# Patient Record
Sex: FEMALE | Race: OTHER | HISPANIC OR LATINO
[De-identification: names, ages, dates, MRNs, and addresses within clinical notes are randomized per-mention and may not be internally consistent; named-entity substitution may affect disease eponyms.]

---

## 2017-09-29 ENCOUNTER — APPOINTMENT (OUTPATIENT)
Dept: NEUROLOGY | Facility: CLINIC | Age: 47
End: 2017-09-29

## 2022-03-27 ENCOUNTER — INPATIENT (INPATIENT)
Facility: HOSPITAL | Age: 52
LOS: 22 days | Discharge: NURSING, REHAB OR LONG-TERM CARE | End: 2022-04-19
Attending: HOSPITALIST | Admitting: HOSPITALIST
Payer: MEDICARE

## 2022-03-27 VITALS
RESPIRATION RATE: 30 BRPM | SYSTOLIC BLOOD PRESSURE: 93 MMHG | TEMPERATURE: 98 F | HEART RATE: 96 BPM | DIASTOLIC BLOOD PRESSURE: 60 MMHG

## 2022-03-27 LAB
ALBUMIN SERPL ELPH-MCNC: 2.5 G/DL — LOW (ref 3.3–5)
ALP SERPL-CCNC: 162 U/L — HIGH (ref 40–120)
ALT FLD-CCNC: 12 U/L — SIGNIFICANT CHANGE UP (ref 4–33)
ANION GAP SERPL CALC-SCNC: 18 MMOL/L — HIGH (ref 7–14)
ANISOCYTOSIS BLD QL: SLIGHT — SIGNIFICANT CHANGE UP
AST SERPL-CCNC: 23 U/L — SIGNIFICANT CHANGE UP (ref 4–32)
B-OH-BUTYR SERPL-SCNC: 0.3 MMOL/L — SIGNIFICANT CHANGE UP (ref 0–0.4)
BASE EXCESS BLDV CALC-SCNC: -9.2 MMOL/L — LOW (ref -2–3)
BASOPHILS # BLD AUTO: 0.58 K/UL — HIGH (ref 0–0.2)
BASOPHILS NFR BLD AUTO: 2.6 % — HIGH (ref 0–2)
BILIRUB SERPL-MCNC: 0.4 MG/DL — SIGNIFICANT CHANGE UP (ref 0.2–1.2)
BLOOD GAS VENOUS COMPREHENSIVE RESULT: SIGNIFICANT CHANGE UP
BUN SERPL-MCNC: 59 MG/DL — HIGH (ref 7–23)
CALCIUM SERPL-MCNC: 9.1 MG/DL — SIGNIFICANT CHANGE UP (ref 8.4–10.5)
CHLORIDE BLDV-SCNC: 100 MMOL/L — SIGNIFICANT CHANGE UP (ref 96–108)
CHLORIDE SERPL-SCNC: 96 MMOL/L — LOW (ref 98–107)
CO2 BLDV-SCNC: 15.2 MMOL/L — LOW (ref 22–26)
CO2 SERPL-SCNC: 13 MMOL/L — LOW (ref 22–31)
CREAT SERPL-MCNC: 2.01 MG/DL — HIGH (ref 0.5–1.3)
EGFR: 30 ML/MIN/1.73M2 — LOW
EOSINOPHIL # BLD AUTO: 0.2 K/UL — SIGNIFICANT CHANGE UP (ref 0–0.5)
EOSINOPHIL NFR BLD AUTO: 0.9 % — SIGNIFICANT CHANGE UP (ref 0–6)
GAS PNL BLDV: 127 MMOL/L — LOW (ref 136–145)
GIANT PLATELETS BLD QL SMEAR: PRESENT — SIGNIFICANT CHANGE UP
GLUCOSE BLDV-MCNC: 551 MG/DL — CRITICAL HIGH (ref 70–99)
GLUCOSE SERPL-MCNC: 531 MG/DL — CRITICAL HIGH (ref 70–99)
HCO3 BLDV-SCNC: 14 MMOL/L — LOW (ref 22–29)
HCT VFR BLD CALC: 27.6 % — LOW (ref 34.5–45)
HCT VFR BLDA CALC: 26 % — LOW (ref 34.5–46.5)
HGB BLD CALC-MCNC: 8.8 G/DL — LOW (ref 11.5–15.5)
HGB BLD-MCNC: 8.4 G/DL — LOW (ref 11.5–15.5)
IANC: 18.3 K/UL — HIGH (ref 1.8–7.4)
LACTATE BLDV-MCNC: 3.2 MMOL/L — HIGH (ref 0.5–2)
LYMPHOCYTES # BLD AUTO: 0.79 K/UL — LOW (ref 1–3.3)
LYMPHOCYTES # BLD AUTO: 3.5 % — LOW (ref 13–44)
MAGNESIUM SERPL-MCNC: 1.5 MG/DL — LOW (ref 1.6–2.6)
MANUAL SMEAR VERIFICATION: SIGNIFICANT CHANGE UP
MCHC RBC-ENTMCNC: 25.3 PG — LOW (ref 27–34)
MCHC RBC-ENTMCNC: 30.4 GM/DL — LOW (ref 32–36)
MCV RBC AUTO: 83.1 FL — SIGNIFICANT CHANGE UP (ref 80–100)
METAMYELOCYTES # FLD: 0.9 % — SIGNIFICANT CHANGE UP (ref 0–1)
MICROCYTES BLD QL: SIGNIFICANT CHANGE UP
MONOCYTES # BLD AUTO: 2.56 K/UL — HIGH (ref 0–0.9)
MONOCYTES NFR BLD AUTO: 11.4 % — SIGNIFICANT CHANGE UP (ref 2–14)
MYELOCYTES NFR BLD: 0.9 % — HIGH (ref 0–0)
NEUTROPHILS # BLD AUTO: 17.52 K/UL — HIGH (ref 1.8–7.4)
NEUTROPHILS NFR BLD AUTO: 76.3 % — SIGNIFICANT CHANGE UP (ref 43–77)
NEUTS BAND # BLD: 1.7 % — SIGNIFICANT CHANGE UP (ref 0–6)
PCO2 BLDV: 24 MMHG — LOW (ref 39–42)
PH BLDV: 7.39 — SIGNIFICANT CHANGE UP (ref 7.32–7.43)
PHOSPHATE SERPL-MCNC: 5.3 MG/DL — HIGH (ref 2.5–4.5)
PLAT MORPH BLD: NORMAL — SIGNIFICANT CHANGE UP
PLATELET # BLD AUTO: 505 K/UL — HIGH (ref 150–400)
PLATELET COUNT - ESTIMATE: ABNORMAL
PO2 BLDV: 107 MMHG — SIGNIFICANT CHANGE UP
POIKILOCYTOSIS BLD QL AUTO: SLIGHT — SIGNIFICANT CHANGE UP
POLYCHROMASIA BLD QL SMEAR: SLIGHT — SIGNIFICANT CHANGE UP
POTASSIUM BLDV-SCNC: 6.8 MMOL/L — CRITICAL HIGH (ref 3.5–5.1)
POTASSIUM SERPL-MCNC: 6.6 MMOL/L — CRITICAL HIGH (ref 3.5–5.3)
POTASSIUM SERPL-SCNC: 6.6 MMOL/L — CRITICAL HIGH (ref 3.5–5.3)
PROT SERPL-MCNC: 8.2 G/DL — SIGNIFICANT CHANGE UP (ref 6–8.3)
RBC # BLD: 3.32 M/UL — LOW (ref 3.8–5.2)
RBC # FLD: 14.7 % — HIGH (ref 10.3–14.5)
RBC BLD AUTO: NORMAL — SIGNIFICANT CHANGE UP
SAO2 % BLDV: 99.2 % — SIGNIFICANT CHANGE UP
SODIUM SERPL-SCNC: 127 MMOL/L — LOW (ref 135–145)
SPHEROCYTES BLD QL SMEAR: SLIGHT — SIGNIFICANT CHANGE UP
VARIANT LYMPHS # BLD: 1.8 % — SIGNIFICANT CHANGE UP (ref 0–6)
WBC # BLD: 22.46 K/UL — HIGH (ref 3.8–10.5)
WBC # FLD AUTO: 22.46 K/UL — HIGH (ref 3.8–10.5)

## 2022-03-27 PROCEDURE — 99291 CRITICAL CARE FIRST HOUR: CPT | Mod: 25

## 2022-03-27 PROCEDURE — 36556 INSERT NON-TUNNEL CV CATH: CPT

## 2022-03-27 RX ORDER — SODIUM CHLORIDE 9 MG/ML
1000 INJECTION, SOLUTION INTRAVENOUS ONCE
Refills: 0 | Status: COMPLETED | OUTPATIENT
Start: 2022-03-27 | End: 2022-03-27

## 2022-03-27 RX ORDER — ALBUTEROL 90 UG/1
2.5 AEROSOL, METERED ORAL ONCE
Refills: 0 | Status: COMPLETED | OUTPATIENT
Start: 2022-03-27 | End: 2022-03-27

## 2022-03-27 RX ORDER — ALBUTEROL 90 UG/1
1 AEROSOL, METERED ORAL ONCE
Refills: 0 | Status: COMPLETED | OUTPATIENT
Start: 2022-03-27 | End: 2022-03-27

## 2022-03-27 RX ORDER — CALCIUM GLUCONATE 100 MG/ML
1 VIAL (ML) INTRAVENOUS ONCE
Refills: 0 | Status: COMPLETED | OUTPATIENT
Start: 2022-03-27 | End: 2022-03-27

## 2022-03-27 RX ORDER — MAGNESIUM SULFATE 500 MG/ML
2 VIAL (ML) INJECTION ONCE
Refills: 0 | Status: COMPLETED | OUTPATIENT
Start: 2022-03-27 | End: 2022-03-27

## 2022-03-27 RX ORDER — INSULIN LISPRO 100/ML
7 VIAL (ML) SUBCUTANEOUS ONCE
Refills: 0 | Status: DISCONTINUED | OUTPATIENT
Start: 2022-03-27 | End: 2022-03-28

## 2022-03-27 RX ORDER — SODIUM BICARBONATE 1 MEQ/ML
50 SYRINGE (ML) INTRAVENOUS ONCE
Refills: 0 | Status: COMPLETED | OUTPATIENT
Start: 2022-03-27 | End: 2022-03-27

## 2022-03-27 RX ORDER — INSULIN HUMAN 100 [IU]/ML
10 INJECTION, SOLUTION SUBCUTANEOUS ONCE
Refills: 0 | Status: COMPLETED | OUTPATIENT
Start: 2022-03-27 | End: 2022-03-27

## 2022-03-27 RX ADMIN — INSULIN HUMAN 10 UNIT(S): 100 INJECTION, SOLUTION SUBCUTANEOUS at 21:33

## 2022-03-27 RX ADMIN — SODIUM CHLORIDE 1000 MILLILITER(S): 9 INJECTION, SOLUTION INTRAVENOUS at 23:30

## 2022-03-27 RX ADMIN — ALBUTEROL 1 PUFF(S): 90 AEROSOL, METERED ORAL at 18:53

## 2022-03-27 RX ADMIN — Medication 25 GRAM(S): at 22:47

## 2022-03-27 RX ADMIN — SODIUM CHLORIDE 1000 MILLILITER(S): 9 INJECTION, SOLUTION INTRAVENOUS at 21:33

## 2022-03-27 RX ADMIN — ALBUTEROL 2.5 MILLIGRAM(S): 90 AEROSOL, METERED ORAL at 21:34

## 2022-03-27 RX ADMIN — Medication 50 MILLIEQUIVALENT(S): at 21:33

## 2022-03-27 RX ADMIN — Medication 100 GRAM(S): at 22:47

## 2022-03-27 NOTE — ED PROVIDER NOTE - CLINICAL SUMMARY MEDICAL DECISION MAKING FREE TEXT BOX
tootie pgy1: 52 F with HTN DM presents for eval of skin wounds. Thigh and suprapubic chronic appearing non-infected but concern for R labial infection. Check labs, CT scan, and assess for appropriateness of drainage. TLC placed by day team. tootie pgy1: 52 F with HTN DM presents for eval of skin wounds. Thigh and suprapubic chronic appearing non-infected but concern for R labial infection. Check labs, CT scan, and assess for appropriateness of drainage. TLC placed by day team.      Authored by Dr Sanford: 51 yo F, hx of T2DM, asthma, obesity, chronic immobility and several chronic wounds, presenting for vulvar lesion noted by her home health nurse today. Tachycardic and borderline temp of 100.3 rectally. exam as noted. Paralabial abscess w/ likely communication to the vaginal cannal. Consider deep space extension. Non-toxic and not septic currently. CT A/P iv contrast and cmp, cbc, vbg lactate. ivf.

## 2022-03-27 NOTE — ED PROVIDER NOTE - ATTENDING CONTRIBUTION TO CARE
51 yo F, hx of T2DM, asthma, obesity, chronic immobility and several chronic wounds, presenting for vulvar lesion noted by her home health nurse today. Unclear the duration of the lesion, as patient had not noticed this herself. The area was draining and appeared infection. Pt brother notified by home RN and ambulance called. Pt denies any ongoing symptoms. She lives on her own. She takes an unspecified oral hyperglycemic, is not on insulin. She has a rescue albuterol inhaler. Denies known allergies to medications.

## 2022-03-27 NOTE — ED PROVIDER NOTE - PHYSICAL EXAMINATION
Authored by Dr Sanford:  PE:  In no acute distress   NCAT   No respiratory distress, mild inspiratory wheeze   Normal rate and rhythm. No abnormal heart sounds.   Abdomen is large and distended w/ subcu fat. Non-tender   : Chaperoned by Phani Espinoza, Attending EP. There is a large paralabial fluid collection. This is draining purulent material. There is purulent material exiting the vagina. There is a  1 cm non-stagable ulcer over the medial groin on the R   Rectal: Large Stage 4 Ulcer over the R hip w/out cardinal signs of infection. There are several low stage ulcers over the rectum and buttock area that do not appear infected. The anus is unremarkable.   Neuro: Alert and grossly oriented. Answers questions approrpiately. Do gross motor deficits  MSK: No lennie deformities.
202

## 2022-03-27 NOTE — ED ADULT TRIAGE NOTE - CHIEF COMPLAINT QUOTE
arrives ems who called to house- bedsores r leg,r rib area,sacral. pt denies c/o pain at present.  pt awke,appears weak,orinted x3.  incontinent feces present. fs 466. pmh- dm,ms

## 2022-03-27 NOTE — ED ADULT NURSE NOTE - OBJECTIVE STATEMENT
A&Ox3. non ambulatory. c/o bed sores. pt states she lives alone and takes care of herself. NAD. pt denies chest pain, dizziness, n/v/d, fevers, chills, SOB. respirations are even and un labored. abd is soft and non tender. positive un stageable pressure wound to right lateral hip, 6" by 6". positive tunneling wound to lower right lateral leg, 4" by 4". IV placed by MD via MD. comfort care provided, wound covers placed to left lateral hip and left lateral thigh. white odorous substance draining from the vagina. various stages of wounds observed to left lateral breast. right heel has un-stageable pressure wound. left heel elevated to relieve pressure. pt position changed every 2 hours. safety precautions maintained.

## 2022-03-27 NOTE — ED PROVIDER NOTE - PROGRESS NOTE DETAILS
tootie pgy1: sign out from day team - 52F with HTN DM2 morbid obesity presents from home after aid described some suprapubic ulcer/R labial and R thigh wound. Per prior resident pt has R labial swelling with some discharge as well as a R thigh/R suprapubic (chronic) wound that does not appear infected. Pt extremely difficult to get vascular access resulting in TLC. Labs, CT scans pending. Will plan to admit. tootie pgy1: labs not improving despite aggressive fluids and meds. Consulted MICU and Nephro. To place toussaint and check urine. Nephro does not think needs emergent HD but will come to see pt. Likely MICU Dustin pgy1: spoke to Nephro fellow again. Does not feel that lab abnormalities are due to renal failure, feels she is still grossly volume down and in DKA vs HHS. Feels K will improve with insulin drip and aggressive fluid resuscitation. No plan for HD yet, MICU aware. Insulin drip ordered. tootie pgy1: pt was pending repeat labs after third IVF L and insulin drip then pt's BP dropped first to 80/40 then 60/40. Pt somewhat more tired and less responsive but still talking. Further fluids given and levo started. MICU re-consulted. tootie pgy1: pt was pending repeat labs after third IVF L and insulin drip then pt's BP dropped first to 80/40 then 60/40. Pt somewhat more tired and less responsive but still talking. Further fluids given and levo started. MICU re-consulted.    Camacho Ocasio DO: agree with above patient received from Dr Espinoza. Upon my evaluation, this patient had a high probability of imminent or life-threatening deterioration due to hyperkalemia, acidemia and then subsequent hypotension requiring pressors, which required my direct attention, intervention, and personal management. s/p 4L ivf lungs cta, patient aaox3 speaking full sentences comfortably. turbid, thick purulent urine draining from newly placed toussaint. The patient has a  medical condition that impairs one or more vital organ systems.  Frequent personal assessment and adjustment of medical interventions was performed.      I have personally provided 35 minutes of critical care time exclusive of time spent on separately billable procedures. Time includes review of laboratory data, radiology results, discussion with consultants, patient and family; monitoring for potential decompensation, as well as time spent retrieving data and reviewing the chart and documenting the visit. Interventions were performed as documented above.

## 2022-03-27 NOTE — ED PROVIDER NOTE - OBJECTIVE STATEMENT
Authored by Dr Sanford: 53 yo F, hx of T2DM, asthma, obesity, chronic immobility and several chronic wounds, presenting for vulvar lesion noted by her home health nurse today. Unclear the duration of the lesion, as patient had not noticed this herself. The area was draining and appeared infection. Pt brother notified by home RN and ambulance called. Pt denies any ongoing symptoms. She lives on her own. She takes an unspecified oral hyperglycemic, is not on insulin. She has a rescue albuterol inhaler. Denies known allergies to medications.

## 2022-03-28 DIAGNOSIS — E87.2 ACIDOSIS: ICD-10-CM

## 2022-03-28 DIAGNOSIS — T14.8XXA OTHER INJURY OF UNSPECIFIED BODY REGION, INITIAL ENCOUNTER: ICD-10-CM

## 2022-03-28 DIAGNOSIS — E87.5 HYPERKALEMIA: ICD-10-CM

## 2022-03-28 DIAGNOSIS — N17.9 ACUTE KIDNEY FAILURE, UNSPECIFIED: ICD-10-CM

## 2022-03-28 LAB
A1C WITH ESTIMATED AVERAGE GLUCOSE RESULT: 14.2 % — HIGH (ref 4–5.6)
ALBUMIN SERPL ELPH-MCNC: 1.7 G/DL — LOW (ref 3.3–5)
ALBUMIN SERPL ELPH-MCNC: 2.2 G/DL — LOW (ref 3.3–5)
ALP SERPL-CCNC: 155 U/L — HIGH (ref 40–120)
ALP SERPL-CCNC: 161 U/L — HIGH (ref 40–120)
ALT FLD-CCNC: 16 U/L — SIGNIFICANT CHANGE UP (ref 4–33)
ALT FLD-CCNC: 18 U/L — SIGNIFICANT CHANGE UP (ref 4–33)
ANION GAP SERPL CALC-SCNC: 16 MMOL/L — HIGH (ref 7–14)
ANION GAP SERPL CALC-SCNC: 16 MMOL/L — HIGH (ref 7–14)
ANION GAP SERPL CALC-SCNC: 18 MMOL/L — HIGH (ref 7–14)
ANION GAP SERPL CALC-SCNC: 18 MMOL/L — HIGH (ref 7–14)
APPEARANCE UR: ABNORMAL
AST SERPL-CCNC: 50 U/L — HIGH (ref 4–32)
AST SERPL-CCNC: 51 U/L — HIGH (ref 4–32)
BACTERIA # UR AUTO: ABNORMAL
BILIRUB SERPL-MCNC: 0.3 MG/DL — SIGNIFICANT CHANGE UP (ref 0.2–1.2)
BILIRUB SERPL-MCNC: 0.4 MG/DL — SIGNIFICANT CHANGE UP (ref 0.2–1.2)
BILIRUB UR-MCNC: NEGATIVE — SIGNIFICANT CHANGE UP
BLOOD GAS VENOUS COMPREHENSIVE RESULT: SIGNIFICANT CHANGE UP
BLOOD GAS VENOUS COMPREHENSIVE RESULT: SIGNIFICANT CHANGE UP
BUN SERPL-MCNC: 54 MG/DL — HIGH (ref 7–23)
BUN SERPL-MCNC: 56 MG/DL — HIGH (ref 7–23)
BUN SERPL-MCNC: 57 MG/DL — HIGH (ref 7–23)
BUN SERPL-MCNC: 59 MG/DL — HIGH (ref 7–23)
CALCIUM SERPL-MCNC: 8.5 MG/DL — SIGNIFICANT CHANGE UP (ref 8.4–10.5)
CALCIUM SERPL-MCNC: 8.8 MG/DL — SIGNIFICANT CHANGE UP (ref 8.4–10.5)
CALCIUM SERPL-MCNC: 9 MG/DL — SIGNIFICANT CHANGE UP (ref 8.4–10.5)
CALCIUM SERPL-MCNC: 9.2 MG/DL — SIGNIFICANT CHANGE UP (ref 8.4–10.5)
CHLORIDE SERPL-SCNC: 91 MMOL/L — LOW (ref 98–107)
CHLORIDE SERPL-SCNC: 95 MMOL/L — LOW (ref 98–107)
CHLORIDE UR-SCNC: 41 MMOL/L — SIGNIFICANT CHANGE UP
CO2 SERPL-SCNC: 12 MMOL/L — LOW (ref 22–31)
CO2 SERPL-SCNC: 15 MMOL/L — LOW (ref 22–31)
CO2 SERPL-SCNC: 17 MMOL/L — LOW (ref 22–31)
CO2 SERPL-SCNC: 18 MMOL/L — LOW (ref 22–31)
COLOR SPEC: ABNORMAL
CREAT ?TM UR-MCNC: 51 MG/DL — SIGNIFICANT CHANGE UP
CREAT SERPL-MCNC: 2.17 MG/DL — HIGH (ref 0.5–1.3)
CREAT SERPL-MCNC: 2.29 MG/DL — HIGH (ref 0.5–1.3)
CREAT SERPL-MCNC: 2.33 MG/DL — HIGH (ref 0.5–1.3)
CREAT SERPL-MCNC: 2.4 MG/DL — HIGH (ref 0.5–1.3)
DIFF PNL FLD: ABNORMAL
EGFR: 24 ML/MIN/1.73M2 — LOW
EGFR: 25 ML/MIN/1.73M2 — LOW
EGFR: 25 ML/MIN/1.73M2 — LOW
EGFR: 27 ML/MIN/1.73M2 — LOW
EPI CELLS # UR: SIGNIFICANT CHANGE UP
ESTIMATED AVERAGE GLUCOSE: 361 — SIGNIFICANT CHANGE UP
FERRITIN SERPL-MCNC: 956 NG/ML — HIGH (ref 15–150)
FLUAV + FLUBV RNA SPEC NAA+PROBE: SIGNIFICANT CHANGE UP
FLUAV AG NPH QL: SIGNIFICANT CHANGE UP
FLUBV AG NPH QL: SIGNIFICANT CHANGE UP
GLUCOSE BLDC GLUCOMTR-MCNC: 231 MG/DL — HIGH (ref 70–99)
GLUCOSE BLDC GLUCOMTR-MCNC: 236 MG/DL — HIGH (ref 70–99)
GLUCOSE BLDC GLUCOMTR-MCNC: 241 MG/DL — HIGH (ref 70–99)
GLUCOSE BLDC GLUCOMTR-MCNC: 301 MG/DL — HIGH (ref 70–99)
GLUCOSE BLDC GLUCOMTR-MCNC: 318 MG/DL — HIGH (ref 70–99)
GLUCOSE BLDC GLUCOMTR-MCNC: 365 MG/DL — HIGH (ref 70–99)
GLUCOSE SERPL-MCNC: 301 MG/DL — HIGH (ref 70–99)
GLUCOSE SERPL-MCNC: 312 MG/DL — HIGH (ref 70–99)
GLUCOSE SERPL-MCNC: 351 MG/DL — HIGH (ref 70–99)
GLUCOSE SERPL-MCNC: 400 MG/DL — HIGH (ref 70–99)
GLUCOSE UR QL: NEGATIVE — SIGNIFICANT CHANGE UP
HAPTOGLOB SERPL-MCNC: 526 MG/DL — HIGH (ref 34–200)
IRON SATN MFR SERPL: 15 % — SIGNIFICANT CHANGE UP (ref 14–50)
IRON SATN MFR SERPL: 24 UG/DL — LOW (ref 30–160)
KETONES UR-MCNC: NEGATIVE — SIGNIFICANT CHANGE UP
LDH SERPL L TO P-CCNC: 387 U/L — HIGH (ref 135–225)
LEUKOCYTE ESTERASE UR-ACNC: ABNORMAL
MAGNESIUM SERPL-MCNC: 1.9 MG/DL — SIGNIFICANT CHANGE UP (ref 1.6–2.6)
NITRITE UR-MCNC: NEGATIVE — SIGNIFICANT CHANGE UP
PH UR: 5.5 — SIGNIFICANT CHANGE UP (ref 5–8)
POTASSIUM SERPL-MCNC: 5.2 MMOL/L — SIGNIFICANT CHANGE UP (ref 3.5–5.3)
POTASSIUM SERPL-MCNC: 5.4 MMOL/L — HIGH (ref 3.5–5.3)
POTASSIUM SERPL-MCNC: 6.1 MMOL/L — HIGH (ref 3.5–5.3)
POTASSIUM SERPL-MCNC: 6.6 MMOL/L — CRITICAL HIGH (ref 3.5–5.3)
POTASSIUM SERPL-SCNC: 5.2 MMOL/L — SIGNIFICANT CHANGE UP (ref 3.5–5.3)
POTASSIUM SERPL-SCNC: 5.4 MMOL/L — HIGH (ref 3.5–5.3)
POTASSIUM SERPL-SCNC: 6.1 MMOL/L — HIGH (ref 3.5–5.3)
POTASSIUM SERPL-SCNC: 6.6 MMOL/L — CRITICAL HIGH (ref 3.5–5.3)
POTASSIUM UR-SCNC: 34.9 MMOL/L — SIGNIFICANT CHANGE UP
PROT SERPL-MCNC: 7.2 G/DL — SIGNIFICANT CHANGE UP (ref 6–8.3)
PROT SERPL-MCNC: 7.7 G/DL — SIGNIFICANT CHANGE UP (ref 6–8.3)
PROT UR-MCNC: ABNORMAL
RBC CASTS # UR COMP ASSIST: 8 /HPF — HIGH (ref 0–4)
RSV RNA NPH QL NAA+NON-PROBE: SIGNIFICANT CHANGE UP
SARS-COV-2 RNA SPEC QL NAA+PROBE: DETECTED
SODIUM SERPL-SCNC: 124 MMOL/L — LOW (ref 135–145)
SODIUM SERPL-SCNC: 125 MMOL/L — LOW (ref 135–145)
SODIUM SERPL-SCNC: 128 MMOL/L — LOW (ref 135–145)
SODIUM SERPL-SCNC: 129 MMOL/L — LOW (ref 135–145)
SODIUM UR-SCNC: 44 MMOL/L — SIGNIFICANT CHANGE UP
SP GR SPEC: 1.03 — SIGNIFICANT CHANGE UP (ref 1–1.05)
TIBC SERPL-MCNC: 164 UG/DL — LOW (ref 220–430)
TSH SERPL-MCNC: 3.86 UIU/ML — SIGNIFICANT CHANGE UP (ref 0.27–4.2)
UIBC SERPL-MCNC: 140 UG/DL — SIGNIFICANT CHANGE UP (ref 110–370)
UROBILINOGEN FLD QL: SIGNIFICANT CHANGE UP
WBC UR QL: >200 /HPF — HIGH (ref 0–5)

## 2022-03-28 PROCEDURE — 76604 US EXAM CHEST: CPT | Mod: 26,GC

## 2022-03-28 PROCEDURE — 99222 1ST HOSP IP/OBS MODERATE 55: CPT

## 2022-03-28 PROCEDURE — 93308 TTE F-UP OR LMTD: CPT | Mod: 26,GC

## 2022-03-28 PROCEDURE — 99291 CRITICAL CARE FIRST HOUR: CPT

## 2022-03-28 PROCEDURE — 74176 CT ABD & PELVIS W/O CONTRAST: CPT | Mod: 26,MA

## 2022-03-28 PROCEDURE — 99292 CRITICAL CARE ADDL 30 MIN: CPT

## 2022-03-28 RX ORDER — SODIUM CHLORIDE 9 MG/ML
1000 INJECTION, SOLUTION INTRAVENOUS ONCE
Refills: 0 | Status: DISCONTINUED | OUTPATIENT
Start: 2022-03-28 | End: 2022-03-28

## 2022-03-28 RX ORDER — INSULIN GLARGINE 100 [IU]/ML
7 INJECTION, SOLUTION SUBCUTANEOUS ONCE
Refills: 0 | Status: DISCONTINUED | OUTPATIENT
Start: 2022-03-28 | End: 2022-03-28

## 2022-03-28 RX ORDER — SODIUM CHLORIDE 9 MG/ML
1000 INJECTION, SOLUTION INTRAVENOUS
Refills: 0 | Status: DISCONTINUED | OUTPATIENT
Start: 2022-03-28 | End: 2022-03-28

## 2022-03-28 RX ORDER — ACETAMINOPHEN 500 MG
1000 TABLET ORAL ONCE
Refills: 0 | Status: COMPLETED | OUTPATIENT
Start: 2022-03-28 | End: 2022-03-28

## 2022-03-28 RX ORDER — SODIUM ZIRCONIUM CYCLOSILICATE 10 G/10G
5 POWDER, FOR SUSPENSION ORAL ONCE
Refills: 0 | Status: COMPLETED | OUTPATIENT
Start: 2022-03-28 | End: 2022-03-28

## 2022-03-28 RX ORDER — SODIUM BICARBONATE 1 MEQ/ML
0.14 SYRINGE (ML) INTRAVENOUS
Qty: 150 | Refills: 0 | Status: DISCONTINUED | OUTPATIENT
Start: 2022-03-28 | End: 2022-03-29

## 2022-03-28 RX ORDER — PIPERACILLIN AND TAZOBACTAM 4; .5 G/20ML; G/20ML
3.38 INJECTION, POWDER, LYOPHILIZED, FOR SOLUTION INTRAVENOUS ONCE
Refills: 0 | Status: COMPLETED | OUTPATIENT
Start: 2022-03-28 | End: 2022-03-28

## 2022-03-28 RX ORDER — VANCOMYCIN HCL 1 G
750 VIAL (EA) INTRAVENOUS ONCE
Refills: 0 | Status: COMPLETED | OUTPATIENT
Start: 2022-03-28 | End: 2022-03-28

## 2022-03-28 RX ORDER — SODIUM ZIRCONIUM CYCLOSILICATE 10 G/10G
10 POWDER, FOR SUSPENSION ORAL ONCE
Refills: 0 | Status: DISCONTINUED | OUTPATIENT
Start: 2022-03-28 | End: 2022-03-28

## 2022-03-28 RX ORDER — MIDODRINE HYDROCHLORIDE 2.5 MG/1
10 TABLET ORAL EVERY 8 HOURS
Refills: 0 | Status: DISCONTINUED | OUTPATIENT
Start: 2022-03-28 | End: 2022-03-29

## 2022-03-28 RX ORDER — VANCOMYCIN HCL 1 G
1000 VIAL (EA) INTRAVENOUS ONCE
Refills: 0 | Status: COMPLETED | OUTPATIENT
Start: 2022-03-28 | End: 2022-03-28

## 2022-03-28 RX ORDER — ALBUTEROL 90 UG/1
2 AEROSOL, METERED ORAL EVERY 6 HOURS
Refills: 0 | Status: DISCONTINUED | OUTPATIENT
Start: 2022-03-28 | End: 2022-04-15

## 2022-03-28 RX ORDER — SODIUM CHLORIDE 9 MG/ML
1000 INJECTION INTRAMUSCULAR; INTRAVENOUS; SUBCUTANEOUS
Refills: 0 | Status: DISCONTINUED | OUTPATIENT
Start: 2022-03-28 | End: 2022-03-28

## 2022-03-28 RX ORDER — DEXTROSE 50 % IN WATER 50 %
25 SYRINGE (ML) INTRAVENOUS ONCE
Refills: 0 | Status: DISCONTINUED | OUTPATIENT
Start: 2022-03-28 | End: 2022-04-19

## 2022-03-28 RX ORDER — INSULIN LISPRO 100/ML
5 VIAL (ML) SUBCUTANEOUS
Refills: 0 | Status: DISCONTINUED | OUTPATIENT
Start: 2022-03-28 | End: 2022-03-29

## 2022-03-28 RX ORDER — SODIUM CHLORIDE 9 MG/ML
1000 INJECTION, SOLUTION INTRAVENOUS ONCE
Refills: 0 | Status: COMPLETED | OUTPATIENT
Start: 2022-03-28 | End: 2022-03-28

## 2022-03-28 RX ORDER — PIPERACILLIN AND TAZOBACTAM 4; .5 G/20ML; G/20ML
3.38 INJECTION, POWDER, LYOPHILIZED, FOR SOLUTION INTRAVENOUS EVERY 8 HOURS
Refills: 0 | Status: DISCONTINUED | OUTPATIENT
Start: 2022-03-28 | End: 2022-03-31

## 2022-03-28 RX ORDER — INSULIN HUMAN 100 [IU]/ML
8 INJECTION, SOLUTION SUBCUTANEOUS
Qty: 100 | Refills: 0 | Status: DISCONTINUED | OUTPATIENT
Start: 2022-03-28 | End: 2022-03-28

## 2022-03-28 RX ORDER — HYDROMORPHONE HYDROCHLORIDE 2 MG/ML
0.25 INJECTION INTRAMUSCULAR; INTRAVENOUS; SUBCUTANEOUS ONCE
Refills: 0 | Status: DISCONTINUED | OUTPATIENT
Start: 2022-03-28 | End: 2022-03-28

## 2022-03-28 RX ORDER — SODIUM CHLORIDE 9 MG/ML
1000 INJECTION, SOLUTION INTRAVENOUS
Refills: 0 | Status: DISCONTINUED | OUTPATIENT
Start: 2022-03-28 | End: 2022-04-19

## 2022-03-28 RX ORDER — HUMAN INSULIN 100 [IU]/ML
7 INJECTION, SUSPENSION SUBCUTANEOUS AT BEDTIME
Refills: 0 | Status: DISCONTINUED | OUTPATIENT
Start: 2022-03-28 | End: 2022-03-29

## 2022-03-28 RX ORDER — SODIUM CHLORIDE 9 MG/ML
1000 INJECTION INTRAMUSCULAR; INTRAVENOUS; SUBCUTANEOUS ONCE
Refills: 0 | Status: COMPLETED | OUTPATIENT
Start: 2022-03-28 | End: 2022-03-28

## 2022-03-28 RX ORDER — SODIUM BICARBONATE 1 MEQ/ML
0.14 SYRINGE (ML) INTRAVENOUS
Qty: 150 | Refills: 0 | Status: DISCONTINUED | OUTPATIENT
Start: 2022-03-28 | End: 2022-03-28

## 2022-03-28 RX ORDER — LEVOTHYROXINE SODIUM 125 MCG
75 TABLET ORAL DAILY
Refills: 0 | Status: DISCONTINUED | OUTPATIENT
Start: 2022-03-28 | End: 2022-04-19

## 2022-03-28 RX ORDER — IPRATROPIUM/ALBUTEROL SULFATE 18-103MCG
3 AEROSOL WITH ADAPTER (GRAM) INHALATION EVERY 6 HOURS
Refills: 0 | Status: DISCONTINUED | OUTPATIENT
Start: 2022-03-28 | End: 2022-04-15

## 2022-03-28 RX ORDER — INSULIN LISPRO 100/ML
VIAL (ML) SUBCUTANEOUS
Refills: 0 | Status: DISCONTINUED | OUTPATIENT
Start: 2022-03-28 | End: 2022-03-29

## 2022-03-28 RX ORDER — INSULIN LISPRO 100/ML
VIAL (ML) SUBCUTANEOUS AT BEDTIME
Refills: 0 | Status: DISCONTINUED | OUTPATIENT
Start: 2022-03-28 | End: 2022-03-29

## 2022-03-28 RX ORDER — ENOXAPARIN SODIUM 100 MG/ML
40 INJECTION SUBCUTANEOUS EVERY 12 HOURS
Refills: 0 | Status: DISCONTINUED | OUTPATIENT
Start: 2022-03-28 | End: 2022-04-07

## 2022-03-28 RX ORDER — DEXTROSE 50 % IN WATER 50 %
15 SYRINGE (ML) INTRAVENOUS ONCE
Refills: 0 | Status: DISCONTINUED | OUTPATIENT
Start: 2022-03-28 | End: 2022-04-19

## 2022-03-28 RX ORDER — GLUCAGON INJECTION, SOLUTION 0.5 MG/.1ML
1 INJECTION, SOLUTION SUBCUTANEOUS ONCE
Refills: 0 | Status: DISCONTINUED | OUTPATIENT
Start: 2022-03-28 | End: 2022-04-19

## 2022-03-28 RX ORDER — NOREPINEPHRINE BITARTRATE/D5W 8 MG/250ML
0.05 PLASTIC BAG, INJECTION (ML) INTRAVENOUS
Qty: 16 | Refills: 0 | Status: DISCONTINUED | OUTPATIENT
Start: 2022-03-28 | End: 2022-03-29

## 2022-03-28 RX ORDER — HUMAN INSULIN 100 [IU]/ML
7 INJECTION, SUSPENSION SUBCUTANEOUS
Refills: 0 | Status: DISCONTINUED | OUTPATIENT
Start: 2022-03-29 | End: 2022-03-29

## 2022-03-28 RX ORDER — CHLORHEXIDINE GLUCONATE 213 G/1000ML
1 SOLUTION TOPICAL
Refills: 0 | Status: DISCONTINUED | OUTPATIENT
Start: 2022-03-28 | End: 2022-03-29

## 2022-03-28 RX ORDER — INSULIN HUMAN 100 [IU]/ML
12 INJECTION, SOLUTION SUBCUTANEOUS
Qty: 100 | Refills: 0 | Status: DISCONTINUED | OUTPATIENT
Start: 2022-03-28 | End: 2022-03-28

## 2022-03-28 RX ORDER — SODIUM ZIRCONIUM CYCLOSILICATE 10 G/10G
10 POWDER, FOR SUSPENSION ORAL ONCE
Refills: 0 | Status: COMPLETED | OUTPATIENT
Start: 2022-03-28 | End: 2022-03-28

## 2022-03-28 RX ORDER — ACETAMINOPHEN 500 MG
975 TABLET ORAL ONCE
Refills: 0 | Status: DISCONTINUED | OUTPATIENT
Start: 2022-03-28 | End: 2022-03-28

## 2022-03-28 RX ORDER — DEXTROSE 50 % IN WATER 50 %
12.5 SYRINGE (ML) INTRAVENOUS ONCE
Refills: 0 | Status: DISCONTINUED | OUTPATIENT
Start: 2022-03-28 | End: 2022-04-19

## 2022-03-28 RX ORDER — HUMAN INSULIN 100 [IU]/ML
7 INJECTION, SUSPENSION SUBCUTANEOUS ONCE
Refills: 0 | Status: COMPLETED | OUTPATIENT
Start: 2022-03-28 | End: 2022-03-28

## 2022-03-28 RX ORDER — NOREPINEPHRINE BITARTRATE/D5W 8 MG/250ML
0.05 PLASTIC BAG, INJECTION (ML) INTRAVENOUS
Qty: 8 | Refills: 0 | Status: DISCONTINUED | OUTPATIENT
Start: 2022-03-28 | End: 2022-03-28

## 2022-03-28 RX ADMIN — Medication 5 UNIT(S): at 17:49

## 2022-03-28 RX ADMIN — Medication 250 MILLIGRAM(S): at 04:37

## 2022-03-28 RX ADMIN — PIPERACILLIN AND TAZOBACTAM 200 GRAM(S): 4; .5 INJECTION, POWDER, LYOPHILIZED, FOR SOLUTION INTRAVENOUS at 02:10

## 2022-03-28 RX ADMIN — Medication 1000 MILLIGRAM(S): at 07:00

## 2022-03-28 RX ADMIN — PIPERACILLIN AND TAZOBACTAM 25 GRAM(S): 4; .5 INJECTION, POWDER, LYOPHILIZED, FOR SOLUTION INTRAVENOUS at 22:48

## 2022-03-28 RX ADMIN — INSULIN HUMAN 8 UNIT(S)/HR: 100 INJECTION, SOLUTION SUBCUTANEOUS at 07:07

## 2022-03-28 RX ADMIN — SODIUM ZIRCONIUM CYCLOSILICATE 5 GRAM(S): 10 POWDER, FOR SUSPENSION ORAL at 14:14

## 2022-03-28 RX ADMIN — Medication 2: at 22:56

## 2022-03-28 RX ADMIN — INSULIN HUMAN 12 UNIT(S)/HR: 100 INJECTION, SOLUTION SUBCUTANEOUS at 04:37

## 2022-03-28 RX ADMIN — Medication 250 MILLIGRAM(S): at 09:13

## 2022-03-28 RX ADMIN — SODIUM ZIRCONIUM CYCLOSILICATE 10 GRAM(S): 10 POWDER, FOR SUSPENSION ORAL at 02:09

## 2022-03-28 RX ADMIN — HUMAN INSULIN 7 UNIT(S): 100 INJECTION, SUSPENSION SUBCUTANEOUS at 22:56

## 2022-03-28 RX ADMIN — SODIUM CHLORIDE 1000 MILLILITER(S): 9 INJECTION, SOLUTION INTRAVENOUS at 04:36

## 2022-03-28 RX ADMIN — Medication 5.16 MICROGRAM(S)/KG/MIN: at 14:06

## 2022-03-28 RX ADMIN — SODIUM CHLORIDE 1000 MILLILITER(S): 9 INJECTION INTRAMUSCULAR; INTRAVENOUS; SUBCUTANEOUS at 06:38

## 2022-03-28 RX ADMIN — MIDODRINE HYDROCHLORIDE 10 MILLIGRAM(S): 2.5 TABLET ORAL at 22:48

## 2022-03-28 RX ADMIN — Medication 100 MEQ/KG/HR: at 15:41

## 2022-03-28 RX ADMIN — Medication 4: at 12:29

## 2022-03-28 RX ADMIN — ENOXAPARIN SODIUM 40 MILLIGRAM(S): 100 INJECTION SUBCUTANEOUS at 17:58

## 2022-03-28 RX ADMIN — Medication 5: at 15:41

## 2022-03-28 RX ADMIN — PIPERACILLIN AND TAZOBACTAM 25 GRAM(S): 4; .5 INJECTION, POWDER, LYOPHILIZED, FOR SOLUTION INTRAVENOUS at 13:43

## 2022-03-28 RX ADMIN — SODIUM CHLORIDE 125 MILLILITER(S): 9 INJECTION, SOLUTION INTRAVENOUS at 07:07

## 2022-03-28 RX ADMIN — MIDODRINE HYDROCHLORIDE 10 MILLIGRAM(S): 2.5 TABLET ORAL at 13:43

## 2022-03-28 RX ADMIN — Medication 400 MILLIGRAM(S): at 06:34

## 2022-03-28 RX ADMIN — HUMAN INSULIN 7 UNIT(S): 100 INJECTION, SUSPENSION SUBCUTANEOUS at 18:47

## 2022-03-28 RX ADMIN — SODIUM CHLORIDE 333.33 MILLILITER(S): 9 INJECTION, SOLUTION INTRAVENOUS at 09:12

## 2022-03-28 RX ADMIN — Medication 14.9 MICROGRAM(S)/KG/MIN: at 06:22

## 2022-03-28 RX ADMIN — CHLORHEXIDINE GLUCONATE 1 APPLICATION(S): 213 SOLUTION TOPICAL at 17:50

## 2022-03-28 NOTE — CONSULT NOTE ADULT - ASSESSMENT
53 yo F, hx of T2DM, asthma, obesity, chronic immobility 2/2 MS and several chronic wounds, presenting for vulvar/R thigh found to have RAFAELA and hyperglycemia c/f DKA and ARF    Plan  Patient on metformin/januvia for DM2 likely not sufficient enough for glycemic control exacerbated in the setting of new wound further c/b RAFAELA a/w metabolic acidosis. Nephro consulted w/ declination of HD for now and patient placed on bicarb gtt. Patient w/ elevated glucose and bicarb of 13, however pH normal and BHB negative making clinical picture less likely due to DKA. Metabolic acidosis likely multifactorial 2/2 distributive shock 2/2 acute decompensation of R vulvar/thigh wound leading to RAFAELA and worsening renal failure.  -Aggressive resuscitation w/ IV fluids  -Basal/bolus insulin regimen   -f/u nephro recs  -repeat labs after adequate resus and subq insulin    Patient is not a MICU candidate. Please reconsult as necessary.

## 2022-03-28 NOTE — CONSULT NOTE ADULT - SUBJECTIVE AND OBJECTIVE BOX
CHIEF COMPLAINT:    HPI:   51 yo F, hx of T2DM, asthma, obesity, chronic immobility and several chronic wounds, presenting for vulvar/R thigh noted by her home health nurse today. Patient states wound began x1 month ago, started as a skin tear and progressed over the course of a month. Patient states that this Friday RN inspected wound and stated it may need evaluation but wanted to observe, would rechecked today and patient was brought into ed.     In the ED, was initially hypotensive to 90s/50s which improved w/ fluids. Labs significant for wc 22, k 6.6, cr 2.2 unknown bl, glc 533, bicarb 13. bhb negative, ph 7.38. MICU consulted for DKA vs ARF requiring dialysis. Nephro consulted for HD, patient declined.        PAST MEDICAL & SURGICAL HISTORY:      FAMILY HISTORY:      SOCIAL HISTORY:  Smoking: __ packs x ___ years  EtOH Use:  Marital Status:  Occupation:  Recent Travel:  Country of Birth:  Advance Directives:    Allergies    No Known Allergies    Intolerances        HOME MEDICATIONS:    HOSPITAL MEDICATIONS:  MEDICATIONS  (STANDING):  insulin lispro Injectable (ADMELOG). 7 Unit(s) SubCutaneous once  insulin regular Infusion 12 Unit(s)/Hr (12 mL/Hr) IV Continuous <Continuous>  sodium chloride 0.9%. 1000 milliLiter(s) (100 mL/Hr) IV Continuous <Continuous>    MEDICATIONS  (PRN):      REVIEW OF SYSTEMS:  General: no fever, chills  HENT: no nasal congestion, no sore throat  Eyes: no visual changes, no blurred vision  Neck: no neck pain  CV: denies chest pain, no palpitations  Resp: no difficulty breathing, no cough  Abdominal: no nausea, no vomiting, no diarrhea, no abdominal pain  MSK: no muscle aches  Neuro: no headaches  Skin: no rashes      OBJECTIVE:  ICU Vital Signs Last 24 Hrs  T(C): 36.6 (28 Mar 2022 01:12), Max: 36.7 (27 Mar 2022 17:05)  T(F): 97.9 (28 Mar 2022 01:12), Max: 98 (27 Mar 2022 17:05)  HR: 94 (28 Mar 2022 01:12) (94 - 96)  BP: 100/62 (28 Mar 2022 01:12) (93/60 - 100/62)  BP(mean): --  ABP: --  ABP(mean): --  RR: 28 (28 Mar 2022 01:12) (28 - 30)  SpO2: 100% (28 Mar 2022 01:12) (100% - 100%)        CAPILLARY BLOOD GLUCOSE      POCT Blood Glucose.: 368 mg/dL (28 Mar 2022 04:28)      PHYSICAL EXAM:  General: obese   HEENT: neck supple, anicteric sclera  Cardiovascular: Normal s1, s2, RRR  Respiratory: CTA b/l   Abdominal: Soft, ntnd  Extremities: No swelling in LEs  Neurologic: Non focal  Psych: Awake, alert answering questions appropriately    LABS:  CBC Full  -  (  @ 19:13 )                        8.4 g/dL  27.6 % )-----------( 30.4 gm/dL              25.3 pg  Mean Cell Volume : 83.1 fL  Auto Neutrophil # : 14.7 %  Auto Lymphocyte # : 22.46 K/uL  Auto Monocyte # : x  Auto Eosinophil # : x  Auto Basophil # : x        128<L>  |  95<L>  |  57<H>  ----------------------------<  400<H>  6.1<H>   |  15<L>  |  2.17<H>    Ca    9.2      28 Mar 2022 00:47  Phos  5.3       Mg     1.50         TPro  8.2  /  Alb  2.5<L>  /  TBili  0.4  /  DBili  x   /  AST  23  /  ALT  12  /  AlkPhos  162<H>          VB.29/34/38/16  Lactate = 5.4  7.39/24/107/14  Lactate = 3.2    ABG:       MICROBIOLOGY:   Blood Cx -    Urine Culture -   Sputum Culture -   RVP -    RADIOLOGY:  Xray -   CT -   MRI -   Ultrasound -   [ ] Reviewed and interpreted by me    EKG:

## 2022-03-28 NOTE — CONSULT NOTE ADULT - SUBJECTIVE AND OBJECTIVE BOX
[facility] DIVISION OF KIDNEY DISEASES AND HYPERTENSION -- [phone]  -- INITIAL CONSULT NOTE  --------------------------------------------------------------------------------  HPI: Patient is a 52 year old female with PMH of MS (diagnosed in 2001), long standing DM (>10 years), hypothyroidism, multiple chronic wounds and morbid obesity who came to the [facility] ER on 3/27 with c/o lethargy x3-4 days. Initial labs in ER showed severe hyperkalemia of 6.6 (non hemolyzed), elevated Blood sugars of 531 and elevated Scr of 2.01. Pt. received 2 L IVF bolus, medical management for hyperkalemia- insulin and calcium gluconate 1gm Iv, MgSo4 IV and sodium bicarb 50 mQ once in ER. Nephrology consutled for RAFAELA, and hyperkalemia.   Scr on admission is elevated at 2.01 with SNa of 135 (corrected), serum potassium elevated to 6.6 and serum CO2 of 15 with Bld glucose of 531. Received medical management for hyperkalemia. Repeat Scr is at 2.17 with serum potassium at 6.1(non hemolyzed). Pt. reported that she is not compliant with her meds. Denies recent use of NSAIDs/ motrin or OTC meds use recently. Reported grandfather had kidney disease and was on HD. Denies SOB, CP, HA, N/V, abdominal pain, diarrhea or constipation or dizziness.     PAST HISTORY  --------------------------------------------------------------------------------  PAST MEDICAL & SURGICAL HISTORY:    FAMILY HISTORY:    PAST SOCIAL HISTORY:    ALLERGIES & MEDICATIONS  --------------------------------------------------------------------------------  Allergies    No Known Allergies    Intolerances    Standing Inpatient Medications  insulin lispro Injectable (ADMELOG). 7 Unit(s) SubCutaneous once  sodium chloride 0.9%. 1000 milliLiter(s) IV Continuous <Continuous>  vancomycin  IVPB. 1000 milliGRAM(s) IV Intermittent once    PRN Inpatient Medications    REVIEW OF SYSTEMS  --------------------------------------------------------------------------------  Gen: No fevers/chills, lethargy+  Skin: No rashes  Head/Eyes/Ears: Normal hearing,   Respiratory: No dyspnea, cough  CV: No chest pain  GI: No abdominal pain, diarrhea  : No dysuria, hematuria, increased frequency +  MSK: No  edema  Heme: No easy bruising or bleeding  Psych: No significant depression  All other systems were reviewed and are negative, except as noted.    VITALS/PHYSICAL EXAM  --------------------------------------------------------------------------------  T(C): 36.6 (03-28-22 @ 01:12), Max: 36.7 (03-27-22 @ 17:05)  HR: 94 (03-28-22 @ 01:12) (94 - 96)  BP: 100/62 (03-28-22 @ 01:12) (93/60 - 100/62)  RR: 28 (03-28-22 @ 01:12) (28 - 30)  SpO2: 100% (03-28-22 @ 01:12) (100% - 100%)  Wt(kg): --    Physical Exam:  	Gen: NAD, appears calm  	HEENT: no JVD  	Pulm: CTA B/L  	CV: S1S2  	Abd: Soft, +BS   	Ext: B/L LE edema -non pitting  	Neuro: Awake, AAOx3  	Skin: Warm and dry, multiple pressure wounds on b/l ankle evident  	Vascular access: Right IJ TLC+     LABS/STUDIES  --------------------------------------------------------------------------------              8.4    22.46 >-----------<  505      [03-27-22 @ 19:13]              27.6     128  |  95  |  57  ----------------------------<  400      [03-28-22 @ 00:47]  6.1   |  15  |  2.17        Ca     9.2     [03-28-22 @ 00:47]      Mg     1.50     [03-27-22 @ 19:13]      Phos  5.3     [03-27-22 @ 19:13]    TPro  8.2  /  Alb  2.5  /  TBili  0.4  /  DBili  x   /  AST  23  /  ALT  12  /  AlkPhos  162  [03-27-22 @ 19:13]    Creatinine Trend:  SCr 2.17 [03-28 @ 00:47]  SCr 2.01 [03-27 @ 19:13] [facility] DIVISION OF KIDNEY DISEASES AND HYPERTENSION -- [phone]  -- INITIAL CONSULT NOTE  --------------------------------------------------------------------------------  HPI: Patient is a 52 year old female with PMH of MS (diagnosed in 2001), long standing DM (>10 years), hypothyroidism, multiple chronic wounds and morbid obesity who came to the [facility] ER on 3/27 with c/o lethargy x3-4 days. Initial labs in ER showed severe hyperkalemia of 6.6 (non hemolyzed), elevated Blood sugars of 531 and elevated Scr of 2.01. Pt. received 2 L IVF bolus, medical management for hyperkalemia- insulin and calcium gluconate 1gm Iv, MgSo4 IV and sodium bicarb 50 mQ once in ER. Nephrology consutled for RAFAELA, and hyperkalemia.   Scr on admission is elevated at 2.01 with SNa of 135 (corrected), serum potassium elevated to 6.6 and serum CO2 of 15 with Bld glucose of 531. Received medical management for hyperkalemia. Repeat Scr is at 2.17 with serum potassium at 6.1(non hemolyzed). Pt. reported that she is not compliant with her meds. Denies recent use of NSAIDs/ motrin or OTC meds use recently. Reported grandfather had kidney disease and was on HD. Denies SOB, CP, HA, N/V, abdominal pain, diarrhea or constipation or dizziness.     PAST HISTORY  --------------------------------------------------------------------------------  PAST MEDICAL & SURGICAL HISTORY:   Multiple sclerosis  IDDM  Hypothyroidism  Morbid Obesity    FAMILY HISTORY: Reviewed and non contributory    PAST SOCIAL HISTORY: No smoking, drugs or alcohol use    ALLERGIES & MEDICATIONS  --------------------------------------------------------------------------------  Allergies    No Known Allergies    Intolerances    Standing Inpatient Medications  insulin lispro Injectable (ADMELOG). 7 Unit(s) SubCutaneous once  sodium chloride 0.9%. 1000 milliLiter(s) IV Continuous <Continuous>  vancomycin  IVPB. 1000 milliGRAM(s) IV Intermittent once    PRN Inpatient Medications    REVIEW OF SYSTEMS  --------------------------------------------------------------------------------  Gen: No fevers/chills, lethargy+  Skin: No rashes  Head/Eyes/Ears: Normal hearing,   Respiratory: No dyspnea, cough  CV: No chest pain  GI: No abdominal pain, diarrhea  : No dysuria, hematuria, increased frequency +  MSK: No  edema  Heme: No easy bruising or bleeding  Psych: No significant depression  All other systems were reviewed and are negative, except as noted.    VITALS/PHYSICAL EXAM  --------------------------------------------------------------------------------  T(C): 36.6 (03-28-22 @ 01:12), Max: 36.7 (03-27-22 @ 17:05)  HR: 94 (03-28-22 @ 01:12) (94 - 96)  BP: 100/62 (03-28-22 @ 01:12) (93/60 - 100/62)  RR: 28 (03-28-22 @ 01:12) (28 - 30)  SpO2: 100% (03-28-22 @ 01:12) (100% - 100%)  Wt(kg): --    Physical Exam:  	Gen: NAD, appears calm  	HEENT: no JVD  	Pulm: CTA B/L  	CV: S1S2  	Abd: Soft, +BS   	Ext: B/L LE edema -non pitting  	Neuro: Awake, AAOx3  	Skin: Warm and dry, multiple pressure wounds on b/l ankle evident  	Vascular access: Right IJ TLC+     LABS/STUDIES  --------------------------------------------------------------------------------              8.4    22.46 >-----------<  505      [03-27-22 @ 19:13]              27.6     128  |  95  |  57  ----------------------------<  400      [03-28-22 @ 00:47]  6.1   |  15  |  2.17        Ca     9.2     [03-28-22 @ 00:47]      Mg     1.50     [03-27-22 @ 19:13]      Phos  5.3     [03-27-22 @ 19:13]    TPro  8.2  /  Alb  2.5  /  TBili  0.4  /  DBili  x   /  AST  23  /  ALT  12  /  AlkPhos  162  [03-27-22 @ 19:13]    Creatinine Trend:  SCr 2.17 [03-28 @ 00:47]  SCr 2.01 [03-27 @ 19:13]

## 2022-03-28 NOTE — PROGRESS NOTE ADULT - SUBJECTIVE AND OBJECTIVE BOX
Follow Up:      Interval History/ROS:    Allergies  No Known Allergies        ANTIMICROBIALS:  piperacillin/tazobactam IVPB.. 3.375 every 8 hours      OTHER MEDS:  MEDICATIONS  (STANDING):  ALBUTerol    90 MICROgram(s) HFA Inhaler 2 every 6 hours PRN  albuterol/ipratropium for Nebulization 3 every 6 hours PRN  dextrose 50% Injectable 25 once  dextrose 50% Injectable 12.5 once  dextrose 50% Injectable 25 once  dextrose Oral Gel 15 once PRN  glucagon  Injectable 1 once  insulin lispro (ADMELOG) corrective regimen sliding scale  three times a day before meals  insulin lispro (ADMELOG) corrective regimen sliding scale  at bedtime  levothyroxine 75 daily  midodrine 10 every 8 hours  norepinephrine Infusion 0.05 <Continuous>      Vital Signs Last 24 Hrs  T(C): 37.3 (28 Mar 2022 08:00), Max: 37.3 (28 Mar 2022 08:00)  T(F): 99.1 (28 Mar 2022 08:00), Max: 99.1 (28 Mar 2022 08:00)  HR: 65 (28 Mar 2022 10:00) (65 - 96)  BP: 140/43 (28 Mar 2022 10:00) (65/40 - 140/43)  BP(mean): 68 (28 Mar 2022 10:00) (49 - 75)  RR: 30 (28 Mar 2022 10:00) (17 - 31)  SpO2: 100% (28 Mar 2022 10:00) (100% - 100%)    PHYSICAL EXAM:  General: no fever, chills  HENT: no nasal congestion, no sore throat  Eyes: no visual changes, no blurred vision,  Neck: no neck pain  CV: denies chest pain, no palpitations  Resp: no difficulty breathing, no cough  Abdominal: no nausea, no vomiting, no diarrhea, no abdominal pain  MSK: no muscle aches  Neuro: no headaches  Skin: R vulvar/thigh wound    22 @ 07:  -  22 @ 11:51  --------------------------------------------------------  IN:    Insulin: 8 mL    Lactated Ringers Bolus: 999.9 mL    Norepinephrine: 92.7 mL  Total IN: 1100.6 mL    OUT:    Indwelling Catheter - Urethral (mL): 145 mL  Total OUT: 145 mL    Total NET: 955.6 mL                                  8.4    22.46 )-----------( 505      ( 27 Mar 2022 19:13 )             27.6           124<L>  |  91<L>  |  59<H>  ----------------------------<  301<H>  5.4<H>   |  17<L>  |  2.40<H>    Ca    9.0      28 Mar 2022 06:18  Phos  5.3       Mg     1.90         TPro  8.2  /  Alb  2.5<L>  /  TBili  0.4  /  DBili  x   /  AST  23  /  ALT  12  /  AlkPhos  162<H>        Urinalysis Basic - ( 28 Mar 2022 06:18 )    Color: Dark Brown / Appearance: Turbid / S.029 / pH: x  Gluc: x / Ketone: Negative  / Bili: Negative / Urobili: <2 mg/dL   Blood: x / Protein: 30 mg/dL / Nitrite: Negative   Leuk Esterase: Large / RBC: 8 /HPF / WBC >200 /HPF   Sq Epi: x / Non Sq Epi: Occasional / Bacteria: Many        MICROBIOLOGY:  v                  RADIOLOGY:   Follow Up:      Interval History/ROS: Patient stable this morning, denying pain.     Allergies  No Known Allergies        ANTIMICROBIALS:  piperacillin/tazobactam IVPB.. 3.375 every 8 hours      OTHER MEDS:  MEDICATIONS  (STANDING):  ALBUTerol    90 MICROgram(s) HFA Inhaler 2 every 6 hours PRN  albuterol/ipratropium for Nebulization 3 every 6 hours PRN  dextrose 50% Injectable 25 once  dextrose 50% Injectable 12.5 once  dextrose 50% Injectable 25 once  dextrose Oral Gel 15 once PRN  glucagon  Injectable 1 once  insulin lispro (ADMELOG) corrective regimen sliding scale  three times a day before meals  insulin lispro (ADMELOG) corrective regimen sliding scale  at bedtime  levothyroxine 75 daily  midodrine 10 every 8 hours  norepinephrine Infusion 0.05 <Continuous>      Vital Signs Last 24 Hrs  T(C): 37.3 (28 Mar 2022 08:00), Max: 37.3 (28 Mar 2022 08:00)  T(F): 99.1 (28 Mar 2022 08:00), Max: 99.1 (28 Mar 2022 08:00)  HR: 65 (28 Mar 2022 10:00) (65 - 96)  BP: 140/43 (28 Mar 2022 10:00) (65/40 - 140/43)  BP(mean): 68 (28 Mar 2022 10:00) (49 - 75)  RR: 30 (28 Mar 2022 10:00) (17 - 31)  SpO2: 100% (28 Mar 2022 10:00) (100% - 100%)    PHYSICAL EXAM:  General: no fever, chills  HENT: no nasal congestion, no sore throat  Eyes: no visual changes, no blurred vision,  Neck: no neck pain  CV: denies chest pain, no palpitations  Resp: no difficulty breathing, no cough  Abdominal: no nausea, no vomiting, no diarrhea, no abdominal pain  MSK: no muscle aches  Neuro: no headaches  Skin: R vulvar/thigh wound    22 @ 07:01  -  22 @ 11:51  --------------------------------------------------------  IN:    Insulin: 8 mL    Lactated Ringers Bolus: 999.9 mL    Norepinephrine: 92.7 mL  Total IN: 1100.6 mL    OUT:    Indwelling Catheter - Urethral (mL): 145 mL  Total OUT: 145 mL    Total NET: 955.6 mL                                  8.4    22.46 )-----------( 505      ( 27 Mar 2022 19:13 )             27.6           124<L>  |  91<L>  |  59<H>  ----------------------------<  301<H>  5.4<H>   |  17<L>  |  2.40<H>    Ca    9.0      28 Mar 2022 06:18  Phos  5.3       Mg     1.90         TPro  8.2  /  Alb  2.5<L>  /  TBili  0.4  /  DBili  x   /  AST  23  /  ALT  12  /  AlkPhos  162<H>        Urinalysis Basic - ( 28 Mar 2022 06:18 )    Color: Dark Brown / Appearance: Turbid / S.029 / pH: x  Gluc: x / Ketone: Negative  / Bili: Negative / Urobili: <2 mg/dL   Blood: x / Protein: 30 mg/dL / Nitrite: Negative   Leuk Esterase: Large / RBC: 8 /HPF / WBC >200 /HPF   Sq Epi: x / Non Sq Epi: Occasional / Bacteria: Many        MICROBIOLOGY:  v                  RADIOLOGY:   Follow Up:      Interval History/ROS: Patient stable this morning, denying pain.     Allergies  No Known Allergies        ANTIMICROBIALS:  piperacillin/tazobactam IVPB.. 3.375 every 8 hours      OTHER MEDS:  MEDICATIONS  (STANDING):  ALBUTerol    90 MICROgram(s) HFA Inhaler 2 every 6 hours PRN  albuterol/ipratropium for Nebulization 3 every 6 hours PRN  dextrose 50% Injectable 25 once  dextrose 50% Injectable 12.5 once  dextrose 50% Injectable 25 once  dextrose Oral Gel 15 once PRN  glucagon  Injectable 1 once  insulin lispro (ADMELOG) corrective regimen sliding scale  three times a day before meals  insulin lispro (ADMELOG) corrective regimen sliding scale  at bedtime  levothyroxine 75 daily  midodrine 10 every 8 hours  norepinephrine Infusion 0.05 <Continuous>      Vital Signs Last 24 Hrs  T(C): 37.3 (28 Mar 2022 08:00), Max: 37.3 (28 Mar 2022 08:00)  T(F): 99.1 (28 Mar 2022 08:00), Max: 99.1 (28 Mar 2022 08:00)  HR: 65 (28 Mar 2022 10:00) (65 - 96)  BP: 140/43 (28 Mar 2022 10:00) (65/40 - 140/43)  BP(mean): 68 (28 Mar 2022 10:00) (49 - 75)  RR: 30 (28 Mar 2022 10:00) (17 - 31)  SpO2: 100% (28 Mar 2022 10:00) (100% - 100%)    PHYSICAL EXAM:  General: no fever, chills  HENT: no nasal congestion, no sore throat  Eyes: no visual changes, no blurred vision,  Neck: no neck pain  CV: denies chest pain, no palpitations  Resp: no difficulty breathing, no cough  Abdominal: no nausea, no vomiting, no diarrhea, no abdominal pain  MSK: no muscle aches  Neuro: no headaches  Skin: R vulvar/thigh wound    22 @ 07:01  -  22 @ 11:51  --------------------------------------------------------  IN:    Insulin: 8 mL    Lactated Ringers Bolus: 999.9 mL    Norepinephrine: 92.7 mL  Total IN: 1100.6 mL    OUT:    Indwelling Catheter - Urethral (mL): 145 mL  Total OUT: 145 mL    Total NET: 955.6 mL               8.4    22.46 )-----------( 505      ( 27 Mar 2022 19:13 )             27.6           124<L>  |  91<L>  |  59<H>  ----------------------------<  301<H>  5.4<H>   |  17<L>  |  2.40<H>    Ca    9.0      28 Mar 2022 06:18  Phos  5.3       Mg     1.90         TPro  8.2  /  Alb  2.5<L>  /  TBili  0.4  /  DBili  x   /  AST  23  /  ALT  12  /  AlkPhos  162<H>        Urinalysis Basic - ( 28 Mar 2022 06:18 )    Color: Dark Brown / Appearance: Turbid / S.029 / pH: x  Gluc: x / Ketone: Negative  / Bili: Negative / Urobili: <2 mg/dL   Blood: x / Protein: 30 mg/dL / Nitrite: Negative   Leuk Esterase: Large / RBC: 8 /HPF / WBC >200 /HPF   Sq Epi: x / Non Sq Epi: Occasional / Bacteria: Many        MICROBIOLOGY:  v                  RADIOLOGY:   Follow Up:      Interval History/ROS: Patient stable this morning, denying pain.     Allergies  No Known Allergies        ANTIMICROBIALS:  piperacillin/tazobactam IVPB.. 3.375 every 8 hours      OTHER MEDS:  MEDICATIONS  (STANDING):  ALBUTerol    90 MICROgram(s) HFA Inhaler 2 every 6 hours PRN  albuterol/ipratropium for Nebulization 3 every 6 hours PRN  dextrose 50% Injectable 25 once  dextrose 50% Injectable 12.5 once  dextrose 50% Injectable 25 once  dextrose Oral Gel 15 once PRN  glucagon  Injectable 1 once  insulin lispro (ADMELOG) corrective regimen sliding scale  three times a day before meals  insulin lispro (ADMELOG) corrective regimen sliding scale  at bedtime  levothyroxine 75 daily  midodrine 10 every 8 hours  norepinephrine Infusion 0.05 <Continuous>      Vital Signs Last 24 Hrs  T(C): 37.3 (28 Mar 2022 08:00), Max: 37.3 (28 Mar 2022 08:00)  T(F): 99.1 (28 Mar 2022 08:00), Max: 99.1 (28 Mar 2022 08:00)  HR: 65 (28 Mar 2022 10:00) (65 - 96)  BP: 140/43 (28 Mar 2022 10:00) (65/40 - 140/43)  BP(mean): 68 (28 Mar 2022 10:00) (49 - 75)  RR: 30 (28 Mar 2022 10:00) (17 - 31)  SpO2: 100% (28 Mar 2022 10:00) (100% - 100%)    PHYSICAL EXAM:  General: no fever, chills  HENT: no nasal congestion, no sore throat  Eyes: no visual changes, no blurred vision,  Neck: no neck pain  CV: denies chest pain, no palpitations  Resp: no difficulty breathing, no cough  Abdominal: no nausea, no vomiting, no diarrhea, no abdominal pain  MSK: no muscle aches  Neuro: no headaches  Skin: R vulvar/thigh wound    22 @ 07:01  -  22 @ 11:51  --------------------------------------------------------  IN:    Insulin: 8 mL    Lactated Ringers Bolus: 999.9 mL    Norepinephrine: 92.7 mL  Total IN: 1100.6 mL    OUT:    Indwelling Catheter - Urethral (mL): 145 mL  Total OUT: 145 mL    Total NET: 955.6 mL               8.4    22.46 )-----------( 505      ( 27 Mar 2022 19:13 )             27.6           124<L>  |  91<L>  |  59<H>  ----------------------------<  301<H>  5.4<H>   |  17<L>  |  2.40<H>    Ca    9.0      28 Mar 2022 06:18  Phos  5.3       Mg     1.90         TPro  8.2  /  Alb  2.5<L>  /  TBili  0.4  /  DBili  x   /  AST  23  /  ALT  12  /  AlkPhos  162<H>        Urinalysis Basic - ( 28 Mar 2022 06:18 )    Color: Dark Brown / Appearance: Turbid / S.029 / pH: x  Gluc: x / Ketone: Negative  / Bili: Negative / Urobili: <2 mg/dL   Blood: x / Protein: 30 mg/dL / Nitrite: Negative   Leuk Esterase: Large / RBC: 8 /HPF / WBC >200 /HPF   Sq Epi: x / Non Sq Epi: Occasional / Bacteria: Many      MICROBIOLOGY:  Blood/wound/urine cultures pending      RADIOLOGY:     Follow Up:      Interval History/ROS: Patient stable this morning, denying pain.     Allergies  No Known Allergies        ANTIMICROBIALS:  piperacillin/tazobactam IVPB.. 3.375 every 8 hours      OTHER MEDS:  MEDICATIONS  (STANDING):  ALBUTerol    90 MICROgram(s) HFA Inhaler 2 every 6 hours PRN  albuterol/ipratropium for Nebulization 3 every 6 hours PRN  dextrose 50% Injectable 25 once  dextrose 50% Injectable 12.5 once  dextrose 50% Injectable 25 once  dextrose Oral Gel 15 once PRN  glucagon  Injectable 1 once  insulin lispro (ADMELOG) corrective regimen sliding scale  three times a day before meals  insulin lispro (ADMELOG) corrective regimen sliding scale  at bedtime  levothyroxine 75 daily  midodrine 10 every 8 hours  norepinephrine Infusion 0.05 <Continuous>      Vital Signs Last 24 Hrs  T(C): 37.3 (28 Mar 2022 08:00), Max: 37.3 (28 Mar 2022 08:00)  T(F): 99.1 (28 Mar 2022 08:00), Max: 99.1 (28 Mar 2022 08:00)  HR: 65 (28 Mar 2022 10:00) (65 - 96)  BP: 140/43 (28 Mar 2022 10:00) (65/40 - 140/43)  BP(mean): 68 (28 Mar 2022 10:00) (49 - 75)  RR: 30 (28 Mar 2022 10:00) (17 - 31)  SpO2: 100% (28 Mar 2022 10:00) (100% - 100%)    PHYSICAL EXAM:  General: no fever, chills  HENT: no nasal congestion, no sore throat  Eyes: no visual changes, no blurred vision,  Neck: no neck pain  CV: denies chest pain, no palpitations  Resp: no difficulty breathing, no cough  Abdominal: no nausea, no vomiting, no diarrhea, no abdominal pain  MSK: no muscle aches  Neuro: no headaches  Skin: R vulvar/thigh wound    22 @ 07:01  -  22 @ 11:51  --------------------------------------------------------  IN:    Insulin: 8 mL    Lactated Ringers Bolus: 999.9 mL    Norepinephrine: 92.7 mL  Total IN: 1100.6 mL    OUT:    Indwelling Catheter - Urethral (mL): 145 mL  Total OUT: 145 mL    Total NET: 955.6 mL               8.4    22.46 )-----------( 505      ( 27 Mar 2022 19:13 )             27.6           124<L>  |  91<L>  |  59<H>  ----------------------------<  301<H>  5.4<H>   |  17<L>  |  2.40<H>    Ca    9.0      28 Mar 2022 06:18  Phos  5.3       Mg     1.90         TPro  8.2  /  Alb  2.5<L>  /  TBili  0.4  /  DBili  x   /  AST  23  /  ALT  12  /  AlkPhos  162<H>        Urinalysis Basic - ( 28 Mar 2022 06:18 )    Color: Dark Brown / Appearance: Turbid / S.029 / pH: x  Gluc: x / Ketone: Negative  / Bili: Negative / Urobili: <2 mg/dL   Blood: x / Protein: 30 mg/dL / Nitrite: Negative   Leuk Esterase: Large / RBC: 8 /HPF / WBC >200 /HPF   Sq Epi: x / Non Sq Epi: Occasional / Bacteria: Many      MICROBIOLOGY:  Blood/wound/urine cultures pending      RADIOLOGY:  < from: CT Abdomen and Pelvis No Cont (22 @ 00:50) >  ACC: [id number] EXAM:  CT ABDOMEN AND PELVIS                        PROCEDURE DATE:  2022    INTERPRETATION:  CLINICAL INFORMATION: Right labial swelling and   discharge, evaluate for labial/perineal abscess.    COMPARISON: Pelvic MRI dated 2010.    CONTRAST/COMPLICATIONS:  IV Contrast: None.  Oral Contrast: None.  Complications: None reported.    PROCEDURE:  CT of the Abdomen and Pelvis was performed.  Sagittal and coronal reformats were performed.    FINDINGS:  LOWER CHEST: Partially imaged catheter tip within the superior vena cava.   Bilateral lower lobe linear atelectasis. A 3 mm nonspecific right lower   lobe pulmonary nodule is seen.    LIVER: Diffuse steatosis. Punctate parenchymal calcification, likely a   granuloma.  BILE DUCTS: Normal caliber.  GALLBLADDER: Cholelithiasis.  SPLEEN: Within normal limits.  PANCREAS: Within normal limits.  ADRENALS: Within normal limits.  KIDNEYS/URETERS: Mild left greater than right hydronephrosis and trace   perinephric and left periureteral fat stranding. Bilateral renal cortical   scarring.    BLADDER: Circumferential bladder wall thickening and adjacent fat   stranding.  REPRODUCTIVE ORGANS: Myomatous uterus.    BOWEL: No bowel obstruction. Appendix is normal.  PERITONEUM: No ascites.  VESSELS: Atherosclerotic changes.  RETROPERITONEUM/LYMPH NODES: No lymphadenopathy.  ABDOMINAL WALL: No discrete collection or abscess is seen involving the   labia or perineum. Detailed evaluation of the soft tissues is limited due   to the absence of intravenous contrast. Scattered foci of air in this   region are favored to be within the patient's skin folds, external to the   patient. A large soft tissue defect is present along the lateral aspect   of the right thigh with underlying inflammatory changes of the   subcutaneous tissues.  BONES: Degenerative changes. Old right rib fractures.    IMPRESSION:  No evidence of perineal or labial fluid collection to suggest abscess,   though full evaluation is limited on noncontrastCT.    Large soft tissue defect/ulceration in the lateral right thigh with   associated subcutaneous inflammatory changes.    Circumferential bladder wall thickening with adjacent inflammation most   concerning for cystitis. In addition, there is mild greater than right   hydronephrosis and subtle perinephric and trace prevertebral fat   stranding, cannot exclude ascending infection and recommend correlation   with clinical examination laboratory findings to exclude pyelonephritis.  --- End of Report ---  < end of copied text >

## 2022-03-28 NOTE — H&P ADULT - NSHPPHYSICALEXAM_GEN_ALL_CORE
General: obese   HEENT: neck supple, anicteric sclera  Cardiovascular: Normal s1, s2, RRR  Respiratory: CTA b/l   Abdominal: Soft, ntnd  Extremities: No swelling in LEs  Neurologic: Non focal  Psych: Awake, alert answering questions appropriately

## 2022-03-28 NOTE — ED ADULT NURSE REASSESSMENT NOTE - NS ED NURSE REASSESS COMMENT FT1
PT is resting in stretcher, easily arousable to verbal stimuli. no apparent distress noted. pt @ [id number]  transported to MICU  from ED w/ RN and ED Tech. pt denies complaints.  will continue to monitor.

## 2022-03-28 NOTE — H&P ADULT - NSHPLABSRESULTS_GEN_ALL_CORE
The Labs were reviewed by me   The Radiology was reviewed by me    EKG tracing reviewed by me        124<L>  |  91<L>  |  59<H>  ----------------------------<  301<H>  5.4<H>   |  17<L>  |  2.40<H>      128<L>  |  95<L>  |  57<H>  ----------------------------<  400<H>  6.1<H>   |  15<L>  |  2.17<H>      127<L>  |  96<L>  |  59<H>  ----------------------------<  531<HH>  6.6<HH>   |  13<L>  |  2.01<H>    Ca    9.0      28 Mar 2022 06:18  Ca    9.2      28 Mar 2022 00:47  Ca    9.1      27 Mar 2022 19:13  Phos  5.3       Mg     1.50         TPro  8.2  /  Alb  2.5<L>  /  TBili  0.4  /  DBili  x   /  AST  23  /  ALT  12  /  AlkPhos  162<H>      Magnesium, Serum: 1.50 mg/dL (22 @ 19:13)    Phosphorus Level, Serum: 5.3 mg/dL (22 @ 19:13)                    Urinalysis Basic - ( 28 Mar 2022 06:18 )    Color: Dark Brown / Appearance: Turbid / S.029 / pH: x  Gluc: x / Ketone: Negative  / Bili: Negative / Urobili: <2 mg/dL   Blood: x / Protein: 30 mg/dL / Nitrite: Negative   Leuk Esterase: Large / RBC: 8 /HPF / WBC >200 /HPF   Sq Epi: x / Non Sq Epi: Occasional / Bacteria: Many                              8.4    22.46 )-----------( 505      ( 27 Mar 2022 19:13 )             27.6     CAPILLARY BLOOD GLUCOSE      POCT Blood Glucose.: 251 mg/dL (28 Mar 2022 06:41)  POCT Blood Glucose.: 347 mg/dL (28 Mar 2022 05:42)  POCT Blood Glucose.: 368 mg/dL (28 Mar 2022 04:28)  POCT Blood Glucose.: 377 mg/dL (28 Mar 2022 03:07)  POCT Blood Glucose.: 555 mg/dL (27 Mar 2022 21:31)  POCT Blood Glucose.: 466 mg/dL (27 Mar 2022 17:04)    Blood Gas Source Venous: Venous (22 @ 06:18)  Blood Gas Source Venous: Venous (22 @ 00:47)

## 2022-03-28 NOTE — PROGRESS NOTE ADULT - ASSESSMENT
53 yo F hx of T2DM, asthma, obesity, chronic immobility 2/2 MS and several chronic wounds presenting for vulvar/R thigh wound found to be hyperglycemic + metabolic acidosis w/ purulent urine c/f urosepsis + ARF.     Neuro  AAOx3 at baseline     #MS  History of MS and follows w/ new neurologist receiving unknown once a week injection   -Patient unsure of primary neurologist; obtain collateral or consult house neuro     Resp  Satting well on RA  -HAKEEM    CV  #Hypotension  Likely septic shock 2/2 UTI  -c/w levo; wean as tolerated  -treat UTI as below     GI  diet     ID  #Complicated UTI  Patient w/ purulent drainage from [facility] w/ no hx of sx prior. However, R medial thigh/vulvar wound could potentially be source (? abscess) in the setting of DM  -Empirically treat w/ vanc/zosyn  -f/u blood/urine cx  -f/u official CT read    #Renal    #ARF   Cr 2.4 w/ unknown baseline a/w metabolic acidosis, bicarb of 13, AG of 18 likely pre renal in the setting of septic shock 2/2 UTI vs RAFAELA on CKD due to uncontrolled DM. Nephro c/s and offered HD if necessary but patient declined and recommend bicarb gtt.  -IV fluids as needed  -Renal US  -f/u Ucx    Heme  #Anemia  Possible anemia of chronic disease d/t CKD  -Send iron studies  -transfuse PRN  -SCDs for now     Endo  Metabolic acidosis 2/2 ? DKA vs ARF 2/2 sepsis vs RAFAELA on CKD. Patient on metformin and janivia w/ likely inadequate control of BG. Urosepsis vs R medial thigh/vulvar wound may represent precipitant for DKA, however BHB negative.   -c/w insulin gtt for now until gap closes   -correct concurrent causes for acidosis including pre renal RAFAELA on CKD + urosepsis  -Endo c/s    MSK  #Multiple chronic + acute wounds  Hx of chronic sacral wound d/t immobility further complicated by acute progression of right medial thigh/vulvar wound precipitated by skin tear  -Wound care   -f/u official CT read    #GOC/Ethics  -GOC pending  53 yo F hx of T2DM, asthma, obesity, chronic immobility 2/2 MS and several chronic wounds presenting for vulvar/R thigh wound found to be hyperglycemic + metabolic acidosis w/ purulent urine c/f urosepsis + ARF.     Neuro  AAOx3 at baseline     #MS  History of MS and follows w/ new neurologist receiving unknown once a week injection   -Patient unsure of primary neurologist; obtain collateral or consult house neuro     Resp  Satting well on RA  -HAKEEM    CV  #Hypotension  Likely septic shock 2/2 UTI  -c/w levo; wean as tolerated  -treat UTI as below     GI  diet     ID  #Complicated UTI  Patient w/ purulent drainage from [facility] w/ no hx of sx prior. However, R medial thigh/vulvar wound could potentially be source (? abscess) in the setting of DM  -Empirically treat w/ vanc/zosyn  -f/u blood/urine cx  -f/u official CT read    #Renal    #ARF   Cr 2.4 w/ unknown baseline a/w metabolic acidosis, bicarb of 13, AG of 18 likely pre renal in the setting of septic shock 2/2 UTI vs RAFAELA on CKD due to uncontrolled DM. Nephro c/s and offered HD if necessary but patient declined and recommend bicarb gtt.  -IV fluids as needed  -Renal US  -f/u Ucx    Heme  #Anemia  Possible anemia of chronic disease d/t CKD  -Send iron studies  -transfuse PRN  -SCDs for now     Endo  Metabolic acidosis 2/2 ? DKA vs ARF 2/2 sepsis vs RAFAELA on CKD. Patient on metformin and janivia w/ likely inadequate control of BG. Urosepsis vs R medial thigh/vulvar wound may represent precipitant for DKA, however BHB negative.   -HbA1c pending   -c/w insulin gtt for now until gap closes   -correct concurrent causes for acidosis including pre renal RAFAELA on CKD + urosepsis  -Endo c/s    MSK  #Multiple chronic + acute wounds  Hx of chronic sacral wound d/t immobility further complicated by acute progression of right medial thigh/vulvar wound precipitated by skin tear  -Wound care   -f/u official CT read    #GOC/Ethics  -GOC pending  51 yo F hx of T2DM, asthma, obesity, chronic immobility 2/2 MS and several chronic wounds presenting for vulvar/R thigh wound found to be hyperglycemic + metabolic acidosis w/ purulent urine c/f urosepsis + ARF.     Neuro  AAOx3 at baseline     #MS  History of MS and follows w/ new neurologist receiving unknown once a week injection   -Patient unsure of primary neurologist; obtain collateral or consult house neuro     Resp  Satting well on RA  -HAKEEM    CV  #Hypotension  Likely septic shock 2/2 UTI  -c/w levo; wean as tolerated  -treat UTI as below     GI  diet     ID  #Complicated UTI  Patient w/ purulent drainage from [facility] w/ no hx of sx prior. However, R medial thigh/vulvar wound could potentially be source (? abscess) in the setting of DM  -Empirically treat w/ vanc/zosyn  -f/u blood/urine cx  -f/u official CT read    #Renal    #ARF   Cr 2.4 w/ unknown baseline a/w metabolic acidosis, bicarb of 13, AG of 18 likely pre renal in the setting of septic shock 2/2 UTI vs RAFAELA on CKD due to uncontrolled DM. Nephro c/s and offered HD if necessary but patient declined and recommend bicarb gtt.  -IV fluids as needed  -Renal US  -f/u Ucx    Heme  #Anemia  Possible anemia of chronic disease d/t CKD  -Send iron studies  -transfuse PRN  -SCDs for now     Endo  Metabolic acidosis 2/2 ? DKA vs ARF 2/2 sepsis vs RAFAELA on CKD. Patient on metformin and janivia w/ likely inadequate control of BG. Urosepsis vs R medial thigh/vulvar wound may represent precipitant for DKA, however BHB negative.   -HbA1c pending   -insulin sliding scale   -correct concurrent causes for acidosis including pre renal RAFAELA on CKD + urosepsis  -Endo c/s    MSK  #Multiple chronic + acute wounds  Hx of chronic sacral wound d/t immobility further complicated by acute progression of right medial thigh/vulvar wound precipitated by skin tear  -Wound care   -f/u official CT read    #GOC/Ethics  -GOC pending  51 yo F hx of T2DM, asthma, obesity, chronic immobility 2/2 MS and several chronic wounds presenting for vulvar/R thigh wound found to be hyperglycemic + metabolic acidosis w/ purulent urine c/f urosepsis + ARF.     Neuro  AAOx3 at baseline     #MS  History of MS and follows w/ new neurologist receiving unknown once a week injection   -Patient unsure of primary neurologist; obtain collateral or consult house neuro     Resp  Satting well on RA  -HAKEEM    CV  #Hypotension  Likely septic shock 2/2 UTI  -c/w levo; wean as tolerated  -midodrine 10q8h  -treat UTI as below     GI  diet     ID  #Complicated UTI  Patient w/ purulent drainage from [facility] w/ no hx of sx prior. However, R medial thigh/vulvar wound could potentially be source (? abscess) in the setting of DM  -Empirically treat w/ vanc/zosyn  -f/u blood/urine cx  -f/u official CT read    #Renal    #ARF   Cr 2.4 w/ unknown baseline a/w metabolic acidosis, bicarb of 13, AG of 18 likely pre renal in the setting of septic shock 2/2 UTI vs RAFAELA on CKD due to uncontrolled DM. Nephro c/s and offered HD if necessary but patient declined and recommend bicarb gtt.  -IV fluids as needed  -Renal US  -f/u Ucx    Heme  #Anemia  Possible anemia of chronic disease d/t CKD  -Send iron studies  -transfuse PRN  -SCDs for now     Endo  Metabolic acidosis 2/2 ? DKA vs ARF 2/2 sepsis vs RAFAELA on CKD. Patient on metformin and janivia w/ likely inadequate control of BG. Urosepsis vs R medial thigh/vulvar wound may represent precipitant for DKA, however BHB negative.   -HbA1c pending   -insulin sliding scale   -correct concurrent causes for acidosis including pre renal RAFAELA on CKD + urosepsis  -Endo c/s    MSK  #Multiple chronic + acute wounds  Hx of chronic sacral wound d/t immobility further complicated by acute progression of right medial thigh/vulvar wound precipitated by skin tear  -Wound care   -f/u official CT read    #GOC/Ethics  -GOC pending  51 yo F hx of T2DM, asthma, obesity, chronic immobility 2/2 MS and several chronic wounds presenting for vulvar/R thigh wound found to be hyperglycemic + metabolic acidosis w/ purulent urine c/f urosepsis + ARF.     Neuro  AAOx3 at baseline     #MS  History of MS and follows w/ new neurologist receiving unknown once a week injection   -Patient unsure of primary neurologist; obtain collateral or consult house neuro     Resp  Satting well on RA  -HAKEEM    CV  #Hypotension  Likely septic shock 2/2 UTI  -c/w levo; wean as tolerated  -midodrine 10q8h  -treat UTI as below   - Hold home losartan 50mg qD    GI  diet     ID  #Complicated UTI  Patient w/ purulent drainage from [facility] w/ no hx of sx prior. However, R medial thigh/vulvar wound could potentially be source (? abscess) in the setting of DM  -Empirically treat w/ vanc/zosyn  -f/u blood/urine cx  -f/u official CT read    #Renal    #ARF   Cr 2.4 w/ unknown baseline a/w metabolic acidosis, bicarb of 13, AG of 18 likely pre renal in the setting of septic shock 2/2 UTI vs RAFAELA on CKD due to uncontrolled DM. Nephro c/s and offered HD if necessary but patient declined and recommend bicarb gtt.  -IV fluids as needed  -Renal US  -f/u Ucx    Heme  #Anemia  Possible anemia of chronic disease d/t CKD  -Send iron studies  -transfuse PRN  -SCDs for now     Endo  Metabolic acidosis 2/2 ? DKA vs ARF 2/2 sepsis vs RAFAELA on CKD. Patient on metformin and janivia w/ likely inadequate control of BG. Urosepsis vs R medial thigh/vulvar wound may represent precipitant for DKA, however BHB negative.   -HbA1c pending   -insulin sliding scale   -correct concurrent causes for acidosis including pre renal RAFAELA on CKD + urosepsis  -Endo c/s  - holding home meds: glimerperide 50mg BID, januvia 100mg qD, basaglar 15 qHS, pioglitazone 30mg qD, metformin 1000 BID  - Start home synthroid 75mcg qD  - Order TSH    MSK  #Multiple chronic + acute wounds  Hx of chronic sacral wound d/t immobility further complicated by acute progression of right medial thigh/vulvar wound precipitated by skin tear  -Wound care   -f/u official CT read    #GOC/Ethics  -GOC pending  53 yo F hx of T2DM, asthma, obesity, chronic immobility 2/2 MS and several chronic wounds presenting for vulvar/R thigh wound found to be hyperglycemic + metabolic acidosis w/ purulent urine c/f urosepsis + ARF.     Neuro  AAOx3 at baseline     #MS  History of MS and follows w/ new neurologist receiving unknown once a week injection   -Patient unsure of primary neurologist; obtain collateral or consult house neuro     Resp  Satting well on RA  - COVID test positive   - Hold remdesivir due to RAFAELA    CV  #Hypotension  Likely septic shock 2/2 UTI  -c/w levo; wean as tolerated  -midodrine 10q8h  -treat UTI as below   - Hold home losartan 50mg qD    GI  -Start DASH diet    ID  #Complicated UTI  Patient w/ purulent drainage from [facility] w/ no hx of sx prior.  -Empirically treat w/ vanc/zosyn, vanc by level  -f/u blood/urine cx  -f/u official CT read    #Renal    #ARF   Cr 2.4 w/ unknown baseline a/w metabolic acidosis, bicarb of 13, AG of 18 likely pre renal in the setting of septic shock 2/2 UTI vs RAFAELA on CKD due to uncontrolled DM. Nephro c/s and offered HD if necessary but patient declined.  -Nephro recs appreciated.  -IV fluids as needed  -f/u Ucx, urine lytes  - BMP+Mg q6h    Heme  #Anemia  Possible anemia of chronic disease d/t CKD  -Send iron studies  -transfuse PRN    #DVT ppx  -Unable to use SCDs due to leg wounds  -Start lovenox 40 BID given BMI 40+    Endo  Metabolic acidosis 2/2 ? DKA vs ARF 2/2 sepsis vs RAFAELA on CKD. Patient on metformin and janivia w/ likely inadequate control of BG. Urosepsis vs R medial thigh/vulvar wound may represent precipitant for DKA, however BHB negative.   -HbA1c pending   -insulin sliding scale   -correct concurrent causes for acidosis including pre renal RAFAELA on CKD + urosepsis  -Endo c/s  - holding home meds: glimerperide 50mg BID, januvia 100mg qD, basaglar 15 qHS, pioglitazone 30mg qD, metformin 1000 BID  - Start home synthroid 75mcg qD  - TSH ordered    MSK  #Multiple chronic + acute wounds  Hx of chronic sacral wound d/t immobility further complicated by acute progression of right medial thigh/vulvar wound precipitated by skin tear  -Wound care consulted  - CT read appreciated: No evidence of perineal or labial fluid collection to suggest abscess, though full evaluation is limited on noncontrast CT. Large soft tissue defect/ulceration in the lateral right thigh with associated subcutaneous inflammatory changes. Mild greater than L>R hydronephrosis and subtle perinephric and trace prevertebral fat stranding, cannot exclude ascending infection    #GOC/Ethics  -GOC pending  53 yo F hx of T2DM, asthma, obesity, chronic immobility 2/2 MS and several chronic wounds presenting for vulvar/R thigh wound found to be hyperglycemic + metabolic acidosis w/ purulent urine c/f urosepsis + ARF.      Neuro  AAOx3 at baseline     #MS  History of MS and follows w/ new neurologist receiving unknown once a week injection   -Patient unsure of primary neurologist; obtain collateral or consult house neuro     Resp  Satting well on RA  - COVID test positive   - Hold remdesivir due to RAFAELA    CV  #Hypotension  Likely septic shock 2/2 UTI  -c/w levo; wean as tolerated  -midodrine 10q8h  -treat UTI as below   - Hold home losartan 50mg qD    GI  -Start DASH diet    ID  #Complicated UTI.   Patient w/ purulent drainage from [facility] w/ no hx of sx prior. However, R medial thigh/vulvar wound could potentially be source in the setting of DM.  -Empirically treat w/ vanc/zosyn  -Vanc trough in the AM, holding vanc until results as per pharmacy  -f/u blood/urine cx  -f/u official CT read    #Renal    #ARF   Cr 2.4 w/ unknown baseline a/w metabolic acidosis, bicarb of 13, AG of 18 likely pre renal in the setting of septic shock 2/2 UTI/skin wounds vs RAFAELA on CKD due to uncontrolled DM. Nephro c/s and offered HD if necessary but patient declined.  -Nephro recs appreciated.  -IV fluids as needed  -f/u Ucx, urine lytes  - BMP+Mg q6h    Heme  #Anemia  Possible anemia of chronic disease d/t CKD  -Send iron studies  -transfuse PRN    #DVT ppx  -Unable to use SCDs due to leg wounds  -Start lovenox 40 BID given BMI 40+    Endo  Metabolic acidosis 2/2 ? DKA vs ARF 2/2 sepsis vs RAFAELA on CKD. Patient on metformin and janivia w/ likely inadequate control of BG. Urosepsis vs R medial thigh/vulvar wound may represent precipitant for DKA, however BHB negative.   -HbA1c pending   -insulin sliding scale   -correct concurrent causes for acidosis including pre renal RAFAELA on CKD + urosepsis  -Endo c/s  - holding home meds: glimerperide 50mg BID, januvia 100mg qD, basaglar 15 qHS, pioglitazone 30mg qD, metformin 1000 BID  - Start home synthroid 75mcg qD  - TSH ordered    MSK  #Multiple chronic + acute wounds  Hx of chronic sacral wound d/t immobility further complicated by acute progression of right medial thigh/vulvar wound precipitated by skin tear  -Wound care consulted  - CT read appreciated: No evidence of perineal or labial fluid collection to suggest abscess, though full evaluation is limited on noncontrast CT. Large soft tissue defect/ulceration in the lateral right thigh with associated subcutaneous inflammatory changes. Mild greater than L>R hydronephrosis and subtle perinephric and trace prevertebral fat stranding, cannot exclude ascending infection    #GOC/Ethics  -GOC pending  53 yo F hx of T2DM, asthma, obesity, chronic immobility 2/2 MS and several chronic wounds presenting for vulvar/R thigh wound found to be hyperglycemic + metabolic acidosis w/ purulent urine c/f urosepsis + ARF.      Neuro  AAOx3 at baseline     #MS  History of MS and follows w/ new neurologist receiving unknown once a week injection   -Hold home cosentyx (20mg q4w). Last dose 3/26 as per brother.    Resp  Satting well on RA  - COVID test positive   - Hold remdesivir due to RAFAELA    CV  #Hypotension  Likely septic shock 2/2 UTI  -c/w levo; wean as tolerated  -midodrine 10q8h  -treat UTI as below   - Hold home losartan 50mg qD    GI  -Start DASH diet    ID  #Complicated UTI.   Patient w/ purulent drainage from [facility] w/ no hx of sx prior. However, R medial thigh/vulvar wound could potentially be source in the setting of DM.  -Empirically treat w/ vanc/zosyn  -Vanc trough in the AM, hold vanc until results as per pharmacy  -f/u blood/urine cx  -f/u official CT read    #Renal    #ARF   Cr 2.4 w/ unknown baseline a/w metabolic acidosis, bicarb of 13, AG of 18 likely pre renal in the setting of septic shock 2/2 UTI/skin wounds vs RAFAELA on CKD due to uncontrolled DM. Nephro c/s and offered HD if necessary but patient declined.  -Nephro recs appreciated.  -IV fluids as needed  -f/u Ucx, urine lytes  - BMP+Mg q6h    Heme  #Anemia  Possible anemia of chronic disease d/t CKD  -Send iron studies  -transfuse PRN    #DVT ppx  -Unable to use SCDs due to leg wounds  -Start lovenox 40 BID given BMI 40+    Endo  Metabolic acidosis 2/2 ? DKA vs ARF 2/2 sepsis vs RAFAELA on CKD. Patient on metformin and janivia w/ likely inadequate control of BG. Urosepsis vs R medial thigh/vulvar wound may represent precipitant for DKA, however BHB negative.   -HbA1c pending   -insulin sliding scale   -correct concurrent causes for acidosis including pre renal RAFAELA on CKD + urosepsis  -Endo c/s  - holding home meds: glimerperide 50mg BID, januvia 100mg qD, basaglar 15 qHS, pioglitazone 30mg qD, metformin 1000 BID  - Start home synthroid 75mcg qD  - TSH ordered    MSK  #Multiple chronic + acute wounds  Hx of chronic sacral wound d/t immobility further complicated by acute progression of right medial thigh/vulvar wound precipitated by skin tear  -Wound care consulted  - CT read appreciated: No evidence of perineal or labial fluid collection to suggest abscess, though full evaluation is limited on noncontrast CT. Large soft tissue defect/ulceration in the lateral right thigh with associated subcutaneous inflammatory changes. Mild greater than L>R hydronephrosis and subtle perinephric and trace prevertebral fat stranding, cannot exclude ascending infection    #GOC/Ethics  -GOC pending  51 yo F hx of T2DM, asthma, obesity, chronic immobility 2/2 MS and several chronic wounds presenting for vulvar/R thigh wound found to be hyperglycemic + metabolic acidosis w/ purulent urine c/f urosepsis + ARF.      Neuro  AAOx3 at baseline     #MS  History of MS and follows w/ new neurologist receiving unknown once a week injection   -Hold home cosentyx (20mg q4w). Last dose 3/26 as per brother.    Resp  Satting well on RA  - COVID test positive   - Hold remdesivir due to RAFAELA    CV  #Hypotension  Likely septic shock 2/2 UTI  -c/w levo; wean as tolerated  -midodrine 10q8h  -treat UTI as below   - Hold home losartan 50mg qD    GI  -Start DASH diet    ID  #Complicated UTI.   Patient w/ purulent drainage from [facility] w/ no hx of sx prior. However, R medial thigh/vulvar wound could potentially be source in the setting of DM.  -Empirically treat w/ vanc/zosyn  -Vanc trough in the AM, hold vanc until results as per pharmacy  -f/u blood/urine cx  -f/u official CT read    #Renal    #ARF   Cr 2.4 w/ unknown baseline a/w metabolic acidosis, bicarb of 13, AG of 18 likely pre renal in the setting of septic shock 2/2 UTI/skin wounds vs RAFAELA on CKD due to uncontrolled DM. Nephro c/s and offered HD if necessary but patient declined.  -Nephro recs appreciated.  -IV fluids as needed  -f/u Ucx, urine lytes  - BMP+Mg q6h    Heme  #Anemia  Possible anemia of chronic disease d/t CKD  -Send iron studies  -transfuse PRN    #DVT ppx  -Unable to use SCDs due to leg wounds  -Start lovenox 40mg BID given BMI 40+    Endo  Metabolic acidosis 2/2 ? DKA vs ARF 2/2 sepsis vs RAFAELA on CKD. Patient on metformin and janivia w/ likely inadequate control of BG. Urosepsis vs R medial thigh/vulvar wound may represent precipitant for DKA, however BHB negative.   -HbA1c pending   -insulin sliding scale   -correct concurrent causes for acidosis including pre renal RAFAELA on CKD + urosepsis  -Endo c/s  - holding home meds: glimerperide 50mg BID, januvia 100mg qD, basaglar 15 qHS, pioglitazone 30mg qD, metformin 1000 BID  - Start home synthroid 75mcg qD  - TSH ordered    MSK  #Multiple chronic + acute wounds  Hx of chronic sacral wound d/t immobility further complicated by acute progression of right medial thigh/vulvar wound precipitated by skin tear  - Wound care consulted  - CT read appreciated: No evidence of perineal or labial fluid collection to suggest abscess, though full evaluation is limited on noncontrast CT. Large soft tissue defect/ulceration in the lateral right thigh with associated subcutaneous inflammatory changes. Mild greater than L>R hydronephrosis and subtle perinephric and trace prevertebral fat stranding, cannot exclude ascending infection    #GOC/Ethics  -GOC pending  51 yo F hx of T2DM, asthma, obesity, chronic immobility 2/2 MS and several chronic wounds presenting for vulvar/R thigh wound found to be hyperglycemic + metabolic acidosis w/ purulent urine c/f urosepsis + ARF.      Neuro  AAOx3 at baseline     #MS  History of MS and follows w/ new neurologist receiving unknown once a week injection   -Hold home cosentyx (20mg q4w). Last dose 3/26 as per brother.    Resp  Satting well on RA  - COVID test positive   - Hold remdesivir due to RAFAELA    CV  #Hypotension  Likely septic shock 2/2 UTI  -c/w levo; wean as tolerated  -midodrine 10q8h  -treat UTI as below   - Hold home losartan 50mg qD    GI  -Start DASH diet    ID  #Complicated UTI.   Patient w/ purulent drainage from [facility] w/ no hx of sx prior. However, R medial thigh/vulvar wound could potentially be source in the setting of DM.  -Empirically treat w/ vanc/zosyn  -Vanc trough in the AM, hold vanc until results as per pharmacy  -f/u blood/urine cx  -f/u official CT read    #Renal    #ARF   Cr 2.4 w/ unknown baseline a/w metabolic acidosis, bicarb of 13, AG of 18 likely pre renal in the setting of septic shock 2/2 UTI/skin wounds vs RAFAELA on CKD due to uncontrolled DM. Nephro c/s and offered HD if necessary but patient declined.  -Nephro recs appreciated.  -IV fluids as needed  -f/u Ucx, urine lytes  - BMP+Mg q6h  - Lokelma 10g PO for hyperkalemia    Heme  #Anemia  Possible anemia of chronic disease d/t CKD  -Send iron studies  -transfuse PRN    #DVT ppx  -Unable to use SCDs due to leg wounds  -Start lovenox 40mg BID given BMI 40+    Endo  Metabolic acidosis 2/2 ? DKA vs ARF 2/2 sepsis vs RAFAELA on CKD. Patient on metformin and janivia w/ likely inadequate control of BG. Urosepsis vs R medial thigh/vulvar wound may represent precipitant for DKA, however BHB negative.   -HbA1c pending   -insulin sliding scale   -correct concurrent causes for acidosis including pre renal RAFAELA on CKD + urosepsis  -Endo c/s  - holding home meds: glimerperide 50mg BID, januvia 100mg qD, basaglar 15 qHS, pioglitazone 30mg qD, metformin 1000 BID  - Start home synthroid 75mcg qD  - TSH ordered    MSK  #Multiple chronic + acute wounds  Hx of chronic sacral wound d/t immobility further complicated by acute progression of right medial thigh/vulvar wound precipitated by skin tear  - Wound care consulted  - CT read appreciated: No evidence of perineal or labial fluid collection to suggest abscess, though full evaluation is limited on noncontrast CT. Large soft tissue defect/ulceration in the lateral right thigh with associated subcutaneous inflammatory changes. Mild greater than L>R hydronephrosis and subtle perinephric and trace prevertebral fat stranding, cannot exclude ascending infection    #GOC/Ethics  -GOC pending  51 yo F hx of T2DM, asthma, obesity, chronic immobility 2/2 MS and several chronic wounds presenting for vulvar/R thigh wound found to be hyperglycemic + metabolic acidosis w/ purulent urine c/f urosepsis + ARF.      Neuro  AAOx3 at baseline     #MS  History of MS   -Hold home cosentyx (20mg q4w). Last dose 3/26 as per brother.    Resp  Sating well on RA  - COVID test positive 3/28  - Hold remdesivir due to RAFAELA    CV  #Hypotension  Likely septic shock 2/2 UTI  -c/w levo; wean as tolerated  -midodrine 10q8h  -treat UTI as below   -Hold home losartan 50mg qD    GI  -Start DASH diet    ID  #Complicated UTI.   Patient w/ purulent drainage from [facility] w/ no hx of sx prior. However, R medial thigh/vulvar wound could potentially be source in the setting of DM.  -Empirically treat w/ vanc by level/zosyn  -Vanc trough in the AM, hold vanc until results as per pharmacy  -f/u blood/urine cx    #Renal    #ARF   Cr 2.4 w/ unknown baseline a/w metabolic acidosis, bicarb of 13, AG of 18 likely pre renal in the setting of septic shock 2/2 UTI/skin wounds vs RAFAELA on CKD due to uncontrolled DM. Nephro c/s and offered HD if necessary but patient declined.  -Nephro recs appreciated.  -IV fluids as needed  -f/u Ucx, urine lytes  -sodium bicarb drip   - BMP+Mg q6h  - Lokelma 5g PO for hyperkalemia    Heme  #Anemia  Hb 8.4, possible anemia of chronic disease d/t CKD  -Send iron studies  -transfuse PRN    #DVT ppx  -Unable to use SCDs due to leg wounds  -Start lovenox 40mg BID given BMI 40+    Endo  Metabolic acidosis 2/2 ? DKA vs ARF 2/2 sepsis vs RAFAELA on CKD. Patient on metformin and janivia w/ likely inadequate control of BG. Urosepsis vs R medial thigh/vulvar wound may represent precipitant for DKA, however BHB negative.   -HbA1c pending   -insulin sliding scale   -correct concurrent causes for acidosis including pre renal RAFAELA on CKD + urosepsis  -Endo c/s  - holding home meds: glimerperide 50mg BID, januvia 100mg qD, basaglar 15 qHS, pioglitazone 30mg qD, metformin 1000 BID  - Start home synthroid 75mcg qD  - TSH ordered    MSK  #Multiple chronic + acute wounds  Hx of chronic sacral wound d/t immobility further complicated by acute progression of right medial thigh/vulvar wound precipitated by skin tear  - Wound care consulted  - CT read appreciated: No evidence of perineal or labial fluid collection to suggest abscess, though full evaluation is limited on noncontrast CT. Large soft tissue defect/ulceration in the lateral right thigh with associated subcutaneous inflammatory changes.     #GOC/Ethics  -GOC pending    53 yo F hx of T2DM, asthma, obesity, chronic immobility 2/2 MS and several chronic wounds presenting for vulvar/R thigh wound found to be hyperglycemic + metabolic acidosis w/ purulent urine c/f urosepsis + ARF.      Neuro  AAOx3 at baseline     #MS  History of MS   -Hold home cosentyx (20mg q4w). Last dose 3/26 as per brother.    Resp  Sating well on RA  - COVID test positive 3/28  - Hold remdesivir due to RAFAELA    CV  #Hypotension  Likely septic shock 2/2 UTI  -c/w levo; wean as tolerated  -midodrine 10q8h  -treat UTI as below   -Hold home losartan 50mg qD    GI  -Start DASH diet    ID  #Complicated UTI.   Patient w/ purulent drainage from [facility]saint w/ no hx of sx prior. However, R medial thigh/vulvar wound could potentially be source in the setting of DM.  -Empirically treat w/ vanc by level/zosyn  -Vanc trough in the AM, hold vanc until results as per pharmacy  -f/u blood/urine cx    #Renal    #ARF   Cr 2.4 w/ unknown baseline a/w metabolic acidosis, bicarb of 13, AG of 18 likely pre renal in the setting of septic shock 2/2 UTI/skin wounds vs RAFAELA on CKD due to uncontrolled DM. Nephro c/s and offered HD if necessary but patient declined.  -Nephro recs appreciated.  -IV fluids as needed  -f/u Ucx, urine lytes  -sodium bicarb drip   - BMP+Mg q6h  - Lokelma 5g PO for hyperkalemia    Heme  #Anemia  Hb 8.4, possible anemia of chronic disease d/t CKD  -Send iron studies  -transfuse PRN    #DVT ppx  -Unable to use SCDs due to leg wounds  -Start lovenox 40mg BID given BMI 40+    Endo  Metabolic acidosis 2/2 ? DKA vs ARF 2/2 sepsis vs RAFAELA on CKD. Patient on metformin and janivia w/ likely inadequate control of BG. Urosepsis vs R medial thigh/vulvar wound may represent precipitant for DKA, however BHB negative.   -HbA1c pending   -insulin sliding scale   -correct concurrent causes for acidosis including pre renal RAFAELA on CKD + urosepsis  -Endo c/s  - holding home meds: glimerperide 50mg BID, januvia 100mg qD, basaglar 15 qHS, pioglitazone 30mg qD, metformin 1000 BID  - Start home synthroid 75mcg qD  - TSH 3.86    MSK  #Multiple chronic + acute wounds  Hx of chronic sacral wound d/t immobility further complicated by acute progression of right medial thigh/vulvar wound precipitated by skin tear  - Wound care consulted  - CT read appreciated: No evidence of perineal or labial fluid collection to suggest abscess, though full evaluation is limited on noncontrast CT. Large soft tissue defect/ulceration in the lateral right thigh with associated subcutaneous inflammatory changes.     #GOC/Ethics  -GOC pending    51 yo F hx of T2DM, asthma, obesity, chronic immobility 2/2 MS and several chronic wounds presenting for vulvar/R thigh wound found to be hyperglycemic + metabolic acidosis w/ purulent urine c/f urosepsis + ARF.      Neuro  AAOx3 at baseline     #MS  History of MS   -Hold home cosentyx (20mg q4w). Last dose 3/26 as per brother.    Resp  Sating well on RA  - COVID test positive 3/28  - Hold remdesivir due to RAFAELA    CV  #Hypotension  Likely septic shock 2/2 UTI  -c/w levo; wean as tolerated  -midodrine 10q8h  -treat UTI as below   -Hold home losartan 50mg qD    GI  -Start DASH diet    ID  #Complicated UTI.   Patient w/ purulent drainage from [facility]saint w/ no hx of sx prior. However, R medial thigh/vulvar wound could potentially be source in the setting of DM.  -Empirically treat w/ vanc by level/zosyn  -Vanc trough in the AM, hold vanc until results as per pharmacy  -f/u blood/urine cx    #Renal    #ARF   Cr 2.4 w/ unknown baseline a/w metabolic acidosis, bicarb of 13, AG of 18 likely pre renal in the setting of septic shock 2/2 UTI/skin wounds vs RAFAELA on CKD due to uncontrolled DM. Nephro c/s and offered HD if necessary but patient declined.  -Nephro recs appreciated.  -IV fluids as needed  -f/u Ucx, urine lytes  -sodium bicarb drip   - BMP+Mg q6h  - Lokelma 5g PO for hyperkalemia    Heme  #Anemia  Hb 8.4, possible anemia of chronic disease d/t CKD  -Send iron studies  -transfuse PRN    #DVT ppx  -Unable to use SCDs due to leg wounds  -Start lovenox 40mg BID given BMI 40+    Endo  Metabolic acidosis 2/2 ? DKA vs ARF 2/2 sepsis vs RAFAELA on CKD. Patient on metformin and janivia w/ likely inadequate control of BG. Urosepsis vs R medial thigh/vulvar wound may represent precipitant for DKA, however BHB negative.   -HbA1c pending   -insulin sliding scale   -correct concurrent causes for acidosis including pre renal RAFAELA on CKD + urosepsis  -Endo c/s  - holding home meds  - NPH 7 units BID Admelog 5 TID  - Start home synthroid 75mcg qD  - TSH 3.86    MSK  #Multiple chronic + acute wounds  Hx of chronic sacral wound d/t immobility further complicated by acute progression of right medial thigh/vulvar wound precipitated by skin tear  - Wound care consulted  - CT read appreciated: No evidence of perineal or labial fluid collection to suggest abscess, though full evaluation is limited on noncontrast CT. Large soft tissue defect/ulceration in the lateral right thigh with associated subcutaneous inflammatory changes.     #GOC/Ethics  -GOC pending

## 2022-03-28 NOTE — CONSULT NOTE ADULT - PROBLEM SELECTOR RECOMMENDATION 2
In the setting of RAFAELA. Received medical management. Agree with insulin infusion for elevated Blood glucose. This will help in transcellular shift of potassium. Recommend continuing medical management. Monitor serum potassium q4.

## 2022-03-28 NOTE — CONSULT NOTE ADULT - PROBLEM SELECTOR RECOMMENDATION 3
In the setting of RAFAELA and ? sepsis. Serum lac is elevated at 5.4. Recommend NS @ 100cc/hr for now. Monitor serum CO2 q12 for now.    If you have any questions, please feel free to contact me  Stephane Albarran  Nephrology Fellow  [phone]  (After 5pm or on weekends please page the on-call fellow).

## 2022-03-28 NOTE — PATIENT PROFILE ADULT - FALL HARM RISK - HARM RISK INTERVENTIONS

## 2022-03-28 NOTE — ED ADULT NURSE REASSESSMENT NOTE - NS ED NURSE REASSESS COMMENT FT1
PT is resting in stretcher, easily arousable to verbal stimuli. no apparent distress noted. Medication being administered per MD orders.  will continue to monitor.

## 2022-03-28 NOTE — H&P ADULT - NSHPREVIEWOFSYSTEMS_GEN_ALL_CORE
General: no fever, chills  HENT: no nasal congestion, no sore throat  Eyes: no visual changes, no blurred vision,  Neck: no neck pain  CV: denies chest pain, no palpitations  Resp: no difficulty breathing, no cough  Abdominal: no nausea, no vomiting, no diarrhea, no abdominal pain  MSK: no muscle aches  Neuro: no headaches  Skin: R vulvar/thigh wound

## 2022-03-28 NOTE — H&P ADULT - ASSESSMENT
52F DM2 likely not sufficient enough for glycemic control exacerbated in the setting of new wound further c/b RAFAELA a/w metabolic acidosis    Neuro  Mentating well   No active pain     Cardio   Became hypotensive after straight cath  Started of midodrine and levo     Respiratory   No acute issues     GI:  53 yo F hx of T2DM, asthma, obesity, chronic immobility 2/2 MS and several chronic wounds presenting for vulvar/R thigh wound found to be hyperglycemic + metabolic acidosis w/ purulent urine c/f urosepsis precipitating DKA + ARF.     Neuro  AAOx3 at baseline     #MS  History of MS and follows w/ new neurologist receiving unknown once a week injection   -Patient unsure of primary neurologist; obtain collateral or consult house neuro     Resp  Satting well on RA  -HAKEEM    CV  #Hypotension  Likely septic shock 2/2 UTI  -c/w levo; wean as tolerated  -treat UTI as below     GI  NPO for now given c/f DKA    ID  #Complicated UTI  Patient w/ purulent drainage from [facility]saint w/ no hx of sx prior. However, R medial thigh/vulvar wound could potentially be source (? abscess) in the setting of DM  -Empirically treat w/ vanc/zosyn  -f/u blood/urine cx  -f/u official CT read    #Renal    #ARF   Cr 2.4 w/ unknown baseline a/w metabolic acidosis, bicarb of 13, AG of 18 likely pre renal in the setting of septic shock 2/2 UTI vs RAFAELA on CKD due to uncontrolled DM. Nephro c/s and offered HD if necessary but patient declined and recommend bicarb gtt.  -IV fluids as needed  -Treat ?DKA as below   -Renal US  -f/u Ucx    Heme  #Anemia  Poss 2/2 anemia of chronic disease d/t CKD  -Send iron studies  -transfuse PRN  -SCDs for now     Endo  Metabolic acidosis 2/2 ? DKA vs ARF 2/2 sepsis vs RAFAELA on CKD. Patient on metformin and janivia w/ likely inadequate control of BG. Urosepsis vs R medial thigh/vulvar wound may represent precipitant for DKA, however BHB negative.   -c/w insulin gtt for now until gap closes   -correct concurrent causes for acidosis including pre renal RAFAELA on CKD + urosepsis  -Endo c/s    MSK  #Multiple chronic + acute wounds  Hx of chronic sacral wound d/t immobility further complicated by acute progression of right medial thigh/vulvar wound precipitated by skin tear  -Wound care   -f/u official CT read    #GOC/Ethics  -GOC pending

## 2022-03-28 NOTE — PROGRESS NOTE ADULT - ATTENDING COMMENTS
52 F with obesity, DM here with multiple purulent skin wounds, found to have septic shock due to UTI, RAFAELA, hyperkalemia, hyperglycemia without ketosis.    Continue with vasopressors for septic shock  continue with broad abx, f/u cultures  given metabolic acidosis and RAFAELA, hyperkalemia, will start bicarb gtt and monitor uop (patient does not want HD at this time), f/u EKG  will try sliding scale insulin, but if still hyperglycemic, may need insulin gtt again  wound care consult for wounds (no evidence of abscess on CT)    Critically ill patient requiring frequent bedside visits with therapy changes. 52 F with obesity, DM here with multiple purulent skin wounds, found to have septic shock due to UTI, RAFAELA, hyperkalemia, hyperglycemia without ketosis.    Continue with vasopressors for septic shock  continue with broad abx, f/u cultures  given metabolic acidosis and RAFAELA, hyperkalemia, will start bicarb gtt and monitor uop (patient does not want HD at this time), f/u EKG  will try sliding scale insulin, but if still hyperglycemic, may need insulin gtt again  wound care consult for wounds (no evidence of abscess on CT)  dvt ppx lovenox  full code as per patient    Critically ill patient requiring frequent bedside visits with therapy changes.

## 2022-03-28 NOTE — H&P ADULT - ATTENDING COMMENTS
51 yo obese and bedbound woman with h/o DM on oral hypoglycemic agent, presenting for eval of multiple purulent skin wounds around perineum and found on bloodwork to have RAFAELA and hyperkalemia, also hyperglycemia without ketosis.  She received 2L crystalloid and total of 17U sq reg insulin and glucose remained elevated.  She was then placed on insulin gtt with additional ivf in hopes of decreasing both glucose and K.  Pt seen by nephrology and is refusing HD at present. CT abd shows thickened bladder with bilat hydro c/w cystitis and poss pyelo.  She received vanco and zosyn MICU called for evaluation of hyperglycemia. (please see consult note earlier today).    MICU reconsulted because she had sudden decompensation with AMS and hypotension necessitating vasopressor.  Urine was noteable for being pus - sent for UA and culture.  FS now 250 and repeat K down to 5.4,     She is awake and alert/oriented lying flat on stretcher in NARD, without complaints.   100% on RA   Hr 80's MAP 65 on 0.1 mcg/k/min norepi.  Lungs clear,  Multiple wounds as described.    RAFAELA and hyperkalemia improving  Uncontrolled DM2 with hyperglycemia without ketosis, improving on insulin gtt.  Will continue for now as pt septic, may add dextrose to ivf  UTI and poss pyelonephritis suggested on CT.  Continue vanco and zosyn and f/u culture  Skin infections: some appear infected; covered with vanco  DVT ppx sq heparin  GOC: pt is full code.  She expressed o/n that she did not want HD, but if it becomes necessary it should be discussed again.  guarded 51 yo obese and bedbound woman with h/o DM on oral hypoglycemic agent, presenting for eval of multiple purulent skin wounds around perineum and found on bloodwork to have RAFAELA and hyperkalemia, also hyperglycemia without ketosis.  She received 2L crystalloid and total of 17U sq reg insulin and glucose remained elevated.  She was then placed on insulin gtt with additional ivf in hopes of decreasing both glucose and K.  Pt seen by nephrology and is refusing HD at present. CT abd shows thickened bladder with bilat hydro c/w cystitis and poss pyelo.  She received vanco and zosyn MICU called for evaluation of hyperglycemia. (please see consult note earlier today).    MICU reconsulted because she had sudden decompensation with AMS and hypotension necessitating vasopressor.  Urine was noteable for being pus - sent for UA and culture.  FS now 250 and repeat K down to 5.4,     She is awake and alert/oriented lying flat on stretcher in NARD, without complaints.   100% on RA   Hr 80's MAP 65 on 0.1 mcg/k/min norepi.  Lungs clear,  Multiple wounds as described.    RAFAELA and hyperkalemia improving  Uncontrolled DM2 with hyperglycemia without ketosis, improving on insulin gtt.  Will continue for now as pt septic, may add dextrose to ivf  UTI and poss pyelonephritis suggested on CT.  Continue vanco and zosyn and f/u culture  Skin infections: some appear infected; covered with vanco  DVT ppx sq heparin  GOC: pt is full code.  She expressed o/n that she did not want HD, but if it becomes necessary it should be discussed again  guarded

## 2022-03-28 NOTE — ED ADULT NURSE REASSESSMENT NOTE - NS ED NURSE REASSESS COMMENT FT1
Break RN note- patient resting quietly in bed, breathing even and nonlabored, no acute distress. Cardiac monitor in place- sinus rhythm. Patient medicated as ordered. Blood cultures sent. Safety maintained. Patient stable upon exiting the room.

## 2022-03-28 NOTE — CHART NOTE - NSCHARTNOTEFT_GEN_A_CORE
: Hyeokchan Kwon      INDICATION: Shock state      PROCEDURE:     [x] LIMITED ECHO    [x] LIMITED ABDOMINAL      FINDINGS: Occasional b-lines, A-line predominant, LV function grossly wnl      INTERPRETATION:  Suggestive of volume resuscitated state    Images stored on [employer] : Dr. Lance Espinoza      INDICATION: Shock state      PROCEDURE:     [x] LIMITED ECHO    [x] LIMITED ABDOMINAL      FINDINGS: Occasional b-lines, A-line predominant, LV function grossly wnl      INTERPRETATION:  Suggestive of volume resuscitated state    Images stored on [employer] : Dr. Lance Espinoza      INDICATION: Shock state      PROCEDURE:     [x] LIMITED ECHO    [x] limited chest      FINDINGS: Occasional b-lines, A-line predominant, no pleural effusiosn   LV function grossly wnl  IVC indeterminate  INTERPRETATION:  shock likely vasoplegic/septic    Images stored on [employer]    Attending Attestation:  I was present during the key portions of the procedure and immediately available during the entire procedure.  Lance Espinoza MD  Attending  Pulmonary & Critical Care Medicine

## 2022-03-28 NOTE — H&P ADULT - HISTORY OF PRESENT ILLNESS
51 yo F, hx of T2DM, asthma, obesity, chronic immobility and several chronic wounds, presenting for vulvar lesion noted by her home health nurse today. Unclear the duration of the lesion, as patient had not noticed this herself. The area was draining and appeared infection. Pt brother notified by home RN and ambulance called. Pt denies any ongoing symptoms. She lives on her own. She takes an unspecified oral hyperglycemic, is not on insulin. She has a rescue albuterol inhaler. Denies known allergies to medications 53 yo F, hx of T2DM, asthma, obesity, chronic immobility and several chronic wounds, presenting for vulvar/R thigh noted by her home health nurse today. Patient states wound began x1 month ago, started as a skin tear and progressed over the course of a month. Patient states that this Friday RN inspected wound and stated it may need evaluation but wanted to observe, would rechecked today and patient was brought into ed.     In the ED, was initially hypotensive to 90s/50s which improved w/ fluids. Labs significant for wc 22, k 6.6, cr 2.2 unknown bl, glc 533, bicarb 13. bhb negative, ph 7.38. Nephro consulted for HD, patient declined. Patient treated for hyper K w/ mild improvement, placed on insulin gtt w/ improvement of bicarb from 13 to 18, pH 7.3, lactate 5.6 to 4.6. However, upon placement of toussaint patient had purulent drainage w/ a temporary change in mental status. Patient admitted to MICU for further management of urosepsis, DKA and metabolic acidosis due to ARF.

## 2022-03-28 NOTE — CONSULT NOTE ADULT - PROBLEM SELECTOR RECOMMENDATION 9
Pt. with RAFAELA of unclear duration. Likely In the setting of vol depletion in view of uncontrolled DM/DKA and or ? sepsis from abdominal wounds. No prior labs available. Scr on admission elevated to 2.1. Pt. has hx of long standing uncontrolled DM (>10 years). Check  UA, urine spot TP/CR and kidney sonogram. Agree with IVF resuscitation for DKA. recommend better BG control. Discussed with patient regarding RRT if Scr worsened or persistent severe hyperkalemia, pt. refused any RRT(hemodialysis) for now. Monitor labs and urine output. Avoid any potential nephrotoxins. Dose medications as per eGFR.

## 2022-03-29 DIAGNOSIS — E11.65 TYPE 2 DIABETES MELLITUS WITH HYPERGLYCEMIA: ICD-10-CM

## 2022-03-29 DIAGNOSIS — E78.5 HYPERLIPIDEMIA, UNSPECIFIED: ICD-10-CM

## 2022-03-29 DIAGNOSIS — E03.9 HYPOTHYROIDISM, UNSPECIFIED: ICD-10-CM

## 2022-03-29 DIAGNOSIS — I10 ESSENTIAL (PRIMARY) HYPERTENSION: ICD-10-CM

## 2022-03-29 LAB
ALBUMIN SERPL ELPH-MCNC: 1.9 G/DL — LOW (ref 3.3–5)
ALBUMIN SERPL ELPH-MCNC: 2 G/DL — LOW (ref 3.3–5)
ALBUMIN SERPL ELPH-MCNC: 2.1 G/DL — LOW (ref 3.3–5)
ALP SERPL-CCNC: 168 U/L — HIGH (ref 40–120)
ALP SERPL-CCNC: 173 U/L — HIGH (ref 40–120)
ALP SERPL-CCNC: 190 U/L — HIGH (ref 40–120)
ALT FLD-CCNC: 17 U/L — SIGNIFICANT CHANGE UP (ref 4–33)
ALT FLD-CCNC: 19 U/L — SIGNIFICANT CHANGE UP (ref 4–33)
ALT FLD-CCNC: 21 U/L — SIGNIFICANT CHANGE UP (ref 4–33)
ANION GAP SERPL CALC-SCNC: 14 MMOL/L — SIGNIFICANT CHANGE UP (ref 7–14)
ANION GAP SERPL CALC-SCNC: 14 MMOL/L — SIGNIFICANT CHANGE UP (ref 7–14)
ANION GAP SERPL CALC-SCNC: 15 MMOL/L — HIGH (ref 7–14)
AST SERPL-CCNC: 43 U/L — HIGH (ref 4–32)
AST SERPL-CCNC: 49 U/L — HIGH (ref 4–32)
AST SERPL-CCNC: 58 U/L — HIGH (ref 4–32)
BASE EXCESS BLDV CALC-SCNC: -2.3 MMOL/L — LOW (ref -2–3)
BASOPHILS # BLD AUTO: 0.09 K/UL — SIGNIFICANT CHANGE UP (ref 0–0.2)
BASOPHILS NFR BLD AUTO: 0.4 % — SIGNIFICANT CHANGE UP (ref 0–2)
BILIRUB SERPL-MCNC: 0.4 MG/DL — SIGNIFICANT CHANGE UP (ref 0.2–1.2)
BLOOD GAS VENOUS COMPREHENSIVE RESULT: SIGNIFICANT CHANGE UP
BUN SERPL-MCNC: 41 MG/DL — HIGH (ref 7–23)
BUN SERPL-MCNC: 45 MG/DL — HIGH (ref 7–23)
BUN SERPL-MCNC: 49 MG/DL — HIGH (ref 7–23)
CALCIUM SERPL-MCNC: 8 MG/DL — LOW (ref 8.4–10.5)
CALCIUM SERPL-MCNC: 8.4 MG/DL — SIGNIFICANT CHANGE UP (ref 8.4–10.5)
CALCIUM SERPL-MCNC: 8.5 MG/DL — SIGNIFICANT CHANGE UP (ref 8.4–10.5)
CHLORIDE BLDV-SCNC: 98 MMOL/L — SIGNIFICANT CHANGE UP (ref 96–108)
CHLORIDE SERPL-SCNC: 94 MMOL/L — LOW (ref 98–107)
CHLORIDE SERPL-SCNC: 95 MMOL/L — LOW (ref 98–107)
CHLORIDE SERPL-SCNC: 96 MMOL/L — LOW (ref 98–107)
CO2 BLDV-SCNC: 23.5 MMOL/L — SIGNIFICANT CHANGE UP (ref 22–26)
CO2 SERPL-SCNC: 21 MMOL/L — LOW (ref 22–31)
CO2 SERPL-SCNC: 22 MMOL/L — SIGNIFICANT CHANGE UP (ref 22–31)
CO2 SERPL-SCNC: 22 MMOL/L — SIGNIFICANT CHANGE UP (ref 22–31)
CREAT ?TM UR-MCNC: 34 MG/DL — SIGNIFICANT CHANGE UP
CREAT SERPL-MCNC: 1.66 MG/DL — HIGH (ref 0.5–1.3)
CREAT SERPL-MCNC: 1.88 MG/DL — HIGH (ref 0.5–1.3)
CREAT SERPL-MCNC: 2.01 MG/DL — HIGH (ref 0.5–1.3)
CULTURE RESULTS: SIGNIFICANT CHANGE UP
EGFR: 30 ML/MIN/1.73M2 — LOW
EGFR: 32 ML/MIN/1.73M2 — LOW
EGFR: 37 ML/MIN/1.73M2 — LOW
EOSINOPHIL # BLD AUTO: 0.17 K/UL — SIGNIFICANT CHANGE UP (ref 0–0.5)
EOSINOPHIL NFR BLD AUTO: 0.8 % — SIGNIFICANT CHANGE UP (ref 0–6)
GAS PNL BLDV: 130 MMOL/L — LOW (ref 136–145)
GLUCOSE BLDC GLUCOMTR-MCNC: 199 MG/DL — HIGH (ref 70–99)
GLUCOSE BLDC GLUCOMTR-MCNC: 218 MG/DL — HIGH (ref 70–99)
GLUCOSE BLDC GLUCOMTR-MCNC: 340 MG/DL — HIGH (ref 70–99)
GLUCOSE BLDC GLUCOMTR-MCNC: 353 MG/DL — HIGH (ref 70–99)
GLUCOSE BLDV-MCNC: 302 MG/DL — HIGH (ref 70–99)
GLUCOSE SERPL-MCNC: 196 MG/DL — HIGH (ref 70–99)
GLUCOSE SERPL-MCNC: 302 MG/DL — HIGH (ref 70–99)
GLUCOSE SERPL-MCNC: 339 MG/DL — HIGH (ref 70–99)
HCO3 BLDV-SCNC: 22 MMOL/L — SIGNIFICANT CHANGE UP (ref 22–29)
HCT VFR BLD CALC: 25.1 % — LOW (ref 34.5–45)
HCT VFR BLDA CALC: 25 % — LOW (ref 34.5–46.5)
HGB BLD CALC-MCNC: 8.2 G/DL — LOW (ref 11.5–15.5)
HGB BLD-MCNC: 8 G/DL — LOW (ref 11.5–15.5)
IANC: 18.86 K/UL — HIGH (ref 1.8–7.4)
IMM GRANULOCYTES NFR BLD AUTO: 1.7 % — HIGH (ref 0–1.5)
LACTATE BLDV-MCNC: 1.7 MMOL/L — SIGNIFICANT CHANGE UP (ref 0.5–2)
LYMPHOCYTES # BLD AUTO: 1.62 K/UL — SIGNIFICANT CHANGE UP (ref 1–3.3)
LYMPHOCYTES # BLD AUTO: 7.2 % — LOW (ref 13–44)
MAGNESIUM SERPL-MCNC: 1.5 MG/DL — LOW (ref 1.6–2.6)
MAGNESIUM SERPL-MCNC: 1.6 MG/DL — SIGNIFICANT CHANGE UP (ref 1.6–2.6)
MAGNESIUM SERPL-MCNC: 1.9 MG/DL — SIGNIFICANT CHANGE UP (ref 1.6–2.6)
MCHC RBC-ENTMCNC: 25.6 PG — LOW (ref 27–34)
MCHC RBC-ENTMCNC: 31.9 GM/DL — LOW (ref 32–36)
MCV RBC AUTO: 80.2 FL — SIGNIFICANT CHANGE UP (ref 80–100)
MONOCYTES # BLD AUTO: 1.46 K/UL — HIGH (ref 0–0.9)
MONOCYTES NFR BLD AUTO: 6.5 % — SIGNIFICANT CHANGE UP (ref 2–14)
NEUTROPHILS # BLD AUTO: 18.86 K/UL — HIGH (ref 1.8–7.4)
NEUTROPHILS NFR BLD AUTO: 83.4 % — HIGH (ref 43–77)
NRBC # BLD: 0 /100 WBCS — SIGNIFICANT CHANGE UP
NRBC # FLD: 0 K/UL — SIGNIFICANT CHANGE UP
PCO2 BLDV: 37 MMHG — LOW (ref 39–42)
PH BLDV: 7.39 — SIGNIFICANT CHANGE UP (ref 7.32–7.43)
PHOSPHATE SERPL-MCNC: 3 MG/DL — SIGNIFICANT CHANGE UP (ref 2.5–4.5)
PHOSPHATE SERPL-MCNC: 4 MG/DL — SIGNIFICANT CHANGE UP (ref 2.5–4.5)
PLATELET # BLD AUTO: 530 K/UL — HIGH (ref 150–400)
PO2 BLDV: 40 MMHG — SIGNIFICANT CHANGE UP
POTASSIUM BLDV-SCNC: 5.2 MMOL/L — HIGH (ref 3.5–5.1)
POTASSIUM SERPL-MCNC: 3.9 MMOL/L — SIGNIFICANT CHANGE UP (ref 3.5–5.3)
POTASSIUM SERPL-MCNC: 4.6 MMOL/L — SIGNIFICANT CHANGE UP (ref 3.5–5.3)
POTASSIUM SERPL-MCNC: 5.2 MMOL/L — SIGNIFICANT CHANGE UP (ref 3.5–5.3)
POTASSIUM SERPL-SCNC: 3.9 MMOL/L — SIGNIFICANT CHANGE UP (ref 3.5–5.3)
POTASSIUM SERPL-SCNC: 4.6 MMOL/L — SIGNIFICANT CHANGE UP (ref 3.5–5.3)
POTASSIUM SERPL-SCNC: 5.2 MMOL/L — SIGNIFICANT CHANGE UP (ref 3.5–5.3)
PROT ?TM UR-MCNC: 90 MG/DL — SIGNIFICANT CHANGE UP
PROT SERPL-MCNC: 6.8 G/DL — SIGNIFICANT CHANGE UP (ref 6–8.3)
PROT SERPL-MCNC: 6.8 G/DL — SIGNIFICANT CHANGE UP (ref 6–8.3)
PROT SERPL-MCNC: 7.1 G/DL — SIGNIFICANT CHANGE UP (ref 6–8.3)
PROT/CREAT UR-RTO: 2.6 RATIO — HIGH (ref 0–0.2)
RBC # BLD: 3.13 M/UL — LOW (ref 3.8–5.2)
RBC # FLD: 14.8 % — HIGH (ref 10.3–14.5)
SAO2 % BLDV: 66.3 % — SIGNIFICANT CHANGE UP
SODIUM SERPL-SCNC: 131 MMOL/L — LOW (ref 135–145)
SPECIMEN SOURCE: SIGNIFICANT CHANGE UP
VANCOMYCIN FLD-MCNC: 16.5 UG/ML — SIGNIFICANT CHANGE UP
WBC # BLD: 22.58 K/UL — HIGH (ref 3.8–10.5)
WBC # FLD AUTO: 22.58 K/UL — HIGH (ref 3.8–10.5)

## 2022-03-29 PROCEDURE — 93308 TTE F-UP OR LMTD: CPT | Mod: 26,GC

## 2022-03-29 PROCEDURE — 99223 1ST HOSP IP/OBS HIGH 75: CPT

## 2022-03-29 PROCEDURE — 99221 1ST HOSP IP/OBS SF/LOW 40: CPT

## 2022-03-29 PROCEDURE — 76604 US EXAM CHEST: CPT | Mod: 26,GC

## 2022-03-29 PROCEDURE — 99233 SBSQ HOSP IP/OBS HIGH 50: CPT | Mod: GC

## 2022-03-29 PROCEDURE — 99291 CRITICAL CARE FIRST HOUR: CPT

## 2022-03-29 PROCEDURE — 76770 US EXAM ABDO BACK WALL COMP: CPT | Mod: 26

## 2022-03-29 RX ORDER — INSULIN LISPRO 100/ML
7 VIAL (ML) SUBCUTANEOUS
Refills: 0 | Status: DISCONTINUED | OUTPATIENT
Start: 2022-03-29 | End: 2022-03-29

## 2022-03-29 RX ORDER — SODIUM CHLORIDE 9 MG/ML
1000 INJECTION INTRAMUSCULAR; INTRAVENOUS; SUBCUTANEOUS
Refills: 0 | Status: DISCONTINUED | OUTPATIENT
Start: 2022-03-29 | End: 2022-03-29

## 2022-03-29 RX ORDER — SODIUM CHLORIDE 9 MG/ML
1000 INJECTION, SOLUTION INTRAVENOUS ONCE
Refills: 0 | Status: COMPLETED | OUTPATIENT
Start: 2022-03-29 | End: 2022-03-29

## 2022-03-29 RX ORDER — MIDODRINE HYDROCHLORIDE 2.5 MG/1
10 TABLET ORAL ONCE
Refills: 0 | Status: COMPLETED | OUTPATIENT
Start: 2022-03-29 | End: 2022-03-29

## 2022-03-29 RX ORDER — MAGNESIUM SULFATE 500 MG/ML
2 VIAL (ML) INJECTION ONCE
Refills: 0 | Status: COMPLETED | OUTPATIENT
Start: 2022-03-29 | End: 2022-03-29

## 2022-03-29 RX ORDER — VANCOMYCIN HCL 1 G
1500 VIAL (EA) INTRAVENOUS EVERY 24 HOURS
Refills: 0 | Status: DISCONTINUED | OUTPATIENT
Start: 2022-03-30 | End: 2022-03-30

## 2022-03-29 RX ORDER — VANCOMYCIN HCL 1 G
VIAL (EA) INTRAVENOUS
Refills: 0 | Status: DISCONTINUED | OUTPATIENT
Start: 2022-03-30 | End: 2022-03-30

## 2022-03-29 RX ORDER — MIDODRINE HYDROCHLORIDE 2.5 MG/1
20 TABLET ORAL EVERY 8 HOURS
Refills: 0 | Status: DISCONTINUED | OUTPATIENT
Start: 2022-03-29 | End: 2022-03-29

## 2022-03-29 RX ORDER — INSULIN LISPRO 100/ML
VIAL (ML) SUBCUTANEOUS
Refills: 0 | Status: DISCONTINUED | OUTPATIENT
Start: 2022-03-29 | End: 2022-04-19

## 2022-03-29 RX ORDER — INSULIN GLARGINE 100 [IU]/ML
22 INJECTION, SOLUTION SUBCUTANEOUS AT BEDTIME
Refills: 0 | Status: DISCONTINUED | OUTPATIENT
Start: 2022-03-29 | End: 2022-03-29

## 2022-03-29 RX ORDER — INSULIN LISPRO 100/ML
11 VIAL (ML) SUBCUTANEOUS
Refills: 0 | Status: DISCONTINUED | OUTPATIENT
Start: 2022-03-29 | End: 2022-03-30

## 2022-03-29 RX ORDER — INSULIN GLARGINE 100 [IU]/ML
33 INJECTION, SOLUTION SUBCUTANEOUS AT BEDTIME
Refills: 0 | Status: DISCONTINUED | OUTPATIENT
Start: 2022-03-29 | End: 2022-03-30

## 2022-03-29 RX ORDER — ACETAMINOPHEN 500 MG
1000 TABLET ORAL ONCE
Refills: 0 | Status: COMPLETED | OUTPATIENT
Start: 2022-03-29 | End: 2022-03-29

## 2022-03-29 RX ORDER — INSULIN LISPRO 100/ML
VIAL (ML) SUBCUTANEOUS AT BEDTIME
Refills: 0 | Status: DISCONTINUED | OUTPATIENT
Start: 2022-03-29 | End: 2022-04-19

## 2022-03-29 RX ORDER — SODIUM HYPOCHLORITE 0.125 %
1 SOLUTION, NON-ORAL MISCELLANEOUS
Refills: 0 | Status: DISCONTINUED | OUTPATIENT
Start: 2022-03-29 | End: 2022-04-19

## 2022-03-29 RX ORDER — VANCOMYCIN HCL 1 G
1500 VIAL (EA) INTRAVENOUS ONCE
Refills: 0 | Status: COMPLETED | OUTPATIENT
Start: 2022-03-29 | End: 2022-03-29

## 2022-03-29 RX ORDER — MIDODRINE HYDROCHLORIDE 2.5 MG/1
20 TABLET ORAL EVERY 8 HOURS
Refills: 0 | Status: DISCONTINUED | OUTPATIENT
Start: 2022-03-29 | End: 2022-04-01

## 2022-03-29 RX ORDER — MAGNESIUM SULFATE 500 MG/ML
2 VIAL (ML) INJECTION ONCE
Refills: 0 | Status: DISCONTINUED | OUTPATIENT
Start: 2022-03-29 | End: 2022-03-29

## 2022-03-29 RX ORDER — ASCORBIC ACID 60 MG
500 TABLET,CHEWABLE ORAL DAILY
Refills: 0 | Status: DISCONTINUED | OUTPATIENT
Start: 2022-03-29 | End: 2022-04-19

## 2022-03-29 RX ADMIN — Medication 5.16 MICROGRAM(S)/KG/MIN: at 08:22

## 2022-03-29 RX ADMIN — Medication 300 MILLIGRAM(S): at 05:00

## 2022-03-29 RX ADMIN — Medication 25 GRAM(S): at 08:35

## 2022-03-29 RX ADMIN — MIDODRINE HYDROCHLORIDE 20 MILLIGRAM(S): 2.5 TABLET ORAL at 13:36

## 2022-03-29 RX ADMIN — MIDODRINE HYDROCHLORIDE 10 MILLIGRAM(S): 2.5 TABLET ORAL at 05:47

## 2022-03-29 RX ADMIN — Medication 7 UNIT(S): at 17:22

## 2022-03-29 RX ADMIN — Medication 7 UNIT(S): at 12:21

## 2022-03-29 RX ADMIN — MIDODRINE HYDROCHLORIDE 10 MILLIGRAM(S): 2.5 TABLET ORAL at 10:05

## 2022-03-29 RX ADMIN — Medication 5: at 08:25

## 2022-03-29 RX ADMIN — SODIUM CHLORIDE 1000 MILLILITER(S): 9 INJECTION, SOLUTION INTRAVENOUS at 10:06

## 2022-03-29 RX ADMIN — PIPERACILLIN AND TAZOBACTAM 25 GRAM(S): 4; .5 INJECTION, POWDER, LYOPHILIZED, FOR SOLUTION INTRAVENOUS at 13:35

## 2022-03-29 RX ADMIN — ENOXAPARIN SODIUM 40 MILLIGRAM(S): 100 INJECTION SUBCUTANEOUS at 05:48

## 2022-03-29 RX ADMIN — Medication 1000 MILLIGRAM(S): at 08:35

## 2022-03-29 RX ADMIN — Medication 75 MICROGRAM(S): at 06:38

## 2022-03-29 RX ADMIN — Medication 4: at 17:23

## 2022-03-29 RX ADMIN — PIPERACILLIN AND TAZOBACTAM 25 GRAM(S): 4; .5 INJECTION, POWDER, LYOPHILIZED, FOR SOLUTION INTRAVENOUS at 06:48

## 2022-03-29 RX ADMIN — HUMAN INSULIN 7 UNIT(S): 100 INJECTION, SUSPENSION SUBCUTANEOUS at 08:24

## 2022-03-29 RX ADMIN — Medication 1 APPLICATION(S): at 19:28

## 2022-03-29 RX ADMIN — INSULIN GLARGINE 33 UNIT(S): 100 INJECTION, SOLUTION SUBCUTANEOUS at 22:06

## 2022-03-29 RX ADMIN — PIPERACILLIN AND TAZOBACTAM 25 GRAM(S): 4; .5 INJECTION, POWDER, LYOPHILIZED, FOR SOLUTION INTRAVENOUS at 21:54

## 2022-03-29 RX ADMIN — Medication 1000 MILLIGRAM(S): at 09:22

## 2022-03-29 RX ADMIN — MIDODRINE HYDROCHLORIDE 20 MILLIGRAM(S): 2.5 TABLET ORAL at 21:54

## 2022-03-29 RX ADMIN — Medication 8: at 12:22

## 2022-03-29 RX ADMIN — ENOXAPARIN SODIUM 40 MILLIGRAM(S): 100 INJECTION SUBCUTANEOUS at 17:29

## 2022-03-29 RX ADMIN — Medication 5 UNIT(S): at 08:26

## 2022-03-29 RX ADMIN — CHLORHEXIDINE GLUCONATE 1 APPLICATION(S): 213 SOLUTION TOPICAL at 05:36

## 2022-03-29 NOTE — DIETITIAN INITIAL EVALUATION ADULT. - WEIGHT (LBS)
Central venous access care/Medication teaching and assessment/Observation and assessment/Rehabilitation services/Teaching and training/Wound care and assessment 125

## 2022-03-29 NOTE — PROGRESS NOTE ADULT - SUBJECTIVE AND OBJECTIVE BOX
INTERVAL HPI/OVERNIGHT EVENTS: No acute overnight events.    SUBJECTIVE: Patient seen and examined at bedside. Complains of R. neck pain due to IJ line. Pain well controlled otherwise. No other complaints.    VITAL SIGNS:  ICU Vital Signs Last 24 Hrs  T(C): 37.2 (29 Mar 2022 04:00), Max: 37.3 (28 Mar 2022 08:00)  T(F): 99 (29 Mar 2022 04:00), Max: 99.1 (28 Mar 2022 08:00)  HR: 92 (29 Mar 2022 06:00) (65 - 100)  BP: 132/56 (29 Mar 2022 06:00) (96/46 - 152/64)  BP(mean): 76 (29 Mar 2022 06:00) (56 - 101)  ABP: --  ABP(mean): --  RR: 23 (29 Mar 2022 06:00) (20 - 34)  SpO2: 98% (29 Mar 2022 06:00) (96% - 100%)      Plateau pressure:   P/F ratio:      @ 07:01  -   @ 07:00  --------------------------------------------------------  IN: 3044.2 mL / OUT: 1850 mL / NET: 1194.2 mL      CAPILLARY BLOOD GLUCOSE  POCT Blood Glucose.: 301 mg/dL (28 Mar 2022 22:52)    PHYSICAL EXAM:    T(C): 37.2 (22 @ 04:00), Max: 37.2 (22 @ 20:00)  HR: 92 (22 @ 06:00) (65 - 100)  BP: 132/56 (22 @ 06:00) (96/46 - 152/64)  RR: 23 (22 @ 06:00) (20 - 34)  SpO2: 98% (22 @ 06:00) (96% - 100%)    CONSTITUTIONAL: Well groomed, no apparent distress  HEENT: No conjunctival or scleral injection, non-icteric  NECK: Supple, symmetric and without tracheal deviation; thyroid gland not enlarged and without palpable masses  RESPIRATORY: No respiratory distress, no use of accessory muscles; CTA b/l, no wheezes, rales or rhonchi.  CARDIOVASCULAR: RRRR, +S1S2, no murmurs, no rubs, no gallops; no JVD; no peripheral edema  GASTROINTESTINAL: Soft, non tender, non distended, no rebound, no guarding  GENITOURINARY: Rose cath patent  SKIN: No obvious ozzing or bleeding from dressings.    MEDICATIONS:  MEDICATIONS  (STANDING):  chlorhexidine 4% Liquid 1 Application(s) Topical <User Schedule>  dextrose 5%. 1000 milliLiter(s) (50 mL/Hr) IV Continuous <Continuous>  dextrose 5%. 1000 milliLiter(s) (100 mL/Hr) IV Continuous <Continuous>  dextrose 50% Injectable 25 Gram(s) IV Push once  dextrose 50% Injectable 12.5 Gram(s) IV Push once  dextrose 50% Injectable 25 Gram(s) IV Push once  enoxaparin Injectable 40 milliGRAM(s) SubCutaneous every 12 hours  glucagon  Injectable 1 milliGRAM(s) IntraMuscular once  insulin lispro (ADMELOG) corrective regimen sliding scale   SubCutaneous three times a day before meals  insulin lispro (ADMELOG) corrective regimen sliding scale   SubCutaneous at bedtime  insulin lispro Injectable (ADMELOG) 5 Unit(s) SubCutaneous three times a day before meals  insulin NPH human recombinant 7 Unit(s) SubCutaneous before breakfast  insulin NPH human recombinant 7 Unit(s) SubCutaneous at bedtime  levothyroxine 75 MICROGram(s) Oral daily  magnesium sulfate  IVPB 2 Gram(s) IV Intermittent once  midodrine 10 milliGRAM(s) Oral every 8 hours  norepinephrine Infusion 0.05 MICROgram(s)/kG/Min (5.16 mL/Hr) IV Continuous <Continuous>  piperacillin/tazobactam IVPB.. 3.375 Gram(s) IV Intermittent every 8 hours  sodium bicarbonate  Infusion 0.136 mEq/kG/Hr (100 mL/Hr) IV Continuous <Continuous>  vancomycin  IVPB        MEDICATIONS  (PRN):  ALBUTerol    90 MICROgram(s) HFA Inhaler 2 Puff(s) Inhalation every 6 hours PRN Shortness of Breath  albuterol/ipratropium for Nebulization 3 milliLiter(s) Nebulizer every 6 hours PRN Shortness of Breath  dextrose Oral Gel 15 Gram(s) Oral once PRN Blood Glucose LESS THAN 70 milliGRAM(s)/deciliter  HYDROmorphone  Injectable 0.25 milliGRAM(s) IV Push once PRN Severe Pain (7 - 10)      ALLERGIES:  Allergies    No Known Allergies  Intolerances        LABS:                        8.0    22.58 )-----------( 530      ( 29 Mar 2022 01:00 )             25.1         131<L>  |  95<L>  |  45<H>  ----------------------------<  339<H>  4.6   |  22  |  1.88<H>    Ca    8.0<L>      29 Mar 2022 06:53  Phos  4.0       Mg     1.50         TPro  6.8  /  Alb  1.9<L>  /  TBili  0.4  /  DBili  x   /  AST  43<H>  /  ALT  17  /  AlkPhos  168<H>      Urinalysis Basic - ( 28 Mar 2022 06:18 )    Color: Dark Brown / Appearance: Turbid / S.029 / pH: x  Gluc: x / Ketone: Negative  / Bili: Negative / Urobili: <2 mg/dL   Blood: x / Protein: 30 mg/dL / Nitrite: Negative   Leuk Esterase: Large / RBC: 8 /HPF / WBC >200 /HPF   Sq Epi: x / Non Sq Epi: Occasional / Bacteria: Many    Sodium, Random Urine: 44 mmol/L (22 @ 12:11)  Creatinine, Random Urine: 51 mg/dL (22 @ 12:11)  Potassium, Random Urine: 34.9 mmol/L (22 @ 12:11)    Vancomycin Level, Random: 16.5 ( @ 01:00)    Iron Total, Serum: 24 ug/dL ( @ 06:28)  Iron - Total Binding Capacity.: 164 ug/dL ( @ 06:28)  Ferritin, Serum: 956 ng/mL ( @ 06:28)       INTERVAL HPI/OVERNIGHT EVENTS: No acute overnight events.    SUBJECTIVE: Patient seen and examined at bedside. Complains of R. neck pain due to IJ line which resolved with dressing change. Pain well controlled otherwise. No other complaints.    VITAL SIGNS:  ICU Vital Signs Last 24 Hrs  T(C): 37.2 (29 Mar 2022 04:00), Max: 37.3 (28 Mar 2022 08:00)  T(F): 99 (29 Mar 2022 04:00), Max: 99.1 (28 Mar 2022 08:00)  HR: 92 (29 Mar 2022 06:00) (65 - 100)  BP: 132/56 (29 Mar 2022 06:00) (96/46 - 152/64)  BP(mean): 76 (29 Mar 2022 06:00) (56 - 101)  ABP: --  ABP(mean): --  RR: 23 (29 Mar 2022 06:00) (20 - 34)  SpO2: 98% (29 Mar 2022 06:00) (96% - 100%)      Plateau pressure:   P/F ratio:      @ 07:01  -   @ 07:00  --------------------------------------------------------  IN: 3044.2 mL / OUT: 1850 mL / NET: 1194.2 mL      CAPILLARY BLOOD GLUCOSE  POCT Blood Glucose.: 301 mg/dL (28 Mar 2022 22:52)    PHYSICAL EXAM:    T(C): 37.2 (22 @ 04:00), Max: 37.2 (22 @ 20:00)  HR: 92 (22 @ 06:00) (65 - 100)  BP: 132/56 (22 @ 06:00) (96/46 - 152/64)  RR: 23 (22 @ 06:00) (20 - 34)  SpO2: 98% (22 @ 06:00) (96% - 100%)    CONSTITUTIONAL: Well groomed, no apparent distress  HEENT: No conjunctival or scleral injection, non-icteric  NECK: Right IJ line without erythema, ecchymosis or bleeding.  RESPIRATORY: No respiratory distress, no use of accessory muscles; CTA b/l, no wheezes, rales or rhonchi.  CARDIOVASCULAR: RRRR, +S1S2, no murmurs, no rubs, no gallops; no JVD; no peripheral edema  GASTROINTESTINAL: Soft, non tender, non distended, no rebound, no guarding  GENITOURINARY: Rose cath patent, urine not cloudy.  SKIN: No obvious ozzing or bleeding from dressings.    MEDICATIONS:  MEDICATIONS  (STANDING):  chlorhexidine 4% Liquid 1 Application(s) Topical <User Schedule>  dextrose 5%. 1000 milliLiter(s) (50 mL/Hr) IV Continuous <Continuous>  dextrose 5%. 1000 milliLiter(s) (100 mL/Hr) IV Continuous <Continuous>  dextrose 50% Injectable 25 Gram(s) IV Push once  dextrose 50% Injectable 12.5 Gram(s) IV Push once  dextrose 50% Injectable 25 Gram(s) IV Push once  enoxaparin Injectable 40 milliGRAM(s) SubCutaneous every 12 hours  glucagon  Injectable 1 milliGRAM(s) IntraMuscular once  insulin lispro (ADMELOG) corrective regimen sliding scale   SubCutaneous three times a day before meals  insulin lispro (ADMELOG) corrective regimen sliding scale   SubCutaneous at bedtime  insulin lispro Injectable (ADMELOG) 5 Unit(s) SubCutaneous three times a day before meals  insulin NPH human recombinant 7 Unit(s) SubCutaneous before breakfast  insulin NPH human recombinant 7 Unit(s) SubCutaneous at bedtime  levothyroxine 75 MICROGram(s) Oral daily  magnesium sulfate  IVPB 2 Gram(s) IV Intermittent once  midodrine 10 milliGRAM(s) Oral every 8 hours  norepinephrine Infusion 0.05 MICROgram(s)/kG/Min (5.16 mL/Hr) IV Continuous <Continuous>  piperacillin/tazobactam IVPB.. 3.375 Gram(s) IV Intermittent every 8 hours  sodium bicarbonate  Infusion 0.136 mEq/kG/Hr (100 mL/Hr) IV Continuous <Continuous>  vancomycin  IVPB        MEDICATIONS  (PRN):  ALBUTerol    90 MICROgram(s) HFA Inhaler 2 Puff(s) Inhalation every 6 hours PRN Shortness of Breath  albuterol/ipratropium for Nebulization 3 milliLiter(s) Nebulizer every 6 hours PRN Shortness of Breath  dextrose Oral Gel 15 Gram(s) Oral once PRN Blood Glucose LESS THAN 70 milliGRAM(s)/deciliter  HYDROmorphone  Injectable 0.25 milliGRAM(s) IV Push once PRN Severe Pain (7 - 10)      ALLERGIES:  Allergies    No Known Allergies  Intolerances        LABS:                        8.0    22.58 )-----------( 530      ( 29 Mar 2022 01:00 )             25.1         131<L>  |  95<L>  |  45<H>  ----------------------------<  339<H>  4.6   |  22  |  1.88<H>    Ca    8.0<L>      29 Mar 2022 06:53  Phos  4.0       Mg     1.50         TPro  6.8  /  Alb  1.9<L>  /  TBili  0.4  /  DBili  x   /  AST  43<H>  /  ALT  17  /  AlkPhos  168<H>      Urinalysis Basic - ( 28 Mar 2022 06:18 )    Color: Dark Brown / Appearance: Turbid / S.029 / pH: x  Gluc: x / Ketone: Negative  / Bili: Negative / Urobili: <2 mg/dL   Blood: x / Protein: 30 mg/dL / Nitrite: Negative   Culture - Blood (22 @ 08:26)    Specimen Source: .Blood Blood    Culture Results:   No growth to date.    Leuk Esterase: Large / RBC: 8 /HPF / WBC >200 /HPF   Sq Epi: x / Non Sq Epi: Occasional / Bacteria: Many    Sodium, Random Urine: 44 mmol/L (22 @ 12:11)  Creatinine, Random Urine: 51 mg/dL (22 @ 12:11)  Potassium, Random Urine: 34.9 mmol/L (22 @ 12:11)    Vancomycin Level, Random: 16.5 ( @ 01:00)    Iron Total, Serum: 24 ug/dL ( @ 06:28)  Iron - Total Binding Capacity.: 164 ug/dL ( @ 06:28)  Ferritin, Serum: 956 ng/mL ( @ 06:28)    Culture - Blood (22 @ 08:26)    Specimen Source: .Blood    Culture Results:   No growth to date.    Culture - Urine (22 @ 07:27)    Specimen Source: Catheterized Catheterized    Culture Results:   >=3 organisms. Probable collection contamination.       INTERVAL HPI/OVERNIGHT EVENTS: No acute overnight events.    SUBJECTIVE: Patient seen and examined at bedside. Complains of R. neck pain due to IJ line which resolved with dressing change. Pain well controlled otherwise. No other complaints.    Plateau pressure:   P/F ratio:      @ 07:01  -   @ 07:00  --------------------------------------------------------  IN: 3044.2 mL / OUT: 1850 mL / NET: 1194.2 mL      CAPILLARY BLOOD GLUCOSE  POCT Blood Glucose.: 301 mg/dL (28 Mar 2022 22:52)    PHYSICAL EXAM:    T(C): 37.2 (22 @ 04:00), Max: 37.2 (22 @ 20:00)  HR: 92 (22 @ 06:00) (65 - 100)  BP: 132/56 (22 @ 06:00) (96/46 - 152/64)  RR: 23 (22 @ 06:00) (20 - 34)  SpO2: 98% (22 @ 06:00) (96% - 100%)    CONSTITUTIONAL: Well groomed, no apparent distress  HEENT: No conjunctival or scleral injection, non-icteric  NECK: Right IJ line without erythema, ecchymosis or bleeding.  RESPIRATORY: No respiratory distress, no use of accessory muscles; CTA b/l, no wheezes, rales or rhonchi.  CARDIOVASCULAR: RRRR, +S1S2, no murmurs, no rubs, no gallops; no JVD; no peripheral edema  GASTROINTESTINAL: Soft, non tender, non distended, no rebound, no guarding  GENITOURINARY: Rose cath patent, urine not cloudy.  SKIN: No obvious ozzing or bleeding from dressings.    MEDICATIONS:  MEDICATIONS  (STANDING):  chlorhexidine 4% Liquid 1 Application(s) Topical <User Schedule>  dextrose 5%. 1000 milliLiter(s) (50 mL/Hr) IV Continuous <Continuous>  dextrose 5%. 1000 milliLiter(s) (100 mL/Hr) IV Continuous <Continuous>  dextrose 50% Injectable 25 Gram(s) IV Push once  dextrose 50% Injectable 12.5 Gram(s) IV Push once  dextrose 50% Injectable 25 Gram(s) IV Push once  enoxaparin Injectable 40 milliGRAM(s) SubCutaneous every 12 hours  glucagon  Injectable 1 milliGRAM(s) IntraMuscular once  insulin lispro (ADMELOG) corrective regimen sliding scale   SubCutaneous three times a day before meals  insulin lispro (ADMELOG) corrective regimen sliding scale   SubCutaneous at bedtime  insulin lispro Injectable (ADMELOG) 5 Unit(s) SubCutaneous three times a day before meals  insulin NPH human recombinant 7 Unit(s) SubCutaneous before breakfast  insulin NPH human recombinant 7 Unit(s) SubCutaneous at bedtime  levothyroxine 75 MICROGram(s) Oral daily  magnesium sulfate  IVPB 2 Gram(s) IV Intermittent once  midodrine 10 milliGRAM(s) Oral every 8 hours  norepinephrine Infusion 0.05 MICROgram(s)/kG/Min (5.16 mL/Hr) IV Continuous <Continuous>  piperacillin/tazobactam IVPB.. 3.375 Gram(s) IV Intermittent every 8 hours  sodium bicarbonate  Infusion 0.136 mEq/kG/Hr (100 mL/Hr) IV Continuous <Continuous>  vancomycin  IVPB        MEDICATIONS  (PRN):  ALBUTerol    90 MICROgram(s) HFA Inhaler 2 Puff(s) Inhalation every 6 hours PRN Shortness of Breath  albuterol/ipratropium for Nebulization 3 milliLiter(s) Nebulizer every 6 hours PRN Shortness of Breath  dextrose Oral Gel 15 Gram(s) Oral once PRN Blood Glucose LESS THAN 70 milliGRAM(s)/deciliter  HYDROmorphone  Injectable 0.25 milliGRAM(s) IV Push once PRN Severe Pain (7 - 10)      ALLERGIES:  Allergies    No Known Allergies  Intolerances        LABS:                        8.0    22.58 )-----------( 530      ( 29 Mar 2022 01:00 )             25.1     03-29    131<L>  |  95<L>  |  45<H>  ----------------------------<  339<H>  4.6   |  22  |  1.88<H>    Ca    8.0<L>      29 Mar 2022 06:53  Phos  4.0     -  Mg     1.50         TPro  6.8  /  Alb  1.9<L>  /  TBili  0.4  /  DBili  x   /  AST  43<H>  /  ALT  17  /  AlkPhos  168<H>      Urinalysis Basic - ( 28 Mar 2022 06:18 )    Color: Dark Brown / Appearance: Turbid / S.029 / pH: x  Gluc: x / Ketone: Negative  / Bili: Negative / Urobili: <2 mg/dL   Blood: x / Protein: 30 mg/dL / Nitrite: Negative   Culture - Blood (22 @ 08:26)    Specimen Source: .Blood Blood    Culture Results:   No growth to date.    Leuk Esterase: Large / RBC: 8 /HPF / WBC >200 /HPF   Sq Epi: x / Non Sq Epi: Occasional / Bacteria: Many    Sodium, Random Urine: 44 mmol/L (22 @ 12:11)  Creatinine, Random Urine: 51 mg/dL (22 @ 12:11)  Potassium, Random Urine: 34.9 mmol/L (22 @ 12:11)    Vancomycin Level, Random: 16.5 ( @ 01:00)    Iron Total, Serum: 24 ug/dL ( @ 06:28)  Iron - Total Binding Capacity.: 164 ug/dL ( @ 06:28)  Ferritin, Serum: 956 ng/mL ( @ 06:28)    Culture - Blood (22 @ 08:26)    Specimen Source: .Blood    Culture Results:   No growth to date.    Culture - Urine (22 @ 07:27)    Specimen Source: Catheterized Catheterized    Culture Results:   >=3 organisms. Probable collection contamination.

## 2022-03-29 NOTE — DIETITIAN INITIAL EVALUATION ADULT. - ORAL INTAKE PTA/DIET HISTORY
Per Pt., generally eats 1-2 meals/day that brother provides.  Drinks water throughout the day.  Does not add salt to food.  Is unable to cook for herself.  Also reports sometimes feeding her dog, "a pug", the food her brother brings to her.

## 2022-03-29 NOTE — CHART NOTE - NSCHARTNOTEFT_GEN_A_CORE
: Lance Espinoza      INDICATION: Shock state      PROCEDURE:    [x] LIMITED ECHO    [x] LIMITED CHEST    [ ] LIMITED RETROPERITONEAL    [ ] LIMITED ABDOMINAL    [ ] LIMITED DVT    [ ] NEEDLE GUIDANCE VASCULAR    [ ] NEEDLE GUIDANCE THORACENTESIS    [ ] NEEDLE GUIDANCE PARACENTESIS    [ ] NEEDLE GUIDANCE PERICARDIOCENTESIS    [ ] OTHER      FINDINGS:  Predominant A-lines, trace B-lines on R side  LV systolic function grossly wnl  Indeterminate IVC    INTERPRETATION:   Indeterminate IVC  Shock noncardiogenic     Images stored on [facility] : Lance Espinoza      INDICATION: Shock state      PROCEDURE:    [x] LIMITED ECHO    [x] LIMITED CHEST    [ ] LIMITED RETROPERITONEAL    [ ] LIMITED ABDOMINAL    [ ] LIMITED DVT    [ ] NEEDLE GUIDANCE VASCULAR    [ ] NEEDLE GUIDANCE THORACENTESIS    [ ] NEEDLE GUIDANCE PARACENTESIS    [ ] NEEDLE GUIDANCE PERICARDIOCENTESIS    [ ] OTHER      FINDINGS:  Predominant A-lines, trace B-lines on R side  LV systolic function grossly wnl  Indeterminate IVC    INTERPRETATION:   Indeterminate IVC  Shock noncardiogenic     Images stored on [facility]    Attending Attestation:  I was present during the key portions of the procedure and immediately available during the entire procedure.  Lance Espinoza MD  Attending  Pulmonary & Critical Care Medicine

## 2022-03-29 NOTE — PROGRESS NOTE ADULT - SUBJECTIVE AND OBJECTIVE BOX
[facility] DIVISION OF KIDNEY DISEASES AND HYPERTENSION -- FOLLOW UP NOTE  --------------------------------------------------------------------------------  HPI: Patient is a 52 year old female with PMH of MS (diagnosed in 2001), long standing DM (>10 years), hypothyroidism, multiple chronic wounds and morbid obesity who came to the [facility] ER on 3/27 with c/o lethargy x3-4 days. Initial labs in ER showed severe hyperkalemia of 6.6 (non hemolyzed), elevated Blood sugars of 531 and elevated Scr of 2.01. Pt. received 2 L IVF bolus, medical management for hyperkalemia- insulin and calcium gluconate 1gm Iv, MgSo4 IV and sodium bicarb 50 mQ once in ER. Nephrology consutled for RAFAELA, and hyperkalemia. Scr on admission is elevated at 2.01 with SNa of 135 (corrected), serum potassium elevated to 6.6 and serum CO2 of 15 with Bld glucose of 531. Received medical management for hyperkalemia. Repeat Scr is at 2.17 with serum potassium at 6.1(non hemolyzed). Pt. reported that she is not compliant with her meds. Denies recent use of NSAIDs/ motrin or OTC meds use recently. Reported grandfather had kidney disease and was on HD. Denies SOB, CP, HA, N/V, abdominal pain, diarrhea or constipation or dizziness. Pt. seen this AM in MICU, denies any acute complaints. Scr. improving with hydration. Off Insulin gtt. Good UOP. Remains on Pressors.       PAST HISTORY  --------------------------------------------------------------------------------  No significant changes to PMH, PSH, FHx, SHx, unless otherwise noted    ALLERGIES & MEDICATIONS  --------------------------------------------------------------------------------  Allergies    No Known Allergies    Intolerances      Standing Inpatient Medications  chlorhexidine 4% Liquid 1 Application(s) Topical <User Schedule>  dextrose 5%. 1000 milliLiter(s) IV Continuous <Continuous>  dextrose 5%. 1000 milliLiter(s) IV Continuous <Continuous>  dextrose 50% Injectable 25 Gram(s) IV Push once  dextrose 50% Injectable 12.5 Gram(s) IV Push once  dextrose 50% Injectable 25 Gram(s) IV Push once  enoxaparin Injectable 40 milliGRAM(s) SubCutaneous every 12 hours  glucagon  Injectable 1 milliGRAM(s) IntraMuscular once  insulin lispro (ADMELOG) corrective regimen sliding scale   SubCutaneous three times a day before meals  insulin lispro (ADMELOG) corrective regimen sliding scale   SubCutaneous at bedtime  insulin lispro Injectable (ADMELOG) 5 Unit(s) SubCutaneous three times a day before meals  insulin NPH human recombinant 7 Unit(s) SubCutaneous before breakfast  insulin NPH human recombinant 7 Unit(s) SubCutaneous at bedtime  levothyroxine 75 MICROGram(s) Oral daily  magnesium sulfate  IVPB 2 Gram(s) IV Intermittent once  midodrine 10 milliGRAM(s) Oral every 8 hours  norepinephrine Infusion 0.05 MICROgram(s)/kG/Min IV Continuous <Continuous>  piperacillin/tazobactam IVPB.. 3.375 Gram(s) IV Intermittent every 8 hours  sodium bicarbonate  Infusion 0.136 mEq/kG/Hr IV Continuous <Continuous>  vancomycin  IVPB        PRN Inpatient Medications  ALBUTerol    90 MICROgram(s) HFA Inhaler 2 Puff(s) Inhalation every 6 hours PRN  albuterol/ipratropium for Nebulization 3 milliLiter(s) Nebulizer every 6 hours PRN  dextrose Oral Gel 15 Gram(s) Oral once PRN  HYDROmorphone  Injectable 0.25 milliGRAM(s) IV Push once PRN      REVIEW OF SYSTEMS  --------------------------------------------------------------------------------    Respiratory: No dyspnea, cough  CV: No chest pain  GI: No abdominal pain, diarrhea  : No urinary complaints  MSK: No edema    All other systems were reviewed and are negative, except as noted.    VITALS/PHYSICAL EXAM  --------------------------------------------------------------------------------  T(C): 37.2 (03-29-22 @ 04:00), Max: 37.3 (03-28-22 @ 08:00)  HR: 92 (03-29-22 @ 06:00) (65 - 100)  BP: 132/56 (03-29-22 @ 06:00) (96/46 - 152/64)  RR: 23 (03-29-22 @ 06:00) (17 - 34)  SpO2: 98% (03-29-22 @ 06:00) (96% - 100%)  Wt(kg): --  Height (cm): 165.1 (03-28-22 @ 08:00)  Weight (kg): 110 (03-28-22 @ 08:00)  BMI (kg/m2): 40.4 (03-28-22 @ 08:00)  BSA (m2): 2.15 (03-28-22 @ 08:00)      03-28-22 @ 07:01  -  03-29-22 @ 07:00  --------------------------------------------------------  IN: 3044.2 mL / OUT: 1850 mL / NET: 1194.2 mL        Physical Exam:  	Gen: NAD  	HEENT: MMM  	Pulm: CTA B/L-anteriorly  	CV: S1S2  	Abd: Obese, Soft, +BS   	Ext: Trace LE edema B/L  	Neuro: Awake  	Skin: Warm and dry  	Vascular access: [facility]      LABS/STUDIES  --------------------------------------------------------------------------------              8.0    22.58 >-----------<  530      [03-29-22 @ 01:00]              25.1     131  |  96  |  49  ----------------------------<  302      [03-29-22 @ 01:00]  5.2   |  21  |  2.01        Ca     8.5     [03-29-22 @ 01:00]      Mg     1.60     [03-29-22 @ 01:00]      Phos  4.0     [03-29-22 @ 01:00]    TPro  7.1  /  Alb  2.1  /  TBili  0.4  /  DBili  x   /  AST  49  /  ALT  19  /  AlkPhos  173  [03-29-22 @ 01:00]              [03-28-22 @ 06:28]    Creatinine Trend:  SCr 2.01 [03-29 @ 01:00]  SCr 2.29 [03-28 @ 18:31]  SCr 2.33 [03-28 @ 12:43]  SCr 2.40 [03-28 @ 06:18]  SCr 2.17 [03-28 @ 00:47]    Urinalysis - [03-28-22 @ 06:18]      Color Dark Brown / Appearance Turbid / SG 1.029 / pH 5.5      Gluc Negative / Ketone Negative  / Bili Negative / Urobili <2 mg/dL       Blood Small / Protein 30 mg/dL / Leuk Est Large / Nitrite Negative      RBC 8 / WBC >200 / Hyaline  / Gran  / Sq Epi  / Non Sq Epi Occasional / Bacteria Many    Urine Creatinine 51      [03-28-22 @ 12:11]  Urine Sodium 44      [03-28-22 @ 12:11]  Urine Potassium 34.9      [03-28-22 @ 12:11]  Urine Chloride 41      [03-28-22 @ 12:11]    Iron 24, TIBC 164, %sat 15      [03-28-22 @ 06:28]  Ferritin 956      [03-28-22 @ 06:28]  TSH 3.86      [03-28-22 @ 12:53]

## 2022-03-29 NOTE — DIETITIAN INITIAL EVALUATION ADULT. - PERTINENT MEDS FT
MEDICATIONS  (STANDING):  chlorhexidine 4% Liquid 1 Application(s) Topical <User Schedule>  dextrose 5%. 1000 milliLiter(s) (50 mL/Hr) IV Continuous <Continuous>  dextrose 5%. 1000 milliLiter(s) (100 mL/Hr) IV Continuous <Continuous>  dextrose 50% Injectable 25 Gram(s) IV Push once  dextrose 50% Injectable 12.5 Gram(s) IV Push once  dextrose 50% Injectable 25 Gram(s) IV Push once  enoxaparin Injectable 40 milliGRAM(s) SubCutaneous every 12 hours  glucagon  Injectable 1 milliGRAM(s) IntraMuscular once  insulin glargine Injectable (LANTUS) 22 Unit(s) SubCutaneous at bedtime  insulin lispro (ADMELOG) corrective regimen sliding scale   SubCutaneous three times a day before meals  insulin lispro (ADMELOG) corrective regimen sliding scale   SubCutaneous at bedtime  insulin lispro Injectable (ADMELOG) 7 Unit(s) SubCutaneous three times a day before meals  levothyroxine 75 MICROGram(s) Oral daily  midodrine 20 milliGRAM(s) Oral every 8 hours  norepinephrine Infusion 0.05 MICROgram(s)/kG/Min (5.16 mL/Hr) IV Continuous <Continuous>  piperacillin/tazobactam IVPB.. 3.375 Gram(s) IV Intermittent every 8 hours  vancomycin  IVPB

## 2022-03-29 NOTE — PROGRESS NOTE ADULT - ATTENDING COMMENTS
RAFAELA  ATN  Metabolic Acidosis  Hyperkalemia  COVID 19 infection    Would continue to treat underlying DKA and infections. Now on pressor support, please maintain a MAP > 60.   Monitor Is/Os and daily weights. Will assess daily for RRT needs.

## 2022-03-29 NOTE — CONSULT NOTE ADULT - ASSESSMENT
Assessment: 51 yo F hx of T2DM, asthma, obesity, chronic immobility 2/2 MS and several chronic wounds presenting for vulvar/R thigh wound found to be hyperglycemic + metabolic acidosis w/ purulent urine c/f urosepsis + ARF.  Wound surgery consult requested to assist with management of multiple pressure injuries POA, no drainable collections, no sharp debridement indicated at this time.    Right trochanter stage 4 pressure injury  - Bone in close proximity but not visible.   - Wound base with 10% firmly attached slough  - Mild odor  - Periwound skin without edema, erythema, no increased warmth, no fluctuance, no crepitus noted.  - CT abd/pelvis: "Large soft tissue defect/ulceration in the lateral right thigh with   associated subcutaneous inflammatory changes."  - No sharp debridement indicated at this time.  - Topical recommendations- pack with Dakins 1/4 strength moistened kerlix, cover with 4x4 gauze, abdominal pad, change twice a day.  - Offload pressure.    Right lateral thigh stage 4 pressure injury  -wound base clean, granula  - Periwound skin without edema, erythema, no increased warmth, no fluctuance, no crepitus noted.  - CT abd/pelvis: "Large soft tissue defect/ulceration in the lateral right thigh with   associated subcutaneous inflammatory changes."  - No sharp debridement indicated at this time.  - No drainable collection  - Topical recommendations- pack with Dakins 1/4 strength moistened kerlix, cover with 4x4 gauze, abdominal pad, change twice a day.  - Offload pressure.    Right buttock unstagable pressure injury  - Firmly attached eschar, no crepitus, no fluctuance noted.  - No drainage, no odor.  - (+) fecal incontinence, pasty stool  - Avoid occlusive/semiocclusive dressing in setting of loose/pasty fecal incontinence  - Autolytic debridement with TRIAD moisture barrier paste every shift and prn with episodes of incontinence  - Offload pressure.    Vulvar abscess?  - Limited exam due to body habitus, no abscess or open ulceration visualized.  - CT abd/pelvis "No discrete collection or abscess is seen involving the   labia or perineum. Detailed evaluation of the soft tissues is limited due   to the absence of intravenous contrast."   - Consider Gyn Consult     Bilateral lower extremities multiple deep tissue pressure injuries  - no ischemic changes noted.  - consider obtaining MARK/PVR  - consider Podiatry consult for left heel Deep tissue pressure injury  - Paint with betadine daily.  - offload pressure, complete cair boots.    Additional recommendations:  -Nutrition consult for optimization as tolerated in patient with multiple pressure injuries, stage 4, unstagable, deep tissue pressure injuries.        consider Vit C to promote wound healing        encourage high quality protein when appropriate  -DM management per Endocrinology/primary team  -Abx per primary team      Remainder of care per primary team.    Upon discharge patient would benefit from VNS or inpatient facility to assist with wound management.   Upon discharge f/u as outpatient at [facility] [address] [place], [place] [phone]  Seen w/ Dr. Cooper, findings and plan discussed with patient, RN, and primary team.  Will f/u periodically throughout hospitalization, please reconsult earlier if needed.    Thank you for this consult  DEMAR Skinner, [email or web address] (pager #[phone])    If after 4PM or before 7:30AM on Mon-Friday or weekend/holiday please contact general surgery for urgent matters.   Team A- [id number]   Team B- [id number]  For non-urgent matters e-mail [email or web address]    We spent 70 minutes face to face with this patient of which more than 50% of the time was spent counseling & coordinating care of this pt

## 2022-03-29 NOTE — PROGRESS NOTE ADULT - PROBLEM SELECTOR PLAN 1
Pt. with RAFAELA of unclear duration. Likely In the setting of vol depletion in view of uncontrolled DM/DKA and or ? sepsis from abdominal wounds vs. UTI. No prior labs available. Scr on admission elevated to 2.1, now trending to 2.0. Pt. has hx of long standing uncontrolled DM (>10 years). Obtain kidney sonogram. Check PCr. ratio. Urine studies suggestive of infection and intrinsic renal disease. Would repeat prior to discharge. Agree with IVF for DKA and septic shock. BG control. Discussion with patient regarding RRT if Scr worsened or persistent severe hyperkalemia, pt. refused any RRT(hemodialysis) for now. Monitor labs and urine output. Avoid any potential nephrotoxins. Dose medications as per eGFR.

## 2022-03-29 NOTE — CONSULT NOTE ADULT - ASSESSMENT
51 yo F hx of uncontrolled T2DM, asthma, obesity, chronic immobility 2/2 MS and several chronic wounds presenting for vulvar/R thigh wound found to be hyperglycemic + metabolic acidosis w/ purulent urine c/f urosepsis + ARF.    Consulted for: Uncontrolled T2DM    #Uncontrolled T2DM  A1c 12.2%. Goal < 7%  Inpatient FS goal 140-180  Home regimen: Takes Metformin 1g BID, Amaryl before meals (doesn't know dose),and Januvia 100 mg daily  Transitioned off insulin gtt to NPH.   Recs:   -Lantus 33 units qHS  -Admelog 11 units qAC. HOLD if NPO  -moderate dose correctional scale qAC and qHS  -consistent carb diet  -FS qAC and qHS  -RD consult and insulin pen teaching (ordered)   For d/c:   -basal/bolus + GLP -1 agonist  -For coverage purpose, please send Lantus/Basaglar/Tresiba/Semglee/Toujeo 10 units at bedtime and also Admelog/Humalog/Novolog 3 units before meals to [facility]. Can start by send one of each and discuss with pharmacy which is covered.   -Please send Trulicity 0.75 mg subq/weekly or Ozempic 0.25 mg subq/weekly to pharmacy to see if covered. If covered, can send either to pharmacy on d/c. If sending Trulicity, please write script for 0.75 mg subq/weekly x 4 weeks. If sending Ozempic, please send 0.25 mg subq/weekly x 4 weeks  -Can fu with Endocrine Practice at [address] [place] [address], [address], [place] [address]; Ph # [phone]    #Hypothyroidism  TSH 3.82  -Check FT4  -Continue LT4 75 mcg daily    #HTN  BP Goal < 130/80  Continue management per primary team    #HLD  LDL goal < 70  Statin if no contraindications    Sandy Hernandez MD  Endocrine Fellow  Can be reached via teams. For follow up questions, discharge recommendations, or new consults, please call answering service at [phone] (weekdays); [phone] (nights/weekends)   51 yo F hx of uncontrolled T2DM, asthma, obesity, chronic immobility 2/2 MS and several chronic wounds presenting for vulvar/R thigh wound found to be hyperglycemic + metabolic acidosis w/ purulent urine c/f urosepsis + ARF.    Consulted for: Uncontrolled T2DM    #Uncontrolled T2DM  A1c 14.2%. Goal < 7%  Inpatient FS goal 140-180  Home regimen: Takes Metformin 1g BID, Amaryl before meals (doesn't know dose),and Januvia 100 mg daily  Transitioned off insulin gtt to NPH.   Recs:   -Lantus 33 units qHS  -Admelog 11 units qAC. HOLD if NPO  -moderate dose correctional scale qAC and qHS  -consistent carb diet  -FS qAC and qHS  -RD consult and insulin pen teaching (ordered)   For d/c:   -basal/bolus + GLP -1 agonist  -For coverage purpose, please send Lantus/Basaglar/Tresiba/Semglee/Toujeo 10 units at bedtime and also Admelog/Humalog/Novolog 3 units before meals to [facility]. Can start by send one of each and discuss with pharmacy which is covered.   -Please send Trulicity 0.75 mg subq/weekly or Ozempic 0.25 mg subq/weekly to pharmacy to see if covered. If covered, can send either to pharmacy on d/c. If sending Trulicity, please write script for 0.75 mg subq/weekly x 4 weeks. If sending Ozempic, please send 0.25 mg subq/weekly x 4 weeks  -Can fu with Endocrine Practice at [address] [place] [address], [address], [place] [address]; Ph # [phone]    #Hypothyroidism  TSH 3.82  -Check FT4  -Continue LT4 75 mcg daily    #HTN  BP Goal < 130/80  Continue management per primary team    #HLD  LDL goal < 70  Statin if no contraindications    Sandy Hernandez MD  Endocrine Fellow  Can be reached via teams. For follow up questions, discharge recommendations, or new consults, please call answering service at [phone] (weekdays); [phone] (nights/weekends)

## 2022-03-29 NOTE — DIETITIAN INITIAL EVALUATION ADULT. - SIGNS/SYMPTOMS
HbA1c 14.2%, elevated gluc w dx DKA.  Pt. unable to teach back prior knowledge of therapeutic diet. Multiple pressure injuries.

## 2022-03-29 NOTE — DIETITIAN INITIAL EVALUATION ADULT. - ADD RECOMMEND
-- Consider modification in insulin regimen.                                                                               -- Add multivitamin for micronutrient coverage.

## 2022-03-29 NOTE — DIETITIAN INITIAL EVALUATION ADULT. - ETIOLOGY
Lack of previous diet/nutrition related education, particularly as it relates to Hx of DM. Physiological demands to help promote improvement in significantly impaired skin integrity.

## 2022-03-29 NOTE — CHART NOTE - NSCHARTNOTEFT_GEN_A_CORE
MICU Transfer Note    Transfer from: MICU    Transfer to: ( x ) Medicine    (  ) Telemetry     (   ) RCU        (    ) Palliative         (   ) Stroke Unit          (   ) __________________    Accepting Physician:  Signout given to:   [id number]  MICU COURSE:  53 yo F hx of T2DM, asthma, obesity, chronic immobility 2/2 MS and several chronic wounds presenting for vulvar/R thigh wound found to be hyperglycemic + metabolic acidosis w/ purulent urine c/f urosepsis + ARF. During the course of her stay, pt was on levo and titrated off and has been hemodynamically stable since 3/29. Presumably the hypotension was from urosepsis.   Vanc and zoysn was given for empiric treatment. Blood cultures have had no growth to date, and possible contamination of her catheterized sample.   Wound care assessed pt, assessed large ulceration in lateral right thigh, rec include offloading pressure, pack w/ dakins,         ASSESSMENT & PLAN:     53 yo F hx of T2DM, asthma, obesity, chronic immobility 2/2 MS and several chronic wounds presenting for vulvar/R thigh wound found to be hyperglycemic + metabolic acidosis w/ purulent urine c/f urosepsis + ARF.    Neuro  AAOx3 at baseline     #MS  History of MS   -Hold home cosentyx (20mg q4w). Last dose 3/26 as per brother.    Resp  Sating well on RA  - COVID test positive 3/28  - Hold remdesivir due to RAFAELA    CV  #Hypotension  Likely septic shock 2/2 UTI  -c/w levo; wean as tolerated  -midodrine 20q8h  -treat UTI as below   -Hold home losartan 50mg qD    GI  -Continue DASH diet    ID  #Complicated UTI.   Patient w/ purulent drainage from [facility]saint w/ no hx of sx prior. However, R medial thigh/vulvar wound could potentially be source in the setting of DM.  -Empirically treat w/ vanc by level/zosyn  -1500 vanc in AM. Vanc trough tomorrow morning, redose accordingly  -blood culture NGTD, urine culture 3 organisms, speciation pending  -MRSA PCR ordered    #Renal    #ARF   Cr 2.01 w/ unknown baseline a/w metabolic acidosis, bicarb of 13, AG of 18 likely pre renal in the setting of septic shock 2/2 UTI/skin wounds vs RAFAELA on CKD due to uncontrolled DM. Nephro c/s and offered HD if necessary but patient declined.  -Nephro recs appreciated  -renal sonogram: mild to moderate bilateral hydronephrosis  -Urine lytes 3/28 Fe 1.4%  -BMP+mg/phos q6h  -Urine culture speciation pending    Heme  #Anemia  Hb 8.0, possible anemia of chronic disease d/t CKD  -Iron studies consistent w/ ACD  -transfuse PRN    #DVT ppx  -Unable to use SCDs due to leg wounds  -Continue lovenox 40mg BID given BMI 40+    Endo  Metabolic acidosis 2/2 ? DKA vs ARF 2/2 sepsis vs RAFAELA on CKD. Patient on metformin and janivia w/ likely inadequate control of BG. Urosepsis vs R medial thigh/vulvar wound may represent precipitant for DKA, however BHB negative.   -HbA1c 14.2  -moderate insulin sliding scale   -Basal bolus insulin 22 units glargine qHS + 7 U admelog TID  -holding home diabetes meds  -Continue home synthroid 75mcg qD, 3/28 TSH 3.86 wnl  -Endo consulted    MSK  #Multiple chronic + acute wounds  Hx of chronic sacral wound d/t immobility further complicated by acute progression of right medial thigh/vulvar wound precipitated by skin tear  -Awaiting wound care consult  -Hydromorphone IV 0.25mg PRN and tylenol PRN for pain.    #GOC/Ethics  -GOC pending  -PT consulted  -Social work consulted        FOR FOLLOW UP:  Wound follow up for seemingly chronic wound  Possible in pt assessment for wound management or wound healing center MICU Transfer Note    Transfer from: MICU    Transfer to: ( x ) Medicine    (  ) Telemetry     (   ) RCU        (    ) Palliative         (   ) Stroke Unit          (   ) __________________    Accepting Physician:  Signout given to: Clair Ku   MICU COURSE:  53 yo F hx of T2DM, asthma, obesity, chronic immobility 2/2 MS and several chronic wounds presenting for vulvar/R thigh wound found to be hyperglycemic + metabolic acidosis w/ purulent urine c/f urosepsis + ARF. During the course of her stay, pt was on levo and titrated off and has been hemodynamically stable since 3/29. Presumably the hypotension was from urosepsis.   Vanc and zoysn was given for empiric treatment. Blood cultures have had no growth to date, and possible contamination of her catheterized sample.   Wound care assessed pt, assessed large ulceration in lateral right thigh, rec include offloading pressure, pack w/ dakins,         ASSESSMENT & PLAN:     53 yo F hx of T2DM, asthma, obesity, chronic immobility 2/2 MS and several chronic wounds presenting for vulvar/R thigh wound found to be hyperglycemic + metabolic acidosis w/ purulent urine c/f urosepsis + ARF.    Neuro  AAOx3 at baseline     #MS  History of MS   -Hold home cosentyx (20mg q4w). Last dose 3/26 as per brother.    Resp  Sating well on RA  - COVID test positive 3/28  - Hold remdesivir due to RAFAELA    CV  #Hypotension  Likely septic shock 2/2 UTI  -c/w levo; wean as tolerated  -midodrine 20q8h  -treat UTI as below   -Hold home losartan 50mg qD    GI  -Continue DASH diet    ID  #Complicated UTI.   Patient w/ purulent drainage from [facility] w/ no hx of sx prior. However, R medial thigh/vulvar wound could potentially be source in the setting of DM.  -Empirically treat w/ vanc by level/zosyn  -1500 vanc in AM. Vanc trough tomorrow morning, redose accordingly  -blood culture NGTD, urine culture 3 organisms, speciation pending  -MRSA PCR ordered    #Renal    #ARF   Cr 2.01 w/ unknown baseline a/w metabolic acidosis, bicarb of 13, AG of 18 likely pre renal in the setting of septic shock 2/2 UTI/skin wounds vs RAFAELA on CKD due to uncontrolled DM. Nephro c/s and offered HD if necessary but patient declined.  -Nephro recs appreciated  -renal sonogram: mild to moderate bilateral hydronephrosis  -Urine lytes 3/28 Fe 1.4%  -BMP+mg/phos q6h  -Urine culture speciation pending    Heme  #Anemia  Hb 8.0, possible anemia of chronic disease d/t CKD  -Iron studies consistent w/ ACD  -transfuse PRN    #DVT ppx  -Unable to use SCDs due to leg wounds  -Continue lovenox 40mg BID given BMI 40+    Endo  Metabolic acidosis 2/2 ? DKA vs ARF 2/2 sepsis vs RAFAELA on CKD. Patient on metformin and janivia w/ likely inadequate control of BG. Urosepsis vs R medial thigh/vulvar wound may represent precipitant for DKA, however BHB negative.   -HbA1c 14.2  -moderate insulin sliding scale   -Basal bolus insulin 22 units glargine qHS + 7 U admelog TID  -holding home diabetes meds  -Continue home synthroid 75mcg qD, 3/28 TSH 3.86 wnl  -Endo consulted    MSK  #Multiple chronic + acute wounds  Hx of chronic sacral wound d/t immobility further complicated by acute progression of right medial thigh/vulvar wound precipitated by skin tear  -Awaiting wound care consult  -Hydromorphone IV 0.25mg PRN and tylenol PRN for pain.    #GOC/Ethics  -GOC pending  -PT consulted  -Social work consulted        FOR FOLLOW UP:  Wound follow up for seemingly chronic wound  Possible in pt assessment for wound management or wound healing center

## 2022-03-29 NOTE — DIETITIAN INITIAL EVALUATION ADULT. - PERTINENT LABORATORY DATA
03-29    131<L>  |  95<L>  |  45<H>  ----------------------------<  339<H>  4.6   |  22  |  1.88<H>    Ca    8.0<L>      29 Mar 2022 06:53  Phos  4.0     03-29  Mg     1.50     03-29    TPro  6.8  /  Alb  1.9<L>  /  TBili  0.4  /  DBili  x   /  AST  43<H>  /  ALT  17  /  AlkPhos  168<H>  03-29    HbA1c 14.2%    CAPILLARY BLOOD GLUCOSE      POCT Blood Glucose.: 353 mg/dL (29 Mar 2022 08:07)  POCT Blood Glucose.: 301 mg/dL (28 Mar 2022 22:52)  POCT Blood Glucose.: 365 mg/dL (28 Mar 2022 15:39)  POCT Blood Glucose.: 318 mg/dL (28 Mar 2022 11:50)

## 2022-03-29 NOTE — PROGRESS NOTE ADULT - ASSESSMENT
53 yo F hx of T2DM, asthma, obesity, chronic immobility 2/2 MS and several chronic wounds presenting for vulvar/R thigh wound found to be hyperglycemic + metabolic acidosis w/ purulent urine c/f urosepsis + ARF.      Neuro  AAOx3 at baseline     #MS  History of MS   -Hold home cosentyx (20mg q4w). Last dose 3/26 as per brother.    Resp  Sating well on RA  - COVID test positive 3/28  - Hold remdesivir due to RAFAELA    CV  #Hypotension  Likely septic shock 2/2 UTI  -c/w levo; wean as tolerated  -midodrine 10q8h  -treat UTI as below   -Hold home losartan 50mg qD    GI  -Continue DASH diet    ID  #Complicated UTI.   Patient w/ purulent drainage from toussaint w/ no hx of sx prior. However, R medial thigh/vulvar wound could potentially be source in the setting of DM.  -Empirically treat w/ vanc by level/zosyn  -1500 vanc in AM, vanc trough tomorrow morning, redose accordingly  -f/u blood/urine cx    #Renal    #ARF   Cr 2.4 w/ unknown baseline a/w metabolic acidosis, bicarb of 13, AG of 18 likely pre renal in the setting of septic shock 2/2 UTI/skin wounds vs RAFAELA on CKD due to uncontrolled DM. Nephro c/s and offered HD if necessary but patient declined.  -Nephro recs appreciated.  -IV fluids as needed  -f/u Ucx, urine lytes  -sodium bicarb drip   - BMP+Mg q6h  - Lokelma 5g PO for hyperkalemia    Heme  #Anemia  Hb 8.0, possible anemia of chronic disease d/t CKD  -Iron studies consistent w/ ACD  -transfuse PRN    #DVT ppx  -Unable to use SCDs due to leg wounds  -Continue lovenox 40mg BID given BMI 40+    Endo  Metabolic acidosis 2/2 ? DKA vs ARF 2/2 sepsis vs RAFAELA on CKD. Patient on metformin and janivia w/ likely inadequate control of BG. Urosepsis vs R medial thigh/vulvar wound may represent precipitant for DKA, however BHB negative.   -HbA1c 14.2  -insulin sliding scale   - NPH 7 units BID Admelog 5 TID  -correct concurrent causes for acidosis including pre renal RAFAELA on CKD + urosepsis  -Endo c/s  - holding home meds  - Continue home synthroid 75mcg qD  - TSH 3.86 wnl    MSK  #Multiple chronic + acute wounds  Hx of chronic sacral wound d/t immobility further complicated by acute progression of right medial thigh/vulvar wound precipitated by skin tear  - Awaiting wound care consult  - Hydromorphone IV 0.25mg PRN and tylenol PRN for pain    #GOC/Ethics  -GOC pending  51 yo F hx of T2DM, asthma, obesity, chronic immobility 2/2 MS and several chronic wounds presenting for vulvar/R thigh wound found to be hyperglycemic + metabolic acidosis w/ purulent urine c/f urosepsis + ARF.      Neuro  AAOx3 at baseline     #MS  History of MS   -Hold home cosentyx (20mg q4w). Last dose 3/26 as per brother.    Resp  Sating well on RA  - COVID test positive 3/28  - Hold remdesivir due to RAFAELA    CV  #Hypotension  Likely septic shock 2/2 UTI  -c/w levo; wean as tolerated  -midodrine 10q8h  -treat UTI as below   -Hold home losartan 50mg qD    GI  -Continue DASH diet    ID  #Complicated UTI.   Patient w/ purulent drainage from toussaint w/ no hx of sx prior. However, R medial thigh/vulvar wound could potentially be source in the setting of DM.  -Empirically treat w/ vanc by level/zosyn  -1500 vanc in AM. Vanc trough tomorrow morning, redose accordingly  -f/u blood/urine cx  -MRSA PCR ordered    #Renal    #ARF   Cr 2.4 w/ unknown baseline a/w metabolic acidosis, bicarb of 13, AG of 18 likely pre renal in the setting of septic shock 2/2 UTI/skin wounds vs RAFAELA on CKD due to uncontrolled DM. Nephro c/s and offered HD if necessary but patient declined.  -Nephro recs appreciated: renal sonogram, urine protein:cr, 1L bolus LR ordered  -f/u Ucx  - Urine lytes 3/28 Fe 1.4%  - BMP q6h    Heme  #Anemia  Hb 8.0, possible anemia of chronic disease d/t CKD  -Iron studies consistent w/ ACD  -transfuse PRN    #DVT ppx  -Unable to use SCDs due to leg wounds  -Continue lovenox 40mg BID given BMI 40+    Endo  Metabolic acidosis 2/2 ? DKA vs ARF 2/2 sepsis vs RAFAELA on CKD. Patient on metformin and janivia w/ likely inadequate control of BG. Urosepsis vs R medial thigh/vulvar wound may represent precipitant for DKA, however BHB negative.   -HbA1c 14.2  - moderate insulin sliding scale   - Basal bolus insulin 22 units glargine qHS + 7 U admelog TID  -Endo c/s  - holding home meds  - Continue home synthroid 75mcg qD  - TSH 3.86 wnl  - Endo consult    MSK  #Multiple chronic + acute wounds  Hx of chronic sacral wound d/t immobility further complicated by acute progression of right medial thigh/vulvar wound precipitated by skin tear  - Awaiting wound care consult  - Hydromorphone IV 0.25mg PRN and tylenol PRN for pain    #GOC/Ethics  -GOC pending  -PT consult  -Social work consult 51 yo F hx of T2DM, asthma, obesity, chronic immobility 2/2 MS and several chronic wounds presenting for vulvar/R thigh wound found to be hyperglycemic + metabolic acidosis w/ purulent urine c/f urosepsis + ARF.      Neuro  AAOx3 at baseline     #MS  History of MS   -Hold home cosentyx (20mg q4w). Last dose 3/26 as per brother.    Resp  Sating well on RA  - COVID test positive 3/28  - Hold remdesivir due to RAFAELA    CV  #Hypotension  Likely septic shock 2/2 UTI  -c/w levo; wean as tolerated  -midodrine 10q8h  -treat UTI as below   -Hold home losartan 50mg qD    GI  -Continue DASH diet    ID  #Complicated UTI.   Patient w/ purulent drainage from toussaint w/ no hx of sx prior. However, R medial thigh/vulvar wound could potentially be source in the setting of DM.  -Empirically treat w/ vanc by level/zosyn  -1500 vanc in AM. Vanc trough tomorrow morning, redose accordingly  -blood culture NGTD, urine culture 3 organisms, will ask lab to speciate  -wound MRSA PCR ordered    #Renal    #ARF   Cr 2.4 w/ unknown baseline a/w metabolic acidosis, bicarb of 13, AG of 18 likely pre renal in the setting of septic shock 2/2 UTI/skin wounds vs RAFAELA on CKD due to uncontrolled DM. Nephro c/s and offered HD if necessary but patient declined.  -Nephro recs appreciated: renal sonogram, urine protein:cr, 1L bolus LR ordered  -f/u Ucx  - Urine lytes 3/28 Fe 1.4%  - BMP+mg/phos q6h    Heme  #Anemia  Hb 8.0, possible anemia of chronic disease d/t CKD  -Iron studies consistent w/ ACD  -transfuse PRN    #DVT ppx  -Unable to use SCDs due to leg wounds  -Continue lovenox 40mg BID given BMI 40+    Endo  Metabolic acidosis 2/2 ? DKA vs ARF 2/2 sepsis vs RAFAELA on CKD. Patient on metformin and janivia w/ likely inadequate control of BG. Urosepsis vs R medial thigh/vulvar wound may represent precipitant for DKA, however BHB negative.   -HbA1c 14.2  - moderate insulin sliding scale   - Basal bolus insulin 22 units glargine qHS + 7 U admelog TID  -holding home meds  -Continue home synthroid 75mcg qD  -TSH 3.86 wnl  -Endo consulted    MSK  #Multiple chronic + acute wounds  Hx of chronic sacral wound d/t immobility further complicated by acute progression of right medial thigh/vulvar wound precipitated by skin tear  - Awaiting wound care consult  - Hydromorphone IV 0.25mg PRN and tylenol PRN for pain.    #GOC/Ethics  -GOC pending  -PT consulted  -Social work consulted 51 yo F hx of T2DM, asthma, obesity, chronic immobility 2/2 MS and several chronic wounds presenting for vulvar/R thigh wound found to be hyperglycemic + metabolic acidosis w/ purulent urine c/f urosepsis + ARF.    Neuro  AAOx3 at baseline     #MS  History of MS   -Hold home cosentyx (20mg q4w). Last dose 3/26 as per brother.    Resp  Sating well on RA  - COVID test positive 3/28  - Hold remdesivir due to RAFAELA    CV  #Hypotension  Likely septic shock 2/2 UTI  -c/w levo; wean as tolerated  -midodrine 10q8h  -treat UTI as below   -Hold home losartan 50mg qD    GI  -Continue DASH diet    ID  #Complicated UTI.   Patient w/ purulent drainage from toussaint w/ no hx of sx prior. However, R medial thigh/vulvar wound could potentially be source in the setting of DM.  -Empirically treat w/ vanc by level/zosyn  -1500 vanc in AM. Vanc trough tomorrow morning, redose accordingly  -blood culture NGTD, urine culture 3 organisms, speciation pending  -wound MRSA PCR ordered    #Renal    #ARF   Cr 2.4 w/ unknown baseline a/w metabolic acidosis, bicarb of 13, AG of 18 likely pre renal in the setting of septic shock 2/2 UTI/skin wounds vs RAFAELA on CKD due to uncontrolled DM. Nephro c/s and offered HD if necessary but patient declined.  -Nephro recs appreciated: renal sonogram, urine protein:cr, 1L bolus LR ordered  -Urine lytes 3/28 Fe 1.4%  -BMP+mg/phos q6h  -Urine culture speciation pending    Heme  #Anemia  Hb 8.0, possible anemia of chronic disease d/t CKD  -Iron studies consistent w/ ACD  -transfuse PRN    #DVT ppx  -Unable to use SCDs due to leg wounds  -Continue lovenox 40mg BID given BMI 40+    Endo  Metabolic acidosis 2/2 ? DKA vs ARF 2/2 sepsis vs RAFAELA on CKD. Patient on metformin and janivia w/ likely inadequate control of BG. Urosepsis vs R medial thigh/vulvar wound may represent precipitant for DKA, however BHB negative.   -HbA1c 14.2  -moderate insulin sliding scale   -Basal bolus insulin 22 units glargine qHS + 7 U admelog TID  -holding home diabetes meds  -Continue home synthroid 75mcg qD, 3/28 TSH 3.86 wnl  -Endo consulted    MSK  #Multiple chronic + acute wounds  Hx of chronic sacral wound d/t immobility further complicated by acute progression of right medial thigh/vulvar wound precipitated by skin tear  -Awaiting wound care consult  -Hydromorphone IV 0.25mg PRN and tylenol PRN for pain.    #GOC/Ethics  -GOC pending  -PT consulted  -Social work consulted 53 yo F hx of T2DM, asthma, obesity, chronic immobility 2/2 MS and several chronic wounds presenting for vulvar/R thigh wound found to be hyperglycemic + metabolic acidosis w/ purulent urine c/f urosepsis + ARF.    Neuro  AAOx3 at baseline     #MS  History of MS   -Hold home cosentyx (20mg q4w). Last dose 3/26 as per brother.    Resp  Sating well on RA  - COVID test positive 3/28  - Hold remdesivir due to RAFAELA    CV  #Hypotension  Likely septic shock 2/2 UTI  -c/w levo; wean as tolerated  -midodrine 20q8h  -treat UTI as below   -Hold home losartan 50mg qD    GI  -Continue DASH diet    ID  #Complicated UTI.   Patient w/ purulent drainage from toussaint w/ no hx of sx prior. However, R medial thigh/vulvar wound could potentially be source in the setting of DM.  -Empirically treat w/ vanc by level/zosyn  -1500 vanc in AM. Vanc trough tomorrow morning, redose accordingly  -blood culture NGTD, urine culture 3 organisms, speciation pending  -MRSA PCR ordered    #Renal    #ARF   Cr 2.01 w/ unknown baseline a/w metabolic acidosis, bicarb of 13, AG of 18 likely pre renal in the setting of septic shock 2/2 UTI/skin wounds vs RAFAEAL on CKD due to uncontrolled DM. Nephro c/s and offered HD if necessary but patient declined.  -Nephro recs appreciated: renal sonogram, urine protein:cr, 1L bolus LR ordered  -Urine lytes 3/28 Fe 1.4%  -BMP+mg/phos q6h  -Urine culture speciation pending    Heme  #Anemia  Hb 8.0, possible anemia of chronic disease d/t CKD  -Iron studies consistent w/ ACD  -transfuse PRN    #DVT ppx  -Unable to use SCDs due to leg wounds  -Continue lovenox 40mg BID given BMI 40+    Endo  Metabolic acidosis 2/2 ? DKA vs ARF 2/2 sepsis vs RAFAELA on CKD. Patient on metformin and janivia w/ likely inadequate control of BG. Urosepsis vs R medial thigh/vulvar wound may represent precipitant for DKA, however BHB negative.   -HbA1c 14.2  -moderate insulin sliding scale   -Basal bolus insulin 22 units glargine qHS + 7 U admelog TID  -holding home diabetes meds  -Continue home synthroid 75mcg qD, 3/28 TSH 3.86 wnl  -Endo consulted    MSK  #Multiple chronic + acute wounds  Hx of chronic sacral wound d/t immobility further complicated by acute progression of right medial thigh/vulvar wound precipitated by skin tear  -Awaiting wound care consult  -Hydromorphone IV 0.25mg PRN and tylenol PRN for pain.    #GOC/Ethics  -GOC pending  -PT consulted  -Social work consulted 53 yo F hx of T2DM, asthma, obesity, chronic immobility 2/2 MS and several chronic wounds presenting for vulvar/R thigh wound found to be hyperglycemic + metabolic acidosis w/ purulent urine c/f urosepsis + ARF.    Neuro  AAOx3 at baseline     #MS  History of MS   -Hold home cosentyx (20mg q4w). Last dose 3/26 as per brother.    Resp  Sating well on RA  - COVID test positive 3/28  - Hold remdesivir due to RAFAELA    CV  #Hypotension  Likely septic shock 2/2 UTI  -c/w levo; wean as tolerated  -midodrine 20q8h  -treat UTI as below   -Hold home losartan 50mg qD    GI  -Continue DASH diet    ID  #Complicated UTI.   Patient w/ purulent drainage from toussaint w/ no hx of sx prior. However, R medial thigh/vulvar wound could potentially be source in the setting of DM.  -Empirically treat w/ vanc by level/zosyn  -1500 vanc in AM. Vanc trough tomorrow morning, redose accordingly  -blood culture NGTD, urine culture 3 organisms, speciation pending  -MRSA PCR ordered    #Renal    #ARF   Cr 2.01 w/ unknown baseline a/w metabolic acidosis, bicarb of 13, AG of 18 likely pre renal in the setting of septic shock 2/2 UTI/skin wounds vs RAFAELA on CKD due to uncontrolled DM. Nephro c/s and offered HD if necessary but patient declined.  -Nephro recs appreciated  -renal sonogram: mild to moderate bilateral hydronephrosis  -Urine lytes 3/28 Fe 1.4%  -BMP+mg/phos q6h  -Urine culture speciation pending    Heme  #Anemia  Hb 8.0, possible anemia of chronic disease d/t CKD  -Iron studies consistent w/ ACD  -transfuse PRN    #DVT ppx  -Unable to use SCDs due to leg wounds  -Continue lovenox 40mg BID given BMI 40+    Endo  Metabolic acidosis 2/2 ? DKA vs ARF 2/2 sepsis vs RAFAELA on CKD. Patient on metformin and janivia w/ likely inadequate control of BG. Urosepsis vs R medial thigh/vulvar wound may represent precipitant for DKA, however BHB negative.   -HbA1c 14.2  -moderate insulin sliding scale   -Basal bolus insulin 22 units glargine qHS + 7 U admelog TID  -holding home diabetes meds  -Continue home synthroid 75mcg qD, 3/28 TSH 3.86 wnl  -Endo consulted    MSK  #Multiple chronic + acute wounds  Hx of chronic sacral wound d/t immobility further complicated by acute progression of right medial thigh/vulvar wound precipitated by skin tear  -Awaiting wound care consult  -Hydromorphone IV 0.25mg PRN and tylenol PRN for pain.    #GOC/Ethics  -GOC pending  -PT consulted  -Social work consulted

## 2022-03-29 NOTE — CONSULT NOTE ADULT - SUBJECTIVE AND OBJECTIVE BOX
Wound SURGERY CONSULT NOTE    HPI:  53 yo F, hx of T2DM, asthma, obesity, chronic immobility and several chronic wounds, presenting for vulvar/R thigh noted by her home health nurse today. Patient states wound began x1 month ago, started as a skin tear and progressed over the course of a month. Patient states that this Friday RN inspected wound and stated it may need evaluation but wanted to observe, would rechecked today and patient was brought into ed.     In the ED, was initially hypotensive to 90s/50s which improved w/ fluids. Labs significant for wc 22, k 6.6, cr 2.2 unknown bl, glc 533, bicarb 13. bhb negative, ph 7.38. Nephro consulted for HD, patient declined. Patient treated for hyper K w/ mild improvement, placed on insulin gtt w/ improvement of bicarb from 13 to 18, pH 7.3, lactate 5.6 to 4.6. However, upon placement of toussaint patient had purulent drainage w/ a temporary change in mental status. Patient admitted to MICU for further management of urosepsis, DKA and metabolic acidosis due to ARF. (28 Mar 2022 06:53)    Wound consult requested to assist w/ management of  multiple pressure injuries.  Per patient she has MS with limited mobility. Reports that she has a hospital bed at home, but feels it is too small for her, is cared for by her brother.    Current Diet: Diet, Consistent Carbohydrate Renal w/Evening Snack:   Supplement Feeding Modality:  Oral  Nepro Cans or Servings Per Day:  1       Frequency:  Two Times a day (03-29-22 @ 14:46)      PAST MEDICAL & SURGICAL HISTORY:      REVIEW OF SYSTEMS: General, skin, msk see HPI  All other systems negative.    MEDICATIONS  (STANDING):  chlorhexidine 4% Liquid 1 Application(s) Topical <User Schedule>  dextrose 5%. 1000 milliLiter(s) (50 mL/Hr) IV Continuous <Continuous>  dextrose 5%. 1000 milliLiter(s) (100 mL/Hr) IV Continuous <Continuous>  dextrose 50% Injectable 25 Gram(s) IV Push once  dextrose 50% Injectable 12.5 Gram(s) IV Push once  dextrose 50% Injectable 25 Gram(s) IV Push once  enoxaparin Injectable 40 milliGRAM(s) SubCutaneous every 12 hours  glucagon  Injectable 1 milliGRAM(s) IntraMuscular once  insulin glargine Injectable (LANTUS) 22 Unit(s) SubCutaneous at bedtime  insulin lispro (ADMELOG) corrective regimen sliding scale   SubCutaneous three times a day before meals  insulin lispro (ADMELOG) corrective regimen sliding scale   SubCutaneous at bedtime  insulin lispro Injectable (ADMELOG) 7 Unit(s) SubCutaneous three times a day before meals  levothyroxine 75 MICROGram(s) Oral daily  midodrine 20 milliGRAM(s) Oral every 8 hours  multivitamin 1 Tablet(s) Oral daily  norepinephrine Infusion 0.05 MICROgram(s)/kG/Min (5.16 mL/Hr) IV Continuous <Continuous>  piperacillin/tazobactam IVPB.. 3.375 Gram(s) IV Intermittent every 8 hours  vancomycin  IVPB        MEDICATIONS  (PRN):  ALBUTerol    90 MICROgram(s) HFA Inhaler 2 Puff(s) Inhalation every 6 hours PRN Shortness of Breath  albuterol/ipratropium for Nebulization 3 milliLiter(s) Nebulizer every 6 hours PRN Shortness of Breath  dextrose Oral Gel 15 Gram(s) Oral once PRN Blood Glucose LESS THAN 70 milliGRAM(s)/deciliter  HYDROmorphone  Injectable 0.25 milliGRAM(s) IV Push once PRN Severe Pain (7 - 10)      Allergies    No Known Drug Allergies  Pineapple (Anaphylaxis)    Intolerances        SOCIAL HISTORY:  Predominantly bedbound. Lives with brother.  Denies smoking, ETOH, illicit drug use.  On airborne/contact precautions COVID +    FAMILY HISTORY:      PHYSICAL EXAM:  Vital Signs Last 24 Hrs  T(C): 36.4 (29 Mar 2022 12:00), Max: 37.2 (28 Mar 2022 20:00)  T(F): 97.5 (29 Mar 2022 12:00), Max: 99 (28 Mar 2022 20:00)  HR: 80 (29 Mar 2022 15:00) (78 - 106)  BP: 103/50 (29 Mar 2022 15:00) (74/54 - 152/64)  BP(mean): 61 (29 Mar 2022 15:00) (59 - 101)  RR: 25 (29 Mar 2022 15:00) (16 - 36)  SpO2: 99% (29 Mar 2022 15:00) (96% - 100%)  Wt: 110 kg (28 Mar 2022)  BMI: 40.4 kg/m2 (28 Mar 2022)      Constitutional: NAD, A&Ox3, obese  (+) low airloss support surface, (+) fluidized positioning devices, (+) complete cair boots  HEENT:  NC/AT, PERRL, EOMI, mucosa moist  Cardiovascular: rate regular, vasopressor support  Respiratory: nonlabored, room air  Gastrointestinal: soft NT/ND, incontinent pasty stool.   : indwelling toussaint catheter, cloudy urine. Limited exam of vulva due to body habitus, no abscess or wound   visualized.  Neurology:  weakened strength & sensation  Musculoskeletal:  limited, requires to person assist for t&P, legs in extension, bilateral feet, mildly inverted.  Vascular: BLE equally warm, no edema noted, capilllary refill < 3 seconds, +2 dp/pt pulses.   Multiple deep tissue pressure injuries b/l le:  Right knee- 2.5cmx1.5cmx0  Right lateral lower leg- [id number]  Right posterior lower leg- 5cmx2.5cmx0  Left heel- [id number]  Non-fluctuant, no crepitus, no drainable collection. 100% purple-maroon discoloration.  Skin:  moist w/ good turgor  Moisture associated dermatitis with linear fissures to abdominal pannus  Right lateral thigh- stage 4 pressure injury- [id number], 100% granular base, no purulent drainage, no odor. Periwound skin intact, no edema, no erythema, no increased warmth, no fluctuance noted.  Right trochanter- stage 4 pressure injury- [id number]- 90% granular base, 10% firmly attached slough, bone in close proximity. Small-moderate seropurulent drainage, mild odor. Periwound skin without edema, erythema, no increased warmth, no fluctuance, no drainable collection. No sharp debridement indicated at this time.  Right buttock- unstagable pressure injury- [id number]- 100% firmly attached eschar, no fluctuance, no crepitus. Periwound skin without edema, erythema, no increased warmth, no fluctuance, no drainable collection. No sharp debridement indicated at this time..        LABS/ CULTURES/ RADIOLOGY:                        8.0    22.58 )-----------( 530      ( 29 Mar 2022 01:00 )             25.1       131  |  95  |  45  ----------------------------<  339      [03-29-22 @ 06:53]  4.6   |  22  |  1.88        Ca     8.0     [03-29-22 @ 06:53]      Mg     1.50     [03-29-22 @ 06:53]      Phos  4.0     [03-29-22 @ 01:00]    TPro  6.8  /  Alb  1.9  /  TBili  0.4  /  DBili  x   /  AST  43  /  ALT  17  /  AlkPhos  168  [03-29-22 @ 06:53]              [03-28-22 @ 06:28]      A1C with Estimated Average Glucose (03.27.22 @ 19:13)   A1C with Estimated Average Glucose Result: 14.2      Culture - Blood (collected 03-28-22 @ 08:26)  Source: .Blood Blood  Preliminary Report (03-29-22 @ 09:01):    No growth to date.      Culture - Urine (03.28.22 @ 07:27)   Specimen Source: Catheterized Catheterized   Culture Results:   >=3 organisms. Probable collection contamination.       < from: CT Abdomen and Pelvis No Cont (03.28.22 @ 00:50) >  ACC: [id number] EXAM:  CT ABDOMEN AND PELVIS                          PROCEDURE DATE:  03/28/2022          INTERPRETATION:  CLINICAL INFORMATION: Right labial swelling and   discharge, evaluate for labial/perineal abscess.    COMPARISON: Pelvic MRI dated 9/30/2010.    CONTRAST/COMPLICATIONS:  IV Contrast: None.  Oral Contrast: None.  Complications: None reported.    PROCEDURE:  CT of the Abdomen and Pelvis was performed.  Sagittal and coronal reformats were performed.    FINDINGS:  LOWER CHEST: Partially imaged catheter tip within the superior vena cava.   Bilateral lower lobe linear atelectasis. A 3 mm nonspecific right lower   lobe pulmonary nodule is seen.    LIVER: Diffuse steatosis. Punctate parenchymal calcification, likely a   granuloma.  BILE DUCTS: Normal caliber.  GALLBLADDER: Cholelithiasis.  SPLEEN: Within normal limits.  PANCREAS: Within normal limits.  ADRENALS: Within normal limits.  KIDNEYS/URETERS: Mild left greater than right hydronephrosis and trace   perinephric and left periureteral fat stranding. Bilateral renal cortical   scarring.    BLADDER: Circumferential bladder wall thickening and adjacent fat   stranding.  REPRODUCTIVE ORGANS: Myomatous uterus.    BOWEL: No bowel obstruction. Appendix is normal.  PERITONEUM: No ascites.  VESSELS: Atherosclerotic changes.  RETROPERITONEUM/LYMPH NODES: No lymphadenopathy.  ABDOMINAL WALL: No discrete collection or abscess is seen involving the   labia or perineum. Detailed evaluation of the soft tissues is limited due   to the absence of intravenous contrast. Scattered foci of air in this   region are favored to be within the patient's skin folds, external to the   patient. A large soft tissue defect is present along the lateral aspect   of the right thigh with underlying inflammatory changes of the   subcutaneous tissues.  BONES: Degenerative changes. Old right rib fractures.    IMPRESSION:  No evidence of perineal or labial fluid collection to suggest abscess,   though full evaluation is limited on noncontrastCT.    Large soft tissue defect/ulceration in the lateral right thigh with   associated subcutaneous inflammatory changes.    Circumferential bladder wall thickening with adjacent inflammation most   concerning for cystitis. In addition, there is mild greater than right   hydronephrosis and subtle perinephric and trace prevertebral fat   stranding, cannot exclude ascending infection and recommend correlation   with clinical examination laboratory findings to exclude pyelonephritis.    --- End of Report ---          MALICK LANG MD; Resident Radiologist  This document has been electronically signed.  YING HAMILTON MD; Attending Radiologist  This document has been electronically signed. Mar 28 2022  1:44AM    < end of copied text >

## 2022-03-29 NOTE — DIETITIAN INITIAL EVALUATION ADULT. - CHIEF COMPLAINT
The patient is a 52y Female w PMH DM2, hypothyroidism, chronic immobility and chronic wounds.  Admitted with septic shock likely 2/2 urosepsis c/b hyperglycemia, COVID+, RAFAELA, DKA.

## 2022-03-29 NOTE — DIETITIAN INITIAL EVALUATION ADULT. - OTHER INFO
Pt. with 10 pressure injuries on admission.  Fingersticks persistently elevated... consider insulin regimen modification.      Pt. 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  Denies nausea/vomiting/diarrhea/constipation, or issues with chewing/swallowing.  Observed as mostly edentulous.   Has not checked blood glucose as she "got tired of it" since "2012" when her mother passed away.  Does not take insulin PTA.  Also states she used to weight "426lbs" in "2012" & would like to lose more weight to "190lbs".    Poor PO intake @ breakfast (only ate gelatin).  Is amenable to Nepro which RDN advised to consume between meals.     RDN emphasized importance of protein intake to help promote improvement in skin integrity.    Pt. with poor health literacy... is unable to teach back [prior] knowledge of therapeutic diet modifications as it relates to Hx of DM (i.e. sources of carbohydrates).  RDN attempted to provide in depth nutrition education and informed of prescribed diet, however Pt. does not teach back appropriate understanding.  Explained importance of proper nutrition as it relates to overall medical outcomes.

## 2022-03-29 NOTE — PROGRESS NOTE ADULT - ATTENDING COMMENTS
52 F with obesity, DM here with multiple purulent skin wounds, found to have septic shock due to UTI, RAFAELA, hyperkalemia, hyperglycemia without ketosis.    Continue with vasopressors for septic shock, increase midodrine  continue with broad abx, f/u cultures, suspect septic shock due to UTI    RAFAELA - improving, likely shock related, ATN, will monitor off bicarb gtt    DM2 start basal/bolus regimen    wound care consult for wounds (no evidence of abscess on CT)    covid positive - on room air, will d/w ID re: role for monoclonal ab given she is likely at high risk (MS, dm), but was vaccinated with J&J in October, per patient    Critically ill patient requiring frequent bedside visits with therapy changes.

## 2022-03-29 NOTE — CONSULT NOTE ADULT - SUBJECTIVE AND OBJECTIVE BOX
HPI:  53 yo F hx of uncontrolled T2DM, asthma, obesity, chronic immobility 2/2 MS and several chronic wounds presenting for vulvar/R thigh wound found to be hyperglycemic + metabolic acidosis w/ purulent urine c/f urosepsis + ARF.    Consulted for: Uncontrolled T2DM    Patient has had T2DM for many years. Most recent A1c from this admission is 14.2%. DM previously managed by Dr. Szymanski, but she needs a new Endocrinologist. Takes Metformin 1g BID, Amaryl before meals (doesn't know dose),and Januvia 100 mg daily. FS elevated at home. Diet is average. Ophtho f/u up to date, no retinopathy. No neuropathy. Has nephropathy here with ARF. No MI or CVA. morbidly obese    Also has a long hx of hypothyroidism, on LT4 75 mcg daily. TSH was 3.82 here. Takes it as prescribed, on an empty stomach.     PAST MEDICAL & SURGICAL HISTORY:  Morbid obesity  DM    FAMILY HISTORY: long hx of DM in parents      Social History: No tobacco use    Home Medications:  Cosentyx: 20 milligram(s) subcutaneous every 4 weeks (28 Mar 2022 13:25)  Januvia 100 mg oral tablet: 1 tab(s) orally once a day (28 Mar 2022 12:42)  losartan 50 mg oral tablet: 1 tab(s) orally once a day (28 Mar 2022 12:42)  metFORMIN 1000 mg oral tablet: 1 tab(s) orally 2 times a day (28 Mar 2022 12:42)  pioglitazone 30 mg oral tablet: 1 tab(s) orally once a day (28 Mar 2022 12:42)  Synthroid 75 mcg (0.075 mg) oral tablet: 1 tab(s) orally once a day (28 Mar 2022 12:42)      MEDICATIONS  (STANDING):  chlorhexidine 4% Liquid 1 Application(s) Topical <User Schedule>  Dakins Solution - 1/4 Strength 1 Application(s) Topical two times a day  dextrose 5%. 1000 milliLiter(s) (50 mL/Hr) IV Continuous <Continuous>  dextrose 5%. 1000 milliLiter(s) (100 mL/Hr) IV Continuous <Continuous>  dextrose 50% Injectable 25 Gram(s) IV Push once  dextrose 50% Injectable 12.5 Gram(s) IV Push once  dextrose 50% Injectable 25 Gram(s) IV Push once  enoxaparin Injectable 40 milliGRAM(s) SubCutaneous every 12 hours  glucagon  Injectable 1 milliGRAM(s) IntraMuscular once  insulin glargine Injectable (LANTUS) 22 Unit(s) SubCutaneous at bedtime  insulin lispro (ADMELOG) corrective regimen sliding scale   SubCutaneous three times a day before meals  insulin lispro (ADMELOG) corrective regimen sliding scale   SubCutaneous at bedtime  insulin lispro Injectable (ADMELOG) 7 Unit(s) SubCutaneous three times a day before meals  levothyroxine 75 MICROGram(s) Oral daily  midodrine 20 milliGRAM(s) Oral every 8 hours  multivitamin 1 Tablet(s) Oral daily  norepinephrine Infusion 0.05 MICROgram(s)/kG/Min (5.16 mL/Hr) IV Continuous <Continuous>  piperacillin/tazobactam IVPB.. 3.375 Gram(s) IV Intermittent every 8 hours  vancomycin  IVPB        MEDICATIONS  (PRN):  ALBUTerol    90 MICROgram(s) HFA Inhaler 2 Puff(s) Inhalation every 6 hours PRN Shortness of Breath  albuterol/ipratropium for Nebulization 3 milliLiter(s) Nebulizer every 6 hours PRN Shortness of Breath  dextrose Oral Gel 15 Gram(s) Oral once PRN Blood Glucose LESS THAN 70 milliGRAM(s)/deciliter  HYDROmorphone  Injectable 0.25 milliGRAM(s) IV Push once PRN Severe Pain (7 - 10)      Allergies    No Known Drug Allergies  Pineapple (Anaphylaxis)    Intolerances      Review of Systems:  Constitutional: No fever  Eyes: No blurry vision  Neuro: No tremors  HEENT: No pain  Cardiovascular: No chest pain, palpitations  Respiratory: No SOB, no cough  GI: No nausea, vomiting, abdominal pain  : No dysuria  Skin: no rash  Psych: no depression  Endocrine: no polyuria, polydipsia  Hem/lymph: no swelling  Osteoporosis: no fractures    PHYSICAL EXAM:  VITALS: T(C): 36.4 (03-29-22 @ 12:00)  T(F): 97.5 (03-29-22 @ 12:00), Max: 99 (03-28-22 @ 20:00)  HR: 93 (03-29-22 @ 17:00) (73 - 106)  BP: 117/67 (03-29-22 @ 17:00) (74/54 - 144/66)  RR:  (16 - 36)  SpO2:  (96% - 100%)  Wt(kg): --  GENERAL: NAD, morbidly obese  EYES: No proptosis, no lid lag, anicteric  THYROID: Normal size, no palpable nodules  RESPIRATORY: Clear to auscultation bilaterally  CARDIOVASCULAR: Regular rate and rhythm  GI: Soft, nontender, non distended  EXT: b/l feet without wounds; 2+ pulses  PSYCH: Alert and oriented x 3, reactive mood    POCT Blood Glucose.: 218 mg/dL (03-29-22 @ 17:20)  POCT Blood Glucose.: 340 mg/dL (03-29-22 @ 12:20)  POCT Blood Glucose.: 353 mg/dL (03-29-22 @ 08:07)  POCT Blood Glucose.: 301 mg/dL (03-28-22 @ 22:52)  POCT Blood Glucose.: 365 mg/dL (03-28-22 @ 15:39)  POCT Blood Glucose.: 318 mg/dL (03-28-22 @ 11:50)  POCT Blood Glucose.: 236 mg/dL (03-28-22 @ 09:05)  POCT Blood Glucose.: 241 mg/dL (03-28-22 @ 08:33)  POCT Blood Glucose.: 231 mg/dL (03-28-22 @ 07:28)  POCT Blood Glucose.: 251 mg/dL (03-28-22 @ 06:41)  POCT Blood Glucose.: 347 mg/dL (03-28-22 @ 05:42)  POCT Blood Glucose.: 368 mg/dL (03-28-22 @ 04:28)  POCT Blood Glucose.: 377 mg/dL (03-28-22 @ 03:07)  POCT Blood Glucose.: 555 mg/dL (03-27-22 @ 21:31)  POCT Blood Glucose.: 466 mg/dL (03-27-22 @ 17:04)                            8.0    22.58 )-----------( 530      ( 29 Mar 2022 01:00 )             25.1       03-29    131<L>  |  95<L>  |  45<H>  ----------------------------<  339<H>  4.6   |  22  |  1.88<H>    EGFR if : x   EGFR if non : x     Ca    8.0<L>      03-29  Mg     1.50     03-29  Phos  4.0     03-29    TPro  6.8  /  Alb  1.9<L>  /  TBili  0.4  /  DBili  x   /  AST  43<H>  /  ALT  17  /  AlkPhos  168<H>  03-29      Thyroid Function Tests:  03-28 @ 12:53 TSH 3.86 FreeT4 -- T3 -- Anti TPO -- Anti Thyroglobulin Ab -- TSI --      A1C with Estimated Average Glucose Result: 14.2 % (03-27-22 @ 19:13)          Radiology:

## 2022-03-30 DIAGNOSIS — Z29.9 ENCOUNTER FOR PROPHYLACTIC MEASURES, UNSPECIFIED: ICD-10-CM

## 2022-03-30 DIAGNOSIS — U07.1 COVID-19: ICD-10-CM

## 2022-03-30 DIAGNOSIS — E03.9 HYPOTHYROIDISM, UNSPECIFIED: ICD-10-CM

## 2022-03-30 DIAGNOSIS — G92.8 OTHER TOXIC ENCEPHALOPATHY: ICD-10-CM

## 2022-03-30 DIAGNOSIS — G35 MULTIPLE SCLEROSIS: ICD-10-CM

## 2022-03-30 DIAGNOSIS — L98.499 NON-PRESSURE CHRONIC ULCER OF SKIN OF OTHER SITES WITH UNSPECIFIED SEVERITY: ICD-10-CM

## 2022-03-30 DIAGNOSIS — A41.9 SEPSIS, UNSPECIFIED ORGANISM: ICD-10-CM

## 2022-03-30 LAB
ANION GAP SERPL CALC-SCNC: 14 MMOL/L — SIGNIFICANT CHANGE UP (ref 7–14)
BUN SERPL-MCNC: 38 MG/DL — HIGH (ref 7–23)
CALCIUM SERPL-MCNC: 8.4 MG/DL — SIGNIFICANT CHANGE UP (ref 8.4–10.5)
CHLORIDE SERPL-SCNC: 97 MMOL/L — LOW (ref 98–107)
CO2 SERPL-SCNC: 23 MMOL/L — SIGNIFICANT CHANGE UP (ref 22–31)
CREAT SERPL-MCNC: 1.68 MG/DL — HIGH (ref 0.5–1.3)
CULTURE RESULTS: SIGNIFICANT CHANGE UP
EGFR: 37 ML/MIN/1.73M2 — LOW
GLUCOSE BLDC GLUCOMTR-MCNC: 110 MG/DL — HIGH (ref 70–99)
GLUCOSE BLDC GLUCOMTR-MCNC: 170 MG/DL — HIGH (ref 70–99)
GLUCOSE BLDC GLUCOMTR-MCNC: 187 MG/DL — HIGH (ref 70–99)
GLUCOSE BLDC GLUCOMTR-MCNC: 195 MG/DL — HIGH (ref 70–99)
GLUCOSE BLDC GLUCOMTR-MCNC: 72 MG/DL — SIGNIFICANT CHANGE UP (ref 70–99)
GLUCOSE SERPL-MCNC: 95 MG/DL — SIGNIFICANT CHANGE UP (ref 70–99)
HCT VFR BLD CALC: 25.2 % — LOW (ref 34.5–45)
HGB BLD-MCNC: 7.5 G/DL — LOW (ref 11.5–15.5)
MAGNESIUM SERPL-MCNC: 1.9 MG/DL — SIGNIFICANT CHANGE UP (ref 1.6–2.6)
MCHC RBC-ENTMCNC: 24.8 PG — LOW (ref 27–34)
MCHC RBC-ENTMCNC: 29.8 GM/DL — LOW (ref 32–36)
MCV RBC AUTO: 83.2 FL — SIGNIFICANT CHANGE UP (ref 80–100)
MRSA PCR RESULT.: SIGNIFICANT CHANGE UP
MRSA PCR RESULT.: SIGNIFICANT CHANGE UP
NRBC # BLD: 0 /100 WBCS — SIGNIFICANT CHANGE UP
NRBC # FLD: 0 K/UL — SIGNIFICANT CHANGE UP
PHOSPHATE SERPL-MCNC: 3.6 MG/DL — SIGNIFICANT CHANGE UP (ref 2.5–4.5)
PLATELET # BLD AUTO: 499 K/UL — HIGH (ref 150–400)
POTASSIUM SERPL-MCNC: 3.8 MMOL/L — SIGNIFICANT CHANGE UP (ref 3.5–5.3)
POTASSIUM SERPL-SCNC: 3.8 MMOL/L — SIGNIFICANT CHANGE UP (ref 3.5–5.3)
RBC # BLD: 3.03 M/UL — LOW (ref 3.8–5.2)
RBC # FLD: 15.1 % — HIGH (ref 10.3–14.5)
S AUREUS DNA NOSE QL NAA+PROBE: DETECTED
S AUREUS DNA NOSE QL NAA+PROBE: DETECTED
SODIUM SERPL-SCNC: 134 MMOL/L — LOW (ref 135–145)
T4 FREE SERPL-MCNC: 1 NG/DL — SIGNIFICANT CHANGE UP (ref 0.9–1.8)
VANCOMYCIN FLD-MCNC: 21.7 UG/ML — SIGNIFICANT CHANGE UP
VANCOMYCIN TROUGH SERPL-MCNC: 23.6 UG/ML — HIGH (ref 10–20)
WBC # BLD: 19.63 K/UL — HIGH (ref 3.8–10.5)
WBC # FLD AUTO: 19.63 K/UL — HIGH (ref 3.8–10.5)

## 2022-03-30 PROCEDURE — 99233 SBSQ HOSP IP/OBS HIGH 50: CPT

## 2022-03-30 PROCEDURE — 71045 X-RAY EXAM CHEST 1 VIEW: CPT | Mod: 26

## 2022-03-30 RX ORDER — INSULIN LISPRO 100/ML
8 VIAL (ML) SUBCUTANEOUS
Refills: 0 | Status: DISCONTINUED | OUTPATIENT
Start: 2022-03-30 | End: 2022-04-05

## 2022-03-30 RX ORDER — SODIUM CHLORIDE 9 MG/ML
1000 INJECTION INTRAMUSCULAR; INTRAVENOUS; SUBCUTANEOUS
Refills: 0 | Status: DISCONTINUED | OUTPATIENT
Start: 2022-03-30 | End: 2022-04-03

## 2022-03-30 RX ORDER — CHLORHEXIDINE GLUCONATE 213 G/1000ML
1 SOLUTION TOPICAL
Refills: 0 | Status: DISCONTINUED | OUTPATIENT
Start: 2022-03-30 | End: 2022-04-19

## 2022-03-30 RX ORDER — MUPIROCIN 20 MG/G
1 OINTMENT TOPICAL
Refills: 0 | Status: COMPLETED | OUTPATIENT
Start: 2022-03-30 | End: 2022-04-03

## 2022-03-30 RX ORDER — ACETAMINOPHEN 500 MG
325 TABLET ORAL EVERY 4 HOURS
Refills: 0 | Status: DISCONTINUED | OUTPATIENT
Start: 2022-03-30 | End: 2022-04-01

## 2022-03-30 RX ORDER — MUPIROCIN 20 MG/G
1 OINTMENT TOPICAL
Refills: 0 | Status: DISCONTINUED | OUTPATIENT
Start: 2022-03-30 | End: 2022-03-30

## 2022-03-30 RX ORDER — INSULIN GLARGINE 100 [IU]/ML
30 INJECTION, SOLUTION SUBCUTANEOUS AT BEDTIME
Refills: 0 | Status: DISCONTINUED | OUTPATIENT
Start: 2022-03-30 | End: 2022-04-19

## 2022-03-30 RX ADMIN — Medication 75 MICROGRAM(S): at 05:30

## 2022-03-30 RX ADMIN — ENOXAPARIN SODIUM 40 MILLIGRAM(S): 100 INJECTION SUBCUTANEOUS at 05:30

## 2022-03-30 RX ADMIN — PIPERACILLIN AND TAZOBACTAM 25 GRAM(S): 4; .5 INJECTION, POWDER, LYOPHILIZED, FOR SOLUTION INTRAVENOUS at 13:14

## 2022-03-30 RX ADMIN — SODIUM CHLORIDE 100 MILLILITER(S): 9 INJECTION INTRAMUSCULAR; INTRAVENOUS; SUBCUTANEOUS at 18:09

## 2022-03-30 RX ADMIN — Medication 2: at 18:44

## 2022-03-30 RX ADMIN — Medication 1 TABLET(S): at 12:55

## 2022-03-30 RX ADMIN — Medication 8 UNIT(S): at 18:44

## 2022-03-30 RX ADMIN — MUPIROCIN 1 APPLICATION(S): 20 OINTMENT TOPICAL at 06:32

## 2022-03-30 RX ADMIN — Medication 325 MILLIGRAM(S): at 19:01

## 2022-03-30 RX ADMIN — CHLORHEXIDINE GLUCONATE 1 APPLICATION(S): 213 SOLUTION TOPICAL at 10:22

## 2022-03-30 RX ADMIN — Medication 500 MILLIGRAM(S): at 12:55

## 2022-03-30 RX ADMIN — Medication 1 APPLICATION(S): at 08:27

## 2022-03-30 RX ADMIN — PIPERACILLIN AND TAZOBACTAM 25 GRAM(S): 4; .5 INJECTION, POWDER, LYOPHILIZED, FOR SOLUTION INTRAVENOUS at 05:30

## 2022-03-30 RX ADMIN — PIPERACILLIN AND TAZOBACTAM 25 GRAM(S): 4; .5 INJECTION, POWDER, LYOPHILIZED, FOR SOLUTION INTRAVENOUS at 21:29

## 2022-03-30 RX ADMIN — MUPIROCIN 1 APPLICATION(S): 20 OINTMENT TOPICAL at 18:12

## 2022-03-30 RX ADMIN — INSULIN GLARGINE 30 UNIT(S): 100 INJECTION, SOLUTION SUBCUTANEOUS at 22:13

## 2022-03-30 RX ADMIN — Medication 1 APPLICATION(S): at 18:58

## 2022-03-30 RX ADMIN — ENOXAPARIN SODIUM 40 MILLIGRAM(S): 100 INJECTION SUBCUTANEOUS at 18:10

## 2022-03-30 NOTE — PROGRESS NOTE ADULT - PROBLEM SELECTOR PLAN 1
On admission , pt with septic shock requiring Levophed on admission   Sepsis suspect secondary to UTI(Patient w/ purulent drainage from toussaint) Other possibility is secondary to multiple skin ulcers  Hx of chronic sacral wound d/t immobility further complicated by acute progression of right medial thigh/vulvar wound precipitated by skin tear  3/28- BC x 1- NGTD. UC- Ecoli . UA-  large LE. COVID positive    CT A/P: Large soft tissue defect/ulceration in the lateral right thigh with associated subcutaneous inflammatory changes. Circumferential bladder wall thickening with adjacent inflammation most concerning for cystitis. In addition, there is mild greater than right hydronephrosis and subtle perinephric and trace prevertebral fat stranding, cannot exclude ascending infection and recommend correlation with clinical examination laboratory findings to exclude pyelonephritis.  on Vanc/Zosyn- Vanc burt 23.6 today. Repeat level in AM   on Midodrine 20mg PO Q 8hrs,wean as tolerated   Wound care on board-  Topical recommendations- pack with Dakins 1/4 strength moistened kerlix, cover with 4x4 gauze, abdominal pad, change twice a day.- Offload pressure.

## 2022-03-30 NOTE — CHART NOTE - NSCHARTNOTEFT_GEN_A_CORE
MAR Accept Note  Transfer to:    Accepting Attending Physician:  Clair Ku   Assigned Room:      Patient seen and examined.   Labs and data reviewed.   No findings precluding transfer of service.       HPI/MICU COURSE:   Please refer to MICU transfer note for full details. Briefly, this is a 51 yo F hx of T2DM, asthma, obesity, chronic immobility 2/2 MS and several chronic wounds presenting for vulvar/R thigh wound found to be hyperglycemic + metabolic acidosis w/ purulent urine c/f urosepsis + ARF. During the course of her stay, pt was on levo and titrated off and has been hemodynamically stable since 3/29. Presumably the hypotension was from urosepsis.   Vanc and zoysn was given for empiric treatment. Blood cultures have had no growth to date, and possible contamination of her catheterized sample.   Wound care assessed pt, assessed large ulceration in lateral right thigh, rec include offloading pressure, pack w/ dakins,       FOR FOLLOW-UP:  Wound follow up for seemingly chronic wound  Possible in pt assessment for wound management or wound healing center.    Rich Burton  IM PGY-3  MAR

## 2022-03-30 NOTE — PHYSICAL THERAPY INITIAL EVALUATION ADULT - PERTINENT HX OF CURRENT PROBLEM, REHAB EVAL
Pt is a 52 year old female presenting to [facility] with vulvar/Right thigh noted by her home health nurse today. Patient states wound began x1 month ago, started as a skin tear and progressed over the course of a month. Patient states that this Friday RN inspected wound and stated it may need evaluation but wanted to observe, would rechecked today and patient was brought into ed. PMHx of T2DM, asthma, obesity, chronic immobility and several chronic wounds. Pt is a 52 year old female presenting to [facility] with wounds to vulvar/Right thigh noted by her home health nurse. Patient states wound began x1 month ago, started as a skin tear and progressed over the course of a month. PMHx of T2DM, asthma, obesity, chronic immobility and several chronic wounds.

## 2022-03-30 NOTE — PHYSICAL THERAPY INITIAL EVALUATION ADULT - ADDITIONAL COMMENTS
Pt lives alone in an apartment with no steps to enter and elevator access. Pt is primarily wheelchair bound >20 years. Pt has a brother and boyfriend who come once or twice a week to assist when needed. Pt states needs assistance with ADLs at times. Pt states she had a home health aide a few weeks prior to admission, but not anymore.     Pt left semi supine in bed, in NAD, lines/tubes intact, call bell within reach, RN aware.

## 2022-03-30 NOTE — PROGRESS NOTE ADULT - PROBLEM SELECTOR PLAN 5
On admission, Cr 2.01 w/ unknown baseline. Also with metabolic acidosis, bicarb of 13, AG of 18   Suspect  pre renal in the setting of septic shock 2/2 UTI/skin wounds vs RAFAELA on CKD due to uncontrolled DM.   Renal sonogram: mild to moderate bilateral hydronephrosis  Urine lytes 3/28 Fe 1.4%  Serum lactate 5.4--> 1.7   Urine culture speciation pending  Nephro recs appreciated- Recommend switching to NS @ 100cc/hr for now.          #Hypothyroidism  TSH 3.82,  FT4-1.0  Continue Synthroid 75 mcg daily

## 2022-03-30 NOTE — PROGRESS NOTE ADULT - SUBJECTIVE AND OBJECTIVE BOX
Melanie Aguilarira  Hospitalist  Pager- [phone]     PROGRESS NOTE:     Patient is a 52y old  Female who presents with a chief complaint of     SUBJECTIVE / OVERNIGHT EVENTS: pt seen and examined by me   No new complaints  Reports pain in back from lying on bed   Denies fever, chills or SOB, Denies cough     ADDITIONAL REVIEW OF SYSTEMS:    MEDICATIONS  (STANDING):  ascorbic acid 500 milliGRAM(s) Oral daily  chlorhexidine 2% Cloths 1 Application(s) Topical <User Schedule>  Dakins Solution - 1/4 Strength 1 Application(s) Topical two times a day  dextrose 5%. 1000 milliLiter(s) (50 mL/Hr) IV Continuous <Continuous>  dextrose 5%. 1000 milliLiter(s) (100 mL/Hr) IV Continuous <Continuous>  dextrose 50% Injectable 25 Gram(s) IV Push once  dextrose 50% Injectable 12.5 Gram(s) IV Push once  dextrose 50% Injectable 25 Gram(s) IV Push once  enoxaparin Injectable 40 milliGRAM(s) SubCutaneous every 12 hours  glucagon  Injectable 1 milliGRAM(s) IntraMuscular once  insulin glargine Injectable (LANTUS) 30 Unit(s) SubCutaneous at bedtime  insulin lispro (ADMELOG) corrective regimen sliding scale   SubCutaneous three times a day before meals  insulin lispro (ADMELOG) corrective regimen sliding scale   SubCutaneous at bedtime  insulin lispro Injectable (ADMELOG) 8 Unit(s) SubCutaneous three times a day before meals  levothyroxine 75 MICROGram(s) Oral daily  midodrine 20 milliGRAM(s) Oral every 8 hours  multivitamin 1 Tablet(s) Oral daily  mupirocin 2% Ointment 1 Application(s) Topical two times a day  piperacillin/tazobactam IVPB.. 3.375 Gram(s) IV Intermittent every 8 hours    MEDICATIONS  (PRN):  ALBUTerol    90 MICROgram(s) HFA Inhaler 2 Puff(s) Inhalation every 6 hours PRN Shortness of Breath  albuterol/ipratropium for Nebulization 3 milliLiter(s) Nebulizer every 6 hours PRN Shortness of Breath  dextrose Oral Gel 15 Gram(s) Oral once PRN Blood Glucose LESS THAN 70 milliGRAM(s)/deciliter  HYDROmorphone  Injectable 0.25 milliGRAM(s) IV Push once PRN Severe Pain (7 - 10)      CAPILLARY BLOOD GLUCOSE      POCT Blood Glucose.: 110 mg/dL (30 Mar 2022 12:42)  POCT Blood Glucose.: 72 mg/dL (30 Mar 2022 08:49)  POCT Blood Glucose.: 187 mg/dL (30 Mar 2022 00:30)  POCT Blood Glucose.: 199 mg/dL (29 Mar 2022 21:59)  POCT Blood Glucose.: 218 mg/dL (29 Mar 2022 17:20)    I&O's Summary    29 Mar 2022 07:01  -  30 Mar 2022 07:00  --------------------------------------------------------  IN: 1371 mL / OUT: 1000 mL / NET: 371 mL        PHYSICAL EXAM:  Vital Signs Last 24 Hrs  T(C): 37.1 (30 Mar 2022 12:20), Max: 37.1 (30 Mar 2022 12:20)  T(F): 98.8 (30 Mar 2022 12:20), Max: 98.8 (30 Mar 2022 12:20)  HR: 90 (30 Mar 2022 12:20) (73 - 109)  BP: 128/60 (30 Mar 2022 12:20) (96/55 - 142/98)  BP(mean): 77 (29 Mar 2022 22:00) (61 - 107)  RR: 20 (30 Mar 2022 12:20) (18 - 30)  SpO2: 99% (30 Mar 2022 12:20) (99% - 100%)      Cardiovascular: rate regular, vasopressor support  Respiratory: nonlabored, room air  Gastrointestinal: soft NT/ND, incontinent pasty stool.   : indwelling toussaint catheter, cloudy urine. Limited exam of vulva due to body habitus, no abscess or wound   visualized.  Neurology:  weakened strength & sensation  Musculoskeletal:  limited, requires to person assist for t&P, legs in extension, bilateral feet, mildly inverted.  Vascular: BLE equally warm, no edema noted, capilllary refill < 3 seconds, +2 dp/pt pulses.         CONSTITUTIONAL: NAD, obese   RESPIRATORY: Normal respiratory effort; decreased BS at base  CARDIOVASCULAR: Regular rate and rhythm, normal S1 and S2, no murmur/rub/gallop; No lower extremity edema; Peripheral pulses are 2+ bilaterally  ABDOMEN: Nontender to palpation, normoactive bowel sounds, no rebound/guarding; No hepatosplenomegaly  MUSCLOSKELETAL: no clubbing or cyanosis of digits; no joint swelling or tenderness to palpation  PSYCH: A+O to person, place, and time; affect appropriate  NEURO: Decreased strength more on right side compared to left( as per pt chronic)  - Toussaint catheter present    SKIN:  Multiple wounds with dressing  present -Right knee, RLE  right posterior lower leg- 5cmx2.5cmx0  Beneath breast/abdominal folds - Dermatitis with  fissures  present  Dressing present  Right lateral thigh-. As per wound care eval , presence of stage 4 pressure injury- 2paw5enm0.5cm, 100% granular base, no purulent drainage, no odor. Periwound skin intact, no edema, no erythema, no increased warmth, no fluctuance noted. Right trochanter- stage 4 pressure injury- [id number]- 90% granular base, 10% firmly attached slough, bone in close proximity. Small-moderate seropurulent drainage, mild odor.       LABS:                        7.5    19.63 )-----------( 499      ( 30 Mar 2022 06:40 )             25.2     03-30    134<L>  |  97<L>  |  38<H>  ----------------------------<  95  3.8   |  23  |  1.68<H>    Ca    8.4      30 Mar 2022 06:40  Phos  3.6     03-30  Mg     1.90     03-30    TPro  6.8  /  Alb  2.0<L>  /  TBili  0.4  /  DBili  x   /  AST  58<H>  /  ALT  21  /  AlkPhos  190<H>  03-29              Culture - Blood (collected 28 Mar 2022 08:26)  Source: .Blood Blood  Preliminary Report (29 Mar 2022 09:01):    No growth to date.    Culture - Urine (collected 28 Mar 2022 08:19)  Source: Catheterized Catheterized  Final Report (30 Mar 2022 08:53):    >100,000 CFU/ml Escherichia coli    >100,000 CFU/ml Escherichia coli #2    >=3 organisms. Probable collection contamination.    Normal Urogenital brayan present        RADIOLOGY & ADDITIONAL TESTS:  Results Reviewed:   Imaging Personally Reviewed:  Electrocardiogram Personally Reviewed:    COORDINATION OF CARE:  Care Discussed with Consultants/Other Providers [Y/N]:  Prior or Outpatient Records Reviewed [Y/N]:

## 2022-03-30 NOTE — CHART NOTE - NSCHARTNOTEFT_GEN_A_CORE
FS reviewed.   Would decrease Lantus to 30 units at bedtime.   Decrease Admelog to 8 units qAC. Patient is not eating much now  Continue moderate dose correctional scale qAC and qHS  TFTs - TSH 3.82, FT4 1.0. Continue home dose LT4 75 mcg PO daily    Sandy Hernandez MD  Endocrine Fellow  Can be reached via teams. For follow up questions, discharge recommendations, or new consults, please call answering service at [phone] (weekdays); [phone] (nights/weekends)

## 2022-03-30 NOTE — PROGRESS NOTE ADULT - ASSESSMENT
51 yo F hx of T2DM, asthma, obesity, chronic immobility 2/2 MS and several chronic wounds presenting for vulvar/R thigh wound found to be hyperglycemic + metabolic acidosis w/ purulent urine c/f urosepsis + ARF.    Neuro  AAOx3 at baseline     #MS  History of MS   -Hold home cosentyx (20mg q4w). Last dose 3/26 as per brother.    Resp  Sating well on RA  - COVID test positive 3/28  - Hold remdesivir due to RAFAELA    CV  #Hypotension  Likely septic shock 2/2 UTI  -c/w levo; wean as tolerated  -midodrine 20q8h  -treat UTI as below   -Hold home losartan 50mg qD    GI  -Continue DASH diet    ID  #Complicated UTI.   Patient w/ purulent drainage from toussaint w/ no hx of sx prior. However, R medial thigh/vulvar wound could potentially be source in the setting of DM.  -Empirically treat w/ vanc by level/zosyn  -1500 vanc in AM. Vanc trough tomorrow morning, redose accordingly  -blood culture NGTD, urine culture 3 organisms, speciation pending  -MRSA PCR ordered    #Renal    #ARF   Cr 2.01 w/ unknown baseline a/w metabolic acidosis, bicarb of 13, AG of 18 likely pre renal in the setting of septic shock 2/2 UTI/skin wounds vs RAFAELA on CKD due to uncontrolled DM. Nephro c/s and offered HD if necessary but patient declined.  -Nephro recs appreciated  -renal sonogram: mild to moderate bilateral hydronephrosis  -Urine lytes 3/28 Fe 1.4%  -BMP+mg/phos q6h  -Urine culture speciation pending    Heme  #Anemia  Hb 8.0, possible anemia of chronic disease d/t CKD  -Iron studies consistent w/ ACD  -transfuse PRN    #DVT ppx  -Unable to use SCDs due to leg wounds  -Continue lovenox 40mg BID given BMI 40+    Endo  Metabolic acidosis 2/2 ? DKA vs ARF 2/2 sepsis vs RAFAELA on CKD. Patient on metformin and janivia w/ likely inadequate control of BG. Urosepsis vs R medial thigh/vulvar wound may represent precipitant for DKA, however BHB negative.   -HbA1c 14.2  -moderate insulin sliding scale   -Basal bolus insulin 22 units glargine qHS + 7 U admelog TID  -holding home diabetes meds  -Continue home synthroid 75mcg qD, 3/28 TSH 3.86 wnl  -Endo consulted    MSK  #Multiple chronic + acute wounds  Hx of chronic sacral wound d/t immobility further complicated by acute progression of right medial thigh/vulvar wound precipitated by skin tear  -Awaiting wound care consult  -Hydromorphone IV 0.25mg PRN and tylenol PRN for pain.    #GOC/Ethics  -GOC pending  -PT consulted  -Social work consulted 51 yo F hx of T2DM, asthma, obesity, chronic immobility 2/2 MS and several chronic wounds presenting for vulvar/R thigh wound found to be hyperglycemic + metabolic acidosis w/ purulent urine c/f urosepsis + ARF.

## 2022-03-31 LAB
-  AMIKACIN: SIGNIFICANT CHANGE UP
-  AMIKACIN: SIGNIFICANT CHANGE UP
-  AMOXICILLIN/CLAVULANIC ACID: SIGNIFICANT CHANGE UP
-  AMOXICILLIN/CLAVULANIC ACID: SIGNIFICANT CHANGE UP
-  AMPICILLIN/SULBACTAM: SIGNIFICANT CHANGE UP
-  AMPICILLIN/SULBACTAM: SIGNIFICANT CHANGE UP
-  AMPICILLIN: SIGNIFICANT CHANGE UP
-  AMPICILLIN: SIGNIFICANT CHANGE UP
-  AZTREONAM: SIGNIFICANT CHANGE UP
-  AZTREONAM: SIGNIFICANT CHANGE UP
-  CEFAZOLIN: SIGNIFICANT CHANGE UP
-  CEFAZOLIN: SIGNIFICANT CHANGE UP
-  CEFEPIME: SIGNIFICANT CHANGE UP
-  CEFEPIME: SIGNIFICANT CHANGE UP
-  CEFOXITIN: SIGNIFICANT CHANGE UP
-  CEFTRIAXONE: SIGNIFICANT CHANGE UP
-  CEFTRIAXONE: SIGNIFICANT CHANGE UP
-  CIPROFLOXACIN: SIGNIFICANT CHANGE UP
-  CIPROFLOXACIN: SIGNIFICANT CHANGE UP
-  ERTAPENEM: SIGNIFICANT CHANGE UP
-  ERTAPENEM: SIGNIFICANT CHANGE UP
-  GENTAMICIN: SIGNIFICANT CHANGE UP
-  GENTAMICIN: SIGNIFICANT CHANGE UP
-  IMIPENEM: SIGNIFICANT CHANGE UP
-  IMIPENEM: SIGNIFICANT CHANGE UP
-  LEVOFLOXACIN: SIGNIFICANT CHANGE UP
-  LEVOFLOXACIN: SIGNIFICANT CHANGE UP
-  MEROPENEM: SIGNIFICANT CHANGE UP
-  MEROPENEM: SIGNIFICANT CHANGE UP
-  NITROFURANTOIN: SIGNIFICANT CHANGE UP
-  NITROFURANTOIN: SIGNIFICANT CHANGE UP
-  PIPERACILLIN/TAZOBACTAM: SIGNIFICANT CHANGE UP
-  PIPERACILLIN/TAZOBACTAM: SIGNIFICANT CHANGE UP
-  STAPHYLOCOCCUS EPIDERMIDIS: SIGNIFICANT CHANGE UP
-  TIGECYCLINE: SIGNIFICANT CHANGE UP
-  TIGECYCLINE: SIGNIFICANT CHANGE UP
-  TOBRAMYCIN: SIGNIFICANT CHANGE UP
-  TOBRAMYCIN: SIGNIFICANT CHANGE UP
-  TRIMETHOPRIM/SULFAMETHOXAZOLE: SIGNIFICANT CHANGE UP
-  TRIMETHOPRIM/SULFAMETHOXAZOLE: SIGNIFICANT CHANGE UP
ALBUMIN SERPL ELPH-MCNC: 1.8 G/DL — LOW (ref 3.3–5)
ALP SERPL-CCNC: 264 U/L — HIGH (ref 40–120)
ALT FLD-CCNC: 19 U/L — SIGNIFICANT CHANGE UP (ref 4–33)
ANION GAP SERPL CALC-SCNC: 15 MMOL/L — HIGH (ref 7–14)
AST SERPL-CCNC: 52 U/L — HIGH (ref 4–32)
BASE EXCESS BLDV CALC-SCNC: -0.2 MMOL/L — SIGNIFICANT CHANGE UP (ref -2–3)
BASE EXCESS BLDV CALC-SCNC: 1.5 MMOL/L — SIGNIFICANT CHANGE UP (ref -2–3)
BASOPHILS # BLD AUTO: 0.06 K/UL — SIGNIFICANT CHANGE UP (ref 0–0.2)
BASOPHILS NFR BLD AUTO: 0.4 % — SIGNIFICANT CHANGE UP (ref 0–2)
BILIRUB SERPL-MCNC: 0.3 MG/DL — SIGNIFICANT CHANGE UP (ref 0.2–1.2)
BLD GP AB SCN SERPL QL: NEGATIVE — SIGNIFICANT CHANGE UP
BLOOD GAS VENOUS COMPREHENSIVE RESULT: SIGNIFICANT CHANGE UP
BLOOD GAS VENOUS COMPREHENSIVE RESULT: SIGNIFICANT CHANGE UP
BUN SERPL-MCNC: 33 MG/DL — HIGH (ref 7–23)
CALCIUM SERPL-MCNC: 7.9 MG/DL — LOW (ref 8.4–10.5)
CHLORIDE BLDV-SCNC: 102 MMOL/L — SIGNIFICANT CHANGE UP (ref 96–108)
CHLORIDE BLDV-SCNC: 104 MMOL/L — SIGNIFICANT CHANGE UP (ref 96–108)
CHLORIDE SERPL-SCNC: 99 MMOL/L — SIGNIFICANT CHANGE UP (ref 98–107)
CO2 BLDV-SCNC: 26.1 MMOL/L — HIGH (ref 22–26)
CO2 BLDV-SCNC: 27.1 MMOL/L — HIGH (ref 22–26)
CO2 SERPL-SCNC: 21 MMOL/L — LOW (ref 22–31)
CREAT SERPL-MCNC: 1.39 MG/DL — HIGH (ref 0.5–1.3)
CULTURE RESULTS: SIGNIFICANT CHANGE UP
EGFR: 46 ML/MIN/1.73M2 — LOW
EOSINOPHIL # BLD AUTO: 0.35 K/UL — SIGNIFICANT CHANGE UP (ref 0–0.5)
EOSINOPHIL NFR BLD AUTO: 2.5 % — SIGNIFICANT CHANGE UP (ref 0–6)
GAS PNL BLDV: 135 MMOL/L — LOW (ref 136–145)
GAS PNL BLDV: 136 MMOL/L — SIGNIFICANT CHANGE UP (ref 136–145)
GLUCOSE BLDC GLUCOMTR-MCNC: 103 MG/DL — HIGH (ref 70–99)
GLUCOSE BLDC GLUCOMTR-MCNC: 119 MG/DL — HIGH (ref 70–99)
GLUCOSE BLDC GLUCOMTR-MCNC: 152 MG/DL — HIGH (ref 70–99)
GLUCOSE BLDC GLUCOMTR-MCNC: 160 MG/DL — HIGH (ref 70–99)
GLUCOSE BLDV-MCNC: 119 MG/DL — HIGH (ref 70–99)
GLUCOSE BLDV-MCNC: 183 MG/DL — HIGH (ref 70–99)
GLUCOSE SERPL-MCNC: 183 MG/DL — HIGH (ref 70–99)
GRAM STN FLD: SIGNIFICANT CHANGE UP
HCO3 BLDV-SCNC: 25 MMOL/L — SIGNIFICANT CHANGE UP (ref 22–29)
HCO3 BLDV-SCNC: 26 MMOL/L — SIGNIFICANT CHANGE UP (ref 22–29)
HCT VFR BLD CALC: 24.9 % — LOW (ref 34.5–45)
HCT VFR BLD CALC: 26.6 % — LOW (ref 34.5–45)
HCT VFR BLDA CALC: 21 % — CRITICAL LOW (ref 34.5–46.5)
HCT VFR BLDA CALC: 24 % — LOW (ref 34.5–46.5)
HGB BLD CALC-MCNC: 7 G/DL — CRITICAL LOW (ref 11.5–15.5)
HGB BLD CALC-MCNC: 8 G/DL — LOW (ref 11.5–15.5)
HGB BLD-MCNC: 7.1 G/DL — LOW (ref 11.5–15.5)
HGB BLD-MCNC: 7.9 G/DL — LOW (ref 11.5–15.5)
IANC: 9.93 K/UL — HIGH (ref 1.8–7.4)
IMM GRANULOCYTES NFR BLD AUTO: 2.5 % — HIGH (ref 0–1.5)
LACTATE BLDV-MCNC: 1.8 MMOL/L — SIGNIFICANT CHANGE UP (ref 0.5–2)
LACTATE BLDV-MCNC: 4.1 MMOL/L — CRITICAL HIGH (ref 0.5–2)
LYMPHOCYTES # BLD AUTO: 16.9 % — SIGNIFICANT CHANGE UP (ref 13–44)
LYMPHOCYTES # BLD AUTO: 2.4 K/UL — SIGNIFICANT CHANGE UP (ref 1–3.3)
MAGNESIUM SERPL-MCNC: 1.6 MG/DL — SIGNIFICANT CHANGE UP (ref 1.6–2.6)
MCHC RBC-ENTMCNC: 24.6 PG — LOW (ref 27–34)
MCHC RBC-ENTMCNC: 25.2 PG — LOW (ref 27–34)
MCHC RBC-ENTMCNC: 28.5 GM/DL — LOW (ref 32–36)
MCHC RBC-ENTMCNC: 29.7 GM/DL — LOW (ref 32–36)
MCV RBC AUTO: 84.7 FL — SIGNIFICANT CHANGE UP (ref 80–100)
MCV RBC AUTO: 86.2 FL — SIGNIFICANT CHANGE UP (ref 80–100)
METHOD TYPE: SIGNIFICANT CHANGE UP
MONOCYTES # BLD AUTO: 1.1 K/UL — HIGH (ref 0–0.9)
MONOCYTES NFR BLD AUTO: 7.8 % — SIGNIFICANT CHANGE UP (ref 2–14)
NEUTROPHILS # BLD AUTO: 9.93 K/UL — HIGH (ref 1.8–7.4)
NEUTROPHILS NFR BLD AUTO: 69.9 % — SIGNIFICANT CHANGE UP (ref 43–77)
NRBC # BLD: 0 /100 WBCS — SIGNIFICANT CHANGE UP
NRBC # BLD: 0 /100 WBCS — SIGNIFICANT CHANGE UP
NRBC # FLD: 0 K/UL — SIGNIFICANT CHANGE UP
NRBC # FLD: 0 K/UL — SIGNIFICANT CHANGE UP
ORGANISM # SPEC MICROSCOPIC CNT: SIGNIFICANT CHANGE UP
PCO2 BLDV: 39 MMHG — SIGNIFICANT CHANGE UP (ref 39–42)
PCO2 BLDV: 41 MMHG — SIGNIFICANT CHANGE UP (ref 39–42)
PH BLDV: 7.39 — SIGNIFICANT CHANGE UP (ref 7.32–7.43)
PH BLDV: 7.43 — SIGNIFICANT CHANGE UP (ref 7.32–7.43)
PHOSPHATE SERPL-MCNC: 2.9 MG/DL — SIGNIFICANT CHANGE UP (ref 2.5–4.5)
PLATELET # BLD AUTO: 427 K/UL — HIGH (ref 150–400)
PLATELET # BLD AUTO: 444 K/UL — HIGH (ref 150–400)
PO2 BLDV: 48 MMHG — SIGNIFICANT CHANGE UP
PO2 BLDV: 51 MMHG — SIGNIFICANT CHANGE UP
POTASSIUM BLDV-SCNC: 3.1 MMOL/L — LOW (ref 3.5–5.1)
POTASSIUM BLDV-SCNC: 3.6 MMOL/L — SIGNIFICANT CHANGE UP (ref 3.5–5.1)
POTASSIUM SERPL-MCNC: 3.4 MMOL/L — LOW (ref 3.5–5.3)
POTASSIUM SERPL-SCNC: 3.4 MMOL/L — LOW (ref 3.5–5.3)
PROT SERPL-MCNC: 6.2 G/DL — SIGNIFICANT CHANGE UP (ref 6–8.3)
RBC # BLD: 2.89 M/UL — LOW (ref 3.8–5.2)
RBC # BLD: 3.14 M/UL — LOW (ref 3.8–5.2)
RBC # FLD: 15.3 % — HIGH (ref 10.3–14.5)
RBC # FLD: 15.4 % — HIGH (ref 10.3–14.5)
RH IG SCN BLD-IMP: POSITIVE — SIGNIFICANT CHANGE UP
SAO2 % BLDV: 77.1 % — SIGNIFICANT CHANGE UP
SAO2 % BLDV: 83.2 % — SIGNIFICANT CHANGE UP
SODIUM SERPL-SCNC: 135 MMOL/L — SIGNIFICANT CHANGE UP (ref 135–145)
VANCOMYCIN FLD-MCNC: 13.6 UG/ML — SIGNIFICANT CHANGE UP
WBC # BLD: 14.19 K/UL — HIGH (ref 3.8–10.5)
WBC # BLD: 16.91 K/UL — HIGH (ref 3.8–10.5)
WBC # FLD AUTO: 14.19 K/UL — HIGH (ref 3.8–10.5)
WBC # FLD AUTO: 16.91 K/UL — HIGH (ref 3.8–10.5)

## 2022-03-31 PROCEDURE — 99222 1ST HOSP IP/OBS MODERATE 55: CPT

## 2022-03-31 PROCEDURE — 99233 SBSQ HOSP IP/OBS HIGH 50: CPT

## 2022-03-31 RX ORDER — VANCOMYCIN HCL 1 G
1500 VIAL (EA) INTRAVENOUS ONCE
Refills: 0 | Status: COMPLETED | OUTPATIENT
Start: 2022-03-31 | End: 2022-03-31

## 2022-03-31 RX ORDER — ERTAPENEM SODIUM 1 G/1
1000 INJECTION, POWDER, LYOPHILIZED, FOR SOLUTION INTRAMUSCULAR; INTRAVENOUS EVERY 24 HOURS
Refills: 0 | Status: COMPLETED | OUTPATIENT
Start: 2022-03-31 | End: 2022-04-06

## 2022-03-31 RX ORDER — POTASSIUM CHLORIDE 20 MEQ
40 PACKET (EA) ORAL ONCE
Refills: 0 | Status: COMPLETED | OUTPATIENT
Start: 2022-03-31 | End: 2022-03-31

## 2022-03-31 RX ORDER — ACETAMINOPHEN 500 MG
1000 TABLET ORAL ONCE
Refills: 0 | Status: COMPLETED | OUTPATIENT
Start: 2022-03-31 | End: 2022-03-31

## 2022-03-31 RX ORDER — MAGNESIUM SULFATE 500 MG/ML
1 VIAL (ML) INJECTION ONCE
Refills: 0 | Status: COMPLETED | OUTPATIENT
Start: 2022-03-31 | End: 2022-03-31

## 2022-03-31 RX ORDER — SODIUM CHLORIDE 9 MG/ML
1000 INJECTION, SOLUTION INTRAVENOUS ONCE
Refills: 0 | Status: COMPLETED | OUTPATIENT
Start: 2022-03-31 | End: 2022-03-31

## 2022-03-31 RX ADMIN — Medication 8 UNIT(S): at 18:14

## 2022-03-31 RX ADMIN — Medication 1 TABLET(S): at 13:05

## 2022-03-31 RX ADMIN — Medication 40 MILLIEQUIVALENT(S): at 05:48

## 2022-03-31 RX ADMIN — MIDODRINE HYDROCHLORIDE 20 MILLIGRAM(S): 2.5 TABLET ORAL at 05:25

## 2022-03-31 RX ADMIN — MUPIROCIN 1 APPLICATION(S): 20 OINTMENT TOPICAL at 18:13

## 2022-03-31 RX ADMIN — Medication 1 APPLICATION(S): at 18:26

## 2022-03-31 RX ADMIN — ENOXAPARIN SODIUM 40 MILLIGRAM(S): 100 INJECTION SUBCUTANEOUS at 05:26

## 2022-03-31 RX ADMIN — Medication 100 GRAM(S): at 13:04

## 2022-03-31 RX ADMIN — Medication 500 MILLIGRAM(S): at 13:05

## 2022-03-31 RX ADMIN — Medication 1 APPLICATION(S): at 05:26

## 2022-03-31 RX ADMIN — ERTAPENEM SODIUM 120 MILLIGRAM(S): 1 INJECTION, POWDER, LYOPHILIZED, FOR SOLUTION INTRAMUSCULAR; INTRAVENOUS at 18:12

## 2022-03-31 RX ADMIN — Medication 8 UNIT(S): at 13:07

## 2022-03-31 RX ADMIN — Medication 325 MILLIGRAM(S): at 01:37

## 2022-03-31 RX ADMIN — ENOXAPARIN SODIUM 40 MILLIGRAM(S): 100 INJECTION SUBCUTANEOUS at 18:13

## 2022-03-31 RX ADMIN — INSULIN GLARGINE 30 UNIT(S): 100 INJECTION, SOLUTION SUBCUTANEOUS at 21:29

## 2022-03-31 RX ADMIN — Medication 400 MILLIGRAM(S): at 21:30

## 2022-03-31 RX ADMIN — Medication 300 MILLIGRAM(S): at 13:06

## 2022-03-31 RX ADMIN — PIPERACILLIN AND TAZOBACTAM 25 GRAM(S): 4; .5 INJECTION, POWDER, LYOPHILIZED, FOR SOLUTION INTRAVENOUS at 05:25

## 2022-03-31 RX ADMIN — SODIUM CHLORIDE 1000 MILLILITER(S): 9 INJECTION, SOLUTION INTRAVENOUS at 05:24

## 2022-03-31 RX ADMIN — MUPIROCIN 1 APPLICATION(S): 20 OINTMENT TOPICAL at 05:25

## 2022-03-31 RX ADMIN — CHLORHEXIDINE GLUCONATE 1 APPLICATION(S): 213 SOLUTION TOPICAL at 13:25

## 2022-03-31 RX ADMIN — Medication 2: at 13:07

## 2022-03-31 RX ADMIN — PIPERACILLIN AND TAZOBACTAM 25 GRAM(S): 4; .5 INJECTION, POWDER, LYOPHILIZED, FOR SOLUTION INTRAVENOUS at 16:02

## 2022-03-31 RX ADMIN — Medication 1000 MILLIGRAM(S): at 22:51

## 2022-03-31 RX ADMIN — SODIUM CHLORIDE 100 MILLILITER(S): 9 INJECTION INTRAMUSCULAR; INTRAVENOUS; SUBCUTANEOUS at 21:34

## 2022-03-31 RX ADMIN — Medication 75 MICROGRAM(S): at 05:26

## 2022-03-31 RX ADMIN — Medication 2: at 18:14

## 2022-03-31 NOTE — PROGRESS NOTE ADULT - SUBJECTIVE AND OBJECTIVE BOX
Melanie Andujar  Hospitalist  Pager- [phone]     PROGRESS NOTE:     Patient is a 52y old  Female who presents with a chief complaint of     SUBJECTIVE / OVERNIGHT EVENTS: pt seen and examined by me   Fever of 100.6 overnight     ADDITIONAL REVIEW OF SYSTEMS:      MEDICATIONS  (STANDING):  ascorbic acid 500 milliGRAM(s) Oral daily  chlorhexidine 2% Cloths 1 Application(s) Topical <User Schedule>  Dakins Solution - 1/4 Strength 1 Application(s) Topical two times a day  dextrose 5%. 1000 milliLiter(s) (50 mL/Hr) IV Continuous <Continuous>  dextrose 5%. 1000 milliLiter(s) (100 mL/Hr) IV Continuous <Continuous>  dextrose 50% Injectable 25 Gram(s) IV Push once  dextrose 50% Injectable 12.5 Gram(s) IV Push once  dextrose 50% Injectable 25 Gram(s) IV Push once  enoxaparin Injectable 40 milliGRAM(s) SubCutaneous every 12 hours  glucagon  Injectable 1 milliGRAM(s) IntraMuscular once  insulin glargine Injectable (LANTUS) 30 Unit(s) SubCutaneous at bedtime  insulin lispro (ADMELOG) corrective regimen sliding scale   SubCutaneous three times a day before meals  insulin lispro (ADMELOG) corrective regimen sliding scale   SubCutaneous at bedtime  insulin lispro Injectable (ADMELOG) 8 Unit(s) SubCutaneous three times a day before meals  levothyroxine 75 MICROGram(s) Oral daily  magnesium sulfate  IVPB 1 Gram(s) IV Intermittent once  midodrine 20 milliGRAM(s) Oral every 8 hours  multivitamin 1 Tablet(s) Oral daily  mupirocin 2% Ointment 1 Application(s) Topical two times a day  piperacillin/tazobactam IVPB.. 3.375 Gram(s) IV Intermittent every 8 hours  sodium chloride 0.9%. 1000 milliLiter(s) (100 mL/Hr) IV Continuous <Continuous>  vancomycin  IVPB 1500 milliGRAM(s) IV Intermittent once    MEDICATIONS  (PRN):  acetaminophen     Tablet .. 325 milliGRAM(s) Oral every 4 hours PRN Temp greater or equal to 38C (100.4F)  ALBUTerol    90 MICROgram(s) HFA Inhaler 2 Puff(s) Inhalation every 6 hours PRN Shortness of Breath  albuterol/ipratropium for Nebulization 3 milliLiter(s) Nebulizer every 6 hours PRN Shortness of Breath  dextrose Oral Gel 15 Gram(s) Oral once PRN Blood Glucose LESS THAN 70 milliGRAM(s)/deciliter  HYDROmorphone  Injectable 0.25 milliGRAM(s) IV Push once PRN Severe Pain (7 - 10)      CAPILLARY BLOOD GLUCOSE  POCT Blood Glucose.: 195 mg/dL (30 Mar 2022 22:12)  POCT Blood Glucose.: 170 mg/dL (30 Mar 2022 18:30)  POCT Blood Glucose.: 110 mg/dL (30 Mar 2022 12:42)  POCT Blood Glucose.: 72 mg/dL (30 Mar 2022 08:49)    I&O's Summary    29 Mar 2022 07:01  -  30 Mar 2022 07:00  --------------------------------------------------------  IN: 1371 mL / OUT: 1000 mL / NET: 371 mL        PHYSICAL EXAM:    Vital Signs Last 24 Hrs  T(C): 37.1 (31 Mar 2022 05:25), Max: 38.1 (30 Mar 2022 18:00)  T(F): 98.7 (31 Mar 2022 05:25), Max: 100.6 (30 Mar 2022 18:00)  HR: 88 (31 Mar 2022 05:25) (88 - 109)  BP: 100/50 (31 Mar 2022 05:25) (100/50 - 131/89)  BP(mean): --  RR: 18 (31 Mar 2022 05:25) (18 - 20)  SpO2: 100% (31 Mar 2022 05:25) (99% - 100%)        CONSTITUTIONAL: NAD, obese   RESPIRATORY: Normal respiratory effort; decreased BS at base  CARDIOVASCULAR: Regular rate and rhythm, normal S1 and S2, no murmur/rub/gallop; No lower extremity edema; Peripheral pulses are 2+ bilaterally  ABDOMEN: Nontender to palpation, normoactive bowel sounds, no rebound/guarding; No hepatosplenomegaly  MUSCLOSKELETAL: no clubbing or cyanosis of digits; no joint swelling or tenderness to palpation  PSYCH: A+O to person, place, and time; affect appropriate  NEURO: Decreased strength more on right side compared to left( as per pt chronic)  - Rose catheter present    SKIN:  Multiple wounds with dressing  present -Right knee, RLE  right posterior lower leg- 5cmx2.5cmx0  Beneath breast/abdominal folds - Dermatitis with  fissures  present  Dressing present  Right lateral thigh-. As per wound care eval , presence of stage 4 pressure injury- [id number], 100% granular base, no purulent drainage, no odor. Periwound skin intact, no edema, no erythema, no increased warmth, no fluctuance noted. Right trochanter- stage 4 pressure injury- [id number]- 90% granular base, 10% firmly attached slough, bone in close proximity. Small-moderate seropurulent drainage, mild odor.       LABS:                                             7.1    14.19 )-----------( 444      ( 31 Mar 2022 04:38 )             24.9     03-31    135  |  99  |  33<H>  ----------------------------<  183<H>  3.4<L>   |  21<L>  |  1.39<H>    Ca    7.9<L>      31 Mar 2022 04:38  Phos  2.9     03-31  Mg     1.60     03-31    TPro  6.2  /  Alb  1.8<L>  /  TBili  0.3  /  DBili  x   /  AST  52<H>  /  ALT  19  /  AlkPhos  264<H>  03-31            Culture - Blood (collected 28 Mar 2022 08:26)  Source: .Blood Blood  Preliminary Report (29 Mar 2022 09:01):    No growth to date.    Culture - Urine (collected 28 Mar 2022 08:19)  Source: Catheterized Catheterized  Final Report (30 Mar 2022 08:53):    >100,000 CFU/ml Escherichia coli    >100,000 CFU/ml Escherichia coli #2    >=3 organisms. Probable collection contamination.    Normal Urogenital brayan present        RADIOLOGY & ADDITIONAL TESTS:  Results Reviewed:   Imaging Personally Reviewed:  Electrocardiogram Personally Reviewed:    COORDINATION OF CARE:  Care Discussed with Consultants/Other Providers [Y/N]:  Prior or Outpatient Records Reviewed [Y/N]:   Melanie Aguilarira  Hospitalist  Pager- [phone]     PROGRESS NOTE:     Patient is a 52y old  Female who presents with a chief complaint of     SUBJECTIVE / OVERNIGHT EVENTS: pt seen and examined by me   Fever of 100.6 overnight   Received 1 L bolus overnight     ADDITIONAL REVIEW OF SYSTEMS:      MEDICATIONS  (STANDING):  ascorbic acid 500 milliGRAM(s) Oral daily  chlorhexidine 2% Cloths 1 Application(s) Topical <User Schedule>  Dakins Solution - 1/4 Strength 1 Application(s) Topical two times a day  dextrose 5%. 1000 milliLiter(s) (50 mL/Hr) IV Continuous <Continuous>  dextrose 5%. 1000 milliLiter(s) (100 mL/Hr) IV Continuous <Continuous>  dextrose 50% Injectable 25 Gram(s) IV Push once  dextrose 50% Injectable 12.5 Gram(s) IV Push once  dextrose 50% Injectable 25 Gram(s) IV Push once  enoxaparin Injectable 40 milliGRAM(s) SubCutaneous every 12 hours  glucagon  Injectable 1 milliGRAM(s) IntraMuscular once  insulin glargine Injectable (LANTUS) 30 Unit(s) SubCutaneous at bedtime  insulin lispro (ADMELOG) corrective regimen sliding scale   SubCutaneous three times a day before meals  insulin lispro (ADMELOG) corrective regimen sliding scale   SubCutaneous at bedtime  insulin lispro Injectable (ADMELOG) 8 Unit(s) SubCutaneous three times a day before meals  levothyroxine 75 MICROGram(s) Oral daily  magnesium sulfate  IVPB 1 Gram(s) IV Intermittent once  midodrine 20 milliGRAM(s) Oral every 8 hours  multivitamin 1 Tablet(s) Oral daily  mupirocin 2% Ointment 1 Application(s) Topical two times a day  piperacillin/tazobactam IVPB.. 3.375 Gram(s) IV Intermittent every 8 hours  sodium chloride 0.9%. 1000 milliLiter(s) (100 mL/Hr) IV Continuous <Continuous>  vancomycin  IVPB 1500 milliGRAM(s) IV Intermittent once    MEDICATIONS  (PRN):  acetaminophen     Tablet .. 325 milliGRAM(s) Oral every 4 hours PRN Temp greater or equal to 38C (100.4F)  ALBUTerol    90 MICROgram(s) HFA Inhaler 2 Puff(s) Inhalation every 6 hours PRN Shortness of Breath  albuterol/ipratropium for Nebulization 3 milliLiter(s) Nebulizer every 6 hours PRN Shortness of Breath  dextrose Oral Gel 15 Gram(s) Oral once PRN Blood Glucose LESS THAN 70 milliGRAM(s)/deciliter  HYDROmorphone  Injectable 0.25 milliGRAM(s) IV Push once PRN Severe Pain (7 - 10)      CAPILLARY BLOOD GLUCOSE  POCT Blood Glucose.: 195 mg/dL (30 Mar 2022 22:12)  POCT Blood Glucose.: 170 mg/dL (30 Mar 2022 18:30)  POCT Blood Glucose.: 110 mg/dL (30 Mar 2022 12:42)  POCT Blood Glucose.: 72 mg/dL (30 Mar 2022 08:49)    I&O's Summary    29 Mar 2022 07:01  -  30 Mar 2022 07:00  --------------------------------------------------------  IN: 1371 mL / OUT: 1000 mL / NET: 371 mL        PHYSICAL EXAM:    Vital Signs Last 24 Hrs  T(C): 37.1 (31 Mar 2022 05:25), Max: 38.1 (30 Mar 2022 18:00)  T(F): 98.7 (31 Mar 2022 05:25), Max: 100.6 (30 Mar 2022 18:00)  HR: 88 (31 Mar 2022 05:25) (88 - 109)  BP: 100/50 (31 Mar 2022 05:25) (100/50 - 131/89)  BP(mean): --  RR: 18 (31 Mar 2022 05:25) (18 - 20)  SpO2: 100% (31 Mar 2022 05:25) (99% - 100%)        CONSTITUTIONAL: NAD, obese   RESPIRATORY: Normal respiratory effort; decreased BS at base  CARDIOVASCULAR: Regular rate and rhythm, normal S1 and S2, no murmur/rub/gallop; No lower extremity edema; Peripheral pulses are 2+ bilaterally  ABDOMEN: Nontender to palpation, normoactive bowel sounds, no rebound/guarding; No hepatosplenomegaly  MUSCLOSKELETAL: no clubbing or cyanosis of digits; no joint swelling or tenderness to palpation  PSYCH: A+O to person, place, and time; affect appropriate  NEURO: Decreased strength more on right side compared to left( as per pt chronic)  - Rose catheter present    SKIN:  Multiple wounds with dressing  present -Right knee, RLE  right posterior lower leg- 5cmx2.5cmx0  Beneath breast/abdominal folds - Dermatitis with  fissures  present  Dressing present  Right lateral thigh-. As per wound care eval , presence of stage 4 pressure injury- [id number], 100% granular base, no purulent drainage, no odor. Periwound skin intact, no edema, no erythema, no increased warmth, no fluctuance noted. Right trochanter- stage 4 pressure injury- [id number]- 90% granular base, 10% firmly attached slough, bone in close proximity. Small-moderate seropurulent drainage, mild odor.       LABS:                                             7.1    14.19 )-----------( 444      ( 31 Mar 2022 04:38 )             24.9     03-31    135  |  99  |  33<H>  ----------------------------<  183<H>  3.4<L>   |  21<L>  |  1.39<H>    Ca    7.9<L>      31 Mar 2022 04:38  Phos  2.9     03-31  Mg     1.60     03-31    TPro  6.2  /  Alb  1.8<L>  /  TBili  0.3  /  DBili  x   /  AST  52<H>  /  ALT  19  /  AlkPhos  264<H>  03-31            Culture - Blood (collected 28 Mar 2022 08:26)  Source: .Blood Blood  Preliminary Report (29 Mar 2022 09:01):    No growth to date.    Culture - Urine (collected 28 Mar 2022 08:19)  Source: Catheterized Catheterized  Final Report (30 Mar 2022 08:53):    >100,000 CFU/ml Escherichia coli    >100,000 CFU/ml Escherichia coli #2    >=3 organisms. Probable collection contamination.    Normal Urogenital brayan present        RADIOLOGY & ADDITIONAL TESTS:  Results Reviewed:   Imaging Personally Reviewed:  Electrocardiogram Personally Reviewed:    COORDINATION OF CARE:  Care Discussed with Consultants/Other Providers [Y/N]:  Prior or Outpatient Records Reviewed [Y/N]:

## 2022-03-31 NOTE — CONSULT NOTE ADULT - SUBJECTIVE AND OBJECTIVE BOX
Patient is a 52y old  Female who presents with a chief complaint of R thigh wound, urosepsis (31 Mar 2022 11:28)    HPI:  53 yo F, hx of T2DM, asthma, obesity, chronic immobility and several chronic wounds, presenting for vulvar/R thigh noted by her home health nurse today. Patient states wound began x1 month ago, started as a skin tear and progressed over the course of a month. Patient states that this Friday RN inspected wound and stated it may need evaluation but wanted to observe, would rechecked today and patient was brought into ed.     In the ED, was initially hypotensive to 90s/50s which improved w/ fluids. Labs significant for wc 22, k 6.6, cr 2.2 unknown bl, glc 533, bicarb 13. bhb negative, ph 7.38. Nephro consulted for HD, patient declined. Patient treated for hyper K w/ mild improvement, placed on insulin gtt w/ improvement of bicarb from 13 to 18, pH 7.3, lactate 5.6 to 4.6. However, upon placement of toussaint patient had purulent drainage w/ a temporary change in mental status. Patient admitted to MICU for further management of urosepsis, DKA and metabolic acidosis due to ARF. (28 Mar 2022 06:53)       REVIEW OF SYSTEMS  [  ] ROS unobtainable because:    [  ] All other systems negative except as noted below    Constitutional:  [ ] fever [ ] chills  [ ] weight loss  [ ]night sweat  [ ]poor appetite/PO intake [ ]fatigue   Skin:  [ ] rash [ ] phlebitis	  Eyes: [ ] icterus [ ] pain  [ ] discharge	  ENMT: [ ] sore throat  [ ] thrush [ ] ulcers [ ] exudates [ ]anosmia  Respiratory: [ ] dyspnea [ ] hemoptysis [ ] cough [ ] sputum	  Cardiovascular:  [ ] chest pain [ ] palpitations [ ] edema	  Gastrointestinal:  [ ] nausea [ ] vomiting [ ] diarrhea [ ] constipation [ ] pain	  Genitourinary:  [ ] dysuria [ ] frequency [ ] hematuria [ ] discharge [ ] flank pain  [ ] incontinence  Musculoskeletal:  [ ] myalgias [ ] arthralgias [ ] arthritis  [ ] back pain  Neurological:  [ ] headache [ ] weakness [ ] seizures  [ ] confusion/altered mental status    prior hospital charts reviewed [V]  primary team notes reviewed [V]  other consultant notes reviewed [V]    PAST MEDICAL & SURGICAL HISTORY:      FAMILY HISTORY:      SOCIAL HISTORY:  - Denied smoking/vaping/alcohol/recreational drug use    Allergies  No Known Drug Allergies  Pineapple (Anaphylaxis)        ANTIMICROBIALS:  piperacillin/tazobactam IVPB.. 3.375 every 8 hours  vancomycin  IVPB 1500 once      ANTIMICROBIALS (past 90 days):   MEDICATIONS  (STANDING):  piperacillin/tazobactam IVPB..   25 mL/Hr IV Intermittent (03-31-22 @ 05:25)   25 mL/Hr IV Intermittent (03-30-22 @ 21:29)   25 mL/Hr IV Intermittent (03-30-22 @ 13:14)   25 mL/Hr IV Intermittent (03-30-22 @ 05:30)   25 mL/Hr IV Intermittent (03-29-22 @ 21:54)   25 mL/Hr IV Intermittent (03-29-22 @ 13:35)   25 mL/Hr IV Intermittent (03-29-22 @ 06:48)   25 mL/Hr IV Intermittent (03-28-22 @ 22:48)   25 mL/Hr IV Intermittent (03-28-22 @ 13:43)    piperacillin/tazobactam IVPB...   200 mL/Hr IV Intermittent (03-28-22 @ 02:10)    vancomycin  IVPB   300 mL/Hr IV Intermittent (03-29-22 @ 05:00)    vancomycin  IVPB   250 mL/Hr IV Intermittent (03-28-22 @ 09:13)    vancomycin  IVPB.   250 mL/Hr IV Intermittent (03-28-22 @ 04:37)        MEDICATIONS  (STANDING):  acetaminophen     Tablet .. 325 every 4 hours PRN  ALBUTerol    90 MICROgram(s) HFA Inhaler 2 every 6 hours PRN  albuterol/ipratropium for Nebulization 3 every 6 hours PRN  dextrose 50% Injectable 25 once  dextrose 50% Injectable 12.5 once  dextrose 50% Injectable 25 once  dextrose Oral Gel 15 once PRN  enoxaparin Injectable 40 every 12 hours  glucagon  Injectable 1 once  HYDROmorphone  Injectable 0.25 once PRN  insulin glargine Injectable (LANTUS) 30 at bedtime  insulin lispro (ADMELOG) corrective regimen sliding scale  three times a day before meals  insulin lispro (ADMELOG) corrective regimen sliding scale  at bedtime  insulin lispro Injectable (ADMELOG) 8 three times a day before meals  levothyroxine 75 daily  midodrine 20 every 8 hours      VITALS:  Vital Signs Last 24 Hrs  T(F): 98.7 (03-31-22 @ 05:25), Max: 100.6 (03-30-22 @ 18:00)  Vital Signs Last 24 Hrs  HR: 88 (03-31-22 @ 05:25) (88 - 109)  BP: 100/50 (03-31-22 @ 05:25) (100/50 - 131/89)  RR: 18 (03-31-22 @ 05:25)  SpO2: 100% (03-31-22 @ 05:25) (99% - 100%)  Wt(kg): --    PHYSICAL EXAM:  Constitutional: non-toxic, no distress  HEAD/EYES: anicteric, no conjunctival injection  ENT:  supple, no thrush  Cardiovascular:   normal S1/S2, no murmur, no edema  Respiratory:  clear BS bilaterally, no wheezes, no rales  GI:  soft, non-tender, normal bowel sounds  :  no toussaint, no CVA tenderness  Musculoskeletal:  no synovitis, normal ROM  Neurologic: awake and alert,  no focal findings  Skin:  no rash, no erythema, no phlebitis  Heme/Onc: no lymphadenopathy   Psychiatric:  awake, alert, appropriate mood      Labs:                        7.9    16.91 )-----------( 427      ( 31 Mar 2022 09:27 )             26.6     03-31    135  |  99  |  33<H>  ----------------------------<  183<H>  3.4<L>   |  21<L>  |  1.39<H>    Ca    7.9<L>      31 Mar 2022 04:38  Phos  2.9     03-31  Mg     1.60     03-31    TPro  6.2  /  Alb  1.8<L>  /  TBili  0.3  /  DBili  x   /  AST  52<H>  /  ALT  19  /  AlkPhos  264<H>  03-31      WBC Trend:  WBC Count: 16.91 (03-31-22 @ 09:27)  WBC Count: 14.19 (03-31-22 @ 04:38)  WBC Count: 19.63 (03-30-22 @ 06:40)  WBC Count: 22.58 (03-29-22 @ 01:00)    Auto Neutrophil #: 9.93 K/uL (03-31-22 @ 04:38)  Auto Neutrophil #: 18.86 K/uL (03-29-22 @ 01:00)  Auto Neutrophil #: 17.52 K/uL (03-27-22 @ 19:13)  Band Neutrophils %: 1.7 % (03-27-22 @ 19:13)    Auto Eosinophil %: 2.5 % (03-31-22 @ 04:38)  Auto Eosinophil %: 0.8 % (03-29-22 @ 01:00)        MICROBIOLOGY:  Vancomycin Level, Random: 13.6 (03-31 @ 04:38)  Vancomycin Level, Random: 21.7 (03-30 @ 06:40)  Vancomycin Level, Trough: 23.6 (03-30 @ 02:39)  Vancomycin Level, Random: 16.5 (03-29 @ 01:00)    MRSA PCR Result.: NotDetec (03-30-22 @ 15:46)  MRSA PCR Result.: NotDetec (03-29-22 @ 20:01)      Culture - Blood (collected 28 Mar 2022 08:26)  Source: .Blood Blood  Preliminary Report:    Growth in anaerobic bottle: Gram Positive Cocci in Clusters    ***Blood Panel PCR results on this specimen are available    approximately 3 hours after the Gram stain result.***    Gram stain, PCR, and/or culture results may not always    correspond due todifference in methodologies.    ************************************************************    This PCR assay was performed by multiplex PCR. This    Assay tests for 66 bacterial and resistance gene targets.    Please refer to the [facility] testdirectory    at [email or web address] for details.  Organism: Blood Culture PCR  Organism: Blood Culture PCR    Sensitivities:      -  Staphylococcus epidermidis: Detec      Method Type: PCR    Culture - Urine (collected 28 Mar 2022 08:19)  Source: Catheterized Catheterized  Final Report:    >=3 organisms. Probable collection contamination. including    >100,000 CFU/ml Escherichia coli ESBL    >100,000 CFU/ml Escherichia coli #2    Normal Urogenital brayan present  Organism: Escherichia coli  Escherichia coli ESBL  Organism: Escherichia coli ESBL    Sensitivities:      -  Amikacin: S <=16      -  Amoxicillin/Clavulanic Acid: S <=8/4 Consider reserving for cystitis when ampicillin/sulbactam is resistant      -  Ampicillin: R >16 These ampicillin results predict results for amoxicillin      -  Ampicillin/Sulbactam: R >16/8 Enterobacter, Klebsiella aerogenes, Citrobacter, and Serratia may develop resistance during prolonged therapy (3-4 days)      -  Aztreonam: R 16      -  Cefazolin: R >16 (MIC_[email or web address]_[email or web address]) For uncomplicated UTI with K. pneumoniae, E. coli, or P. mirablis: DHAVAL <=16 is sensitive and DHAVAL >=32 is resistant. This also predicts results for oral agents cefaclor, cefdinir, cefpodoxime, cefprozil, cefuroxime axetil, cephalexin and locarbef for uncomplicated UTI. Note that some isolates may be susceptible to these agents while testing resistant to cefazolin.      -  Cefepime: R >16      -  Ceftriaxone: R >32 Enterobacter, Klebsiella aerogenes, Citrobacter, and Serratia may develop resistance during prolonged therapy      -  Ciprofloxacin: R >2      -  Ertapenem: S <=0.5      -  Gentamicin: S <=2      -  Imipenem: S <=1      -  Levofloxacin: R >4      -  Meropenem: S <=1      -  Nitrofurantoin: S <=32 Should not be used to treat pyelonephritis      -  Piperacillin/Tazobactam: S <=8      -  Tigecycline: S <=2      -  Tobramycin: S <=2      -  Trimethoprim/Sulfamethoxazole: S <=0.5/9.5      Method Type: DHAVAL  Organism: Escherichia coli    Sensitivities:      -  Amikacin: S <=16      -  Amoxicillin/Clavulanic Acid: S <=8/4 Consider reserving for cystitis when ampicillin/sulbactam is resistant      -  Ampicillin: R >16 These ampicillin results predict results for amoxicillin      -  Ampicillin/Sulbactam: R >16/8 Enterobacter, Klebsiella aerogenes, Citrobacter, and Serratia may develop resistance during prolonged therapy (3-4 days)      -  Aztreonam: S <=4      -  Cefazolin: S 8 (MIC_CL_COM_[email or web address]) For uncomplicated UTI with K. pneumoniae, E. coli, or P. mirablis: DHAVAL <=16 is sensitive and DHAVAL >=32 is resistant. This also predicts results for oral agents cefaclor, cefdinir, cefpodoxime, cefprozil, cefuroxime axetil, cephalexin and locarbef for uncomplicated UTI. Note that some isolates may be susceptible to these agents while testing resistant to cefazolin.      -  Cefepime: S <=2      -  Cefoxitin: S <=8      -  Ceftriaxone: S <=1 Enterobacter, Klebsiella aerogenes, Citrobacter, and Serratia may develop resistance during prolonged therapy      -  Ciprofloxacin: S <=0.25      -  Ertapenem: S <=0.5      -  Gentamicin: S <=2      -  Imipenem: S <=1      -  Levofloxacin: S <=0.5      -  Meropenem: S <=1      -  Nitrofurantoin: S <=32 Should not be used to treat pyelonephritis      -  Piperacillin/Tazobactam: S <=8      -  Tigecycline: S <=2      -  Tobramycin: S <=2      -  Trimethoprim/Sulfamethoxazole: R >2/38      Method Type: DHAVAL      RADIOLOGY:  imaging below personally reviewed     Patient is a 52y old  Female who presents with a chief complaint of R thigh wound, urosepsis (31 Mar 2022 11:28)    HPI:  51 yo F, hx of T2DM, asthma, obesity, chronic immobility and several chronic wounds, presenting for vulvar/R thigh noted by her home health nurse today. Patient states wound began x1 month ago, started as a skin tear and progressed over the course of a month. Patient states that this Friday RN inspected wound and stated it may need evaluation but wanted to observe, would rechecked today and patient was brought into ed.     In the ED, was initially hypotensive to 90s/50s which improved w/ fluids. Labs significant for wc 22, k 6.6, cr 2.2 unknown bl, glc 533, bicarb 13. bhb negative, ph 7.38. Nephro consulted for HD, patient declined. Patient treated for hyper K w/ mild improvement, placed on insulin gtt w/ improvement of bicarb from 13 to 18, pH 7.3, lactate 5.6 to 4.6. However, upon placement of toussaint patient had purulent drainage w/ a temporary change in mental status. Patient admitted to MICU for further management of urosepsis, DKA and metabolic acidosis due to ARF. (28 Mar 2022 06:53)    Pt required MICU admission for pressor support briefly, now downgraded to floors.   ID consulted for fever despite antibiotics. Patient with no urinary symptoms, states she was surprised to find out she had a UTI       REVIEW OF SYSTEMS  [  ] ROS unobtainable because:    [ x ] All other systems negative except as noted below    Constitutional:  [ ] fever [ ] chills  [ ] weight loss  [ ]night sweat  [ ]poor appetite/PO intake [ ]fatigue   Skin:  [ ] rash [ ] phlebitis	  Eyes: [ ] icterus [ ] pain  [ ] discharge	  ENMT: [ ] sore throat  [ ] thrush [ ] ulcers [ ] exudates [ ]anosmia  Respiratory: [ ] dyspnea [ ] hemoptysis [ ] cough [ ] sputum	  Cardiovascular:  [ ] chest pain [ ] palpitations [ ] edema	  Gastrointestinal:  [ ] nausea [ ] vomiting [ ] diarrhea [ ] constipation [ ] pain	  Genitourinary:  [ ] dysuria [ ] frequency [ ] hematuria [ ] discharge [ ] flank pain  [ ] incontinence  Musculoskeletal:  [ ] myalgias [ ] arthralgias [ ] arthritis  [ ] back pain  Neurological:  [ ] headache [ ] weakness [ ] seizures  [ ] confusion/altered mental status    prior hospital charts reviewed [V]  primary team notes reviewed [V]  other consultant notes reviewed [V]    PAST MEDICAL & SURGICAL HISTORY:      FAMILY HISTORY:  No pertinent family history in first degree relatives    SOCIAL HISTORY:  Denied smoking  Lives alone    Allergies  No Known Drug Allergies  Pineapple (Anaphylaxis)        ANTIMICROBIALS:  piperacillin/tazobactam IVPB.. 3.375 every 8 hours  vancomycin  IVPB 1500 once      ANTIMICROBIALS (past 90 days):   MEDICATIONS  (STANDING):  piperacillin/tazobactam IVPB..   25 mL/Hr IV Intermittent (03-31-22 @ 05:25)   25 mL/Hr IV Intermittent (03-30-22 @ 21:29)   25 mL/Hr IV Intermittent (03-30-22 @ 13:14)   25 mL/Hr IV Intermittent (03-30-22 @ 05:30)   25 mL/Hr IV Intermittent (03-29-22 @ 21:54)   25 mL/Hr IV Intermittent (03-29-22 @ 13:35)   25 mL/Hr IV Intermittent (03-29-22 @ 06:48)   25 mL/Hr IV Intermittent (03-28-22 @ 22:48)   25 mL/Hr IV Intermittent (03-28-22 @ 13:43)    piperacillin/tazobactam IVPB...   200 mL/Hr IV Intermittent (03-28-22 @ 02:10)    vancomycin  IVPB   300 mL/Hr IV Intermittent (03-29-22 @ 05:00)    vancomycin  IVPB   250 mL/Hr IV Intermittent (03-28-22 @ 09:13)    vancomycin  IVPB.   250 mL/Hr IV Intermittent (03-28-22 @ 04:37)        MEDICATIONS  (STANDING):  acetaminophen     Tablet .. 325 every 4 hours PRN  ALBUTerol    90 MICROgram(s) HFA Inhaler 2 every 6 hours PRN  albuterol/ipratropium for Nebulization 3 every 6 hours PRN  dextrose 50% Injectable 25 once  dextrose 50% Injectable 12.5 once  dextrose 50% Injectable 25 once  dextrose Oral Gel 15 once PRN  enoxaparin Injectable 40 every 12 hours  glucagon  Injectable 1 once  HYDROmorphone  Injectable 0.25 once PRN  insulin glargine Injectable (LANTUS) 30 at bedtime  insulin lispro (ADMELOG) corrective regimen sliding scale  three times a day before meals  insulin lispro (ADMELOG) corrective regimen sliding scale  at bedtime  insulin lispro Injectable (ADMELOG) 8 three times a day before meals  levothyroxine 75 daily  midodrine 20 every 8 hours      VITALS:  Vital Signs Last 24 Hrs  T(F): 98.7 (03-31-22 @ 05:25), Max: 100.6 (03-30-22 @ 18:00)  Vital Signs Last 24 Hrs  HR: 88 (03-31-22 @ 05:25) (88 - 109)  BP: 100/50 (03-31-22 @ 05:25) (100/50 - 131/89)  RR: 18 (03-31-22 @ 05:25)  SpO2: 100% (03-31-22 @ 05:25) (99% - 100%)  Wt(kg): --    PHYSICAL EXAM:  Constitutional: morbidly obese, non-toxic, no distress  HEAD/EYES: anicteric, no conjunctival injection  ENT:  supple, no thrush  Cardiovascular:   +S1/S2  Respiratory:  +BS bilaterally  GI:  soft, non-tender, +bowel sounds  :  +toussaint, no CVA tenderness  Musculoskeletal:  +LE edema  Neurologic: awake and alert,  no focal findings  Skin:  +deep thigh wounds, no surrounding erythema, minimal drainage  Heme/Onc: no cervical lymphadenopathy   Psychiatric:  awake, alert, appropriate mood      Labs:                        7.9    16.91 )-----------( 427      ( 31 Mar 2022 09:27 )             26.6     03-31    135  |  99  |  33<H>  ----------------------------<  183<H>  3.4<L>   |  21<L>  |  1.39<H>    Ca    7.9<L>      31 Mar 2022 04:38  Phos  2.9     03-31  Mg     1.60     03-31    TPro  6.2  /  Alb  1.8<L>  /  TBili  0.3  /  DBili  x   /  AST  52<H>  /  ALT  19  /  AlkPhos  264<H>  03-31      WBC Trend:  WBC Count: 16.91 (03-31-22 @ 09:27)  WBC Count: 14.19 (03-31-22 @ 04:38)  WBC Count: 19.63 (03-30-22 @ 06:40)  WBC Count: 22.58 (03-29-22 @ 01:00)    Auto Neutrophil #: 9.93 K/uL (03-31-22 @ 04:38)  Auto Neutrophil #: 18.86 K/uL (03-29-22 @ 01:00)  Auto Neutrophil #: 17.52 K/uL (03-27-22 @ 19:13)  Band Neutrophils %: 1.7 % (03-27-22 @ 19:13)    Auto Eosinophil %: 2.5 % (03-31-22 @ 04:38)  Auto Eosinophil %: 0.8 % (03-29-22 @ 01:00)        MICROBIOLOGY:  Vancomycin Level, Random: 13.6 (03-31 @ 04:38)  Vancomycin Level, Random: 21.7 (03-30 @ 06:40)  Vancomycin Level, Trough: 23.6 (03-30 @ 02:39)  Vancomycin Level, Random: 16.5 (03-29 @ 01:00)    MRSA PCR Result.: NotDetec (03-30-22 @ 15:46)  MRSA PCR Result.: NotDetec (03-29-22 @ 20:01)      Culture - Blood (collected 28 Mar 2022 08:26)  Source: .Blood Blood  Preliminary Report:    Growth in anaerobic bottle: Gram Positive Cocci in Clusters    ***Blood Panel PCR results on this specimen are available    approximately 3 hours after the Gram stain result.***    Gram stain, PCR, and/or culture results may not always    correspond due todifference in methodologies.    ************************************************************    This PCR assay was performed by multiplex PCR. This    Assay tests for 66 bacterial and resistance gene targets.    Please refer to the [facility] testdirectory    at [email or web address] for details.  Organism: Blood Culture PCR  Organism: Blood Culture PCR    Sensitivities:      -  Staphylococcus epidermidis: Detec      Method Type: PCR    Culture - Urine (collected 28 Mar 2022 08:19)  Source: Catheterized Catheterized  Final Report:    >=3 organisms. Probable collection contamination. including    >100,000 CFU/ml Escherichia coli ESBL    >100,000 CFU/ml Escherichia coli #2    Normal Urogenital brayan present  Organism: Escherichia coli  Escherichia coli ESBL  Organism: Escherichia coli ESBL    Sensitivities:      -  Amikacin: S <=16      -  Amoxicillin/Clavulanic Acid: S <=8/4 Consider reserving for cystitis when ampicillin/sulbactam is resistant      -  Ampicillin: R >16 These ampicillin results predict results for amoxicillin      -  Ampicillin/Sulbactam: R >16/8 Enterobacter, Klebsiella aerogenes, Citrobacter, and Serratia may develop resistance during prolonged therapy (3-4 days)      -  Aztreonam: R 16      -  Cefazolin: R >16 (MIC_CL_COM_ENTERIC_CEFAZU) For uncomplicated UTI with K. pneumoniae, E. coli, or P. mirablis: DHAVAL <=16 is sensitive and DHAVAL >=32 is resistant. This also predicts results for oral agents cefaclor, cefdinir, cefpodoxime, cefprozil, cefuroxime axetil, cephalexin and locarbef for uncomplicated UTI. Note that some isolates may be susceptible to these agents while testing resistant to cefazolin.      -  Cefepime: R >16      -  Ceftriaxone: R >32 Enterobacter, Klebsiella aerogenes, Citrobacter, and Serratia may develop resistance during prolonged therapy      -  Ciprofloxacin: R >2      -  Ertapenem: S <=0.5      -  Gentamicin: S <=2      -  Imipenem: S <=1      -  Levofloxacin: R >4      -  Meropenem: S <=1      -  Nitrofurantoin: S <=32 Should not be used to treat pyelonephritis      -  Piperacillin/Tazobactam: S <=8      -  Tigecycline: S <=2      -  Tobramycin: S <=2      -  Trimethoprim/Sulfamethoxazole: S <=0.5/9.5      Method Type: DHAVAL  Organism: Escherichia coli    Sensitivities:      -  Amikacin: S <=16      -  Amoxicillin/Clavulanic Acid: S <=8/4 Consider reserving for cystitis when ampicillin/sulbactam is resistant      -  Ampicillin: R >16 These ampicillin results predict results for amoxicillin      -  Ampicillin/Sulbactam: R >16/8 Enterobacter, Klebsiella aerogenes, Citrobacter, and Serratia may develop resistance during prolonged therapy (3-4 days)      -  Aztreonam: S <=4      -  Cefazolin: S 8 (MIC_CL_COM_ENTERIC_CEFAZU) For uncomplicated UTI with K. pneumoniae, E. coli, or P. mirablis: DHAVAL <=16 is sensitive and DHAVAL >=32 is resistant. This also predicts results for oral agents cefaclor, cefdinir, cefpodoxime, cefprozil, cefuroxime axetil, cephalexin and locarbef for uncomplicated UTI. Note that some isolates may be susceptible to these agents while testing resistant to cefazolin.      -  Cefepime: S <=2      -  Cefoxitin: S <=8      -  Ceftriaxone: S <=1 Enterobacter, Klebsiella aerogenes, Citrobacter, and Serratia may develop resistance during prolonged therapy      -  Ciprofloxacin: S <=0.25      -  Ertapenem: S <=0.5      -  Gentamicin: S <=2      -  Imipenem: S <=1      -  Levofloxacin: S <=0.5      -  Meropenem: S <=1      -  Nitrofurantoin: S <=32 Should not be used to treat pyelonephritis      -  Piperacillin/Tazobactam: S <=8      -  Tigecycline: S <=2      -  Tobramycin: S <=2      -  Trimethoprim/Sulfamethoxazole: R >2/38      Method Type: DHAVAL      RADIOLOGY:  imaging below personally reviewed    < from: Xray Chest 1 View- PORTABLE-Urgent (Xray Chest 1 View- PORTABLE-Urgent .) (03.30.22 @ 19:47) >  IMPRESSION:  No focal consolidation  < end of copied text >    < from: US Kidney and Bladder (03.29.22 @ 14:39) >  IMPRESSION:  Mild to moderate bilateral hydronephrosis  < end of copied text >    < from: CT Abdomen and Pelvis No Cont (03.28.22 @ 00:50) >  IMPRESSION:  No evidence of perineal or labial fluid collection to suggest abscess,   though full evaluation is limited on noncontrastCT.  Large soft tissue defect/ulceration in the lateral right thigh with   associated subcutaneous inflammatory changes.  Circumferential bladder wall thickening with adjacent inflammation most   concerning for cystitis. In addition, there is mild greater than right   hydronephrosis and subtle perinephric and trace prevertebral fat   stranding, cannot exclude ascending infection and recommend correlation   with clinical examination laboratory findings to exclude pyelonephritis.  < end of copied text >

## 2022-03-31 NOTE — PROVIDER CONTACT NOTE (CRITICAL VALUE NOTIFICATION) - BACKGROUND
patient admitted for septic shock, covid, RAFAELA, and uncontrolled diabetes.
Patient admitted for urosepsis

## 2022-03-31 NOTE — PROGRESS NOTE ADULT - PROBLEM SELECTOR PLAN 5
On admission, Cr 2.01 w/ unknown baseline. Also with metabolic acidosis, bicarb of 13, AG of 18   Suspect  pre renal in the setting of septic shock 2/2 UTI/skin wounds vs RAFAELA on CKD due to uncontrolled DM.   Renal sonogram: mild to moderate bilateral hydronephrosis  Creatinine improving  Urine lytes 3/28 Fe 1.4%  Serum lactate 5.4--> 1.7   Urine culture speciation pending  Nephro recs appreciated- Recommend switching to NS @ 100cc/hr for now.    # Hypokalemia-replete On admission, Cr 2.01 w/ unknown baseline. Also with metabolic acidosis, bicarb of 13, AG of 18   Suspect  pre renal in the setting of septic shock 2/2 UTI/skin wounds vs RAFAELA on CKD due to uncontrolled DM.   Renal sonogram: mild to moderate bilateral hydronephrosis  Creatinine improving to 1.39  Urine lytes 3/28 Fe 1.4%  Serum lactate 5.4--> 1.7   Nephro recs appreciated    # Hypokalemia-replete

## 2022-03-31 NOTE — CONSULT NOTE ADULT - ATTENDING COMMENTS
53 yo obese and bedbound woman with h/o DM on oral hypoglycemic agent, presenting for eval of multiple purulent skin wounds around perineum and found on bloodwork to have RAFAELA and hyperkalemia, also hyperglycemia wihtout ketosis.  She received 2L crystalloid and total of 17U sq reg insulin and glucose remained elevated.  She was then placed on insulin gtt with additional ivf in hopes of decreasing both glucose and K.  Pt seen by nephrology and is refusing HD at present. CT abd shows thickened bladder with bilat hydro c/w cystitis and poss pyelo.  She received vanco and zosyn MICU called for evaluation of hyperglycemia.  She is awake and alert/oriented lying flat on stretcher in NARD, without complaints.  100/60, HR 94, afeb, RR 26 100% on RA  Multiple wounds as described.    RAFAELA and hyperkalemia  Uncontrolled DM2 with hyperglycemia without ketosis  Poss UTI, Skin wound infection  - agree that pt needs aggressive ivf hydration and insulin.  FS now in 300's.  I believe she could be treated with  sq insulin but now that she is already on gtt, would check BMP after 1-2 hours on gtt and consider transitioning to sq insulin if K and glucose improved.  If this is not possible, please reconsult MICU.  Check UA and send Cx. Agree with empiric abx to cover skin and urine  - At this time she is not a MICU candidate.  Please reconsult as necessary  D/W ED team
53 yo woman with uncontrolled diabetes, obesity and chronic wounds right thigh x 2  admitted to MICU with hypotension due to suspected UTI  ESBL E coli one species growing from urine.  CT suggestive pyelo.    Also found covid+  Leucocytosis 22K now downtrending 13K  Wounds on thigh appear chronic.  Blood culture staph epi may represent contaminant.   Would broaden zosyn to ertapenem.
RAFAELA  ATN  Metabolic Acidosis  Hyperkalemia  COVID 19 infection    Would continue to treat underlying DKA and infections. Now on pressor support, please maintain a MAP > 60.   Monitor Is/Os and daily weights. Will assess daily for RRT needs.
Uncontrolled DM2 HBA1c 14.2%  in MICU with septic shock on pressors.  Will require basal bolus insulin pens for dc plus will add GLP1RA for weight management.  DM education. Will follow.  Patient previously seeing Dr. Vyas, looking to find new endocrinologist, can offer [facility] endocrine.  Endocrine team consulted for uncontrolled diabetes. Patient is high risk with high level decision making due to uncontrolled diabetes which places patient at high risk for cardiovascular and cerebrovascular events. Patient with lability of glucose requiring close monitoring and insulin adjustments.    Ananth Carrero MD  Division of Endocrinology  Pager: [phone]    If after 6PM or before 9AM, or on weekends/holidays, please call endocrine answering service for assistance ([phone]).  For nonurgent matters email [email or web address] for assistance.

## 2022-03-31 NOTE — PROVIDER CONTACT NOTE (CRITICAL VALUE NOTIFICATION) - TEST AND RESULT REPORTED:
urine culture, greater than or equal to 3 organisms, probable collection contamination including greater than 100,000 cfu/ml E. coli and ESBL. greater than 100,000 cfu/ml E. coli #2. normal Urogenital Mikaela present
Lactate 4.1, hemoglobin 7.0, and hematocrit 21.0

## 2022-03-31 NOTE — CONSULT NOTE ADULT - CONSULT REASON
Multiple pressure injuries present on arrival.
fever
RAFAELA
Uncontrolled T2DM
RAFAELA , hyperkalemia

## 2022-03-31 NOTE — PROVIDER CONTACT NOTE (OTHER) - BACKGROUND
PAtient with COVID, and PMHX of DM2, asthma, hypothyodisim, obseity, chronic immobility, MS, several chronic wounds Patient with COVID, and PMHX of DM2, asthma, hypothyroidism, obesity, chronic immobility, MS, several chronic wounds

## 2022-03-31 NOTE — PROGRESS NOTE ADULT - PROBLEM SELECTOR PLAN 1
On admission , pt with septic shock requiring Levophed on admission   Sepsis suspect secondary to UTI(Patient w/ purulent drainage from [facility]) Other possibility is secondary to multiple skin ulcers  Hx of chronic sacral wound d/t immobility further complicated by acute progression of right medial thigh/vulvar wound precipitated by skin tear  Fever of 100.6 overnight.   3/28- BC x 1- NGTD. UC- Ecoli . UA-  large LE. COVID positive    CT A/P: Large soft tissue defect/ulceration in the lateral right thigh with associated subcutaneous inflammatory changes. Circumferential bladder wall thickening with adjacent inflammation most concerning for cystitis. In addition, there is mild greater than right hydronephrosis and subtle perinephric and trace prevertebral fat stranding, cannot exclude ascending infection and recommend correlation with clinical examination laboratory findings to exclude pyelonephritis.  Lactate of 4.1 this AM, Received 1 LBolus, On NS at 100/hr  on Vanc/Zosyn- Vanc burt 23.6 today. Level- 13.6- Dose Vanc   on Midodrine 20mg PO Q 8hrs,wean as tolerated   Wound care on board-  Topical recommendations- pack with Dakins 1/4 strength moistened kerlix, cover with 4x4 gauze, abdominal pad, change twice a day.- Offload pressure.  Will obtain ID consult On admission , pt with septic shock requiring Levophed on admission   Sepsis suspect secondary to UTI(Patient w/ purulent drainage from [facility]) Other possibility is secondary to multiple skin ulcers  Hx of chronic sacral wound d/t immobility further complicated by acute progression of right medial thigh/vulvar wound precipitated by skin tear  Fever of 100.6 overnight.   3/28- BC x 1- NGTD. UC- Ecoli . UA-  large LE. COVID positive    CT A/P: Large soft tissue defect/ulceration in the lateral right thigh with associated subcutaneous inflammatory changes. Circumferential bladder wall thickening with adjacent inflammation most concerning for cystitis. In addition, there is mild greater than right hydronephrosis and subtle perinephric and trace prevertebral fat stranding, cannot exclude ascending infection and recommend correlation with clinical examination laboratory findings to exclude pyelonephritis.  Lactate of 4.1 this AM, Received 1 L Bolus, On NS at 100/hr  on Vanc/Zosyn- Vanc burt 23.6 today. Level- 13.6- Dose Vanc   on Midodrine 20mg PO Q 8hrs,wean as tolerated   Wound care on board-  Topical recommendations- pack with Dakins 1/4 strength moistened kerlix, cover with 4x4 gauze, abdominal pad, change twice a day.- Offload pressure.  Will obtain ID consult

## 2022-03-31 NOTE — CONSULT NOTE ADULT - ASSESSMENT
51 y/o F PMHx T2DM, asthma, obesity, chronic immobility, several chronic wounds, presenting for R thigh wound, worsening over last several months. 53 y/o F PMHx T2DM, asthma, obesity, chronic immobility, and chronic wounds, presenting for R thigh wound, worsening over last several months. Admitted to MICU for suspected urosepsis requiring pressor support. UA with pyuria and urine culture growing multiple strains of e.coli, including ESBL. Found to be incidentally COVID positive. Now downgraded to medicine floor, ID consulted for fever despite antibiotics.    Impression:  #Suspected UTI w/ ESBL - denied urinary symptoms, but was hypotensive requiring pressor support on admission with pyuria, ESBL e.coli on urine culture, and radiographic findings supportive of ?pyelo. Will treat for ESBL pyelo  #Fever, Leukocytosis - supports the diagnosis of pyelo, as fevers can persist for 5 days after initiation of abx  #R Thigh Wound - deep wounds, no surrounding cellulitis. No collections noted on CT  #Blood Culture growing CoNS - thus far in 1 bottle, >48 hours incubation time. Likely represents contaminant  #COVID - asymptomatic    Recs:  - d/c Zosyn   - start ertapenem 1g IV q24H to cover ESBL e.coli   - f/u blood culture  - trend leukocytosis  - wound care R thigh  - isolation for COVID     Plan discussed with ACP    Lyle Prescott MD  Infectious Disease Fellow  Available on [employer] Teams  Before 9AM or after 5PM: [phone]

## 2022-03-31 NOTE — CHART NOTE - NSCHARTNOTEFT_GEN_A_CORE
Pt. with significant improvement of renal function since admission. Acidosis improved. Nephrology signing off at this time. Please call for any questions.   Upon discharge, for appointment scheduling please email Nephrology at [email or web address]

## 2022-03-31 NOTE — CHART NOTE - NSCHARTNOTEFT_GEN_A_CORE
Morning labs reviewed. Hgb trending down Morning labs reviewed. Hgb trending down. Repeat cbc and T&S obtained. Consent obtained and placed in chart.  Lactate uptrending from 1.6 to 4.1. 1 L LR ordered STAT. Patient was febrile yesterday evening blood cultures were obtained and chest xray performed.   Will f/u labs and consider transfusion and maintenance fluid based on results.     DAPHNIE Nguyen  Department of Medicine   In House # [id number]

## 2022-03-31 NOTE — PROGRESS NOTE ADULT - ASSESSMENT
51 yo F hx of T2DM, asthma, obesity, chronic immobility 2/2 MS and several chronic wounds presenting for vulvar/R thigh wound found to be hyperglycemic + metabolic acidosis w/ purulent urine c/f urosepsis + ARF.

## 2022-04-01 LAB
ALBUMIN SERPL ELPH-MCNC: 2 G/DL — LOW (ref 3.3–5)
ALP SERPL-CCNC: 301 U/L — HIGH (ref 40–120)
ALT FLD-CCNC: 18 U/L — SIGNIFICANT CHANGE UP (ref 4–33)
ANION GAP SERPL CALC-SCNC: 13 MMOL/L — SIGNIFICANT CHANGE UP (ref 7–14)
AST SERPL-CCNC: 47 U/L — HIGH (ref 4–32)
BILIRUB SERPL-MCNC: 0.2 MG/DL — SIGNIFICANT CHANGE UP (ref 0.2–1.2)
BUN SERPL-MCNC: 21 MG/DL — SIGNIFICANT CHANGE UP (ref 7–23)
CALCIUM SERPL-MCNC: 7.9 MG/DL — LOW (ref 8.4–10.5)
CHLORIDE SERPL-SCNC: 104 MMOL/L — SIGNIFICANT CHANGE UP (ref 98–107)
CO2 SERPL-SCNC: 22 MMOL/L — SIGNIFICANT CHANGE UP (ref 22–31)
CREAT SERPL-MCNC: 0.98 MG/DL — SIGNIFICANT CHANGE UP (ref 0.5–1.3)
CULTURE RESULTS: SIGNIFICANT CHANGE UP
EGFR: 70 ML/MIN/1.73M2 — SIGNIFICANT CHANGE UP
GLUCOSE BLDC GLUCOMTR-MCNC: 113 MG/DL — HIGH (ref 70–99)
GLUCOSE BLDC GLUCOMTR-MCNC: 113 MG/DL — HIGH (ref 70–99)
GLUCOSE BLDC GLUCOMTR-MCNC: 120 MG/DL — HIGH (ref 70–99)
GLUCOSE BLDC GLUCOMTR-MCNC: 126 MG/DL — HIGH (ref 70–99)
GLUCOSE SERPL-MCNC: 86 MG/DL — SIGNIFICANT CHANGE UP (ref 70–99)
HCT VFR BLD CALC: 25.8 % — LOW (ref 34.5–45)
HGB BLD-MCNC: 7.5 G/DL — LOW (ref 11.5–15.5)
MAGNESIUM SERPL-MCNC: 1.6 MG/DL — SIGNIFICANT CHANGE UP (ref 1.6–2.6)
MCHC RBC-ENTMCNC: 25.3 PG — LOW (ref 27–34)
MCHC RBC-ENTMCNC: 29.1 GM/DL — LOW (ref 32–36)
MCV RBC AUTO: 86.9 FL — SIGNIFICANT CHANGE UP (ref 80–100)
NRBC # BLD: 0 /100 WBCS — SIGNIFICANT CHANGE UP
NRBC # FLD: 0 K/UL — SIGNIFICANT CHANGE UP
ORGANISM # SPEC MICROSCOPIC CNT: SIGNIFICANT CHANGE UP
ORGANISM # SPEC MICROSCOPIC CNT: SIGNIFICANT CHANGE UP
PLATELET # BLD AUTO: 485 K/UL — HIGH (ref 150–400)
POTASSIUM SERPL-MCNC: 3.8 MMOL/L — SIGNIFICANT CHANGE UP (ref 3.5–5.3)
POTASSIUM SERPL-SCNC: 3.8 MMOL/L — SIGNIFICANT CHANGE UP (ref 3.5–5.3)
PROT SERPL-MCNC: 6.5 G/DL — SIGNIFICANT CHANGE UP (ref 6–8.3)
RBC # BLD: 2.97 M/UL — LOW (ref 3.8–5.2)
RBC # FLD: 15.8 % — HIGH (ref 10.3–14.5)
SODIUM SERPL-SCNC: 139 MMOL/L — SIGNIFICANT CHANGE UP (ref 135–145)
SPECIMEN SOURCE: SIGNIFICANT CHANGE UP
WBC # BLD: 15.63 K/UL — HIGH (ref 3.8–10.5)
WBC # FLD AUTO: 15.63 K/UL — HIGH (ref 3.8–10.5)

## 2022-04-01 PROCEDURE — 99232 SBSQ HOSP IP/OBS MODERATE 35: CPT | Mod: GC

## 2022-04-01 PROCEDURE — 99232 SBSQ HOSP IP/OBS MODERATE 35: CPT

## 2022-04-01 RX ORDER — ACETAMINOPHEN 500 MG
650 TABLET ORAL EVERY 6 HOURS
Refills: 0 | Status: DISCONTINUED | OUTPATIENT
Start: 2022-04-01 | End: 2022-04-19

## 2022-04-01 RX ORDER — NYSTATIN CREAM 100000 [USP'U]/G
1 CREAM TOPICAL THREE TIMES A DAY
Refills: 0 | Status: DISCONTINUED | OUTPATIENT
Start: 2022-04-01 | End: 2022-04-19

## 2022-04-01 RX ORDER — MIDODRINE HYDROCHLORIDE 2.5 MG/1
15 TABLET ORAL EVERY 8 HOURS
Refills: 0 | Status: DISCONTINUED | OUTPATIENT
Start: 2022-04-01 | End: 2022-04-02

## 2022-04-01 RX ORDER — ACETAMINOPHEN 500 MG
650 TABLET ORAL ONCE
Refills: 0 | Status: COMPLETED | OUTPATIENT
Start: 2022-04-01 | End: 2022-04-01

## 2022-04-01 RX ORDER — ACETAMINOPHEN 500 MG
975 TABLET ORAL ONCE
Refills: 0 | Status: COMPLETED | OUTPATIENT
Start: 2022-04-01 | End: 2022-04-01

## 2022-04-01 RX ADMIN — Medication 1 APPLICATION(S): at 17:48

## 2022-04-01 RX ADMIN — Medication 975 MILLIGRAM(S): at 23:07

## 2022-04-01 RX ADMIN — Medication 8 UNIT(S): at 08:44

## 2022-04-01 RX ADMIN — SODIUM CHLORIDE 100 MILLILITER(S): 9 INJECTION INTRAMUSCULAR; INTRAVENOUS; SUBCUTANEOUS at 15:41

## 2022-04-01 RX ADMIN — MIDODRINE HYDROCHLORIDE 15 MILLIGRAM(S): 2.5 TABLET ORAL at 21:18

## 2022-04-01 RX ADMIN — Medication 650 MILLIGRAM(S): at 05:50

## 2022-04-01 RX ADMIN — NYSTATIN CREAM 1 APPLICATION(S): 100000 CREAM TOPICAL at 13:16

## 2022-04-01 RX ADMIN — Medication 1 APPLICATION(S): at 05:07

## 2022-04-01 RX ADMIN — Medication 650 MILLIGRAM(S): at 06:54

## 2022-04-01 RX ADMIN — ENOXAPARIN SODIUM 40 MILLIGRAM(S): 100 INJECTION SUBCUTANEOUS at 05:07

## 2022-04-01 RX ADMIN — ENOXAPARIN SODIUM 40 MILLIGRAM(S): 100 INJECTION SUBCUTANEOUS at 17:43

## 2022-04-01 RX ADMIN — ERTAPENEM SODIUM 120 MILLIGRAM(S): 1 INJECTION, POWDER, LYOPHILIZED, FOR SOLUTION INTRAMUSCULAR; INTRAVENOUS at 15:51

## 2022-04-01 RX ADMIN — Medication 75 MICROGRAM(S): at 05:07

## 2022-04-01 RX ADMIN — Medication 8 UNIT(S): at 18:21

## 2022-04-01 RX ADMIN — NYSTATIN CREAM 1 APPLICATION(S): 100000 CREAM TOPICAL at 21:18

## 2022-04-01 RX ADMIN — MUPIROCIN 1 APPLICATION(S): 20 OINTMENT TOPICAL at 17:42

## 2022-04-01 RX ADMIN — Medication 1 TABLET(S): at 11:00

## 2022-04-01 RX ADMIN — Medication 500 MILLIGRAM(S): at 11:00

## 2022-04-01 RX ADMIN — Medication 650 MILLIGRAM(S): at 17:43

## 2022-04-01 RX ADMIN — MUPIROCIN 1 APPLICATION(S): 20 OINTMENT TOPICAL at 05:07

## 2022-04-01 RX ADMIN — INSULIN GLARGINE 30 UNIT(S): 100 INJECTION, SOLUTION SUBCUTANEOUS at 21:17

## 2022-04-01 RX ADMIN — Medication 8 UNIT(S): at 12:39

## 2022-04-01 RX ADMIN — SODIUM CHLORIDE 100 MILLILITER(S): 9 INJECTION INTRAMUSCULAR; INTRAVENOUS; SUBCUTANEOUS at 21:20

## 2022-04-01 RX ADMIN — CHLORHEXIDINE GLUCONATE 1 APPLICATION(S): 213 SOLUTION TOPICAL at 11:00

## 2022-04-01 NOTE — PROVIDER CONTACT NOTE (OTHER) - BACKGROUND
Patient with COVID, and PMHX of DM2, asthma, hypothyroidism, obesity, chronic immobility, MS, several chronic wounds

## 2022-04-01 NOTE — PROGRESS NOTE ADULT - PROBLEM SELECTOR PLAN 5
On admission, Cr 2.01 w/ unknown baseline. Also with metabolic acidosis, bicarb of 13, AG of 18   Suspect  pre renal in the setting of septic shock 2/2 UTI/skin wounds vs RAFAELA on CKD due to uncontrolled DM.   Renal sonogram: mild to moderate bilateral hydronephrosis  Creatinine improving to 0.98 ( Admission Creatinine 2.33)   Urine lytes 3/28 Fe 1.4%  Nephro recs appreciated    # Hypokalemia-replete

## 2022-04-01 NOTE — PROGRESS NOTE ADULT - ASSESSMENT
53 y/o F PMHx T2DM, asthma, obesity, chronic immobility, and chronic wounds, presenting for R thigh wound, worsening over last several months. Admitted to MICU for suspected urosepsis requiring pressor support. UA with pyuria and urine culture growing multiple strains of e.coli, including ESBL. Found to be incidentally COVID positive. Now downgraded to medicine floor, ID consulted for fever despite antibiotics.    Impression:  #Suspected UTI w/ ESBL - denied urinary symptoms, but was hypotensive requiring pressor support on admission with pyuria, ESBL e.coli on urine culture, and radiographic findings supportive of ?pyelo. Will treat for ESBL pyelo  #Fever, Leukocytosis - 2/2 UTI/pyelo  #R Thigh Wound - chronic wounds, no surrounding cellulitis. No collections noted on CT  #Blood Culture growing CoNS - thus far in 1 bottle, >48 hours incubation time. Likely represents contaminant  #COVID - asymptomatic    Recs:  - c/w ertapenem 1g IV q24H to cover ESBL e.coli   - trend leukocytosis  - wound care R thigh    Lyle Prescott MD  Infectious Disease Fellow  Available on [employer] Teams  Before 9AM or after 5PM: [phone]

## 2022-04-01 NOTE — PROGRESS NOTE ADULT - PROBLEM SELECTOR PLAN 1
On admission , pt with septic shock requiring Levophed on admission   Sepsis suspect secondary to UT/Pyelo I(Patient w/ purulent drainage from [facility]) Other possibility is secondary to multiple skin ulcers  Hx of chronic sacral wound d/t immobility further complicated by acute progression of right medial thigh/vulvar wound precipitated by skin tear  Still with fevers, leukocytosis, Fever of 102.3 overnight.   3/28- BC x 1-Staph epi. Repeat BC 3/20- NGTD  UC- ESBL Ecoli . UA-  large LE. COVID positive    Lactate- 4.7--> 1.8  CT A/P: Large soft tissue defect/ulceration in the lateral right thigh with associated subcutaneous inflammatory changes. Circumferential bladder wall thickening with adjacent inflammation most concerning for cystitis. In addition, there is mild greater than right hydronephrosis and subtle perinephric and trace prevertebral fat stranding, cannot exclude ascending infection and recommend correlation with clinical examination laboratory findings to exclude pyelonephritis.    Was on  Vanc/Zosyn, transitioned to Ertapenem given ESBL ( 3/31-)   on Midodrine 20mg PO Q 8hrs,wean as tolerated   Wound care on board-  Topical recommendations- pack with Dakins 1/4 strength moistened kerlix, cover with 4x4 gauze, abdominal pad, change twice a day.- Offload pressure.  Appreciate ID recs On admission , pt with septic shock requiring Levophed on admission   Sepsis suspect secondary to UT/Pyelo I(Patient w/ purulent drainage from [facility]) Other possibility is secondary to multiple skin ulcers  Hx of chronic sacral wound d/t immobility further complicated by acute progression of right medial thigh/vulvar wound precipitated by skin tear  Still with fevers, leukocytosis, Fever of 102.3 overnight.   3/28- BC x 1-Staph epi. Repeat BC 3/20- NGTD  UC- ESBL Ecoli . UA-  large LE. COVID positive    Lactate- 4.7--> 1.8  CT A/P: Large soft tissue defect/ulceration in the lateral right thigh with associated subcutaneous inflammatory changes. Circumferential bladder wall thickening with adjacent inflammation most concerning for cystitis. In addition, there is mild greater than right hydronephrosis and subtle perinephric and trace prevertebral fat stranding, cannot exclude ascending infection and recommend correlation with clinical examination laboratory findings to exclude pyelonephritis.    Was on  Vanc/Zosyn, transitioned to Ertapenem given ESBL ( 3/31-)   On Midodrine 20mg PO Q 8hrs( was on pressors in MICU),  taper  to 15mg PO Q 8 from 4/1.Wean  as tolerated   Appreciate ID / Wound care recs

## 2022-04-01 NOTE — PROGRESS NOTE ADULT - ATTENDING COMMENTS
51 yo woman with uncontrolled diabetes, obesity and chronic wounds right thigh x 2  admitted to MICU with hypotension due to suspected UTI  Two species of E coli growing in urine one is ESBL +   CT suggestive pyelo.    covid+  Leucocytosis 22K now downtrending 15K  Wounds on thigh appear chronic.  Blood culture staph epi may represent contaminant.   Would c/w ertapenem.

## 2022-04-01 NOTE — CHART NOTE - NSCHARTNOTEFT_GEN_A_CORE
FS reviewed. Cont lantus 30 units HS and Admelog 8 units TIDAC along with moderate correctional scale. Endocrine team will follow.    Janae Quach DO

## 2022-04-01 NOTE — PROGRESS NOTE ADULT - ASSESSMENT
53 yo F hx of T2DM, asthma, obesity, chronic immobility 2/2 MS and several chronic wounds presenting for vulvar/R thigh wound found to be hyperglycemic + metabolic acidosis w/ purulent urine c/f urosepsis + ARF.

## 2022-04-01 NOTE — PROGRESS NOTE ADULT - SUBJECTIVE AND OBJECTIVE BOX
Melanie Andujar  Hospitalist  Pager- [phone]     PROGRESS NOTE:     Patient is a 52y old  Female who presents with a chief complaint of     SUBJECTIVE / OVERNIGHT EVENTS: pt seen and examined by me   Fever of 102.3 overnight       ADDITIONAL REVIEW OF SYSTEMS:      MEDICATIONS  (STANDING):  ascorbic acid 500 milliGRAM(s) Oral daily  chlorhexidine 2% Cloths 1 Application(s) Topical <User Schedule>  Dakins Solution - 1/4 Strength 1 Application(s) Topical two times a day  dextrose 5%. 1000 milliLiter(s) (50 mL/Hr) IV Continuous <Continuous>  dextrose 5%. 1000 milliLiter(s) (100 mL/Hr) IV Continuous <Continuous>  dextrose 50% Injectable 25 Gram(s) IV Push once  dextrose 50% Injectable 12.5 Gram(s) IV Push once  dextrose 50% Injectable 25 Gram(s) IV Push once  enoxaparin Injectable 40 milliGRAM(s) SubCutaneous every 12 hours  ertapenem  IVPB 1000 milliGRAM(s) IV Intermittent every 24 hours  glucagon  Injectable 1 milliGRAM(s) IntraMuscular once  insulin glargine Injectable (LANTUS) 30 Unit(s) SubCutaneous at bedtime  insulin lispro (ADMELOG) corrective regimen sliding scale   SubCutaneous three times a day before meals  insulin lispro (ADMELOG) corrective regimen sliding scale   SubCutaneous at bedtime  insulin lispro Injectable (ADMELOG) 8 Unit(s) SubCutaneous three times a day before meals  levothyroxine 75 MICROGram(s) Oral daily  midodrine 20 milliGRAM(s) Oral every 8 hours  multivitamin 1 Tablet(s) Oral daily  mupirocin 2% Ointment 1 Application(s) Topical two times a day  sodium chloride 0.9%. 1000 milliLiter(s) (100 mL/Hr) IV Continuous <Continuous>    MEDICATIONS  (PRN):  acetaminophen     Tablet .. 325 milliGRAM(s) Oral every 4 hours PRN Temp greater or equal to 38C (100.4F)  ALBUTerol    90 MICROgram(s) HFA Inhaler 2 Puff(s) Inhalation every 6 hours PRN Shortness of Breath  albuterol/ipratropium for Nebulization 3 milliLiter(s) Nebulizer every 6 hours PRN Shortness of Breath  dextrose Oral Gel 15 Gram(s) Oral once PRN Blood Glucose LESS THAN 70 milliGRAM(s)/deciliter  HYDROmorphone  Injectable 0.25 milliGRAM(s) IV Push once PRN Severe Pain (7 - 10)        CAPILLARY BLOOD GLUCOSE      POCT Blood Glucose.: 113 mg/dL (01 Apr 2022 08:41)  POCT Blood Glucose.: 119 mg/dL (31 Mar 2022 21:05)  POCT Blood Glucose.: 152 mg/dL (31 Mar 2022 17:59)  POCT Blood Glucose.: 160 mg/dL (31 Mar 2022 12:29)    I&O's Summary    29 Mar 2022 07:01  -  30 Mar 2022 07:00  --------------------------------------------------------  IN: 1371 mL / OUT: 1000 mL / NET: 371 mL        PHYSICAL EXAM:    Vital Signs Last 24 Hrs  T(C): 36.8 (01 Apr 2022 05:00), Max: 39.1 (31 Mar 2022 21:06)  T(F): 98.3 (01 Apr 2022 05:00), Max: 102.3 (31 Mar 2022 21:06)  HR: 84 (01 Apr 2022 05:00) (84 - 114)  BP: 134/84 (01 Apr 2022 05:00) (115/57 - 134/84)  BP(mean): --  RR: 18 (01 Apr 2022 05:00) (16 - 18)  SpO2: 98% (01 Apr 2022 05:00) (98% - 100%)      CONSTITUTIONAL: NAD, obese   RESPIRATORY: Normal respiratory effort; decreased BS at base  CARDIOVASCULAR: Regular rate and rhythm, normal S1 and S2, no murmur/rub/gallop; No lower extremity edema; Peripheral pulses are 2+ bilaterally  ABDOMEN: Nontender to palpation, normoactive bowel sounds, no rebound/guarding; No hepatosplenomegaly  MUSCLOSKELETAL: no clubbing or cyanosis of digits; no joint swelling or tenderness to palpation  PSYCH: A+O to person, place, and time; affect appropriate  NEURO: Decreased strength more on right side compared to left( as per pt chronic)  - Rose catheter present    SKIN:  Multiple wounds with dressing  present -Right knee, RLE  right posterior lower leg- 5cmx2.5cmx0  Beneath breast/abdominal folds - Dermatitis with  fissures  present  Dressing present  Right lateral thigh-.Stage 4 pressure ulcer - [id number],  granular base, no purulent drainage, no odor. Periwound skin intact, no edema, no erythema, no increased warmth, no fluctuance noted. Right trochanter- stage 4 pressure injury- [id number], granular base, bone in close proximity.     LABS:                                                                    7.5    15.63 )-----------( 485      ( 01 Apr 2022 07:10 )             25.8     04-01    139  |  104  |  21  ----------------------------<  86  3.8   |  22  |  0.98    Ca    7.9<L>      01 Apr 2022 07:10  Phos  2.9     03-31  Mg     1.60     04-01    TPro  6.5  /  Alb  2.0<L>  /  TBili  0.2  /  DBili  x   /  AST  47<H>  /  ALT  18  /  AlkPhos  301<H>  04-01      Culture - Blood (collected 28 Mar 2022 08:26)  Source: .Blood Blood  Preliminary Report (29 Mar 2022 09:01):    No growth to date.    Culture - Urine (collected 28 Mar 2022 08:19)  Source: Catheterized Catheterized  Final Report (30 Mar 2022 08:53):    >100,000 CFU/ml Escherichia coli    >100,000 CFU/ml Escherichia coli #2    >=3 organisms. Probable collection contamination.    Normal Urogenital brayan present        RADIOLOGY & ADDITIONAL TESTS:  Results Reviewed:   Imaging Personally Reviewed:  Electrocardiogram Personally Reviewed:    COORDINATION OF CARE:  Care Discussed with Consultants/Other Providers [Y/N]:  Prior or Outpatient Records Reviewed [Y/N]:   Melanie Andujar  Hospitalist  Pager- [phone]     PROGRESS NOTE:     Patient is a 52y old  Female who presents with a chief complaint of     SUBJECTIVE / OVERNIGHT EVENTS: pt seen and examined by me   Fever of 102.3 overnight   C/o Chronic low back pain from lying in bed in one position. States pain is 8/10 which improves with Tylenol   Not eating much- reports she wants to lose weight     ADDITIONAL REVIEW OF SYSTEMS:      MEDICATIONS  (STANDING):  ascorbic acid 500 milliGRAM(s) Oral daily  chlorhexidine 2% Cloths 1 Application(s) Topical <User Schedule>  Dakins Solution - 1/4 Strength 1 Application(s) Topical two times a day  dextrose 5%. 1000 milliLiter(s) (50 mL/Hr) IV Continuous <Continuous>  dextrose 5%. 1000 milliLiter(s) (100 mL/Hr) IV Continuous <Continuous>  dextrose 50% Injectable 25 Gram(s) IV Push once  dextrose 50% Injectable 12.5 Gram(s) IV Push once  dextrose 50% Injectable 25 Gram(s) IV Push once  enoxaparin Injectable 40 milliGRAM(s) SubCutaneous every 12 hours  ertapenem  IVPB 1000 milliGRAM(s) IV Intermittent every 24 hours  glucagon  Injectable 1 milliGRAM(s) IntraMuscular once  insulin glargine Injectable (LANTUS) 30 Unit(s) SubCutaneous at bedtime  insulin lispro (ADMELOG) corrective regimen sliding scale   SubCutaneous three times a day before meals  insulin lispro (ADMELOG) corrective regimen sliding scale   SubCutaneous at bedtime  insulin lispro Injectable (ADMELOG) 8 Unit(s) SubCutaneous three times a day before meals  levothyroxine 75 MICROGram(s) Oral daily  midodrine 20 milliGRAM(s) Oral every 8 hours  multivitamin 1 Tablet(s) Oral daily  mupirocin 2% Ointment 1 Application(s) Topical two times a day  sodium chloride 0.9%. 1000 milliLiter(s) (100 mL/Hr) IV Continuous <Continuous>    MEDICATIONS  (PRN):  acetaminophen     Tablet .. 325 milliGRAM(s) Oral every 4 hours PRN Temp greater or equal to 38C (100.4F)  ALBUTerol    90 MICROgram(s) HFA Inhaler 2 Puff(s) Inhalation every 6 hours PRN Shortness of Breath  albuterol/ipratropium for Nebulization 3 milliLiter(s) Nebulizer every 6 hours PRN Shortness of Breath  dextrose Oral Gel 15 Gram(s) Oral once PRN Blood Glucose LESS THAN 70 milliGRAM(s)/deciliter  HYDROmorphone  Injectable 0.25 milliGRAM(s) IV Push once PRN Severe Pain (7 - 10)        CAPILLARY BLOOD GLUCOSE      POCT Blood Glucose.: 113 mg/dL (01 Apr 2022 08:41)  POCT Blood Glucose.: 119 mg/dL (31 Mar 2022 21:05)  POCT Blood Glucose.: 152 mg/dL (31 Mar 2022 17:59)  POCT Blood Glucose.: 160 mg/dL (31 Mar 2022 12:29)    I&O's Summary    29 Mar 2022 07:01  -  30 Mar 2022 07:00  --------------------------------------------------------  IN: 1371 mL / OUT: 1000 mL / NET: 371 mL        PHYSICAL EXAM:    Vital Signs Last 24 Hrs  T(C): 36.8 (01 Apr 2022 05:00), Max: 39.1 (31 Mar 2022 21:06)  T(F): 98.3 (01 Apr 2022 05:00), Max: 102.3 (31 Mar 2022 21:06)  HR: 84 (01 Apr 2022 05:00) (84 - 114)  BP: 134/84 (01 Apr 2022 05:00) (115/57 - 134/84)  BP(mean): --  RR: 18 (01 Apr 2022 05:00) (16 - 18)  SpO2: 98% (01 Apr 2022 05:00) (98% - 100%)      CONSTITUTIONAL: NAD, obese   RESPIRATORY: Normal respiratory effort; decreased BS at base  CARDIOVASCULAR: Regular rate and rhythm, normal S1 and S2, no murmur/rub/gallop; No lower extremity edema; Peripheral pulses are 2+ bilaterally  ABDOMEN: Nontender to palpation, normoactive bowel sounds, no rebound/guarding; No hepatosplenomegaly  MUSCLOSKELETAL: no clubbing or cyanosis of digits; no joint swelling or tenderness to palpation  PSYCH: A+O to person, place, and time; affect appropriate  NEURO: Decreased strength more on right side compared to left( as per pt chronic)  - Rose catheter present    SKIN:  Multiple wounds with dressing  present -Right knee, RLE  right posterior lower leg- 5cmx2.5cmx0  Beneath breast/abdominal folds - Dermatitis with  fissures  present  Dressing present  Right lateral thigh-.Stage 4 pressure ulcer - 9una0ooa7.5cm,  granular base, no purulent drainage, no odor. Periwound skin intact, no edema, no erythema, no increased warmth, no fluctuance noted. Right trochanter- stage 4 pressure injury- [id number], granular base, bone in close proximity.     LABS:                                                                    7.5    15.63 )-----------( 485      ( 01 Apr 2022 07:10 )             25.8     04-01    139  |  104  |  21  ----------------------------<  86  3.8   |  22  |  0.98    Ca    7.9<L>      01 Apr 2022 07:10  Phos  2.9     03-31  Mg     1.60     04-01    TPro  6.5  /  Alb  2.0<L>  /  TBili  0.2  /  DBili  x   /  AST  47<H>  /  ALT  18  /  AlkPhos  301<H>  04-01      Culture - Blood (collected 28 Mar 2022 08:26)  Source: .Blood Blood  Preliminary Report (29 Mar 2022 09:01):    No growth to date.    Culture - Urine (collected 28 Mar 2022 08:19)  Source: Catheterized Catheterized  Final Report (30 Mar 2022 08:53):    >100,000 CFU/ml Escherichia coli    >100,000 CFU/ml Escherichia coli #2    >=3 organisms. Probable collection contamination.    Normal Urogenital brayan present        RADIOLOGY & ADDITIONAL TESTS:  Results Reviewed:   Imaging Personally Reviewed:  Electrocardiogram Personally Reviewed:    COORDINATION OF CARE:  Care Discussed with Consultants/Other Providers [Y/N]:  Prior or Outpatient Records Reviewed [Y/N]:

## 2022-04-01 NOTE — PROGRESS NOTE ADULT - PROBLEM SELECTOR PLAN 7
Hold home cosentyx (20mg q4w). Last dose 3/26 as per brother.  [facility] as outpt Hold home cosentyx (20mg q4w). Last dose 3/26 as per brother.  [facility] as outpt    # Multiple decub ulcers-  Topical recommendations- pack with Dakins 1/4 strength moistened kerlix, cover with 4x4 gauze, abdominal pad, change twice a day.- Offload pressure.  [facility] Wound care attending- ok to aguilar indwelling Rose, Ok for Primafit

## 2022-04-01 NOTE — PROVIDER CONTACT NOTE (OTHER) - BACKGROUND
Patient admitted with COVID.  PMHx of DM2, asthma, hypothyroidism, obesity, chronic immobility, MS, several chronic wounds

## 2022-04-01 NOTE — PROGRESS NOTE ADULT - SUBJECTIVE AND OBJECTIVE BOX
Follow Up:  urosepsis    Interval History/ROS:  No fevers overnight    Allergies  No Known Drug Allergies  Pineapple (Anaphylaxis)        ANTIMICROBIALS:  ertapenem  IVPB 1000 every 24 hours      OTHER MEDS:  MEDICATIONS  (STANDING):  acetaminophen     Tablet .. 650 every 6 hours PRN  ALBUTerol    90 MICROgram(s) HFA Inhaler 2 every 6 hours PRN  albuterol/ipratropium for Nebulization 3 every 6 hours PRN  dextrose 50% Injectable 25 once  dextrose 50% Injectable 12.5 once  dextrose 50% Injectable 25 once  dextrose Oral Gel 15 once PRN  enoxaparin Injectable 40 every 12 hours  glucagon  Injectable 1 once  HYDROmorphone  Injectable 0.25 once PRN  insulin glargine Injectable (LANTUS) 30 at bedtime  insulin lispro (ADMELOG) corrective regimen sliding scale  three times a day before meals  insulin lispro (ADMELOG) corrective regimen sliding scale  at bedtime  insulin lispro Injectable (ADMELOG) 8 three times a day before meals  levothyroxine 75 daily  midodrine 15 every 8 hours      Vital Signs Last 24 Hrs  T(C): 37.1 (01 Apr 2022 09:00), Max: 39.1 (31 Mar 2022 21:06)  T(F): 98.8 (01 Apr 2022 09:00), Max: 102.3 (31 Mar 2022 21:06)  HR: 90 (01 Apr 2022 09:00) (84 - 114)  BP: 114/58 (01 Apr 2022 09:00) (114/58 - 134/84)  BP(mean): --  RR: 17 (01 Apr 2022 09:00) (16 - 18)  SpO2: 100% (01 Apr 2022 09:00) (98% - 100%)    PHYSICAL EXAM:  Constitutional: morbidly obese, non-toxic, no distress  HEAD/EYES: anicteric, no conjunctival injection  ENT:  supple, no thrush  Cardiovascular:   +S1/S2  Respiratory:  +BS bilaterally  GI:  soft, non-tender, +bowel sounds  :  +toussaint, no CVA tenderness  Musculoskeletal:  +LE edema  Neurologic: awake and alert,  no focal findings  Skin:  +deep thigh wounds, no surrounding erythema, minimal drainage                                7.5    15.63 )-----------( 485      ( 01 Apr 2022 07:10 )             25.8       04-01    139  |  104  |  21  ----------------------------<  86  3.8   |  22  |  0.98    Ca    7.9<L>      01 Apr 2022 07:10  Phos  2.9     03-31  Mg     1.60     04-01    TPro  6.5  /  Alb  2.0<L>  /  TBili  0.2  /  DBili  x   /  AST  47<H>  /  ALT  18  /  AlkPhos  301<H>  04-01          MICROBIOLOGY:  v    Culture - Blood (collected 30 Mar 2022 22:41)  Source: .Blood Blood  Preliminary Report (31 Mar 2022 23:01):    No growth to date.    Culture - Blood (collected 28 Mar 2022 08:26)  Source: .Blood Blood  Gram Stain (31 Mar 2022 10:27):    Growth in anaerobic bottle: Gram Positive Cocci in Clusters  Preliminary Report (31 Mar 2022 10:28):    Growth in anaerobic bottle: Gram Positive Cocci in Clusters    ***Blood Panel PCR results on this specimen are available    approximately 3 hours after the Gram stain result.***    Gram stain, PCR, and/or culture results may not always    correspond due todifference in methodologies.    ************************************************************    This PCR assay was performed by multiplex PCR. This    Assay tests for 66 bacterial and resistance gene targets.    Please refer to the [facility] testdirectory    at [email or web address] for details.  Organism: Blood Culture PCR (31 Mar 2022 12:15)  Organism: Blood Culture PCR (31 Mar 2022 12:15)      -  Staphylococcus epidermidis: Detec      Method Type: PCR    Culture - Urine (collected 28 Mar 2022 08:19)  Source: Catheterized Catheterized  Final Report (31 Mar 2022 11:32):    >=3 organisms. Probable collection contamination. including    >100,000 CFU/ml Escherichia coli ESBL    >100,000 CFU/ml Escherichia coli #2    Normal Urogenital brayan present  Organism: Escherichia coli  Escherichia coli ESBL (31 Mar 2022 11:31)  Organism: Escherichia coli ESBL (31 Mar 2022 11:31)      -  Amikacin: S <=16      -  Amoxicillin/Clavulanic Acid: S <=8/4 Consider reserving for cystitis when ampicillin/sulbactam is resistant      -  Ampicillin: R >16 These ampicillin results predict results for amoxicillin      -  Ampicillin/Sulbactam: R >16/8 Enterobacter, Klebsiella aerogenes, Citrobacter, and Serratia may develop resistance during prolonged therapy (3-4 days)      -  Aztreonam: R 16      -  Cefazolin: R >16 ([email or web address]) For uncomplicated UTI with K. pneumoniae, E. coli, or P. mirablis: DHAVAL <=16 is sensitive and DHAVAL >=32 is resistant. This also predicts results for oral agents cefaclor, cefdinir, cefpodoxime, cefprozil, cefuroxime axetil, cephalexin and locarbef for uncomplicated UTI. Note that some isolates may be susceptible to these agents while testing resistant to cefazolin.      -  Cefepime: R >16      -  Ceftriaxone: R >32 Enterobacter, Klebsiella aerogenes, Citrobacter, and Serratia may develop resistance during prolonged therapy      -  Ciprofloxacin: R >2      -  Ertapenem: S <=0.5      -  Gentamicin: S <=2      -  Imipenem: S <=1      -  Levofloxacin: R >4      -  Meropenem: S <=1      -  Nitrofurantoin: S <=32 Should not be used to treat pyelonephritis      -  Piperacillin/Tazobactam: S <=8      -  Tigecycline: S <=2      -  Tobramycin: S <=2      -  Trimethoprim/Sulfamethoxazole: S <=0.5/9.5      Method Type: DHAVAL  Organism: Escherichia coli (31 Mar 2022 11:31)      -  Amikacin: S <=16      -  Amoxicillin/Clavulanic Acid: S <=8/4 Consider reserving for cystitis when ampicillin/sulbactam is resistant      -  Ampicillin: R >16 These ampicillin results predict results for amoxicillin      -  Ampicillin/Sulbactam: R >16/8 Enterobacter, Klebsiella aerogenes, Citrobacter, and Serratia may develop resistance during prolonged therapy (3-4 days)      -  Aztreonam: S <=4      -  Cefazolin: S 8 ([email or web address]_[email or web address]) For uncomplicated UTI with K. pneumoniae, E. coli, or P. mirablis: DHAVAL <=16 is sensitive and DHAVAL >=32 is resistant. This also predicts results for oral agents cefaclor, cefdinir, cefpodoxime, cefprozil, cefuroxime axetil, cephalexin and locarbef for uncomplicated UTI. Note that some isolates may be susceptible to these agents while testing resistant to cefazolin.      -  Cefepime: S <=2      -  Cefoxitin: S <=8      -  Ceftriaxone: S <=1 Enterobacter, Klebsiella aerogenes, Citrobacter, and Serratia may develop resistance during prolonged therapy      -  Ciprofloxacin: S <=0.25      -  Ertapenem: S <=0.5      -  Gentamicin: S <=2      -  Imipenem: S <=1      -  Levofloxacin: S <=0.5      -  Meropenem: S <=1      -  Nitrofurantoin: S <=32 Should not be used to treat pyelonephritis      -  Piperacillin/Tazobactam: S <=8      -  Tigecycline: S <=2      -  Tobramycin: S <=2      -  Trimethoprim/Sulfamethoxazole: R >2/38      Method Type: DHAVAL        RADIOLOGY:    < from: Xray Chest 1 View- PORTABLE-Urgent (Xray Chest 1 View- PORTABLE-Urgent .) (03.30.22 @ 19:47) >  IMPRESSION:    No focal consolidation    < end of copied text >

## 2022-04-02 DIAGNOSIS — E66.01 MORBID (SEVERE) OBESITY DUE TO EXCESS CALORIES: ICD-10-CM

## 2022-04-02 LAB
ALBUMIN SERPL ELPH-MCNC: 1.4 G/DL — LOW (ref 3.3–5)
ALP SERPL-CCNC: 288 U/L — HIGH (ref 40–120)
ALT FLD-CCNC: 17 U/L — SIGNIFICANT CHANGE UP (ref 4–33)
ANION GAP SERPL CALC-SCNC: 15 MMOL/L — HIGH (ref 7–14)
AST SERPL-CCNC: 51 U/L — HIGH (ref 4–32)
BILIRUB SERPL-MCNC: 0.2 MG/DL — SIGNIFICANT CHANGE UP (ref 0.2–1.2)
BUN SERPL-MCNC: 18 MG/DL — SIGNIFICANT CHANGE UP (ref 7–23)
CALCIUM SERPL-MCNC: 7.7 MG/DL — LOW (ref 8.4–10.5)
CHLORIDE SERPL-SCNC: 104 MMOL/L — SIGNIFICANT CHANGE UP (ref 98–107)
CO2 SERPL-SCNC: 17 MMOL/L — LOW (ref 22–31)
CREAT SERPL-MCNC: 0.77 MG/DL — SIGNIFICANT CHANGE UP (ref 0.5–1.3)
EGFR: 94 ML/MIN/1.73M2 — SIGNIFICANT CHANGE UP
GGT SERPL-CCNC: 128 U/L — HIGH (ref 5–36)
GLUCOSE BLDC GLUCOMTR-MCNC: 107 MG/DL — HIGH (ref 70–99)
GLUCOSE BLDC GLUCOMTR-MCNC: 133 MG/DL — HIGH (ref 70–99)
GLUCOSE BLDC GLUCOMTR-MCNC: 155 MG/DL — HIGH (ref 70–99)
GLUCOSE BLDC GLUCOMTR-MCNC: 94 MG/DL — SIGNIFICANT CHANGE UP (ref 70–99)
GLUCOSE SERPL-MCNC: 80 MG/DL — SIGNIFICANT CHANGE UP (ref 70–99)
HCT VFR BLD CALC: 28.7 % — LOW (ref 34.5–45)
HGB BLD-MCNC: 8.2 G/DL — LOW (ref 11.5–15.5)
MAGNESIUM SERPL-MCNC: 1.4 MG/DL — LOW (ref 1.6–2.6)
MCHC RBC-ENTMCNC: 25.3 PG — LOW (ref 27–34)
MCHC RBC-ENTMCNC: 28.6 GM/DL — LOW (ref 32–36)
MCV RBC AUTO: 88.6 FL — SIGNIFICANT CHANGE UP (ref 80–100)
NRBC # BLD: 0 /100 WBCS — SIGNIFICANT CHANGE UP
NRBC # FLD: 0 K/UL — SIGNIFICANT CHANGE UP
PHOSPHATE SERPL-MCNC: 3.2 MG/DL — SIGNIFICANT CHANGE UP (ref 2.5–4.5)
PLATELET # BLD AUTO: 394 K/UL — SIGNIFICANT CHANGE UP (ref 150–400)
POTASSIUM SERPL-MCNC: 4.8 MMOL/L — SIGNIFICANT CHANGE UP (ref 3.5–5.3)
POTASSIUM SERPL-SCNC: 4.8 MMOL/L — SIGNIFICANT CHANGE UP (ref 3.5–5.3)
PROT SERPL-MCNC: 6.5 G/DL — SIGNIFICANT CHANGE UP (ref 6–8.3)
RBC # BLD: 3.24 M/UL — LOW (ref 3.8–5.2)
RBC # FLD: 16.1 % — HIGH (ref 10.3–14.5)
SODIUM SERPL-SCNC: 136 MMOL/L — SIGNIFICANT CHANGE UP (ref 135–145)
WBC # BLD: 12.63 K/UL — HIGH (ref 3.8–10.5)
WBC # FLD AUTO: 12.63 K/UL — HIGH (ref 3.8–10.5)

## 2022-04-02 PROCEDURE — 99233 SBSQ HOSP IP/OBS HIGH 50: CPT

## 2022-04-02 RX ORDER — MAGNESIUM SULFATE 500 MG/ML
2 VIAL (ML) INJECTION ONCE
Refills: 0 | Status: COMPLETED | OUTPATIENT
Start: 2022-04-02 | End: 2022-04-02

## 2022-04-02 RX ORDER — MIDODRINE HYDROCHLORIDE 2.5 MG/1
10 TABLET ORAL EVERY 8 HOURS
Refills: 0 | Status: DISCONTINUED | OUTPATIENT
Start: 2022-04-02 | End: 2022-04-03

## 2022-04-02 RX ORDER — MAGNESIUM SULFATE 500 MG/ML
2 VIAL (ML) INJECTION ONCE
Refills: 0 | Status: DISCONTINUED | OUTPATIENT
Start: 2022-04-02 | End: 2022-04-02

## 2022-04-02 RX ADMIN — ENOXAPARIN SODIUM 40 MILLIGRAM(S): 100 INJECTION SUBCUTANEOUS at 18:15

## 2022-04-02 RX ADMIN — Medication 650 MILLIGRAM(S): at 13:04

## 2022-04-02 RX ADMIN — Medication 8 UNIT(S): at 18:15

## 2022-04-02 RX ADMIN — Medication 500 MILLIGRAM(S): at 11:32

## 2022-04-02 RX ADMIN — Medication 8 UNIT(S): at 08:58

## 2022-04-02 RX ADMIN — INSULIN GLARGINE 30 UNIT(S): 100 INJECTION, SOLUTION SUBCUTANEOUS at 21:46

## 2022-04-02 RX ADMIN — MUPIROCIN 1 APPLICATION(S): 20 OINTMENT TOPICAL at 18:14

## 2022-04-02 RX ADMIN — MIDODRINE HYDROCHLORIDE 10 MILLIGRAM(S): 2.5 TABLET ORAL at 13:04

## 2022-04-02 RX ADMIN — CHLORHEXIDINE GLUCONATE 1 APPLICATION(S): 213 SOLUTION TOPICAL at 11:32

## 2022-04-02 RX ADMIN — MUPIROCIN 1 APPLICATION(S): 20 OINTMENT TOPICAL at 05:05

## 2022-04-02 RX ADMIN — Medication 1 APPLICATION(S): at 05:04

## 2022-04-02 RX ADMIN — ERTAPENEM SODIUM 120 MILLIGRAM(S): 1 INJECTION, POWDER, LYOPHILIZED, FOR SOLUTION INTRAMUSCULAR; INTRAVENOUS at 16:07

## 2022-04-02 RX ADMIN — Medication 1 APPLICATION(S): at 17:31

## 2022-04-02 RX ADMIN — Medication 650 MILLIGRAM(S): at 14:04

## 2022-04-02 RX ADMIN — Medication 2: at 12:58

## 2022-04-02 RX ADMIN — SODIUM CHLORIDE 100 MILLILITER(S): 9 INJECTION INTRAMUSCULAR; INTRAVENOUS; SUBCUTANEOUS at 12:35

## 2022-04-02 RX ADMIN — Medication 8 UNIT(S): at 12:59

## 2022-04-02 RX ADMIN — SODIUM CHLORIDE 100 MILLILITER(S): 9 INJECTION INTRAMUSCULAR; INTRAVENOUS; SUBCUTANEOUS at 22:12

## 2022-04-02 RX ADMIN — ENOXAPARIN SODIUM 40 MILLIGRAM(S): 100 INJECTION SUBCUTANEOUS at 05:06

## 2022-04-02 RX ADMIN — NYSTATIN CREAM 1 APPLICATION(S): 100000 CREAM TOPICAL at 21:47

## 2022-04-02 RX ADMIN — NYSTATIN CREAM 1 APPLICATION(S): 100000 CREAM TOPICAL at 13:03

## 2022-04-02 RX ADMIN — NYSTATIN CREAM 1 APPLICATION(S): 100000 CREAM TOPICAL at 05:05

## 2022-04-02 RX ADMIN — Medication 1 TABLET(S): at 11:32

## 2022-04-02 RX ADMIN — Medication 25 GRAM(S): at 09:24

## 2022-04-02 RX ADMIN — Medication 975 MILLIGRAM(S): at 00:51

## 2022-04-02 RX ADMIN — Medication 75 MICROGRAM(S): at 05:05

## 2022-04-02 NOTE — PROGRESS NOTE ADULT - PROBLEM SELECTOR PLAN 1
On admission , pt with septic shock requiring Levophed on admission   Sepsis suspect secondary to UT/Pyelo I(Patient w/ purulent drainage from toussaint) Other possibility is secondary to multiple skin ulcers  Hx of chronic sacral wound d/t immobility further complicated by acute progression of right medial thigh/vulvar wound precipitated by skin tear  Still with fevers, leukocytosis, Fever of 102.3 overnight.   3/28- BC x 1-Staph epi. Repeat BC 3/20- NGTD  UC- ESBL Ecoli . UA-  large LE. COVID positive    Lactate- 4.7--> 1.8  CT A/P: Large soft tissue defect/ulceration in the lateral right thigh with associated subcutaneous inflammatory changes. Circumferential bladder wall thickening with adjacent inflammation most concerning for cystitis. In addition, there is mild greater than right hydronephrosis and subtle perinephric and trace prevertebral fat stranding, cannot exclude ascending infection and recommend correlation with clinical examination laboratory findings to exclude pyelonephritis.    Was on  Vanc/Zosyn, transitioned to Ertapenem given ESBL ( 3/31-)   On Midodrine 20mg PO Q 8hrs( was on pressors in MICU),  taper  to 15mg PO Q 8 from 4/1, further taper to 10mg Q 8 on 4/2. Wean  as tolerated   Appreciate ID / Wound care recs

## 2022-04-02 NOTE — PROGRESS NOTE ADULT - SUBJECTIVE AND OBJECTIVE BOX
Melanie Andujar  Hospitalist  Pager- [phone]     PROGRESS NOTE:     Patient is a 52y old  Female who presents with a chief complaint of     SUBJECTIVE / OVERNIGHT EVENTS: pt seen and examined by me   Fever of 100.4-100.6  overnight   C/o Chronic low back pain from lying in bed in one position. States pain is 8/10 which improves with Tylenol   Not eating much- reports she wants to lose weight     ADDITIONAL REVIEW OF SYSTEMS:        MEDICATIONS  (STANDING):  ascorbic acid 500 milliGRAM(s) Oral daily  chlorhexidine 2% Cloths 1 Application(s) Topical <User Schedule>  Dakins Solution - 1/4 Strength 1 Application(s) Topical two times a day  dextrose 5%. 1000 milliLiter(s) (50 mL/Hr) IV Continuous <Continuous>  dextrose 5%. 1000 milliLiter(s) (100 mL/Hr) IV Continuous <Continuous>  dextrose 50% Injectable 25 Gram(s) IV Push once  dextrose 50% Injectable 12.5 Gram(s) IV Push once  dextrose 50% Injectable 25 Gram(s) IV Push once  enoxaparin Injectable 40 milliGRAM(s) SubCutaneous every 12 hours  ertapenem  IVPB 1000 milliGRAM(s) IV Intermittent every 24 hours  glucagon  Injectable 1 milliGRAM(s) IntraMuscular once  insulin glargine Injectable (LANTUS) 30 Unit(s) SubCutaneous at bedtime  insulin lispro (ADMELOG) corrective regimen sliding scale   SubCutaneous three times a day before meals  insulin lispro (ADMELOG) corrective regimen sliding scale   SubCutaneous at bedtime  insulin lispro Injectable (ADMELOG) 8 Unit(s) SubCutaneous three times a day before meals  levothyroxine 75 MICROGram(s) Oral daily  magnesium sulfate  IVPB 2 Gram(s) IV Intermittent once  midodrine 15 milliGRAM(s) Oral every 8 hours  multivitamin 1 Tablet(s) Oral daily  mupirocin 2% Ointment 1 Application(s) Topical two times a day  nystatin Powder 1 Application(s) Topical three times a day  sodium chloride 0.9%. 1000 milliLiter(s) (100 mL/Hr) IV Continuous <Continuous>    MEDICATIONS  (PRN):  acetaminophen     Tablet .. 650 milliGRAM(s) Oral every 6 hours PRN Temp greater or equal to 38C (100.4F), Mild Pain (1 - 3), Moderate Pain (4 - 6)  ALBUTerol    90 MICROgram(s) HFA Inhaler 2 Puff(s) Inhalation every 6 hours PRN Shortness of Breath  albuterol/ipratropium for Nebulization 3 milliLiter(s) Nebulizer every 6 hours PRN Shortness of Breath  dextrose Oral Gel 15 Gram(s) Oral once PRN Blood Glucose LESS THAN 70 milliGRAM(s)/deciliter  HYDROmorphone  Injectable 0.25 milliGRAM(s) IV Push once PRN Severe Pain (7 - 10)      CAPILLARY BLOOD GLUCOSE      POCT Blood Glucose.: 107 mg/dL (02 Apr 2022 08:54)  POCT Blood Glucose.: 120 mg/dL (01 Apr 2022 20:54)  POCT Blood Glucose.: 126 mg/dL (01 Apr 2022 17:23)  POCT Blood Glucose.: 113 mg/dL (01 Apr 2022 12:33)    I&O's Summary    29 Mar 2022 07:01  -  30 Mar 2022 07:00  --------------------------------------------------------  IN: 1371 mL / OUT: 1000 mL / NET: 371 mL        PHYSICAL EXAM:    Vital Signs Last 24 Hrs  T(C): 37 (02 Apr 2022 08:47), Max: 38.1 (01 Apr 2022 23:00)  T(F): 98.6 (02 Apr 2022 08:47), Max: 100.6 (01 Apr 2022 23:00)  HR: 93 (02 Apr 2022 08:47) (77 - 106)  BP: 144/64 (02 Apr 2022 08:47) (118/56 - 145/60)  BP(mean): --  RR: 17 (02 Apr 2022 08:47) (17 - 18)  SpO2: 100% (02 Apr 2022 08:47) (96% - 100%)    CONSTITUTIONAL: NAD, obese   RESPIRATORY: Normal respiratory effort; decreased BS at base  CARDIOVASCULAR: Regular rate and rhythm, normal S1 and S2, no murmur/rub/gallop; No lower extremity edema; Peripheral pulses are 2+ bilaterally  ABDOMEN: Nontender to palpation, normoactive bowel sounds, no rebound/guarding; No hepatosplenomegaly  MUSCLOSKELETAL: no clubbing or cyanosis of digits; no joint swelling or tenderness to palpation  PSYCH: A+O to person, place, and time; affect appropriate  NEURO: Decreased strength more on right side compared to left( as per pt chronic)  - Rose catheter present    SKIN:  Multiple wounds with dressing  present -Right knee, RLE  right posterior lower leg- 5cmx2.5cmx0  Beneath breast/abdominal folds - Dermatitis with  fissures  present  Dressing present  Right lateral thigh-.Stage 4 pressure ulcer - 9mne2cgi8.5cm,  granular base, no purulent drainage, no odor. Periwound skin intact, no edema, no erythema, no increased warmth, no fluctuance noted. Right trochanter- stage 4 pressure injury- [id number], granular base, bone in close proximity.     LABS:                                                                    8.2    12.63 )-----------( 394      ( 02 Apr 2022 06:40 )             28.7         04-02    136  |  104  |  18  ----------------------------<  80  4.8   |  17<L>  |  0.77    Ca    7.7<L>      02 Apr 2022 06:40  Phos  3.2     04-02  Mg     1.40     04-02    TPro  6.5  /  Alb  1.4<L>  /  TBili  0.2  /  DBili  x   /  AST  51<H>  /  ALT  17  /  AlkPhos  288<H>  04-02        Culture - Blood (collected 28 Mar 2022 08:26)  Source: .Blood Blood  Preliminary Report (29 Mar 2022 09:01):    No growth to date.    Culture - Urine (collected 28 Mar 2022 08:19)  Source: Catheterized Catheterized  Final Report (30 Mar 2022 08:53):    >100,000 CFU/ml Escherichia coli    >100,000 CFU/ml Escherichia coli #2    >=3 organisms. Probable collection contamination.    Normal Urogenital brayan present        RADIOLOGY & ADDITIONAL TESTS:  Results Reviewed:   Imaging Personally Reviewed:  Electrocardiogram Personally Reviewed:    COORDINATION OF CARE:  Care Discussed with Consultants/Other Providers [Y/N]:  Prior or Outpatient Records Reviewed [Y/N]:

## 2022-04-02 NOTE — PROGRESS NOTE ADULT - PROBLEM SELECTOR PLAN 5
BMI 40.4  Also with low albumin  Pt reports she doesn't eat much as she is trying to lose weight   Will obtain Nutrition consult

## 2022-04-03 LAB
ALBUMIN SERPL ELPH-MCNC: 2 G/DL — LOW (ref 3.3–5)
ALP SERPL-CCNC: 272 U/L — HIGH (ref 40–120)
ALT FLD-CCNC: 10 U/L — SIGNIFICANT CHANGE UP (ref 4–33)
ANION GAP SERPL CALC-SCNC: 13 MMOL/L — SIGNIFICANT CHANGE UP (ref 7–14)
AST SERPL-CCNC: 25 U/L — SIGNIFICANT CHANGE UP (ref 4–32)
BILIRUB SERPL-MCNC: <0.2 MG/DL — SIGNIFICANT CHANGE UP (ref 0.2–1.2)
BUN SERPL-MCNC: 13 MG/DL — SIGNIFICANT CHANGE UP (ref 7–23)
CALCIUM SERPL-MCNC: 7.8 MG/DL — LOW (ref 8.4–10.5)
CHLORIDE SERPL-SCNC: 108 MMOL/L — HIGH (ref 98–107)
CO2 SERPL-SCNC: 20 MMOL/L — LOW (ref 22–31)
CREAT SERPL-MCNC: 0.7 MG/DL — SIGNIFICANT CHANGE UP (ref 0.5–1.3)
EGFR: 105 ML/MIN/1.73M2 — SIGNIFICANT CHANGE UP
GLUCOSE BLDC GLUCOMTR-MCNC: 130 MG/DL — HIGH (ref 70–99)
GLUCOSE BLDC GLUCOMTR-MCNC: 142 MG/DL — HIGH (ref 70–99)
GLUCOSE BLDC GLUCOMTR-MCNC: 172 MG/DL — HIGH (ref 70–99)
GLUCOSE BLDC GLUCOMTR-MCNC: 217 MG/DL — HIGH (ref 70–99)
GLUCOSE SERPL-MCNC: 114 MG/DL — HIGH (ref 70–99)
HCT VFR BLD CALC: 24.7 % — LOW (ref 34.5–45)
HGB BLD-MCNC: 7.4 G/DL — LOW (ref 11.5–15.5)
MAGNESIUM SERPL-MCNC: 1.4 MG/DL — LOW (ref 1.6–2.6)
MCHC RBC-ENTMCNC: 25.4 PG — LOW (ref 27–34)
MCHC RBC-ENTMCNC: 30 GM/DL — LOW (ref 32–36)
MCV RBC AUTO: 84.9 FL — SIGNIFICANT CHANGE UP (ref 80–100)
NRBC # BLD: 0 /100 WBCS — SIGNIFICANT CHANGE UP
NRBC # FLD: 0 K/UL — SIGNIFICANT CHANGE UP
PHOSPHATE SERPL-MCNC: 3 MG/DL — SIGNIFICANT CHANGE UP (ref 2.5–4.5)
PLATELET # BLD AUTO: 546 K/UL — HIGH (ref 150–400)
POTASSIUM SERPL-MCNC: 3.5 MMOL/L — SIGNIFICANT CHANGE UP (ref 3.5–5.3)
POTASSIUM SERPL-SCNC: 3.5 MMOL/L — SIGNIFICANT CHANGE UP (ref 3.5–5.3)
PROT SERPL-MCNC: 6.2 G/DL — SIGNIFICANT CHANGE UP (ref 6–8.3)
RBC # BLD: 2.91 M/UL — LOW (ref 3.8–5.2)
RBC # FLD: 16.2 % — HIGH (ref 10.3–14.5)
SODIUM SERPL-SCNC: 141 MMOL/L — SIGNIFICANT CHANGE UP (ref 135–145)
WBC # BLD: 10.88 K/UL — HIGH (ref 3.8–10.5)
WBC # FLD AUTO: 10.88 K/UL — HIGH (ref 3.8–10.5)

## 2022-04-03 PROCEDURE — 99233 SBSQ HOSP IP/OBS HIGH 50: CPT

## 2022-04-03 RX ORDER — MIDODRINE HYDROCHLORIDE 2.5 MG/1
5 TABLET ORAL EVERY 8 HOURS
Refills: 0 | Status: DISCONTINUED | OUTPATIENT
Start: 2022-04-03 | End: 2022-04-04

## 2022-04-03 RX ORDER — SODIUM CHLORIDE 9 MG/ML
1000 INJECTION INTRAMUSCULAR; INTRAVENOUS; SUBCUTANEOUS
Refills: 0 | Status: DISCONTINUED | OUTPATIENT
Start: 2022-04-03 | End: 2022-04-16

## 2022-04-03 RX ORDER — POTASSIUM CHLORIDE 20 MEQ
40 PACKET (EA) ORAL ONCE
Refills: 0 | Status: COMPLETED | OUTPATIENT
Start: 2022-04-03 | End: 2022-04-03

## 2022-04-03 RX ORDER — MAGNESIUM SULFATE 500 MG/ML
4 VIAL (ML) INJECTION ONCE
Refills: 0 | Status: COMPLETED | OUTPATIENT
Start: 2022-04-03 | End: 2022-04-03

## 2022-04-03 RX ADMIN — INSULIN GLARGINE 30 UNIT(S): 100 INJECTION, SOLUTION SUBCUTANEOUS at 21:19

## 2022-04-03 RX ADMIN — Medication 500 MILLIGRAM(S): at 11:51

## 2022-04-03 RX ADMIN — Medication 75 MICROGRAM(S): at 06:19

## 2022-04-03 RX ADMIN — NYSTATIN CREAM 1 APPLICATION(S): 100000 CREAM TOPICAL at 21:19

## 2022-04-03 RX ADMIN — SODIUM CHLORIDE 100 MILLILITER(S): 9 INJECTION INTRAMUSCULAR; INTRAVENOUS; SUBCUTANEOUS at 08:39

## 2022-04-03 RX ADMIN — Medication 25 GRAM(S): at 09:08

## 2022-04-03 RX ADMIN — NYSTATIN CREAM 1 APPLICATION(S): 100000 CREAM TOPICAL at 06:19

## 2022-04-03 RX ADMIN — Medication 1 APPLICATION(S): at 17:10

## 2022-04-03 RX ADMIN — ENOXAPARIN SODIUM 40 MILLIGRAM(S): 100 INJECTION SUBCUTANEOUS at 17:10

## 2022-04-03 RX ADMIN — Medication 1 APPLICATION(S): at 06:56

## 2022-04-03 RX ADMIN — ENOXAPARIN SODIUM 40 MILLIGRAM(S): 100 INJECTION SUBCUTANEOUS at 06:19

## 2022-04-03 RX ADMIN — NYSTATIN CREAM 1 APPLICATION(S): 100000 CREAM TOPICAL at 14:17

## 2022-04-03 RX ADMIN — SODIUM CHLORIDE 50 MILLILITER(S): 9 INJECTION INTRAMUSCULAR; INTRAVENOUS; SUBCUTANEOUS at 21:18

## 2022-04-03 RX ADMIN — Medication 8 UNIT(S): at 08:45

## 2022-04-03 RX ADMIN — SODIUM CHLORIDE 50 MILLILITER(S): 9 INJECTION INTRAMUSCULAR; INTRAVENOUS; SUBCUTANEOUS at 11:04

## 2022-04-03 RX ADMIN — ERTAPENEM SODIUM 120 MILLIGRAM(S): 1 INJECTION, POWDER, LYOPHILIZED, FOR SOLUTION INTRAMUSCULAR; INTRAVENOUS at 17:09

## 2022-04-03 RX ADMIN — MUPIROCIN 1 APPLICATION(S): 20 OINTMENT TOPICAL at 17:11

## 2022-04-03 RX ADMIN — Medication 1 TABLET(S): at 11:51

## 2022-04-03 RX ADMIN — Medication 40 MILLIEQUIVALENT(S): at 08:38

## 2022-04-03 RX ADMIN — Medication 4: at 18:15

## 2022-04-03 RX ADMIN — Medication 8 UNIT(S): at 12:48

## 2022-04-03 RX ADMIN — MUPIROCIN 1 APPLICATION(S): 20 OINTMENT TOPICAL at 06:20

## 2022-04-03 RX ADMIN — Medication 8 UNIT(S): at 18:15

## 2022-04-03 RX ADMIN — CHLORHEXIDINE GLUCONATE 1 APPLICATION(S): 213 SOLUTION TOPICAL at 09:08

## 2022-04-03 NOTE — PROGRESS NOTE ADULT - SUBJECTIVE AND OBJECTIVE BOX
Melanie Andujar  Hospitalist  Pager- [phone]     PROGRESS NOTE:     Patient is a 52y old  Female who presents with a chief complaint of     SUBJECTIVE / OVERNIGHT EVENTS: pt seen and examined by me   No fever overnight       ADDITIONAL REVIEW OF SYSTEMS:          MEDICATIONS  (STANDING):  ascorbic acid 500 milliGRAM(s) Oral daily  chlorhexidine 2% Cloths 1 Application(s) Topical <User Schedule>  Dakins Solution - 1/4 Strength 1 Application(s) Topical two times a day  dextrose 5%. 1000 milliLiter(s) (50 mL/Hr) IV Continuous <Continuous>  dextrose 5%. 1000 milliLiter(s) (100 mL/Hr) IV Continuous <Continuous>  dextrose 50% Injectable 25 Gram(s) IV Push once  dextrose 50% Injectable 12.5 Gram(s) IV Push once  dextrose 50% Injectable 25 Gram(s) IV Push once  enoxaparin Injectable 40 milliGRAM(s) SubCutaneous every 12 hours  ertapenem  IVPB 1000 milliGRAM(s) IV Intermittent every 24 hours  glucagon  Injectable 1 milliGRAM(s) IntraMuscular once  insulin glargine Injectable (LANTUS) 30 Unit(s) SubCutaneous at bedtime  insulin lispro (ADMELOG) corrective regimen sliding scale   SubCutaneous three times a day before meals  insulin lispro (ADMELOG) corrective regimen sliding scale   SubCutaneous at bedtime  insulin lispro Injectable (ADMELOG) 8 Unit(s) SubCutaneous three times a day before meals  levothyroxine 75 MICROGram(s) Oral daily  magnesium sulfate  IVPB 4 Gram(s) IV Intermittent once  midodrine 10 milliGRAM(s) Oral every 8 hours  multivitamin 1 Tablet(s) Oral daily  mupirocin 2% Ointment 1 Application(s) Topical two times a day  nystatin Powder 1 Application(s) Topical three times a day  sodium chloride 0.9%. 1000 milliLiter(s) (100 mL/Hr) IV Continuous <Continuous>    MEDICATIONS  (PRN):  acetaminophen     Tablet .. 650 milliGRAM(s) Oral every 6 hours PRN Temp greater or equal to 38C (100.4F), Mild Pain (1 - 3), Moderate Pain (4 - 6)  ALBUTerol    90 MICROgram(s) HFA Inhaler 2 Puff(s) Inhalation every 6 hours PRN Shortness of Breath  albuterol/ipratropium for Nebulization 3 milliLiter(s) Nebulizer every 6 hours PRN Shortness of Breath  dextrose Oral Gel 15 Gram(s) Oral once PRN Blood Glucose LESS THAN 70 milliGRAM(s)/deciliter  HYDROmorphone  Injectable 0.25 milliGRAM(s) IV Push once PRN Severe Pain (7 - 10)      CAPILLARY BLOOD GLUCOSE    POCT Blood Glucose.: 142 mg/dL (03 Apr 2022 08:43)  POCT Blood Glucose.: 94 mg/dL (02 Apr 2022 21:30)  POCT Blood Glucose.: 133 mg/dL (02 Apr 2022 18:06)  POCT Blood Glucose.: 155 mg/dL (02 Apr 2022 12:50)  I&O's Summary    29 Mar 2022 07:01  -  30 Mar 2022 07:00  --------------------------------------------------------  IN: 1371 mL / OUT: 1000 mL / NET: 371 mL        PHYSICAL EXAM:      Vital Signs Last 24 Hrs  T(C): 36.8 (03 Apr 2022 06:32), Max: 37.3 (02 Apr 2022 22:41)  T(F): 98.3 (03 Apr 2022 06:32), Max: 99.2 (02 Apr 2022 22:41)  HR: 86 (03 Apr 2022 06:32) (69 - 100)  BP: 148/70 (03 Apr 2022 06:32) (101/75 - 148/70)  BP(mean): --  RR: 17 (03 Apr 2022 06:32) (15 - 18)  SpO2: 100% (03 Apr 2022 06:32) (100% - 100%)    CONSTITUTIONAL: NAD, obese   RESPIRATORY: Normal respiratory effort; decreased BS at base  CARDIOVASCULAR: Regular rate and rhythm, normal S1 and S2, no murmur/rub/gallop; No lower extremity edema; Peripheral pulses are 2+ bilaterally  ABDOMEN: Nontender to palpation, normoactive bowel sounds, no rebound/guarding; No hepatosplenomegaly  MUSCLOSKELETAL: no clubbing or cyanosis of digits; no joint swelling or tenderness to palpation  PSYCH: A+O to person, place, and time; affect appropriate  NEURO: Decreased strength more on right side compared to left( as per pt chronic)  - Primafit   SKIN:  Multiple wounds with dressing  present -Right knee, RLE  right posterior lower leg- 5cmx2.5cmx0  Beneath breast/abdominal folds - Dermatitis with  fissures  present  Dressing present  Right lateral thigh-.Stage 4 pressure ulcer - 5wpn9msz8.5cm,  granular base, no purulent drainage, no odor. Periwound skin intact, no edema, no erythema, no increased warmth, no fluctuance noted. Right trochanter- stage 4 pressure injury- [id number], granular base, bone in close proximity.     LABS:                                                     7.4    10.88 )-----------( 546      ( 03 Apr 2022 07:22 )             24.7     04-03    141  |  108<H>  |  13  ----------------------------<  114<H>  3.5   |  20<L>  |  0.70    Ca    7.8<L>      03 Apr 2022 07:22  Phos  3.0     04-03  Mg     1.40     04-03    TPro  6.2  /  Alb  2.0<L>  /  TBili  <0.2  /  DBili  x   /  AST  25  /  ALT  10  /  AlkPhos  272<H>  04-03      Culture - Blood (collected 28 Mar 2022 08:26)  Source: .Blood Blood  Preliminary Report (29 Mar 2022 09:01):    No growth to date.    Culture - Urine (collected 28 Mar 2022 08:19)  Source: Catheterized Catheterized  Final Report (30 Mar 2022 08:53):    >100,000 CFU/ml Escherichia coli    >100,000 CFU/ml Escherichia coli #2    >=3 organisms. Probable collection contamination.    Normal Urogenital brayan present        RADIOLOGY & ADDITIONAL TESTS:  Results Reviewed:   Imaging Personally Reviewed:  Electrocardiogram Personally Reviewed:    COORDINATION OF CARE:  Care Discussed with Consultants/Other Providers [Y/N]:  Prior or Outpatient Records Reviewed [Y/N]:   Melanie Andujar  Hospitalist  Pager- [phone]     PROGRESS NOTE:     Patient is a 52y old  Female who presents with a chief complaint of     SUBJECTIVE / OVERNIGHT EVENTS: pt seen and examined by me   No fever overnight   Had  an episode of diarrhea after drinking glucerna  No other symptoms    ADDITIONAL REVIEW OF SYSTEMS:          MEDICATIONS  (STANDING):  ascorbic acid 500 milliGRAM(s) Oral daily  chlorhexidine 2% Cloths 1 Application(s) Topical <User Schedule>  Dakins Solution - 1/4 Strength 1 Application(s) Topical two times a day  dextrose 5%. 1000 milliLiter(s) (50 mL/Hr) IV Continuous <Continuous>  dextrose 5%. 1000 milliLiter(s) (100 mL/Hr) IV Continuous <Continuous>  dextrose 50% Injectable 25 Gram(s) IV Push once  dextrose 50% Injectable 12.5 Gram(s) IV Push once  dextrose 50% Injectable 25 Gram(s) IV Push once  enoxaparin Injectable 40 milliGRAM(s) SubCutaneous every 12 hours  ertapenem  IVPB 1000 milliGRAM(s) IV Intermittent every 24 hours  glucagon  Injectable 1 milliGRAM(s) IntraMuscular once  insulin glargine Injectable (LANTUS) 30 Unit(s) SubCutaneous at bedtime  insulin lispro (ADMELOG) corrective regimen sliding scale   SubCutaneous three times a day before meals  insulin lispro (ADMELOG) corrective regimen sliding scale   SubCutaneous at bedtime  insulin lispro Injectable (ADMELOG) 8 Unit(s) SubCutaneous three times a day before meals  levothyroxine 75 MICROGram(s) Oral daily  magnesium sulfate  IVPB 4 Gram(s) IV Intermittent once  midodrine 10 milliGRAM(s) Oral every 8 hours  multivitamin 1 Tablet(s) Oral daily  mupirocin 2% Ointment 1 Application(s) Topical two times a day  nystatin Powder 1 Application(s) Topical three times a day  sodium chloride 0.9%. 1000 milliLiter(s) (100 mL/Hr) IV Continuous <Continuous>    MEDICATIONS  (PRN):  acetaminophen     Tablet .. 650 milliGRAM(s) Oral every 6 hours PRN Temp greater or equal to 38C (100.4F), Mild Pain (1 - 3), Moderate Pain (4 - 6)  ALBUTerol    90 MICROgram(s) HFA Inhaler 2 Puff(s) Inhalation every 6 hours PRN Shortness of Breath  albuterol/ipratropium for Nebulization 3 milliLiter(s) Nebulizer every 6 hours PRN Shortness of Breath  dextrose Oral Gel 15 Gram(s) Oral once PRN Blood Glucose LESS THAN 70 milliGRAM(s)/deciliter  HYDROmorphone  Injectable 0.25 milliGRAM(s) IV Push once PRN Severe Pain (7 - 10)      CAPILLARY BLOOD GLUCOSE    POCT Blood Glucose.: 142 mg/dL (03 Apr 2022 08:43)  POCT Blood Glucose.: 94 mg/dL (02 Apr 2022 21:30)  POCT Blood Glucose.: 133 mg/dL (02 Apr 2022 18:06)  POCT Blood Glucose.: 155 mg/dL (02 Apr 2022 12:50)  I&O's Summary    29 Mar 2022 07:01  -  30 Mar 2022 07:00  --------------------------------------------------------  IN: 1371 mL / OUT: 1000 mL / NET: 371 mL        PHYSICAL EXAM:      Vital Signs Last 24 Hrs  T(C): 36.8 (03 Apr 2022 06:32), Max: 37.3 (02 Apr 2022 22:41)  T(F): 98.3 (03 Apr 2022 06:32), Max: 99.2 (02 Apr 2022 22:41)  HR: 86 (03 Apr 2022 06:32) (69 - 100)  BP: 148/70 (03 Apr 2022 06:32) (101/75 - 148/70)  BP(mean): --  RR: 17 (03 Apr 2022 06:32) (15 - 18)  SpO2: 100% (03 Apr 2022 06:32) (100% - 100%)    CONSTITUTIONAL: NAD, obese   RESPIRATORY: Normal respiratory effort; decreased BS at base  CARDIOVASCULAR: Regular rate and rhythm, normal S1 and S2, no murmur/rub/gallop; No lower extremity edema; Peripheral pulses are 2+ bilaterally  ABDOMEN: Nontender to palpation, normoactive bowel sounds, no rebound/guarding; No hepatosplenomegaly  MUSCLOSKELETAL: no clubbing or cyanosis of digits; no joint swelling or tenderness to palpation  PSYCH: A+O to person, place, and time; affect appropriate  NEURO: Decreased strength more on right side compared to left( as per pt chronic)  - Primafit   SKIN:  Multiple wounds with dressing  present -Right knee, RLE  right posterior lower leg- 5cmx2.5cmx0  Beneath breast/abdominal folds - Dermatitis with  fissures  present  Dressing present  Right lateral thigh-.Stage 4 pressure ulcer - 0vxd5zgc4.5cm,  granular base, no purulent drainage, no odor. Periwound skin intact, no edema, no erythema, no increased warmth, no fluctuance noted. Right trochanter- stage 4 pressure injury- [id number], granular base, bone in close proximity.     LABS:                                                     7.4    10.88 )-----------( 546      ( 03 Apr 2022 07:22 )             24.7     04-03    141  |  108<H>  |  13  ----------------------------<  114<H>  3.5   |  20<L>  |  0.70    Ca    7.8<L>      03 Apr 2022 07:22  Phos  3.0     04-03  Mg     1.40     04-03    TPro  6.2  /  Alb  2.0<L>  /  TBili  <0.2  /  DBili  x   /  AST  25  /  ALT  10  /  AlkPhos  272<H>  04-03      Culture - Blood (collected 28 Mar 2022 08:26)  Source: .Blood Blood  Preliminary Report (29 Mar 2022 09:01):    No growth to date.    Culture - Urine (collected 28 Mar 2022 08:19)  Source: Catheterized Catheterized  Final Report (30 Mar 2022 08:53):    >100,000 CFU/ml Escherichia coli    >100,000 CFU/ml Escherichia coli #2    >=3 organisms. Probable collection contamination.    Normal Urogenital brayan present        RADIOLOGY & ADDITIONAL TESTS:  Results Reviewed:   Imaging Personally Reviewed:  Electrocardiogram Personally Reviewed:    COORDINATION OF CARE:  Care Discussed with Consultants/Other Providers [Y/N]:  Prior or Outpatient Records Reviewed [Y/N]:

## 2022-04-03 NOTE — PROGRESS NOTE ADULT - PROBLEM SELECTOR PLAN 7
TSH 3.86 wnl  Continue home synthroid 75mcg qD,    # Elevated Alk Phosphatase-GGT elevated  FU Abdominal ultrasound

## 2022-04-03 NOTE — PROGRESS NOTE ADULT - PROBLEM SELECTOR PLAN 1
On admission , pt with septic shock requiring Levophed on admission   Sepsis suspect secondary to UT/Pyelo I(Patient w/ purulent drainage from [facility]) Other possibility is secondary to multiple skin ulcers  Fever of 102.3 on 4/1 overnight, No fever now   3/28- BC x 1-Staph epi. Repeat BC 3/20- NGTD  UC- ESBL Ecoli . UA-  large LE. COVID positive    Lactate- 4.7--> 1.8  CT A/P: Large soft tissue defect/ulceration in the lateral right thigh with associated subcutaneous inflammatory changes. Circumferential bladder wall thickening with adjacent inflammation most concerning for cystitis. In addition, there is mild greater than right hydronephrosis and subtle perinephric and trace prevertebral fat stranding, cannot exclude ascending infection and recommend correlation with clinical examination laboratory findings to exclude pyelonephritis.    Was on  Vanc/Zosyn, transitioned to Ertapenem given ESBL ( 3/31-)   On Midodrine 20mg PO Q 8hrs( was on pressors in MICU),  taper  to 15mg PO Q 8 from 4/1, further taper to 10mg Q 8 on 4/2 and 5mg Q 8 on 4/3. Taper to off  as tolerated   Appreciate ID / Wound care recs

## 2022-04-04 LAB
24R-OH-CALCIDIOL SERPL-MCNC: 12.5 NG/ML — LOW (ref 30–80)
ALBUMIN SERPL ELPH-MCNC: 2.1 G/DL — LOW (ref 3.3–5)
ALP SERPL-CCNC: 297 U/L — HIGH (ref 40–120)
ALT FLD-CCNC: 9 U/L — SIGNIFICANT CHANGE UP (ref 4–33)
ANION GAP SERPL CALC-SCNC: 12 MMOL/L — SIGNIFICANT CHANGE UP (ref 7–14)
AST SERPL-CCNC: 24 U/L — SIGNIFICANT CHANGE UP (ref 4–32)
BILIRUB SERPL-MCNC: <0.2 MG/DL — SIGNIFICANT CHANGE UP (ref 0.2–1.2)
BLD GP AB SCN SERPL QL: NEGATIVE — SIGNIFICANT CHANGE UP
BUN SERPL-MCNC: 9 MG/DL — SIGNIFICANT CHANGE UP (ref 7–23)
CALCIUM SERPL-MCNC: 7.9 MG/DL — LOW (ref 8.4–10.5)
CHLORIDE SERPL-SCNC: 109 MMOL/L — HIGH (ref 98–107)
CO2 SERPL-SCNC: 20 MMOL/L — LOW (ref 22–31)
CREAT SERPL-MCNC: 0.6 MG/DL — SIGNIFICANT CHANGE UP (ref 0.5–1.3)
CRP SERPL-MCNC: 124.7 MG/L — HIGH
CULTURE RESULTS: SIGNIFICANT CHANGE UP
D DIMER BLD IA.RAPID-MCNC: 869 NG/ML DDU — HIGH
EGFR: 109 ML/MIN/1.73M2 — SIGNIFICANT CHANGE UP
FERRITIN SERPL-MCNC: 247 NG/ML — HIGH (ref 15–150)
GLUCOSE BLDC GLUCOMTR-MCNC: 132 MG/DL — HIGH (ref 70–99)
GLUCOSE BLDC GLUCOMTR-MCNC: 134 MG/DL — HIGH (ref 70–99)
GLUCOSE BLDC GLUCOMTR-MCNC: 224 MG/DL — HIGH (ref 70–99)
GLUCOSE BLDC GLUCOMTR-MCNC: 231 MG/DL — HIGH (ref 70–99)
GLUCOSE SERPL-MCNC: 122 MG/DL — HIGH (ref 70–99)
HCT VFR BLD CALC: 23.5 % — LOW (ref 34.5–45)
HCT VFR BLD CALC: 26.1 % — LOW (ref 34.5–45)
HGB BLD-MCNC: 6.8 G/DL — CRITICAL LOW (ref 11.5–15.5)
HGB BLD-MCNC: 7.5 G/DL — LOW (ref 11.5–15.5)
LDH SERPL L TO P-CCNC: 326 U/L — HIGH (ref 135–225)
MAGNESIUM SERPL-MCNC: 1.5 MG/DL — LOW (ref 1.6–2.6)
MCHC RBC-ENTMCNC: 24.9 PG — LOW (ref 27–34)
MCHC RBC-ENTMCNC: 25.1 PG — LOW (ref 27–34)
MCHC RBC-ENTMCNC: 28.7 GM/DL — LOW (ref 32–36)
MCHC RBC-ENTMCNC: 28.9 GM/DL — LOW (ref 32–36)
MCV RBC AUTO: 86.7 FL — SIGNIFICANT CHANGE UP (ref 80–100)
MCV RBC AUTO: 86.7 FL — SIGNIFICANT CHANGE UP (ref 80–100)
NRBC # BLD: 0 /100 WBCS — SIGNIFICANT CHANGE UP
NRBC # BLD: 0 /100 WBCS — SIGNIFICANT CHANGE UP
NRBC # FLD: 0 K/UL — SIGNIFICANT CHANGE UP
NRBC # FLD: 0.03 K/UL — HIGH
PHOSPHATE SERPL-MCNC: 2.9 MG/DL — SIGNIFICANT CHANGE UP (ref 2.5–4.5)
PLATELET # BLD AUTO: 507 K/UL — HIGH (ref 150–400)
PLATELET # BLD AUTO: 573 K/UL — HIGH (ref 150–400)
POTASSIUM SERPL-MCNC: 3.6 MMOL/L — SIGNIFICANT CHANGE UP (ref 3.5–5.3)
POTASSIUM SERPL-SCNC: 3.6 MMOL/L — SIGNIFICANT CHANGE UP (ref 3.5–5.3)
PROCALCITONIN SERPL-MCNC: 0.3 NG/ML — HIGH (ref 0.02–0.1)
PROT SERPL-MCNC: 6.2 G/DL — SIGNIFICANT CHANGE UP (ref 6–8.3)
RBC # BLD: 2.71 M/UL — LOW (ref 3.8–5.2)
RBC # BLD: 3.01 M/UL — LOW (ref 3.8–5.2)
RBC # FLD: 16.2 % — HIGH (ref 10.3–14.5)
RBC # FLD: 16.2 % — HIGH (ref 10.3–14.5)
RH IG SCN BLD-IMP: POSITIVE — SIGNIFICANT CHANGE UP
SODIUM SERPL-SCNC: 141 MMOL/L — SIGNIFICANT CHANGE UP (ref 135–145)
SPECIMEN SOURCE: SIGNIFICANT CHANGE UP
WBC # BLD: 10.08 K/UL — SIGNIFICANT CHANGE UP (ref 3.8–10.5)
WBC # BLD: 9.79 K/UL — SIGNIFICANT CHANGE UP (ref 3.8–10.5)
WBC # FLD AUTO: 10.08 K/UL — SIGNIFICANT CHANGE UP (ref 3.8–10.5)
WBC # FLD AUTO: 9.79 K/UL — SIGNIFICANT CHANGE UP (ref 3.8–10.5)

## 2022-04-04 PROCEDURE — 99233 SBSQ HOSP IP/OBS HIGH 50: CPT

## 2022-04-04 RX ORDER — ERGOCALCIFEROL 1.25 MG/1
50000 CAPSULE ORAL
Refills: 0 | Status: DISCONTINUED | OUTPATIENT
Start: 2022-04-04 | End: 2022-04-19

## 2022-04-04 RX ORDER — MAGNESIUM SULFATE 500 MG/ML
2 VIAL (ML) INJECTION DAILY
Refills: 0 | Status: DISCONTINUED | OUTPATIENT
Start: 2022-04-04 | End: 2022-04-05

## 2022-04-04 RX ORDER — MIDODRINE HYDROCHLORIDE 2.5 MG/1
2.5 TABLET ORAL EVERY 8 HOURS
Refills: 0 | Status: DISCONTINUED | OUTPATIENT
Start: 2022-04-04 | End: 2022-04-06

## 2022-04-04 RX ADMIN — CHLORHEXIDINE GLUCONATE 1 APPLICATION(S): 213 SOLUTION TOPICAL at 09:56

## 2022-04-04 RX ADMIN — Medication 1 APPLICATION(S): at 05:14

## 2022-04-04 RX ADMIN — ENOXAPARIN SODIUM 40 MILLIGRAM(S): 100 INJECTION SUBCUTANEOUS at 17:13

## 2022-04-04 RX ADMIN — ERTAPENEM SODIUM 120 MILLIGRAM(S): 1 INJECTION, POWDER, LYOPHILIZED, FOR SOLUTION INTRAMUSCULAR; INTRAVENOUS at 16:41

## 2022-04-04 RX ADMIN — Medication 75 MICROGRAM(S): at 05:11

## 2022-04-04 RX ADMIN — MIDODRINE HYDROCHLORIDE 2.5 MILLIGRAM(S): 2.5 TABLET ORAL at 22:03

## 2022-04-04 RX ADMIN — INSULIN GLARGINE 30 UNIT(S): 100 INJECTION, SOLUTION SUBCUTANEOUS at 22:03

## 2022-04-04 RX ADMIN — NYSTATIN CREAM 1 APPLICATION(S): 100000 CREAM TOPICAL at 13:44

## 2022-04-04 RX ADMIN — Medication 8 UNIT(S): at 12:31

## 2022-04-04 RX ADMIN — Medication 4: at 18:17

## 2022-04-04 RX ADMIN — ERGOCALCIFEROL 50000 UNIT(S): 1.25 CAPSULE ORAL at 16:40

## 2022-04-04 RX ADMIN — ENOXAPARIN SODIUM 40 MILLIGRAM(S): 100 INJECTION SUBCUTANEOUS at 05:11

## 2022-04-04 RX ADMIN — Medication 25 GRAM(S): at 11:08

## 2022-04-04 RX ADMIN — Medication 8 UNIT(S): at 18:18

## 2022-04-04 RX ADMIN — Medication 1 APPLICATION(S): at 17:13

## 2022-04-04 RX ADMIN — Medication 650 MILLIGRAM(S): at 17:12

## 2022-04-04 RX ADMIN — NYSTATIN CREAM 1 APPLICATION(S): 100000 CREAM TOPICAL at 05:11

## 2022-04-04 RX ADMIN — NYSTATIN CREAM 1 APPLICATION(S): 100000 CREAM TOPICAL at 21:06

## 2022-04-04 RX ADMIN — Medication 8 UNIT(S): at 08:47

## 2022-04-04 NOTE — CHART NOTE - NSCHARTNOTEFT_GEN_A_CORE
Patient's AM Hgb revealed to be 6.8. Patient seen and examined at bedside. Patient was asymptomatic with no evidence of bleeding. Repeat stat CBC revealed Hgb to be 7.5. No transfusion needed at this time. Last type and screen 4/4. Will continue to monitor. Discussed with Dr. Rolon.

## 2022-04-04 NOTE — PROGRESS NOTE ADULT - SUBJECTIVE AND OBJECTIVE BOX
Layla Rolon MD   Hospitalist  Pager- [phone]    SUBJECTIVE / OVERNIGHT EVENTS: pt seen and examined by me   No fever overnight   No other symptoms    ADDITIONAL REVIEW OF SYSTEMS:    MEDICATIONS  (STANDING):  ascorbic acid 500 milliGRAM(s) Oral daily  chlorhexidine 2% Cloths 1 Application(s) Topical <User Schedule>  Dakins Solution - 1/4 Strength 1 Application(s) Topical two times a day  dextrose 5%. 1000 milliLiter(s) (50 mL/Hr) IV Continuous <Continuous>  dextrose 5%. 1000 milliLiter(s) (100 mL/Hr) IV Continuous <Continuous>  dextrose 50% Injectable 25 Gram(s) IV Push once  dextrose 50% Injectable 12.5 Gram(s) IV Push once  dextrose 50% Injectable 25 Gram(s) IV Push once  enoxaparin Injectable 40 milliGRAM(s) SubCutaneous every 12 hours  ergocalciferol 79648 Unit(s) Oral <User Schedule>  ertapenem  IVPB 1000 milliGRAM(s) IV Intermittent every 24 hours  glucagon  Injectable 1 milliGRAM(s) IntraMuscular once  insulin glargine Injectable (LANTUS) 30 Unit(s) SubCutaneous at bedtime  insulin lispro (ADMELOG) corrective regimen sliding scale   SubCutaneous three times a day before meals  insulin lispro (ADMELOG) corrective regimen sliding scale   SubCutaneous at bedtime  insulin lispro Injectable (ADMELOG) 8 Unit(s) SubCutaneous three times a day before meals  levothyroxine 75 MICROGram(s) Oral daily  magnesium sulfate  IVPB 2 Gram(s) IV Intermittent daily  midodrine 5 milliGRAM(s) Oral every 8 hours  multivitamin 1 Tablet(s) Oral daily  nystatin Powder 1 Application(s) Topical three times a day  sodium chloride 0.9%. 1000 milliLiter(s) (50 mL/Hr) IV Continuous <Continuous>    MEDICATIONS  (PRN):  acetaminophen     Tablet .. 650 milliGRAM(s) Oral every 6 hours PRN Temp greater or equal to 38C (100.4F), Mild Pain (1 - 3), Moderate Pain (4 - 6)  ALBUTerol    90 MICROgram(s) HFA Inhaler 2 Puff(s) Inhalation every 6 hours PRN Shortness of Breath  albuterol/ipratropium for Nebulization 3 milliLiter(s) Nebulizer every 6 hours PRN Shortness of Breath  dextrose Oral Gel 15 Gram(s) Oral once PRN Blood Glucose LESS THAN 70 milliGRAM(s)/deciliter  HYDROmorphone  Injectable 0.25 milliGRAM(s) IV Push once PRN Severe Pain (7 - 10)    CAPILLARY BLOOD GLUCOSE  POCT Blood Glucose.: 134 mg/dL (04 Apr 2022 11:55)  POCT Blood Glucose.: 132 mg/dL (04 Apr 2022 08:21)  POCT Blood Glucose.: 172 mg/dL (03 Apr 2022 21:07)  POCT Blood Glucose.: 217 mg/dL (03 Apr 2022 17:57)    PHYSICAL EXAM:  Vital Signs Last 24 Hrs  T(C): 37.3 (04 Apr 2022 14:00), Max: 37.3 (04 Apr 2022 14:00)  T(F): 99.2 (04 Apr 2022 14:00), Max: 99.2 (04 Apr 2022 14:00)  HR: 98 (04 Apr 2022 14:00) (93 - 110)  BP: 126/60 (04 Apr 2022 14:00) (111/70 - 165/82)  BP(mean): --  RR: 18 (04 Apr 2022 14:00) (17 - 18)  SpO2: 97% (04 Apr 2022 14:00) (96% - 100%)    CONSTITUTIONAL: NAD, obese   RESPIRATORY: Normal respiratory effort; decreased BS at base  CARDIOVASCULAR: Regular rate and rhythm, normal S1 and S2, no murmur/rub/gallop; No lower extremity edema; Peripheral pulses are 2+ bilaterally  ABDOMEN: Nontender to palpation, normoactive bowel sounds, no rebound/guarding; No hepatosplenomegaly  MUSCLOSKELETAL: no clubbing or cyanosis of digits; no joint swelling or tenderness to palpation  PSYCH: A+O to person, place, and time; affect appropriate  NEURO: Decreased strength more on right side compared to left( as per pt chronic)  - Primafit   SKIN:  Multiple wounds with dressing  present -Right knee, RLE  right posterior lower leg- 5cmx2.5cmx0  Beneath breast/abdominal folds - Dermatitis with  fissures  present  Dressing present  Right lateral thigh-.Stage 4 pressure ulcer - 8rkw5vbu8.5cm,  granular base, no purulent drainage, no odor. Periwound skin intact, no edema, no erythema, no increased warmth, no fluctuance noted. Right trochanter- stage 4 pressure injury- [id number], granular base, bone in close proximity.     LABS:                         7.5    10.08 )-----------( 507      ( 04 Apr 2022 10:02 )             26.1     04-04    141  |  109<H>  |  9   ----------------------------<  122<H>  3.6   |  20<L>  |  0.60    Ca    7.9<L>      04 Apr 2022 06:40  Phos  2.9     04-04  Mg     1.50     04-04    TPro  6.2  /  Alb  2.1<L>  /  TBili  <0.2  /  DBili  x   /  AST  24  /  ALT  9   /  AlkPhos  297<H>  04-04                  RADIOLOGY, EKG & ADDITIONAL TESTS: Reviewed.     Culture - Blood (collected 28 Mar 2022 08:26)  Source: .Blood Blood  Preliminary Report (29 Mar 2022 09:01):    No growth to date.    Culture - Urine (collected 28 Mar 2022 08:19)  Source: Catheterized Catheterized  Final Report (30 Mar 2022 08:53):    >100,000 CFU/ml Escherichia coli    >100,000 CFU/ml Escherichia coli #2    >=3 organisms. Probable collection contamination.    Normal Urogenital brayan present        RADIOLOGY & ADDITIONAL TESTS:  Results Reviewed:   Imaging Personally Reviewed:  Electrocardiogram Personally Reviewed:    COORDINATION OF CARE:  Care Discussed with Consultants/Other Providers [Y/N]:  Prior or Outpatient Records Reviewed [Y/N]:

## 2022-04-04 NOTE — PROGRESS NOTE ADULT - PROBLEM SELECTOR PLAN 7
TSH 3.86 wnl  Continue home synthroid 75mcg qD,    # Elevated Alk Phosphatase-GGT elevated  CT scan w/ gallstones, and hepatic steatosis  Abdominal US discontinued given hepatic and gallbladder imaging already performed this admission w/ benign abdominal exam

## 2022-04-04 NOTE — CHART NOTE - NSCHARTNOTEFT_GEN_A_CORE
Pt seen for nutrition consult- Nutrition Support Team.    Medical Course:  - Per chart, pt is 52 year old female PMHx type 2 DM, asthma, obesity, chronic immobility secondary to MS and several chronic wounds presenting for vulvar/R thigh wound. Pt admitted to MICU for suspected urosepsis requiring pressor support. Found to be incidentally COVID positive. Pt now downgraded to medicine floor for continued management.     Nutrition Course:  - Pt on RA at this time. Pt with improving PO intake since admission. Per chart, pt reported monitoring PO intake as wants to lose weight.   - History of type 2 DM. HbA1c (3/27/22) 14.2% indicating poor control. Medications PTA per chart inclusive of Metformin 1000 mg BID, Amaryl premeal (unknown dose) and Januvia 100 mg qD. Pt unable to administer insulin independently, possible plan for discharge to rehab. Prior to admit, pt was living with brother at home (who assisted with meal preparation). Pt was provided with nutrition education on diabetic diet during initial RDN assessment (3/29), however pt was unable to teach back material secondary to poor health literacy. Nutrition education deferred at this time given current clinical condition.  - Per chart, pt with episode of diarrhea following consumption of nutrition supplement. Last BM today per flowsheets, fecal incontinence noted.  - Labs notable for low magnesium, repleted. Additionally with low vitamin D, being supplemented.     Diet Prescription:   - Consistent Carbohydrate Renal w/Evening Snack  - Nepro 1 PO 2x daily    Food Allergies:  - Pineapple    Pertinent Medications:  - ascorbic acid, ergocalciferol, LANTUS 30U qHS, ADMELOG corrective regimen sliding scale, ADMELOG 8U TID, levothyroxine, magnesium sulfate IVPB, midodrine, multivitamin, sodium chloride 0.9% IV Continuous    Pertinent Labs:   - (4/4) Na 141 mmol/L Glu 122 mg/dL<H> K+ 3.6 mmol/L Cr 0.60 mg/dL BUN 9 mg/dL Phos 2.9 mg/dL Alb 2.1 g/dL<L>  - (3/27) HbA1c 14.2%<H>  - POCT: (4/4) 132-134, (4/3) 130-217    Weight: (3/28 dosing) 242.5 lbs / 110.0 kg  Height: 65 in / 165.1 cm  IBW: 125 lbs / 56.6 kg +/-10%  BMI: 40.3 kg/m^2 (at dosing weight)    Physical Assessment:  Edema, per flowsheets: 1+ generalized  Pressure Injury, per Wound Care NP note (3/29/22): R knee deep tissue pressure injury, R lateral lower leg deep tissue pressure injury, R posterior lower leg deep tissue pressure injury, L heel deep tissue pressure injury, R lateral thigh stage 4 pressure injury, R trochanter stage 4 pressure injury, R buttock unstageable pressure injury    Estimated Needs:   [X] No change since previous assessment, based on ideal body weight 125 lbs / 56.6 kg  1480-6794 kcal daily @30-35 kcal/kg, 90..2 gm protein daily @1.6-2.0 gm/kg     Previous Nutrition Diagnosis: [X] Increased nutrient needs, ongoing [X] Altered nutrition related lab values, ongoing [X] Food and nutrition related knowledge deficit, ongoing  New Nutrition Diagnosis: [X] Overweight/Obesity related to excessive caloric intake, limited physical expenditure as evidenced by BMI 40.3 kg/m^2.     Interventions:   1) Recommend change diet to consistent carbohydrate with evening snack; may d/c Renal restriction.   2) Recommend Glucerna 1 PO 2x daily (provides 220 kcal, 10 gm protein per 8oz serving); may d/c Nepro.  3) Encourage PO intake as tolerated, with prioritization of protein dense tray items.  4) May continue micronutrient supplementation.  5) Obtain weekly weights.  6) RDN remains available to provide nutrition education as appropriate.    Monitor & Evaluate:  PO intake, tolerance to diet/supplement, nutrition related lab values, weight trends, BMs/GI distress, hydration status, skin integrity.  Roma Sotelo RDN, CDN #[id number]

## 2022-04-04 NOTE — CHART NOTE - NSCHARTNOTEFT_GEN_A_CORE
FS reviewed. Continue Lantus 30 Units HS and Admelog 8 Units TIDAC plus moderate scale. Would also start ergocalciferol [id number] weekly X 8 weeks for Vitamin D deficiency.  Endocrine team will follow.    Janae Quach DO

## 2022-04-04 NOTE — PROGRESS NOTE ADULT - PROBLEM SELECTOR PLAN 1
On admission , pt with septic shock requiring Levophed on admission   Sepsis suspect secondary to UT/Pyelo I(Patient w/ purulent drainage from [facility]) Other possibility is secondary to multiple skin ulcers  Fever of 102.3 on 4/1 overnight, No fever now   3/28- BC x 1-Staph epi. Repeat BC 3/20- NGTD  UC- ESBL Ecoli . UA-  large LE. COVID positive    Lactate- 4.7--> 1.8  CT A/P: Large soft tissue defect/ulceration in the lateral right thigh with associated subcutaneous inflammatory changes. Circumferential bladder wall thickening with adjacent inflammation most concerning for cystitis. In addition, there is mild greater than right hydronephrosis and subtle perinephric and trace prevertebral fat stranding, cannot exclude ascending infection and recommend correlation with clinical examination laboratory findings to exclude pyelonephritis.    Was on  Vanc/Zosyn, transitioned to Ertapenem given ESBL ( 3/31-)   On Midodrine 20mg PO Q 8hrs( was on pressors in MICU),  taper  to 15mg PO Q 8 from 4/1, further taper to 10mg Q 8 on 4/2 and 5mg Q 8 on 4/3. Taper to 2.5mg Q8 4/4.   Appreciate ID / Wound care recs

## 2022-04-05 LAB
ALBUMIN SERPL ELPH-MCNC: 2 G/DL — LOW (ref 3.3–5)
ALP SERPL-CCNC: 255 U/L — HIGH (ref 40–120)
ALT FLD-CCNC: 9 U/L — SIGNIFICANT CHANGE UP (ref 4–33)
ANION GAP SERPL CALC-SCNC: 14 MMOL/L — SIGNIFICANT CHANGE UP (ref 7–14)
AST SERPL-CCNC: 19 U/L — SIGNIFICANT CHANGE UP (ref 4–32)
BILIRUB SERPL-MCNC: <0.2 MG/DL — SIGNIFICANT CHANGE UP (ref 0.2–1.2)
BUN SERPL-MCNC: 11 MG/DL — SIGNIFICANT CHANGE UP (ref 7–23)
CALCIUM SERPL-MCNC: 7.9 MG/DL — LOW (ref 8.4–10.5)
CHLORIDE SERPL-SCNC: 105 MMOL/L — SIGNIFICANT CHANGE UP (ref 98–107)
CO2 SERPL-SCNC: 20 MMOL/L — LOW (ref 22–31)
CREAT SERPL-MCNC: 0.64 MG/DL — SIGNIFICANT CHANGE UP (ref 0.5–1.3)
EGFR: 108 ML/MIN/1.73M2 — SIGNIFICANT CHANGE UP
GLUCOSE BLDC GLUCOMTR-MCNC: 229 MG/DL — HIGH (ref 70–99)
GLUCOSE SERPL-MCNC: 269 MG/DL — HIGH (ref 70–99)
HCT VFR BLD CALC: 22.8 % — LOW (ref 34.5–45)
HCT VFR BLD CALC: 24 % — LOW (ref 34.5–45)
HCT VFR BLD CALC: 29.1 % — LOW (ref 34.5–45)
HGB BLD-MCNC: 6.6 G/DL — CRITICAL LOW (ref 11.5–15.5)
HGB BLD-MCNC: 6.9 G/DL — CRITICAL LOW (ref 11.5–15.5)
HGB BLD-MCNC: 8.7 G/DL — LOW (ref 11.5–15.5)
MAGNESIUM SERPL-MCNC: 1.5 MG/DL — LOW (ref 1.6–2.6)
MCHC RBC-ENTMCNC: 25 PG — LOW (ref 27–34)
MCHC RBC-ENTMCNC: 25.4 PG — LOW (ref 27–34)
MCHC RBC-ENTMCNC: 25.4 PG — LOW (ref 27–34)
MCHC RBC-ENTMCNC: 28.8 GM/DL — LOW (ref 32–36)
MCHC RBC-ENTMCNC: 28.9 GM/DL — LOW (ref 32–36)
MCHC RBC-ENTMCNC: 29.9 GM/DL — LOW (ref 32–36)
MCV RBC AUTO: 85.1 FL — SIGNIFICANT CHANGE UP (ref 80–100)
MCV RBC AUTO: 86.4 FL — SIGNIFICANT CHANGE UP (ref 80–100)
MCV RBC AUTO: 88.2 FL — SIGNIFICANT CHANGE UP (ref 80–100)
NRBC # BLD: 0 /100 WBCS — SIGNIFICANT CHANGE UP
NRBC # FLD: 0 K/UL — SIGNIFICANT CHANGE UP
PHOSPHATE SERPL-MCNC: 3.8 MG/DL — SIGNIFICANT CHANGE UP (ref 2.5–4.5)
PLATELET # BLD AUTO: 558 K/UL — HIGH (ref 150–400)
PLATELET # BLD AUTO: 573 K/UL — HIGH (ref 150–400)
PLATELET # BLD AUTO: 628 K/UL — HIGH (ref 150–400)
POTASSIUM SERPL-MCNC: 3.3 MMOL/L — LOW (ref 3.5–5.3)
POTASSIUM SERPL-SCNC: 3.3 MMOL/L — LOW (ref 3.5–5.3)
PROT SERPL-MCNC: 6 G/DL — SIGNIFICANT CHANGE UP (ref 6–8.3)
RBC # BLD: 2.64 M/UL — LOW (ref 3.8–5.2)
RBC # BLD: 2.72 M/UL — LOW (ref 3.8–5.2)
RBC # BLD: 3.42 M/UL — LOW (ref 3.8–5.2)
RBC # FLD: 16.1 % — HIGH (ref 10.3–14.5)
RBC # FLD: 16.3 % — HIGH (ref 10.3–14.5)
RBC # FLD: 16.4 % — HIGH (ref 10.3–14.5)
SODIUM SERPL-SCNC: 139 MMOL/L — SIGNIFICANT CHANGE UP (ref 135–145)
WBC # BLD: 7.75 K/UL — SIGNIFICANT CHANGE UP (ref 3.8–10.5)
WBC # BLD: 8.57 K/UL — SIGNIFICANT CHANGE UP (ref 3.8–10.5)
WBC # BLD: 9.52 K/UL — SIGNIFICANT CHANGE UP (ref 3.8–10.5)
WBC # FLD AUTO: 7.75 K/UL — SIGNIFICANT CHANGE UP (ref 3.8–10.5)
WBC # FLD AUTO: 8.57 K/UL — SIGNIFICANT CHANGE UP (ref 3.8–10.5)
WBC # FLD AUTO: 9.52 K/UL — SIGNIFICANT CHANGE UP (ref 3.8–10.5)

## 2022-04-05 PROCEDURE — 99233 SBSQ HOSP IP/OBS HIGH 50: CPT

## 2022-04-05 PROCEDURE — 99232 SBSQ HOSP IP/OBS MODERATE 35: CPT

## 2022-04-05 RX ORDER — MAGNESIUM SULFATE 500 MG/ML
2 VIAL (ML) INJECTION ONCE
Refills: 0 | Status: DISCONTINUED | OUTPATIENT
Start: 2022-04-05 | End: 2022-04-05

## 2022-04-05 RX ORDER — POTASSIUM CHLORIDE 20 MEQ
40 PACKET (EA) ORAL ONCE
Refills: 0 | Status: COMPLETED | OUTPATIENT
Start: 2022-04-05 | End: 2022-04-05

## 2022-04-05 RX ORDER — INSULIN LISPRO 100/ML
9 VIAL (ML) SUBCUTANEOUS
Refills: 0 | Status: DISCONTINUED | OUTPATIENT
Start: 2022-04-05 | End: 2022-04-12

## 2022-04-05 RX ADMIN — Medication 1 APPLICATION(S): at 19:26

## 2022-04-05 RX ADMIN — INSULIN GLARGINE 30 UNIT(S): 100 INJECTION, SOLUTION SUBCUTANEOUS at 21:42

## 2022-04-05 RX ADMIN — ENOXAPARIN SODIUM 40 MILLIGRAM(S): 100 INJECTION SUBCUTANEOUS at 18:04

## 2022-04-05 RX ADMIN — CHLORHEXIDINE GLUCONATE 1 APPLICATION(S): 213 SOLUTION TOPICAL at 11:38

## 2022-04-05 RX ADMIN — Medication 25 GRAM(S): at 12:21

## 2022-04-05 RX ADMIN — NYSTATIN CREAM 1 APPLICATION(S): 100000 CREAM TOPICAL at 21:42

## 2022-04-05 RX ADMIN — Medication 8 UNIT(S): at 09:30

## 2022-04-05 RX ADMIN — ENOXAPARIN SODIUM 40 MILLIGRAM(S): 100 INJECTION SUBCUTANEOUS at 05:33

## 2022-04-05 RX ADMIN — Medication 2: at 13:54

## 2022-04-05 RX ADMIN — MIDODRINE HYDROCHLORIDE 2.5 MILLIGRAM(S): 2.5 TABLET ORAL at 05:34

## 2022-04-05 RX ADMIN — Medication 9 UNIT(S): at 18:16

## 2022-04-05 RX ADMIN — Medication 4: at 09:30

## 2022-04-05 RX ADMIN — ERTAPENEM SODIUM 120 MILLIGRAM(S): 1 INJECTION, POWDER, LYOPHILIZED, FOR SOLUTION INTRAMUSCULAR; INTRAVENOUS at 19:25

## 2022-04-05 RX ADMIN — Medication 75 MICROGRAM(S): at 05:33

## 2022-04-05 RX ADMIN — NYSTATIN CREAM 1 APPLICATION(S): 100000 CREAM TOPICAL at 13:53

## 2022-04-05 RX ADMIN — Medication 40 MILLIEQUIVALENT(S): at 16:08

## 2022-04-05 RX ADMIN — Medication 650 MILLIGRAM(S): at 12:05

## 2022-04-05 RX ADMIN — Medication 500 MILLIGRAM(S): at 11:40

## 2022-04-05 RX ADMIN — Medication 8 UNIT(S): at 13:52

## 2022-04-05 RX ADMIN — MIDODRINE HYDROCHLORIDE 2.5 MILLIGRAM(S): 2.5 TABLET ORAL at 13:58

## 2022-04-05 RX ADMIN — Medication 2: at 18:15

## 2022-04-05 RX ADMIN — Medication 650 MILLIGRAM(S): at 11:38

## 2022-04-05 RX ADMIN — Medication 1 TABLET(S): at 11:39

## 2022-04-05 NOTE — CHART NOTE - NSCHARTNOTEFT_GEN_A_CORE
Patients AM Hgb 4/5 found to be 6.9. Repeat stat CBC revealed Hgb 6.6 Patient seen and observed at bedside, is asymptomatic. Denies any lightheadedness or dizziness. Will transfuse at this time to maintain Hgb >7. Patient consent in chart. Risks and benefits discussed with patient. Type and screen active.     Pt has no phx of ESRD, malignancy or thyroid disease. Heme onc consulted for consistent anemia.  As per resident, pt's COVID status, pyelonephritis and chronic conditions can be sources of anemia. Recommends transfusion and supportive care.

## 2022-04-05 NOTE — PROGRESS NOTE ADULT - PROBLEM SELECTOR PLAN 5
BMI 40.4  Also with low albumin  Pt reports she doesn't eat much as she is trying to lose weight   Nutrition consult

## 2022-04-05 NOTE — PROGRESS NOTE ADULT - ASSESSMENT
51 yo F hx of uncontrolled T2DM, asthma, obesity, chronic immobility 2/2 MS and several chronic wounds presenting for vulvar/R thigh wound found to be hyperglycemic + metabolic acidosis w/ purulent urine c/f urosepsis + ARF.    Consulted for: Uncontrolled T2DM    #Uncontrolled T2DM  A1c 14.2%. Goal < 7%  Inpatient FS goal 140-180  Home regimen: Takes Metformin 1g BID, Amaryl before meals (doesn't know dose),and Januvia 100 mg daily    Recs:   -Lantus 30 units qHS  -Admelog 9 Units TIDAC  -moderate dose correctional scale qAC and qHS  -consistent carb diet  -FS qAC and qHS  -RD consult and insulin pen teaching (ordered)   For d/c:   -basal/bolus + GLP -1 agonist  -For coverage purpose, please send Lantus/Basaglar/Tresiba/Semglee/Toujeo 10 units at bedtime and also Admelog/Humalog/Novolog 3 units before meals to [facility]. Can start by send one of each and discuss with pharmacy which is covered.   -Please send Trulicity 0.75 mg subq/weekly or Ozempic 0.25 mg subq/weekly to pharmacy to see if covered. If covered, can send either to pharmacy on d/c. If sending Trulicity, please write script for 0.75 mg subq/weekly x 4 weeks. If sending Ozempic, please send 0.25 mg subq/weekly x 4 weeks  -Can fu with Endocrine Practice at [address] [place] [address], [address], [place] [address]; Ph # [phone]    #Hypothyroidism  TSH 3.82  -Continue LT4 75 mcg daily    #HTN  BP Goal < 130/80  Continue management per primary team    #HLD  LDL goal < 70  Statin if no contraindications      Janae Quach DO

## 2022-04-05 NOTE — PROGRESS NOTE ADULT - PROBLEM SELECTOR PROBLEM 1
Continue cephalexin. I have added bactrim to treatment. If no improvement, follow up.    Diabetes type 2, uncontrolled

## 2022-04-05 NOTE — PROGRESS NOTE ADULT - ASSESSMENT
51 y/o F PMHx MS, T2DM, asthma, obesity, chronic immobility, and chronic wounds, presented 3/28 for R thigh wound, worsening over last several months. Admitted to MICU for suspected urosepsis requiring pressor support. UA with pyuria and urine culture growing multiple strains of e.coli, including ESBL. Found to be incidentally COVID positive. Downgraded to medicine floor.    Impression:  #Suspected UTI w/ ESBL - denied urinary symptoms, but was hypotensive requiring pressor support on admission with pyuria, ESBL e.coli on urine culture, and radiographic findings supportive of ?pyelo. Treating for for ESBL pyelo 3/31-    Leukocytosis - 2/2 UTI/pyelo.  WBC normal now.  #R Thigh Wound - chronic wounds, no surrounding cellulitis. No collections noted on CT  #Blood Culture growing CoNS - thus far in 1 bottle, >48 hours incubation time. Likely represents contaminant  #COVID - asymptomatic  # Fever - intermittent low grade.  blood culture 3/30 negative   clinically stable    Recs:  - c/w ertapenem 1g IV q24H --> 4/8  - wound care R thigh  - monitor temp

## 2022-04-05 NOTE — PROGRESS NOTE ADULT - PROBLEM SELECTOR PLAN 1
On admission , pt with septic shock requiring Levophed on admission   Sepsis suspect secondary to UT/Pyelo I(Patient w/ purulent drainage from [facility]) Other possibility is secondary to multiple skin ulcers  Fever of 102.3 on 4/1 overnight, Febrile overnight 4/4  3/28- BC x 1-Staph epi. Repeat BC 3/20- NGTD  UC- ESBL Ecoli . UA-  large LE. COVID positive    Lactate- 4.7--> 1.8  CT A/P: Large soft tissue defect/ulceration in the lateral right thigh with associated subcutaneous inflammatory changes. Circumferential bladder wall thickening with adjacent inflammation most concerning for cystitis. In addition, there is mild greater than right hydronephrosis and subtle perinephric and trace prevertebral fat stranding, cannot exclude ascending infection and recommend correlation with clinical examination laboratory findings to exclude pyelonephritis.    Was on  Vanc/Zosyn, transitioned to Ertapenem given ESBL ( 3/31-) planned course through 4/8 per ID  On Midodrine 20mg PO Q 8hrs( was on pressors in MICU),  taper  to 15mg PO Q 8 from 4/1, further taper to 10mg Q 8 on 4/2 and 5mg Q 8 on 4/3. Taper to 2.5mg Q8 4/4.   Appreciate ID / Wound care recs

## 2022-04-05 NOTE — PROGRESS NOTE ADULT - SUBJECTIVE AND OBJECTIVE BOX
Chief Complaint: T2DM    History: Patient reports that her FS have been running higher since Glucerna was added to her regimen.     MEDICATIONS  (STANDING):  ascorbic acid 500 milliGRAM(s) Oral daily  chlorhexidine 2% Cloths 1 Application(s) Topical <User Schedule>  Dakins Solution - 1/4 Strength 1 Application(s) Topical two times a day  dextrose 5%. 1000 milliLiter(s) (50 mL/Hr) IV Continuous <Continuous>  dextrose 5%. 1000 milliLiter(s) (100 mL/Hr) IV Continuous <Continuous>  dextrose 50% Injectable 25 Gram(s) IV Push once  dextrose 50% Injectable 12.5 Gram(s) IV Push once  dextrose 50% Injectable 25 Gram(s) IV Push once  enoxaparin Injectable 40 milliGRAM(s) SubCutaneous every 12 hours  ergocalciferol 74193 Unit(s) Oral <User Schedule>  ertapenem  IVPB 1000 milliGRAM(s) IV Intermittent every 24 hours  glucagon  Injectable 1 milliGRAM(s) IntraMuscular once  insulin glargine Injectable (LANTUS) 30 Unit(s) SubCutaneous at bedtime  insulin lispro (ADMELOG) corrective regimen sliding scale   SubCutaneous three times a day before meals  insulin lispro (ADMELOG) corrective regimen sliding scale   SubCutaneous at bedtime  insulin lispro Injectable (ADMELOG) 8 Unit(s) SubCutaneous three times a day before meals  levothyroxine 75 MICROGram(s) Oral daily  midodrine 2.5 milliGRAM(s) Oral every 8 hours  multivitamin 1 Tablet(s) Oral daily  nystatin Powder 1 Application(s) Topical three times a day  sodium chloride 0.9%. 1000 milliLiter(s) (50 mL/Hr) IV Continuous <Continuous>    MEDICATIONS  (PRN):  acetaminophen     Tablet .. 650 milliGRAM(s) Oral every 6 hours PRN Temp greater or equal to 38C (100.4F), Mild Pain (1 - 3), Moderate Pain (4 - 6)  ALBUTerol    90 MICROgram(s) HFA Inhaler 2 Puff(s) Inhalation every 6 hours PRN Shortness of Breath  albuterol/ipratropium for Nebulization 3 milliLiter(s) Nebulizer every 6 hours PRN Shortness of Breath  dextrose Oral Gel 15 Gram(s) Oral once PRN Blood Glucose LESS THAN 70 milliGRAM(s)/deciliter      Allergies    No Known Drug Allergies  Pineapple (Anaphylaxis)    Intolerances      Review of Systems:  Constitutional: No fever  Eyes: No blurry vision  Neuro: No tremors  HEENT: No pain  Cardiovascular: No chest pain, palpitations  Respiratory: No SOB, no cough  GI: No nausea, vomiting, abdominal pain  : No dysuria  Skin: no rash  Psych: no depression  Endocrine: no polyuria, polydipsia  Hem/lymph: no swelling  Osteoporosis: no fractures    ALL OTHER SYSTEMS REVIEWED AND NEGATIVE        PHYSICAL EXAM:  VITALS: T(C): 37.1 (04-05-22 @ 15:45)  T(F): 98.8 (04-05-22 @ 15:45), Max: 100.4 (04-04-22 @ 17:00)  HR: 89 (04-05-22 @ 15:45) (89 - 105)  BP: 124/52 (04-05-22 @ 15:45) (115/70 - 152/72)  RR:  (16 - 19)  SpO2:  (97% - 100%)  Wt(kg): --  GENERAL: NAD, well-groomed, well-developed  EYES: No proptosis, no lid lag, anicteric  HEENT:  Atraumatic, Normocephalic, moist mucous membranes  RESPIRATORY: Clear to auscultation bilaterally  CARDIOVASCULAR: Regular rate and rhythm  GI: Soft, nontender, non distended, normal bowel sounds  PSYCH: Alert and oriented x 3, normal affect, normal mood      POCT Blood Glucose.: 229 mg/dL (04-05-22 @ 08:44)  POCT Blood Glucose.: 224 mg/dL (04-04-22 @ 21:39)  POCT Blood Glucose.: 231 mg/dL (04-04-22 @ 17:55)  POCT Blood Glucose.: 134 mg/dL (04-04-22 @ 11:55)  POCT Blood Glucose.: 132 mg/dL (04-04-22 @ 08:21)  POCT Blood Glucose.: 172 mg/dL (04-03-22 @ 21:07)  POCT Blood Glucose.: 217 mg/dL (04-03-22 @ 17:57)  POCT Blood Glucose.: 130 mg/dL (04-03-22 @ 12:42)  POCT Blood Glucose.: 142 mg/dL (04-03-22 @ 08:43)  POCT Blood Glucose.: 94 mg/dL (04-02-22 @ 21:30)  POCT Blood Glucose.: 133 mg/dL (04-02-22 @ 18:06)      04-05    139  |  105  |  11  ----------------------------<  269<H>  3.3<L>   |  20<L>  |  0.64    EGFR if : x   EGFR if non : x     Ca    7.9<L>      04-05  Mg     1.50     04-05  Phos  3.8     04-05    TPro  6.0  /  Alb  2.0<L>  /  TBili  <0.2  /  DBili  x   /  AST  19  /  ALT  9   /  AlkPhos  255<H>  04-05          Thyroid Function Tests:  03-30 @ 02:39 TSH -- FreeT4 1.0 T3 -- Anti TPO -- Anti Thyroglobulin Ab -- TSI --  03-28 @ 12:53 TSH 3.86 FreeT4 -- T3 -- Anti TPO -- Anti Thyroglobulin Ab -- TSI --

## 2022-04-05 NOTE — PROGRESS NOTE ADULT - SUBJECTIVE AND OBJECTIVE BOX
Layla Rolon MD   Hospitalist  Pager- [phone]    SUBJECTIVE / OVERNIGHT EVENTS: patient seen and examined by me. Patient w/ fever overnight. ID notified. Continue w/ ertapenem 4/8 likely fever 2/2 to COVID. Patient also now w/ worsening anemia w/o blood loss requiring blood transfusion Anemia studies consistent w/ AOCD. Patient otherwise w/o complaints. ROS otherwise negative.     MEDICATIONS  (STANDING):  ascorbic acid 500 milliGRAM(s) Oral daily  chlorhexidine 2% Cloths 1 Application(s) Topical <User Schedule>  Dakins Solution - 1/4 Strength 1 Application(s) Topical two times a day  dextrose 5%. 1000 milliLiter(s) (50 mL/Hr) IV Continuous <Continuous>  dextrose 5%. 1000 milliLiter(s) (100 mL/Hr) IV Continuous <Continuous>  dextrose 50% Injectable 25 Gram(s) IV Push once  dextrose 50% Injectable 12.5 Gram(s) IV Push once  dextrose 50% Injectable 25 Gram(s) IV Push once  enoxaparin Injectable 40 milliGRAM(s) SubCutaneous every 12 hours  ergocalciferol 04691 Unit(s) Oral <User Schedule>  ertapenem  IVPB 1000 milliGRAM(s) IV Intermittent every 24 hours  glucagon  Injectable 1 milliGRAM(s) IntraMuscular once  insulin glargine Injectable (LANTUS) 30 Unit(s) SubCutaneous at bedtime  insulin lispro (ADMELOG) corrective regimen sliding scale   SubCutaneous three times a day before meals  insulin lispro (ADMELOG) corrective regimen sliding scale   SubCutaneous at bedtime  insulin lispro Injectable (ADMELOG) 8 Unit(s) SubCutaneous three times a day before meals  levothyroxine 75 MICROGram(s) Oral daily  midodrine 2.5 milliGRAM(s) Oral every 8 hours  multivitamin 1 Tablet(s) Oral daily  nystatin Powder 1 Application(s) Topical three times a day  sodium chloride 0.9%. 1000 milliLiter(s) (50 mL/Hr) IV Continuous <Continuous>    MEDICATIONS  (PRN):  acetaminophen     Tablet .. 650 milliGRAM(s) Oral every 6 hours PRN Temp greater or equal to 38C (100.4F), Mild Pain (1 - 3), Moderate Pain (4 - 6)  ALBUTerol    90 MICROgram(s) HFA Inhaler 2 Puff(s) Inhalation every 6 hours PRN Shortness of Breath  albuterol/ipratropium for Nebulization 3 milliLiter(s) Nebulizer every 6 hours PRN Shortness of Breath  dextrose Oral Gel 15 Gram(s) Oral once PRN Blood Glucose LESS THAN 70 milliGRAM(s)/deciliter    CAPILLARY BLOOD GLUCOSE  POCT Blood Glucose.: 229 mg/dL (05 Apr 2022 08:44)  POCT Blood Glucose.: 224 mg/dL (04 Apr 2022 21:39)    PHYSICAL EXAM:  Vital Signs Last 24 Hrs  T(C): 37.1 (05 Apr 2022 15:45), Max: 37.9 (05 Apr 2022 05:30)  T(F): 98.8 (05 Apr 2022 15:45), Max: 100.2 (05 Apr 2022 05:30)  HR: 89 (05 Apr 2022 15:45) (89 - 105)  BP: 124/52 (05 Apr 2022 15:45) (115/70 - 152/72)  BP(mean): --  RR: 17 (05 Apr 2022 15:45) (16 - 19)  SpO2: 100% (05 Apr 2022 15:45) (97% - 100%)    CONSTITUTIONAL: NAD, obese   RESPIRATORY: Normal respiratory effort; decreased BS at base  CARDIOVASCULAR: Regular rate and rhythm, normal S1 and S2, no murmur/rub/gallop; No lower extremity edema; Peripheral pulses are 2+ bilaterally  ABDOMEN: Nontender to palpation, normoactive bowel sounds, no rebound/guarding; No hepatosplenomegaly  MUSCLOSKELETAL: no clubbing or cyanosis of digits; no joint swelling or tenderness to palpation  PSYCH: A+O to person, place, and time; affect appropriate  NEURO: Decreased strength more on right side compared to left( as per pt chronic)  - Primafit   SKIN:  Multiple wounds with dressing  present -Right knee, RLE  right posterior lower leg- 5cmx2.5cmx0  Beneath breast/abdominal folds - Dermatitis with  fissures  present  Dressing present  Right lateral thigh-.Stage 4 pressure ulcer - [id number],  granular base, no purulent drainage, no odor. Periwound skin intact, no edema, no erythema, no increased warmth, no fluctuance noted. Right trochanter- stage 4 pressure injury- [id number], granular base, bone in close proximity.     LABS:                         6.6    8.57  )-----------( 558      ( 05 Apr 2022 10:52 )             22.8     04-05    139  |  105  |  11  ----------------------------<  269<H>  3.3<L>   |  20<L>  |  0.64    Ca    7.9<L>      05 Apr 2022 07:40  Phos  3.8     04-05  Mg     1.50     04-05    TPro  6.0  /  Alb  2.0<L>  /  TBili  <0.2  /  DBili  x   /  AST  19  /  ALT  9   /  AlkPhos  255<H>  04-05                  RADIOLOGY, EKG & ADDITIONAL TESTS: Reviewed.     Culture - Blood (collected 28 Mar 2022 08:26)  Source: .Blood Blood  Preliminary Report (29 Mar 2022 09:01):    No growth to date.    Culture - Urine (collected 28 Mar 2022 08:19)  Source: Catheterized Catheterized  Final Report (30 Mar 2022 08:53):    >100,000 CFU/ml Escherichia coli    >100,000 CFU/ml Escherichia coli #2    >=3 organisms. Probable collection contamination.    Normal Urogenital brayan present        RADIOLOGY & ADDITIONAL TESTS:  Results Reviewed:   Imaging Personally Reviewed:  Electrocardiogram Personally Reviewed:    COORDINATION OF CARE:  Care Discussed with Consultants/Other Providers [Y/N]:  Prior or Outpatient Records Reviewed [Y/N]:

## 2022-04-06 LAB
ALBUMIN SERPL ELPH-MCNC: 2.1 G/DL — LOW (ref 3.3–5)
ALP SERPL-CCNC: 215 U/L — HIGH (ref 40–120)
ALT FLD-CCNC: 9 U/L — SIGNIFICANT CHANGE UP (ref 4–33)
ANION GAP SERPL CALC-SCNC: 14 MMOL/L — SIGNIFICANT CHANGE UP (ref 7–14)
AST SERPL-CCNC: 19 U/L — SIGNIFICANT CHANGE UP (ref 4–32)
BASOPHILS # BLD AUTO: 0.01 K/UL — SIGNIFICANT CHANGE UP (ref 0–0.2)
BASOPHILS NFR BLD AUTO: 0.1 % — SIGNIFICANT CHANGE UP (ref 0–2)
BILIRUB SERPL-MCNC: <0.2 MG/DL — SIGNIFICANT CHANGE UP (ref 0.2–1.2)
BUN SERPL-MCNC: 10 MG/DL — SIGNIFICANT CHANGE UP (ref 7–23)
CALCIUM SERPL-MCNC: 8.2 MG/DL — LOW (ref 8.4–10.5)
CHLORIDE SERPL-SCNC: 109 MMOL/L — HIGH (ref 98–107)
CO2 SERPL-SCNC: 19 MMOL/L — LOW (ref 22–31)
CREAT SERPL-MCNC: 0.6 MG/DL — SIGNIFICANT CHANGE UP (ref 0.5–1.3)
EGFR: 109 ML/MIN/1.73M2 — SIGNIFICANT CHANGE UP
EOSINOPHIL # BLD AUTO: 0.11 K/UL — SIGNIFICANT CHANGE UP (ref 0–0.5)
EOSINOPHIL NFR BLD AUTO: 1.5 % — SIGNIFICANT CHANGE UP (ref 0–6)
GLUCOSE SERPL-MCNC: 162 MG/DL — HIGH (ref 70–99)
HCT VFR BLD CALC: 26.3 % — LOW (ref 34.5–45)
HGB BLD-MCNC: 7.9 G/DL — LOW (ref 11.5–15.5)
IANC: 4.09 K/UL — SIGNIFICANT CHANGE UP (ref 1.8–7.4)
IMM GRANULOCYTES NFR BLD AUTO: 2.2 % — HIGH (ref 0–1.5)
LYMPHOCYTES # BLD AUTO: 2.21 K/UL — SIGNIFICANT CHANGE UP (ref 1–3.3)
LYMPHOCYTES # BLD AUTO: 30.7 % — SIGNIFICANT CHANGE UP (ref 13–44)
MAGNESIUM SERPL-MCNC: 1.3 MG/DL — LOW (ref 1.6–2.6)
MCHC RBC-ENTMCNC: 25.6 PG — LOW (ref 27–34)
MCHC RBC-ENTMCNC: 30 GM/DL — LOW (ref 32–36)
MCV RBC AUTO: 85.4 FL — SIGNIFICANT CHANGE UP (ref 80–100)
MONOCYTES # BLD AUTO: 0.63 K/UL — SIGNIFICANT CHANGE UP (ref 0–0.9)
MONOCYTES NFR BLD AUTO: 8.7 % — SIGNIFICANT CHANGE UP (ref 2–14)
NEUTROPHILS # BLD AUTO: 4.09 K/UL — SIGNIFICANT CHANGE UP (ref 1.8–7.4)
NEUTROPHILS NFR BLD AUTO: 56.8 % — SIGNIFICANT CHANGE UP (ref 43–77)
NRBC # BLD: 0 /100 WBCS — SIGNIFICANT CHANGE UP
NRBC # FLD: 0 K/UL — SIGNIFICANT CHANGE UP
PHOSPHATE SERPL-MCNC: 3.5 MG/DL — SIGNIFICANT CHANGE UP (ref 2.5–4.5)
PLATELET # BLD AUTO: 600 K/UL — HIGH (ref 150–400)
POTASSIUM SERPL-MCNC: 3.6 MMOL/L — SIGNIFICANT CHANGE UP (ref 3.5–5.3)
POTASSIUM SERPL-SCNC: 3.6 MMOL/L — SIGNIFICANT CHANGE UP (ref 3.5–5.3)
PROT SERPL-MCNC: 5.8 G/DL — LOW (ref 6–8.3)
RBC # BLD: 3.08 M/UL — LOW (ref 3.8–5.2)
RBC # FLD: 16.5 % — HIGH (ref 10.3–14.5)
SODIUM SERPL-SCNC: 142 MMOL/L — SIGNIFICANT CHANGE UP (ref 135–145)
WBC # BLD: 7.21 K/UL — SIGNIFICANT CHANGE UP (ref 3.8–10.5)
WBC # FLD AUTO: 7.21 K/UL — SIGNIFICANT CHANGE UP (ref 3.8–10.5)

## 2022-04-06 PROCEDURE — 99232 SBSQ HOSP IP/OBS MODERATE 35: CPT

## 2022-04-06 RX ORDER — INSULIN GLARGINE 100 [IU]/ML
24 INJECTION, SOLUTION SUBCUTANEOUS ONCE
Refills: 0 | Status: COMPLETED | OUTPATIENT
Start: 2022-04-06 | End: 2022-04-06

## 2022-04-06 RX ORDER — MAGNESIUM SULFATE 500 MG/ML
4 VIAL (ML) INJECTION ONCE
Refills: 0 | Status: COMPLETED | OUTPATIENT
Start: 2022-04-06 | End: 2022-04-06

## 2022-04-06 RX ADMIN — Medication 650 MILLIGRAM(S): at 05:59

## 2022-04-06 RX ADMIN — Medication 25 GRAM(S): at 11:59

## 2022-04-06 RX ADMIN — ENOXAPARIN SODIUM 40 MILLIGRAM(S): 100 INJECTION SUBCUTANEOUS at 05:06

## 2022-04-06 RX ADMIN — NYSTATIN CREAM 1 APPLICATION(S): 100000 CREAM TOPICAL at 05:06

## 2022-04-06 RX ADMIN — ERTAPENEM SODIUM 120 MILLIGRAM(S): 1 INJECTION, POWDER, LYOPHILIZED, FOR SOLUTION INTRAMUSCULAR; INTRAVENOUS at 17:35

## 2022-04-06 RX ADMIN — Medication 1 APPLICATION(S): at 05:07

## 2022-04-06 RX ADMIN — Medication 9 UNIT(S): at 12:34

## 2022-04-06 RX ADMIN — Medication 9 UNIT(S): at 08:40

## 2022-04-06 RX ADMIN — MIDODRINE HYDROCHLORIDE 2.5 MILLIGRAM(S): 2.5 TABLET ORAL at 05:07

## 2022-04-06 RX ADMIN — INSULIN GLARGINE 24 UNIT(S): 100 INJECTION, SOLUTION SUBCUTANEOUS at 23:15

## 2022-04-06 RX ADMIN — Medication 9 UNIT(S): at 17:53

## 2022-04-06 RX ADMIN — Medication 500 MILLIGRAM(S): at 12:33

## 2022-04-06 RX ADMIN — ENOXAPARIN SODIUM 40 MILLIGRAM(S): 100 INJECTION SUBCUTANEOUS at 17:36

## 2022-04-06 RX ADMIN — CHLORHEXIDINE GLUCONATE 1 APPLICATION(S): 213 SOLUTION TOPICAL at 12:02

## 2022-04-06 RX ADMIN — Medication 1 TABLET(S): at 12:33

## 2022-04-06 RX ADMIN — Medication 1 APPLICATION(S): at 17:36

## 2022-04-06 RX ADMIN — NYSTATIN CREAM 1 APPLICATION(S): 100000 CREAM TOPICAL at 12:03

## 2022-04-06 RX ADMIN — Medication 75 MICROGRAM(S): at 05:06

## 2022-04-06 NOTE — PROGRESS NOTE ADULT - SUBJECTIVE AND OBJECTIVE BOX
Chief Complaint: T2DM    History: pt seen at bedside.  Did not want to eat lunch today.  Does not remember if she was given insulin.  Denies n/v, denies, s/s of hypoglycemia    MEDICATIONS  (STANDING):  ascorbic acid 500 milliGRAM(s) Oral daily  chlorhexidine 2% Cloths 1 Application(s) Topical <User Schedule>  Dakins Solution - 1/4 Strength 1 Application(s) Topical two times a day  dextrose 5%. 1000 milliLiter(s) (50 mL/Hr) IV Continuous <Continuous>  dextrose 5%. 1000 milliLiter(s) (100 mL/Hr) IV Continuous <Continuous>  dextrose 50% Injectable 25 Gram(s) IV Push once  dextrose 50% Injectable 12.5 Gram(s) IV Push once  dextrose 50% Injectable 25 Gram(s) IV Push once  enoxaparin Injectable 40 milliGRAM(s) SubCutaneous every 12 hours  ergocalciferol 84687 Unit(s) Oral <User Schedule>  ertapenem  IVPB 1000 milliGRAM(s) IV Intermittent every 24 hours  glucagon  Injectable 1 milliGRAM(s) IntraMuscular once  insulin glargine Injectable (LANTUS) 30 Unit(s) SubCutaneous at bedtime  insulin lispro (ADMELOG) corrective regimen sliding scale   SubCutaneous three times a day before meals  insulin lispro (ADMELOG) corrective regimen sliding scale   SubCutaneous at bedtime  insulin lispro Injectable (ADMELOG) 8 Unit(s) SubCutaneous three times a day before meals  levothyroxine 75 MICROGram(s) Oral daily  midodrine 2.5 milliGRAM(s) Oral every 8 hours  multivitamin 1 Tablet(s) Oral daily  nystatin Powder 1 Application(s) Topical three times a day  sodium chloride 0.9%. 1000 milliLiter(s) (50 mL/Hr) IV Continuous <Continuous>    MEDICATIONS  (PRN):  acetaminophen     Tablet .. 650 milliGRAM(s) Oral every 6 hours PRN Temp greater or equal to 38C (100.4F), Mild Pain (1 - 3), Moderate Pain (4 - 6)  ALBUTerol    90 MICROgram(s) HFA Inhaler 2 Puff(s) Inhalation every 6 hours PRN Shortness of Breath  albuterol/ipratropium for Nebulization 3 milliLiter(s) Nebulizer every 6 hours PRN Shortness of Breath  dextrose Oral Gel 15 Gram(s) Oral once PRN Blood Glucose LESS THAN 70 milliGRAM(s)/deciliter      Allergies    No Known Drug Allergies  Pineapple (Anaphylaxis)    Intolerances      Review of Systems:  Constitutional: No fever  ALL OTHER SYSTEMS REVIEWED AND NEGATIVE        PHYSICAL EXAM:  Vital Signs Last 24 Hrs  T(C): 36.8 (06 Apr 2022 13:00), Max: 37 (06 Apr 2022 05:00)  T(F): 98.3 (06 Apr 2022 13:00), Max: 98.6 (06 Apr 2022 05:00)  HR: 94 (06 Apr 2022 13:00) (9 - 98)  BP: 128/64 (06 Apr 2022 13:00) (115/56 - 149/73)  BP(mean): --  RR: 17 (06 Apr 2022 13:00) (16 - 18)  SpO2: 99% (06 Apr 2022 13:00) (98% - 100%)  GENERAL: NAD, well-developed  EYES: No proptosis, no lid lag, anicteric  HEENT:  Atraumatic, Normocephalic, moist mucous membranes  RESPIRATORY: Clear to auscultation bilaterally  CARDIOVASCULAR: Regular rate and rhythm  GI: Soft, nontender, non distended, normal bowel sounds  PSYCH: Alert and oriented x 3, normal affect, normal mood    CAPILLARY BLOOD GLUCOSE  120 (06 Apr 2022 12:00)  169 (05 Apr 2022 21:15)  POCT Blood Glucose.: 229 mg/dL (04-05-22 @ 08:44)  POCT Blood Glucose.: 224 mg/dL (04-04-22 @ 21:39)  POCT Blood Glucose.: 231 mg/dL (04-04-22 @ 17:55)  POCT Blood Glucose.: 134 mg/dL (04-04-22 @ 11:55)  POCT Blood Glucose.: 132 mg/dL (04-04-22 @ 08:21)  POCT Blood Glucose.: 172 mg/dL (04-03-22 @ 21:07)  POCT Blood Glucose.: 217 mg/dL (04-03-22 @ 17:57)  POCT Blood Glucose.: 130 mg/dL (04-03-22 @ 12:42)  POCT Blood Glucose.: 142 mg/dL (04-03-22 @ 08:43)  POCT Blood Glucose.: 94 mg/dL (04-02-22 @ 21:30)  POCT Blood Glucose.: 133 mg/dL (04-02-22 @ 18:06)      04-06    142  |  109<H>  |  10  ----------------------------<  162<H>  3.6   |  19<L>  |  0.60    Ca    8.2<L>      06 Apr 2022 07:25  Phos  3.5     04-06  Mg     1.30     04-06    TPro  5.8<L>  /  Alb  2.1<L>  /  TBili  <0.2  /  DBili  x   /  AST  19  /  ALT  9   /  AlkPhos  215<H>  04-06        Thyroid Function Tests:  03-30 @ 02:39 TSH -- FreeT4 1.0 T3 -- Anti TPO -- Anti Thyroglobulin Ab -- TSI --  03-28 @ 12:53 TSH 3.86 FreeT4 -- T3 -- Anti TPO -- Anti Thyroglobulin Ab -- TSI --

## 2022-04-06 NOTE — PROGRESS NOTE ADULT - ASSESSMENT
53 yo F hx of uncontrolled T2DM, asthma, obesity, chronic immobility 2/2 MS and several chronic wounds presenting for vulvar/R thigh wound found to be hyperglycemic + metabolic acidosis w/ purulent urine c/f urosepsis + ARF.    Consulted for: Uncontrolled T2DM    #Uncontrolled T2DM  A1c 14.2%. Goal < 7%  Inpatient FS goal 140-180  Home regimen: Takes Metformin 1g BID, Amaryl before meals (doesn't know dose),and Januvia 100 mg daily    Recs:   -Continue Lantus 30 units qHS  -Continue Admelog 9 Units TIDAC  -moderate dose correctional scale qAC and qHS  -consistent carb diet  -FS qAC and qHS  -RD consult and insulin pen teaching (ordered)   -BEDSIDE RN - should provide insulin PEN and hypoglycemia teaching to patient prior to d/c.      For d/c:   -basal/bolus + GLP -1 agonist  -For coverage purpose, please send Lantus/Basaglar/Tresiba/Semglee/Toujeo 10 units at bedtime and also Admelog/Humalog/Novolog 3 units before meals to [facility]. Can start by send one of each and discuss with pharmacy which is covered.   -Please send Trulicity 0.75 mg subq/weekly or Ozempic 0.25 mg subq/weekly to pharmacy to see if covered. If covered, can send either to pharmacy on d/c. If sending Trulicity, please write script for 0.75 mg subq/weekly x 4 weeks. If sending Ozempic, please send 0.25 mg subq/weekly x 4 weeks  -Can fu with Endocrine Practice at [address] [place] [address], [address], [place] [address]; Ph # [phone]    #Hypothyroidism  TSH 3.82  -Continue LT4 75 mcg daily    #HTN  BP Goal < 130/80  Continue management per primary team    #HLD  LDL goal < 70  Statin if no contraindications      Michelle Clark  Nurse Practitioner  Division of Endocrinology & Diabetes  Pager # [phone]      If after 6PM or before 9AM, or on weekends/holidays, please call endocrine answering service for assistance ([phone]).  For nonurgent matters email [email or web address] for assistance.

## 2022-04-06 NOTE — PROGRESS NOTE ADULT - PROBLEM SELECTOR PLAN 1
On admission , pt with septic shock requiring Levophed on admission   Sepsis suspect secondary to UT/Pyelo I(Patient w/ purulent drainage from toussaint) Other possibility is secondary to multiple skin ulcers  Fever of 102.3 on 4/1 overnight, Febrile overnight 4/4  3/28- BC x 1-Staph epi. Repeat BC 3/20- NGTD  UC- ESBL Ecoli . UA-  large LE. COVID positive    Lactate- 4.7--> 1.8  CT A/P: Large soft tissue defect/ulceration in the lateral right thigh with associated subcutaneous inflammatory changes. Circumferential bladder wall thickening with adjacent inflammation most concerning for cystitis. In addition, there is mild greater than right hydronephrosis and subtle perinephric and trace prevertebral fat stranding, cannot exclude ascending infection and recommend correlation with clinical examination laboratory findings to exclude pyelonephritis.    Was on  Vanc/Zosyn, transitioned to Ertapenem given ESBL ( 3/31-) planned course through today per ID  On Midodrine 20mg PO Q 8hrs( was on pressors in MICU),  taper  to 15mg PO Q 8 from 4/1, further taper to 10mg Q 8 on 4/2 and 5mg Q 8 on 4/3. Taper to 2.5mg Q8 4/4. Tapered off today 4/6  Appreciate ID / Wound care recs

## 2022-04-06 NOTE — PROGRESS NOTE ADULT - SUBJECTIVE AND OBJECTIVE BOX
Layla Rolon MD   Hospitalist  Pager- [phone]    SUBJECTIVE / OVERNIGHT EVENTS: patient seen and examined by me afebrile. Per ID last day of ertapenem today. Patient last day for isolation is today. Patient hemoglobin responded appropriately to blood transfusion. Patient otherwise w/o complaints. ROS otherwise negative.   Complaining of chronic back pain, giving Tylenol.     MEDICATIONS  (STANDING):  ascorbic acid 500 milliGRAM(s) Oral daily  chlorhexidine 2% Cloths 1 Application(s) Topical <User Schedule>  Dakins Solution - 1/4 Strength 1 Application(s) Topical two times a day  dextrose 5%. 1000 milliLiter(s) (50 mL/Hr) IV Continuous <Continuous>  dextrose 5%. 1000 milliLiter(s) (100 mL/Hr) IV Continuous <Continuous>  dextrose 50% Injectable 25 Gram(s) IV Push once  dextrose 50% Injectable 12.5 Gram(s) IV Push once  dextrose 50% Injectable 25 Gram(s) IV Push once  enoxaparin Injectable 40 milliGRAM(s) SubCutaneous every 12 hours  ergocalciferol 60831 Unit(s) Oral <User Schedule>  ertapenem  IVPB 1000 milliGRAM(s) IV Intermittent every 24 hours  glucagon  Injectable 1 milliGRAM(s) IntraMuscular once  insulin glargine Injectable (LANTUS) 30 Unit(s) SubCutaneous at bedtime  insulin lispro (ADMELOG) corrective regimen sliding scale   SubCutaneous three times a day before meals  insulin lispro (ADMELOG) corrective regimen sliding scale   SubCutaneous at bedtime  insulin lispro Injectable (ADMELOG) 9 Unit(s) SubCutaneous three times a day before meals  levothyroxine 75 MICROGram(s) Oral daily  multivitamin 1 Tablet(s) Oral daily  nystatin Powder 1 Application(s) Topical three times a day  sodium chloride 0.9%. 1000 milliLiter(s) (50 mL/Hr) IV Continuous <Continuous>    MEDICATIONS  (PRN):  acetaminophen     Tablet .. 650 milliGRAM(s) Oral every 6 hours PRN Temp greater or equal to 38C (100.4F), Mild Pain (1 - 3), Moderate Pain (4 - 6)  ALBUTerol    90 MICROgram(s) HFA Inhaler 2 Puff(s) Inhalation every 6 hours PRN Shortness of Breath  albuterol/ipratropium for Nebulization 3 milliLiter(s) Nebulizer every 6 hours PRN Shortness of Breath  dextrose Oral Gel 15 Gram(s) Oral once PRN Blood Glucose LESS THAN 70 milliGRAM(s)/deciliter    CAPILLARY BLOOD GLUCOSE  120 (06 Apr 2022 12:00)  169 (05 Apr 2022 21:15)    Vital Signs Last 24 Hrs  T(C): 36.9 (06 Apr 2022 17:00), Max: 37 (06 Apr 2022 05:00)  T(F): 98.4 (06 Apr 2022 17:00), Max: 98.6 (06 Apr 2022 05:00)  HR: 86 (06 Apr 2022 17:00) (9 - 98)  BP: 137/66 (06 Apr 2022 17:00) (115/56 - 149/73)  BP(mean): --  RR: 19 (06 Apr 2022 17:00) (16 - 19)  SpO2: 98% (06 Apr 2022 17:00) (98% - 100%)    CONSTITUTIONAL: NAD, obese   RESPIRATORY: Normal respiratory effort; decreased BS at base  CARDIOVASCULAR: Regular rate and rhythm, normal S1 and S2, no murmur/rub/gallop; No lower extremity edema; Peripheral pulses are 2+ bilaterally  ABDOMEN: Nontender to palpation, normoactive bowel sounds, no rebound/guarding; No hepatosplenomegaly  MUSCLOSKELETAL: no clubbing or cyanosis of digits; no joint swelling or tenderness to palpation  PSYCH: A+O to person, place, and time; affect appropriate  NEURO: Decreased strength more on right side compared to left( as per pt chronic)  - Primafit   SKIN:  Multiple wounds with dressing  present -Right knee, RLE  right posterior lower leg- 5cmx2.5cmx0  Beneath breast/abdominal folds - Dermatitis with  fissures  present  Dressing present  Right lateral thigh-.Stage 4 pressure ulcer - 7apo7dyr2.5cm,  granular base, no purulent drainage, no odor. Periwound skin intact, no edema, no erythema, no increased warmth, no fluctuance noted. Right trochanter- stage 4 pressure injury- [id number], granular base, bone in close proximity.     LABS:                         7.9    7.21  )-----------( 600      ( 06 Apr 2022 07:25 )             26.3     04-06    142  |  109<H>  |  10  ----------------------------<  162<H>  3.6   |  19<L>  |  0.60    Ca    8.2<L>      06 Apr 2022 07:25  Phos  3.5     04-06  Mg     1.30     04-06    TPro  5.8<L>  /  Alb  2.1<L>  /  TBili  <0.2  /  DBili  x   /  AST  19  /  ALT  9   /  AlkPhos  215<H>  04-06                  RADIOLOGY, EKG & ADDITIONAL TESTS: Reviewed.     Culture - Blood (collected 28 Mar 2022 08:26)  Source: .Blood Blood  Preliminary Report (29 Mar 2022 09:01):    No growth to date.    Culture - Urine (collected 28 Mar 2022 08:19)  Source: Catheterized Catheterized  Final Report (30 Mar 2022 08:53):    >100,000 CFU/ml Escherichia coli    >100,000 CFU/ml Escherichia coli #2    >=3 organisms. Probable collection contamination.    Normal Urogenital brayan present        RADIOLOGY & ADDITIONAL TESTS:  Results Reviewed:   Imaging Personally Reviewed:  Electrocardiogram Personally Reviewed:    COORDINATION OF CARE:  Care Discussed with Consultants/Other Providers [Y/N]:  Prior or Outpatient Records Reviewed [Y/N]:

## 2022-04-07 ENCOUNTER — TRANSCRIPTION ENCOUNTER (OUTPATIENT)
Age: 52
End: 2022-04-07

## 2022-04-07 DIAGNOSIS — E11.9 TYPE 2 DIABETES MELLITUS WITHOUT COMPLICATIONS: ICD-10-CM

## 2022-04-07 DIAGNOSIS — E78.5 HYPERLIPIDEMIA, UNSPECIFIED: ICD-10-CM

## 2022-04-07 LAB
ALBUMIN SERPL ELPH-MCNC: 2.3 G/DL — LOW (ref 3.3–5)
ALP SERPL-CCNC: 219 U/L — HIGH (ref 40–120)
ALT FLD-CCNC: 9 U/L — SIGNIFICANT CHANGE UP (ref 4–33)
ANION GAP SERPL CALC-SCNC: 12 MMOL/L — SIGNIFICANT CHANGE UP (ref 7–14)
AST SERPL-CCNC: 25 U/L — SIGNIFICANT CHANGE UP (ref 4–32)
BILIRUB SERPL-MCNC: <0.2 MG/DL — SIGNIFICANT CHANGE UP (ref 0.2–1.2)
BLD GP AB SCN SERPL QL: NEGATIVE — SIGNIFICANT CHANGE UP
BUN SERPL-MCNC: 8 MG/DL — SIGNIFICANT CHANGE UP (ref 7–23)
CALCIUM SERPL-MCNC: 8.6 MG/DL — SIGNIFICANT CHANGE UP (ref 8.4–10.5)
CHLORIDE SERPL-SCNC: 109 MMOL/L — HIGH (ref 98–107)
CO2 SERPL-SCNC: 22 MMOL/L — SIGNIFICANT CHANGE UP (ref 22–31)
CREAT SERPL-MCNC: 0.54 MG/DL — SIGNIFICANT CHANGE UP (ref 0.5–1.3)
CRP SERPL-MCNC: 63.6 MG/L — HIGH
D DIMER BLD IA.RAPID-MCNC: 987 NG/ML DDU — HIGH
EGFR: 112 ML/MIN/1.73M2 — SIGNIFICANT CHANGE UP
FERRITIN SERPL-MCNC: 251 NG/ML — HIGH (ref 15–150)
GLUCOSE BLDC GLUCOMTR-MCNC: 112 MG/DL — HIGH (ref 70–99)
GLUCOSE BLDC GLUCOMTR-MCNC: 128 MG/DL — HIGH (ref 70–99)
GLUCOSE BLDC GLUCOMTR-MCNC: 133 MG/DL — HIGH (ref 70–99)
GLUCOSE BLDC GLUCOMTR-MCNC: 160 MG/DL — HIGH (ref 70–99)
GLUCOSE BLDC GLUCOMTR-MCNC: 204 MG/DL — HIGH (ref 70–99)
GLUCOSE SERPL-MCNC: 97 MG/DL — SIGNIFICANT CHANGE UP (ref 70–99)
HCT VFR BLD CALC: 29.9 % — LOW (ref 34.5–45)
HGB BLD-MCNC: 8.8 G/DL — LOW (ref 11.5–15.5)
LDH SERPL L TO P-CCNC: 321 U/L — HIGH (ref 135–225)
MAGNESIUM SERPL-MCNC: 1.6 MG/DL — SIGNIFICANT CHANGE UP (ref 1.6–2.6)
MCHC RBC-ENTMCNC: 25.4 PG — LOW (ref 27–34)
MCHC RBC-ENTMCNC: 29.4 GM/DL — LOW (ref 32–36)
MCV RBC AUTO: 86.4 FL — SIGNIFICANT CHANGE UP (ref 80–100)
NRBC # BLD: 0 /100 WBCS — SIGNIFICANT CHANGE UP
NRBC # FLD: 0 K/UL — SIGNIFICANT CHANGE UP
PHOSPHATE SERPL-MCNC: 3.8 MG/DL — SIGNIFICANT CHANGE UP (ref 2.5–4.5)
PLATELET # BLD AUTO: 663 K/UL — HIGH (ref 150–400)
POTASSIUM SERPL-MCNC: 3.9 MMOL/L — SIGNIFICANT CHANGE UP (ref 3.5–5.3)
POTASSIUM SERPL-SCNC: 3.9 MMOL/L — SIGNIFICANT CHANGE UP (ref 3.5–5.3)
PROCALCITONIN SERPL-MCNC: 0.16 NG/ML — HIGH (ref 0.02–0.1)
PROT SERPL-MCNC: 6 G/DL — SIGNIFICANT CHANGE UP (ref 6–8.3)
RBC # BLD: 3.46 M/UL — LOW (ref 3.8–5.2)
RBC # FLD: 16.8 % — HIGH (ref 10.3–14.5)
RH IG SCN BLD-IMP: POSITIVE — SIGNIFICANT CHANGE UP
SARS-COV-2 RNA SPEC QL NAA+PROBE: SIGNIFICANT CHANGE UP
SODIUM SERPL-SCNC: 143 MMOL/L — SIGNIFICANT CHANGE UP (ref 135–145)
WBC # BLD: 7.13 K/UL — SIGNIFICANT CHANGE UP (ref 3.8–10.5)
WBC # FLD AUTO: 7.13 K/UL — SIGNIFICANT CHANGE UP (ref 3.8–10.5)

## 2022-04-07 PROCEDURE — 99232 SBSQ HOSP IP/OBS MODERATE 35: CPT

## 2022-04-07 RX ORDER — INSULIN LISPRO 100/ML
3 VIAL (ML) SUBCUTANEOUS
Qty: 0 | Refills: 0 | DISCHARGE

## 2022-04-07 RX ORDER — ALBUTEROL 90 UG/1
2 AEROSOL, METERED ORAL
Qty: 0 | Refills: 0 | DISCHARGE
Start: 2022-04-07

## 2022-04-07 RX ORDER — NYSTATIN CREAM 100000 [USP'U]/G
1 CREAM TOPICAL
Qty: 0 | Refills: 0 | DISCHARGE
Start: 2022-04-07

## 2022-04-07 RX ORDER — INSULIN LISPRO 100/ML
3 VIAL (ML) SUBCUTANEOUS
Qty: 1 | Refills: 0
Start: 2022-04-07 | End: 2022-05-06

## 2022-04-07 RX ORDER — ASCORBIC ACID 60 MG
1 TABLET,CHEWABLE ORAL
Qty: 0 | Refills: 0 | DISCHARGE
Start: 2022-04-07

## 2022-04-07 RX ORDER — ENOXAPARIN SODIUM 100 MG/ML
10 INJECTION SUBCUTANEOUS
Qty: 0 | Refills: 0 | DISCHARGE

## 2022-04-07 RX ORDER — ACETAMINOPHEN 500 MG
2 TABLET ORAL
Qty: 0 | Refills: 0 | DISCHARGE
Start: 2022-04-07

## 2022-04-07 RX ORDER — LOSARTAN POTASSIUM 100 MG/1
1 TABLET, FILM COATED ORAL
Qty: 0 | Refills: 0 | DISCHARGE

## 2022-04-07 RX ORDER — DULAGLUTIDE 4.5 MG/.5ML
1 INJECTION, SOLUTION SUBCUTANEOUS
Qty: 4 | Refills: 0
Start: 2022-04-07 | End: 2022-04-10

## 2022-04-07 RX ORDER — ENOXAPARIN SODIUM 100 MG/ML
40 INJECTION SUBCUTANEOUS EVERY 12 HOURS
Refills: 0 | Status: DISCONTINUED | OUTPATIENT
Start: 2022-04-07 | End: 2022-04-19

## 2022-04-07 RX ORDER — SITAGLIPTIN 50 MG/1
1 TABLET, FILM COATED ORAL
Qty: 0 | Refills: 0 | DISCHARGE

## 2022-04-07 RX ORDER — SODIUM HYPOCHLORITE 0.125 %
1 SOLUTION, NON-ORAL MISCELLANEOUS
Qty: 0 | Refills: 0 | DISCHARGE
Start: 2022-04-07

## 2022-04-07 RX ORDER — DULAGLUTIDE 4.5 MG/.5ML
1 INJECTION, SOLUTION SUBCUTANEOUS
Qty: 0 | Refills: 0 | DISCHARGE

## 2022-04-07 RX ORDER — ERGOCALCIFEROL 1.25 MG/1
50000 CAPSULE ORAL
Qty: 0 | Refills: 0 | DISCHARGE
Start: 2022-04-07

## 2022-04-07 RX ORDER — METFORMIN HYDROCHLORIDE 850 MG/1
1 TABLET ORAL
Qty: 0 | Refills: 0 | DISCHARGE

## 2022-04-07 RX ORDER — PIOGLITAZONE HYDROCHLORIDE 15 MG/1
1 TABLET ORAL
Qty: 0 | Refills: 0 | DISCHARGE

## 2022-04-07 RX ORDER — ENOXAPARIN SODIUM 100 MG/ML
10 INJECTION SUBCUTANEOUS
Qty: 1 | Refills: 0
Start: 2022-04-07 | End: 2022-05-06

## 2022-04-07 RX ORDER — MAGNESIUM SULFATE 500 MG/ML
2 VIAL (ML) INJECTION ONCE
Refills: 0 | Status: COMPLETED | OUTPATIENT
Start: 2022-04-07 | End: 2022-04-07

## 2022-04-07 RX ADMIN — Medication 500 MILLIGRAM(S): at 12:45

## 2022-04-07 RX ADMIN — Medication 1 TABLET(S): at 12:45

## 2022-04-07 RX ADMIN — Medication 650 MILLIGRAM(S): at 09:52

## 2022-04-07 RX ADMIN — Medication 650 MILLIGRAM(S): at 11:00

## 2022-04-07 RX ADMIN — Medication 9 UNIT(S): at 12:46

## 2022-04-07 RX ADMIN — NYSTATIN CREAM 1 APPLICATION(S): 100000 CREAM TOPICAL at 17:57

## 2022-04-07 RX ADMIN — Medication 9 UNIT(S): at 17:57

## 2022-04-07 RX ADMIN — ENOXAPARIN SODIUM 40 MILLIGRAM(S): 100 INJECTION SUBCUTANEOUS at 05:04

## 2022-04-07 RX ADMIN — ENOXAPARIN SODIUM 40 MILLIGRAM(S): 100 INJECTION SUBCUTANEOUS at 17:58

## 2022-04-07 RX ADMIN — Medication 25 GRAM(S): at 10:33

## 2022-04-07 RX ADMIN — CHLORHEXIDINE GLUCONATE 1 APPLICATION(S): 213 SOLUTION TOPICAL at 09:31

## 2022-04-07 RX ADMIN — INSULIN GLARGINE 30 UNIT(S): 100 INJECTION, SOLUTION SUBCUTANEOUS at 21:45

## 2022-04-07 RX ADMIN — Medication 9 UNIT(S): at 09:30

## 2022-04-07 RX ADMIN — Medication 1 APPLICATION(S): at 17:57

## 2022-04-07 RX ADMIN — Medication 2: at 12:46

## 2022-04-07 RX ADMIN — Medication 1 APPLICATION(S): at 05:30

## 2022-04-07 RX ADMIN — Medication 75 MICROGRAM(S): at 05:05

## 2022-04-07 NOTE — DISCHARGE NOTE PROVIDER - HOSPITAL COURSE
51 y/o Female, with a PmHx of T2DM, Asthma, Obesity, Chronic immobility and several chronic wounds, presenting to the [facility] ED for vulvar/R thigh noted by her home health nurse today. Patient states wound began x 1 month ago, started as a skin tear and progressed over the course of a month. Patient states that this Friday RN inspected wound and stated it may need evaluation but wanted to observe, would rechecked today and patient was brought into ed. In the ED, was initially hypotensive to 90s/50s which improved w/ fluids. Labs significant for wc 22, k 6.6, cr 2.2 unknown bl, glc 533, bicarb 13. bhb negative, ph 7.38. Nephro consulted for HD, patient declined. Patient treated for hyper K w/ mild improvement, placed on insulin gtt w/ improvement of bicarb from 13 to 18, pH 7.3, lactate 5.6 to 4.6. However, upon placement of toussaint patient had purulent drainage w/ a temporary change in mental status. Patient admitted to MICU for further management of urosepsis, DKA and metabolic acidosis due to ARF.    On admission:    1. COVID  LDH: 317         Haptoglobin: 526          Ferritin: 956           Lactate: 5.4  - on RA, Hold remdesivir due to RAFAELA, no need for Dex    2. Septic shock   WBC: 22.46             Lactate: 5.4           UA - large leuks, small blood  Bcx 3/28 staph epi, repeat BCx 3/30 NGTD, likely contaminated  UCX - ESBL pyleo  - weaned off levo and Midodrine  - Hold home losartan 50mg qD  - MRSA PCR positive, Mupiricon x 5days ordered   - House ID c/s following - s/p ertapenem x 7 finished 4/6    3. RAFAELA  Bun/Cr: 59/2.40--> 8/0.54, now resolved  - likely due to uncontrolled DM.   3/29 Renal US - mild to moderate bilateral hydronephrosis  - Losartan on hold  - Cr improved outpatient followup with renal   - House Renal c/s following    4. Anemia  Hgb: 6.6/27.8-->8.8/29.9  - possible anemia of chronic disease d/t CKD  - Iron studies consistent w/ ACD  - S/p 1u PRBC 4/5    5. DM  HbA1c: 14.2           Glucose: 531  - House Endo c/s following  - Endo adjusting insulin, f/u recs upon discharge    6. Multiple chronic/Acute wounds  - Hx of chronic sacral wound d/t immobility further complicated by acute progression of right medial thigh/vulvar wound precipitated by skin tear  - Wound care recs in chart    7. Transaminitis  GGT: 128         Alk phos: 264         AST: 52  3/28 CT Abd/Pelvis - No evidence of perineal or labial fluid collection to suggest abscess, though full evaluation is limited on noncontrast CT. Large soft tissue defect/ulceration in the lateral right thigh with associated subcutaneous inflammatory changes. Circumferential bladder wall thickening with adjacent inflammation most concerning for cystitis. In addition, there is mild greater than right hydronephrosis and subtle perinephric and trace prevertebral fat stranding, cannot exclude ascending infection and recommend correlation with clinical examination laboratory findings to exclude pyelonephritis.    8. Vit D Deficiency  - Started on supplements weekly x 8 weeks     Pt comfortable at this time and is now medically cleared for discharge to [place] for Rehab. Outpatient medical follow up.    Reviewed discharge medications with patient; All new medications requiring new prescription sent to pharmacy of patients choice. Reviewed need for prescription from previous home medications and new prescriptions sent if requested. Patient in agreement and understands.   52 F PMHx DM, asthma, obesity, hypothyroidism, chronic immobility and several chronic wounds, presenting to the [facility] ED for vulvar/R thigh noted by her home health nurse today. Pt states wound began x 1 month ago, started as a skin tear and progressed over the course of a month. Patient states that this Friday RN inspected wound and stated it may need evaluation but wanted to observe, would rechecked today and patient was brought into ed. In the ED, was initially hypotensive to 90s/50s which improved w/ fluids. Labs significant for WBC 22, K 6.6, Cr 2.2, , bicarb 13. BHB negative, pH 7.38. Nephro consulted for HD, pt declined. Pt treated for hyper K w/ mild improvement, placed on insulin gtt w/ improvement of bicarb from 13 to 18, pH 7.3, lactate 5.6 to 4.6. However, upon placement of Rose patient had purulent drainage w/ a temporary change in mental status. Pt admitted to MICU for further management of urosepsis, DKA and metabolic acidosis due to ARF. Renal US with mild-moderate B/L hydronephrosis. Losartan held. Nephro consulted. ARF now resolved. Treated with Ertapenem (completed 4/6) for ESBL UTI.     Noted COVID positive. Stable on RA. Rem held 2/2 RAFAELA. No need for Dex. Completed contact isolation for COVID.     A1c 14.2. Endocrine consulted. S/P insulin gtt for elevated BG. BG now stable on Lantus/ Admelog, ISS.     Noted with anemia. Iron studies c/w ACD. S/P 1 U PRBC 4/5. Stable, no signs of acute bleed     Vit D levels low - supplement started x8 weeks    Labs noted with elevated ALP-GGT. CT with gallstones, hepatic steatosis. Stable, abdominal exam benign.     Hospital course c/b exposure to flu. Tamiflu started. Continue Albuterol PRN. Stable      Pt comfortable at this time and is now medically cleared for discharge to [place] for Rehab. Outpatient medical follow up.     52 F PMHx DM, asthma, obesity, hypothyroidism, chronic immobility and several chronic wounds, presenting to the [facility] ED for vulvar/R thigh noted by her home health nurse today. Pt states wound began x 1 month ago, started as a skin tear and progressed over the course of a month. Patient states that this Friday RN inspected wound and stated it may need evaluation but wanted to observe, would rechecked today and patient was brought into ed. In the ED, was initially hypotensive to 90s/50s which improved w/ fluids. Labs significant for WBC 22, K 6.6, Cr 2.2, , bicarb 13. BHB negative, pH 7.38. Nephro consulted for HD, pt declined. Pt treated for hyper K w/ mild improvement, placed on insulin gtt w/ improvement of bicarb from 13 to 18, pH 7.3, lactate 5.6 to 4.6. However, upon placement of Rose patient had purulent drainage w/ a temporary change in mental status. Pt admitted to MICU for further management of urosepsis, DKA and metabolic acidosis due to ARF. Renal US with mild-moderate B/L hydronephrosis. Losartan held. Nephro consulted. ARF now resolved. Treated with Ertapenem (completed 4/6) for ESBL UTI.     Noted COVID positive. Stable on RA. Rem held 2/2 RAFAELA. No need for Dex. Completed contact isolation for COVID.     A1c 14.2. Endocrine consulted. S/P insulin gtt for elevated BG. BG now stable on Lantus/ Admelog, ISS.     Noted with anemia. Iron studies c/w ACD. S/P 1 U PRBC 4/5. Stable, no signs of acute bleed     Vit D levels low - supplement started x8 weeks    Labs noted with elevated ALP-GGT. CT with gallstones, hepatic steatosis. Stable, abdominal exam benign.     Hospital course c/b exposure to flu. Tamiflu started. Continue Albuterol PRN. Stable    Pt comfortable at this time and is now medically cleared for discharge to [place] for rehab. Outpatient medical follow up.     52 F PMHx DM, asthma, obesity, hypothyroidism, chronic immobility and several chronic wounds, presenting to the [facility] ED for vulvar/R thigh noted by her home health nurse today. Pt states wound began x 1 month ago, started as a skin tear and progressed over the course of a month. Patient states that this Friday RN inspected wound and stated it may need evaluation but wanted to observe, would rechecked today and patient was brought into ed. In the ED, was initially hypotensive to 90s/50s which improved w/ fluids. Labs significant for WBC 22, K 6.6, Cr 2.2, , bicarb 13. BHB negative, pH 7.38. Nephro consulted for HD, pt declined. Pt treated for hyper K w/ mild improvement, placed on insulin gtt w/ improvement of bicarb from 13 to 18, pH 7.3, lactate 5.6 to 4.6. However, upon placement of Rose patient had purulent drainage w/ a temporary change in mental status. Pt admitted to MICU for further management of urosepsis, DKA and metabolic acidosis due to ARF. Renal US with mild-moderate B/L hydronephrosis. Losartan held. Nephro consulted. ARF now resolved. Treated with Ertapenem (completed 4/6) for ESBL UTI.     Noted COVID positive. Stable on RA. Rem held 2/2 RAFAELA. No need for Dex. Completed contact isolation for COVID.     A1c 14.2. Endocrine consulted. S/P insulin gtt for elevated BG. BG now stable on Lantus/ Admelog, ISS. Pt to be discharge with trulicity 0.75mg weekly, lantus 30U, admelog 8U premeal and metformin 1g BID    Noted with anemia. Iron studies c/w ACD. S/P 1 U PRBC 4/5. Stable, no signs of acute bleed     Vit D levels low - supplement started x8 weeks    Labs noted with elevated ALP-GGT. CT with gallstones, hepatic steatosis. Stable, abdominal exam benign.     Hospital course c/b exposure to flu. Tamiflu started. Continue Albuterol PRN. Stable    Pt comfortable at this time and is now medically cleared for discharge to [place] for rehab. Outpatient medical follow up.     52 F PMHx DM, asthma, obesity, hypothyroidism, chronic immobility and several chronic wounds, presenting to the [facility] ED for vulvar/R thigh noted by her home health nurse. Pt states wound began x 1 month ago, started as a skin tear and progressed over the course of a month. Patient states that this Friday RN inspected wound and stated it may need evaluation but wanted to observe. In the ED, was initially hypotensive to 90s/50s which improved w/ fluids. Labs significant for WBC 22, K 6.6, Cr 2.2, , bicarb 13. BHB negative, pH 7.38. Nephro consulted for HD, pt declined. Pt treated for hyper K w/ mild improvement, placed on insulin gtt w/ improvement of bicarb from 13 to 18, pH 7.3, lactate 5.6 to 4.6. However, upon placement of Rose patient had purulent drainage w/ a temporary change in mental status. Pt admitted to MICU for further management of urosepsis, hyperglycemia and metabolic acidosis due to ARF. Renal US with mild-moderate B/L hydronephrosis. Losartan held. Nephro consulted. ARF now resolved. Treated with Ertapenem (completed 4/6) for ESBL UTI.     Noted COVID positive. Stable on RA. Rem held 2/2 RAFAELA. No need for Dex. Completed contact isolation for COVID.     Uncontrolled DM2 w/ severe hyperglycemia. A1c 14.2. Endocrine consulted. S/P insulin gtt for elevated BG. BG now stable on Lantus/ Admelog, ISS. Pt to be discharge with trulicity 0.75mg weekly, lantus 30U, admelog 8U premeal and metformin 1g BID    Noted with anemia. Iron studies c/w ACD. S/P 1 U PRBC 4/5. Stable, no signs of acute bleed     Vit D levels low - supplement started x8 weeks    Labs noted with elevated ALP-GGT. CT with gallstones, hepatic steatosis. Stable, abdominal exam benign.     Hospital course c/b exposure to flu. Tamiflu started. Continue Albuterol PRN. Stable    Pt comfortable at this time and is now medically cleared for discharge to [place] for rehab. Outpatient medical follow up.

## 2022-04-07 NOTE — DISCHARGE NOTE PROVIDER - NSDCCPCAREPLAN_GEN_ALL_CORE_FT
PRINCIPAL DISCHARGE DIAGNOSIS  Diagnosis: Wound of skin  Assessment and Plan of Treatment: Continue Dakins Solution and Nystatin Powder as prescribed. Follow up with your primary care doctor.      SECONDARY DISCHARGE DIAGNOSES  Diagnosis: RAFAELA (acute kidney injury)  Assessment and Plan of Treatment: Found to be in Acute Kidney Injury. Now resolved. Avoid medications and things that may worsen renal fucntion. Follow up with your primary care doctor.    Diagnosis: 2019 novel coronavirus disease (COVID-19)  Assessment and Plan of Treatment: You have tested POSITIVE for the novel coronavirus (COVID-19). Upon discharge, you must self-quarantine for 14 days, or until the Department of Health contacts you. Please wear a face mask if you are around other individuals. Try to avoid contact with house members, family, and friends for the duration of this quarantine. Please follow up with your primary care physician within 2-3 weeks of your discharge from the hospital. Please take all medications as prescribed. If you experience any worsening or recurrence of your symptoms, particularly worsening or high fever, shortness of breathe, extreme fatigue, or bloody cough please call [phone] immediately or report to the nearest Emergency Department.    Diagnosis: Hyperlipidemia  Assessment and Plan of Treatment: To maintain normal cholesterol levels to prevent stroke, coronary artery disease, peripheral vascular disease and heart attacks.   Low fat diet, exercise daily and continue current medications. Follow up with primary care physician and cardiologist for management.    Diagnosis: Diabetes type 2, uncontrolled  Assessment and Plan of Treatment: To maintain a normal blood glucose level and HgA1C level < 5.7 and to prevent diabetic complications such as uncontrolled diabetes, diabetic coma, blindness, renal failure, and amputations.   Monitor finger sticks pre-meal and bedtime, low salt, fat and carbohydrate diet, minimize glucose intake.  Exercise daily for at least 30 minutes and weight loss.  Follow up with primary care physician and endocrinologist for routine Hemoglobin A1C checks and management.  Follow up with your ophthalmologist for routine yearly vision exams.   Follow up with Endorcinology.    Diagnosis: Hypothyroidism  Assessment and Plan of Treatment: Continue taking Levothyroxine as prescribed. Follow up with Endocrinology.    Diagnosis: Multiple sclerosis  Assessment and Plan of Treatment: Follow up with your primary care doctor.    Diagnosis: Asthma  Assessment and Plan of Treatment: To prevent long-term (chronic) symptoms that interfere with daily living, such as coughing, wheezing or shortness of breath during the night or after exercise.  To be able to participate in all activities of daily living, including work, school, and exercise.  To maintain near normal pulmonary function test and prevent asthma exacerbation.   Continue current medications as prescribed.  Avoid exposures to environmental allergens such as carpets, pets and both first-hand and second-hand smoking.  During seasonal allergy period, take a shower as soon as you get home and change your clothes immediately.  Follow up your routine medical appointments.     PRINCIPAL DISCHARGE DIAGNOSIS  Diagnosis: Wound of skin  Assessment and Plan of Treatment: Continue Dakins Solution and Nystatin Powder as prescribed. Follow up with your primary care doctor.      SECONDARY DISCHARGE DIAGNOSES  Diagnosis: RAFAELA (acute kidney injury)  Assessment and Plan of Treatment: Found to be in Acute Kidney Injury. Now resolved. Avoid medications and things that may worsen renal fucntion. Follow up with your primary care doctor.    Diagnosis: Diabetes type 2, uncontrolled  Assessment and Plan of Treatment: You were noted with uncontrolled diabetes, you were seen by Endocrine in-hospital and your insulin was adjusted with improvement in blood sugar levels. Continue with insulin regimen plus Metformin as recommended upon discharge to rehab. Continue consistent carbohydrate diet.  Monitor blood glucose levels throughout the day before meals and at bedtime.  Record blood sugars and bring to outpatient providers appointment in order to be reviewed by your doctor for management modifications.  Be aware of diabetes management symptoms including feeling cool and clammy may be related to low glucose levels.  Feeling hot and dry may indicate high glucose levels. If you feel these symptoms, check your blood sugar.  Make regular podiatry appointments in order to have feet checked for wounds and toe nails cut by a doctor to prevent infections, as well as, appointments with an ophthalmologist to monitor your vision. Follow up outpatient for consideration of adding weekly GLP-1 agonist (eg: Trulicity, Ozempic) for diabetes, cardiac and weight loss benefits. Follow up with [facility] at [address] [place] [address], [address], [place] [address];  # [phone].    Diagnosis: Multiple sclerosis  Assessment and Plan of Treatment: Follow up with your primary care doctor.    Diagnosis: Hypothyroidism  Assessment and Plan of Treatment: Continue your thyroid medications as recommended and follow-up with your outpatient provider for continual thyroid function testing and further medication management.      Diagnosis: 2019 novel coronavirus disease (COVID-19)  Assessment and Plan of Treatment: You have tested POSITIVE for the novel coronavirus (COVID-19)  during this admission and have completed quarantine. Please wear a face mask if you are around other individuals. Monitor for worsening fever, shortness of breathe, extreme fatigue, or bloody cough.    Diagnosis: Hyperlipidemia  Assessment and Plan of Treatment: To maintain normal cholesterol levels to prevent stroke, coronary artery disease, peripheral vascular disease and heart attacks.   Continue low fat diet, exercise daily and continue current medications. Follow up with primary care physician and cardiologist for management.    Diagnosis: Anemia of chronic disease  Assessment and Plan of Treatment: Follow-up with your outpatient provider if further treatment is warranted. Monitor for signs/symptoms indicating worsening of disease, such as, easy bleeding/bruising, pale skin, fatigue, dizziness, increased heart rate, or chest pain.      Diagnosis: Vitamin D deficiency  Assessment and Plan of Treatment: Continue with supplements as recommended. Follow up PCP for further management.    Diagnosis: Asthma  Assessment and Plan of Treatment: To prevent long-term (chronic) symptoms that interfere with daily living, such as coughing, wheezing or shortness of breath during the night or after exercise.  To be able to participate in all activities of daily living, including work, school, and exercise.  To maintain near normal pulmonary function test and prevent asthma exacerbation.   Continue current medications as prescribed.  Avoid exposures to environmental allergens such as carpets, pets and both first-hand and second-hand smoking.  During seasonal allergy period, take a shower as soon as you get home and change your clothes immediately.  Follow up your routine medical appointments.     PRINCIPAL DISCHARGE DIAGNOSIS  Diagnosis: Wound of skin  Assessment and Plan of Treatment: Continue Dakins Solution and Nystatin Powder as prescribed. Follow up with your primary care doctor.      SECONDARY DISCHARGE DIAGNOSES  Diagnosis: RAFAELA (acute kidney injury)  Assessment and Plan of Treatment: Now resolved. Avoid medications and things that may worsen renal function. Follow up with your primary care doctor for repeat labwork to monitor and further management.    Diagnosis: Diabetes type 2, uncontrolled  Assessment and Plan of Treatment: You were noted with uncontrolled diabetes, you were seen by Endocrine in-hospital and your insulin was adjusted with improvement in blood sugar levels. Continue with insulin regimen plus Metformin as recommended upon discharge to rehab. Continue consistent carbohydrate diet.  Monitor blood glucose levels throughout the day before meals and at bedtime.  Record blood sugars and bring to outpatient providers appointment in order to be reviewed by your doctor for management modifications.  Be aware of diabetes management symptoms including feeling cool and clammy may be related to low glucose levels.  Feeling hot and dry may indicate high glucose levels. If you feel these symptoms, check your blood sugar.  Make regular podiatry appointments in order to have feet checked for wounds and toe nails cut by a doctor to prevent infections, as well as, appointments with an ophthalmologist to monitor your vision. Upon discharge from rehab - make sure if needed to obtain prescription for glucometer, test strips, lancets, alcohol pads, and BD loren pen needles AND if needed glucose tabs, Baqsimi nasal spray or glucagon emergency kit for hypoglycemia risk Follow up outpatient for consideration to adding weekly GLP-1 Agonist (eg: Trulicity, Ozempic) for diabetes, cardiac and weight loss benefits. Followup with [facility] at [address] [place] [address], [address], [place] [address]; Ph # [phone]      Diagnosis: Multiple sclerosis  Assessment and Plan of Treatment: Your Cosentyx was held this admission. Follow up outpatient with neurologist after rehab for further management and resuming as warranted.    Diagnosis: Hypothyroidism  Assessment and Plan of Treatment: Continue your thyroid medications as recommended and follow-up with your outpatient provider for continual thyroid function testing and further medication management.      Diagnosis: 2019 novel coronavirus disease (COVID-19)  Assessment and Plan of Treatment: You have tested POSITIVE for the novel coronavirus (COVID-19)  during this admission and have completed quarantine. Please wear a face mask if you are around other individuals. Monitor for worsening fever, shortness of breathe, extreme fatigue, or bloody cough.    Diagnosis: Hyperlipidemia  Assessment and Plan of Treatment: To maintain normal cholesterol levels to prevent stroke, coronary artery disease, peripheral vascular disease and heart attacks.   Continue low fat diet, exercise daily and continue current medications. Follow up with primary care physician and cardiologist for management.    Diagnosis: Anemia of chronic disease  Assessment and Plan of Treatment: Follow-up with your outpatient provider if further treatment is warranted. Monitor for signs/symptoms indicating worsening of disease, such as, easy bleeding/bruising, pale skin, fatigue, dizziness, increased heart rate, or chest pain.      Diagnosis: Vitamin D deficiency  Assessment and Plan of Treatment: Continue with supplements as recommended. Follow up PCP for further management.    Diagnosis: Asthma  Assessment and Plan of Treatment: To prevent long-term (chronic) symptoms that interfere with daily living, such as coughing, wheezing or shortness of breath during the night or after exercise.  To be able to participate in all activities of daily living, including work, school, and exercise.  To maintain near normal pulmonary function test and prevent asthma exacerbation.   Continue current medications as prescribed.  Avoid exposures to environmental allergens such as carpets, pets and both first-hand and second-hand smoking.  During seasonal allergy period, take a shower as soon as you get home and change your clothes immediately.  Follow up your routine medical appointments.     PRINCIPAL DISCHARGE DIAGNOSIS  Diagnosis: Wound of skin  Assessment and Plan of Treatment: Continue Dakins Solution and Nystatin Powder as prescribed. Follow up with your primary care doctor.      SECONDARY DISCHARGE DIAGNOSES  Diagnosis: RAFAELA (acute kidney injury)  Assessment and Plan of Treatment: Now resolved. Avoid medications and things that may worsen renal function. Follow up with your primary care doctor for repeat labwork to monitor and further management.    Diagnosis: Diabetes type 2, uncontrolled  Assessment and Plan of Treatment: You were noted with uncontrolled diabetes, you were seen by Endocrine in-hospital and your insulin was adjusted with improvement in blood sugar levels. Continue with insulin regimen plus Metformin as recommended upon discharge to rehab. Continue consistent carbohydrate diet.  Monitor blood glucose levels throughout the day before meals and at bedtime.  Record blood sugars and bring to outpatient providers appointment in order to be reviewed by your doctor for management modifications.  Be aware of diabetes management symptoms including feeling cool and clammy may be related to low glucose levels.  Feeling hot and dry may indicate high glucose levels. If you feel these symptoms, check your blood sugar.  Make regular podiatry appointments in order to have feet checked for wounds and toe nails cut by a doctor to prevent infections, as well as, appointments with an ophthalmologist to monitor your vision. Upon discharge from rehab - make sure if needed to obtain prescription for glucometer, test strips, lancets, alcohol pads, and BD loren pen needles AND if needed glucose tabs, Baqsimi nasal spray or glucagon emergency kit for hypoglycemia risk Follow up outpatient for consideration to adding weekly GLP-1 Agonist (eg: Trulicity, Ozempic) for diabetes, cardiac and weight loss benefits. Followup with [facility] at [address] [place] [address], [address], [place] [address]; Ph # [phone]      Diagnosis: Multiple sclerosis  Assessment and Plan of Treatment: Your Cosentyx was held this admission. Follow up outpatient with neurologist after rehab for further management and resuming as warranted.    Diagnosis: Hypothyroidism  Assessment and Plan of Treatment: Continue your thyroid medications as recommended and follow-up with your outpatient provider for continual thyroid function testing and further medication management.      Diagnosis: 2019 novel coronavirus disease (COVID-19)  Assessment and Plan of Treatment: You have tested POSITIVE for the novel coronavirus (COVID-19)  during this admission and have completed quarantine. Please wear a face mask if you are around other individuals. Monitor for worsening fever, shortness of breathe, extreme fatigue, or bloody cough.    Diagnosis: Hyperlipidemia  Assessment and Plan of Treatment: To maintain normal cholesterol levels to prevent stroke, coronary artery disease, peripheral vascular disease and heart attacks.   Continue low fat diet, exercise daily and continue current medications. Follow up with primary care physician and cardiologist for management.    Diagnosis: Asthma  Assessment and Plan of Treatment: To prevent long-term (chronic) symptoms that interfere with daily living, such as coughing, wheezing or shortness of breath during the night or after exercise.  To be able to participate in all activities of daily living, including work, school, and exercise.  To maintain near normal pulmonary function test and prevent asthma exacerbation.   Continue current medications as prescribed.  Avoid exposures to environmental allergens such as carpets, pets and both first-hand and second-hand smoking.  During seasonal allergy period, take a shower as soon as you get home and change your clothes immediately.  Follow up your routine medical appointments.    Diagnosis: Anemia of chronic disease  Assessment and Plan of Treatment: Follow-up with your outpatient provider if further treatment is warranted. Monitor for signs/symptoms indicating worsening of disease, such as, easy bleeding/bruising, pale skin, fatigue, dizziness, increased heart rate, or chest pain.      Diagnosis: Vitamin D deficiency  Assessment and Plan of Treatment: Continue with supplements as recommended. Follow up PCP for further management.     PRINCIPAL DISCHARGE DIAGNOSIS  Diagnosis: Wound of skin  Assessment and Plan of Treatment: Continue Dakins Solution and Nystatin Powder as prescribed. Follow up with your primary care doctor.      SECONDARY DISCHARGE DIAGNOSES  Diagnosis: RAFAELA (acute kidney injury)  Assessment and Plan of Treatment: Now resolved. Avoid medications and things that may worsen renal function. Follow up with your primary care doctor for repeat labwork to monitor and further management.    Diagnosis: Diabetes type 2, uncontrolled  Assessment and Plan of Treatment: You were noted with uncontrolled diabetes, you were seen by Endocrine in-hospital and your insulin was adjusted with improvement in blood sugar levels. Continue with insulin regimen, trulicity plus Metformin as recommended upon discharge to rehab. Continue consistent carbohydrate diet.  Monitor blood glucose levels throughout the day before meals and at bedtime.  Record blood sugars and bring to outpatient providers appointment in order to be reviewed by your doctor for management modifications.  Be aware of diabetes management symptoms including feeling cool and clammy may be related to low glucose levels.  Feeling hot and dry may indicate high glucose levels. If you feel these symptoms, check your blood sugar.  Make regular podiatry appointments in order to have feet checked for wounds and toe nails cut by a doctor to prevent infections, as well as, appointments with an ophthalmologist to monitor your vision. Upon discharge from rehab - make sure if needed to obtain prescription for Basaglar, Novolog, trulicity, glucometer, test strips, lancets, alcohol pads, and BD loren pen needles AND if needed glucose tabs, Baqsimi nasal spray or glucagon emergency kit for hypoglycemia risk .  Followup with [facility] at [address] [place] [address], [address], [place] [address]; Ph # [phone]  Of note pt's insuarnce cover for Trulicity copay $0      Diagnosis: Multiple sclerosis  Assessment and Plan of Treatment: Your Cosentyx was held this admission. Follow up outpatient with neurologist after rehab for further management and resuming as warranted.    Diagnosis: Hypothyroidism  Assessment and Plan of Treatment: Continue your thyroid medications as recommended and follow-up with your outpatient provider for continual thyroid function testing and further medication management.      Diagnosis: 2019 novel coronavirus disease (COVID-19)  Assessment and Plan of Treatment: You have tested POSITIVE for the novel coronavirus (COVID-19)  during this admission and have completed quarantine. Please wear a face mask if you are around other individuals. Monitor for worsening fever, shortness of breathe, extreme fatigue, or bloody cough.    Diagnosis: Hyperlipidemia  Assessment and Plan of Treatment: To maintain normal cholesterol levels to prevent stroke, coronary artery disease, peripheral vascular disease and heart attacks.   Continue low fat diet, exercise daily and continue current medications. Follow up with primary care physician and cardiologist for management.    Diagnosis: Asthma  Assessment and Plan of Treatment: To prevent long-term (chronic) symptoms that interfere with daily living, such as coughing, wheezing or shortness of breath during the night or after exercise.  To be able to participate in all activities of daily living, including work, school, and exercise.  To maintain near normal pulmonary function test and prevent asthma exacerbation.   Continue current medications as prescribed.  Avoid exposures to environmental allergens such as carpets, pets and both first-hand and second-hand smoking.  During seasonal allergy period, take a shower as soon as you get home and change your clothes immediately.  Follow up your routine medical appointments.    Diagnosis: Anemia of chronic disease  Assessment and Plan of Treatment: Follow-up with your outpatient provider if further treatment is warranted. Monitor for signs/symptoms indicating worsening of disease, such as, easy bleeding/bruising, pale skin, fatigue, dizziness, increased heart rate, or chest pain.      Diagnosis: Vitamin D deficiency  Assessment and Plan of Treatment: Continue with supplements as recommended. Follow up PCP for further management.

## 2022-04-07 NOTE — PROGRESS NOTE ADULT - ASSESSMENT
52 year old woman with uncontrolled T2DM, asthma, obesity, chronic immobility 2/2 MS and several chronic wounds presenting for vulvar/R thigh wound found to be hyperglycemic + metabolic acidosis w/ purulent urine c/f urosepsis + ARF.         T2DM - now with reasonable glucose control  A1c 14.2%.   Home regimen: Takes Metformin 1g BID, Amaryl before meals (doesn't know dose),and Januvia 100 mg daily    Recommend   -Continue Lantus 30 units at bedtime  -Continue Admelog 9 Units before meals  -moderate dose correctional scale before meals and at bedtime  -consistent carb diet  -FS qAC and qHS  -BEDSIDE RN - should provide insulin PEN and hypoglycemia teaching to patient prior to d/c.      For d/c:   -basal/bolus + GLP -1 agonist  -For coverage purpose, please send Lantus/Basaglar/Tresiba/Semglee/Toujeo 10 units at bedtime and also Admelog/Humalog/Novolog 3 units before meals to [facility]. Can start by send one of each and discuss with pharmacy which is covered.   -Please send Trulicity 0.75 mg subq/weekly or Ozempic 0.25 mg subq/weekly to pharmacy to see if covered. If covered, can send either to pharmacy on d/c. If sending Trulicity, please write script for 0.75 mg subq/weekly x 4 weeks. If sending Ozempic, please send 0.25 mg subq/weekly x 4 weeks  -Can fu with [facility] at [address] [place] [address], [address], [place] [address]; Ph # [phone]    Hypothyroidism  TSH 3.82  -Continue LT4 75 mcg daily      HLD  LDL goal < 70  Recommend Statin if no contraindications      Naty Conner MD  pager  on 4/7/22  Other times:  Diabetes team: [phone] business hours  [phone] night/weekend  For nonurgent matters email [email or web address] for assistance.

## 2022-04-07 NOTE — PROGRESS NOTE ADULT - SUBJECTIVE AND OBJECTIVE BOX
Layla Rolon MD   Hospitalist  Pager- [phone]    SUBJECTIVE / OVERNIGHT EVENTS: patient seen and examined by me afebrile. Completed course of ertapenem 4/6. Patient last day of isolation today 4/7, can come off isolation 4/8. Patient hemoglobin remains stable since transfusion. Patient otherwise w/o complaints. Patient medically optimized for discharge awaiting rehab bed. ROS otherwise negative.     MEDICATIONS  (STANDING):  ascorbic acid 500 milliGRAM(s) Oral daily  chlorhexidine 2% Cloths 1 Application(s) Topical <User Schedule>  Dakins Solution - 1/4 Strength 1 Application(s) Topical two times a day  dextrose 5%. 1000 milliLiter(s) (50 mL/Hr) IV Continuous <Continuous>  dextrose 5%. 1000 milliLiter(s) (100 mL/Hr) IV Continuous <Continuous>  dextrose 50% Injectable 25 Gram(s) IV Push once  dextrose 50% Injectable 12.5 Gram(s) IV Push once  dextrose 50% Injectable 25 Gram(s) IV Push once  enoxaparin Injectable 40 milliGRAM(s) SubCutaneous every 12 hours  ergocalciferol 06524 Unit(s) Oral <User Schedule>  glucagon  Injectable 1 milliGRAM(s) IntraMuscular once  insulin glargine Injectable (LANTUS) 30 Unit(s) SubCutaneous at bedtime  insulin lispro (ADMELOG) corrective regimen sliding scale   SubCutaneous three times a day before meals  insulin lispro (ADMELOG) corrective regimen sliding scale   SubCutaneous at bedtime  insulin lispro Injectable (ADMELOG) 9 Unit(s) SubCutaneous three times a day before meals  levothyroxine 75 MICROGram(s) Oral daily  multivitamin 1 Tablet(s) Oral daily  nystatin Powder 1 Application(s) Topical three times a day  sodium chloride 0.9%. 1000 milliLiter(s) (50 mL/Hr) IV Continuous <Continuous>    MEDICATIONS  (PRN):  acetaminophen     Tablet .. 650 milliGRAM(s) Oral every 6 hours PRN Temp greater or equal to 38C (100.4F), Mild Pain (1 - 3), Moderate Pain (4 - 6)  ALBUTerol    90 MICROgram(s) HFA Inhaler 2 Puff(s) Inhalation every 6 hours PRN Shortness of Breath  albuterol/ipratropium for Nebulization 3 milliLiter(s) Nebulizer every 6 hours PRN Shortness of Breath  dextrose Oral Gel 15 Gram(s) Oral once PRN Blood Glucose LESS THAN 70 milliGRAM(s)/deciliter    CAPILLARY BLOOD GLUCOSE  128 (07 Apr 2022 02:16)  109 (06 Apr 2022 22:15)  94 (06 Apr 2022 21:15)  77 (06 Apr 2022 21:05)      POCT Blood Glucose.: 160 mg/dL (07 Apr 2022 12:35)  POCT Blood Glucose.: 112 mg/dL (07 Apr 2022 08:51)  POCT Blood Glucose.: 128 mg/dL (07 Apr 2022 02:14)    Vital Signs Last 24 Hrs  T(C): 36.6 (07 Apr 2022 13:00), Max: 36.9 (06 Apr 2022 17:00)  T(F): 97.9 (07 Apr 2022 13:00), Max: 98.4 (06 Apr 2022 17:00)  HR: 88 (07 Apr 2022 13:00) (85 - 92)  BP: 127/70 (07 Apr 2022 13:00) (127/70 - 156/74)  BP(mean): --  RR: 19 (07 Apr 2022 13:00) (17 - 19)  SpO2: 98% (07 Apr 2022 13:00) (98% - 100%)    CONSTITUTIONAL: NAD, obese   RESPIRATORY: Normal respiratory effort; decreased BS at base  CARDIOVASCULAR: Regular rate and rhythm, normal S1 and S2, no murmur/rub/gallop; No lower extremity edema; Peripheral pulses are 2+ bilaterally  ABDOMEN: Nontender to palpation, normoactive bowel sounds, no rebound/guarding; No hepatosplenomegaly  MUSCLOSKELETAL: no clubbing or cyanosis of digits; no joint swelling or tenderness to palpation  PSYCH: A+O to person, place, and time; affect appropriate  NEURO: Decreased strength more on right side compared to left( as per pt chronic)  - Primafit   SKIN:  Multiple wounds with dressing  present -Right knee, RLE  right posterior lower leg- 5cmx2.5cmx0  Beneath breast/abdominal folds - Dermatitis with  fissures  present  Dressing present  Right lateral thigh-.Stage 4 pressure ulcer - [id number],  granular base, no purulent drainage, no odor. Periwound skin intact, no edema, no erythema, no increased warmth, no fluctuance noted. Right trochanter- stage 4 pressure injury- [id number], granular base, bone in close proximity.     LABS:                         8.8    7.13  )-----------( 663      ( 07 Apr 2022 07:12 )             29.9     04-07    143  |  109<H>  |  8   ----------------------------<  97  3.9   |  22  |  0.54    Ca    8.6      07 Apr 2022 07:12  Phos  3.8     04-07  Mg     1.60     04-07    TPro  6.0  /  Alb  2.3<L>  /  TBili  <0.2  /  DBili  x   /  AST  25  /  ALT  9   /  AlkPhos  219<H>  04-07                  RADIOLOGY, EKG & ADDITIONAL TESTS: Reviewed.     Culture - Blood (collected 28 Mar 2022 08:26)  Source: .Blood Blood  Preliminary Report (29 Mar 2022 09:01):    No growth to date.    Culture - Urine (collected 28 Mar 2022 08:19)  Source: Catheterized Catheterized  Final Report (30 Mar 2022 08:53):    >100,000 CFU/ml Escherichia coli    >100,000 CFU/ml Escherichia coli #2    >=3 organisms. Probable collection contamination.    Normal Urogenital brayan present        RADIOLOGY & ADDITIONAL TESTS:  Results Reviewed:   Imaging Personally Reviewed:  Electrocardiogram Personally Reviewed:    COORDINATION OF CARE:  Care Discussed with Consultants/Other Providers [Y/N]:  Prior or Outpatient Records Reviewed [Y/N]:

## 2022-04-07 NOTE — CHART NOTE - NSCHARTNOTEFT_GEN_A_CORE
Medicine PA Note    Spoke with Infection Control. Pt cannot come off isolation until 4/8/22. Will continue to keep patient on isolation until tomorrow morning. Family wanted to visit so they were hoping she cam come off isolation in order to see her but as per infection control she needs one more day before she can be cleared. Pt is d/c planning when bed at Rehab comes available. Discussed with Dr. Rolon.     Buster Jewell PA-C   [id number]

## 2022-04-07 NOTE — DISCHARGE NOTE PROVIDER - PROVIDER TOKENS
PROVIDER:[TOKEN:[[id number]]],PROVIDER:[TOKEN:[[id number]]] PROVIDER:[TOKEN:[[id number]]],PROVIDER:[TOKEN:[[id number]]],FREE:[LAST:[Out patient neurologist:],PHONE:[phone]],FAX:[(   )    -],ADDRESS:[[address]]]

## 2022-04-07 NOTE — DISCHARGE NOTE PROVIDER - CARE PROVIDERS DIRECT ADDRESSES
,[email or web address],DirectAddress_Unknown ,[email or web address],DirectAddress_Unknown,DirectAddress_Unknown

## 2022-04-07 NOTE — PROGRESS NOTE ADULT - PROBLEM SELECTOR PLAN 5
BMI 40.4  Also with low albumin  Pt reports she doesn't eat much as she is trying to lose weight   Nutrition consult recs appreciated:  1) diet changed to consistent carbohydrate with evening snack; d/cd Renal restriction.   2) Glucerna 1 PO 2x daily (provides 220 kcal, 10 gm protein per 8oz serving); dcd Phylicia

## 2022-04-07 NOTE — DISCHARGE NOTE PROVIDER - CARE PROVIDER_API CALL
Naty Conner (MD)  EndocrinologyMetabDiabetes  [address] [facility], [address]  [place] [address]  Phone: [phone]  Fax: [phone]  Follow Up Time:     JONN OLEA  Internal Medicine  [address]  [place] [address]  Phone: [phone]  Fax: [phone]  Follow Up Time:    Naty Conner (MD)  EndocrinologyMetabDiabetes  [address] [facility] [address], [address]  [place] [address]  Phone: [phone]  Fax: [phone]  Follow Up Time:     JONN OLEA  Internal Medicine  [address]  [place] [address]  Phone: [phone]  Fax: [phone]  Follow Up Time:     Out patient neurologist:,   [facility] [address] [place] [address]  Phone: [phone]  Fax: (   )    -  Follow Up Time:

## 2022-04-07 NOTE — PROGRESS NOTE ADULT - PROBLEM SELECTOR PLAN 1
On admission , pt with septic shock requiring Levophed on admission   Sepsis suspect secondary to UT/Pyelo (Patient w/ purulent drainage from [facility]) Other possibility is secondary to multiple skin ulcers  Fever of 102.3 on 4/1 overnight, Febrile overnight 4/4, afebrile today  3/28- BC x 1-Staph epi. Repeat BC 3/20- NGTD  UC- ESBL Ecoli . UA-  large LE. COVID positive    Lactate- 4.7--> 1.8  CT A/P: Large soft tissue defect/ulceration in the lateral right thigh with associated subcutaneous inflammatory changes. Circumferential bladder wall thickening with adjacent inflammation most concerning for cystitis. In addition, there is mild greater than right hydronephrosis and subtle perinephric and trace prevertebral fat stranding, cannot exclude ascending infection and recommend correlation with clinical examination laboratory findings to exclude pyelonephritis.    Was on  Vanc/Zosyn, transitioned to Ertapenem given ESBL ( 3/31-4/6), completed  midodrine weaned off  Appreciate ID / Wound care recs

## 2022-04-07 NOTE — DISCHARGE NOTE PROVIDER - NSDCMRMEDTOKEN_GEN_ALL_CORE_FT
acetaminophen 325 mg oral tablet: 2 tab(s) orally every 6 hours, As needed, Temp greater or equal to 38C (100.4F), Mild Pain (1 - 3), Moderate Pain (4 - 6)  albuterol 90 mcg/inh inhalation aerosol: 2 puff(s) inhaled every 6 hours, As needed, Shortness of Breath  ascorbic acid 500 mg oral tablet: 1 tab(s) orally once a day  Cosentyx: 20 milligram(s) subcutaneous every 4 weeks  ergocalciferol: 22057 unit(s) orally once a week  on Mondays  HumaLOG KwikPen 100 units/mL injectable solution: 3 unit(s) injectable 3 times a day (before meals)  Lantus Solostar Pen 100 units/mL subcutaneous solution: 10 unit(s) subcutaneous once a day (at bedtime)  Multiple Vitamins oral tablet: 1 tab(s) orally once a day  nystatin 100,000 units/g topical powder: 1 application topically 3 times a day  sodium hypochlorite 0.125% topical solution: 1 application topically 2 times a day  Synthroid 75 mcg (0.075 mg) oral tablet: 1 tab(s) orally once a day  Trulicity Pen 0.75 mg/0.5 mL subcutaneous solution: 1 application subcutaneous once a week on Mondays x 4 weeks   acetaminophen 325 mg oral tablet: 2 tab(s) orally every 6 hours, As needed, Temp greater or equal to 38C (100.4F), Mild Pain (1 - 3), Moderate Pain (4 - 6)  albuterol 90 mcg/inh inhalation aerosol: 2 puff(s) inhaled every 6 hours, As needed, Shortness of Breath  ascorbic acid 500 mg oral tablet: 1 tab(s) orally once a day  Basaglar KwikPen 100 units/mL subcutaneous solution: 10 unit(s) subcutaneous once a day (at bedtime)  Cosentyx: 20 milligram(s) subcutaneous every 4 weeks  ergocalciferol: 76352 unit(s) orally once a week  on Mondays  Multiple Vitamins oral tablet: 1 tab(s) orally once a day  NovoLOG FlexPen 100 units/mL injectable solution: 3 unit(s) injectable 3 times a day (before meals)  nystatin 100,000 units/g topical powder: 1 application topically 3 times a day  sodium hypochlorite 0.125% topical solution: 1 application topically 2 times a day  Synthroid 75 mcg (0.075 mg) oral tablet: 1 tab(s) orally once a day  Trulicity Pen 0.75 mg/0.5 mL subcutaneous solution: 1 application subcutaneous once a week on Mondays x 4 weeks   acetaminophen 325 mg oral tablet: 2 tab(s) orally every 6 hours, As needed, Temp greater or equal to 38C (100.4F), Mild Pain (1 - 3), Moderate Pain (4 - 6)  albuterol 90 mcg/inh inhalation aerosol: 2 puff(s) inhaled every 6 hours, As needed, Shortness of Breath  ascorbic acid 500 mg oral tablet: 1 tab(s) orally once a day  Basaglar KwikPen 100 units/mL subcutaneous solution: 10 unit(s) subcutaneous once a day (at bedtime)  Cosentyx: 20 milligram(s) subcutaneous every 4 weeks  ergocalciferol: 52193 unit(s) orally once a week  on Mondays  Multiple Vitamins oral tablet: 1 tab(s) orally once a day  NovoLOG FlexPen 100 units/mL injectable solution: 3 unit(s) injectable 3 times a day (before meals)  nystatin 100,000 units/g topical powder: 1 application topically 3 times a day  oseltamivir 75 mg oral capsule: 1 cap(s) orally every 24 hours through 4/22   sodium hypochlorite 0.125% topical solution: 1 application topically 2 times a day  Synthroid 75 mcg (0.075 mg) oral tablet: 1 tab(s) orally once a day  Trulicity Pen 0.75 mg/0.5 mL subcutaneous solution: 1 application subcutaneous once a week on Mondays x 4 weeks   acetaminophen 325 mg oral tablet: 2 tab(s) orally every 6 hours, As needed, Temp greater or equal to 38C (100.4F), Mild Pain (1 - 3), Moderate Pain (4 - 6)  albuterol 90 mcg/inh inhalation aerosol: 2 puff(s) inhaled every 6 hours, As needed, Shortness of Breath  ascorbic acid 500 mg oral tablet: 1 tab(s) orally once a day  Basaglar KwikPen 100 units/mL subcutaneous solution: 30 unit(s) subcutaneous once a day (at bedtime)  Cosentyx: 20 milligram(s) subcutaneous every 4 weeks  ergocalciferol: 93553 unit(s) orally once a week  on Mondays  Multiple Vitamins oral tablet: 1 tab(s) orally once a day  NovoLOG FlexPen 100 units/mL injectable solution: 10 unit(s) injectable 3 times a day (before meals)  nystatin 100,000 units/g topical powder: 1 application topically 3 times a day  oseltamivir 75 mg oral capsule: 1 cap(s) orally every 24 hours through 4/22   sodium hypochlorite 0.125% topical solution: 1 application topically 2 times a day  Synthroid 75 mcg (0.075 mg) oral tablet: 1 tab(s) orally once a day   acetaminophen 325 mg oral tablet: 2 tab(s) orally every 6 hours, As needed, Temp greater or equal to 38C (100.4F), Mild Pain (1 - 3), Moderate Pain (4 - 6)  albuterol 90 mcg/inh inhalation aerosol: 2 puff(s) inhaled every 6 hours, As needed, Shortness of Breath  ascorbic acid 500 mg oral tablet: 1 tab(s) orally once a day  Basaglar KwikPen 100 units/mL subcutaneous solution: 30 unit(s) subcutaneous once a day (at bedtime)  ergocalciferol: 66145 unit(s) orally once a week  on Mondays  metFORMIN 1000 mg oral tablet: 1 tab(s) orally 2 times a day  Multiple Vitamins oral tablet: 1 tab(s) orally once a day  NovoLOG FlexPen 100 units/mL injectable solution: 10 unit(s) injectable 3 times a day (before meals)  nystatin 100,000 units/g topical powder: 1 application topically 3 times a day  oseltamivir 75 mg oral capsule: 1 cap(s) orally every 24 hours through 4/22   sodium hypochlorite 0.125% topical solution: 1 application topically 2 times a day  Synthroid 75 mcg (0.075 mg) oral tablet: 1 tab(s) orally once a day   acetaminophen 325 mg oral tablet: 2 tab(s) orally every 6 hours, As needed, Temp greater or equal to 38C (100.4F), Mild Pain (1 - 3), Moderate Pain (4 - 6)  albuterol 90 mcg/inh inhalation aerosol: 2 puff(s) inhaled every 6 hours, As needed, Shortness of Breath  ascorbic acid 500 mg oral tablet: 1 tab(s) orally once a day  Basaglar KwikPen 100 units/mL subcutaneous solution: 30 unit(s) subcutaneous once a day (at bedtime)  ergocalciferol: 66096 unit(s) orally once a week  on Mondays  metFORMIN 1000 mg oral tablet: 1 tab(s) orally 2 times a day  Multiple Vitamins oral tablet: 1 tab(s) orally once a day  NovoLOG FlexPen 100 units/mL injectable solution: 8 unit(s) injectable 3 times a day (before meals)  nystatin 100,000 units/g topical powder: 1 application topically 3 times a day  oseltamivir 75 mg oral capsule: 1 cap(s) orally every 24 hours through 4/22   sodium hypochlorite 0.125% topical solution: 1 application topically 2 times a day  Synthroid 75 mcg (0.075 mg) oral tablet: 1 tab(s) orally once a day  Trulicity Pen 0.75 mg/0.5 mL subcutaneous solution: 0.75 milligram(s) subcutaneous once a week Monday

## 2022-04-07 NOTE — DISCHARGE NOTE PROVIDER - NSDCFUADDAPPT_GEN_ALL_CORE_FT
[facility] [address] [phone]    Out patient neurologist:  [address] [phone] Right trochanter stage 4 pressure injury - pack with Dakins 1/4 strength moistened Kerlix, cover with 4x4 gauze, abdominal pad, Tegaderm, change twice a day. Offload pressure.    Right lateral thigh stage 4 pressure injury - pack with Aquacel hydrofiber, cover with silicone foam with border. Change daily. Offload pressure.    Right buttock unstagable pressure injury - pack with Dakins 1/4 strength moistened Kerlix, cover with 4x4 gauze, abdominal pad, Tegaderm change twice a day. Offload pressure    Sacral/gluteal cleft mixed moisture/unstageable pressure injury- apply Dakins 1/4 strength moistened gauze, cover with 4x4 gauze, abdominal pad, Tegaderm, change twice a day. Offload pressure     Bilateral lower extremities - to areas of purple-maroon discoloration, paint with betadine, cover with 4x4 gauze. To open ulcerations apply Medihoney gel, cover with 4x4 gauze, and abdominal pads. Secure with Kerlix wrap and paper tape. Change daily.    Follow up outpatient with [facility] [address] [phone]    Outpatient neurologist: [address] [phone]

## 2022-04-07 NOTE — PROGRESS NOTE ADULT - SUBJECTIVE AND OBJECTIVE BOX
Diabetes  Follow up note    Interval History/ROS: Pt reports no nausea, vomiting , abdominal pain.    MEDICATIONS  (STANDING):  ascorbic acid 500 milliGRAM(s) Oral daily  chlorhexidine 2% Cloths 1 Application(s) Topical <User Schedule>  Dakins Solution - 1/4 Strength 1 Application(s) Topical two times a day  dextrose 5%. 1000 milliLiter(s) (50 mL/Hr) IV Continuous <Continuous>  dextrose 5%. 1000 milliLiter(s) (100 mL/Hr) IV Continuous <Continuous>  dextrose 50% Injectable 25 Gram(s) IV Push once  dextrose 50% Injectable 12.5 Gram(s) IV Push once  dextrose 50% Injectable 25 Gram(s) IV Push once  enoxaparin Injectable 40 milliGRAM(s) SubCutaneous every 12 hours  ergocalciferol 53029 Unit(s) Oral <User Schedule>  glucagon  Injectable 1 milliGRAM(s) IntraMuscular once  insulin glargine Injectable (LANTUS) 30 Unit(s) SubCutaneous at bedtime  insulin lispro (ADMELOG) corrective regimen sliding scale   SubCutaneous three times a day before meals  insulin lispro (ADMELOG) corrective regimen sliding scale   SubCutaneous at bedtime  insulin lispro Injectable (ADMELOG) 9 Unit(s) SubCutaneous three times a day before meals  levothyroxine 75 MICROGram(s) Oral daily  multivitamin 1 Tablet(s) Oral daily  nystatin Powder 1 Application(s) Topical three times a day  sodium chloride 0.9%. 1000 milliLiter(s) (50 mL/Hr) IV Continuous <Continuous>    MEDICATIONS  (PRN):  acetaminophen     Tablet .. 650 milliGRAM(s) Oral every 6 hours PRN Temp greater or equal to 38C (100.4F), Mild Pain (1 - 3), Moderate Pain (4 - 6)  ALBUTerol    90 MICROgram(s) HFA Inhaler 2 Puff(s) Inhalation every 6 hours PRN Shortness of Breath  albuterol/ipratropium for Nebulization 3 milliLiter(s) Nebulizer every 6 hours PRN Shortness of Breath  dextrose Oral Gel 15 Gram(s) Oral once PRN Blood Glucose LESS THAN 70 milliGRAM(s)/deciliter      Allergies    No Known Drug Allergies  Pineapple (Anaphylaxis)    Intolerances          PHYSICAL EXAM:  VITALS: T(C): 36.7 (04-07-22 @ 09:00)  T(F): 98 (04-07-22 @ 09:00), Max: 98.4 (04-06-22 @ 17:00)  HR: 88 (04-07-22 @ 09:00) (85 - 92)  BP: 155/72 (04-07-22 @ 09:00) (137/66 - 156/74)  RR:  (17 - 19)  SpO2:  (98% - 100%)  GENERAL: Lying in bed in NAD,   EYES: No proptosis,  HEENT:  Atraumatic, Normocephalic,   Thyroid: normal size  RESPIRATORY: NO audible wheezing  GI: Soft, nontender, non distended, s      POCT Blood Glucose.: 160 mg/dL (04-07-22 @ 12:35)  POCT Blood Glucose.: 112 mg/dL (04-07-22 @ 08:51)  POCT Blood Glucose.: 128 mg/dL (04-07-22 @ 02:14)  POCT Blood Glucose.: 229 mg/dL (04-05-22 @ 08:44)  POCT Blood Glucose.: 224 mg/dL (04-04-22 @ 21:39)  POCT Blood Glucose.: 231 mg/dL (04-04-22 @ 17:55)        04-07    143  |  109<H>  |  8   ----------------------------<  97  3.9   |  22  |  0.54    EGFR if : x   EGFR if non : x     Ca    8.6      04-07  Mg     1.60     04-07  Phos  3.8     04-07    TPro  6.0  /  Alb  2.3<L>  /  TBili  <0.2  /  DBili  x   /  AST  25  /  ALT  9   /  AlkPhos  219<H>  04-07        A1C with Estimated Average Glucose Result: 14.2 % (03-27-22 @ 19:13)

## 2022-04-07 NOTE — CHART NOTE - NSCHARTNOTEFT_GEN_A_CORE
Medicine PA Note    Spoke with [facility]. Pt's insurance will Cover Trulicity, Basaglar and Novolog only.    Buster Jewell PA-C  x [phone]

## 2022-04-08 LAB
ANION GAP SERPL CALC-SCNC: 12 MMOL/L — SIGNIFICANT CHANGE UP (ref 7–14)
BASOPHILS # BLD AUTO: 0.03 K/UL — SIGNIFICANT CHANGE UP (ref 0–0.2)
BASOPHILS NFR BLD AUTO: 0.4 % — SIGNIFICANT CHANGE UP (ref 0–2)
BUN SERPL-MCNC: 10 MG/DL — SIGNIFICANT CHANGE UP (ref 7–23)
CALCIUM SERPL-MCNC: 8.3 MG/DL — LOW (ref 8.4–10.5)
CHLORIDE SERPL-SCNC: 105 MMOL/L — SIGNIFICANT CHANGE UP (ref 98–107)
CO2 SERPL-SCNC: 21 MMOL/L — LOW (ref 22–31)
CREAT SERPL-MCNC: 0.56 MG/DL — SIGNIFICANT CHANGE UP (ref 0.5–1.3)
EGFR: 111 ML/MIN/1.73M2 — SIGNIFICANT CHANGE UP
EOSINOPHIL # BLD AUTO: 0.18 K/UL — SIGNIFICANT CHANGE UP (ref 0–0.5)
EOSINOPHIL NFR BLD AUTO: 2.5 % — SIGNIFICANT CHANGE UP (ref 0–6)
GLUCOSE BLDC GLUCOMTR-MCNC: 120 MG/DL — HIGH (ref 70–99)
GLUCOSE BLDC GLUCOMTR-MCNC: 129 MG/DL — HIGH (ref 70–99)
GLUCOSE BLDC GLUCOMTR-MCNC: 163 MG/DL — HIGH (ref 70–99)
GLUCOSE BLDC GLUCOMTR-MCNC: 177 MG/DL — HIGH (ref 70–99)
GLUCOSE BLDC GLUCOMTR-MCNC: 183 MG/DL — HIGH (ref 70–99)
GLUCOSE SERPL-MCNC: 150 MG/DL — HIGH (ref 70–99)
HCT VFR BLD CALC: 26.8 % — LOW (ref 34.5–45)
HGB BLD-MCNC: 8.1 G/DL — LOW (ref 11.5–15.5)
IANC: 3.99 K/UL — SIGNIFICANT CHANGE UP (ref 1.8–7.4)
IMM GRANULOCYTES NFR BLD AUTO: 1.1 % — SIGNIFICANT CHANGE UP (ref 0–1.5)
LYMPHOCYTES # BLD AUTO: 2.27 K/UL — SIGNIFICANT CHANGE UP (ref 1–3.3)
LYMPHOCYTES # BLD AUTO: 31.4 % — SIGNIFICANT CHANGE UP (ref 13–44)
MAGNESIUM SERPL-MCNC: 1.5 MG/DL — LOW (ref 1.6–2.6)
MCHC RBC-ENTMCNC: 25.7 PG — LOW (ref 27–34)
MCHC RBC-ENTMCNC: 30.2 GM/DL — LOW (ref 32–36)
MCV RBC AUTO: 85.1 FL — SIGNIFICANT CHANGE UP (ref 80–100)
MONOCYTES # BLD AUTO: 0.68 K/UL — SIGNIFICANT CHANGE UP (ref 0–0.9)
MONOCYTES NFR BLD AUTO: 9.4 % — SIGNIFICANT CHANGE UP (ref 2–14)
NEUTROPHILS # BLD AUTO: 3.99 K/UL — SIGNIFICANT CHANGE UP (ref 1.8–7.4)
NEUTROPHILS NFR BLD AUTO: 55.2 % — SIGNIFICANT CHANGE UP (ref 43–77)
NRBC # BLD: 0 /100 WBCS — SIGNIFICANT CHANGE UP
NRBC # FLD: 0 K/UL — SIGNIFICANT CHANGE UP
PHOSPHATE SERPL-MCNC: 3.9 MG/DL — SIGNIFICANT CHANGE UP (ref 2.5–4.5)
PLATELET # BLD AUTO: 633 K/UL — HIGH (ref 150–400)
POTASSIUM SERPL-MCNC: 3.8 MMOL/L — SIGNIFICANT CHANGE UP (ref 3.5–5.3)
POTASSIUM SERPL-SCNC: 3.8 MMOL/L — SIGNIFICANT CHANGE UP (ref 3.5–5.3)
RBC # BLD: 3.15 M/UL — LOW (ref 3.8–5.2)
RBC # FLD: 16.7 % — HIGH (ref 10.3–14.5)
SODIUM SERPL-SCNC: 138 MMOL/L — SIGNIFICANT CHANGE UP (ref 135–145)
WBC # BLD: 7.23 K/UL — SIGNIFICANT CHANGE UP (ref 3.8–10.5)
WBC # FLD AUTO: 7.23 K/UL — SIGNIFICANT CHANGE UP (ref 3.8–10.5)

## 2022-04-08 PROCEDURE — 99232 SBSQ HOSP IP/OBS MODERATE 35: CPT

## 2022-04-08 RX ORDER — MAGNESIUM SULFATE 500 MG/ML
2 VIAL (ML) INJECTION ONCE
Refills: 0 | Status: COMPLETED | OUTPATIENT
Start: 2022-04-08 | End: 2022-04-08

## 2022-04-08 RX ADMIN — NYSTATIN CREAM 1 APPLICATION(S): 100000 CREAM TOPICAL at 21:58

## 2022-04-08 RX ADMIN — Medication 500 MILLIGRAM(S): at 12:20

## 2022-04-08 RX ADMIN — INSULIN GLARGINE 30 UNIT(S): 100 INJECTION, SOLUTION SUBCUTANEOUS at 21:54

## 2022-04-08 RX ADMIN — Medication 2: at 12:57

## 2022-04-08 RX ADMIN — Medication 9 UNIT(S): at 18:35

## 2022-04-08 RX ADMIN — Medication 650 MILLIGRAM(S): at 09:43

## 2022-04-08 RX ADMIN — Medication 2: at 18:34

## 2022-04-08 RX ADMIN — Medication 9 UNIT(S): at 12:57

## 2022-04-08 RX ADMIN — Medication 1 APPLICATION(S): at 17:55

## 2022-04-08 RX ADMIN — Medication 25 GRAM(S): at 10:01

## 2022-04-08 RX ADMIN — NYSTATIN CREAM 1 APPLICATION(S): 100000 CREAM TOPICAL at 12:20

## 2022-04-08 RX ADMIN — NYSTATIN CREAM 1 APPLICATION(S): 100000 CREAM TOPICAL at 05:41

## 2022-04-08 RX ADMIN — Medication 2: at 08:44

## 2022-04-08 RX ADMIN — ENOXAPARIN SODIUM 40 MILLIGRAM(S): 100 INJECTION SUBCUTANEOUS at 17:43

## 2022-04-08 RX ADMIN — Medication 650 MILLIGRAM(S): at 08:43

## 2022-04-08 RX ADMIN — Medication 1 APPLICATION(S): at 05:42

## 2022-04-08 RX ADMIN — Medication 75 MICROGRAM(S): at 05:41

## 2022-04-08 RX ADMIN — CHLORHEXIDINE GLUCONATE 1 APPLICATION(S): 213 SOLUTION TOPICAL at 10:14

## 2022-04-08 RX ADMIN — ENOXAPARIN SODIUM 40 MILLIGRAM(S): 100 INJECTION SUBCUTANEOUS at 05:42

## 2022-04-08 RX ADMIN — Medication 9 UNIT(S): at 08:47

## 2022-04-08 NOTE — PROGRESS NOTE ADULT - PROBLEM SELECTOR PLAN 1
RESOLVED  On admission , pt with septic shock requiring Levophed on admission   Sepsis suspect secondary to UT/Pyelo (Patient w/ purulent drainage from [facility]) Other possibility is secondary to multiple skin ulcers  Fever of 102.3 on 4/1 overnight, Febrile overnight 4/4, afebrile today  3/28- BC x 1-Staph epi. Repeat BC 3/20- NGTD  UC- ESBL Ecoli . UA-  large LE. COVID positive    Lactate- 4.7--> 1.8  CT A/P: Large soft tissue defect/ulceration in the lateral right thigh with associated subcutaneous inflammatory changes. Circumferential bladder wall thickening with adjacent inflammation most concerning for cystitis. In addition, there is mild greater than right hydronephrosis and subtle perinephric and trace prevertebral fat stranding, cannot exclude ascending infection and recommend correlation with clinical examination laboratory findings to exclude pyelonephritis.    Was on  Vanc/Zosyn, transitioned to Ertapenem given ESBL ( 3/31-4/6), completed  midodrine weaned off  Appreciate ID / Wound care recs

## 2022-04-08 NOTE — PROGRESS NOTE ADULT - PROBLEM SELECTOR PROBLEM 8
Multiple sclerosis
Need for prophylactic measure
Need for prophylactic measure
Multiple sclerosis

## 2022-04-08 NOTE — PROGRESS NOTE ADULT - ASSESSMENT
53 yo F hx of T2DM, asthma, obesity, chronic immobility 2/2 MS and several chronic wounds presenting for vulvar/R thigh wound found to be hyperglycemic + metabolic acidosis w/ purulent urine c/f urosepsis + ARF. Patient also COVID positive. Patient s/p course of ertapenem. Off isolation for COVID. Patient pending rehab placement.

## 2022-04-08 NOTE — PROGRESS NOTE ADULT - PROBLEM SELECTOR PLAN 8
Lovenox   PT eval- Rehab
Lovenox   FU PT eval  Fu ID eval
Hold home cosentyx (20mg q4w). Last dose 3/26 as per brother.  FU Neurology as outpt
Hold home cosentyx (20mg q4w). Last dose 3/26 as per brother.  FU Neurology as outpt    # Multiple decub ulcers-  Hx of chronic sacral wound d/t immobility further complicated by acute progression of right medial thigh/vulvar wound precipitated by skin tear  Appreciate wounds care recs- Topical recommendations- pack with Dakins 1/4 strength moistened kerlix, cover with 4x4 gauze, abdominal pad, change twice a day.- Offload pressure.  DW Wound care attending- ok to aguilar indwelling Rose, Ok for Primafit    #Anemia  - hemoglobin 7.9 today responded appropriately to transfusion   - no transfusion today, hgb goal >7
Hold home cosentyx (20mg q4w). Last dose 3/26 as per brother.  FU Neurology as outpt    # Multiple decub ulcers-  Hx of chronic sacral wound d/t immobility further complicated by acute progression of right medial thigh/vulvar wound precipitated by skin tear  Appreciate wounds care recs- Topical recommendations- pack with Dakins 1/4 strength moistened kerlix, cover with 4x4 gauze, abdominal pad, change twice a day.- Offload pressure.  DW Wound care attending- ok to [place] indwelling Rose, Ok for Primafit    #Anemia  - hemoglobin 6.8 repeat 6.6  - ferritin elevated likely AOCD  - transfusion today
Hold home cosentyx (20mg q4w). Last dose 3/26 as per brother.  FU Neurology as outpt    # Multiple decub ulcers-  Hx of chronic sacral wound d/t immobility further complicated by acute progression of right medial thigh/vulvar wound precipitated by skin tear  Appreciate wounds care recs- Topical recommendations- pack with Dakins 1/4 strength moistened kerlix, cover with 4x4 gauze, abdominal pad, change twice a day.- Offload pressure.  DW Wound care attending- ok to aguilar indwelling Rose, Ok for Primafit
Hold home cosentyx (20mg q4w). Last dose 3/26 as per brother.  FU Neurology as outpt    # Multiple decub ulcers-  Hx of chronic sacral wound d/t immobility further complicated by acute progression of right medial thigh/vulvar wound precipitated by skin tear  Appreciate wounds care recs- Topical recommendations- pack with Dakins 1/4 strength moistened kerlix, cover with 4x4 gauze, abdominal pad, change twice a day.- Offload pressure.  DW Wound care attending- ok to [place] indwelling Rose, Ok for Primafit    #Anemia  - hemoglobin 6.8 this AM--> repeat 7.5 stable from prior hemoglobins  - ferritin elevated likely AOCD  - CTM, transfuse if hemoglobin falls below 7
Hold home cosentyx (20mg q4w). Last dose 3/26 as per brother.  FU Neurology as outpt    # Multiple decub ulcers-  Hx of chronic sacral wound d/t immobility further complicated by acute progression of right medial thigh/vulvar wound precipitated by skin tear  Appreciate wounds care recs- Topical recommendations- pack with Dakins 1/4 strength moistened kerlix, cover with 4x4 gauze, abdominal pad, change twice a day.- Offload pressure.  DW Wound care attending- ok to aguilar indwelling Rose, Ok for Primafit    #Anemia  - hemoglobin 8.1 today responded appropriately to transfusion   - no transfusion today, hgb goal >7
Hold home cosentyx (20mg q4w). Last dose 3/26 as per brother.  FU Neurology as outpt    # Multiple decub ulcers-  Hx of chronic sacral wound d/t immobility further complicated by acute progression of right medial thigh/vulvar wound precipitated by skin tear  Appreciate wounds care recs- Topical recommendations- pack with Dakins 1/4 strength moistened kerlix, cover with 4x4 gauze, abdominal pad, change twice a day.- Offload pressure.  DW Wound care attending- ok to aguilar indwelling Rose, Ok for Primafit    #Anemia  - hemoglobin 8.8 today responded appropriately to transfusion   - no transfusion today, hgb goal >7
Hold home cosentyx (20mg q4w). Last dose 3/26 as per brother.  FU Neurology as outpt    # Multiple decub ulcers-  Topical recommendations- pack with Dakins 1/4 strength moistened kerlix, cover with 4x4 gauze, abdominal pad, change twice a day.- Offload pressure.  DW Wound care attending- ok to aguilar indwelling Rose, Ok for Primafit

## 2022-04-08 NOTE — PROGRESS NOTE ADULT - SUBJECTIVE AND OBJECTIVE BOX
Layla Rolon MD   Hospitalist  Pager- [phone]    SUBJECTIVE / OVERNIGHT EVENTS: Patient seen and examined afebrile. Patient coming off isolation today. Patient otherwise w/o complaints. Patient medically optimized for discharge pending rehab.     MEDICATIONS  (STANDING):  ascorbic acid 500 milliGRAM(s) Oral daily  chlorhexidine 2% Cloths 1 Application(s) Topical <User Schedule>  Dakins Solution - 1/4 Strength 1 Application(s) Topical two times a day  dextrose 5%. 1000 milliLiter(s) (50 mL/Hr) IV Continuous <Continuous>  dextrose 5%. 1000 milliLiter(s) (100 mL/Hr) IV Continuous <Continuous>  dextrose 50% Injectable 25 Gram(s) IV Push once  dextrose 50% Injectable 12.5 Gram(s) IV Push once  dextrose 50% Injectable 25 Gram(s) IV Push once  enoxaparin Injectable 40 milliGRAM(s) SubCutaneous every 12 hours  ergocalciferol 37636 Unit(s) Oral <User Schedule>  glucagon  Injectable 1 milliGRAM(s) IntraMuscular once  insulin glargine Injectable (LANTUS) 30 Unit(s) SubCutaneous at bedtime  insulin lispro (ADMELOG) corrective regimen sliding scale   SubCutaneous three times a day before meals  insulin lispro (ADMELOG) corrective regimen sliding scale   SubCutaneous at bedtime  insulin lispro Injectable (ADMELOG) 9 Unit(s) SubCutaneous three times a day before meals  levothyroxine 75 MICROGram(s) Oral daily  multivitamin 1 Tablet(s) Oral daily  nystatin Powder 1 Application(s) Topical three times a day  sodium chloride 0.9%. 1000 milliLiter(s) (50 mL/Hr) IV Continuous <Continuous>    MEDICATIONS  (PRN):  acetaminophen     Tablet .. 650 milliGRAM(s) Oral every 6 hours PRN Temp greater or equal to 38C (100.4F), Mild Pain (1 - 3), Moderate Pain (4 - 6)  ALBUTerol    90 MICROgram(s) HFA Inhaler 2 Puff(s) Inhalation every 6 hours PRN Shortness of Breath  albuterol/ipratropium for Nebulization 3 milliLiter(s) Nebulizer every 6 hours PRN Shortness of Breath  dextrose Oral Gel 15 Gram(s) Oral once PRN Blood Glucose LESS THAN 70 milliGRAM(s)/deciliter    CAPILLARY BLOOD GLUCOSE  POCT Blood Glucose.: 163 mg/dL (08 Apr 2022 17:57)  POCT Blood Glucose.: 177 mg/dL (08 Apr 2022 12:44)  POCT Blood Glucose.: 183 mg/dL (08 Apr 2022 08:42)  POCT Blood Glucose.: 204 mg/dL (07 Apr 2022 21:00)    Vital Signs Last 24 Hrs  T(C): 36.7 (08 Apr 2022 16:00), Max: 37.3 (08 Apr 2022 01:00)  T(F): 98.1 (08 Apr 2022 16:00), Max: 99.2 (08 Apr 2022 01:00)  HR: 95 (08 Apr 2022 16:00) (71 - 100)  BP: 126/69 (08 Apr 2022 16:00) (125/60 - 149/68)  BP(mean): --  RR: 19 (08 Apr 2022 16:00) (17 - 19)  SpO2: 100% (08 Apr 2022 16:00) (95% - 100%)    CONSTITUTIONAL: NAD, obese   RESPIRATORY: Normal respiratory effort; decreased BS at base  CARDIOVASCULAR: Regular rate and rhythm, normal S1 and S2, no murmur/rub/gallop; No lower extremity edema; Peripheral pulses are 2+ bilaterally  ABDOMEN: Nontender to palpation, normoactive bowel sounds, no rebound/guarding; No hepatosplenomegaly  MUSCLOSKELETAL: no clubbing or cyanosis of digits; no joint swelling or tenderness to palpation  PSYCH: A+O to person, place, and time; affect appropriate  NEURO: Decreased strength more on right side compared to left( as per pt chronic)  - Primafit   SKIN:  Multiple wounds with dressing  present -Right knee, RLE  right posterior lower leg- 5cmx2.5cmx0  Beneath breast/abdominal folds - Dermatitis with  fissures  present  Dressing present  Right lateral thigh-.Stage 4 pressure ulcer - 0qxz1rcl8.5cm,  granular base, no purulent drainage, no odor. Periwound skin intact, no edema, no erythema, no increased warmth, no fluctuance noted. Right trochanter- stage 4 pressure injury- [id number], granular base, bone in close proximity.     LABS:                         8.1    7.23  )-----------( 633      ( 08 Apr 2022 06:42 )             26.8     04-08    138  |  105  |  10  ----------------------------<  150<H>  3.8   |  21<L>  |  0.56    Ca    8.3<L>      08 Apr 2022 06:42  Phos  3.9     04-08  Mg     1.50     04-08    TPro  6.0  /  Alb  2.3<L>  /  TBili  <0.2  /  DBili  x   /  AST  25  /  ALT  9   /  AlkPhos  219<H>  04-07                  RADIOLOGY, EKG & ADDITIONAL TESTS: Reviewed.   Culture - Blood (collected 28 Mar 2022 08:26)  Source: .Blood Blood  Preliminary Report (29 Mar 2022 09:01):    No growth to date.    Culture - Urine (collected 28 Mar 2022 08:19)  Source: Catheterized Catheterized  Final Report (30 Mar 2022 08:53):    >100,000 CFU/ml Escherichia coli    >100,000 CFU/ml Escherichia coli #2    >=3 organisms. Probable collection contamination.    Normal Urogenital brayan present        RADIOLOGY & ADDITIONAL TESTS:  Results Reviewed:   Imaging Personally Reviewed:  Electrocardiogram Personally Reviewed:    COORDINATION OF CARE:  Care Discussed with Consultants/Other Providers [Y/N]:  Prior or Outpatient Records Reviewed [Y/N]:

## 2022-04-09 DIAGNOSIS — D63.8 ANEMIA IN OTHER CHRONIC DISEASES CLASSIFIED ELSEWHERE: ICD-10-CM

## 2022-04-09 LAB
ANION GAP SERPL CALC-SCNC: 12 MMOL/L — SIGNIFICANT CHANGE UP (ref 7–14)
BASOPHILS # BLD AUTO: 0.04 K/UL — SIGNIFICANT CHANGE UP (ref 0–0.2)
BASOPHILS NFR BLD AUTO: 0.5 % — SIGNIFICANT CHANGE UP (ref 0–2)
BUN SERPL-MCNC: 9 MG/DL — SIGNIFICANT CHANGE UP (ref 7–23)
CALCIUM SERPL-MCNC: 8.6 MG/DL — SIGNIFICANT CHANGE UP (ref 8.4–10.5)
CHLORIDE SERPL-SCNC: 109 MMOL/L — HIGH (ref 98–107)
CO2 SERPL-SCNC: 22 MMOL/L — SIGNIFICANT CHANGE UP (ref 22–31)
CREAT SERPL-MCNC: 0.62 MG/DL — SIGNIFICANT CHANGE UP (ref 0.5–1.3)
EGFR: 108 ML/MIN/1.73M2 — SIGNIFICANT CHANGE UP
EOSINOPHIL # BLD AUTO: 0.35 K/UL — SIGNIFICANT CHANGE UP (ref 0–0.5)
EOSINOPHIL NFR BLD AUTO: 4 % — SIGNIFICANT CHANGE UP (ref 0–6)
GLUCOSE BLDC GLUCOMTR-MCNC: 124 MG/DL — HIGH (ref 70–99)
GLUCOSE BLDC GLUCOMTR-MCNC: 125 MG/DL — HIGH (ref 70–99)
GLUCOSE BLDC GLUCOMTR-MCNC: 135 MG/DL — HIGH (ref 70–99)
GLUCOSE BLDC GLUCOMTR-MCNC: 141 MG/DL — HIGH (ref 70–99)
GLUCOSE BLDC GLUCOMTR-MCNC: 142 MG/DL — HIGH (ref 70–99)
GLUCOSE BLDC GLUCOMTR-MCNC: 146 MG/DL — HIGH (ref 70–99)
GLUCOSE SERPL-MCNC: 133 MG/DL — HIGH (ref 70–99)
HCT VFR BLD CALC: 26.6 % — LOW (ref 34.5–45)
HGB BLD-MCNC: 7.9 G/DL — LOW (ref 11.5–15.5)
IANC: 5.03 K/UL — SIGNIFICANT CHANGE UP (ref 1.8–7.4)
IMM GRANULOCYTES NFR BLD AUTO: 0.7 % — SIGNIFICANT CHANGE UP (ref 0–1.5)
LYMPHOCYTES # BLD AUTO: 2.54 K/UL — SIGNIFICANT CHANGE UP (ref 1–3.3)
LYMPHOCYTES # BLD AUTO: 29 % — SIGNIFICANT CHANGE UP (ref 13–44)
MAGNESIUM SERPL-MCNC: 1.5 MG/DL — LOW (ref 1.6–2.6)
MCHC RBC-ENTMCNC: 25.6 PG — LOW (ref 27–34)
MCHC RBC-ENTMCNC: 29.7 GM/DL — LOW (ref 32–36)
MCV RBC AUTO: 86.1 FL — SIGNIFICANT CHANGE UP (ref 80–100)
MONOCYTES # BLD AUTO: 0.73 K/UL — SIGNIFICANT CHANGE UP (ref 0–0.9)
MONOCYTES NFR BLD AUTO: 8.3 % — SIGNIFICANT CHANGE UP (ref 2–14)
NEUTROPHILS # BLD AUTO: 5.03 K/UL — SIGNIFICANT CHANGE UP (ref 1.8–7.4)
NEUTROPHILS NFR BLD AUTO: 57.5 % — SIGNIFICANT CHANGE UP (ref 43–77)
NRBC # BLD: 0 /100 WBCS — SIGNIFICANT CHANGE UP
NRBC # FLD: 0 K/UL — SIGNIFICANT CHANGE UP
PHOSPHATE SERPL-MCNC: 4.5 MG/DL — SIGNIFICANT CHANGE UP (ref 2.5–4.5)
PLATELET # BLD AUTO: 607 K/UL — HIGH (ref 150–400)
POTASSIUM SERPL-MCNC: 3.6 MMOL/L — SIGNIFICANT CHANGE UP (ref 3.5–5.3)
POTASSIUM SERPL-SCNC: 3.6 MMOL/L — SIGNIFICANT CHANGE UP (ref 3.5–5.3)
RBC # BLD: 3.09 M/UL — LOW (ref 3.8–5.2)
RBC # FLD: 17.1 % — HIGH (ref 10.3–14.5)
SODIUM SERPL-SCNC: 143 MMOL/L — SIGNIFICANT CHANGE UP (ref 135–145)
WBC # BLD: 8.75 K/UL — SIGNIFICANT CHANGE UP (ref 3.8–10.5)
WBC # FLD AUTO: 8.75 K/UL — SIGNIFICANT CHANGE UP (ref 3.8–10.5)

## 2022-04-09 PROCEDURE — 99233 SBSQ HOSP IP/OBS HIGH 50: CPT

## 2022-04-09 RX ORDER — MAGNESIUM SULFATE 500 MG/ML
2 VIAL (ML) INJECTION ONCE
Refills: 0 | Status: COMPLETED | OUTPATIENT
Start: 2022-04-09 | End: 2022-04-09

## 2022-04-09 RX ADMIN — NYSTATIN CREAM 1 APPLICATION(S): 100000 CREAM TOPICAL at 13:32

## 2022-04-09 RX ADMIN — Medication 25 GRAM(S): at 11:29

## 2022-04-09 RX ADMIN — Medication 1 APPLICATION(S): at 17:11

## 2022-04-09 RX ADMIN — Medication 9 UNIT(S): at 18:02

## 2022-04-09 RX ADMIN — Medication 1 APPLICATION(S): at 05:27

## 2022-04-09 RX ADMIN — ENOXAPARIN SODIUM 40 MILLIGRAM(S): 100 INJECTION SUBCUTANEOUS at 05:25

## 2022-04-09 RX ADMIN — ENOXAPARIN SODIUM 40 MILLIGRAM(S): 100 INJECTION SUBCUTANEOUS at 17:11

## 2022-04-09 RX ADMIN — Medication 1 TABLET(S): at 11:29

## 2022-04-09 RX ADMIN — NYSTATIN CREAM 1 APPLICATION(S): 100000 CREAM TOPICAL at 05:26

## 2022-04-09 RX ADMIN — Medication 650 MILLIGRAM(S): at 11:46

## 2022-04-09 RX ADMIN — CHLORHEXIDINE GLUCONATE 1 APPLICATION(S): 213 SOLUTION TOPICAL at 09:11

## 2022-04-09 RX ADMIN — Medication 9 UNIT(S): at 13:32

## 2022-04-09 RX ADMIN — Medication 500 MILLIGRAM(S): at 11:29

## 2022-04-09 RX ADMIN — Medication 650 MILLIGRAM(S): at 12:46

## 2022-04-09 RX ADMIN — Medication 75 MICROGRAM(S): at 05:26

## 2022-04-09 RX ADMIN — INSULIN GLARGINE 30 UNIT(S): 100 INJECTION, SOLUTION SUBCUTANEOUS at 22:41

## 2022-04-09 RX ADMIN — Medication 9 UNIT(S): at 09:08

## 2022-04-09 RX ADMIN — NYSTATIN CREAM 1 APPLICATION(S): 100000 CREAM TOPICAL at 22:35

## 2022-04-09 NOTE — PROGRESS NOTE ADULT - PROBLEM SELECTOR PLAN 1
Likely chronic in nature.  No sign of acute bleed.  Monitor H/H - will verify with SW with what Hgb level NABEEL require and consider transfusion to keep Hgb >8.

## 2022-04-09 NOTE — PROGRESS NOTE ADULT - PROBLEM SELECTOR PLAN 5
Hold home cosentyx (20mg q4w). Last dose 3/26 as per brother.  FU Neurology as outpt    # Multiple decub ulcers-  Hx of chronic sacral wound d/t immobility further complicated by acute progression of right medial thigh/vulvar wound precipitated by skin tear  Appreciate wounds care recs- Topical recommendations- pack with Dakins 1/4 strength moistened kerlix, cover with 4x4 gauze, abdominal pad, change twice a day.- Offload pressure.  DW Wound care attending- ok to aguilar indwelling Rose, Ok for Primafit    #Anemia  - hemoglobin 8.1 today responded appropriately to transfusion   - no transfusion today, hgb goal >7

## 2022-04-09 NOTE — PROGRESS NOTE ADULT - SUBJECTIVE AND OBJECTIVE BOX
[facility] Division of Hospital Medicine  Jose Ayala MD  Pager (JAMA-F, 0J-5P): [phone]  Other Times:  [phone]    Patient is a 52y old  Female who presents with a chief complaint of Septic Shock (07 Apr 2022 15:48)    SUBJECTIVE / OVERNIGHT EVENTS:  Patient offers no new complaints.  No F/C, N/V, CP, SOB, Cough, lightheadedness, dizziness, abdominal pain, diarrhea, dysuria.    MEDICATIONS  (STANDING):  ascorbic acid 500 milliGRAM(s) Oral daily  chlorhexidine 2% Cloths 1 Application(s) Topical <User Schedule>  Dakins Solution - 1/4 Strength 1 Application(s) Topical two times a day  dextrose 5%. 1000 milliLiter(s) (50 mL/Hr) IV Continuous <Continuous>  dextrose 5%. 1000 milliLiter(s) (100 mL/Hr) IV Continuous <Continuous>  dextrose 50% Injectable 25 Gram(s) IV Push once  dextrose 50% Injectable 12.5 Gram(s) IV Push once  dextrose 50% Injectable 25 Gram(s) IV Push once  enoxaparin Injectable 40 milliGRAM(s) SubCutaneous every 12 hours  ergocalciferol 14107 Unit(s) Oral <User Schedule>  glucagon  Injectable 1 milliGRAM(s) IntraMuscular once  insulin glargine Injectable (LANTUS) 30 Unit(s) SubCutaneous at bedtime  insulin lispro (ADMELOG) corrective regimen sliding scale   SubCutaneous three times a day before meals  insulin lispro (ADMELOG) corrective regimen sliding scale   SubCutaneous at bedtime  insulin lispro Injectable (ADMELOG) 9 Unit(s) SubCutaneous three times a day before meals  levothyroxine 75 MICROGram(s) Oral daily  multivitamin 1 Tablet(s) Oral daily  nystatin Powder 1 Application(s) Topical three times a day  sodium chloride 0.9%. 1000 milliLiter(s) (50 mL/Hr) IV Continuous <Continuous>    MEDICATIONS  (PRN):  acetaminophen     Tablet .. 650 milliGRAM(s) Oral every 6 hours PRN Temp greater or equal to 38C (100.4F), Mild Pain (1 - 3), Moderate Pain (4 - 6)  ALBUTerol    90 MICROgram(s) HFA Inhaler 2 Puff(s) Inhalation every 6 hours PRN Shortness of Breath  albuterol/ipratropium for Nebulization 3 milliLiter(s) Nebulizer every 6 hours PRN Shortness of Breath  dextrose Oral Gel 15 Gram(s) Oral once PRN Blood Glucose LESS THAN 70 milliGRAM(s)/deciliter      Vital Signs Last 24 Hrs  T(C): 36.4 (09 Apr 2022 12:00), Max: 37.5 (09 Apr 2022 00:00)  T(F): 97.5 (09 Apr 2022 12:00), Max: 99.5 (09 Apr 2022 00:00)  HR: 95 (09 Apr 2022 12:00) (78 - 99)  BP: 137/64 (09 Apr 2022 12:00) (113/62 - 146/71)  BP(mean): --  RR: 17 (09 Apr 2022 12:00) (17 - 19)  SpO2: 97% (09 Apr 2022 12:00) (96% - 100%)  CAPILLARY BLOOD GLUCOSE      POCT Blood Glucose.: 142 mg/dL (09 Apr 2022 08:59)  POCT Blood Glucose.: 129 mg/dL (08 Apr 2022 21:23)  POCT Blood Glucose.: 163 mg/dL (08 Apr 2022 17:57)    I&O's Summary    08 Apr 2022 07:01  -  09 Apr 2022 07:00  --------------------------------------------------------  IN: 700 mL / OUT: 1400 mL / NET: -700 mL        PHYSICAL EXAM:  CONSTITUTIONAL: NAD, obese   RESPIRATORY: Normal respiratory effort; decreased BS at base  CARDIOVASCULAR: Regular rate and rhythm, normal S1 and S2, no murmur/rub/gallop; No lower extremity edema; Peripheral pulses are 2+ bilaterally  ABDOMEN: Nontender to palpation, normoactive bowel sounds, no rebound/guarding; No hepatosplenomegaly  MUSCLOSKELETAL: no clubbing or cyanosis of digits; no joint swelling or tenderness to palpation  PSYCH: A+O to person, place, and time; affect appropriate  NEURO: Decreased strength more on right side compared to left( as per pt chronic)  - Primafit   SKIN:  Multiple wounds with dressing  present -Right knee, RLE  right posterior lower leg- 5cmx2.5cmx0  Beneath breast/abdominal folds - Dermatitis with  fissures  present  Dressing present  Right lateral thigh-.Stage 4 pressure ulcer - 8quv7woc8.5cm,  granular base, no purulent drainage, no odor. Periwound skin intact, no edema, no erythema, no increased warmth, no fluctuance noted. Right trochanter- stage 4 pressure injury- [id number], granular base, bone in close proximity.     LABS:                        7.9    8.75  )-----------( 607      ( 09 Apr 2022 07:37 )             26.6     04-09    143  |  109<H>  |  9   ----------------------------<  133<H>  3.6   |  22  |  0.62    Ca    8.6      09 Apr 2022 07:37  Phos  4.5     04-09  Mg     1.50     04-09                RADIOLOGY & ADDITIONAL TESTS:    Imaging Personally Reviewed:    Care Discussed with Consultants/Other Providers:

## 2022-04-09 NOTE — PROGRESS NOTE ADULT - ASSESSMENT
52F DM type 2 A1c 14.2, asthma, hypothyroidism, obesity, MS - limited mobility, chronic wounds to vulvar/Rt thigh p/w septic shock POA from UTI requiring MICU/pressors s/p Ertapenem IV tx, asymptomatic COVID, chronic anemia.

## 2022-04-10 LAB
ANION GAP SERPL CALC-SCNC: 14 MMOL/L — SIGNIFICANT CHANGE UP (ref 7–14)
ANISOCYTOSIS BLD QL: SLIGHT — SIGNIFICANT CHANGE UP
BASOPHILS # BLD AUTO: 0.06 K/UL — SIGNIFICANT CHANGE UP (ref 0–0.2)
BASOPHILS NFR BLD AUTO: 0.8 % — SIGNIFICANT CHANGE UP (ref 0–2)
BUN SERPL-MCNC: 8 MG/DL — SIGNIFICANT CHANGE UP (ref 7–23)
CALCIUM SERPL-MCNC: 8.9 MG/DL — SIGNIFICANT CHANGE UP (ref 8.4–10.5)
CHLORIDE SERPL-SCNC: 109 MMOL/L — HIGH (ref 98–107)
CO2 SERPL-SCNC: 22 MMOL/L — SIGNIFICANT CHANGE UP (ref 22–31)
CREAT SERPL-MCNC: 0.6 MG/DL — SIGNIFICANT CHANGE UP (ref 0.5–1.3)
EGFR: 109 ML/MIN/1.73M2 — SIGNIFICANT CHANGE UP
EOSINOPHIL # BLD AUTO: 0.35 K/UL — SIGNIFICANT CHANGE UP (ref 0–0.5)
EOSINOPHIL NFR BLD AUTO: 4.9 % — SIGNIFICANT CHANGE UP (ref 0–6)
GIANT PLATELETS BLD QL SMEAR: PRESENT — SIGNIFICANT CHANGE UP
GLUCOSE BLDC GLUCOMTR-MCNC: 104 MG/DL — HIGH (ref 70–99)
GLUCOSE BLDC GLUCOMTR-MCNC: 109 MG/DL — HIGH (ref 70–99)
GLUCOSE BLDC GLUCOMTR-MCNC: 162 MG/DL — HIGH (ref 70–99)
GLUCOSE BLDC GLUCOMTR-MCNC: 178 MG/DL — HIGH (ref 70–99)
GLUCOSE BLDC GLUCOMTR-MCNC: 213 MG/DL — HIGH (ref 70–99)
GLUCOSE SERPL-MCNC: 99 MG/DL — SIGNIFICANT CHANGE UP (ref 70–99)
HCT VFR BLD CALC: 29.5 % — LOW (ref 34.5–45)
HGB BLD-MCNC: 8.6 G/DL — LOW (ref 11.5–15.5)
IANC: 3.72 K/UL — SIGNIFICANT CHANGE UP (ref 1.8–7.4)
IMM GRANULOCYTES NFR BLD AUTO: 1 % — SIGNIFICANT CHANGE UP (ref 0–1.5)
LYMPHOCYTES # BLD AUTO: 2.36 K/UL — SIGNIFICANT CHANGE UP (ref 1–3.3)
LYMPHOCYTES # BLD AUTO: 32.8 % — SIGNIFICANT CHANGE UP (ref 13–44)
MAGNESIUM SERPL-MCNC: 1.5 MG/DL — LOW (ref 1.6–2.6)
MCHC RBC-ENTMCNC: 25.7 PG — LOW (ref 27–34)
MCHC RBC-ENTMCNC: 29.2 GM/DL — LOW (ref 32–36)
MCV RBC AUTO: 88.3 FL — SIGNIFICANT CHANGE UP (ref 80–100)
MONOCYTES # BLD AUTO: 0.63 K/UL — SIGNIFICANT CHANGE UP (ref 0–0.9)
MONOCYTES NFR BLD AUTO: 8.8 % — SIGNIFICANT CHANGE UP (ref 2–14)
NEUTROPHILS # BLD AUTO: 3.72 K/UL — SIGNIFICANT CHANGE UP (ref 1.8–7.4)
NEUTROPHILS NFR BLD AUTO: 51.7 % — SIGNIFICANT CHANGE UP (ref 43–77)
NRBC # BLD: 0 /100 WBCS — SIGNIFICANT CHANGE UP
NRBC # FLD: 0 K/UL — SIGNIFICANT CHANGE UP
PHOSPHATE SERPL-MCNC: 4.2 MG/DL — SIGNIFICANT CHANGE UP (ref 2.5–4.5)
PLAT MORPH BLD: NORMAL — SIGNIFICANT CHANGE UP
PLATELET # BLD AUTO: 373 K/UL — SIGNIFICANT CHANGE UP (ref 150–400)
PLATELET COUNT - ESTIMATE: ABNORMAL
POIKILOCYTOSIS BLD QL AUTO: SLIGHT — SIGNIFICANT CHANGE UP
POLYCHROMASIA BLD QL SMEAR: SLIGHT — SIGNIFICANT CHANGE UP
POTASSIUM SERPL-MCNC: 4.1 MMOL/L — SIGNIFICANT CHANGE UP (ref 3.5–5.3)
POTASSIUM SERPL-SCNC: 4.1 MMOL/L — SIGNIFICANT CHANGE UP (ref 3.5–5.3)
RBC # BLD: 3.34 M/UL — LOW (ref 3.8–5.2)
RBC # FLD: 17.1 % — HIGH (ref 10.3–14.5)
RBC BLD AUTO: NORMAL — SIGNIFICANT CHANGE UP
SARS-COV-2 RNA SPEC QL NAA+PROBE: SIGNIFICANT CHANGE UP
SMUDGE CELLS # BLD: PRESENT — SIGNIFICANT CHANGE UP
SODIUM SERPL-SCNC: 145 MMOL/L — SIGNIFICANT CHANGE UP (ref 135–145)
VARIANT LYMPHS # BLD: 3.8 % — SIGNIFICANT CHANGE UP (ref 0–6)
WBC # BLD: 7.19 K/UL — SIGNIFICANT CHANGE UP (ref 3.8–10.5)
WBC # FLD AUTO: 7.19 K/UL — SIGNIFICANT CHANGE UP (ref 3.8–10.5)

## 2022-04-10 PROCEDURE — 99232 SBSQ HOSP IP/OBS MODERATE 35: CPT

## 2022-04-10 RX ORDER — MAGNESIUM SULFATE 500 MG/ML
4 VIAL (ML) INJECTION ONCE
Refills: 0 | Status: COMPLETED | OUTPATIENT
Start: 2022-04-10 | End: 2022-04-10

## 2022-04-10 RX ADMIN — NYSTATIN CREAM 1 APPLICATION(S): 100000 CREAM TOPICAL at 13:21

## 2022-04-10 RX ADMIN — INSULIN GLARGINE 30 UNIT(S): 100 INJECTION, SOLUTION SUBCUTANEOUS at 22:22

## 2022-04-10 RX ADMIN — Medication 75 MICROGRAM(S): at 05:59

## 2022-04-10 RX ADMIN — Medication 9 UNIT(S): at 13:15

## 2022-04-10 RX ADMIN — Medication 1 TABLET(S): at 13:16

## 2022-04-10 RX ADMIN — Medication 25 GRAM(S): at 13:14

## 2022-04-10 RX ADMIN — Medication 9 UNIT(S): at 18:18

## 2022-04-10 RX ADMIN — Medication 500 MILLIGRAM(S): at 13:16

## 2022-04-10 RX ADMIN — Medication 9 UNIT(S): at 09:56

## 2022-04-10 RX ADMIN — ENOXAPARIN SODIUM 40 MILLIGRAM(S): 100 INJECTION SUBCUTANEOUS at 18:20

## 2022-04-10 RX ADMIN — CHLORHEXIDINE GLUCONATE 1 APPLICATION(S): 213 SOLUTION TOPICAL at 13:21

## 2022-04-10 RX ADMIN — ENOXAPARIN SODIUM 40 MILLIGRAM(S): 100 INJECTION SUBCUTANEOUS at 05:59

## 2022-04-10 RX ADMIN — Medication 1 APPLICATION(S): at 18:19

## 2022-04-10 RX ADMIN — NYSTATIN CREAM 1 APPLICATION(S): 100000 CREAM TOPICAL at 22:23

## 2022-04-10 RX ADMIN — Medication 1 APPLICATION(S): at 06:24

## 2022-04-10 RX ADMIN — Medication 2: at 18:16

## 2022-04-10 RX ADMIN — NYSTATIN CREAM 1 APPLICATION(S): 100000 CREAM TOPICAL at 05:59

## 2022-04-10 NOTE — PROGRESS NOTE ADULT - SUBJECTIVE AND OBJECTIVE BOX
[facility] Division of Hospital Medicine  Jose Ayala MD  Pager (JAMA-F, 1E-5P): [phone]  Other Times:  [phone]    Patient is a 52y old  Female who presents with a chief complaint of Septic Shock (07 Apr 2022 15:48)    SUBJECTIVE / OVERNIGHT EVENTS:  No new complaints.  No F/C, N/V, CP, SOB, Cough, lightheadedness, dizziness, abdominal pain, diarrhea, dysuria.    MEDICATIONS  (STANDING):  ascorbic acid 500 milliGRAM(s) Oral daily  chlorhexidine 2% Cloths 1 Application(s) Topical <User Schedule>  Dakins Solution - 1/4 Strength 1 Application(s) Topical two times a day  dextrose 5%. 1000 milliLiter(s) (100 mL/Hr) IV Continuous <Continuous>  dextrose 5%. 1000 milliLiter(s) (50 mL/Hr) IV Continuous <Continuous>  dextrose 50% Injectable 25 Gram(s) IV Push once  dextrose 50% Injectable 12.5 Gram(s) IV Push once  dextrose 50% Injectable 25 Gram(s) IV Push once  enoxaparin Injectable 40 milliGRAM(s) SubCutaneous every 12 hours  ergocalciferol 85092 Unit(s) Oral <User Schedule>  glucagon  Injectable 1 milliGRAM(s) IntraMuscular once  insulin glargine Injectable (LANTUS) 30 Unit(s) SubCutaneous at bedtime  insulin lispro (ADMELOG) corrective regimen sliding scale   SubCutaneous three times a day before meals  insulin lispro (ADMELOG) corrective regimen sliding scale   SubCutaneous at bedtime  insulin lispro Injectable (ADMELOG) 9 Unit(s) SubCutaneous three times a day before meals  levothyroxine 75 MICROGram(s) Oral daily  magnesium sulfate  IVPB 4 Gram(s) IV Intermittent once  multivitamin 1 Tablet(s) Oral daily  nystatin Powder 1 Application(s) Topical three times a day  sodium chloride 0.9%. 1000 milliLiter(s) (50 mL/Hr) IV Continuous <Continuous>    MEDICATIONS  (PRN):  acetaminophen     Tablet .. 650 milliGRAM(s) Oral every 6 hours PRN Temp greater or equal to 38C (100.4F), Mild Pain (1 - 3), Moderate Pain (4 - 6)  ALBUTerol    90 MICROgram(s) HFA Inhaler 2 Puff(s) Inhalation every 6 hours PRN Shortness of Breath  albuterol/ipratropium for Nebulization 3 milliLiter(s) Nebulizer every 6 hours PRN Shortness of Breath  dextrose Oral Gel 15 Gram(s) Oral once PRN Blood Glucose LESS THAN 70 milliGRAM(s)/deciliter      Vital Signs Last 24 Hrs  T(C): 36.8 (10 Apr 2022 10:00), Max: 37.7 (09 Apr 2022 22:30)  T(F): 98.2 (10 Apr 2022 10:00), Max: 99.9 (09 Apr 2022 22:30)  HR: 91 (10 Apr 2022 10:00) (86 - 98)  BP: 145/77 (10 Apr 2022 10:00) (118/68 - 152/76)  BP(mean): --  RR: 18 (10 Apr 2022 10:00) (17 - 20)  SpO2: 100% (10 Apr 2022 10:00) (96% - 100%)  CAPILLARY BLOOD GLUCOSE      POCT Blood Glucose.: 104 mg/dL (10 Apr 2022 08:58)  POCT Blood Glucose.: 124 mg/dL (09 Apr 2022 22:39)  POCT Blood Glucose.: 125 mg/dL (09 Apr 2022 21:00)  POCT Blood Glucose.: 141 mg/dL (09 Apr 2022 17:53)  POCT Blood Glucose.: 135 mg/dL (09 Apr 2022 13:10)    I&O's Summary    09 Apr 2022 07:01  -  10 Apr 2022 07:00  --------------------------------------------------------  IN: 0 mL / OUT: 1600 mL / NET: -1600 mL        PHYSICAL EXAM:  CONSTITUTIONAL: NAD, obese   RESPIRATORY: Normal respiratory effort; decreased BS at base  CARDIOVASCULAR: Regular rate and rhythm, normal S1 and S2, no murmur/rub/gallop; No lower extremity edema; Peripheral pulses are 2+ bilaterally  ABDOMEN: Nontender to palpation, normoactive bowel sounds, no rebound/guarding; No hepatosplenomegaly  MUSCLOSKELETAL: no clubbing or cyanosis of digits; no joint swelling or tenderness to palpation  PSYCH: A+O to person, place, and time; affect appropriate  NEURO: Decreased strength more on right side compared to left( as per pt chronic)  - Primafit   SKIN:  Multiple wounds with dressing  present -Right knee, RLE  right posterior lower leg- 5cmx2.5cmx0  Beneath breast/abdominal folds - Dermatitis with  fissures  present  Dressing present  Right lateral thigh-.Stage 4 pressure ulcer - 0jfu0gyl2.5cm,  granular base, no purulent drainage, no odor. Periwound skin intact, no edema, no erythema, no increased warmth, no fluctuance noted. Right trochanter- stage 4 pressure injury- [id number], granular base, bone in close proximity.     LABS:                        8.6    7.19  )-----------( 373      ( 10 Apr 2022 07:32 )             29.5     04-10    145  |  109<H>  |  8   ----------------------------<  99  4.1   |  22  |  0.60    Ca    8.9      10 Apr 2022 07:32  Phos  4.2     04-10  Mg     1.50     04-10                RADIOLOGY & ADDITIONAL TESTS:    Imaging Personally Reviewed:    Care Discussed with Consultants/Other Providers:

## 2022-04-11 ENCOUNTER — TRANSCRIPTION ENCOUNTER (OUTPATIENT)
Age: 52
End: 2022-04-11

## 2022-04-11 DIAGNOSIS — E55.9 VITAMIN D DEFICIENCY, UNSPECIFIED: ICD-10-CM

## 2022-04-11 LAB
ANION GAP SERPL CALC-SCNC: 12 MMOL/L — SIGNIFICANT CHANGE UP (ref 7–14)
BUN SERPL-MCNC: 9 MG/DL — SIGNIFICANT CHANGE UP (ref 7–23)
CALCIUM SERPL-MCNC: 8.7 MG/DL — SIGNIFICANT CHANGE UP (ref 8.4–10.5)
CHLORIDE SERPL-SCNC: 107 MMOL/L — SIGNIFICANT CHANGE UP (ref 98–107)
CO2 SERPL-SCNC: 25 MMOL/L — SIGNIFICANT CHANGE UP (ref 22–31)
CREAT SERPL-MCNC: 0.56 MG/DL — SIGNIFICANT CHANGE UP (ref 0.5–1.3)
EGFR: 111 ML/MIN/1.73M2 — SIGNIFICANT CHANGE UP
GLUCOSE BLDC GLUCOMTR-MCNC: 157 MG/DL — HIGH (ref 70–99)
GLUCOSE SERPL-MCNC: 148 MG/DL — HIGH (ref 70–99)
MAGNESIUM SERPL-MCNC: 1.6 MG/DL — SIGNIFICANT CHANGE UP (ref 1.6–2.6)
PHOSPHATE SERPL-MCNC: 4.4 MG/DL — SIGNIFICANT CHANGE UP (ref 2.5–4.5)
POTASSIUM SERPL-MCNC: 3.8 MMOL/L — SIGNIFICANT CHANGE UP (ref 3.5–5.3)
POTASSIUM SERPL-SCNC: 3.8 MMOL/L — SIGNIFICANT CHANGE UP (ref 3.5–5.3)
SODIUM SERPL-SCNC: 144 MMOL/L — SIGNIFICANT CHANGE UP (ref 135–145)

## 2022-04-11 PROCEDURE — 99232 SBSQ HOSP IP/OBS MODERATE 35: CPT

## 2022-04-11 PROCEDURE — 99233 SBSQ HOSP IP/OBS HIGH 50: CPT

## 2022-04-11 RX ADMIN — Medication 9 UNIT(S): at 09:07

## 2022-04-11 RX ADMIN — Medication 500 MILLIGRAM(S): at 12:37

## 2022-04-11 RX ADMIN — NYSTATIN CREAM 1 APPLICATION(S): 100000 CREAM TOPICAL at 05:00

## 2022-04-11 RX ADMIN — Medication 650 MILLIGRAM(S): at 10:35

## 2022-04-11 RX ADMIN — Medication 1 APPLICATION(S): at 05:00

## 2022-04-11 RX ADMIN — ENOXAPARIN SODIUM 40 MILLIGRAM(S): 100 INJECTION SUBCUTANEOUS at 05:00

## 2022-04-11 RX ADMIN — NYSTATIN CREAM 1 APPLICATION(S): 100000 CREAM TOPICAL at 23:52

## 2022-04-11 RX ADMIN — INSULIN GLARGINE 30 UNIT(S): 100 INJECTION, SOLUTION SUBCUTANEOUS at 23:51

## 2022-04-11 RX ADMIN — Medication 2: at 09:03

## 2022-04-11 RX ADMIN — Medication 9 UNIT(S): at 12:51

## 2022-04-11 RX ADMIN — Medication 75 MICROGRAM(S): at 08:15

## 2022-04-11 RX ADMIN — Medication 4: at 18:13

## 2022-04-11 RX ADMIN — ENOXAPARIN SODIUM 40 MILLIGRAM(S): 100 INJECTION SUBCUTANEOUS at 18:05

## 2022-04-11 RX ADMIN — Medication 9 UNIT(S): at 18:14

## 2022-04-11 RX ADMIN — CHLORHEXIDINE GLUCONATE 1 APPLICATION(S): 213 SOLUTION TOPICAL at 09:08

## 2022-04-11 RX ADMIN — Medication 650 MILLIGRAM(S): at 09:35

## 2022-04-11 RX ADMIN — NYSTATIN CREAM 1 APPLICATION(S): 100000 CREAM TOPICAL at 12:36

## 2022-04-11 RX ADMIN — Medication 650 MILLIGRAM(S): at 18:24

## 2022-04-11 RX ADMIN — ERGOCALCIFEROL 50000 UNIT(S): 1.25 CAPSULE ORAL at 12:37

## 2022-04-11 RX ADMIN — Medication 1 APPLICATION(S): at 18:32

## 2022-04-11 NOTE — PROGRESS NOTE ADULT - ASSESSMENT
A/P: 52F DM type 2 A1c 14.2, asthma, hypothyroidism, obesity, MS - limited mobility, chronic wounds to vulvar/Rt thigh p/w septic shock POA from UTI requiring MICU/pressors s/p Ertapenem IV tx, asymptomatic COVID, chronic anemia.  Wound surgery f/u for multiple pressure injuries POA, no drainable collections, no sharp debridement indicated at this time.    Right trochanter stage 4 pressure injury  - Bone in close proximity but not visible.   - Wound base with 5% firmly attached slough  - Odor resolved  - Periwound skin without edema, erythema, no increased warmth, no fluctuance, no crepitus noted.  - CT abd/pelvis: "Large soft tissue defect/ulceration in the lateral right thigh with   associated subcutaneous inflammatory changes."  - No sharp debridement indicated at this time.  - Topical recommendations- pack with Dakins 1/4 strength moistened kerlix, cover with 4x4 gauze, abdominal pad, tegaderm, change twice a day.  - Offload pressure.    Right lateral thigh stage 4 pressure injury  -wound base clean, granular  - Periwound skin without edema, erythema, no increased warmth, no fluctuance, no crepitus noted.  - CT abd/pelvis: "Large soft tissue defect/ulceration in the lateral right thigh with   associated subcutaneous inflammatory changes."  - No sharp debridement indicated at this time.  - No drainable collection  - Topical recommendations- pack with Aquacel hydrofiber, cover with silicone foam with border. Change daily.  - Offload pressure.    Right buttock unstagable pressure injury  - Softening eschar  from wound edges, exposing red-moist adipose tissue  - no crepitus, no fluctuance, no drainable collections.  - Small-moderate serosanguineous drainage, no odor  - no s/s of acute skin infection/cellulitis  - (+) fecal incontinence, pasty stool  - No sharp debridement indicated.  - Topical recommendations- pack with Dakins 1/4 strength moistened kerlix, cover with 4x4 gauze, abdominal pad, tegaderm change twice a day.  -offload pressure    Sacral/gluteal cleft mixed moisture/unstageable pressure injury  -100% tan-moist firmly attached slough  - no crepitus, no fluctuance, no drainable collections.  - No drainage  - no s/s of acute skin infection/cellulitis  - No sharp debridement indicated.  - Topical recommendations-Apply Dakins 1/4 strength moistened gauze, cover with 4x4 gauze, abdominal pad, tegaderm, change twice a day.  -offload pressure      Bilateral lower extremities   - no ischemic changes noted.  - consider obtaining MARK/PVR  - consider Podiatry consult for left heel monitor for changes in tissue type  -TOpical recommendations: to areas of purple-maroon discoloration, paint with betadine, cover with 4x4 gauze. To open ulcerations apply medihoney gel, cover with 4x4 gauze, and abdominal pads. Secure with kerlix wrap and paper tape. Change daily.  -continue to offload pressure; complete cair boots.    Additional recommendations  -Nutrition consult for optimization as tolerated in patient with multiple pressure injuries, stage 4, unstagable, deep tissue pressure injuries.        consider Vit C to promote wound healing        encourage high quality protein when appropriate  -DM management per Endocrinology/primary team  -Case management to assist with proper mattress upon discharge home and external urinary collection device with suction (per patient's request)      Remainder of care per primary team.    Upon discharge patient would benefit from VNS or inpatient facility to assist with wound management.   Upon discharge f/u as outpatient at [facility] [address] [place], [place] [phone]  Seen w/ Dr. Mccollum, findings and plan discussed with patient and primary team.  Will f/u periodically throughout hospitalization, please reconsult earlier if needed.    Thank you for this consult  DEMAR Skinner, DAVID (pager #[phone])    If after 4PM or before 7:30AM on Mon-Friday or weekend/holiday please contact general surgery for urgent matters.   Team A- [id number]   Team B- [id number]  For non-urgent matters e-mail [email or web address]    We spent 70 minutes face to face with this patient of which more than 50% of the time was spent counseling & coordinating care of this pt     A/P: 52F DM type 2 A1c 14.2, asthma, hypothyroidism, obesity, MS - limited mobility, chronic wounds to vulvar/Rt thigh p/w septic shock POA from UTI requiring MICU/pressors s/p Ertapenem IV tx, asymptomatic COVID, chronic anemia.  Wound surgery f/u for multiple pressure injuries POA, no drainable collections, no sharp debridement indicated at this time.    Right trochanter stage 4 pressure injury  - Bone in close proximity but not visible.   - Wound base with 5% firmly attached slough  - Odor resolved  - Periwound skin without edema, erythema, no increased warmth, no fluctuance, no crepitus noted.  - CT abd/pelvis: "Large soft tissue defect/ulceration in the lateral right thigh with   associated subcutaneous inflammatory changes."  - No sharp debridement indicated at this time.  - Topical recommendations- pack with Dakins 1/4 strength moistened kerlix, cover with 4x4 gauze, abdominal pad, tegaderm, change twice a day.  - Offload pressure.    Right lateral thigh stage 4 pressure injury  -wound base clean, granular  - Periwound skin without edema, erythema, no increased warmth, no fluctuance, no crepitus noted.  - CT abd/pelvis: "Large soft tissue defect/ulceration in the lateral right thigh with   associated subcutaneous inflammatory changes."  - No sharp debridement indicated at this time.  - No drainable collection  - Topical recommendations- pack with Aquacel hydrofiber, cover with silicone foam with border. Change daily.  - Offload pressure.    Right buttock unstagable pressure injury  - Softening eschar  from wound edges, exposing red-moist adipose tissue  - no crepitus, no fluctuance, no drainable collections.  - Small-moderate serosanguineous drainage, no odor  - no s/s of acute skin infection/cellulitis  - (+) fecal incontinence, pasty stool  - No sharp debridement indicated.  - Topical recommendations- pack with Dakins 1/4 strength moistened kerlix, cover with 4x4 gauze, abdominal pad, tegaderm change twice a day.  -offload pressure  may need debridement sharp if does not seprate    Sacral/gluteal cleft mixed moisture/unstageable pressure injury  -100% tan-moist firmly attached slough  - no crepitus, no fluctuance, no drainable collections.  - No drainage  - no s/s of acute skin infection/cellulitis  - No sharp debridement indicated.  - Topical recommendations-Apply Dakins 1/4 strength moistened gauze, cover with 4x4 gauze, abdominal pad, tegaderm, change twice a day.  -offload pressure      Bilateral lower extremities   - no ischemic changes noted.  - consider obtaining MARK/PVR  - consider Podiatry consult for left heel monitor for changes in tissue type  -TOpical recommendations: to areas of purple-maroon discoloration, paint with betadine, cover with 4x4 gauze. To open ulcerations apply medihoney gel, cover with 4x4 gauze, and abdominal pads. Secure with kerlix wrap and paper tape. Change daily.  -continue to offload pressure; complete cair boots.    Additional recommendations  -Nutrition consult for optimization as tolerated in patient with multiple pressure injuries, stage 4, unstagable, deep tissue pressure injuries.        consider Vit C to promote wound healing        encourage high quality protein when appropriate  -DM management per Endocrinology/primary team  -Case management to assist with proper mattress upon discharge home and external urinary collection device with suction (per patient's request)      Remainder of care per primary team.    Upon discharge patient would benefit from VNS or inpatient facility to assist with wound management.   Upon discharge f/u as outpatient at [facility] [address] [place], [place] [phone]  Seen w/ Dr. Mccollum, findings and plan discussed with patient and primary team.  Will f/u periodically throughout hospitalization, please reconsult earlier if needed.    Thank you for this consult  DEMAR Skinner, CWJAYDONN (pager #[phone])    If after 4PM or before 7:30AM on Mon-Friday or weekend/holiday please contact general surgery for urgent matters.   Team A- [id number]   Team B- [id number]  For non-urgent matters e-mail [email or web address]    We spent 70 minutes face to face with this patient of which more than 50% of the time was spent counseling & coordinating care of this pt

## 2022-04-11 NOTE — PROGRESS NOTE ADULT - SUBJECTIVE AND OBJECTIVE BOX
[facility] Division of Hospital Medicine  Jose Ayala MD  Pager (JAMA-F, 8O-5P): [phone]  Other Times:  [phone]    Patient is a 52y old  Female who presents with a chief complaint of Septic Shock (07 Apr 2022 15:48)    SUBJECTIVE / OVERNIGHT EVENTS:  Patient offers no new complaints.  No F/C, N/V, CP, SOB, Cough, lightheadedness, dizziness, abdominal pain, diarrhea, dysuria.    MEDICATIONS  (STANDING):  ascorbic acid 500 milliGRAM(s) Oral daily  chlorhexidine 2% Cloths 1 Application(s) Topical <User Schedule>  Dakins Solution - 1/4 Strength 1 Application(s) Topical two times a day  dextrose 5%. 1000 milliLiter(s) (50 mL/Hr) IV Continuous <Continuous>  dextrose 5%. 1000 milliLiter(s) (100 mL/Hr) IV Continuous <Continuous>  dextrose 50% Injectable 25 Gram(s) IV Push once  dextrose 50% Injectable 12.5 Gram(s) IV Push once  dextrose 50% Injectable 25 Gram(s) IV Push once  enoxaparin Injectable 40 milliGRAM(s) SubCutaneous every 12 hours  ergocalciferol 74435 Unit(s) Oral <User Schedule>  glucagon  Injectable 1 milliGRAM(s) IntraMuscular once  insulin glargine Injectable (LANTUS) 30 Unit(s) SubCutaneous at bedtime  insulin lispro (ADMELOG) corrective regimen sliding scale   SubCutaneous three times a day before meals  insulin lispro (ADMELOG) corrective regimen sliding scale   SubCutaneous at bedtime  insulin lispro Injectable (ADMELOG) 9 Unit(s) SubCutaneous three times a day before meals  levothyroxine 75 MICROGram(s) Oral daily  multivitamin 1 Tablet(s) Oral daily  nystatin Powder 1 Application(s) Topical three times a day  sodium chloride 0.9%. 1000 milliLiter(s) (50 mL/Hr) IV Continuous <Continuous>    MEDICATIONS  (PRN):  acetaminophen     Tablet .. 650 milliGRAM(s) Oral every 6 hours PRN Temp greater or equal to 38C (100.4F), Mild Pain (1 - 3), Moderate Pain (4 - 6)  ALBUTerol    90 MICROgram(s) HFA Inhaler 2 Puff(s) Inhalation every 6 hours PRN Shortness of Breath  albuterol/ipratropium for Nebulization 3 milliLiter(s) Nebulizer every 6 hours PRN Shortness of Breath  dextrose Oral Gel 15 Gram(s) Oral once PRN Blood Glucose LESS THAN 70 milliGRAM(s)/deciliter      Vital Signs Last 24 Hrs  T(C): 37.1 (11 Apr 2022 09:39), Max: 37.1 (11 Apr 2022 09:39)  T(F): 98.8 (11 Apr 2022 09:39), Max: 98.8 (11 Apr 2022 09:39)  HR: 93 (11 Apr 2022 09:39) (91 - 95)  BP: 136/60 (11 Apr 2022 09:39) (127/73 - 149/66)  BP(mean): --  RR: 17 (11 Apr 2022 09:39) (16 - 18)  SpO2: 97% (11 Apr 2022 09:39) (97% - 99%)  CAPILLARY BLOOD GLUCOSE      POCT Blood Glucose.: 157 mg/dL (11 Apr 2022 09:00)  POCT Blood Glucose.: 178 mg/dL (10 Apr 2022 22:20)  POCT Blood Glucose.: 213 mg/dL (10 Apr 2022 21:15)  POCT Blood Glucose.: 162 mg/dL (10 Apr 2022 17:30)  POCT Blood Glucose.: 109 mg/dL (10 Apr 2022 13:01)    I&O's Summary      PHYSICAL EXAM:  CONSTITUTIONAL: NAD, obese   RESPIRATORY: Normal respiratory effort; decreased BS at base  CARDIOVASCULAR: Regular rate and rhythm, normal S1 and S2, no murmur/rub/gallop; No lower extremity edema; Peripheral pulses are 2+ bilaterally  ABDOMEN: Nontender to palpation, normoactive bowel sounds, no rebound/guarding; No hepatosplenomegaly  MUSCLOSKELETAL: no clubbing or cyanosis of digits; no joint swelling or tenderness to palpation  PSYCH: A+O to person, place, and time; affect appropriate  NEURO: Decreased strength more on right side compared to left( as per pt chronic)  - Primafit   SKIN:  Multiple wounds with dressing  present -Right knee, RLE  right posterior lower leg- 5cmx2.5cmx0  Beneath breast/abdominal folds - Dermatitis with  fissures  present  Dressing present  Right lateral thigh-.Stage 4 pressure ulcer - 0flg5qne1.5cm,  granular base, no purulent drainage, no odor. Periwound skin intact, no edema, no erythema, no increased warmth, no fluctuance noted. Right trochanter- stage 4 pressure injury- 8mgh0pne5tm, granular base, bone in close proximity.     LABS:                        8.6    7.19  )-----------( 373      ( 10 Apr 2022 07:32 )             29.5     04-11    144  |  107  |  9   ----------------------------<  148<H>  3.8   |  25  |  0.56    Ca    8.7      11 Apr 2022 07:55  Phos  4.4     04-11  Mg     1.60     04-11                RADIOLOGY & ADDITIONAL TESTS:    Imaging Personally Reviewed:    Care Discussed with Consultants/Other Providers:

## 2022-04-11 NOTE — PROGRESS NOTE ADULT - ASSESSMENT
52 year old woman with uncontrolled T2DM, asthma, obesity, chronic immobility 2/2 MS and several chronic wounds presenting for vulvar/R thigh wound found to be hyperglycemic + metabolic acidosis w/ purulent urine c/f urosepsis + ARF.    1. Uncontrolled DM 2  A1c 14.2%.   Home regimen: Takes Metformin 1g BID, Glimepiride (doesn't know dose), Januvia 100 mg daily    While inpatient:  BG target 100-180 mg/dl  Within target, will continue current management  Continue Lantus 30 units SQ qHS  Continue Admelog 9 units SQ TID before meals (Hold if NPO/not eating meal)  Continue Admelog MODERATE dose correctional scale before meals, moderate dose at bedtime   Consistent carb diet  Check BG before meals and bedtime  Hypoglycemia protocol  DM Education: Please ensure insulin PEN education is completed, document return demonstration in [facility] flowsheets     Discharge Plan:  Recommend basal/bolus insulin to rehab, dose TBD (current dosing if FS remains in target range)  Can continue Metformin 1g PO BID  STOP Glimepiride, STOP Januvia   Outpatient, recommend consideration of adding weekly GLP-1 Agonist (eg: Trulicity, Ozempic) for diabetes, cardiac and weight loss benefits  [facility]up with [facility] at [address] [place] [address], [address], [place] [address]; Ph # [phone]    2. Hypothyroidism  TSH 3.82  Continue Levothyroxine 75 mcg PO daily    3. Vitamin D deficiency   Vitamin D 25 OH 12.6  Continue Ergocalciferol 50,000 units PO weekly (every Monday)    4. HLD  LDL goal < 70  Likely would benefit from statin if no contraindications     Keiko Hunter  Nurse Practitioner  Division of Endocrinology & Diabetes  In house pager #[phone]/long range pager #[phone]    If before 9AM or after 6PM, or on weekends/holidays, please call endocrine answering service for assistance ([phone]).  For nonurgent matters email [email or web address] for assistance.

## 2022-04-11 NOTE — DISCHARGE NOTE NURSING/CASE MANAGEMENT/SOCIAL WORK - PATIENT PORTAL LINK FT
You can access the FollowMyHealth Patient Portal offered by [facility] by registering at the following website: [email or web address]. By joining [employer]s FollowMyHealth portal, you will also be able to view your health information using other applications (apps) compatible with our system.

## 2022-04-11 NOTE — DISCHARGE NOTE NURSING/CASE MANAGEMENT/SOCIAL WORK - NSDCFUADDAPPT_GEN_ALL_CORE_FT
[facility] [address] [phone]    Out patient neurologist:  [address] [phone]

## 2022-04-11 NOTE — PROGRESS NOTE ADULT - SUBJECTIVE AND OBJECTIVE BOX
Chief Complaint: DM 2    History: Patient seen at bedside. Eating meals, no nausea/vomiting, no s/s of hypoglycemia  Discussed basal/bolus insulin plan for discharge - patient is agreeable. She will be NEW to insulin. Has practiced self-injection with syringes, but has not learned insulin PEN yet. Reports she knows how to use a glucometer  Plan for discharge to rehab initially after hospitalization    MEDICATIONS  (STANDING):  ascorbic acid 500 milliGRAM(s) Oral daily  chlorhexidine 2% Cloths 1 Application(s) Topical <User Schedule>  Dakins Solution - 1/4 Strength 1 Application(s) Topical two times a day  dextrose 5%. 1000 milliLiter(s) (50 mL/Hr) IV Continuous <Continuous>  dextrose 5%. 1000 milliLiter(s) (100 mL/Hr) IV Continuous <Continuous>  dextrose 50% Injectable 25 Gram(s) IV Push once  dextrose 50% Injectable 12.5 Gram(s) IV Push once  dextrose 50% Injectable 25 Gram(s) IV Push once  enoxaparin Injectable 40 milliGRAM(s) SubCutaneous every 12 hours  ergocalciferol 68795 Unit(s) Oral <User Schedule>  glucagon  Injectable 1 milliGRAM(s) IntraMuscular once  insulin glargine Injectable (LANTUS) 30 Unit(s) SubCutaneous at bedtime  insulin lispro (ADMELOG) corrective regimen sliding scale   SubCutaneous three times a day before meals  insulin lispro (ADMELOG) corrective regimen sliding scale   SubCutaneous at bedtime  insulin lispro Injectable (ADMELOG) 9 Unit(s) SubCutaneous three times a day before meals  levothyroxine 75 MICROGram(s) Oral daily  multivitamin 1 Tablet(s) Oral daily  nystatin Powder 1 Application(s) Topical three times a day  sodium chloride 0.9%. 1000 milliLiter(s) (50 mL/Hr) IV Continuous <Continuous>    MEDICATIONS  (PRN):  acetaminophen     Tablet .. 650 milliGRAM(s) Oral every 6 hours PRN Temp greater or equal to 38C (100.4F), Mild Pain (1 - 3), Moderate Pain (4 - 6)  ALBUTerol    90 MICROgram(s) HFA Inhaler 2 Puff(s) Inhalation every 6 hours PRN Shortness of Breath  albuterol/ipratropium for Nebulization 3 milliLiter(s) Nebulizer every 6 hours PRN Shortness of Breath  dextrose Oral Gel 15 Gram(s) Oral once PRN Blood Glucose LESS THAN 70 milliGRAM(s)/deciliter    Allergies  No Known Drug Allergies  Pineapple (Anaphylaxis)    Review of Systems:  Cardiovascular: No chest pain  Respiratory: No SOB  GI: No nausea, vomiting  Endocrine: no hypoglycemia     PHYSICAL EXAM:  VITALS: T(C): 37.4 (04-11-22 @ 13:35)  T(F): 99.3 (04-11-22 @ 13:35), Max: 99.3 (04-11-22 @ 13:35)  HR: 98 (04-11-22 @ 13:35) (91 - 98)  BP: 144/69 (04-11-22 @ 13:35) (127/73 - 144/69)  RR:  (17 - 19)  SpO2:  (97% - 98%)  Wt(kg): --  GENERAL: NAD  EYES: No proptosis, no lid lag, anicteric  HEENT:  Atraumatic, Normocephalic, moist mucous membranes  RESPIRATORY: unlabored respirations     CAPILLARY BLOOD GLUCOSE    POCT Blood Glucose.: 148 mg/dL (11 Apr 2022 12:48)  POCT Blood Glucose.: 157 mg/dL (11 Apr 2022 09:00)  POCT Blood Glucose.: 178 mg/dL (10 Apr 2022 22:20)  POCT Blood Glucose.: 213 mg/dL (10 Apr 2022 21:15)  POCT Blood Glucose.: 162 mg/dL (10 Apr 2022 17:30)      04-11    144  |  107  |  9   ----------------------------<  148<H>  3.8   |  25  |  0.56    EGFR if : x   EGFR if non : x     Ca    8.7      04-11  Mg     1.60     04-11  Phos  4.4     04-11        Thyroid Function Tests:  03-30 @ 02:39 TSH -- FreeT4 1.0 T3 -- Anti TPO -- Anti Thyroglobulin Ab -- TSI --  03-28 @ 12:53 TSH 3.86 FreeT4 -- T3 -- Anti TPO -- Anti Thyroglobulin Ab -- TSI --      A1C with Estimated Average Glucose Result: 14.2 % (03-27-22 @ 19:13)    Diet, Consistent Carbohydrate w/Evening Snack:   Supplement Feeding Modality:  Oral  Glucerna Shake Cans or Servings Per Day:  1       Frequency:  Two Times a day (04-04-22 @ 17:29)

## 2022-04-11 NOTE — DISCHARGE NOTE NURSING/CASE MANAGEMENT/SOCIAL WORK - NSDCPEFALRISK_GEN_ALL_CORE
For information on Fall & Injury Prevention, visit: [email or web address] OR  [email or web address] OR  https://www.cdc.gov/steadi/patient.html

## 2022-04-11 NOTE — PROVIDER CONTACT NOTE (OTHER) - RECOMMENDATIONS
Providers are aware. No new orders at this time. Provider stated she would see patient at bedside Will continue to monitor.
blood transfusion
Repeat blood cultures sent and oral tylenol administered.
Tylenol
notify provider
Administered PRN PO acetaminophen 650mg. Encouraged PO fluid intake and use of cooling measures including cooling blanket, but patient refused cooling blanket.
IV Tylenol
notify provider
notify provider
Provider made aware

## 2022-04-11 NOTE — PROGRESS NOTE ADULT - SUBJECTIVE AND OBJECTIVE BOX
[facility]-- WOUND TEAM -- FOLLOW UP NOTE  --------------------------------------------------------------------------------    subjective: Patient was seen and examined at bedside. Denies any new complaints. Patient requested external female urinary collection device at home as she is bedbound. Reports that she likely needs a new hospital bed at home.  Denies n/v, fever, chills, diarrhea.    Interval HPI/24 hour events: No acute events overnight    Chart reviewed including labs and relevant images      Diet:  Diet, Consistent Carbohydrate w/Evening Snack:   Supplement Feeding Modality:  Oral  Glucerna Shake Cans or Servings Per Day:  1       Frequency:  Two Times a day (04-04-22 @ 17:29)      ROS: General, skin see above  All other systems negative.    ALLERGIES & MEDICATIONS  --------------------------------------------------------------------------------  Allergies    No Known Drug Allergies  Pineapple (Anaphylaxis)    Intolerances          STANDING INPATIENT MEDICATIONS    ascorbic acid 500 milliGRAM(s) Oral daily  chlorhexidine 2% Cloths 1 Application(s) Topical <User Schedule>  Dakins Solution - 1/4 Strength 1 Application(s) Topical two times a day  dextrose 5%. 1000 milliLiter(s) IV Continuous <Continuous>  dextrose 5%. 1000 milliLiter(s) IV Continuous <Continuous>  dextrose 50% Injectable 25 Gram(s) IV Push once  dextrose 50% Injectable 12.5 Gram(s) IV Push once  dextrose 50% Injectable 25 Gram(s) IV Push once  enoxaparin Injectable 40 milliGRAM(s) SubCutaneous every 12 hours  ergocalciferol 22026 Unit(s) Oral <User Schedule>  glucagon  Injectable 1 milliGRAM(s) IntraMuscular once  insulin glargine Injectable (LANTUS) 30 Unit(s) SubCutaneous at bedtime  insulin lispro (ADMELOG) corrective regimen sliding scale   SubCutaneous three times a day before meals  insulin lispro (ADMELOG) corrective regimen sliding scale   SubCutaneous at bedtime  insulin lispro Injectable (ADMELOG) 9 Unit(s) SubCutaneous three times a day before meals  levothyroxine 75 MICROGram(s) Oral daily  multivitamin 1 Tablet(s) Oral daily  nystatin Powder 1 Application(s) Topical three times a day  sodium chloride 0.9%. 1000 milliLiter(s) IV Continuous <Continuous>      PRN INPATIENT MEDICATION  acetaminophen     Tablet .. 650 milliGRAM(s) Oral every 6 hours PRN  ALBUTerol    90 MICROgram(s) HFA Inhaler 2 Puff(s) Inhalation every 6 hours PRN  albuterol/ipratropium for Nebulization 3 milliLiter(s) Nebulizer every 6 hours PRN  dextrose Oral Gel 15 Gram(s) Oral once PRN        Vital signs:  T(C): 37.1 (04-11-22 @ 09:39), Max: 37.1 (04-11-22 @ 09:39)  HR: 93 (04-11-22 @ 09:39) (91 - 95)  BP: 136/60 (04-11-22 @ 09:39) (127/73 - 149/66)  RR: 17 (04-11-22 @ 09:39) (16 - 18)  SpO2: 97% (04-11-22 @ 09:39) (97% - 99%)  Wt(kg): --        Constitutional: NAD, A&Ox3, obese  (+) low airloss support surface, (+) fluidized positioning devices, (+) complete cair boots  HEENT:  NC/AT, PERRL, EOMI, mucosa moist  Cardiovascular: rate regular  Respiratory: nonlabored, room air  Gastrointestinal: soft NT/ND, incontinent pasty stool.   : external female urinary collection device in place.   Neurology:  weakened strength & sensation  Musculoskeletal:  limited, requires to person assist for t&P, legs in extension, bilateral feet, mildly inverted.  Vascular: BLE equally warm, no edema noted, capillary refill < 3 seconds, +2 dp/pt pulses.   Multiple deep tissue pressure injuries b/l le:  Right knee- 2.5cmx1.5cmx0, stable pruple-maroon  Right lateral lower leg- 8azu9zmt4.1cm (prev  4jsd4jtq2), now dermis with fibrin film  Right posterior lower leg- resolved   Right medial heel- hyperpigmentation without palpable change [id number]  Left heel- 2taw0bll9 (prev 4uwf8ygm0) purple-maroon  Left achilles- 8umc5vxv6- purple-maroon  Left latera lower leg proximal- 5szv2qow2- intact scab  Left lateral lower leg distal- 4cmx2.5cmx0- tan-firmly attached slough  Non-fluctuant, no crepitus, no drainable collection.   Skin:  moist w/ good turgor  Moisture associated dermatitis to abdominal pannus resolved. Healed linear fissures  Right lateral thigh- stage 4 pressure injury- 1.3cmx1.5cmx0.8cm (prev 6dfd8oaw4.5cm), 100% granular base, no purulent drainage, no odor. Periwound skin intact, no edema, no erythema, no increased warmth, no fluctuance noted.  Right trochanter- stage 4 pressure injury- 4.5cmx5.5cmx3.5cm (prev 2kpz1skh8nw) undermining at 3 o'clock extending 3.5cm- 95% granular base, 5% firmly attached slough. Small serosanguinous drainage, no longer purulent, no odor. Periwound skin without edema, erythema, no increased warmth, no fluctuance, no drainable collection. No sharp debridement indicated at this time.  Right buttock- unstagable pressure injury- 7cmx9.6wrm3gj ( prev 0hcu8gaa1.1cm)- 90% moist firmly attached softening eschar, beginning to separate from wound edges, exposing 10% red-moist adipose tissue, no fluctuance, no crepitus. With palpation able to express small-moderate serosanguinous drainage, no odor. Periwound skin without edema, erythema, no increased warmth, no fluctuance, no drainable collection. No sharp debridement indicated at this time.  Sacral/gluteal cleft - mixed moisture and unstageable pressure injury- 0ynd2yqm5.2- 100% slough. Periwound skin without edema, erythema, no increased warmth, no fluctuance, no drainable collection. No sharp debridement indicated at this time.        LABS/ CULTURES/ RADIOLOGY:              8.6    7.19  >-----------<  373      [04-10-22 @ 07:32]              29.5     144  |  107  |  9   ----------------------------<  148      [04-11-22 @ 07:55]  3.8   |  25  |  0.56  < from: CT Abdomen and Pelvis No Cont (03.28.22 @ 00:50) >        Ca     8.7     [04-11-22 @ 07:55]      Mg     1.60     [04-11-22 @ 07:55]      Phos  4.4     [04-11-22 @ 07:55]          CAPILLARY BLOOD GLUCOSE      POCT Blood Glucose.: 148 mg/dL (11 Apr 2022 12:48)  POCT Blood Glucose.: 157 mg/dL (11 Apr 2022 09:00)  POCT Blood Glucose.: 178 mg/dL (10 Apr 2022 22:20)  POCT Blood Glucose.: 213 mg/dL (10 Apr 2022 21:15)  POCT Blood Glucose.: 162 mg/dL (10 Apr 2022 17:30)        A1C with Estimated Average Glucose Result: 14.2 % (03-27-22 @ 19:13)    Vitamin D, 25-Hydroxy: 12.5 ng/mL (04-04-22 @ 09:46)          < from: CT Abdomen and Pelvis No Cont (03.28.22 @ 00:50) >  ACC: [id number] EXAM:  CT ABDOMEN AND PELVIS                          PROCEDURE DATE:  03/28/2022          INTERPRETATION:  CLINICAL INFORMATION: Right labial swelling and   discharge, evaluate for labial/perineal abscess.    COMPARISON: Pelvic MRI dated 9/30/2010.    CONTRAST/COMPLICATIONS:  IV Contrast: None.  Oral Contrast: None.  Complications: None reported.    PROCEDURE:  CT of the Abdomen and Pelvis was performed.  Sagittal and coronal reformats were performed.    FINDINGS:  LOWER CHEST: Partially imaged catheter tip within the superior vena cava.   Bilateral lower lobe linear atelectasis. A 3 mm nonspecific right lower   lobe pulmonary nodule is seen.    LIVER: Diffuse steatosis. Punctate parenchymal calcification, likely a   granuloma.  BILE DUCTS: Normal caliber.  GALLBLADDER: Cholelithiasis.  SPLEEN: Within normal limits.  PANCREAS: Within normal limits.  ADRENALS: Within normal limits.  KIDNEYS/URETERS: Mild left greater than right hydronephrosis and trace   perinephric and left periureteral fat stranding. Bilateral renal cortical   scarring.    BLADDER: Circumferential bladder wall thickening and adjacent fat   stranding.  REPRODUCTIVE ORGANS: Myomatous uterus.    BOWEL: No bowel obstruction. Appendix is normal.  PERITONEUM: No ascites.  VESSELS: Atherosclerotic changes.  RETROPERITONEUM/LYMPH NODES: No lymphadenopathy.  ABDOMINAL WALL: No discrete collection or abscess is seen involving the   labia or perineum. Detailed evaluation of the soft tissues is limited due   to the absence of intravenous contrast. Scattered foci of air in this   region are favored to be within the patient's skin folds, external to the   patient. A large soft tissue defect is present along the lateral aspect   of the right thigh with underlying inflammatory changes of the   subcutaneous tissues.  BONES: Degenerative changes. Old right rib fractures.    IMPRESSION:  No evidence of perineal or labial fluid collection to suggest abscess,   though full evaluation is limited on noncontrastCT.    Large soft tissue defect/ulceration in the lateral right thigh with   associated subcutaneous inflammatory changes.    Circumferential bladder wall thickening with adjacent inflammation most   concerning for cystitis. In addition, there is mild greater than right   hydronephrosis and subtle perinephric and trace prevertebral fat   stranding, cannot exclude ascending infection and recommend correlation   with clinical examination laboratory findings to exclude pyelonephritis.    --- End of Report ---          MALICK LANG MD; Resident Radiologist  This document has been electronically signed.  YING HAMILTON MD; Attending Radiologist  This document has been electronically signed. Mar 28 2022  1:44AM    < end of copied text >               [facility]-- WOUND TEAM -- FOLLOW UP NOTE  --------------------------------------------------------------------------------    subjective: Patient was seen and examined at bedside. Denies any new complaints. Patient requested external female urinary collection device at home as she is bedbound. Reports that she likely needs a new hospital bed at home.  Denies n/v, fever, chills, diarrhea.    Interval HPI/24 hour events: No acute events overnight    Chart reviewed including labs and relevant images      Diet:  Diet, Consistent Carbohydrate w/Evening Snack:   Supplement Feeding Modality:  Oral  Glucerna Shake Cans or Servings Per Day:  1       Frequency:  Two Times a day (04-04-22 @ 17:29)    pmh and hpi - see above  ROS: General, skin see above  All other systems negative.    ALLERGIES & MEDICATIONS  --------------------------------------------------------------------------------  Allergies    No Known Drug Allergies  Pineapple (Anaphylaxis)    Intolerances          STANDING INPATIENT MEDICATIONS    ascorbic acid 500 milliGRAM(s) Oral daily  chlorhexidine 2% Cloths 1 Application(s) Topical <User Schedule>  Dakins Solution - 1/4 Strength 1 Application(s) Topical two times a day  dextrose 5%. 1000 milliLiter(s) IV Continuous <Continuous>  dextrose 5%. 1000 milliLiter(s) IV Continuous <Continuous>  dextrose 50% Injectable 25 Gram(s) IV Push once  dextrose 50% Injectable 12.5 Gram(s) IV Push once  dextrose 50% Injectable 25 Gram(s) IV Push once  enoxaparin Injectable 40 milliGRAM(s) SubCutaneous every 12 hours  ergocalciferol 92464 Unit(s) Oral <User Schedule>  glucagon  Injectable 1 milliGRAM(s) IntraMuscular once  insulin glargine Injectable (LANTUS) 30 Unit(s) SubCutaneous at bedtime  insulin lispro (ADMELOG) corrective regimen sliding scale   SubCutaneous three times a day before meals  insulin lispro (ADMELOG) corrective regimen sliding scale   SubCutaneous at bedtime  insulin lispro Injectable (ADMELOG) 9 Unit(s) SubCutaneous three times a day before meals  levothyroxine 75 MICROGram(s) Oral daily  multivitamin 1 Tablet(s) Oral daily  nystatin Powder 1 Application(s) Topical three times a day  sodium chloride 0.9%. 1000 milliLiter(s) IV Continuous <Continuous>      PRN INPATIENT MEDICATION  acetaminophen     Tablet .. 650 milliGRAM(s) Oral every 6 hours PRN  ALBUTerol    90 MICROgram(s) HFA Inhaler 2 Puff(s) Inhalation every 6 hours PRN  albuterol/ipratropium for Nebulization 3 milliLiter(s) Nebulizer every 6 hours PRN  dextrose Oral Gel 15 Gram(s) Oral once PRN        Vital signs:  T(C): 37.1 (04-11-22 @ 09:39), Max: 37.1 (04-11-22 @ 09:39)  HR: 93 (04-11-22 @ 09:39) (91 - 95)  BP: 136/60 (04-11-22 @ 09:39) (127/73 - 149/66)  RR: 17 (04-11-22 @ 09:39) (16 - 18)  SpO2: 97% (04-11-22 @ 09:39) (97% - 99%)  Wt(kg): --        Constitutional: NAD, A&Ox3, morbid obese  (+) low airloss support surface, (+) fluidized positioning devices, (+) complete cair boots  HEENT:  NC/AT, PERRL, EOMI, mucosa moist  Cardiovascular: rate regular  Respiratory: nonlabored, room air  Gastrointestinal: soft NT/ND, incontinent pasty stool.   : external female urinary collection device in place.   Neurology:  weakened strength & sensation  Musculoskeletal:  limited, requires to person assist for t&P, legs in extension, bilateral feet, mildly inverted.  Vascular: BLE equally warm, no edema noted, capillary refill < 3 seconds, +2 dp/pt pulses.   Multiple deep tissue pressure injuries b/l le:  Right knee- 2.5cmx1.5cmx0, stable pruple-maroon  Right lateral lower leg- 0cxi6cgx1.1cm (prev  2jyv8wzg7), now dermis with fibrin film  Right posterior lower leg- resolved   Right medial heel- hyperpigmentation without palpable change 9bhi0xuc5  Left heel- 7hyi1zgz3 (prev 2khw6nbk7) purple-maroon  Left achilles- 7joq4etf5- purple-maroon  Left latera lower leg proximal- 8ung4iwo7- intact scab  Left lateral lower leg distal- 4cmx2.5cmx0- tan-firmly attached slough  Non-fluctuant, no crepitus, no drainable collection.   Skin:  moist w/ good turgor  Moisture associated dermatitis to abdominal pannus resolved. Healed linear fissures  Right lateral thigh- stage 4 pressure injury- 1.3cmx1.5cmx0.8cm (prev 0zhr1knt5.5cm), 100% granular base, no purulent drainage, no odor. Periwound skin intact, no edema, no erythema, no increased warmth, no fluctuance noted.  Right trochanter- stage 4 pressure injury- 4.5cmx5.5cmx3.5cm (prev 6mcd3gkp3ta) undermining at 3 o'clock extending 3.5cm- 95% granular base, 5% firmly attached slough. Small serosanguinous drainage, no longer purulent, no odor. Periwound skin without edema, erythema, no increased warmth, no fluctuance, no drainable collection. No sharp debridement indicated at this time.  Right buttock- unstagable pressure injury- 7cmx9.9zjc8pz ( prev 5vus2fmg4.1cm)- 90% moist firmly attached softening eschar, beginning to separate from wound edges, exposing 10% red-moist adipose tissue, no fluctuance, no crepitus. With palpation able to express small-moderate serosanguinous drainage, no odor. Periwound skin without edema, erythema, no increased warmth, no fluctuance, no drainable collection. No sharp debridement indicated at this time.  Sacral/gluteal cleft - mixed moisture and unstageable pressure injury- 0kld1ngs2.2- 100% slough. Periwound skin without edema, erythema, no increased warmth, no fluctuance, no drainable collection. No sharp debridement indicated at this time.        LABS/ CULTURES/ RADIOLOGY:              8.6    7.19  >-----------<  373      [04-10-22 @ 07:32]              29.5     144  |  107  |  9   ----------------------------<  148      [04-11-22 @ 07:55]  3.8   |  25  |  0.56  < from: CT Abdomen and Pelvis No Cont (03.28.22 @ 00:50) >        Ca     8.7     [04-11-22 @ 07:55]      Mg     1.60     [04-11-22 @ 07:55]      Phos  4.4     [04-11-22 @ 07:55]          CAPILLARY BLOOD GLUCOSE      POCT Blood Glucose.: 148 mg/dL (11 Apr 2022 12:48)  POCT Blood Glucose.: 157 mg/dL (11 Apr 2022 09:00)  POCT Blood Glucose.: 178 mg/dL (10 Apr 2022 22:20)  POCT Blood Glucose.: 213 mg/dL (10 Apr 2022 21:15)  POCT Blood Glucose.: 162 mg/dL (10 Apr 2022 17:30)        A1C with Estimated Average Glucose Result: 14.2 % (03-27-22 @ 19:13)    Vitamin D, 25-Hydroxy: 12.5 ng/mL (04-04-22 @ 09:46)          < from: CT Abdomen and Pelvis No Cont (03.28.22 @ 00:50) >  ACC: [id number] EXAM:  CT ABDOMEN AND PELVIS                          PROCEDURE DATE:  03/28/2022          INTERPRETATION:  CLINICAL INFORMATION: Right labial swelling and   discharge, evaluate for labial/perineal abscess.    COMPARISON: Pelvic MRI dated 9/30/2010.    CONTRAST/COMPLICATIONS:  IV Contrast: None.  Oral Contrast: None.  Complications: None reported.    PROCEDURE:  CT of the Abdomen and Pelvis was performed.  Sagittal and coronal reformats were performed.    FINDINGS:  LOWER CHEST: Partially imaged catheter tip within the superior vena cava.   Bilateral lower lobe linear atelectasis. A 3 mm nonspecific right lower   lobe pulmonary nodule is seen.    LIVER: Diffuse steatosis. Punctate parenchymal calcification, likely a   granuloma.  BILE DUCTS: Normal caliber.  GALLBLADDER: Cholelithiasis.  SPLEEN: Within normal limits.  PANCREAS: Within normal limits.  ADRENALS: Within normal limits.  KIDNEYS/URETERS: Mild left greater than right hydronephrosis and trace   perinephric and left periureteral fat stranding. Bilateral renal cortical   scarring.    BLADDER: Circumferential bladder wall thickening and adjacent fat   stranding.  REPRODUCTIVE ORGANS: Myomatous uterus.    BOWEL: No bowel obstruction. Appendix is normal.  PERITONEUM: No ascites.  VESSELS: Atherosclerotic changes.  RETROPERITONEUM/LYMPH NODES: No lymphadenopathy.  ABDOMINAL WALL: No discrete collection or abscess is seen involving the   labia or perineum. Detailed evaluation of the soft tissues is limited due   to the absence of intravenous contrast. Scattered foci of air in this   region are favored to be within the patient's skin folds, external to the   patient. A large soft tissue defect is present along the lateral aspect   of the right thigh with underlying inflammatory changes of the   subcutaneous tissues.  BONES: Degenerative changes. Old right rib fractures.    IMPRESSION:  No evidence of perineal or labial fluid collection to suggest abscess,   though full evaluation is limited on noncontrastCT.    Large soft tissue defect/ulceration in the lateral right thigh with   associated subcutaneous inflammatory changes.    Circumferential bladder wall thickening with adjacent inflammation most   concerning for cystitis. In addition, there is mild greater than right   hydronephrosis and subtle perinephric and trace prevertebral fat   stranding, cannot exclude ascending infection and recommend correlation   with clinical examination laboratory findings to exclude pyelonephritis.    --- End of Report ---          MALICK LANG MD; Resident Radiologist  This document has been electronically signed.  YING HAMILTON MD; Attending Radiologist  This document has been electronically signed. Mar 28 2022  1:44AM    < end of copied text >

## 2022-04-12 DIAGNOSIS — Z20.828 CONTACT WITH AND (SUSPECTED) EXPOSURE TO OTHER VIRAL COMMUNICABLE DISEASES: ICD-10-CM

## 2022-04-12 LAB

## 2022-04-12 PROCEDURE — 99232 SBSQ HOSP IP/OBS MODERATE 35: CPT

## 2022-04-12 PROCEDURE — 99233 SBSQ HOSP IP/OBS HIGH 50: CPT

## 2022-04-12 RX ORDER — INSULIN LISPRO 100/ML
10 VIAL (ML) SUBCUTANEOUS
Refills: 0 | Status: DISCONTINUED | OUTPATIENT
Start: 2022-04-12 | End: 2022-04-19

## 2022-04-12 RX ADMIN — Medication 1 TABLET(S): at 17:45

## 2022-04-12 RX ADMIN — Medication 75 MILLIGRAM(S): at 17:44

## 2022-04-12 RX ADMIN — Medication 2: at 13:14

## 2022-04-12 RX ADMIN — Medication 500 MILLIGRAM(S): at 17:44

## 2022-04-12 RX ADMIN — NYSTATIN CREAM 1 APPLICATION(S): 100000 CREAM TOPICAL at 17:45

## 2022-04-12 RX ADMIN — Medication 9 UNIT(S): at 13:15

## 2022-04-12 RX ADMIN — CHLORHEXIDINE GLUCONATE 1 APPLICATION(S): 213 SOLUTION TOPICAL at 09:02

## 2022-04-12 RX ADMIN — ENOXAPARIN SODIUM 40 MILLIGRAM(S): 100 INJECTION SUBCUTANEOUS at 17:45

## 2022-04-12 RX ADMIN — Medication 75 MICROGRAM(S): at 05:25

## 2022-04-12 RX ADMIN — ENOXAPARIN SODIUM 40 MILLIGRAM(S): 100 INJECTION SUBCUTANEOUS at 05:25

## 2022-04-12 RX ADMIN — INSULIN GLARGINE 30 UNIT(S): 100 INJECTION, SOLUTION SUBCUTANEOUS at 21:25

## 2022-04-12 RX ADMIN — Medication 1 APPLICATION(S): at 13:08

## 2022-04-12 RX ADMIN — Medication 10 UNIT(S): at 17:45

## 2022-04-12 RX ADMIN — Medication 2: at 08:43

## 2022-04-12 RX ADMIN — NYSTATIN CREAM 1 APPLICATION(S): 100000 CREAM TOPICAL at 05:25

## 2022-04-12 RX ADMIN — Medication 1 APPLICATION(S): at 06:19

## 2022-04-12 RX ADMIN — Medication 9 UNIT(S): at 08:43

## 2022-04-12 RX ADMIN — NYSTATIN CREAM 1 APPLICATION(S): 100000 CREAM TOPICAL at 21:24

## 2022-04-12 NOTE — PROGRESS NOTE ADULT - SUBJECTIVE AND OBJECTIVE BOX
Chief Complaint: DM 2    History: Patient seen at bedside. Eating meals, denies n/v, denies s/s of hypoglycemia  Reports she learned insulin PEN use yesterday, feels comfortable     MEDICATIONS  (STANDING):  ascorbic acid 500 milliGRAM(s) Oral daily  chlorhexidine 2% Cloths 1 Application(s) Topical <User Schedule>  Dakins Solution - 1/4 Strength 1 Application(s) Topical two times a day  dextrose 5%. 1000 milliLiter(s) (50 mL/Hr) IV Continuous <Continuous>  dextrose 5%. 1000 milliLiter(s) (100 mL/Hr) IV Continuous <Continuous>  dextrose 50% Injectable 25 Gram(s) IV Push once  dextrose 50% Injectable 12.5 Gram(s) IV Push once  dextrose 50% Injectable 25 Gram(s) IV Push once  enoxaparin Injectable 40 milliGRAM(s) SubCutaneous every 12 hours  ergocalciferol 46766 Unit(s) Oral <User Schedule>  glucagon  Injectable 1 milliGRAM(s) IntraMuscular once  insulin glargine Injectable (LANTUS) 30 Unit(s) SubCutaneous at bedtime  insulin lispro (ADMELOG) corrective regimen sliding scale   SubCutaneous three times a day before meals  insulin lispro (ADMELOG) corrective regimen sliding scale   SubCutaneous at bedtime  insulin lispro Injectable (ADMELOG) 9 Unit(s) SubCutaneous three times a day before meals  levothyroxine 75 MICROGram(s) Oral daily  multivitamin 1 Tablet(s) Oral daily  nystatin Powder 1 Application(s) Topical three times a day  oseltamivir 75 milliGRAM(s) Oral every 24 hours  sodium chloride 0.9%. 1000 milliLiter(s) (50 mL/Hr) IV Continuous <Continuous>    MEDICATIONS  (PRN):  acetaminophen     Tablet .. 650 milliGRAM(s) Oral every 6 hours PRN Temp greater or equal to 38C (100.4F), Mild Pain (1 - 3), Moderate Pain (4 - 6)  ALBUTerol    90 MICROgram(s) HFA Inhaler 2 Puff(s) Inhalation every 6 hours PRN Shortness of Breath  albuterol/ipratropium for Nebulization 3 milliLiter(s) Nebulizer every 6 hours PRN Shortness of Breath  dextrose Oral Gel 15 Gram(s) Oral once PRN Blood Glucose LESS THAN 70 milliGRAM(s)/deciliter    Allergies  No Known Drug Allergies  Pineapple (Anaphylaxis)    Review of Systems:  Cardiovascular: No chest pain  Respiratory: No SOB  GI: No nausea, vomiting  Endocrine: no hypoglycemia     PHYSICAL EXAM:  VITALS: T(C): 37.1 (04-12-22 @ 05:00)  T(F): 98.7 (04-12-22 @ 05:00), Max: 98.9 (04-11-22 @ 17:35)  HR: 97 (04-12-22 @ 05:00) (94 - 99)  BP: 138/57 (04-12-22 @ 05:00) (119/67 - 138/57)  RR:  (18 - 18)  SpO2:  (98% - 99%)  Wt(kg): --  GENERAL: NAD  EYES: No proptosis, no lid lag, anicteric  HEENT:  Atraumatic, Normocephalic, moist mucous membranes  RESPIRATORY: unlabored respirations     CAPILLARY BLOOD GLUCOSE    POCT Blood Glucose.: 191 mg/dL (12 Apr 2022 13:13)  POCT Blood Glucose.: 217 mg/dL (12 Apr 2022 12:05)  POCT Blood Glucose.: 165 mg/dL (12 Apr 2022 08:23)  POCT Blood Glucose.: 220 mg/dL (11 Apr 2022 23:47)  POCT Blood Glucose.: 237 mg/dL (11 Apr 2022 18:12)      04-11    144  |  107  |  9   ----------------------------<  148<H>  3.8   |  25  |  0.56    EGFR if : x   EGFR if non : x     Ca    8.7      04-11  Mg     1.60     04-11  Phos  4.4     04-11        Thyroid Function Tests:  03-30 @ 02:39 TSH -- FreeT4 1.0 T3 -- Anti TPO -- Anti Thyroglobulin Ab -- TSI --  03-28 @ 12:53 TSH 3.86 FreeT4 -- T3 -- Anti TPO -- Anti Thyroglobulin Ab -- TSI --      A1C with Estimated Average Glucose Result: 14.2 % (03-27-22 @ 19:13)    Diet, Consistent Carbohydrate w/Evening Snack:   Supplement Feeding Modality:  Oral  Glucerna Shake Cans or Servings Per Day:  1       Frequency:  Two Times a day (04-04-22 @ 17:29)

## 2022-04-12 NOTE — PROGRESS NOTE ADULT - ASSESSMENT
52F DM type 2 A1c 14.2, asthma, hypothyroidism, obesity, MS - limited mobility, chronic wounds to vulvar/Rt thigh p/w septic shock POA from UTI requiring MICU/pressors s/p Ertapenem IV tx, asymptomatic COVID, chronic anemia, flu exposure.

## 2022-04-12 NOTE — PROGRESS NOTE ADULT - ASSESSMENT
52 year old woman with uncontrolled T2DM, asthma, obesity, chronic immobility 2/2 MS and several chronic wounds presenting for vulvar/R thigh wound found to be hyperglycemic + metabolic acidosis w/ purulent urine c/f urosepsis + ARF.    1. Uncontrolled DM 2  A1c 14.2%.   Home regimen: Takes Metformin 1g BID, Glimepiride (doesn't know dose), Januvia 100 mg daily    While inpatient:  BG target 100-180 mg/dl  Continue Lantus 30 units SQ qHS  Increase Admelog to 10 units SQ TID before meals (Hold if NPO/not eating meal)  Continue Admelog MODERATE dose correctional scale before meals, moderate dose at bedtime   Consistent carb diet  Check BG before meals and bedtime  Hypoglycemia protocol  DM Education: Please continue to reinforce insulin PEN education, document in [facility] flowsheets     Discharge Plan:  Recommend basal/bolus insulin to rehab, dose TBD (current dosing if FS remains in target range)  Can continue Metformin 1g PO BID  STOP Glimepiride, STOP Januvia   Outpatient, recommend consideration of adding weekly GLP-1 Agonist (eg: Trulicity, Ozempic) for diabetes, cardiac and weight loss benefits  Followup with [facility] at [address] [place] [address], [address], [place] [address]; Ph # [phone]    2. Hypothyroidism  TSH 3.82  Continue Levothyroxine 75 mcg PO daily    3. Vitamin D deficiency   Vitamin D 25 OH 12.6  Continue Ergocalciferol 50,000 units PO weekly (every Monday)    4. HLD  LDL goal < 70  Likely would benefit from statin if no contraindications     Keiko Hunter  Nurse Practitioner  Division of Endocrinology & Diabetes  In house pager #[phone]/long range pager #[phone]    If before 9AM or after 6PM, or on weekends/holidays, please call endocrine answering service for assistance ([phone]).  For nonurgent matters email [email or web address] for [email or web address].

## 2022-04-12 NOTE — PROGRESS NOTE ADULT - SUBJECTIVE AND OBJECTIVE BOX
[facility] Division of Hospital Medicine  Jose Ayala MD  Pager (JAMA-MITCH, 9A-5P): [phone]  Other Times:  [phone]    Patient is a 52y old  Female who presents with a chief complaint of leg wounds (11 Apr 2022 13:11)    SUBJECTIVE / OVERNIGHT EVENTS:  Patient moved to  due to flu exposure. Offers no new complaints.  No F/C, N/V, CP, SOB, Cough, lightheadedness, dizziness, abdominal pain, diarrhea, dysuria.    MEDICATIONS  (STANDING):  ascorbic acid 500 milliGRAM(s) Oral daily  chlorhexidine 2% Cloths 1 Application(s) Topical <User Schedule>  Dakins Solution - 1/4 Strength 1 Application(s) Topical two times a day  dextrose 5%. 1000 milliLiter(s) (50 mL/Hr) IV Continuous <Continuous>  dextrose 5%. 1000 milliLiter(s) (100 mL/Hr) IV Continuous <Continuous>  dextrose 50% Injectable 25 Gram(s) IV Push once  dextrose 50% Injectable 12.5 Gram(s) IV Push once  dextrose 50% Injectable 25 Gram(s) IV Push once  enoxaparin Injectable 40 milliGRAM(s) SubCutaneous every 12 hours  ergocalciferol 79739 Unit(s) Oral <User Schedule>  glucagon  Injectable 1 milliGRAM(s) IntraMuscular once  insulin glargine Injectable (LANTUS) 30 Unit(s) SubCutaneous at bedtime  insulin lispro (ADMELOG) corrective regimen sliding scale   SubCutaneous three times a day before meals  insulin lispro (ADMELOG) corrective regimen sliding scale   SubCutaneous at bedtime  insulin lispro Injectable (ADMELOG) 9 Unit(s) SubCutaneous three times a day before meals  levothyroxine 75 MICROGram(s) Oral daily  multivitamin 1 Tablet(s) Oral daily  nystatin Powder 1 Application(s) Topical three times a day  oseltamivir 75 milliGRAM(s) Oral every 24 hours  sodium chloride 0.9%. 1000 milliLiter(s) (50 mL/Hr) IV Continuous <Continuous>    MEDICATIONS  (PRN):  acetaminophen     Tablet .. 650 milliGRAM(s) Oral every 6 hours PRN Temp greater or equal to 38C (100.4F), Mild Pain (1 - 3), Moderate Pain (4 - 6)  ALBUTerol    90 MICROgram(s) HFA Inhaler 2 Puff(s) Inhalation every 6 hours PRN Shortness of Breath  albuterol/ipratropium for Nebulization 3 milliLiter(s) Nebulizer every 6 hours PRN Shortness of Breath  dextrose Oral Gel 15 Gram(s) Oral once PRN Blood Glucose LESS THAN 70 milliGRAM(s)/deciliter      Vital Signs Last 24 Hrs  T(C): 37.1 (12 Apr 2022 05:00), Max: 37.4 (11 Apr 2022 13:35)  T(F): 98.7 (12 Apr 2022 05:00), Max: 99.3 (11 Apr 2022 13:35)  HR: 97 (12 Apr 2022 05:00) (94 - 99)  BP: 138/57 (12 Apr 2022 05:00) (119/67 - 144/69)  BP(mean): --  RR: 18 (12 Apr 2022 05:00) (18 - 19)  SpO2: 99% (12 Apr 2022 05:00) (97% - 99%)  CAPILLARY BLOOD GLUCOSE      POCT Blood Glucose.: 217 mg/dL (12 Apr 2022 12:05)  POCT Blood Glucose.: 165 mg/dL (12 Apr 2022 08:23)  POCT Blood Glucose.: 220 mg/dL (11 Apr 2022 23:47)  POCT Blood Glucose.: 237 mg/dL (11 Apr 2022 18:12)  POCT Blood Glucose.: 148 mg/dL (11 Apr 2022 12:48)    I&O's Summary    11 Apr 2022 07:01  -  12 Apr 2022 07:00  --------------------------------------------------------  IN: 0 mL / OUT: 650 mL / NET: -650 mL        PHYSICAL EXAM:  CONSTITUTIONAL: NAD, obese   RESPIRATORY: Normal respiratory effort; decreased BS at base  CARDIOVASCULAR: Regular rate and rhythm, normal S1 and S2, no murmur/rub/gallop; No lower extremity edema; Peripheral pulses are 2+ bilaterally  ABDOMEN: Nontender to palpation, normoactive bowel sounds, no rebound/guarding; No hepatosplenomegaly  MUSCLOSKELETAL: no clubbing or cyanosis of digits; no joint swelling or tenderness to palpation  PSYCH: A+O to person, place, and time; affect appropriate  NEURO: Decreased strength more on right side compared to left( as per pt chronic)  - Primafit   SKIN:  Multiple wounds with dressing  present -Right knee, RLE  right posterior lower leg- 5cmx2.5cmx0  Beneath breast/abdominal folds - Dermatitis with  fissures  present  Dressing present  Right lateral thigh-.Stage 4 pressure ulcer - 8sxs6apn2.5cm,  granular base, no purulent drainage, no odor. Periwound skin intact, no edema, no erythema, no increased warmth, no fluctuance noted. Right trochanter- stage 4 pressure injury- [id number], granular base, bone in close proximity.     LABS:    04-11    144  |  107  |  9   ----------------------------<  148<H>  3.8   |  25  |  0.56    Ca    8.7      11 Apr 2022 07:55  Phos  4.4     04-11  Mg     1.60     04-11                RADIOLOGY & ADDITIONAL TESTS:    Imaging Personally Reviewed:    Care Discussed with Consultants/Other Providers:

## 2022-04-13 PROCEDURE — 99232 SBSQ HOSP IP/OBS MODERATE 35: CPT

## 2022-04-13 PROCEDURE — 71045 X-RAY EXAM CHEST 1 VIEW: CPT | Mod: 26

## 2022-04-13 RX ADMIN — ENOXAPARIN SODIUM 40 MILLIGRAM(S): 100 INJECTION SUBCUTANEOUS at 06:17

## 2022-04-13 RX ADMIN — NYSTATIN CREAM 1 APPLICATION(S): 100000 CREAM TOPICAL at 12:35

## 2022-04-13 RX ADMIN — Medication 75 MICROGRAM(S): at 06:17

## 2022-04-13 RX ADMIN — Medication 1 APPLICATION(S): at 06:50

## 2022-04-13 RX ADMIN — INSULIN GLARGINE 30 UNIT(S): 100 INJECTION, SOLUTION SUBCUTANEOUS at 22:23

## 2022-04-13 RX ADMIN — Medication 1 APPLICATION(S): at 18:22

## 2022-04-13 RX ADMIN — CHLORHEXIDINE GLUCONATE 1 APPLICATION(S): 213 SOLUTION TOPICAL at 10:31

## 2022-04-13 RX ADMIN — Medication 75 MILLIGRAM(S): at 17:51

## 2022-04-13 RX ADMIN — Medication 500 MILLIGRAM(S): at 12:34

## 2022-04-13 RX ADMIN — NYSTATIN CREAM 1 APPLICATION(S): 100000 CREAM TOPICAL at 06:18

## 2022-04-13 RX ADMIN — ENOXAPARIN SODIUM 40 MILLIGRAM(S): 100 INJECTION SUBCUTANEOUS at 17:50

## 2022-04-13 RX ADMIN — Medication 2: at 12:33

## 2022-04-13 RX ADMIN — Medication 4: at 17:50

## 2022-04-13 RX ADMIN — Medication 1 TABLET(S): at 12:34

## 2022-04-13 RX ADMIN — Medication 10 UNIT(S): at 17:50

## 2022-04-13 RX ADMIN — Medication 10 UNIT(S): at 08:41

## 2022-04-13 RX ADMIN — Medication 10 UNIT(S): at 12:34

## 2022-04-13 NOTE — PROGRESS NOTE ADULT - PROBLEM SELECTOR PLAN 5
TSH 3.86 wnl  Continue home synthroid 75mcg qD,    # Elevated Alk Phosphatase-GGT elevated  CT scan w/ gallstones, and hepatic steatosis  Abdominal US discontinued given hepatic and gallbladder imaging already performed this admission w/ benign abdominal exam 2019

## 2022-04-13 NOTE — PROGRESS NOTE ADULT - SUBJECTIVE AND OBJECTIVE BOX
Chief Complaint: DM 2    History: Patient seen at bedside. Eating meals, denies n/v, denies s/s of hypoglycemia    MEDICATIONS  (STANDING):  ascorbic acid 500 milliGRAM(s) Oral daily  chlorhexidine 2% Cloths 1 Application(s) Topical <User Schedule>  Dakins Solution - 1/4 Strength 1 Application(s) Topical two times a day  dextrose 5%. 1000 milliLiter(s) (50 mL/Hr) IV Continuous <Continuous>  dextrose 5%. 1000 milliLiter(s) (100 mL/Hr) IV Continuous <Continuous>  dextrose 50% Injectable 25 Gram(s) IV Push once  dextrose 50% Injectable 12.5 Gram(s) IV Push once  dextrose 50% Injectable 25 Gram(s) IV Push once  enoxaparin Injectable 40 milliGRAM(s) SubCutaneous every 12 hours  ergocalciferol 59355 Unit(s) Oral <User Schedule>  glucagon  Injectable 1 milliGRAM(s) IntraMuscular once  insulin glargine Injectable (LANTUS) 30 Unit(s) SubCutaneous at bedtime  insulin lispro (ADMELOG) corrective regimen sliding scale   SubCutaneous three times a day before meals  insulin lispro (ADMELOG) corrective regimen sliding scale   SubCutaneous at bedtime  insulin lispro Injectable (ADMELOG) 10 Unit(s) SubCutaneous three times a day before meals  levothyroxine 75 MICROGram(s) Oral daily  multivitamin 1 Tablet(s) Oral daily  nystatin Powder 1 Application(s) Topical three times a day  oseltamivir 75 milliGRAM(s) Oral every 24 hours  sodium chloride 0.9%. 1000 milliLiter(s) (50 mL/Hr) IV Continuous <Continuous>    MEDICATIONS  (PRN):  acetaminophen     Tablet .. 650 milliGRAM(s) Oral every 6 hours PRN Temp greater or equal to 38C (100.4F), Mild Pain (1 - 3), Moderate Pain (4 - 6)  ALBUTerol    90 MICROgram(s) HFA Inhaler 2 Puff(s) Inhalation every 6 hours PRN Shortness of Breath  albuterol/ipratropium for Nebulization 3 milliLiter(s) Nebulizer every 6 hours PRN Shortness of Breath  dextrose Oral Gel 15 Gram(s) Oral once PRN Blood Glucose LESS THAN 70 milliGRAM(s)/deciliter    Allergies  No Known Drug Allergies  Pineapple (Anaphylaxis)    Review of Systems:  Cardiovascular: No chest pain  Respiratory: No SOB  GI: No nausea, vomiting  Endocrine: no hypoglycemia     PHYSICAL EXAM:  VITALS: T(C): 36.1 (04-13-22 @ 12:05)  T(F): 97 (04-13-22 @ 12:05), Max: 98.2 (04-12-22 @ 22:30)  HR: 90 (04-13-22 @ 12:05) (90 - 95)  BP: 132/72 (04-13-22 @ 12:05) (132/72 - 138/68)  RR:  (17 - 18)  SpO2:  (97% - 99%)  Wt(kg): --  GENERAL: NAD  EYES: No proptosis, no lid lag, anicteric  HEENT:  Atraumatic, Normocephalic, moist mucous membranes  RESPIRATORY: unlabored respirations    CAPILLARY BLOOD GLUCOSE    POCT Blood Glucose.: 188 mg/dL (13 Apr 2022 12:27)  POCT Blood Glucose.: 136 mg/dL (13 Apr 2022 08:25)  POCT Blood Glucose.: 138 mg/dL (12 Apr 2022 21:07)  POCT Blood Glucose.: 141 mg/dL (12 Apr 2022 17:14)      04-11    144  |  107  |  9   ----------------------------<  148<H>  3.8   |  25  |  0.56    EGFR if : x   EGFR if non : x     Ca    8.7      04-11  Mg     1.60     04-11  Phos  4.4     04-11        Thyroid Function Tests:  03-30 @ 02:39 TSH -- FreeT4 1.0 T3 -- Anti TPO -- Anti Thyroglobulin Ab -- TSI --  03-28 @ 12:53 TSH 3.86 FreeT4 -- T3 -- Anti TPO -- Anti Thyroglobulin Ab -- TSI --      A1C with Estimated Average Glucose Result: 14.2 % (03-27-22 @ 19:13)    Diet, Consistent Carbohydrate w/Evening Snack:   Supplement Feeding Modality:  Oral  Glucerna Shake Cans or Servings Per Day:  1       Frequency:  Two Times a day (04-04-22 @ 17:29)

## 2022-04-13 NOTE — PROGRESS NOTE ADULT - ASSESSMENT
52 year old woman with uncontrolled T2DM, asthma, obesity, chronic immobility 2/2 MS and several chronic wounds presenting for vulvar/R thigh wound found to be hyperglycemic + metabolic acidosis w/ purulent urine c/f urosepsis + ARF.    1. Uncontrolled DM 2  A1c 14.2%.   Home regimen: Takes Metformin 1g BID, Glimepiride (doesn't know dose), Januvia 100 mg daily    While inpatient:  BG target 100-180 mg/dl  Continue Lantus 30 units SQ qHS  Continue Admelog 10 units SQ TID before meals (Hold if NPO/not eating meal)  Continue Admelog MODERATE dose correctional scale before meals, moderate dose at bedtime   Consistent carb diet  Check BG before meals and bedtime  Hypoglycemia protocol  DM Education: Please continue to reinforce insulin PEN education, document in [facility] flowsheets     Discharge Plan:  Recommend basal/bolus insulin to rehab, dose TBD (current dosing if FS remains in target range)  Can continue Metformin 1g PO BID  STOP Glimepiride, STOP Januvia   Outpatient, recommend consideration of adding weekly GLP-1 Agonist (eg: Trulicity, Ozempic) for diabetes, cardiac and weight loss benefits  [facility]up with [facility] at [address] [place] [address], [address], [place] [address]; Ph # [phone]    2. Hypothyroidism  TSH 3.82  Continue Levothyroxine 75 mcg PO daily    3. Vitamin D deficiency   Vitamin D 25 OH 12.6  Continue Ergocalciferol 50,000 units PO weekly (every Monday)    4. HLD  LDL goal < 70  Likely would benefit from statin if no contraindications     Keiko Hunter  Nurse Practitioner  Division of Endocrinology & Diabetes  In house pager #[phone]/long range pager #[phone]    If before 9AM or after 6PM, or on weekends/holidays, please call endocrine answering service for assistance ([phone]).  For nonurgent matters email [email or web address] for assistance.

## 2022-04-13 NOTE — PROGRESS NOTE ADULT - PROBLEM SELECTOR PLAN 1
Tamiflu started.  Monitor clinical status. Noted cough - offer albuterol neb PRN as patient has asthma history.  Patient was COVID (+) in 3/28 - cleared quarantine period.

## 2022-04-13 NOTE — PROGRESS NOTE ADULT - SUBJECTIVE AND OBJECTIVE BOX
[facility] Division of Hospital Medicine  Jose Ayala MD  Pager (TOMASZ, 0O-5P): [phone]  Other Times:  [phone]    Patient is a 52y old  Female who presents with a chief complaint of leg wounds (11 Apr 2022 13:11)    SUBJECTIVE / OVERNIGHT EVENTS:  Patient states that she doesn't want to go to [facility] but acknowledged that she didnt' participate with PT.  Pt agreed to be evaluated by PT again and follow all the recommended tests. Also c/o cough taht started yesterday.  No F/C, N/V, CP, SOB, lightheadedness, dizziness, abdominal pain, diarrhea, dysuria.    MEDICATIONS  (STANDING):  ascorbic acid 500 milliGRAM(s) Oral daily  chlorhexidine 2% Cloths 1 Application(s) Topical <User Schedule>  Dakins Solution - 1/4 Strength 1 Application(s) Topical two times a day  dextrose 5%. 1000 milliLiter(s) (50 mL/Hr) IV Continuous <Continuous>  dextrose 5%. 1000 milliLiter(s) (100 mL/Hr) IV Continuous <Continuous>  dextrose 50% Injectable 25 Gram(s) IV Push once  dextrose 50% Injectable 12.5 Gram(s) IV Push once  dextrose 50% Injectable 25 Gram(s) IV Push once  enoxaparin Injectable 40 milliGRAM(s) SubCutaneous every 12 hours  ergocalciferol 86113 Unit(s) Oral <User Schedule>  glucagon  Injectable 1 milliGRAM(s) IntraMuscular once  insulin glargine Injectable (LANTUS) 30 Unit(s) SubCutaneous at bedtime  insulin lispro (ADMELOG) corrective regimen sliding scale   SubCutaneous three times a day before meals  insulin lispro (ADMELOG) corrective regimen sliding scale   SubCutaneous at bedtime  insulin lispro Injectable (ADMELOG) 10 Unit(s) SubCutaneous three times a day before meals  levothyroxine 75 MICROGram(s) Oral daily  multivitamin 1 Tablet(s) Oral daily  nystatin Powder 1 Application(s) Topical three times a day  oseltamivir 75 milliGRAM(s) Oral every 24 hours  sodium chloride 0.9%. 1000 milliLiter(s) (50 mL/Hr) IV Continuous <Continuous>    MEDICATIONS  (PRN):  acetaminophen     Tablet .. 650 milliGRAM(s) Oral every 6 hours PRN Temp greater or equal to 38C (100.4F), Mild Pain (1 - 3), Moderate Pain (4 - 6)  ALBUTerol    90 MICROgram(s) HFA Inhaler 2 Puff(s) Inhalation every 6 hours PRN Shortness of Breath  albuterol/ipratropium for Nebulization 3 milliLiter(s) Nebulizer every 6 hours PRN Shortness of Breath  dextrose Oral Gel 15 Gram(s) Oral once PRN Blood Glucose LESS THAN 70 milliGRAM(s)/deciliter      Vital Signs Last 24 Hrs  T(C): 36.1 (13 Apr 2022 12:05), Max: 36.8 (12 Apr 2022 22:30)  T(F): 97 (13 Apr 2022 12:05), Max: 98.2 (12 Apr 2022 22:30)  HR: 90 (13 Apr 2022 12:05) (90 - 95)  BP: 132/72 (13 Apr 2022 12:05) (132/72 - 138/68)  BP(mean): --  RR: 17 (13 Apr 2022 12:05) (17 - 18)  SpO2: 97% (13 Apr 2022 12:05) (97% - 99%)  CAPILLARY BLOOD GLUCOSE      POCT Blood Glucose.: 188 mg/dL (13 Apr 2022 12:27)  POCT Blood Glucose.: 136 mg/dL (13 Apr 2022 08:25)  POCT Blood Glucose.: 138 mg/dL (12 Apr 2022 21:07)  POCT Blood Glucose.: 141 mg/dL (12 Apr 2022 17:14)  POCT Blood Glucose.: 191 mg/dL (12 Apr 2022 13:13)    I&O's Summary      PHYSICAL EXAM:  CONSTITUTIONAL: NAD, obese   RESPIRATORY: Normal respiratory effort; decreased BS at base  CARDIOVASCULAR: Regular rate and rhythm, normal S1 and S2, no murmur/rub/gallop; No lower extremity edema; Peripheral pulses are 2+ bilaterally  ABDOMEN: Nontender to palpation, normoactive bowel sounds, no rebound/guarding; No hepatosplenomegaly  MUSCLOSKELETAL: no clubbing or cyanosis of digits; no joint swelling or tenderness to palpation  PSYCH: A+O to person, place, and time; affect appropriate  NEURO: Decreased strength more on right side compared to left( as per pt chronic)  - Primafit   SKIN:  Multiple wounds with dressing  present -Right knee, RLE  right posterior lower leg- 5cmx2.5cmx0  Beneath breast/abdominal folds - Dermatitis with  fissures  present  Dressing present  Right lateral thigh-.Stage 4 pressure ulcer - 5icn0fjx1.5cm,  granular base, no purulent drainage, no odor. Periwound skin intact, no edema, no erythema, no increased warmth, no fluctuance noted. Right trochanter- stage 4 pressure injury- [id number], granular base, bone in close proximity.     LABS:                    RADIOLOGY & ADDITIONAL TESTS:    Imaging Personally Reviewed:    Care Discussed with Consultants/Other Providers:

## 2022-04-14 LAB
ANION GAP SERPL CALC-SCNC: 12 MMOL/L — SIGNIFICANT CHANGE UP (ref 7–14)
BUN SERPL-MCNC: 9 MG/DL — SIGNIFICANT CHANGE UP (ref 7–23)
CALCIUM SERPL-MCNC: 8.6 MG/DL — SIGNIFICANT CHANGE UP (ref 8.4–10.5)
CHLORIDE SERPL-SCNC: 103 MMOL/L — SIGNIFICANT CHANGE UP (ref 98–107)
CO2 SERPL-SCNC: 26 MMOL/L — SIGNIFICANT CHANGE UP (ref 22–31)
CREAT SERPL-MCNC: 0.6 MG/DL — SIGNIFICANT CHANGE UP (ref 0.5–1.3)
EGFR: 109 ML/MIN/1.73M2 — SIGNIFICANT CHANGE UP
GLUCOSE SERPL-MCNC: 168 MG/DL — HIGH (ref 70–99)
HCT VFR BLD CALC: 28.8 % — LOW (ref 34.5–45)
HGB BLD-MCNC: 8.6 G/DL — LOW (ref 11.5–15.5)
MAGNESIUM SERPL-MCNC: 1.3 MG/DL — LOW (ref 1.6–2.6)
MCHC RBC-ENTMCNC: 26.1 PG — LOW (ref 27–34)
MCHC RBC-ENTMCNC: 29.9 GM/DL — LOW (ref 32–36)
MCV RBC AUTO: 87.3 FL — SIGNIFICANT CHANGE UP (ref 80–100)
NRBC # BLD: 0 /100 WBCS — SIGNIFICANT CHANGE UP
NRBC # FLD: 0 K/UL — SIGNIFICANT CHANGE UP
PHOSPHATE SERPL-MCNC: 3.8 MG/DL — SIGNIFICANT CHANGE UP (ref 2.5–4.5)
PLATELET # BLD AUTO: 466 K/UL — HIGH (ref 150–400)
POTASSIUM SERPL-MCNC: 3.6 MMOL/L — SIGNIFICANT CHANGE UP (ref 3.5–5.3)
POTASSIUM SERPL-SCNC: 3.6 MMOL/L — SIGNIFICANT CHANGE UP (ref 3.5–5.3)
RBC # BLD: 3.3 M/UL — LOW (ref 3.8–5.2)
RBC # FLD: 17.2 % — HIGH (ref 10.3–14.5)
SODIUM SERPL-SCNC: 141 MMOL/L — SIGNIFICANT CHANGE UP (ref 135–145)
WBC # BLD: 7.3 K/UL — SIGNIFICANT CHANGE UP (ref 3.8–10.5)
WBC # FLD AUTO: 7.3 K/UL — SIGNIFICANT CHANGE UP (ref 3.8–10.5)

## 2022-04-14 PROCEDURE — 99232 SBSQ HOSP IP/OBS MODERATE 35: CPT

## 2022-04-14 RX ADMIN — Medication 1 APPLICATION(S): at 06:31

## 2022-04-14 RX ADMIN — NYSTATIN CREAM 1 APPLICATION(S): 100000 CREAM TOPICAL at 21:17

## 2022-04-14 RX ADMIN — Medication 2: at 09:09

## 2022-04-14 RX ADMIN — Medication 2: at 12:43

## 2022-04-14 RX ADMIN — CHLORHEXIDINE GLUCONATE 1 APPLICATION(S): 213 SOLUTION TOPICAL at 13:15

## 2022-04-14 RX ADMIN — Medication 75 MICROGRAM(S): at 06:12

## 2022-04-14 RX ADMIN — ENOXAPARIN SODIUM 40 MILLIGRAM(S): 100 INJECTION SUBCUTANEOUS at 06:12

## 2022-04-14 RX ADMIN — NYSTATIN CREAM 1 APPLICATION(S): 100000 CREAM TOPICAL at 12:44

## 2022-04-14 RX ADMIN — Medication 1 APPLICATION(S): at 17:32

## 2022-04-14 RX ADMIN — Medication 10 UNIT(S): at 09:10

## 2022-04-14 RX ADMIN — Medication 10 UNIT(S): at 12:44

## 2022-04-14 RX ADMIN — Medication 75 MILLIGRAM(S): at 18:12

## 2022-04-14 RX ADMIN — NYSTATIN CREAM 1 APPLICATION(S): 100000 CREAM TOPICAL at 06:12

## 2022-04-14 RX ADMIN — ENOXAPARIN SODIUM 40 MILLIGRAM(S): 100 INJECTION SUBCUTANEOUS at 18:12

## 2022-04-14 RX ADMIN — Medication 10 UNIT(S): at 18:10

## 2022-04-14 NOTE — PROVIDER CONTACT NOTE (OTHER) - BACKGROUND
Patient admitted to the hospital with an injury of unspecified body region initial encounter. She has a history of exposure to the flu, anemia, hypothyroidism and DM2.

## 2022-04-14 NOTE — PROGRESS NOTE ADULT - PROBLEM SELECTOR PLAN 7
Lovenox   PT eval for safe disposition - Rehab but patient wants second opinion.  Dispo- medically optimize for discharge; pending NABEEL

## 2022-04-14 NOTE — PROVIDER CONTACT NOTE (OTHER) - DATE AND TIME:
04-Apr-2022 07:20
01-Apr-2022 17:55
01-Apr-2022 23:00
05-Apr-2022 06:56
30-Mar-2022 18:00
31-Mar-2022 21:10
01-Apr-2022 21:30
08-Apr-2022 00:09
11-Apr-2022 07:45
14-Apr-2022 06:37
05-Apr-2022 06:07

## 2022-04-14 NOTE — PROVIDER CONTACT NOTE (OTHER) - ASSESSMENT
Elevated Temp 102.3 orally and 
Patient stable no distress noted
Temp 100.2F oral
Temp 100.5F rectally. Pt asymptomatic. Other vitals stable as documented
Temp 100.6F rectally
A&Ox4. Patient is refusing all medical care including education on indications, risks, and benefits. Patient stable no distress noted.
Patient has an oral temperature of 100.6
Temp 99.6F and 
Patient in bed, remains A&O x4. VS per flow sheet with oral temperature of 100.4F
Patient is complaining of being warm in bed.
Hgb 6.8

## 2022-04-14 NOTE — PROVIDER CONTACT NOTE (OTHER) - NAME OF MD/NP/PA/DO NOTIFIED:
Lori Hernandez
DESMOND Raza
David Sandoval
Olimpia Bojorquez ACP
Olimpia Bojorquez PA
Janet Melo ACP
Lori Hernandez
Lori Hernandez
Olimpia Bojorquez
Sascha Lopez
Gerda Hernandes

## 2022-04-14 NOTE — PROVIDER CONTACT NOTE (OTHER) - ACTION/TREATMENT ORDERED:
Ordered PRN acetaminophen administered. Provider made aware.
Ordered IV Tylenol and carried out. Safety maintained.
Providers are aware. No new orders at this time. Provider stated she would see patient at bedside Will continue to monitor.
Will continue to monitor and assess.
ordered 975mg Tylenol. order carried out.  Safety maintained.
provider made aware. rectal temp ordered. will continue to monitor. Safety maintained.
provider notified
DESMOND Lorenz notified via text page. RN continue to monitor temperature.
Instructed to recheck temp and HR at 2300 . Safety maintained.
provider made aware. . will continue to monitor. Safety maintained.

## 2022-04-14 NOTE — PROVIDER CONTACT NOTE (OTHER) - SITUATION
Temp 100.2 rectally
Patient's temperature is 100.1
Temp 99.6F and 
refused nystatin power
as per tox lab Luke - pt Hgb 6.8
Elevated Temp 102.3 orally and 
Noted with 100.4F oral temperature
Patient A&Ox4. Is refusing vitals, telemetry, medications, and assessments.
Temp 100.5F rectally
Temp 100.6F rectally

## 2022-04-14 NOTE — PROVIDER CONTACT NOTE (OTHER) - REASON
Patient's temperature is 100.1
fever 100.6
Temp 100.2F oral
Temp 99.6F and 
Temp 100.5F Rectally
Elevated Temp and HR
Increased temperature
refused nystatin power
Patient A&Ox4. Is refusing vitals, telemetry, medications, and assessments.
Temp 100.6F rectally
Hgb 6.8

## 2022-04-14 NOTE — PROGRESS NOTE ADULT - SUBJECTIVE AND OBJECTIVE BOX
[facility] Division of Hospital Medicine  Jose Ayala MD  Pager (JAMA-MITCH, 3U-5P): [phone]  Other Times:  [phone]    Patient is a 52y old  Female who presents with a chief complaint of leg wounds (11 Apr 2022 13:11)    SUBJECTIVE / OVERNIGHT EVENTS:  Patient hasn't been evaluated by PT yet today.  Also noted to have cough but was not coughing during my interview with her.  No F/C, N/V, CP, SOB, Cough, lightheadedness, dizziness, abdominal pain, diarrhea, dysuria.    MEDICATIONS  (STANDING):  ascorbic acid 500 milliGRAM(s) Oral daily  chlorhexidine 2% Cloths 1 Application(s) Topical <User Schedule>  Dakins Solution - 1/4 Strength 1 Application(s) Topical two times a day  dextrose 5%. 1000 milliLiter(s) (50 mL/Hr) IV Continuous <Continuous>  dextrose 5%. 1000 milliLiter(s) (100 mL/Hr) IV Continuous <Continuous>  dextrose 50% Injectable 25 Gram(s) IV Push once  dextrose 50% Injectable 12.5 Gram(s) IV Push once  dextrose 50% Injectable 25 Gram(s) IV Push once  enoxaparin Injectable 40 milliGRAM(s) SubCutaneous every 12 hours  ergocalciferol 70195 Unit(s) Oral <User Schedule>  glucagon  Injectable 1 milliGRAM(s) IntraMuscular once  insulin glargine Injectable (LANTUS) 30 Unit(s) SubCutaneous at bedtime  insulin lispro (ADMELOG) corrective regimen sliding scale   SubCutaneous three times a day before meals  insulin lispro (ADMELOG) corrective regimen sliding scale   SubCutaneous at bedtime  insulin lispro Injectable (ADMELOG) 10 Unit(s) SubCutaneous three times a day before meals  levothyroxine 75 MICROGram(s) Oral daily  multivitamin 1 Tablet(s) Oral daily  nystatin Powder 1 Application(s) Topical three times a day  oseltamivir 75 milliGRAM(s) Oral every 24 hours  sodium chloride 0.9%. 1000 milliLiter(s) (50 mL/Hr) IV Continuous <Continuous>    MEDICATIONS  (PRN):  acetaminophen     Tablet .. 650 milliGRAM(s) Oral every 6 hours PRN Temp greater or equal to 38C (100.4F), Mild Pain (1 - 3), Moderate Pain (4 - 6)  ALBUTerol    90 MICROgram(s) HFA Inhaler 2 Puff(s) Inhalation every 6 hours PRN Shortness of Breath  albuterol/ipratropium for Nebulization 3 milliLiter(s) Nebulizer every 6 hours PRN Shortness of Breath  dextrose Oral Gel 15 Gram(s) Oral once PRN Blood Glucose LESS THAN 70 milliGRAM(s)/deciliter      Vital Signs Last 24 Hrs  T(C): 37 (14 Apr 2022 11:23), Max: 37.8 (14 Apr 2022 06:12)  T(F): 98.6 (14 Apr 2022 11:23), Max: 100.1 (14 Apr 2022 06:12)  HR: 60 (14 Apr 2022 11:23) (60 - 95)  BP: 125/54 (14 Apr 2022 11:23) (125/54 - 142/75)  BP(mean): --  RR: 17 (14 Apr 2022 11:23) (17 - 18)  SpO2: 97% (14 Apr 2022 11:23) (97% - 98%)  CAPILLARY BLOOD GLUCOSE      POCT Blood Glucose.: 154 mg/dL (14 Apr 2022 08:29)  POCT Blood Glucose.: 199 mg/dL (13 Apr 2022 22:03)  POCT Blood Glucose.: 206 mg/dL (13 Apr 2022 17:46)  POCT Blood Glucose.: 188 mg/dL (13 Apr 2022 12:27)    I&O's Summary    13 Apr 2022 07:01  -  14 Apr 2022 07:00  --------------------------------------------------------  IN: 150 mL / OUT: 100 mL / NET: 50 mL    14 Apr 2022 07:01  -  14 Apr 2022 11:24  --------------------------------------------------------  IN: 360 mL / OUT: 1100 mL / NET: -740 mL        PHYSICAL EXAM:  CONSTITUTIONAL: NAD, obese   RESPIRATORY: Normal respiratory effort; decreased BS at base  CARDIOVASCULAR: Regular rate and rhythm, normal S1 and S2, no murmur/rub/gallop; No lower extremity edema; Peripheral pulses are 2+ bilaterally  ABDOMEN: Nontender to palpation, normoactive bowel sounds, no rebound/guarding; No hepatosplenomegaly  MUSCLOSKELETAL: no clubbing or cyanosis of digits; no joint swelling or tenderness to palpation  PSYCH: A+O to person, place, and time; affect appropriate  NEURO: Decreased strength more on right side compared to left( as per pt chronic)  - Primafit   SKIN:  Multiple wounds with dressing  present -Right knee, RLE  right posterior lower leg- 5cmx2.5cmx0  Beneath breast/abdominal folds - Dermatitis with  fissures  present  Dressing present  Right lateral thigh-.Stage 4 pressure ulcer - 3vfg3vsd5.5cm,  granular base, no purulent drainage, no odor. Periwound skin intact, no edema, no erythema, no increased warmth, no fluctuance noted. Right trochanter- stage 4 pressure injury- [id number], granular base, bone in close proximity.     LABS:                        8.6    7.30  )-----------( 466      ( 14 Apr 2022 07:54 )             28.8     04-14    141  |  103  |  9   ----------------------------<  168<H>  3.6   |  26  |  0.60    Ca    8.6      14 Apr 2022 07:54  Phos  3.8     04-14  Mg     1.30     04-14                          COVID-19 PCR: [employer] (04-10-22 @ 14:18)  COVID-19 PCR: [employer] (04-06-22 @ 20:07)      RADIOLOGY & ADDITIONAL TESTS:    Imaging Personally Reviewed:    Care Discussed with Consultants/Other Providers:

## 2022-04-14 NOTE — PROGRESS NOTE ADULT - PROBLEM SELECTOR PLAN 1
Tamiflu started.  Monitor clinical status. Cough - offer albuterol neb PRN as patient has asthma history.  Patient was COVID (+) in 3/28 - cleared quarantine period.

## 2022-04-15 LAB
ANION GAP SERPL CALC-SCNC: 15 MMOL/L — HIGH (ref 7–14)
BUN SERPL-MCNC: 10 MG/DL — SIGNIFICANT CHANGE UP (ref 7–23)
CALCIUM SERPL-MCNC: 8.5 MG/DL — SIGNIFICANT CHANGE UP (ref 8.4–10.5)
CHLORIDE SERPL-SCNC: 100 MMOL/L — SIGNIFICANT CHANGE UP (ref 98–107)
CO2 SERPL-SCNC: 25 MMOL/L — SIGNIFICANT CHANGE UP (ref 22–31)
CREAT SERPL-MCNC: 0.58 MG/DL — SIGNIFICANT CHANGE UP (ref 0.5–1.3)
EGFR: 110 ML/MIN/1.73M2 — SIGNIFICANT CHANGE UP
GLUCOSE SERPL-MCNC: 134 MG/DL — HIGH (ref 70–99)
HCT VFR BLD CALC: 29.6 % — LOW (ref 34.5–45)
HGB BLD-MCNC: 8.8 G/DL — LOW (ref 11.5–15.5)
MAGNESIUM SERPL-MCNC: 1.4 MG/DL — LOW (ref 1.6–2.6)
MCHC RBC-ENTMCNC: 26.1 PG — LOW (ref 27–34)
MCHC RBC-ENTMCNC: 29.7 GM/DL — LOW (ref 32–36)
MCV RBC AUTO: 87.8 FL — SIGNIFICANT CHANGE UP (ref 80–100)
NRBC # BLD: 0 /100 WBCS — SIGNIFICANT CHANGE UP
NRBC # FLD: 0 K/UL — SIGNIFICANT CHANGE UP
PHOSPHATE SERPL-MCNC: 3.7 MG/DL — SIGNIFICANT CHANGE UP (ref 2.5–4.5)
PLATELET # BLD AUTO: 442 K/UL — HIGH (ref 150–400)
POTASSIUM SERPL-MCNC: 3.7 MMOL/L — SIGNIFICANT CHANGE UP (ref 3.5–5.3)
POTASSIUM SERPL-SCNC: 3.7 MMOL/L — SIGNIFICANT CHANGE UP (ref 3.5–5.3)
RBC # BLD: 3.37 M/UL — LOW (ref 3.8–5.2)
RBC # FLD: 17.2 % — HIGH (ref 10.3–14.5)
SODIUM SERPL-SCNC: 140 MMOL/L — SIGNIFICANT CHANGE UP (ref 135–145)
WBC # BLD: 6.37 K/UL — SIGNIFICANT CHANGE UP (ref 3.8–10.5)
WBC # FLD AUTO: 6.37 K/UL — SIGNIFICANT CHANGE UP (ref 3.8–10.5)

## 2022-04-15 PROCEDURE — 99232 SBSQ HOSP IP/OBS MODERATE 35: CPT

## 2022-04-15 RX ORDER — INSULIN GLARGINE 100 [IU]/ML
10 INJECTION, SOLUTION SUBCUTANEOUS
Qty: 0 | Refills: 0 | DISCHARGE

## 2022-04-15 RX ORDER — ALBUTEROL 90 UG/1
2 AEROSOL, METERED ORAL EVERY 6 HOURS
Refills: 0 | Status: DISCONTINUED | OUTPATIENT
Start: 2022-04-15 | End: 2022-04-16

## 2022-04-15 RX ORDER — MAGNESIUM SULFATE 500 MG/ML
2 VIAL (ML) INJECTION ONCE
Refills: 0 | Status: DISCONTINUED | OUTPATIENT
Start: 2022-04-15 | End: 2022-04-19

## 2022-04-15 RX ORDER — INSULIN ASPART 100 [IU]/ML
3 INJECTION, SOLUTION SUBCUTANEOUS
Qty: 0 | Refills: 0 | DISCHARGE

## 2022-04-15 RX ORDER — SECUKINUMAB 150 MG/ML
20 INJECTION SUBCUTANEOUS
Qty: 0 | Refills: 0 | DISCHARGE

## 2022-04-15 RX ADMIN — Medication 1 APPLICATION(S): at 04:48

## 2022-04-15 RX ADMIN — Medication 2: at 12:37

## 2022-04-15 RX ADMIN — Medication 2: at 08:47

## 2022-04-15 RX ADMIN — Medication 10 UNIT(S): at 18:06

## 2022-04-15 RX ADMIN — ENOXAPARIN SODIUM 40 MILLIGRAM(S): 100 INJECTION SUBCUTANEOUS at 04:48

## 2022-04-15 RX ADMIN — Medication 1 APPLICATION(S): at 18:06

## 2022-04-15 RX ADMIN — Medication 10 UNIT(S): at 08:47

## 2022-04-15 RX ADMIN — Medication 75 MILLIGRAM(S): at 18:06

## 2022-04-15 RX ADMIN — NYSTATIN CREAM 1 APPLICATION(S): 100000 CREAM TOPICAL at 12:34

## 2022-04-15 RX ADMIN — ENOXAPARIN SODIUM 40 MILLIGRAM(S): 100 INJECTION SUBCUTANEOUS at 18:06

## 2022-04-15 RX ADMIN — NYSTATIN CREAM 1 APPLICATION(S): 100000 CREAM TOPICAL at 22:12

## 2022-04-15 RX ADMIN — Medication 75 MICROGRAM(S): at 04:48

## 2022-04-15 RX ADMIN — NYSTATIN CREAM 1 APPLICATION(S): 100000 CREAM TOPICAL at 04:48

## 2022-04-15 RX ADMIN — INSULIN GLARGINE 30 UNIT(S): 100 INJECTION, SOLUTION SUBCUTANEOUS at 22:13

## 2022-04-15 RX ADMIN — Medication 500 MILLIGRAM(S): at 12:37

## 2022-04-15 RX ADMIN — CHLORHEXIDINE GLUCONATE 1 APPLICATION(S): 213 SOLUTION TOPICAL at 12:37

## 2022-04-15 RX ADMIN — Medication 10 UNIT(S): at 12:38

## 2022-04-15 NOTE — PROGRESS NOTE ADULT - PROBLEM SELECTOR PROBLEM 1
guardian called requesting a refill on Guanfacine 0 5mg daily at bedtime  guardian states he is doing well and didn't report any side effects     Last Visit: 3/14/2022  Next visit:4/11/2022  PDMP checked: no Exposure to the flu

## 2022-04-15 NOTE — CHART NOTE - NSCHARTNOTEFT_GEN_A_CORE
52 year old woman with uncontrolled T2DM, asthma, obesity, chronic immobility 2/2 MS and several chronic wounds presenting for vulvar/R thigh wound found to be hyperglycemic + metabolic acidosis w/ purulent urine c/f urosepsis + ARF.    CAPILLARY BLOOD GLUCOSE  POCT Blood Glucose.: 152 mg/dL (15 Apr 2022 12:16)  POCT Blood Glucose.: 151 mg/dL (15 Apr 2022 08:23)  POCT Blood Glucose.: 177 mg/dL (14 Apr 2022 21:46)  POCT Blood Glucose.: 110 mg/dL (14 Apr 2022 17:23)      1. Uncontrolled DM 2  A1c 14.2%.   Home regimen: Takes Metformin 1g BID, Glimepiride (doesn't know dose), Januvia 100 mg daily    While inpatient:  BG target 100-180 mg/dl  Continue Lantus 30 units SQ qHS  Continue Admelog 10 units SQ TID before meals (Hold if NPO/not eating meal)  Continue Admelog MODERATE dose correctional scale before meals, moderate dose at bedtime   Consistent carb diet  Check BG before meals and bedtime  Hypoglycemia protocol  DM Education: Please continue to reinforce insulin PEN education, document in [facility] flowsheets     Discharge Plan:  Recommend basal/bolus insulin plan for rehab and home with current dosing.  Please send Lantus solostar pen and humalog kwikpen as test script to check insurance coverage.  Ok to send with current doses   If Lantus not covered- can try alternating with one of following   tresiba/basaglar/toujeo/Levemir  If Humalog not covered- can try alternating with one of following  novolog/apidra/admelog/fiasp  Ensure patient has working glucometer, test strips, lancets, alcohol pads, and BD loren pen needles  Please also prescribe glucose tabs, Baqsimi nasal spray or glucagon emergency kit for hypoglycemia risk   Can continue Metformin 1g PO BID  STOP Glimepiride, STOP Januvia   Outpatient, recommend consideration of adding weekly GLP-1 Agonist (eg: Trulicity, Ozempic) for diabetes, cardiac and weight loss benefits  Followup with [facility] at [address] [place] [address], [address], [place] [address]; Ph # [phone]      Michelle Clark  Nurse Practitioner  Division of Endocrinology & Diabetes  Pager # [phone]      If after 6PM or before 9AM, or on weekends/holidays, please call endocrine answering service for assistance ([phone]).  For nonurgent matters email [email or web address] for assistance.

## 2022-04-15 NOTE — PROGRESS NOTE ADULT - SUBJECTIVE AND OBJECTIVE BOX
[facility] Division of Hospital Medicine  Jose Ayala MD  Pager (JAMA-MITCH, [phone]): [phone]  Other Times:  [phone]    Patient is a 52y old  Female who presents with a chief complaint of leg wounds (11 Apr 2022 13:11)    SUBJECTIVE / OVERNIGHT EVENTS:  No new events overnight. Patient states that she has lot of cough but did not cough once during our interview again.  No F/C, N/V, CP, SOB, Cough, lightheadedness, dizziness, abdominal pain, diarrhea, dysuria.    MEDICATIONS  (STANDING):  ascorbic acid 500 milliGRAM(s) Oral daily  chlorhexidine 2% Cloths 1 Application(s) Topical <User Schedule>  Dakins Solution - 1/4 Strength 1 Application(s) Topical two times a day  dextrose 5%. 1000 milliLiter(s) (50 mL/Hr) IV Continuous <Continuous>  dextrose 5%. 1000 milliLiter(s) (100 mL/Hr) IV Continuous <Continuous>  dextrose 50% Injectable 25 Gram(s) IV Push once  dextrose 50% Injectable 12.5 Gram(s) IV Push once  dextrose 50% Injectable 25 Gram(s) IV Push once  enoxaparin Injectable 40 milliGRAM(s) SubCutaneous every 12 hours  ergocalciferol 57909 Unit(s) Oral <User Schedule>  glucagon  Injectable 1 milliGRAM(s) IntraMuscular once  insulin glargine Injectable (LANTUS) 30 Unit(s) SubCutaneous at bedtime  insulin lispro (ADMELOG) corrective regimen sliding scale   SubCutaneous three times a day before meals  insulin lispro (ADMELOG) corrective regimen sliding scale   SubCutaneous at bedtime  insulin lispro Injectable (ADMELOG) 10 Unit(s) SubCutaneous three times a day before meals  levothyroxine 75 MICROGram(s) Oral daily  magnesium sulfate  IVPB 2 Gram(s) IV Intermittent once  multivitamin 1 Tablet(s) Oral daily  nystatin Powder 1 Application(s) Topical three times a day  oseltamivir 75 milliGRAM(s) Oral every 24 hours  sodium chloride 0.9%. 1000 milliLiter(s) (50 mL/Hr) IV Continuous <Continuous>    MEDICATIONS  (PRN):  acetaminophen     Tablet .. 650 milliGRAM(s) Oral every 6 hours PRN Temp greater or equal to 38C (100.4F), Mild Pain (1 - 3), Moderate Pain (4 - 6)  ALBUTerol    90 MICROgram(s) HFA Inhaler 2 Puff(s) Inhalation every 6 hours PRN Shortness of Breath  albuterol/ipratropium for Nebulization 3 milliLiter(s) Nebulizer every 6 hours PRN Shortness of Breath  dextrose Oral Gel 15 Gram(s) Oral once PRN Blood Glucose LESS THAN 70 milliGRAM(s)/deciliter      Vital Signs Last 24 Hrs  T(C): 36.9 (15 Apr 2022 04:45), Max: 37 (14 Apr 2022 11:23)  T(F): 98.4 (15 Apr 2022 04:45), Max: 98.6 (14 Apr 2022 11:23)  HR: 95 (15 Apr 2022 04:45) (60 - 96)  BP: 138/73 (15 Apr 2022 04:45) (125/54 - 138/73)  BP(mean): --  RR: 18 (15 Apr 2022 04:45) (17 - 18)  SpO2: 96% (15 Apr 2022 04:45) (95% - 97%)  CAPILLARY BLOOD GLUCOSE      POCT Blood Glucose.: 151 mg/dL (15 Apr 2022 08:23)  POCT Blood Glucose.: 177 mg/dL (14 Apr 2022 21:46)  POCT Blood Glucose.: 110 mg/dL (14 Apr 2022 17:23)  POCT Blood Glucose.: 164 mg/dL (14 Apr 2022 12:11)    I&O's Summary    14 Apr 2022 07:01  -  15 Apr 2022 07:00  --------------------------------------------------------  IN: 960 mL / OUT: 2600 mL / NET: -1640 mL        PHYSICAL EXAM:  CONSTITUTIONAL: NAD, obese   RESPIRATORY: Normal respiratory effort; decreased BS at base  CARDIOVASCULAR: Regular rate and rhythm, normal S1 and S2, no murmur/rub/gallop; No lower extremity edema; Peripheral pulses are 2+ bilaterally  ABDOMEN: Nontender to palpation, normoactive bowel sounds, no rebound/guarding; No hepatosplenomegaly  MUSCLOSKELETAL: no clubbing or cyanosis of digits; no joint swelling or tenderness to palpation  PSYCH: A+O to person, place, and time; affect appropriate  NEURO: Decreased strength more on right side compared to left( as per pt chronic)  - Primafit   SKIN:  Multiple wounds with dressing  present -Right knee, RLE  right posterior lower leg- 5cmx2.5cmx0  Beneath breast/abdominal folds - Dermatitis with  fissures  present  Dressing present  Right lateral thigh-.Stage 4 pressure ulcer - [id number],  granular base, no purulent drainage, no odor. Periwound skin intact, no edema, no erythema, no increased warmth, no fluctuance noted. Right trochanter- stage 4 pressure injury- [id number], granular base, bone in close proximity.     LABS:                        8.8    6.37  )-----------( 442      ( 15 Apr 2022 04:43 )             29.6     04-15    140  |  100  |  10  ----------------------------<  134<H>  3.7   |  25  |  0.58    Ca    8.5      15 Apr 2022 04:43  Phos  3.7     04-15  Mg     1.40     04-15                RADIOLOGY & ADDITIONAL TESTS:    Imaging Personally Reviewed:    Care Discussed with Consultants/Other Providers:

## 2022-04-15 NOTE — PROGRESS NOTE ADULT - NSPROGADDITIONALINFOA_GEN_ALL_CORE
Patient medically optimized for discharge.  Discharge planning 40 minutes - discussed with patient and consultants.

## 2022-04-16 PROCEDURE — 99232 SBSQ HOSP IP/OBS MODERATE 35: CPT

## 2022-04-16 RX ORDER — ALBUTEROL 90 UG/1
2 AEROSOL, METERED ORAL DAILY
Refills: 0 | Status: DISCONTINUED | OUTPATIENT
Start: 2022-04-16 | End: 2022-04-19

## 2022-04-16 RX ADMIN — NYSTATIN CREAM 1 APPLICATION(S): 100000 CREAM TOPICAL at 05:03

## 2022-04-16 RX ADMIN — Medication 75 MICROGRAM(S): at 05:02

## 2022-04-16 RX ADMIN — Medication 2: at 08:48

## 2022-04-16 RX ADMIN — Medication 1 APPLICATION(S): at 18:09

## 2022-04-16 RX ADMIN — Medication 650 MILLIGRAM(S): at 18:36

## 2022-04-16 RX ADMIN — Medication 10 UNIT(S): at 13:03

## 2022-04-16 RX ADMIN — NYSTATIN CREAM 1 APPLICATION(S): 100000 CREAM TOPICAL at 13:02

## 2022-04-16 RX ADMIN — Medication 75 MILLIGRAM(S): at 18:00

## 2022-04-16 RX ADMIN — ENOXAPARIN SODIUM 40 MILLIGRAM(S): 100 INJECTION SUBCUTANEOUS at 18:00

## 2022-04-16 RX ADMIN — Medication 1 TABLET(S): at 13:02

## 2022-04-16 RX ADMIN — Medication 500 MILLIGRAM(S): at 13:02

## 2022-04-16 RX ADMIN — Medication 10 UNIT(S): at 17:59

## 2022-04-16 RX ADMIN — Medication 1 APPLICATION(S): at 05:03

## 2022-04-16 RX ADMIN — CHLORHEXIDINE GLUCONATE 1 APPLICATION(S): 213 SOLUTION TOPICAL at 08:50

## 2022-04-16 RX ADMIN — NYSTATIN CREAM 1 APPLICATION(S): 100000 CREAM TOPICAL at 22:02

## 2022-04-16 RX ADMIN — Medication 4: at 13:03

## 2022-04-16 RX ADMIN — Medication 10 UNIT(S): at 08:49

## 2022-04-16 RX ADMIN — ENOXAPARIN SODIUM 40 MILLIGRAM(S): 100 INJECTION SUBCUTANEOUS at 05:02

## 2022-04-16 RX ADMIN — INSULIN GLARGINE 30 UNIT(S): 100 INJECTION, SOLUTION SUBCUTANEOUS at 22:02

## 2022-04-16 RX ADMIN — Medication 2: at 17:59

## 2022-04-16 NOTE — PROGRESS NOTE ADULT - PROBLEM SELECTOR PLAN 1
Tamiflu started.  Monitor clinical status. Cough - c/w albuterol  Patient was COVID (+) in 3/28 - cleared quarantine period.

## 2022-04-16 NOTE — PROGRESS NOTE ADULT - SUBJECTIVE AND OBJECTIVE BOX
Karly Sanford  [facility] of Hospital Medicine  Pager #[phone]    Patient is a 52y old  Female who presents with a chief complaint of leg wounds (11 Apr 2022 13:11)      SUBJECTIVE / OVERNIGHT EVENTS: Patient seen and examined at bedside. Patient was supposed to be d/edgar to rehab yesterday but states that an ambulance never came to transport her. She is c/o cough and wants her albuterol inhaler in the morning.    ADDITIONAL REVIEW OF SYSTEMS:    MEDICATIONS  (STANDING):  ALBUTerol    90 MICROgram(s) HFA Inhaler 2 Puff(s) Inhalation daily  ascorbic acid 500 milliGRAM(s) Oral daily  chlorhexidine 2% Cloths 1 Application(s) Topical <User Schedule>  Dakins Solution - 1/4 Strength 1 Application(s) Topical two times a day  dextrose 5%. 1000 milliLiter(s) (50 mL/Hr) IV Continuous <Continuous>  dextrose 5%. 1000 milliLiter(s) (100 mL/Hr) IV Continuous <Continuous>  dextrose 50% Injectable 25 Gram(s) IV Push once  dextrose 50% Injectable 12.5 Gram(s) IV Push once  dextrose 50% Injectable 25 Gram(s) IV Push once  enoxaparin Injectable 40 milliGRAM(s) SubCutaneous every 12 hours  ergocalciferol 83948 Unit(s) Oral <User Schedule>  glucagon  Injectable 1 milliGRAM(s) IntraMuscular once  insulin glargine Injectable (LANTUS) 30 Unit(s) SubCutaneous at bedtime  insulin lispro (ADMELOG) corrective regimen sliding scale   SubCutaneous three times a day before meals  insulin lispro (ADMELOG) corrective regimen sliding scale   SubCutaneous at bedtime  insulin lispro Injectable (ADMELOG) 10 Unit(s) SubCutaneous three times a day before meals  levothyroxine 75 MICROGram(s) Oral daily  magnesium sulfate  IVPB 2 Gram(s) IV Intermittent once  multivitamin 1 Tablet(s) Oral daily  nystatin Powder 1 Application(s) Topical three times a day  oseltamivir 75 milliGRAM(s) Oral every 24 hours    MEDICATIONS  (PRN):  acetaminophen     Tablet .. 650 milliGRAM(s) Oral every 6 hours PRN Temp greater or equal to 38C (100.4F), Mild Pain (1 - 3), Moderate Pain (4 - 6)  dextrose Oral Gel 15 Gram(s) Oral once PRN Blood Glucose LESS THAN 70 milliGRAM(s)/deciliter      CAPILLARY BLOOD GLUCOSE      POCT Blood Glucose.: 231 mg/dL (16 Apr 2022 12:18)  POCT Blood Glucose.: 164 mg/dL (16 Apr 2022 08:30)  POCT Blood Glucose.: 161 mg/dL (15 Apr 2022 21:38)  POCT Blood Glucose.: 129 mg/dL (15 Apr 2022 17:11)    I&O's Summary    15 Apr 2022 07:01  -  16 Apr 2022 07:00  --------------------------------------------------------  IN: 0 mL / OUT: 600 mL / NET: -600 mL        PHYSICAL EXAM:    Vital Signs Last 24 Hrs  T(C): 36.6 (16 Apr 2022 14:23), Max: 37 (15 Apr 2022 22:00)  T(F): 97.9 (16 Apr 2022 14:23), Max: 98.6 (15 Apr 2022 22:00)  HR: 90 (16 Apr 2022 14:23) (87 - 94)  BP: 115/70 (16 Apr 2022 14:23) (115/70 - 160/60)  BP(mean): --  RR: 17 (16 Apr 2022 14:23) (17 - 18)  SpO2: 98% (16 Apr 2022 14:23) (98% - 100%)    CONSTITUTIONAL: NAD, well-developed, morbidly obese  EYES: Conjunctiva and sclera clear  ENMT: Moist oral mucosa  RESPIRATORY: Normal respiratory effort; lungs are clear to auscultation bilaterally  CARDIOVASCULAR: Regular rate and rhythm, normal S1 and S2, no murmur/rub/gallop  ABDOMEN: Nontender to palpation, normoactive bowel sounds  PSYCH: A+O to person, place  NEUROLOGY: CN 2-12 are intact and symmetric; no gross sensory deficits   SKIN: Lower extremity wounds wrapped    LABS:                        8.8    6.37  )-----------( 442      ( 15 Apr 2022 04:43 )             29.6     04-15    140  |  100  |  10  ----------------------------<  134<H>  3.7   |  25  |  0.58    Ca    8.5      15 Apr 2022 04:43  Phos  3.7     04-15  Mg     1.40     04-15    RADIOLOGY & ADDITIONAL TESTS: No new imaging  Results Reviewed: Yes  Imaging Personally Reviewed:  Electrocardiogram Personally Reviewed:    COORDINATION OF CARE:  Care Discussed with Consultants/Other Providers [Y/N]:  Prior or Outpatient Records Reviewed [Y/N]:

## 2022-04-17 LAB — SARS-COV-2 RNA SPEC QL NAA+PROBE: DETECTED

## 2022-04-17 PROCEDURE — 99232 SBSQ HOSP IP/OBS MODERATE 35: CPT

## 2022-04-17 RX ADMIN — Medication 10 UNIT(S): at 13:02

## 2022-04-17 RX ADMIN — Medication 75 MICROGRAM(S): at 05:13

## 2022-04-17 RX ADMIN — INSULIN GLARGINE 30 UNIT(S): 100 INJECTION, SOLUTION SUBCUTANEOUS at 22:01

## 2022-04-17 RX ADMIN — CHLORHEXIDINE GLUCONATE 1 APPLICATION(S): 213 SOLUTION TOPICAL at 13:02

## 2022-04-17 RX ADMIN — Medication 10 UNIT(S): at 17:34

## 2022-04-17 RX ADMIN — ALBUTEROL 2 PUFF(S): 90 AEROSOL, METERED ORAL at 09:09

## 2022-04-17 RX ADMIN — NYSTATIN CREAM 1 APPLICATION(S): 100000 CREAM TOPICAL at 22:02

## 2022-04-17 RX ADMIN — Medication 1 TABLET(S): at 13:02

## 2022-04-17 RX ADMIN — Medication 650 MILLIGRAM(S): at 10:32

## 2022-04-17 RX ADMIN — Medication 500 MILLIGRAM(S): at 13:01

## 2022-04-17 RX ADMIN — ENOXAPARIN SODIUM 40 MILLIGRAM(S): 100 INJECTION SUBCUTANEOUS at 17:35

## 2022-04-17 RX ADMIN — Medication 650 MILLIGRAM(S): at 13:42

## 2022-04-17 RX ADMIN — Medication 75 MILLIGRAM(S): at 17:33

## 2022-04-17 RX ADMIN — NYSTATIN CREAM 1 APPLICATION(S): 100000 CREAM TOPICAL at 13:02

## 2022-04-17 RX ADMIN — Medication 1 APPLICATION(S): at 05:19

## 2022-04-17 RX ADMIN — Medication 10 UNIT(S): at 09:08

## 2022-04-17 RX ADMIN — ENOXAPARIN SODIUM 40 MILLIGRAM(S): 100 INJECTION SUBCUTANEOUS at 05:13

## 2022-04-17 RX ADMIN — NYSTATIN CREAM 1 APPLICATION(S): 100000 CREAM TOPICAL at 05:14

## 2022-04-17 RX ADMIN — Medication 1 APPLICATION(S): at 17:56

## 2022-04-17 RX ADMIN — Medication 2: at 09:08

## 2022-04-17 NOTE — PROGRESS NOTE ADULT - SUBJECTIVE AND OBJECTIVE BOX
Karly Sanford  [facility] Medicine  Pager #[phone]    Patient is a 52y old  Female who presents with a chief complaint of Septic shock (16 Apr 2022 14:45)      SUBJECTIVE / OVERNIGHT EVENTS: Patient seen and examined at bedside. No acute complaints.    ADDITIONAL REVIEW OF SYSTEMS:    MEDICATIONS  (STANDING):  ALBUTerol    90 MICROgram(s) HFA Inhaler 2 Puff(s) Inhalation daily  ascorbic acid 500 milliGRAM(s) Oral daily  chlorhexidine 2% Cloths 1 Application(s) Topical <User Schedule>  Dakins Solution - 1/4 Strength 1 Application(s) Topical two times a day  dextrose 5%. 1000 milliLiter(s) (100 mL/Hr) IV Continuous <Continuous>  dextrose 5%. 1000 milliLiter(s) (50 mL/Hr) IV Continuous <Continuous>  dextrose 50% Injectable 25 Gram(s) IV Push once  dextrose 50% Injectable 12.5 Gram(s) IV Push once  dextrose 50% Injectable 25 Gram(s) IV Push once  enoxaparin Injectable 40 milliGRAM(s) SubCutaneous every 12 hours  ergocalciferol 34561 Unit(s) Oral <User Schedule>  glucagon  Injectable 1 milliGRAM(s) IntraMuscular once  insulin glargine Injectable (LANTUS) 30 Unit(s) SubCutaneous at bedtime  insulin lispro (ADMELOG) corrective regimen sliding scale   SubCutaneous three times a day before meals  insulin lispro (ADMELOG) corrective regimen sliding scale   SubCutaneous at bedtime  insulin lispro Injectable (ADMELOG) 10 Unit(s) SubCutaneous three times a day before meals  levothyroxine 75 MICROGram(s) Oral daily  magnesium sulfate  IVPB 2 Gram(s) IV Intermittent once  multivitamin 1 Tablet(s) Oral daily  nystatin Powder 1 Application(s) Topical three times a day  oseltamivir 75 milliGRAM(s) Oral every 24 hours    MEDICATIONS  (PRN):  acetaminophen     Tablet .. 650 milliGRAM(s) Oral every 6 hours PRN Temp greater or equal to 38C (100.4F), Mild Pain (1 - 3), Moderate Pain (4 - 6)  dextrose Oral Gel 15 Gram(s) Oral once PRN Blood Glucose LESS THAN 70 milliGRAM(s)/deciliter      CAPILLARY BLOOD GLUCOSE      POCT Blood Glucose.: 136 mg/dL (17 Apr 2022 12:35)  POCT Blood Glucose.: 172 mg/dL (17 Apr 2022 08:43)  POCT Blood Glucose.: 172 mg/dL (16 Apr 2022 21:47)  POCT Blood Glucose.: 161 mg/dL (16 Apr 2022 17:26)    I&O's Summary    16 Apr 2022 07:01  -  17 Apr 2022 07:00  --------------------------------------------------------  IN: 200 mL / OUT: 1100 mL / NET: -900 mL        PHYSICAL EXAM:    Vital Signs Last 24 Hrs  T(C): 36.9 (17 Apr 2022 11:15), Max: 36.9 (16 Apr 2022 21:04)  T(F): 98.5 (17 Apr 2022 11:15), Max: 98.5 (16 Apr 2022 21:04)  HR: 95 (17 Apr 2022 11:15) (85 - 95)  BP: 96/61 (17 Apr 2022 11:15) (96/61 - 142/77)  BP(mean): --  RR: 18 (17 Apr 2022 11:15) (17 - 18)  SpO2: 100% (17 Apr 2022 11:15) (97% - 100%)    CONSTITUTIONAL: NAD, well-developed, morbidly obese  EYES: Conjunctiva and sclera clear  ENMT: Moist oral mucosa  RESPIRATORY: Normal respiratory effort; lungs are clear to auscultation bilaterally  CARDIOVASCULAR: Regular rate and rhythm, normal S1 and S2, no murmur/rub/gallop  ABDOMEN: Nontender to palpation, normoactive bowel sounds  PSYCH: A+O to person, place  NEUROLOGY: No focal deficits  SKIN: Lower extremity wounds wrapped    LABS: No new labs    RADIOLOGY & ADDITIONAL TESTS: No new imaging  Results Reviewed: Yes  Imaging Personally Reviewed:  Electrocardiogram Personally Reviewed:    COORDINATION OF CARE:  Care Discussed with Consultants/Other Providers [Y/N]:  Prior or Outpatient Records Reviewed [Y/N]:

## 2022-04-18 LAB
GLUCOSE BLDC GLUCOMTR-MCNC: 165 MG/DL — HIGH (ref 70–99)
GLUCOSE BLDC GLUCOMTR-MCNC: 199 MG/DL — HIGH (ref 70–99)
GLUCOSE BLDC GLUCOMTR-MCNC: 235 MG/DL — HIGH (ref 70–99)

## 2022-04-18 PROCEDURE — 99232 SBSQ HOSP IP/OBS MODERATE 35: CPT

## 2022-04-18 RX ORDER — MECLIZINE HCL 12.5 MG
12.5 TABLET ORAL EVERY 8 HOURS
Refills: 0 | Status: DISCONTINUED | OUTPATIENT
Start: 2022-04-18 | End: 2022-04-19

## 2022-04-18 RX ADMIN — NYSTATIN CREAM 1 APPLICATION(S): 100000 CREAM TOPICAL at 13:16

## 2022-04-18 RX ADMIN — Medication 10 UNIT(S): at 13:14

## 2022-04-18 RX ADMIN — Medication 75 MILLIGRAM(S): at 17:38

## 2022-04-18 RX ADMIN — Medication 2: at 17:37

## 2022-04-18 RX ADMIN — Medication 2: at 13:14

## 2022-04-18 RX ADMIN — Medication 10 UNIT(S): at 09:18

## 2022-04-18 RX ADMIN — ALBUTEROL 2 PUFF(S): 90 AEROSOL, METERED ORAL at 09:40

## 2022-04-18 RX ADMIN — ERGOCALCIFEROL 50000 UNIT(S): 1.25 CAPSULE ORAL at 13:15

## 2022-04-18 RX ADMIN — Medication 650 MILLIGRAM(S): at 10:30

## 2022-04-18 RX ADMIN — Medication 10 UNIT(S): at 17:36

## 2022-04-18 RX ADMIN — Medication 1 TABLET(S): at 13:15

## 2022-04-18 RX ADMIN — Medication 1 APPLICATION(S): at 04:55

## 2022-04-18 RX ADMIN — NYSTATIN CREAM 1 APPLICATION(S): 100000 CREAM TOPICAL at 21:59

## 2022-04-18 RX ADMIN — Medication 650 MILLIGRAM(S): at 09:39

## 2022-04-18 RX ADMIN — Medication 12.5 MILLIGRAM(S): at 16:38

## 2022-04-18 RX ADMIN — CHLORHEXIDINE GLUCONATE 1 APPLICATION(S): 213 SOLUTION TOPICAL at 09:42

## 2022-04-18 RX ADMIN — NYSTATIN CREAM 1 APPLICATION(S): 100000 CREAM TOPICAL at 04:56

## 2022-04-18 RX ADMIN — ENOXAPARIN SODIUM 40 MILLIGRAM(S): 100 INJECTION SUBCUTANEOUS at 04:56

## 2022-04-18 RX ADMIN — INSULIN GLARGINE 30 UNIT(S): 100 INJECTION, SOLUTION SUBCUTANEOUS at 21:59

## 2022-04-18 RX ADMIN — Medication 1 APPLICATION(S): at 17:38

## 2022-04-18 RX ADMIN — Medication 500 MILLIGRAM(S): at 13:15

## 2022-04-18 RX ADMIN — Medication 75 MICROGRAM(S): at 04:56

## 2022-04-18 RX ADMIN — ENOXAPARIN SODIUM 40 MILLIGRAM(S): 100 INJECTION SUBCUTANEOUS at 17:38

## 2022-04-18 NOTE — PROGRESS NOTE ADULT - PROBLEM SELECTOR PLAN 7
Lovenox   PT eval for safe disposition - Rehab but patient wants second opinion.  Dispo- medically optimized for discharge; pending NABEEL

## 2022-04-18 NOTE — CHART NOTE - NSCHARTNOTEFT_GEN_A_CORE
Source: Patient A&Ox4 x ]    Family [ ]     other [ x] chart review, RN     Nutrition f/u for extended Length Of Stay.   52F DM type 2 A1c 14.2, asthma, hypothyroidism, obesity, MS - limited mobility, chronic wounds to vulvar/Rt thigh p/w septic shock POA from UTI requiring MICU/pressors s/p Ertapenem IV tx, asymptomatic COVID, chronic anemia, flu exposure.    Pt reports good appetite. Reviewed consistent carbohydrate diet. Pt states she needs to lose weight and wants to cut out all carbs. Discussed how carbs with fiber help keep her full and appropriate portions sizes for wt loss.   Denies any GI issues (nausea/vomiting/diarrhea/constipation.)            Diet, Consistent Carbohydrate w/Evening Snack:   Supplement Feeding Modality:  Oral  Glucerna Shake Cans or Servings Per Day:  1       Frequency:  Two Times a day (04-04-22 @ 17:29)      GI: WDL. Last BM 4/17    PO intake:  < 50% [ ] 50-75% [ ]   % [ x]  other :        Anthropometrics:   Height (cm): 165.1 (03-28)  Weight (kg): 110 (03-28). no new weights   BMI (kg/m2): 40.4 (03-28)    Edema: 1+ generalized   Pressure Injuries: R knee deep tissue pressure injury, R lateral lower leg deep tissue pressure injury, R posterior lower leg deep tissue pressure injury, L heel deep tissue pressure injury, R lateral thigh stage 4 pressure injury, R trochanter stage 4 pressure injury, R buttock unstageable pressure injury    __________________ Pertinent Medications__________________   MEDICATIONS  (STANDING):  ALBUTerol    90 MICROgram(s) HFA Inhaler 2 Puff(s) Inhalation daily  ascorbic acid 500 milliGRAM(s) Oral daily  chlorhexidine 2% Cloths 1 Application(s) Topical <User Schedule>  Dakins Solution - 1/4 Strength 1 Application(s) Topical two times a day  dextrose 5%. 1000 milliLiter(s) (50 mL/Hr) IV Continuous <Continuous>  dextrose 5%. 1000 milliLiter(s) (100 mL/Hr) IV Continuous <Continuous>  dextrose 50% Injectable 25 Gram(s) IV Push once  dextrose 50% Injectable 12.5 Gram(s) IV Push once  dextrose 50% Injectable 25 Gram(s) IV Push once  enoxaparin Injectable 40 milliGRAM(s) SubCutaneous every 12 hours  ergocalciferol 46755 Unit(s) Oral <User Schedule>  glucagon  Injectable 1 milliGRAM(s) IntraMuscular once  insulin glargine Injectable (LANTUS) 30 Unit(s) SubCutaneous at bedtime  insulin lispro (ADMELOG) corrective regimen sliding scale   SubCutaneous at bedtime  insulin lispro (ADMELOG) corrective regimen sliding scale   SubCutaneous three times a day before meals  insulin lispro Injectable (ADMELOG) 10 Unit(s) SubCutaneous three times a day before meals  levothyroxine 75 MICROGram(s) Oral daily  magnesium sulfate  IVPB 2 Gram(s) IV Intermittent once  multivitamin 1 Tablet(s) Oral daily  nystatin Powder 1 Application(s) Topical three times a day  oseltamivir 75 milliGRAM(s) Oral every 24 hours      __________________ Pertinent Labs__________________    04-15 Phos 3.7 mg/dL        POCT Blood Glucose.: 165 mg/dL (04-18-22 @ 12:29)  POCT Blood Glucose.: 142 mg/dL (04-18-22 @ 08:32)  POCT Blood Glucose.: 123 mg/dL (04-17-22 @ 21:07)  POCT Blood Glucose.: 146 mg/dL (04-17-22 @ 17:11)  POCT Blood Glucose.: 136 mg/dL (04-17-22 @ 12:35)  POCT Blood Glucose.: 172 mg/dL (04-17-22 @ 08:43)  POCT Blood Glucose.: 172 mg/dL (04-16-22 @ 21:47)  POCT Blood Glucose.: 161 mg/dL (04-16-22 @ 17:26)  POCT Blood Glucose.: 231 mg/dL (04-16-22 @ 12:18)  POCT Blood Glucose.: 164 mg/dL (04-16-22 @ 08:30)  POCT Blood Glucose.: 161 mg/dL (04-15-22 @ 21:38)  POCT Blood Glucose.: 129 mg/dL (04-15-22 @ 17:11)          Estimated Needs:   [x ] no change since previous assessment        Previous Nutrition Diagnosis: increased needs, knowledge deficit, obesity     Nutrition Diagnosis is [x ] ongoing     Recommendations:  1. Continue current diet order.   2. F/u with nutrition education as feasible. Pt with teach back on some components of consistent carb diet, but needs more reinforcement.  3. Updated weight.         Monitoring and Evaluation:      [ x] Tolerance to diet prescription [x ] weights [x ] follow up per protocol  [ ] other:

## 2022-04-18 NOTE — PROGRESS NOTE ADULT - SUBJECTIVE AND OBJECTIVE BOX
PROGRESS NOTE:     Patient is a 52y old  Female who presents with a chief complaint of Septic shock (17 Apr 2022 15:42)      SUBJECTIVE / OVERNIGHT EVENTS: Pt c/o intermittent vertigo. No other complaints.     ADDITIONAL REVIEW OF SYSTEMS:    MEDICATIONS  (STANDING):  ALBUTerol    90 MICROgram(s) HFA Inhaler 2 Puff(s) Inhalation daily  ascorbic acid 500 milliGRAM(s) Oral daily  chlorhexidine 2% Cloths 1 Application(s) Topical <User Schedule>  Dakins Solution - 1/4 Strength 1 Application(s) Topical two times a day  dextrose 5%. 1000 milliLiter(s) (50 mL/Hr) IV Continuous <Continuous>  dextrose 5%. 1000 milliLiter(s) (100 mL/Hr) IV Continuous <Continuous>  dextrose 50% Injectable 25 Gram(s) IV Push once  dextrose 50% Injectable 12.5 Gram(s) IV Push once  dextrose 50% Injectable 25 Gram(s) IV Push once  enoxaparin Injectable 40 milliGRAM(s) SubCutaneous every 12 hours  ergocalciferol 34704 Unit(s) Oral <User Schedule>  glucagon  Injectable 1 milliGRAM(s) IntraMuscular once  insulin glargine Injectable (LANTUS) 30 Unit(s) SubCutaneous at bedtime  insulin lispro (ADMELOG) corrective regimen sliding scale   SubCutaneous at bedtime  insulin lispro (ADMELOG) corrective regimen sliding scale   SubCutaneous three times a day before meals  insulin lispro Injectable (ADMELOG) 10 Unit(s) SubCutaneous three times a day before meals  levothyroxine 75 MICROGram(s) Oral daily  magnesium sulfate  IVPB 2 Gram(s) IV Intermittent once  multivitamin 1 Tablet(s) Oral daily  nystatin Powder 1 Application(s) Topical three times a day  oseltamivir 75 milliGRAM(s) Oral every 24 hours    MEDICATIONS  (PRN):  acetaminophen     Tablet .. 650 milliGRAM(s) Oral every 6 hours PRN Temp greater or equal to 38C (100.4F), Mild Pain (1 - 3), Moderate Pain (4 - 6)  dextrose Oral Gel 15 Gram(s) Oral once PRN Blood Glucose LESS THAN 70 milliGRAM(s)/deciliter      CAPILLARY BLOOD GLUCOSE      POCT Blood Glucose.: 165 mg/dL (18 Apr 2022 12:29)  POCT Blood Glucose.: 142 mg/dL (18 Apr 2022 08:32)  POCT Blood Glucose.: 123 mg/dL (17 Apr 2022 21:07)  POCT Blood Glucose.: 146 mg/dL (17 Apr 2022 17:11)    I&O's Summary    17 Apr 2022 07:01  -  18 Apr 2022 07:00  --------------------------------------------------------  IN: 200 mL / OUT: 500 mL / NET: -300 mL        PHYSICAL EXAM:  Vital Signs Last 24 Hrs  T(C): 36.9 (18 Apr 2022 12:20), Max: 37.2 (18 Apr 2022 09:50)  T(F): 98.4 (18 Apr 2022 12:20), Max: 98.9 (18 Apr 2022 09:50)  HR: 97 (18 Apr 2022 12:20) (80 - 97)  BP: 98/80 (18 Apr 2022 12:20) (98/80 - 122/62)  BP(mean): --  RR: 18 (18 Apr 2022 12:20) (17 - 18)  SpO2: 100% (18 Apr 2022 12:20) (99% - 100%)    CONSTITUTIONAL: NAD, well-developed, morbidly obese  EYES: Conjunctiva and sclera clear  ENMT: Moist oral mucosa  RESPIRATORY: Normal respiratory effort; lungs are clear to auscultation bilaterally  CARDIOVASCULAR: Regular rate and rhythm, normal S1 and S2, no murmur/rub/gallop  ABDOMEN: Nontender to palpation, normoactive bowel sounds  PSYCH: A+O to person, place  NEUROLOGY: No focal deficits  SKIN: Lower extremity wounds wrapped      LABS:                      RADIOLOGY & ADDITIONAL TESTS:  Results Reviewed:   Imaging Personally Reviewed:  Electrocardiogram Personally Reviewed:    COORDINATION OF CARE:  Care Discussed with Consultants/Other Providers [Y/N]:  Prior or Outpatient Records Reviewed [Y/N]:

## 2022-04-18 NOTE — CHART NOTE - NSCHARTNOTESELECT_GEN_ALL_CORE
Event Note
Event Note
Follow Up/Nutrition Services
MAR accept note/Transfer Note
Nephrology
ACP/Event Note
Consult/Nutrition Services
Endocrine Fellow/Event Note
Event Note
Transfer Note
Ultrasound
Ultrasound
endocrinology/Event Note

## 2022-04-18 NOTE — PROGRESS NOTE ADULT - PROBLEM SELECTOR PLAN 1
Tamiflu started.  Monitor clinical status. Cough - c/w albuterol  Patient was COVID (+) on 3/28 - cleared quarantine period.

## 2022-04-18 NOTE — PROGRESS NOTE ADULT - ASSESSMENT
52 year old woman with uncontrolled T2DM, asthma, obesity, chronic immobility 2/2 MS and several chronic wounds presenting for vulvar/R thigh wound found to be hyperglycemic + metabolic acidosis w/ purulent urine c/f urosepsis + ARF.    1. Uncontrolled DM 2  A1c 14.2%.   Home regimen: Takes Metformin 1g BID, Glimepiride (doesn't know dose), Januvia 100 mg daily    While inpatient:  BG target 100-180 mg/dl  Glucose stable today   Continue Lantus 30 units SQ qHS (please give 80% of dose if NPO)  Continue Admelog 10 units SQ TID before meals (Hold if NPO/not eating meal)  Continue Admelog MODERATE dose correctional scale before meals, moderate dose at bedtime   Consistent carb diet  Check BG before meals and bedtime  Hypoglycemia protocol  DM Education: Please continue to reinforce insulin PEN education, document in [facility] flowsheets     Discharge Plan:  Recommend basal/bolus insulin to rehab, dose TBD (current dosing if FS remains in target range)  Can continue Metformin 1g PO BID  STOP Glimepiride, STOP Januvia   Outpatient, recommend consideration of adding weekly GLP-1 Agonist (eg: Trulicity, Ozempic) for diabetes, cardiac and weight loss benefits  [facility]up with [facility] at [address] [place] [address], [address], [place] [address]; Ph # [phone]    2. Hypothyroidism  TSH 3.82 Free Thyroxine 1.0 WNL   Continue Levothyroxine 75 mcg PO daily  repeat TFTs in 4-6 weeks     3. Vitamin D deficiency   Vitamin D 25 OH 12.6  Continue Ergocalciferol 50,000 units PO weekly (every Monday)  Repeat Vitamin D level as an outpt    4. HLD  LDL goal < 70  Likely would benefit from statin if no contraindications     Dana Espinosa  Nurse Practitioner  Division of Endocrinology & Diabetes  In house pager #[phone]    If before 9AM or after 6PM, or on weekends/holidays, please call endocrine answering service for assistance ([phone]).For nonurgent matters email [email or web address] for assistance.    52 year old woman with uncontrolled T2DM, asthma, obesity, chronic immobility 2/2 MS and several chronic wounds presenting for vulvar/R thigh wound found to be hyperglycemic + metabolic acidosis w/ purulent urine c/f urosepsis + ARF.    1. Uncontrolled DM 2  A1c 14.2%.   Home regimen: Takes Metformin 1g BID, Glimepiride (doesn't know dose), Januvia 100 mg daily    While inpatient:  BG target 100-180 mg/dl  Glucose stable today   Continue Lantus 30 units SQ qHS (please give 80% of dose if NPO)  Continue Admelog 10 units SQ TID before meals (Hold if NPO/not eating meal)  Continue Admelog MODERATE dose correctional scale before meals, moderate dose at bedtime   Consistent carb diet  Check BG before meals and bedtime  Hypoglycemia protocol  DM Education: Please continue to reinforce insulin PEN education, document in [facility] flowsheets : RN made aware   Dietitician consult: completed     Discharge Plan:  Recommend basal/bolus insulin to rehab, dose TBD (current dosing if FS remains in target range)  Can continue Metformin 1g PO BID  STOP Glimepiride, STOP Januvia   Outpatient, recommend consideration of adding weekly GLP-1 Agonist (eg: Trulicity, Ozempic) for diabetes, cardiac and weight loss benefits  Followup with [facility] at [address] [place] [address], [address], [place] [address]; Ph # [phone]: emailed office with patients information for appointment date and time. Will update note with details when informed.     2. Hypothyroidism  TSH 3.82 Free Thyroxine 1.0 WNL   Continue Levothyroxine 75 mcg PO daily  repeat TFTs in 4-6 weeks     3. Vitamin D deficiency   Vitamin D 25 OH 12.6  Continue Ergocalciferol 50,000 units PO weekly (every Monday)  Repeat Vitamin D level as an outpt    4. HLD  LDL goal < 70  Likely would benefit from statin if no contraindications     Dana Espinosa  Nurse Practitioner  Division of Endocrinology & Diabetes  In house pager #[phone]    If before 9AM or after 6PM, or on weekends/holidays, please call endocrine answering service for assistance ([phone]).For nonurgent matters email [email or web address] for assistance.

## 2022-04-18 NOTE — PROGRESS NOTE ADULT - SUBJECTIVE AND OBJECTIVE BOX
Chief Complaint: Uncontrolled DM 2    History: Pt seen at bedside. Pt reports an adequate appetite. Pt had pumpkin soup, carrots, and crackers. Pt denies nausea and vomiting/any signs of hypoglycemia.     MEDICATIONS  (STANDING):  ALBUTerol    90 MICROgram(s) HFA Inhaler 2 Puff(s) Inhalation daily  ascorbic acid 500 milliGRAM(s) Oral daily  chlorhexidine 2% Cloths 1 Application(s) Topical <User Schedule>  Dakins Solution - 1/4 Strength 1 Application(s) Topical two times a day  dextrose 5%. 1000 milliLiter(s) (50 mL/Hr) IV Continuous <Continuous>  dextrose 5%. 1000 milliLiter(s) (100 mL/Hr) IV Continuous <Continuous>  dextrose 50% Injectable 25 Gram(s) IV Push once  dextrose 50% Injectable 12.5 Gram(s) IV Push once  dextrose 50% Injectable 25 Gram(s) IV Push once  enoxaparin Injectable 40 milliGRAM(s) SubCutaneous every 12 hours  ergocalciferol 64184 Unit(s) Oral <User Schedule>  glucagon  Injectable 1 milliGRAM(s) IntraMuscular once  insulin glargine Injectable (LANTUS) 30 Unit(s) SubCutaneous at bedtime  insulin lispro (ADMELOG) corrective regimen sliding scale   SubCutaneous at bedtime  insulin lispro (ADMELOG) corrective regimen sliding scale   SubCutaneous three times a day before meals  insulin lispro Injectable (ADMELOG) 10 Unit(s) SubCutaneous three times a day before meals  levothyroxine 75 MICROGram(s) Oral daily  magnesium sulfate  IVPB 2 Gram(s) IV Intermittent once  multivitamin 1 Tablet(s) Oral daily  nystatin Powder 1 Application(s) Topical three times a day  oseltamivir 75 milliGRAM(s) Oral every 24 hours    MEDICATIONS  (PRN):  acetaminophen     Tablet .. 650 milliGRAM(s) Oral every 6 hours PRN Temp greater or equal to 38C (100.4F), Mild Pain (1 - 3), Moderate Pain (4 - 6)  dextrose Oral Gel 15 Gram(s) Oral once PRN Blood Glucose LESS THAN 70 milliGRAM(s)/deciliter  meclizine 12.5 milliGRAM(s) Oral every 8 hours PRN Dizziness      Allergies: No Known Drug Allergies  Pineapple (Anaphylaxis)      Review of Systems:  HEENT: No pain  Cardiovascular: No chest pain, palpitations  Respiratory: No SOB, no cough  GI: No nausea, vomiting, abdominal pain  Endocrine: no polyuria, polydipsia  ALL OTHER SYSTEMS REVIEWED AND NEGATIVE    PHYSICAL EXAM:  VITALS: T(C): 36.9 (04-18-22 @ 12:20)  T(F): 98.4 (04-18-22 @ 12:20), Max: 98.9 (04-18-22 @ 09:50)  HR: 97 (04-18-22 @ 12:20) (80 - 97)  BP: 98/80 (04-18-22 @ 12:20) (98/80 - 122/62)  RR:  (17 - 18)  SpO2:  (99% - 100%)  Wt(kg): --  GENERAL: NAD, well-groomed, well-developed  RESPIRATORY: No labored breathing   GI: Soft, nontender, non distended  PSYCH: Alert and oriented x 3, normal affect, normal mood      CAPILLARY BLOOD GLUCOSE  POCT Blood Glucose.: 165 mg/dL (18 Apr 2022 12:29)  POCT Blood Glucose.: 142 mg/dL (18 Apr 2022 08:32)  POCT Blood Glucose.: 123 mg/dL (17 Apr 2022 21:07)  POCT Blood Glucose.: 146 mg/dL (17 Apr 2022 17:11)    A1C with Estimated Average Glucose (03.27.22 @ 19:13)    A1C with Estimated Average Glucose Result: 14.2      Thyroid Function Tests:  03-30 @ 02:39 TSH -- FreeT4 1.0 T3 -- Anti TPO -- Anti Thyroglobulin Ab -- TSI --  03-28 @ 12:53 TSH 3.86 FreeT4 -- T3 -- Anti TPO -- Anti Thyroglobulin Ab -- TSI --    Diet, Consistent Carbohydrate w/Evening Snack:   Supplement Feeding Modality:  Oral  Glucerna Shake Cans or Servings Per Day:  1       Frequency:  Two Times a day (04-04-22 @ 17:29) [Active]

## 2022-04-19 VITALS
OXYGEN SATURATION: 98 % | SYSTOLIC BLOOD PRESSURE: 118 MMHG | RESPIRATION RATE: 16 BRPM | DIASTOLIC BLOOD PRESSURE: 50 MMHG | TEMPERATURE: 99 F | HEART RATE: 94 BPM

## 2022-04-19 DIAGNOSIS — E03.9 HYPOTHYROIDISM, UNSPECIFIED: ICD-10-CM

## 2022-04-19 DIAGNOSIS — E55.9 VITAMIN D DEFICIENCY, UNSPECIFIED: ICD-10-CM

## 2022-04-19 DIAGNOSIS — E11.65 TYPE 2 DIABETES MELLITUS WITH HYPERGLYCEMIA: ICD-10-CM

## 2022-04-19 LAB
GLUCOSE BLDC GLUCOMTR-MCNC: 162 MG/DL — HIGH (ref 70–99)
GLUCOSE BLDC GLUCOMTR-MCNC: 170 MG/DL — HIGH (ref 70–99)
GLUCOSE BLDC GLUCOMTR-MCNC: 175 MG/DL — HIGH (ref 70–99)

## 2022-04-19 PROCEDURE — 99232 SBSQ HOSP IP/OBS MODERATE 35: CPT

## 2022-04-19 PROCEDURE — 99239 HOSP IP/OBS DSCHRG MGMT >30: CPT

## 2022-04-19 RX ORDER — INSULIN ASPART 100 [IU]/ML
10 INJECTION, SOLUTION SUBCUTANEOUS
Qty: 0 | Refills: 0 | DISCHARGE

## 2022-04-19 RX ORDER — DULAGLUTIDE 4.5 MG/.5ML
0.75 INJECTION, SOLUTION SUBCUTANEOUS
Qty: 30 | Refills: 0
Start: 2022-04-19 | End: 2022-05-18

## 2022-04-19 RX ORDER — INSULIN LISPRO 100/ML
8 VIAL (ML) SUBCUTANEOUS
Refills: 0 | Status: DISCONTINUED | OUTPATIENT
Start: 2022-04-19 | End: 2022-04-19

## 2022-04-19 RX ADMIN — Medication 650 MILLIGRAM(S): at 16:15

## 2022-04-19 RX ADMIN — Medication 500 MILLIGRAM(S): at 11:02

## 2022-04-19 RX ADMIN — Medication 650 MILLIGRAM(S): at 17:15

## 2022-04-19 RX ADMIN — Medication 8 UNIT(S): at 18:13

## 2022-04-19 RX ADMIN — ENOXAPARIN SODIUM 40 MILLIGRAM(S): 100 INJECTION SUBCUTANEOUS at 18:14

## 2022-04-19 RX ADMIN — NYSTATIN CREAM 1 APPLICATION(S): 100000 CREAM TOPICAL at 17:32

## 2022-04-19 RX ADMIN — Medication 1 APPLICATION(S): at 16:15

## 2022-04-19 RX ADMIN — Medication 12.5 MILLIGRAM(S): at 11:02

## 2022-04-19 RX ADMIN — Medication 1 APPLICATION(S): at 05:23

## 2022-04-19 RX ADMIN — CHLORHEXIDINE GLUCONATE 1 APPLICATION(S): 213 SOLUTION TOPICAL at 11:02

## 2022-04-19 RX ADMIN — Medication 2: at 08:57

## 2022-04-19 RX ADMIN — Medication 2: at 18:13

## 2022-04-19 RX ADMIN — Medication 10 UNIT(S): at 08:57

## 2022-04-19 RX ADMIN — Medication 75 MICROGRAM(S): at 05:23

## 2022-04-19 RX ADMIN — ENOXAPARIN SODIUM 40 MILLIGRAM(S): 100 INJECTION SUBCUTANEOUS at 05:22

## 2022-04-19 RX ADMIN — NYSTATIN CREAM 1 APPLICATION(S): 100000 CREAM TOPICAL at 05:23

## 2022-04-19 RX ADMIN — ALBUTEROL 2 PUFF(S): 90 AEROSOL, METERED ORAL at 11:03

## 2022-04-19 RX ADMIN — Medication 2: at 12:53

## 2022-04-19 RX ADMIN — Medication 10 UNIT(S): at 12:54

## 2022-04-19 RX ADMIN — Medication 1 TABLET(S): at 11:02

## 2022-04-19 RX ADMIN — Medication 75 MILLIGRAM(S): at 17:32

## 2022-04-19 NOTE — PROGRESS NOTE ADULT - PROBLEM SELECTOR PROBLEM 1
Type 2 diabetes mellitus, without long-term current use of insulin Type 2 diabetes mellitus with hyperglycemia

## 2022-04-19 NOTE — PROGRESS NOTE ADULT - PROBLEM SELECTOR PLAN 3
BMI 40.4  Also with low albumin  Pt reports she doesn't eat much as she is trying to lose weight   Nutrition consult recs appreciated:  1) diet changed to consistent carbohydrate with evening snack; d/cd Renal restriction.   2) Glucerna 1 PO 2x daily (provides 220 kcal, 10 gm protein per 8oz serving); Glucerna causing diarrhea.
On admission, pt with Uncontrolled Hyperglycemia ( BS in 400-500s) with metabolic acidosis  HbA1c 14.2.  BHB negative.   Was placed on insulin gtt  on admission given elevated BS  Now on  Lantus 30 units plus Admelog 9 units TID plus SSS. BS within acceptable goal   Home regimen: Metformin 1g BID, Amaryl before meals (doesn't know dose),and Januvia 100 mg daily - on hold   Endo on board    Increased admelog to 9units 4/5 per endocrine
On admission, pt with Uncontrolled Hyperglycemia ( BS in 400-500s) with metabolic acidosis  HbA1c 14.2.  BHB negative.   Was placed on insulin gtt  on admission given elevated BS  Now on  Lantus 30 units plus Admelog 9 units TID plus SSS. BS within acceptable goal   Home regimen: Metformin 1g BID, Amaryl before meals (doesn't know dose),and Januvia 100 mg daily - on hold   Endo on board
On admission, pt with Uncontrolled Hyperglycemia ( BS in 400-500s) with metabolic acidosis  HbA1c 14.2.  BHB negative.   Was placed on insulin gtt  on admission given elevated BS  Now on  Lantus 30 units plus Admelog 9 units TID plus SSS. BS within acceptable goal       Endocrine Discharge Recommendations: basal/bolus + GLP -1 agonist; Trulicity, Basaglar, and Novolog covered
Now resolving. In the setting of RAFAELA and ? sepsis. Serum lactate now wnl. Recommend switching to NS @ 100cc/hr for now. Monitor serum CO2 q12 for now. Trend blood gas.     If you have any questions, please feel free to contact me  Adriel Montoya  Nephrology Fellow  [phone]; Prefer [employer] TEAMS  (After 5pm or on weekends please page the on-call fellow)
On admission, pt with Uncontrolled Hyperglycemia ( BS in 400-500s) with metabolic acidosis  HbA1c 14.2.  BHB negative.   Was placed on insulin gtt  on admission given elevated BS  Now on  Lantus 30 units plus Admelog 9 units TID plus SSS. BS within acceptable goal       Endocrine Discharge Recommendations: basal/bolus + GLP -1 agonist; Trulicity, Basaglar, and Novolog covered
On admission, pt with Uncontrolled Hyperglycemia ( BS in 400-500s) with metabolic acidosis  HbA1c 14.2.  BHB negative.   Was placed on insulin gtt  on admission given elevated BS  Now on  Lantus 30 units plus Admelog 8 units TID plus SSS. BS within acceptable goal   Home regimen: Metformin 1g BID, Amaryl before meals (doesn't know dose),and Januvia 100 mg daily - on hold   Endo on board
On admission, pt with Uncontrolled Hyperglycemia ( BS in 400-500s) with metabolic acidosis  HbA1c 14.2.  BHB negative.   Was placed on insulin gtt  on admission.  Now on  Lantus 30 units plus Admelog 8 units TID plus SSS  Home regimen: Metformin 1g BID, Amaryl before meals (doesn't know dose),and Januvia 100 mg daily - on hold   Endo on board
On admission, pt with Uncontrolled Hyperglycemia ( BS in 400-500s) with metabolic acidosis  HbA1c 14.2.  BHB negative.   Was placed on insulin gtt  on admission given elevated BS  Now on  Lantus 30 units plus Admelog 10 units TID plus SSS. BS within acceptable goal   Endocrine Discharge Recommendations: basal/bolus + GLP -1 agonist; Trulicity, Basaglar, and Novolog covered
On admission, pt with Uncontrolled Hyperglycemia ( BS in 400-500s) with metabolic acidosis  HbA1c 14.2.  BHB negative.   Was placed on insulin gtt  on admission given elevated BS  Now on  Lantus 30 units plus Admelog 9 units TID plus SSS. BS within acceptable goal       Endocrine Discharge Recommendations: basal/bolus + GLP -1 agonist; Trulicity, Basaglar, and Novolog covered
On admission, pt with Uncontrolled Hyperglycemia ( BS in 400-500s) with metabolic acidosis  HbA1c 14.2.  BHB negative.   Was placed on insulin gtt  on admission given elevated BS  Now on  Lantus 30 units plus Admelog 8 units TID plus SSS. BS within acceptable goal   Home regimen: Metformin 1g BID, Amaryl before meals (doesn't know dose),and Januvia 100 mg daily - on hold   Endo on board
On admission, pt with Uncontrolled Hyperglycemia ( BS in 400-500s) with metabolic acidosis  HbA1c 14.2.  BHB negative.   Was placed on insulin gtt  on admission given elevated BS  Now on  Lantus 30 units plus Admelog 9 units TID plus SSS. BS within acceptable goal       Endocrine Discharge Recommendations: basal/bolus + GLP -1 agonist; Trulicity, Basaglar, and Novolog covered
BMI 40.4  Also with low albumin  Pt reports she doesn't eat much as she is trying to lose weight   Nutrition consult recs appreciated:  1) diet changed to consistent carbohydrate with evening snack; d/cd Renal restriction.   2) Glucerna 1 PO 2x daily (provides 220 kcal, 10 gm protein per 8oz serving); dcd Phylicia
On admission, pt with Uncontrolled Hyperglycemia ( BS in 400-500s) with metabolic acidosis  HbA1c 14.2.  BHB negative.   Was placed on insulin gtt  on admission given elevated BS  Now on  Lantus 30 units plus Admelog 9 units TID plus SSS. BS within acceptable goal   Endocrine Discharge Recommendations: basal/bolus + GLP -1 agonist; Trulicity, Basaglar, and Novolog covered
On admission, pt with Uncontrolled Hyperglycemia ( BS in 400-500s) with metabolic acidosis  HbA1c 14.2.  BHB negative.   Was placed on insulin gtt  on admission given elevated BS  Now on  Lantus 30 units plus Admelog 8 units TID plus SSS. BS within acceptable goal   Home regimen: Metformin 1g BID, Amaryl before meals (doesn't know dose),and Januvia 100 mg daily - on hold   Endo on board
BMI 40.4  Also with low albumin  Pt reports she doesn't eat much as she is trying to lose weight   Nutrition consult recs appreciated:  1) diet changed to consistent carbohydrate with evening snack; d/cd Renal restriction.   2) Glucerna 1 PO 2x daily (provides 220 kcal, 10 gm protein per 8oz serving); dcd Phylicia
On admission, pt with Uncontrolled Hyperglycemia ( BS in 400-500s) with metabolic acidosis  HbA1c 14.2.  BHB negative.   Was placed on insulin gtt  on admission.  Now on  Lantus 30 units plus Admelog 8 units TID plus SSS  Home regimen: Metformin 1g BID, Amaryl before meals (doesn't know dose),and Januvia 100 mg daily - on hold   Endo on board
On admission, pt with Uncontrolled Hyperglycemia ( BS in 400-500s) with metabolic acidosis  HbA1c 14.2.  BHB negative.   Was placed on insulin gtt  on admission given elevated BS  Now on  Lantus 30 units plus Admelog 9 units TID plus SSS. BS within acceptable goal   Endocrine Discharge Recommendations: basal/bolus + GLP -1 agonist; Trulicity, Basaglar, and Novolog covered
On admission, pt with Uncontrolled Hyperglycemia ( BS in 400-500s) with metabolic acidosis  HbA1c 14.2.  BHB negative.   Was placed on insulin gtt  on admission.  Now on  Lantus 30 units plus Admelog 8 units TID plus SSS  Home regimen: Metformin 1g BID, Amaryl before meals (doesn't know dose),and Januvia 100 mg daily - on hold   Endo on board
On admission, pt with Uncontrolled Hyperglycemia ( BS in 400-500s) with metabolic acidosis  HbA1c 14.2.  BHB negative.   Was placed on insulin gtt  on admission given elevated BS  Now on  Lantus 30 units plus Admelog 9 units TID plus SSS. BS within acceptable goal   Home regimen: Metformin 1g BID, Amaryl before meals (doesn't know dose),and Januvia 100 mg daily - on hold   Endo on board

## 2022-04-19 NOTE — PROGRESS NOTE ADULT - PROVIDER SPECIALTY LIST ADULT
Hospitalist
Infectious Disease
MICU
MICU
Nephrology
Infectious Disease
Wound Care
Endocrinology
Hospitalist
Endocrinology
Hospitalist
Endocrinology
Endocrinology
Hospitalist
Endocrinology
Endocrinology
Hospitalist

## 2022-04-19 NOTE — PROGRESS NOTE ADULT - PROBLEM SELECTOR PROBLEM 3
Vitamin D deficiency
Diabetes type 2, uncontrolled
Morbid obesity
Vitamin D deficiency
Diabetes type 2, uncontrolled
Diabetes type 2, uncontrolled
Hyperlipidemia
Vitamin D deficiency
Diabetes type 2, uncontrolled
Hypertension
Diabetes type 2, uncontrolled
Morbid obesity
Diabetes type 2, uncontrolled
Metabolic acidosis
Diabetes type 2, uncontrolled
Morbid obesity
Diabetes type 2, uncontrolled

## 2022-04-19 NOTE — PROGRESS NOTE ADULT - SUBJECTIVE AND OBJECTIVE BOX
PROGRESS NOTE:     Patient is a 52y old  Female who presents with a chief complaint of Septic shock (17 Apr 2022 15:42)      SUBJECTIVE / OVERNIGHT EVENTS: No new complaints.     ADDITIONAL REVIEW OF SYSTEMS:    MEDICATIONS  (STANDING):  ALBUTerol    90 MICROgram(s) HFA Inhaler 2 Puff(s) Inhalation daily  ascorbic acid 500 milliGRAM(s) Oral daily  chlorhexidine 2% Cloths 1 Application(s) Topical <User Schedule>  Dakins Solution - 1/4 Strength 1 Application(s) Topical two times a day  dextrose 5%. 1000 milliLiter(s) (50 mL/Hr) IV Continuous <Continuous>  dextrose 5%. 1000 milliLiter(s) (100 mL/Hr) IV Continuous <Continuous>  dextrose 50% Injectable 25 Gram(s) IV Push once  dextrose 50% Injectable 12.5 Gram(s) IV Push once  dextrose 50% Injectable 25 Gram(s) IV Push once  enoxaparin Injectable 40 milliGRAM(s) SubCutaneous every 12 hours  ergocalciferol 78283 Unit(s) Oral <User Schedule>  glucagon  Injectable 1 milliGRAM(s) IntraMuscular once  insulin glargine Injectable (LANTUS) 30 Unit(s) SubCutaneous at bedtime  insulin lispro (ADMELOG) corrective regimen sliding scale   SubCutaneous three times a day before meals  insulin lispro (ADMELOG) corrective regimen sliding scale   SubCutaneous at bedtime  insulin lispro Injectable (ADMELOG) 10 Unit(s) SubCutaneous three times a day before meals  levothyroxine 75 MICROGram(s) Oral daily  magnesium sulfate  IVPB 2 Gram(s) IV Intermittent once  multivitamin 1 Tablet(s) Oral daily  nystatin Powder 1 Application(s) Topical three times a day  oseltamivir 75 milliGRAM(s) Oral every 24 hours    MEDICATIONS  (PRN):  acetaminophen     Tablet .. 650 milliGRAM(s) Oral every 6 hours PRN Temp greater or equal to 38C (100.4F), Mild Pain (1 - 3), Moderate Pain (4 - 6)  dextrose Oral Gel 15 Gram(s) Oral once PRN Blood Glucose LESS THAN 70 milliGRAM(s)/deciliter  meclizine 12.5 milliGRAM(s) Oral every 8 hours PRN Dizziness      CAPILLARY BLOOD GLUCOSE      POCT Blood Glucose.: 175 mg/dL (19 Apr 2022 12:26)  POCT Blood Glucose.: 162 mg/dL (19 Apr 2022 08:14)  POCT Blood Glucose.: 235 mg/dL (18 Apr 2022 21:09)  POCT Blood Glucose.: 199 mg/dL (18 Apr 2022 17:11)    I&O's Summary    18 Apr 2022 07:01  -  19 Apr 2022 07:00  --------------------------------------------------------  IN: 100 mL / OUT: 1000 mL / NET: -900 mL        PHYSICAL EXAM:  Vital Signs Last 24 Hrs  T(C): 36.7 (19 Apr 2022 12:09), Max: 37.8 (18 Apr 2022 21:50)  T(F): 98.1 (19 Apr 2022 12:09), Max: 100 (18 Apr 2022 21:50)  HR: 95 (19 Apr 2022 12:09) (88 - 100)  BP: 120/55 (19 Apr 2022 12:09) (120/55 - 148/71)  BP(mean): --  RR: 18 (19 Apr 2022 12:09) (16 - 18)  SpO2: 99% (19 Apr 2022 12:09) (98% - 100%)    CONSTITUTIONAL: NAD, well-developed, morbidly obese  EYES: Conjunctiva and sclera clear  ENMT: Moist oral mucosa  RESPIRATORY: Normal respiratory effort; lungs are clear to auscultation bilaterally  CARDIOVASCULAR: Regular rate and rhythm, normal S1 and S2, no murmur/rub/gallop  ABDOMEN: Nontender to palpation, normoactive bowel sounds  PSYCH: A+O to person, place  NEUROLOGY: No focal deficits  SKIN: Lower extremity wounds wrapped    LABS:                      RADIOLOGY & ADDITIONAL TESTS:  Results Reviewed:   Imaging Personally Reviewed:  Electrocardiogram Personally Reviewed:    COORDINATION OF CARE:  Care Discussed with Consultants/Other Providers [Y/N]:  Prior or Outpatient Records Reviewed [Y/N]:

## 2022-04-19 NOTE — PROGRESS NOTE ADULT - PROBLEM SELECTOR PLAN 6
Lovenox   PT eval- Rehab  Dispo- medically optimize for discharge; pending NABEEL
Resolved. In the setting of RAFAELA.   Received medical management. S/p insulin infusion for elevated Blood glucose.  Monitor serum potassium.
Hold home cosentyx (20mg q4w). Last dose 3/26 as per brother.  [facility] Neurology as outpt    # Multiple decub ulcers-  Hx of chronic sacral wound d/t immobility further complicated by acute progression of right medial thigh/vulvar wound precipitated by skin tear  Appreciate wounds care recs- Topical recommendations- pack with Dakins 1/4 strength moistened kerlix, cover with 4x4 gauze, abdominal pad, change twice a day.- Offload pressure.  DW Wound care attending- ok to aguilar indwelling Rose, Ok for Primafit
On admission, Cr 2.01 w/ unknown baseline. Also with metabolic acidosis, bicarb of 13, AG of 18   Suspect  pre renal in the setting of septic shock 2/2 UTI/skin wounds vs RAFAELA on CKD due to uncontrolled DM.   Renal sonogram: mild to moderate bilateral hydronephrosis  Creatinine improving to 0.6 ( Admission Creatinine 2.33)   Urine lytes 3/28 Fe 1.4%  Nephro recs appreciated
On admission, Cr 2.01 w/ unknown baseline. Also with metabolic acidosis, bicarb of 13, AG of 18   Suspect  pre renal in the setting of septic shock 2/2 UTI/skin wounds vs RAFAELA on CKD due to uncontrolled DM.   Renal sonogram: mild to moderate bilateral hydronephrosis  Creatinine improving to 0.6 ( Admission Creatinine 2.33)   Urine lytes 3/28 Fe 1.4%  Nephro recs appreciated
Hold home cosentyx (20mg q4w). Last dose 3/26 as per brother.  FU Neurology as outpt    # Multiple decub ulcers-  Hx of chronic sacral wound d/t immobility further complicated by acute progression of right medial thigh/vulvar wound precipitated by skin tear  Appreciate wounds care recs- Topical recommendations- pack with Dakins 1/4 strength moistened kerlix, cover with 4x4 gauze, abdominal pad, change twice a day.- Offload pressure.  DW Wound care attending- ok to aguilar indwelling Rose, Ok for Primafit    #Anemia  - hemoglobin 8.1 today responded appropriately to transfusion   - no transfusion today, hgb goal >7
Hold home cosentyx (20mg q4w). Last dose 3/26 as per brother.  FU Neurology as outpt    # Multiple decub ulcers-  Hx of chronic sacral wound d/t immobility further complicated by acute progression of right medial thigh/vulvar wound precipitated by skin tear  Appreciate wounds care recs- Topical recommendations- pack with Dakins 1/4 strength moistened kerlix, cover with 4x4 gauze, abdominal pad, change twice a day.- Offload pressure.  DW Wound care attending- ok to aguilar indwelling Rose, Ok for Primafit
TSH 3.86 wnl  Continue home synthroid 75mcg qD,    # Elevated Alk Phosphatase- Will check GGT to in AM
Hold home cosentyx (20mg q4w). Last dose 3/26 as per brother.  FU Neurology as outpt    # Multiple decub ulcers-  Hx of chronic sacral wound d/t immobility further complicated by acute progression of right medial thigh/vulvar wound precipitated by skin tear  Appreciate wounds care recs- Topical recommendations- pack with Dakins 1/4 strength moistened kerlix, cover with 4x4 gauze, abdominal pad, change twice a day.- Offload pressure.  DW Wound care attending- ok to aguilar indwelling Rose, Ok for Primafit
Lovenox   PT eval- Rehab  Dispo- medically optimize for discharge; pending NABEEL
On admission, Cr 2.01 w/ unknown baseline. Also with metabolic acidosis, bicarb of 13, AG of 18   Suspect  pre renal in the setting of septic shock 2/2 UTI/skin wounds vs RAFAELA on CKD due to uncontrolled DM.   Renal sonogram: mild to moderate bilateral hydronephrosis  Creatinine improving to 0.98 ( Admission Creatinine 2.33)   Urine lytes 3/28 Fe 1.4%  Nephro recs appreciated    # HypoMag- Replete    # Hypocalcemia - Will check Vitamin D
Hold home cosentyx (20mg q4w). Last dose 3/26 as per brother.  FU Neurology as outpt    # Multiple decub ulcers-  Hx of chronic sacral wound d/t immobility further complicated by acute progression of right medial thigh/vulvar wound precipitated by skin tear  Appreciate wounds care recs- Topical recommendations- pack with Dakins 1/4 strength moistened kerlix, cover with 4x4 gauze, abdominal pad, change twice a day.- Offload pressure.  DW Wound care attending- ok to aguilar indwelling Rose, Ok for Primafit
Hold home cosentyx (20mg q4w). Last dose 3/26 as per brother.  FU Neurology as outpt    # Multiple decub ulcers-  Hx of chronic sacral wound d/t immobility further complicated by acute progression of right medial thigh/vulvar wound precipitated by skin tear  Appreciate wounds care recs- Topical recommendations- pack with Dakins 1/4 strength moistened kerlix, cover with 4x4 gauze, abdominal pad, change twice a day.- Offload pressure.  DW Wound care attending- ok to aguilar indwelling Rose, Ok for Primafit
On admission, Cr 2.01 w/ unknown baseline. Also with metabolic acidosis, bicarb of 13, AG of 18   Suspect  pre renal in the setting of septic shock 2/2 UTI/skin wounds vs RAFAELA on CKD due to uncontrolled DM.   Renal sonogram: mild to moderate bilateral hydronephrosis  Creatinine improving to 0.6 ( Admission Creatinine 2.33)   Urine lytes 3/28 Fe 1.4%  Nephro recs appreciated
RESOLVED  On admission, Cr 2.01 w/ unknown baseline. Also with metabolic acidosis, bicarb of 13, AG of 18   Suspect  pre renal in the setting of septic shock 2/2 UTI/skin wounds vs RAFAELA on CKD due to uncontrolled DM.   Renal sonogram: mild to moderate bilateral hydronephrosis  Creatinine improving to 0.56 ( Admission Creatinine 2.33)   Urine lytes 3/28 Fe 1.4%  Nephro recs appreciated
Hold home cosentyx (20mg q4w). Last dose 3/26 as per brother.  FU Neurology as outpt    # Multiple decub ulcers-  Hx of chronic sacral wound d/t immobility further complicated by acute progression of right medial thigh/vulvar wound precipitated by skin tear  Appreciate wounds care recs- Topical recommendations- pack with Dakins 1/4 strength moistened kerlix, cover with 4x4 gauze, abdominal pad, change twice a day.- Offload pressure.  DW Wound care attending- ok to aguilar indwelling Rose, Ok for Primafit
On admission, Cr 2.01 w/ unknown baseline. Also with metabolic acidosis, bicarb of 13, AG of 18   Suspect  pre renal in the setting of septic shock 2/2 UTI/skin wounds vs RAFAELA on CKD due to uncontrolled DM.   Renal sonogram: mild to moderate bilateral hydronephrosis  Creatinine improving to 0.98 ( Admission Creatinine 2.33)   Urine lytes 3/28 Fe 1.4%  Nephro recs appreciated    # HypoMag- Replete
Hold home cosentyx (20mg q4w). Last dose 3/26 as per brother.  FU Neurology as outpt    # Multiple decub ulcers-  Hx of chronic sacral wound d/t immobility further complicated by acute progression of right medial thigh/vulvar wound precipitated by skin tear  Appreciate wounds care recs- Topical recommendations- pack with Dakins 1/4 strength moistened kerlix, cover with 4x4 gauze, abdominal pad, change twice a day.- Offload pressure.  DW Wound care attending- ok to aguilar indwelling Rose, Ok for Primafit    #Anemia  - hemoglobin 8.1 today responded appropriately to transfusion   - no transfusion today, hgb goal >7
Lovenox   PT eval- Rehab  Dispo- medically optimize for discharge; pending NABEEL
TSH 3.86 wnl  Continue home synthroid 75mcg qD,
On admission, Cr 2.01 w/ unknown baseline. Also with metabolic acidosis, bicarb of 13, AG of 18   Suspect  pre renal in the setting of septic shock 2/2 UTI/skin wounds vs RAFAELA on CKD due to uncontrolled DM.   Renal sonogram: mild to moderate bilateral hydronephrosis  Creatinine improving to 0.54 ( Admission Creatinine 2.33)   Urine lytes 3/28 Fe 1.4%  Nephro recs appreciated

## 2022-04-19 NOTE — PROGRESS NOTE ADULT - PROBLEM SELECTOR PROBLEM 7
Multiple sclerosis
Need for prophylactic measure
Need for prophylactic measure
Multiple sclerosis
Need for prophylactic measure
Hypothyroidism
Hypothyroidism
Need for prophylactic measure
Need for prophylactic measure
Hypothyroidism
Need for prophylactic measure
Need for prophylactic measure
Hypothyroidism
Hypothyroidism
Need for prophylactic measure
Hypothyroidism

## 2022-04-19 NOTE — PROGRESS NOTE ADULT - PROBLEM SELECTOR PROBLEM 5
Hypothyroidism
Hypothyroidism
Multiple sclerosis
Hypothyroidism
Multiple sclerosis
Morbid obesity
RAFAELA (acute kidney injury)
RAFAELA (acute kidney injury)
Hypothyroidism
Multiple sclerosis
Morbid obesity
Morbid obesity
Hypothyroidism
Hypothyroidism
Morbid obesity
Hypothyroidism
Morbid obesity
Morbid obesity
Hypothyroidism
Morbid obesity
RAFAELA (acute kidney injury)

## 2022-04-19 NOTE — PROGRESS NOTE ADULT - NS ATTEND AMEND GEN_ALL_CORE FT
agree with above, potential vac candidate
Uncontrolled DM2 hyperglycemia now improving for dc today on basal and bolus insulins, GLP1RA and metformin.  Outpatient endocrine follow up for adjustments.    Ananth Carrero MD  Division of Endocrinology  Pager: [phone]    If after 6PM or before 9AM, or on weekends/holidays, please call endocrine answering service for assistance ([phone]).  For nonurgent matters email [email or web address] for assistance.

## 2022-04-19 NOTE — PROGRESS NOTE ADULT - ASSESSMENT
52 year old woman with uncontrolled T2DM, asthma, obesity, chronic immobility 2/2 MS and several chronic wounds presenting for vulvar/R thigh wound found to be hyperglycemic + metabolic acidosis w/ purulent urine c/f urosepsis + ARF.    1. Uncontrolled DM 2  A1c 14.2%.   Home regimen: Takes Metformin 1g BID, Glimepiride (doesn't know dose), Januvia 100 mg daily    While inpatient:  BG target 100-180 mg/dl  Glucose stable today   Continue Lantus 30 units SQ qHS (please give 80% of dose if NPO)  Continue Admelog 10 units SQ TID before meals (Hold if NPO/not eating meal)  Continue Admelog MODERATE dose correctional scale before meals, moderate dose at bedtime   Consistent carb diet  Check BG before meals and bedtime  Hypoglycemia protocol  DM Education: Please continue to reinforce insulin PEN education, document in [facility] flowsheets : RN made aware   Dietitician consult: completed     Discharge Plan:  Recommend basal/bolus insulin to rehab, dose TBD (current dosing if FS remains in target range)  Can continue Metformin 1g PO BID  -For coverage purpose, please send Lantus/Basaglar/Tresiba/Semglee/Toujeo 10 units at bedtime and also Admelog/Humalog/Novolog 3 units before meals to [facility]. Can start by send one of each and discuss with pharmacy which is covered.   -Please send Trulicity 0.75 mg subq/weekly or Ozempic 0.25 mg subq/weekly to pharmacy to see if covered. If covered, can send either to pharmacy on d/c. If sending Trulicity, please write script for 0.75 mg subq/weekly x 4 weeks. If sending Ozempic, please send 0.25 mg subq/weekly x 4 weeks; for diabetes, cardiac and weight loss benefits  STOP Glimepiride, STOP Januvia   [facility] with [facility] at [address] [place] [address], [address], [place] [address]; Ph # [phone]: emailed office with patients information for appointment date and time. Will update note with details when informed.     2. Hypothyroidism  TSH 3.82 Free Thyroxine 1.0 WNL   Continue Levothyroxine 75 mcg PO daily  repeat TFTs in 4-6 weeks     3. Vitamin D deficiency   Vitamin D 25 OH 12.6  Continue Ergocalciferol 50,000 units PO weekly (every Monday)  Repeat Vitamin D level as an outpt    4. HLD  LDL goal < 70  Likely would benefit from statin if no contraindications     Dana Espinosa  Nurse Practitioner  Division of Endocrinology & Diabetes  In house pager #[phone]    If before 9AM or after 6PM, or on weekends/holidays, please call endocrine answering service for assistance ([phone]).For nonurgent matters email [email or web address] for assistance.    52 year old woman with uncontrolled T2DM, asthma, obesity, chronic immobility 2/2 MS and several chronic wounds presenting for vulvar/R thigh wound found to be hyperglycemic + metabolic acidosis w/ purulent urine c/f urosepsis + ARF.    1. Uncontrolled DM 2  A1c 14.2%.   Home regimen: Takes Metformin 1g BID, Glimepiride (doesn't know dose), Januvia 100 mg daily    While inpatient:  BG target 100-180 mg/dl  Glucose stable today   Continue Lantus 30 units SQ qHS (please give 80% of dose if NPO)  Continue Admelog 10 units SQ TID before meals (Hold if NPO/not eating meal)  Continue Admelog MODERATE dose correctional scale before meals, moderate dose at bedtime   Consistent carb diet  Check BG before meals and bedtime  Hypoglycemia protocol  DM Education: Please continue to reinforce insulin PEN education, document in [facility] flowsheets : RN made aware   Dietitician consult: completed     Discharge Plan:  Decrease Admelog Solostar pen to 8 units sq TID AC (please hold if NPO) ; Lantus Solostar pen 30 units sq qhs (please discharge pt on insulin covered by by pts insurance); plus GLP1 Trulicity 0.75 mg subq/weekly or Ozempic 0.25 mg subq/weekly to pharmacy to see if covered. If sending Trulicity, please write script for 0.75 mg subq/weekly x 4 weeks. If sending Ozempic, please send 0.25 mg subq/weekly x 4 weeks; for diabetes, cardiac and weight loss benefit; plus Metformin 1g PO BID (please take with food)  STOP Glimepiride, STOP Januvia   Please send Lantus solostar pen and humalog kwikpen as test script to check insurance coverage.  Ok to send with current doses and update prior to d/c    If Lantus not covered- can try alternating with one of following   tresiba/basaglar/toujeo/Levemir    If Humalog not covered- can try alternating with one of following  novolog/apidra/admelog/fiasp    Ensure patient has working glucometer, test strips, lancets, alcohol pads, and BD loren pen needles  Please also prescribe glucose tabs, Baqsimi nasal spray or glucagon emergency kit for hypoglycemia risk  Please make sure patients medications and supplies are covered     [facility]up with [facility] at [address] [place] [address], [address], [place] [address]; Ph # [phone]: emailed office with patients information for appointment date and time. Pt will receive a phone call with appointment details   Please follow up with opthalmology, pcp, endocrinology, and podiatry as an outpt    2. Hypothyroidism  TSH 3.82 Free Thyroxine 1.0 WNL   Continue Levothyroxine 75 mcg PO daily  repeat TFTs in 4-6 weeks     3. Vitamin D deficiency   Vitamin D 25 OH 12.6  Continue Ergocalciferol 50,000 units PO weekly (every Monday)  Repeat Vitamin D level as an outpt    4. HLD  LDL goal < 70  Likely would benefit from statin if no contraindications     Dana Espinosa  Nurse Practitioner  Division of Endocrinology & Diabetes  In house pager #[phone]    If before 9AM or after 6PM, or on weekends/holidays, please call endocrine answering service for assistance ([phone]).For nonurgent matters email [email or web address] for assistance.    52 year old woman with uncontrolled T2DM, asthma, obesity, chronic immobility 2/2 MS and several chronic wounds presenting for vulvar/R thigh wound found to be hyperglycemic + metabolic acidosis w/ purulent urine c/f urosepsis + ARF.    1. Uncontrolled DM 2  A1c 14.2%.   Home regimen: Takes Metformin 1g BID, Glimepiride (doesn't know dose), Januvia 100 mg daily    While inpatient:  BG target 100-180 mg/dl  Glucose stable today   Continue Lantus 30 units SQ qHS (please give 80% of dose if NPO)  Continue Admelog 10 units SQ TID before meals (Hold if NPO/not eating meal)  Continue Admelog MODERATE dose correctional scale before meals, moderate dose at bedtime   Consistent carb diet  Check BG before meals and bedtime  Hypoglycemia protocol  DM Education: Please continue to reinforce insulin PEN education, document in [facility] flowsheets : RN made aware   Dietitician consult: completed   Trulicity teaching performed with teach back    Discharge Plan:  Decrease Admelog Solostar pen to 8 units sq TID AC (please hold if NPO) ; Lantus Solostar pen 30 units sq qhs (please discharge pt on insulin covered by by pts insurance); plus GLP1 Trulicity 0.75 mg subq/weekly (covered as per primary team) plus Metformin 1g PO BID (please take with food)  STOP Glimepiride, STOP Januvia   Please send Lantus solostar pen and humalog kwikpen as test script to check insurance coverage.  Ok to send with current doses and update prior to d/c    If Lantus not covered- can try alternating with one of following   tresiba/basaglar/toujeo/Levemir    If Humalog not covered- can try alternating with one of following  novolog/apidra/admelog/fiasp    Ensure patient has working glucometer, test strips, lancets, alcohol pads, and BD loren pen needles  Please also prescribe glucose tabs, Baqsimi nasal spray or glucagon emergency kit for hypoglycemia risk  Please make sure patients medications and supplies are covered     [facility]up with [facility] at [address] [place] [address], [address], [place] [address];  # [phone]: emailed office with patients information for appointment date and time. Pt will receive a phone call with appointment details   Please follow up with opthalmology, pcp, endocrinology, and podiatry as an outpt    2. Hypothyroidism  TSH 3.82 Free Thyroxine 1.0 WNL   Continue Levothyroxine 75 mcg PO daily  repeat TFTs in 4-6 weeks     3. Vitamin D deficiency   Vitamin D 25 OH 12.6  Continue Ergocalciferol 50,000 units PO weekly (every Monday)  Repeat Vitamin D level as an outpt    4. HLD  LDL goal < 70  Likely would benefit from statin if no contraindications     Dana Espinosa  Nurse Practitioner  Division of Endocrinology & Diabetes  In house pager #[phone]    If before 9AM or after 6PM, or on weekends/holidays, please call endocrine answering service for assistance ([phone]).For nonurgent matters email [email or web address] for assistance.

## 2022-04-19 NOTE — PROGRESS NOTE ADULT - SUBJECTIVE AND OBJECTIVE BOX
Chief Complaint:     History:    MEDICATIONS  (STANDING):  ALBUTerol    90 MICROgram(s) HFA Inhaler 2 Puff(s) Inhalation daily  ascorbic acid 500 milliGRAM(s) Oral daily  chlorhexidine 2% Cloths 1 Application(s) Topical <User Schedule>  Dakins Solution - 1/4 Strength 1 Application(s) Topical two times a day  dextrose 5%. 1000 milliLiter(s) (50 mL/Hr) IV Continuous <Continuous>  dextrose 5%. 1000 milliLiter(s) (100 mL/Hr) IV Continuous <Continuous>  dextrose 50% Injectable 25 Gram(s) IV Push once  dextrose 50% Injectable 12.5 Gram(s) IV Push once  dextrose 50% Injectable 25 Gram(s) IV Push once  enoxaparin Injectable 40 milliGRAM(s) SubCutaneous every 12 hours  ergocalciferol 23050 Unit(s) Oral <User Schedule>  glucagon  Injectable 1 milliGRAM(s) IntraMuscular once  insulin glargine Injectable (LANTUS) 30 Unit(s) SubCutaneous at bedtime  insulin lispro (ADMELOG) corrective regimen sliding scale   SubCutaneous three times a day before meals  insulin lispro (ADMELOG) corrective regimen sliding scale   SubCutaneous at bedtime  insulin lispro Injectable (ADMELOG) 10 Unit(s) SubCutaneous three times a day before meals  levothyroxine 75 MICROGram(s) Oral daily  magnesium sulfate  IVPB 2 Gram(s) IV Intermittent once  multivitamin 1 Tablet(s) Oral daily  nystatin Powder 1 Application(s) Topical three times a day  oseltamivir 75 milliGRAM(s) Oral every 24 hours    MEDICATIONS  (PRN):  acetaminophen     Tablet .. 650 milliGRAM(s) Oral every 6 hours PRN Temp greater or equal to 38C (100.4F), Mild Pain (1 - 3), Moderate Pain (4 - 6)  dextrose Oral Gel 15 Gram(s) Oral once PRN Blood Glucose LESS THAN 70 milliGRAM(s)/deciliter  meclizine 12.5 milliGRAM(s) Oral every 8 hours PRN Dizziness      Allergies    No Known Drug Allergies  Pineapple (Anaphylaxis)    Intolerances      Review of Systems:  Constitutional: No fever  Eyes: No blurry vision  Neuro: No tremors  HEENT: No pain  Cardiovascular: No chest pain, palpitations  Respiratory: No SOB, no cough  GI: No nausea, vomiting, abdominal pain  : No dysuria  Skin: no rash  Psych: no depression  Endocrine: no polyuria, polydipsia  Hem/lymph: no swelling  Osteoporosis: no fractures    ALL OTHER SYSTEMS REVIEWED AND NEGATIVE    UNABLE TO OBTAIN    PHYSICAL EXAM:  VITALS: T(C): 36.7 (04-19-22 @ 12:09)  T(F): 98.1 (04-19-22 @ 12:09), Max: 100 (04-18-22 @ 21:50)  HR: 95 (04-19-22 @ 12:09) (88 - 100)  BP: 120/55 (04-19-22 @ 12:09) (120/55 - 148/71)  RR:  (16 - 18)  SpO2:  (98% - 100%)  Wt(kg): --  GENERAL: NAD, well-groomed, well-developed  EYES: No proptosis, no lid lag, anicteric  HEENT:  Atraumatic, Normocephalic, moist mucous membranes  THYROID: Normal size, no palpable nodules  RESPIRATORY: Clear to auscultation bilaterally; No rales, rhonchi, wheezing  CARDIOVASCULAR: Regular rate and rhythm; No murmurs; no peripheral edema  GI: Soft, nontender, non distended  SKIN: Dry, intact, No rashes or lesions  MUSCULOSKELETAL: Full range of motion, normal strength  NEURO: extraocular movements intact, no tremor  PSYCH: Alert and oriented x 3, normal affect, normal mood      CAPILLARY BLOOD GLUCOSE      POCT Blood Glucose.: 175 mg/dL (19 Apr 2022 12:26)  POCT Blood Glucose.: 162 mg/dL (19 Apr 2022 08:14)  POCT Blood Glucose.: 235 mg/dL (18 Apr 2022 21:09)  POCT Blood Glucose.: 199 mg/dL (18 Apr 2022 17:11)                  Thyroid Function Tests:  03-30 @ 02:39 TSH -- FreeT4 1.0 T3 -- Anti TPO -- Anti Thyroglobulin Ab -- TSI --  03-28 @ 12:53 TSH 3.86 FreeT4 -- T3 -- Anti TPO -- Anti Thyroglobulin Ab -- TSI --                       Chief Complaint: Uncontrolled DM 2    History: Pt seen at bedside. Pt reports an adequate appetite. Pt tolerating an oral intake. Pt denies nausea and vomiting/any signs of hypoglycemia.     MEDICATIONS  (STANDING):  ALBUTerol    90 MICROgram(s) HFA Inhaler 2 Puff(s) Inhalation daily  ascorbic acid 500 milliGRAM(s) Oral daily  chlorhexidine 2% Cloths 1 Application(s) Topical <User Schedule>  Dakins Solution - 1/4 Strength 1 Application(s) Topical two times a day  dextrose 5%. 1000 milliLiter(s) (50 mL/Hr) IV Continuous <Continuous>  dextrose 5%. 1000 milliLiter(s) (100 mL/Hr) IV Continuous <Continuous>  dextrose 50% Injectable 25 Gram(s) IV Push once  dextrose 50% Injectable 12.5 Gram(s) IV Push once  dextrose 50% Injectable 25 Gram(s) IV Push once  enoxaparin Injectable 40 milliGRAM(s) SubCutaneous every 12 hours  ergocalciferol 90584 Unit(s) Oral <User Schedule>  glucagon  Injectable 1 milliGRAM(s) IntraMuscular once  insulin glargine Injectable (LANTUS) 30 Unit(s) SubCutaneous at bedtime  insulin lispro (ADMELOG) corrective regimen sliding scale   SubCutaneous three times a day before meals  insulin lispro (ADMELOG) corrective regimen sliding scale   SubCutaneous at bedtime  insulin lispro Injectable (ADMELOG) 10 Unit(s) SubCutaneous three times a day before meals  levothyroxine 75 MICROGram(s) Oral daily  magnesium sulfate  IVPB 2 Gram(s) IV Intermittent once  multivitamin 1 Tablet(s) Oral daily  nystatin Powder 1 Application(s) Topical three times a day  oseltamivir 75 milliGRAM(s) Oral every 24 hours    MEDICATIONS  (PRN):  acetaminophen     Tablet .. 650 milliGRAM(s) Oral every 6 hours PRN Temp greater or equal to 38C (100.4F), Mild Pain (1 - 3), Moderate Pain (4 - 6)  dextrose Oral Gel 15 Gram(s) Oral once PRN Blood Glucose LESS THAN 70 milliGRAM(s)/deciliter  meclizine 12.5 milliGRAM(s) Oral every 8 hours PRN Dizziness      Allergies: No Known Drug Allergies  Pineapple (Anaphylaxis)      Review of Systems:  HEENT: No pain  Cardiovascular: No chest pain, palpitations  Respiratory: No SOB, no cough  GI: No nausea, vomiting, abdominal pain  Endocrine: no polyuria, polydipsia    ALL OTHER SYSTEMS REVIEWED AND NEGATIVE    PHYSICAL EXAM:  VITALS: T(C): 36.7 (04-19-22 @ 12:09)  T(F): 98.1 (04-19-22 @ 12:09), Max: 100 (04-18-22 @ 21:50)  HR: 95 (04-19-22 @ 12:09) (88 - 100)  BP: 120/55 (04-19-22 @ 12:09) (120/55 - 148/71)  RR:  (16 - 18)  SpO2:  (98% - 100%)  Wt(kg): --  GENERAL: NAD, well-groomed, well-developed  RESPIRATORY: No labored breathing   GI: Soft, nontender, non distended  PSYCH: Alert and oriented x 3, normal affect, normal mood      CAPILLARY BLOOD GLUCOSE  POCT Blood Glucose.: 175 mg/dL (19 Apr 2022 12:26)  POCT Blood Glucose.: 162 mg/dL (19 Apr 2022 08:14)  POCT Blood Glucose.: 235 mg/dL (18 Apr 2022 21:09)  POCT Blood Glucose.: 199 mg/dL (18 Apr 2022 17:11)      A1C with Estimated Average Glucose (03.27.22 @ 19:13)    A1C with Estimated Average Glucose Result: 14.2      Thyroid Function Tests:  03-30 @ 02:39 TSH -- FreeT4 1.0 T3 -- Anti TPO -- Anti Thyroglobulin Ab -- TSI --  03-28 @ 12:53 TSH 3.86 FreeT4 -- T3 -- Anti TPO -- Anti Thyroglobulin Ab -- TSI --      Diet, Consistent Carbohydrate w/Evening Snack:   Supplement Feeding Modality:  Oral  Glucerna Shake Cans or Servings Per Day:  1       Frequency:  Two Times a day (04-04-22 @ 17:29) [Active]

## 2022-04-19 NOTE — PROGRESS NOTE ADULT - PROBLEM SELECTOR PLAN 4
BMI 40.4  Also with low albumin  Pt reports she doesn't eat much as she is trying to lose weight   Nutrition consult recs appreciated:  1) diet changed to consistent carbohydrate with evening snack; d/cd Renal restriction.   2) Glucerna 1 PO 2x daily (provides 220 kcal, 10 gm protein per 8oz serving); Glucerna causing diarrhea.
BMI 40.4  Also with low albumin  Pt reports she doesn't eat much as she is trying to lose weight   Nutrition consult recs appreciated:  1) diet changed to consistent carbohydrate with evening snack; d/cd Renal restriction.   2) Glucerna 1 PO 2x daily (provides 220 kcal, 10 gm protein per 8oz serving); Glucerna causing diarrhea.
COVID test positive 3/28  Denies chills /SOB. Fever likely secondary to ESBL UTI  Satting %on RA  Remdesivir  held on admission due to RAFAELA- no need for it now
TSH 3.86 wnl  Continue home synthroid 75mcg qD,    # Elevated Alk Phosphatase-GGT elevated  CT scan w/ gallstones, and hepatic steatosis  Abdominal US discontinued given hepatic and gallbladder imaging already performed this admission w/ benign abdominal exam
BMI 40.4  Also with low albumin  Pt reports she doesn't eat much as she is trying to lose weight   Nutrition consult recs appreciated:  1) diet changed to consistent carbohydrate with evening snack; d/cd Renal restriction.   2) Glucerna 1 PO 2x daily (provides 220 kcal, 10 gm protein per 8oz serving); Glucerna causing diarrhea.
COVID test positive 3/28  Asymptomatic   Satting %on RA  Remdesivir  held on admission due to RAFAELA- no need for it now
COVID test positive 3/28  Denies chills /SOB. Fever likely secondary to ESBL UTI  Satting %on RA  Remdesivir  held on admission due to RAFAELA- no need for it now
BMI 40.4  Also with low albumin  Pt reports she doesn't eat much as she is trying to lose weight   Nutrition consult recs appreciated:  1) diet changed to consistent carbohydrate with evening snack; d/cd Renal restriction.   2) Glucerna 1 PO 2x daily (provides 220 kcal, 10 gm protein per 8oz serving); Glucerna causing diarrhea.
BMI 40.4  Also with low albumin  Pt reports she doesn't eat much as she is trying to lose weight   Nutrition consult recs appreciated:  1) diet changed to consistent carbohydrate with evening snack; d/cd Renal restriction.   2) Glucerna 1 PO 2x daily (provides 220 kcal, 10 gm protein per 8oz serving); Glucerna causing diarrhea.
TSH 3.86 wnl  Continue home synthroid 75mcg qD,    # Elevated Alk Phosphatase-GGT elevated  CT scan w/ gallstones, and hepatic steatosis  Abdominal US discontinued given hepatic and gallbladder imaging already performed this admission w/ benign abdominal exam
COVID test positive 3/28  Denies chills /SOB. Fever likely secondary to ESBL UTI  Satting %on RA  Remdesivir  held on admission due to RAFAELA- no need for it now
COVID test positive 3/28  Denies chills /SOB. Fever likely secondary to ESBL UTI  Satting %on RA  Remdesivir  held on admission due to RAFAELA- no need for it now  Discontinuation isolation precautions 4/8
COVID test positive 3/28  Asymptomatic   Satting %on RA  Remdesivir  held on admission due to RAFAELA- no need for it now
TSH 3.86 wnl  Continue home synthroid 75mcg qD,    # Elevated Alk Phosphatase-GGT elevated  CT scan w/ gallstones, and hepatic steatosis  Abdominal US discontinued given hepatic and gallbladder imaging already performed this admission w/ benign abdominal exam
BMI 40.4  Also with low albumin  Pt reports she doesn't eat much as she is trying to lose weight   Nutrition consult recs appreciated:  1) diet changed to consistent carbohydrate with evening snack; d/cd Renal restriction.   2) Glucerna 1 PO 2x daily (provides 220 kcal, 10 gm protein per 8oz serving); Glucerna causing diarrhea.
BMI 40.4  Also with low albumin  Pt reports she doesn't eat much as she is trying to lose weight   Nutrition consult recs appreciated:  1) diet changed to consistent carbohydrate with evening snack; d/cd Renal restriction.   2) Glucerna 1 PO 2x daily (provides 220 kcal, 10 gm protein per 8oz serving); Glucerna causing diarrhea.
RESOLVED  COVID test positive 3/28  Denies chills /SOB. Fever likely secondary to ESBL UTI  Satting %on RA  Remdesivir  held on admission due to RAFAELA- no need for it now  Discontinuation isolation precautions 4/8
BMI 40.4  Also with low albumin  Pt reports she doesn't eat much as she is trying to lose weight   Nutrition consult recs appreciated:  1) diet changed to consistent carbohydrate with evening snack; d/cd Renal restriction.   2) Glucerna 1 PO 2x daily (provides 220 kcal, 10 gm protein per 8oz serving); Glucerna causing diarrhea.
COVID test positive 3/28  Denies chills /SOB. Fever likely secondary to ESBL UTI  Satting %on RA  Remdesivir  held on admission due to RAFAELA- no need for it now

## 2022-04-19 NOTE — PROGRESS NOTE ADULT - PROBLEM SELECTOR PLAN 7
Lovenox   PT eval for safe disposition - Rehab but patient wants second opinion.  Dispo- medically optimized for discharge; pending NABEEL Lovenox   PT eval for safe disposition - Rehab but patient wants second opinion.  Dispo- medically optimized for discharge; pending NABEEL  DC time 36 minutes

## 2022-04-19 NOTE — PROGRESS NOTE ADULT - PROBLEM SELECTOR PROBLEM 4
Hyperlipidemia
Morbid obesity
Hypothyroidism
Hypothyroidism
2019 novel coronavirus disease (COVID-19)
Hyperlipidemia
2019 novel coronavirus disease (COVID-19)
2019 novel coronavirus disease (COVID-19)
Hyperlipidemia
Morbid obesity
2019 novel coronavirus disease (COVID-19)
Morbid obesity
2019 novel coronavirus disease (COVID-19)
Morbid obesity
Morbid obesity
2019 novel coronavirus disease (COVID-19)
Morbid obesity
Morbid obesity
Hypothyroidism
Morbid obesity
2019 novel coronavirus disease (COVID-19)
2019 novel coronavirus disease (COVID-19)

## 2022-04-19 NOTE — PROGRESS NOTE ADULT - PROBLEM SELECTOR PLAN 2
On admission, pt with Uncontrolled Hyperglycemia ( BS in 400-500s) with metabolic acidosis  HbA1c 14.2.  BHB negative.   Was placed on insulin gtt  on admission given elevated BS  Now on  Lantus 30 units plus Admelog 9 units TID plus SSS. BS within acceptable goal       Endocrine Discharge Recommendations: basal/bolus + GLP -1 agonist; Trulicity, Basaglar, and Novolog covered
Pt with AMS on admission   Likely secondary to septic shock  Now AAOx3
Pt with AMS on admission   Likely secondary to septic shock  Now AAOx3
Resolved. In the setting of RAFAELA. Received medical management. S/p insulin infusion for elevated Blood glucose. Recommend continuing medical management. Monitor serum potassium.
Likely chronic in nature.  No sign of acute bleed.  Monitor H/H
Pt with AMS on admission   Likely secondary to septic shock  Now AAOx3
Pt with AMS on admission   Likely secondary to septic shock  Now AAOx3
Likely chronic in nature.  No sign of acute bleed.  Monitor H/H
RESOLVED    Pt with AMS on admission   Likely secondary to septic shock  Now AAOx3
RESOLVED    Pt with AMS on admission   Likely secondary to septic shock  Now AAOx3
Likely chronic in nature.  No sign of acute bleed.  Monitor H/H
Pt with AMS on admission   Likely secondary to septic shock  Now AAOx3
Likely chronic in nature.  No sign of acute bleed.  Monitor H/H
On admission, pt with Uncontrolled Hyperglycemia ( BS in 400-500s) with metabolic acidosis  HbA1c 14.2.  BHB negative.   Was placed on insulin gtt  on admission given elevated BS  Now on  Lantus 30 units plus Admelog 9 units TID plus SSS. BS within acceptable goal       Endocrine Discharge Recommendations: basal/bolus + GLP -1 agonist; Trulicity, Basaglar, and Novolog covered
On admission, pt with Uncontrolled Hyperglycemia ( BS in 400-500s) with metabolic acidosis  HbA1c 14.2.  BHB negative.   Was placed on insulin gtt  on admission given elevated BS  Now on  Lantus 30 units plus Admelog 9 units TID plus SSS. BS within acceptable goal       Endocrine Discharge Recommendations: basal/bolus + GLP -1 agonist; Trulicity, Basaglar, and Novolog covered
Likely chronic in nature.  No sign of acute bleed.  Monitor H/H
Likely chronic in nature.  No sign of acute bleed.  Monitor H/H
Pt with AMS on admission   Likely secondary to septic shock  Now AAOx3
Likely chronic in nature.  No sign of acute bleed.  Monitor H/H
Likely chronic in nature.  No sign of acute bleed.  Monitor H/H
Pt with AMS on admission   Likely secondary to septic shock  Now AAOx3
Pt with AMS on admission   Likely secondary to septic shock  Now AAOx3

## 2022-04-19 NOTE — PROGRESS NOTE ADULT - PROBLEM SELECTOR PROBLEM 6
Multiple sclerosis
Multiple sclerosis
Hypothyroidism
Multiple sclerosis
RAFAELA (acute kidney injury)
Multiple sclerosis
Need for prophylactic measure
Need for prophylactic measure
Hyperkalemia
Hypothyroidism
Multiple sclerosis
RAFAELA (acute kidney injury)
Need for prophylactic measure
RAFAELA (acute kidney injury)

## 2022-07-21 NOTE — PROGRESS NOTE ADULT - PROBLEM SELECTOR PLAN 1
Tamiflu started.  Monitor clinical status. Cough - c/w albuterol  Patient was COVID (+) on 3/28 - cleared quarantine period. today

## 2022-08-10 ENCOUNTER — INPATIENT (INPATIENT)
Facility: HOSPITAL | Age: 52
LOS: 38 days | Discharge: OTHER INSTITUTION | End: 2022-09-18
Attending: INTERNAL MEDICINE | Admitting: INTERNAL MEDICINE
Payer: MEDICARE

## 2022-08-10 ENCOUNTER — TRANSCRIPTION ENCOUNTER (OUTPATIENT)
Age: 52
End: 2022-08-10

## 2022-08-10 ENCOUNTER — RESULT REVIEW (OUTPATIENT)
Age: 52
End: 2022-08-10

## 2022-08-10 VITALS
HEART RATE: 124 BPM | DIASTOLIC BLOOD PRESSURE: 56 MMHG | OXYGEN SATURATION: 100 % | RESPIRATION RATE: 18 BRPM | HEIGHT: 65 IN | TEMPERATURE: 99 F | SYSTOLIC BLOOD PRESSURE: 123 MMHG

## 2022-08-10 DIAGNOSIS — M72.6 NECROTIZING FASCIITIS: ICD-10-CM

## 2022-08-10 LAB
ALBUMIN SERPL ELPH-MCNC: 2.1 G/DL — LOW (ref 3.3–5)
ALP SERPL-CCNC: 247 U/L — HIGH (ref 40–120)
ALT FLD-CCNC: 10 U/L — SIGNIFICANT CHANGE UP (ref 4–33)
ANION GAP SERPL CALC-SCNC: 20 MMOL/L — HIGH (ref 7–14)
APPEARANCE UR: ABNORMAL
APTT BLD: 28.2 SEC — SIGNIFICANT CHANGE UP (ref 27–36.3)
AST SERPL-CCNC: 10 U/L — SIGNIFICANT CHANGE UP (ref 4–32)
BACTERIA # UR AUTO: ABNORMAL
BASE EXCESS BLDV CALC-SCNC: -9.7 MMOL/L — LOW (ref -2–3)
BILIRUB SERPL-MCNC: <0.2 MG/DL — SIGNIFICANT CHANGE UP (ref 0.2–1.2)
BILIRUB UR-MCNC: NEGATIVE — SIGNIFICANT CHANGE UP
BLOOD GAS VENOUS COMPREHENSIVE RESULT: SIGNIFICANT CHANGE UP
BUN SERPL-MCNC: 17 MG/DL — SIGNIFICANT CHANGE UP (ref 7–23)
CALCIUM SERPL-MCNC: 8.9 MG/DL — SIGNIFICANT CHANGE UP (ref 8.4–10.5)
CHLORIDE BLDV-SCNC: 107 MMOL/L — SIGNIFICANT CHANGE UP (ref 96–108)
CHLORIDE SERPL-SCNC: 103 MMOL/L — SIGNIFICANT CHANGE UP (ref 98–107)
CO2 BLDV-SCNC: 16.4 MMOL/L — LOW (ref 22–26)
CO2 SERPL-SCNC: 14 MMOL/L — LOW (ref 22–31)
COLOR SPEC: YELLOW — SIGNIFICANT CHANGE UP
CREAT SERPL-MCNC: 0.62 MG/DL — SIGNIFICANT CHANGE UP (ref 0.5–1.3)
DIFF PNL FLD: ABNORMAL
EGFR: 108 ML/MIN/1.73M2 — SIGNIFICANT CHANGE UP
GAS PNL BLDV: 137 MMOL/L — SIGNIFICANT CHANGE UP (ref 136–145)
GAS PNL BLDV: SIGNIFICANT CHANGE UP
GLUCOSE BLDV-MCNC: 166 MG/DL — HIGH (ref 70–99)
GLUCOSE SERPL-MCNC: 164 MG/DL — HIGH (ref 70–99)
GLUCOSE UR QL: NEGATIVE — SIGNIFICANT CHANGE UP
HCO3 BLDV-SCNC: 16 MMOL/L — LOW (ref 22–29)
HCT VFR BLD CALC: 22.5 % — LOW (ref 34.5–45)
HCT VFR BLDA CALC: 20 % — CRITICAL LOW (ref 34.5–46.5)
HGB BLD CALC-MCNC: 6.8 G/DL — CRITICAL LOW (ref 11.5–15.5)
HGB BLD-MCNC: 5.9 G/DL — CRITICAL LOW (ref 11.5–15.5)
IANC: 25.71 K/UL — HIGH (ref 1.8–7.4)
INR BLD: 2.19 RATIO — HIGH (ref 0.88–1.16)
KETONES UR-MCNC: NEGATIVE — SIGNIFICANT CHANGE UP
LACTATE BLDV-MCNC: 4.4 MMOL/L — CRITICAL HIGH (ref 0.5–2)
LEUKOCYTE ESTERASE UR-ACNC: ABNORMAL
MCHC RBC-ENTMCNC: 21.5 PG — LOW (ref 27–34)
MCHC RBC-ENTMCNC: 26.2 GM/DL — LOW (ref 32–36)
MCV RBC AUTO: 81.8 FL — SIGNIFICANT CHANGE UP (ref 80–100)
NITRITE UR-MCNC: NEGATIVE — SIGNIFICANT CHANGE UP
OB PNL STL: NEGATIVE — SIGNIFICANT CHANGE UP
PCO2 BLDV: 30 MMHG — LOW (ref 39–42)
PH BLDV: 7.32 — SIGNIFICANT CHANGE UP (ref 7.32–7.43)
PH UR: 6.5 — SIGNIFICANT CHANGE UP (ref 5–8)
PLATELET # BLD AUTO: 920 K/UL — HIGH (ref 150–400)
PO2 BLDV: 25 MMHG — SIGNIFICANT CHANGE UP
POTASSIUM BLDV-SCNC: 3.8 MMOL/L — SIGNIFICANT CHANGE UP (ref 3.5–5.1)
POTASSIUM SERPL-MCNC: 3.9 MMOL/L — SIGNIFICANT CHANGE UP (ref 3.5–5.3)
POTASSIUM SERPL-SCNC: 3.9 MMOL/L — SIGNIFICANT CHANGE UP (ref 3.5–5.3)
PROT SERPL-MCNC: 7.3 G/DL — SIGNIFICANT CHANGE UP (ref 6–8.3)
PROT UR-MCNC: ABNORMAL
PROTHROM AB SERPL-ACNC: 25.6 SEC — HIGH (ref 10.5–13.4)
RBC # BLD: 2.75 M/UL — LOW (ref 3.8–5.2)
RBC # FLD: 18.1 % — HIGH (ref 10.3–14.5)
RBC CASTS # UR COMP ASSIST: SIGNIFICANT CHANGE UP /HPF (ref 0–4)
SAO2 % BLDV: 30.1 % — SIGNIFICANT CHANGE UP
SODIUM SERPL-SCNC: 137 MMOL/L — SIGNIFICANT CHANGE UP (ref 135–145)
SP GR SPEC: 1.02 — SIGNIFICANT CHANGE UP (ref 1–1.05)
UROBILINOGEN FLD QL: SIGNIFICANT CHANGE UP
WBC # BLD: 31.78 K/UL — HIGH (ref 3.8–10.5)
WBC # FLD AUTO: 31.78 K/UL — HIGH (ref 3.8–10.5)
WBC UR QL: >50 /HPF — SIGNIFICANT CHANGE UP (ref 0–5)

## 2022-08-10 PROCEDURE — 99291 CRITICAL CARE FIRST HOUR: CPT

## 2022-08-10 PROCEDURE — 74176 CT ABD & PELVIS W/O CONTRAST: CPT | Mod: 26

## 2022-08-10 PROCEDURE — 99222 1ST HOSP IP/OBS MODERATE 55: CPT | Mod: 57

## 2022-08-10 DEVICE — SURGICEL NU-KNIT 6 X 9": Type: IMPLANTABLE DEVICE | Status: FUNCTIONAL

## 2022-08-10 RX ORDER — NYSTATIN CREAM 100000 [USP'U]/G
1 CREAM TOPICAL
Refills: 0 | Status: DISCONTINUED | OUTPATIENT
Start: 2022-08-10 | End: 2022-09-18

## 2022-08-10 RX ORDER — SODIUM CHLORIDE 9 MG/ML
1000 INJECTION, SOLUTION INTRAVENOUS
Refills: 0 | Status: DISCONTINUED | OUTPATIENT
Start: 2022-08-10 | End: 2022-08-11

## 2022-08-10 RX ORDER — DEXTROSE 50 % IN WATER 50 %
12.5 SYRINGE (ML) INTRAVENOUS ONCE
Refills: 0 | Status: DISCONTINUED | OUTPATIENT
Start: 2022-08-10 | End: 2022-08-12

## 2022-08-10 RX ORDER — ASCORBIC ACID 60 MG
500 TABLET,CHEWABLE ORAL DAILY
Refills: 0 | Status: DISCONTINUED | OUTPATIENT
Start: 2022-08-10 | End: 2022-09-18

## 2022-08-10 RX ORDER — LEVOTHYROXINE SODIUM 125 MCG
75 TABLET ORAL DAILY
Refills: 0 | Status: DISCONTINUED | OUTPATIENT
Start: 2022-08-10 | End: 2022-08-12

## 2022-08-10 RX ORDER — DEXTROSE 50 % IN WATER 50 %
25 SYRINGE (ML) INTRAVENOUS ONCE
Refills: 0 | Status: DISCONTINUED | OUTPATIENT
Start: 2022-08-10 | End: 2022-08-11

## 2022-08-10 RX ORDER — ALBUTEROL 90 UG/1
2 AEROSOL, METERED ORAL EVERY 6 HOURS
Refills: 0 | Status: DISCONTINUED | OUTPATIENT
Start: 2022-08-10 | End: 2022-09-18

## 2022-08-10 RX ORDER — ERGOCALCIFEROL 1.25 MG/1
50000 CAPSULE ORAL
Refills: 0 | Status: DISCONTINUED | OUTPATIENT
Start: 2022-08-10 | End: 2022-08-15

## 2022-08-10 RX ORDER — VANCOMYCIN HCL 1 G
1000 VIAL (EA) INTRAVENOUS ONCE
Refills: 0 | Status: DISCONTINUED | OUTPATIENT
Start: 2022-08-10 | End: 2022-08-11

## 2022-08-10 RX ORDER — GLUCAGON INJECTION, SOLUTION 0.5 MG/.1ML
1 INJECTION, SOLUTION SUBCUTANEOUS ONCE
Refills: 0 | Status: DISCONTINUED | OUTPATIENT
Start: 2022-08-10 | End: 2022-08-11

## 2022-08-10 RX ORDER — DEXTROSE 50 % IN WATER 50 %
25 SYRINGE (ML) INTRAVENOUS ONCE
Refills: 0 | Status: DISCONTINUED | OUTPATIENT
Start: 2022-08-10 | End: 2022-08-12

## 2022-08-10 RX ORDER — INSULIN GLARGINE 100 [IU]/ML
30 INJECTION, SOLUTION SUBCUTANEOUS AT BEDTIME
Refills: 0 | Status: DISCONTINUED | OUTPATIENT
Start: 2022-08-10 | End: 2022-08-11

## 2022-08-10 RX ORDER — SODIUM CHLORIDE 9 MG/ML
1000 INJECTION INTRAMUSCULAR; INTRAVENOUS; SUBCUTANEOUS ONCE
Refills: 0 | Status: COMPLETED | OUTPATIENT
Start: 2022-08-10 | End: 2022-08-10

## 2022-08-10 RX ORDER — INSULIN LISPRO 100/ML
VIAL (ML) SUBCUTANEOUS EVERY 6 HOURS
Refills: 0 | Status: DISCONTINUED | OUTPATIENT
Start: 2022-08-10 | End: 2022-08-12

## 2022-08-10 RX ORDER — PIPERACILLIN AND TAZOBACTAM 4; .5 G/20ML; G/20ML
3.38 INJECTION, POWDER, LYOPHILIZED, FOR SOLUTION INTRAVENOUS ONCE
Refills: 0 | Status: DISCONTINUED | OUTPATIENT
Start: 2022-08-10 | End: 2022-08-10

## 2022-08-10 RX ORDER — ACETAMINOPHEN 500 MG
1000 TABLET ORAL ONCE
Refills: 0 | Status: COMPLETED | OUTPATIENT
Start: 2022-08-10 | End: 2022-08-10

## 2022-08-10 RX ORDER — HEPARIN SODIUM 5000 [USP'U]/ML
5000 INJECTION INTRAVENOUS; SUBCUTANEOUS EVERY 8 HOURS
Refills: 0 | Status: DISCONTINUED | OUTPATIENT
Start: 2022-08-10 | End: 2022-09-18

## 2022-08-10 RX ORDER — CEFEPIME 1 G/1
2000 INJECTION, POWDER, FOR SOLUTION INTRAMUSCULAR; INTRAVENOUS ONCE
Refills: 0 | Status: COMPLETED | OUTPATIENT
Start: 2022-08-10 | End: 2022-08-10

## 2022-08-10 RX ORDER — DEXTROSE 50 % IN WATER 50 %
15 SYRINGE (ML) INTRAVENOUS ONCE
Refills: 0 | Status: DISCONTINUED | OUTPATIENT
Start: 2022-08-10 | End: 2022-08-11

## 2022-08-10 RX ADMIN — Medication 100 MILLIGRAM(S): at 23:04

## 2022-08-10 RX ADMIN — SODIUM CHLORIDE 1000 MILLILITER(S): 9 INJECTION INTRAMUSCULAR; INTRAVENOUS; SUBCUTANEOUS at 23:03

## 2022-08-10 RX ADMIN — Medication 400 MILLIGRAM(S): at 23:02

## 2022-08-10 RX ADMIN — CEFEPIME 100 MILLIGRAM(S): 1 INJECTION, POWDER, FOR SOLUTION INTRAMUSCULAR; INTRAVENOUS at 23:46

## 2022-08-10 NOTE — ED ADULT TRIAGE NOTE - CHIEF COMPLAINT QUOTE
pt form [facility], staff noticed pt had an abscess on L hip they want evaluated. also states pts hemoglobin was 6.3, denies active bleeding, pale in appearance. pt form [facility], staff noticed pt had an abscess on L hip they want evaluated. also states pts hemoglobin was 6.3, denies active bleeding, pale in appearance. wound in questions noted to be malodorous.

## 2022-08-10 NOTE — H&P ADULT - HISTORY OF PRESENT ILLNESS
HPI:  52F immobile 2/2 MS, poorly controlled T2DM, asthma, hypothyroidism, known chronic wounds sacrum (stage 4), vulvar/Rt thigh, and right calf. Recent April 2022 hospitalization for urosepsis. Presented from [place] with concerns for UTI and anemia (6.8 from baseline 8.0). Found to have new malodorous wound on L hip to L flank area. Surgery consulted for potential necrotizing soft tissue infection.            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 1000 milliGRAM(s) IV Intermittent once  cefepime   IVPB 2000 milliGRAM(s) IV Intermittent once  clindamycin IVPB 900 milliGRAM(s) IV Intermittent once  sodium chloride 0.9% Bolus 1000 milliLiter(s) IV Bolus once  vancomycin  IVPB. 1000 milliGRAM(s) IV Intermittent once    MEDICATIONS  (PRN):      Allergies    No Known Drug Allergies  Pineapple (Anaphylaxis)    Intolerances        SOCIAL HISTORY:    FAMILY HISTORY:          Physical Exam:  General: NAD, resting comfortably, warm to touch   HEENT: NC/AT, EOMI  Pulmonary: normal resp effort, patent airway  Abdominal: obese  Extremities: poor muscle tone  Skin: sacral wound stage 4, r thigh wound tracking to labia, L thigh wound, L hip/flank malodorous dark green wound with surrounding erythema and underlying crepitus tracking up the L flank  Neuro: A/O x 3-4, CNs II-XII grossly intact    Vital Signs Last 24 Hrs  T(C): 39.8 (10 Aug 2022 21:06), Max: 39.8 (10 Aug 2022 21:06)  T(F): 103.7 (10 Aug 2022 21:06), Max: 103.7 (10 Aug 2022 21:06)  HR: 127 (10 Aug 2022 20:10) (124 - 127)  BP: 139/72 (10 Aug 2022 20:10) (123/56 - 139/72)  BP(mean): --  RR: 20 (10 Aug 2022 20:10) (18 - 20)  SpO2: 100% (10 Aug 2022 20:10) (100% - 100%)    Parameters below as of 10 Aug 2022 20:10  Patient On (Oxygen Delivery Method): room air        I&O's Summary          LABS:              CAPILLARY BLOOD GLUCOSE            Cultures:      RADIOLOGY & ADDITIONAL STUDIES:      Plan:                   HPI:  52F immobile 2/2 MS, poorly controlled T2DM, asthma, hypothyroidism, known chronic wounds sacrum (stage 4), vulvar/Rt thigh, and right calf. Recent 2022 hospitalization for urosepsis. On cefepime/flagyl for chronic osteo 2/2 unstageable sacral decubitus ulcer, dakins BID packing. Presented from [place] with concerns for UTI and anemia (6.8 from baseline 8.0) and new malodorous wound on L hip to L flank area. Surgery consulted for potential necrotizing soft tissue infection.            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 1000 milliGRAM(s) IV Intermittent once  cefepime   IVPB 2000 milliGRAM(s) IV Intermittent once  clindamycin IVPB 900 milliGRAM(s) IV Intermittent once  sodium chloride 0.9% Bolus 1000 milliLiter(s) IV Bolus once  vancomycin  IVPB. 1000 milliGRAM(s) IV Intermittent once    MEDICATIONS  (PRN):      Allergies    No Known Drug Allergies  Pineapple (Anaphylaxis)    Intolerances        SOCIAL HISTORY:    FAMILY HISTORY:          Physical Exam:  General: NAD, resting comfortably, warm to touch   HEENT: NC/AT, EOMI  Pulmonary: normal resp effort, patent airway  Abdominal: obese  Extremities: poor muscle tone  Skin: sacral wound stage 4, r thigh wound tracking to labia, L thigh wound, L hip/flank malodorous dark green wound with surrounding erythema and underlying crepitus tracking up the L flank  Neuro: A/O x 3-4, CNs II-XII grossly intact    Vital Signs Last 24 Hrs  T(C): 39.8 (10 Aug 2022 21:06), Max: 39.8 (10 Aug 2022 21:06)  T(F): 103.7 (10 Aug 2022 21:06), Max: 103.7 (10 Aug 2022 21:06)  HR: 127 (10 Aug 2022 20:10) (124 - 127)  BP: 139/72 (10 Aug 2022 20:10) (123/56 - 139/72)  BP(mean): --  RR: 20 (10 Aug 2022 20:10) (18 - 20)  SpO2: 100% (10 Aug 2022 20:10) (100% - 100%)    Parameters below as of 10 Aug 2022 20:10  Patient On (Oxygen Delivery Method): room air        I&O's Summary          LABS:                        5.9    31.78 )-----------( 920      ( 10 Aug 2022 22:30 )             22.5         Urinalysis Basic - ( 10 Aug 2022 22:30 )    Color: Yellow / Appearance: Turbid / S.017 / pH: x  Gluc: x / Ketone: Negative  / Bili: Negative / Urobili: <2 mg/dL   Blood: x / Protein: 100 mg/dL / Nitrite: Negative   Leuk Esterase: Large / RBC: 25-50 /HPF / WBC >50 /HPF   Sq Epi: x / Non Sq Epi: x / Bacteria: Many                CAPILLARY BLOOD GLUCOSE            Cultures:      RADIOLOGY & ADDITIONAL STUDIES:

## 2022-08-10 NOTE — ED PROVIDER NOTE - OBJECTIVE STATEMENT
53 y/o F h/o MS, DM, urosepsis, multiple pressure ulcers presents from [facility] 2/2 uptrending WBC, sacral ulcer, new L hip abscess. Pt arrived in triage tachycardic with rectal temp of 103.7. Pt with large L hip abscess that is malodorous and green color in appearance. Pt with unstageable sacral ulcer, left heel ulcer, LLE ulcer. Recent labs showed leukocytosis of 16 and Hgb 6.3. Pt reports pain to L hip worse with palpation. No abd pain, n/v/d, chest pain, sob.

## 2022-08-10 NOTE — CONSULT NOTE ADULT - ASSESSMENT
52F immobile 2/2 MS, poorly controlled T2DM, asthma, hypothyroidism, known chronic wounds sacrum (stage 4), vulvar/Rt thigh, and right calf. Recent April 2022 hospitalization for urosepsis. Presented from [place] with concerns for UTI and anemia (6.8 from baseline 8.0). Found to have new malodorous wound on L hip to L flank area, febrile to 103.7. Surgery consulted for potential necrotizing soft tissue infection.     PLAN  - OR for emergent exploration/debridement  - Abx: cefepime, clinda, vanc  - f/u CT abd noncon (allergy)  - Transfuse PRN  - Monitor fever curve - IV tylenol  - Monitor I&Os  - Rose  - Diet: NPO/IVF  - DVT PPx  - Admit to Surgery    Discussed with Dr. Zacarias    Surgery Team B  [id number]

## 2022-08-10 NOTE — ED ADULT NURSE NOTE - OBJECTIVE STATEMENT
pt presents to ED for wound check, has extensive pressure ulcers to sacrum, buttocks and hip areas bilaterally, also bilateral heels, and L/shin area. wound to sacrum is Stage4, undermining, foul smelling, needing debridement to some area, measuring 15cm x 12cm x 8cm. about 8cm deep. about 4cm x 4cm to Left lateral hip area, a 4cm x 4cm x 4cm to back of R/thigh. the fluid-filled vesicle is on the Lateral side of the L/hip towards the greater trochanter, measuring 22cm x  16cm, pt accidentally popped vesicle, fluid is green, foul smelling.  all wounds cleaned and dressed. pt is AAOx4 breathing on RA and in NAD.

## 2022-08-10 NOTE — H&P ADULT - ATTENDING COMMENTS
Necrotizing soft tissue infection  a.  Admit to B surgery/ GIUSEPPE [facility]  b.  For emergent excisional debridement  c.  Start IV vancomycin, clindamycin and cefepime  d. Continue IVF resuscitation  e.  Keep NPO    DM, poorly controlled  a.  On Lantuss, ISS

## 2022-08-10 NOTE — CONSULT NOTE ADULT - SUBJECTIVE AND OBJECTIVE BOX
HPI:  52F immobile 2/2 MS, poorly controlled T2DM, asthma, hypothyroidism, known chronic wounds sacrum (stage 4), vulvar/Rt thigh, and right calf. Recent April 2022 hospitalization for urosepsis. Presented from [place] with concerns for UTI and anemia (6.8 from baseline 8.0). Found to have new malodorous wound on L hip to L flank area. Surgery consulted for potential necrotizing soft tissue infection.       PAST MEDICAL & SURGICAL HISTORY:  DM (diabetes mellitus)      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      MS (multiple sclerosis)          MEDICATIONS  (STANDING):  cefepime   IVPB 2000 milliGRAM(s) IV Intermittent once  clindamycin IVPB 900 milliGRAM(s) IV Intermittent once  sodium chloride 0.9% Bolus 1000 milliLiter(s) IV Bolus once  vancomycin  IVPB. 1000 milliGRAM(s) IV Intermittent once    MEDICATIONS  (PRN):      Allergies    No Known Drug Allergies  Pineapple (Anaphylaxis)    Intolerances        SOCIAL HISTORY:    FAMILY HISTORY:        Physical Exam:  General: NAD, resting comfortably, warm to touch   HEENT: NC/AT, EOMI  Pulmonary: normal resp effort, patent airway  Abdominal: obese  Extremities: poor muscle tone  Skin: sacral wound stage 4, r thigh wound tracking to labia, L thigh wound, L hip/flank malodorous dark green wound with surrounding erythema and underlying crepitus tracking up the L flank  Neuro: A/O x 3-4, CNs II-XII grossly intact      Vital Signs Last 24 Hrs  T(C): 39.8 (10 Aug 2022 21:06), Max: 39.8 (10 Aug 2022 21:06)  T(F): 103.7 (10 Aug 2022 21:06), Max: 103.7 (10 Aug 2022 21:06)  HR: 127 (10 Aug 2022 20:10) (124 - 127)  BP: 139/72 (10 Aug 2022 20:10) (123/56 - 139/72)  BP(mean): --  RR: 20 (10 Aug 2022 20:10) (18 - 20)  SpO2: 100% (10 Aug 2022 20:10) (100% - 100%)    Parameters below as of 10 Aug 2022 20:10  Patient On (Oxygen Delivery Method): room air        I&O's Summary          LABS:              CAPILLARY BLOOD GLUCOSE            Cultures:      RADIOLOGY & ADDITIONAL STUDIES:      Plan:

## 2022-08-10 NOTE — ED PROVIDER NOTE - SKIN, MLM
Large L hip bulla with green appearance with tracking around L flank. + crepitus. Unstageable sacral ulcer, L heel ulcer and LLE ulcer.

## 2022-08-10 NOTE — ED ADULT NURSE NOTE - CHIEF COMPLAINT QUOTE
pt form [facility], staff noticed pt had an abscess on L hip they want evaluated. also states pts hemoglobin was 6.3, denies active bleeding, pale in appearance. wound in questions noted to be malodorous.

## 2022-08-10 NOTE — ED PROVIDER NOTE - CLINICAL SUMMARY MEDICAL DECISION MAKING FREE TEXT BOX
53 y/o F h/o MS, DM, urosepsis, multiple pressure ulcers presents from [facility] 2/2 uptrending WBC, sacral ulcer, new L hip abscess. Pt arrived in triage tachycardic with rectal temp of 103.7. Pt with large L hip abscess that is malodorous and green color in appearance. Pt with unstageable sacral ulcer, left heel ulcer, LLE ulcer. Recent labs showed leukocytosis of 16 and Hgb 6.3.  Large L hip bulla with green appearance with tracking around L flank. + Crepitus   Concern for Necrotizing facisiitis   plan  - labs  - blood cultures  - ua/cx  - emergent surgery consult  - IV abx - vanc, cefepime, clinda  - CTAP - pt reports IV contrast allergy (swelling), will get non contrast CT.

## 2022-08-10 NOTE — ED PROVIDER NOTE - NSICDXPASTMEDICALHX_GEN_ALL_CORE_FT
PAST MEDICAL HISTORY:  DM (diabetes mellitus)     MS (multiple sclerosis)     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

## 2022-08-10 NOTE — H&P ADULT - ASSESSMENT
52F immobile 2/2 MS, poorly controlled T2DM, asthma, hypothyroidism, known chronic wounds sacrum (stage 4), vulvar/Rt thigh, and right calf. Recent April 2022 hospitalization for urosepsis. Presented from [place] with concerns for UTI and anemia (6.8 from baseline 8.0). Found to have new malodorous wound on L hip to L flank area, febrile to 103.7. Surgery consulted for potential necrotizing soft tissue infection.     PLAN  - OR for emergent exploration/debridement  - Abx: cefepime, clinda, vanc  - f/u CT abd noncon (allergy)  - Transfuse PRN  - Monitor fever curve - IV tylenol  - Monitor I&Os  - Rose  - Diet: NPO/IVF  - DVT PPx  - Admit to Surgery    Discussed with Dr. Zacarias    Surgery Team B  [id number]  52F immobile 2/2 MS, poorly controlled T2DM, asthma, hypothyroidism, known chronic wounds sacrum (stage 4), vulvar/Rt thigh, and right calf. Recent April 2022 hospitalization for urosepsis. Presented from [place] with concerns for UTI and anemia (6.8 from baseline 8.0). Found to have new malodorous wound on L hip to L flank area, febrile to 103.7. Surgery consulted for potential necrotizing soft tissue infection.     PLAN  - OR for emergent exploration/debridement  - Abx: cefepime, clinda, vanc  - Transfuse PRN  - Monitor fever curve - IV tylenol  - Monitor I&Os  - Rose  - Diet: NPO/IVF  - DVT PPx  - Admit to Surgery    Discussed with Dr. Zacarias    Surgery Team B  [id number]  52F immobile 2/2 MS, poorly controlled T2DM, asthma, hypothyroidism, known chronic wounds sacrum (stage 4), vulvar/Rt thigh, and right calf. Recent April 2022 hospitalization for urosepsis. Presented from [place] with concerns for UTI and anemia (6.8 from baseline 8.0). Found to have new malodorous wound on L hip to L flank area, febrile to 103.7. Surgery consulted for potential necrotizing soft tissue infection.     PLAN  - OR for emergent exploration/debridement  - Abx: cefepime, clinda, vanc  - Transfuse PRN  - Monitor fever curve - IV tylenol  - Monitor I&Os  - Rose  - Diet: NPO/IVF  - DVT PPx  - Admit to Surgery    of note, patient takes 30u lantus at night however did not receive any lantus in ED 8/10 d/t emergent OR  consider giving lantus in AM  will need to resume 8u premeal ademlog when patient is on a diet    Discussed with Dr. Zacarias    Surgery Team B  [id number]  52F immobile 2/2 MS, poorly controlled T2DM, asthma, hypothyroidism, known chronic wounds sacrum (stage 4), vulvar/Rt thigh, and right calf. Recent April 2022 hospitalization for urosepsis. Presented from [place] with concerns for UTI and anemia (6.8 from baseline 8.0). Found to have new malodorous wound on L hip to L flank area, febrile to 103.7. Surgery consulted for potential necrotizing soft tissue infection.     PLAN  - OR for emergent exploration/debridement  - Abx: cefepime, clinda, vanc  - Transfuse PRN  - Monitor fever curve - IV tylenol  - Monitor I&Os  - Rose  - Diet: NPO/IVF  - DVT PPx  - Admit to Surgery    of note, patient takes 30u lantus at night however did not receive any lantus in ED 8/10 d/t emergent OR  consider giving lantus in AM if starting diet  will need to resume 8u premeal ademlog when patient is on a diet    Discussed with Dr. Zacarias    Surgery Team B  [id number]

## 2022-08-10 NOTE — ED PROVIDER NOTE - CRITICAL CARE ATTENDING CONTRIBUTION TO CARE
52F h/o MS, pressure ulcers, found to be anemic to 6.3, usu 8.  Also new abscess to L hip.  Tachycardic, LG temp.  C/o pain to L hip.  Malodorous.  Pressure ulcers to legs as well.  Pt also noted to have abnormal urine on 8/7/22.  Pt examined, large green area of induration to L flank with large blister containing dirty dishwater - type fluid, (+)warmth.  Large complex sacral DU with deep undermining and purulent malodorous packing noted, packing removed.  Purulence at base of sacral wound.  Bilat heels dry DUs.  bilat calves in dressings.  Rectal temp done at time of exam 103.7.  Likely necrotizing fasciitis - rx clinda/cefepime/vanco, CT of abd/pel, stat surg consult.  Surg eval pt, recc immediate OR.  Pt taken to OR as results were coming back.  Surg to follow up on results in SICU.  VS:  fever tachycardia    GEN -mod distress back pain, malaise;   A+O x3   HEAD - NC/AT     ENT - PEERL, EOMI, mucous membranes   dry, no discharge      NECK: Neck supple, non-tender without lymphadenopathy, no masses, no JVD  PULM - CTA b/l,  symmetric breath sounds  COR -  normal heart sounds    ABD - , obese, ND, NT, soft,  L flank - large green area of induration to L flank with large blister containing dirty dishwater - type fluid, (+)warmth.  BACK - no CVA tenderness, nontender spine  (+) Large complex sacral DU with deep undermining and purulent malodorous packing noted, packing removed.  Purulence at base of sacral wound.  EXTREMS - no edema, no deformity, warm and well perfused  (+)Bilat heels dry DUs.  SKIN - see above  NEUROLOGIC - alert, face symmetric, speech fluent, sensation nl, motor generally weak, bilat LE significant weakness.  Bilat UE strength intact.      Upon my evaluation, this patient had a high probability of imminent or life-threatening deterioration due to necrotizing fasciitis, which required my direct attention, intervention, and personal management.  The patient has a  medical condition that impairs one or more vital organ systems.  Frequent personal assessment and adjustment of medical interventions was performed.      I have personally provided 60 minutes of critical care time exclusive of time spent on separately billable procedures. Time includes review of laboratory data, radiology results, discussion with consultants, patient and family; monitoring for potential decompensation, as well as time spent retrieving data and reviewing the chart and documenting the visit. Interventions were performed as documented above.

## 2022-08-10 NOTE — ED ADULT NURSE NOTE - CHPI ED NUR SYMPTOMS POS
[FreeTextEntry1] : 8/29/17;\par This is a 65-year-old left-handed woman who comes in today for followup of stroke. This stroke left her with some left-sided weakness and expressive aphasia. She's not been to physical therapy for several months now. She generally is nonambulatory in her home but able to transfer from chair to wheelchair to couch. She is here today expressing renewed interested in doing physical therapy to become stronger. She is hoping to be able to drive again.\par \par Followup April 6, 2018:\par This is a 65-year-old woman returns for followup of stroke. She had a stroke in the past which left her with left-sided weakness mildly aphasia as well as dysarthria. Pressures in the hospital about 2 weeks ago and had another stroke seen on MRI in the posterior right frontal lobe. Her Lipitor was increased from 20 mg to 80 mg a day. She continued on antiplatelet agents. She states her blood pressure is under adequate control. She is here today for neurologic followup.\par \par Followup May 22, 2018:\par This is a 65-year-old woman who returns for followup of stroke. She has left hemiparesis and dysarthria as a result of her stroke. She can ambulate with a walker in the home but uses a wheelchair outside of the home. In March she had recurrent stroke. She apparently had question of PFO and was supposed to have a transesophageal echocardiogram. Currently she is not wish to have this test as she would not have surgery it was necessary based upon the results of the test. She is here today for neurologic followup and continues on medicine for blood pressure control, or hyperlipidemia control as well his antiplatelet agents for stroke.\par \par Followup August 21, 2018:\par This is a 66-year-old woman returns today for followup of her stroke. She has residual dysarthria and left hemiparesthesias. She is unchanged from stroke point of view. She has been a bit more impulsive after the sudden death of her sister. Other than that she's had no new issues. She is here today for routine neurologic followup.\par \par Followup March 26, 2019:\par This is a 66-year-old woman who presents today for followup of stroke. She has left-sided weakness and dysarthria. This is currently at her baseline. She is here with her brother who typically accompanies her to the office visits. There has not been any significant change in her neurologic or medical status since her last visit. She is here today for routine neurologic followup.\par \par Followup October 1, 2019:\par This is a 66-year-old woman who presents today for followup of stroke. She has residual left-sided weakness and dysarthria. She is also having what appears to be sensory loss of the fourth and fifth digits of the left hand. Her brother is concerned because sometimes her rings will turn around and if she goes to grab something to stones will begin her hand. She did not feel it. She is otherwise unchanged from her previous visit. She is here today for neurologic followup.\par \par Followup September 22, 2021:\par This is a 69-year-old woman who presents today for neurologic followup. She's had history of stroke the past his left Her with residual dysarthria and residual left-sided weakness. Last month she had episode of confusion where she couldn't recognize people. The family took her to [facility] where workup revealed no acute findings on head CT and a urinary tract infection which was treated. Her brother states that she's been having a mild short term memory issues. She is no longer experiencing altered mental status. She is here today for neurologic evaluation.\par \par Followup December 14, 2021::\par This is a 69-year-old woman who presents today for neurologic followup. She has a history of stroke that has left her with residual dysarthria. She also has mild left-sided weakness. She at last visit was complaining of memory issues. She finds that this is stable and her brother agrees. She is currently taking a daily aspirin, 325 mg. She is keeping control her blood pressure and cholesterol. She is here today for neurologic followup.\par \par Follow-up March 15, 2022:\par This is a 69-year-old woman who presents today for follow-up of stroke as well as memory loss.  Regarding her stroke she is fairly stable she has some dysarthria and mild left-sided weakness which is at her baseline.  She also was complaining of memory loss.  This is stable.  This may represent more of a vascular issue rather than a primary neurodegenerative dementia.  She is here today routine neurologic follow-up, remaining on medications for blood pressure cholesterol as well as aspirin.\par 
PAIN
Abnormal Lactate: > 2

## 2022-08-11 LAB
ANION GAP SERPL CALC-SCNC: 17 MMOL/L — HIGH (ref 7–14)
ANION GAP SERPL CALC-SCNC: 18 MMOL/L — HIGH (ref 7–14)
ANISOCYTOSIS BLD QL: SLIGHT — SIGNIFICANT CHANGE UP
APTT BLD: 33.1 SEC — SIGNIFICANT CHANGE UP (ref 27–36.3)
B-OH-BUTYR SERPL-SCNC: 0.2 MMOL/L — SIGNIFICANT CHANGE UP (ref 0–0.4)
B-OH-BUTYR SERPL-SCNC: <0 MMOL/L — SIGNIFICANT CHANGE UP (ref 0–0.4)
BASOPHILS # BLD AUTO: 0 K/UL — SIGNIFICANT CHANGE UP (ref 0–0.2)
BASOPHILS NFR BLD AUTO: 0 % — SIGNIFICANT CHANGE UP (ref 0–2)
BLD GP AB SCN SERPL QL: NEGATIVE — SIGNIFICANT CHANGE UP
BLD GP AB SCN SERPL QL: NEGATIVE — SIGNIFICANT CHANGE UP
BLOOD GAS ARTERIAL - LYTES,HGB,ICA,LACT RESULT: SIGNIFICANT CHANGE UP
BLOOD GAS ARTERIAL COMPREHENSIVE RESULT: SIGNIFICANT CHANGE UP
BUN SERPL-MCNC: 18 MG/DL — SIGNIFICANT CHANGE UP (ref 7–23)
BUN SERPL-MCNC: 18 MG/DL — SIGNIFICANT CHANGE UP (ref 7–23)
CALCIUM SERPL-MCNC: 8 MG/DL — LOW (ref 8.4–10.5)
CALCIUM SERPL-MCNC: 8.1 MG/DL — LOW (ref 8.4–10.5)
CHLORIDE SERPL-SCNC: 102 MMOL/L — SIGNIFICANT CHANGE UP (ref 98–107)
CHLORIDE SERPL-SCNC: 103 MMOL/L — SIGNIFICANT CHANGE UP (ref 98–107)
CO2 SERPL-SCNC: 12 MMOL/L — LOW (ref 22–31)
CO2 SERPL-SCNC: 12 MMOL/L — LOW (ref 22–31)
CREAT SERPL-MCNC: 0.61 MG/DL — SIGNIFICANT CHANGE UP (ref 0.5–1.3)
CREAT SERPL-MCNC: 0.61 MG/DL — SIGNIFICANT CHANGE UP (ref 0.5–1.3)
EGFR: 109 ML/MIN/1.73M2 — SIGNIFICANT CHANGE UP
EGFR: 109 ML/MIN/1.73M2 — SIGNIFICANT CHANGE UP
EOSINOPHIL # BLD AUTO: 0 K/UL — SIGNIFICANT CHANGE UP (ref 0–0.5)
EOSINOPHIL NFR BLD AUTO: 0 % — SIGNIFICANT CHANGE UP (ref 0–6)
FLUAV AG NPH QL: SIGNIFICANT CHANGE UP
FLUBV AG NPH QL: SIGNIFICANT CHANGE UP
GIANT PLATELETS BLD QL SMEAR: PRESENT — SIGNIFICANT CHANGE UP
GLUCOSE BLDC GLUCOMTR-MCNC: 201 MG/DL — HIGH (ref 70–99)
GLUCOSE BLDC GLUCOMTR-MCNC: 207 MG/DL — HIGH (ref 70–99)
GLUCOSE BLDC GLUCOMTR-MCNC: 207 MG/DL — HIGH (ref 70–99)
GLUCOSE BLDC GLUCOMTR-MCNC: 282 MG/DL — HIGH (ref 70–99)
GLUCOSE BLDC GLUCOMTR-MCNC: 282 MG/DL — HIGH (ref 70–99)
GLUCOSE BLDC GLUCOMTR-MCNC: 289 MG/DL — HIGH (ref 70–99)
GLUCOSE BLDC GLUCOMTR-MCNC: 296 MG/DL — HIGH (ref 70–99)
GLUCOSE BLDC GLUCOMTR-MCNC: 297 MG/DL — HIGH (ref 70–99)
GLUCOSE BLDC GLUCOMTR-MCNC: 308 MG/DL — HIGH (ref 70–99)
GLUCOSE BLDC GLUCOMTR-MCNC: 345 MG/DL — HIGH (ref 70–99)
GLUCOSE SERPL-MCNC: 267 MG/DL — HIGH (ref 70–99)
GLUCOSE SERPL-MCNC: 370 MG/DL — HIGH (ref 70–99)
HCG SERPL-ACNC: <5 MIU/ML — SIGNIFICANT CHANGE UP
HCT VFR BLD CALC: 23.5 % — LOW (ref 34.5–45)
HCT VFR BLD CALC: 24.6 % — LOW (ref 34.5–45)
HGB BLD-MCNC: 6.9 G/DL — CRITICAL LOW (ref 11.5–15.5)
HGB BLD-MCNC: 7 G/DL — CRITICAL LOW (ref 11.5–15.5)
INR BLD: 2.03 RATIO — HIGH (ref 0.88–1.16)
LYMPHOCYTES # BLD AUTO: 11.2 % — LOW (ref 13–44)
LYMPHOCYTES # BLD AUTO: 3.56 K/UL — HIGH (ref 1–3.3)
MAGNESIUM SERPL-MCNC: 1.3 MG/DL — LOW (ref 1.6–2.6)
MAGNESIUM SERPL-MCNC: 1.9 MG/DL — SIGNIFICANT CHANGE UP (ref 1.6–2.6)
MANUAL SMEAR VERIFICATION: SIGNIFICANT CHANGE UP
MCHC RBC-ENTMCNC: 24.1 PG — LOW (ref 27–34)
MCHC RBC-ENTMCNC: 25 PG — LOW (ref 27–34)
MCHC RBC-ENTMCNC: 28.5 GM/DL — LOW (ref 32–36)
MCHC RBC-ENTMCNC: 29.4 GM/DL — LOW (ref 32–36)
MCV RBC AUTO: 84.8 FL — SIGNIFICANT CHANGE UP (ref 80–100)
MCV RBC AUTO: 85.1 FL — SIGNIFICANT CHANGE UP (ref 80–100)
MONOCYTES # BLD AUTO: 2.73 K/UL — HIGH (ref 0–0.9)
MONOCYTES NFR BLD AUTO: 8.6 % — SIGNIFICANT CHANGE UP (ref 2–14)
NEUTROPHILS # BLD AUTO: 25.49 K/UL — HIGH (ref 1.8–7.4)
NEUTROPHILS NFR BLD AUTO: 80.2 % — HIGH (ref 43–77)
NIGHT BLUE STAIN TISS: SIGNIFICANT CHANGE UP
NRBC # BLD: 0 /100 WBCS — SIGNIFICANT CHANGE UP
NRBC # BLD: 0 /100 WBCS — SIGNIFICANT CHANGE UP
NRBC # FLD: 0.1 K/UL — HIGH
NRBC # FLD: 0.12 K/UL — HIGH
OVALOCYTES BLD QL SMEAR: SLIGHT — SIGNIFICANT CHANGE UP
PHOSPHATE SERPL-MCNC: 4.1 MG/DL — SIGNIFICANT CHANGE UP (ref 2.5–4.5)
PHOSPHATE SERPL-MCNC: 4.4 MG/DL — SIGNIFICANT CHANGE UP (ref 2.5–4.5)
PLAT MORPH BLD: ABNORMAL
PLATELET # BLD AUTO: 758 K/UL — HIGH (ref 150–400)
PLATELET # BLD AUTO: 834 K/UL — HIGH (ref 150–400)
PLATELET COUNT - ESTIMATE: ABNORMAL
POIKILOCYTOSIS BLD QL AUTO: SLIGHT — SIGNIFICANT CHANGE UP
POLYCHROMASIA BLD QL SMEAR: SLIGHT — SIGNIFICANT CHANGE UP
POTASSIUM SERPL-MCNC: 3.8 MMOL/L — SIGNIFICANT CHANGE UP (ref 3.5–5.3)
POTASSIUM SERPL-MCNC: 4.3 MMOL/L — SIGNIFICANT CHANGE UP (ref 3.5–5.3)
POTASSIUM SERPL-SCNC: 3.8 MMOL/L — SIGNIFICANT CHANGE UP (ref 3.5–5.3)
POTASSIUM SERPL-SCNC: 4.3 MMOL/L — SIGNIFICANT CHANGE UP (ref 3.5–5.3)
PROTHROM AB SERPL-ACNC: 23.7 SEC — HIGH (ref 10.5–13.4)
RBC # BLD: 2.76 M/UL — LOW (ref 3.8–5.2)
RBC # BLD: 2.9 M/UL — LOW (ref 3.8–5.2)
RBC # FLD: 16.1 % — HIGH (ref 10.3–14.5)
RBC # FLD: 17.2 % — HIGH (ref 10.3–14.5)
RBC BLD AUTO: ABNORMAL
RH IG SCN BLD-IMP: POSITIVE — SIGNIFICANT CHANGE UP
RH IG SCN BLD-IMP: POSITIVE — SIGNIFICANT CHANGE UP
ROULEAUX BLD QL SMEAR: PRESENT
RSV RNA NPH QL NAA+NON-PROBE: SIGNIFICANT CHANGE UP
SARS-COV-2 RNA SPEC QL NAA+PROBE: SIGNIFICANT CHANGE UP
SODIUM SERPL-SCNC: 132 MMOL/L — LOW (ref 135–145)
SODIUM SERPL-SCNC: 132 MMOL/L — LOW (ref 135–145)
SPECIMEN SOURCE: SIGNIFICANT CHANGE UP
WBC # BLD: 44.03 K/UL — CRITICAL HIGH (ref 3.8–10.5)
WBC # BLD: 46.96 K/UL — CRITICAL HIGH (ref 3.8–10.5)
WBC # FLD AUTO: 44.03 K/UL — CRITICAL HIGH (ref 3.8–10.5)
WBC # FLD AUTO: 46.96 K/UL — CRITICAL HIGH (ref 3.8–10.5)

## 2022-08-11 PROCEDURE — 88304 TISSUE EXAM BY PATHOLOGIST: CPT | Mod: 26

## 2022-08-11 PROCEDURE — 99223 1ST HOSP IP/OBS HIGH 75: CPT

## 2022-08-11 PROCEDURE — 76937 US GUIDE VASCULAR ACCESS: CPT | Mod: 26

## 2022-08-11 PROCEDURE — 99292 CRITICAL CARE ADDL 30 MIN: CPT | Mod: 24

## 2022-08-11 PROCEDURE — 71045 X-RAY EXAM CHEST 1 VIEW: CPT | Mod: 26

## 2022-08-11 PROCEDURE — 99291 CRITICAL CARE FIRST HOUR: CPT | Mod: GC,25

## 2022-08-11 PROCEDURE — 36556 INSERT NON-TUNNEL CV CATH: CPT

## 2022-08-11 PROCEDURE — 99233 SBSQ HOSP IP/OBS HIGH 50: CPT | Mod: GC,25

## 2022-08-11 PROCEDURE — 11043 DBRDMT MUSC&/FSCA 1ST 20/<: CPT | Mod: 59

## 2022-08-11 PROCEDURE — 93010 ELECTROCARDIOGRAM REPORT: CPT

## 2022-08-11 PROCEDURE — 99291 CRITICAL CARE FIRST HOUR: CPT | Mod: 24

## 2022-08-11 PROCEDURE — 11046 DBRDMT MUSC&/FSCA EA ADDL: CPT | Mod: 59

## 2022-08-11 PROCEDURE — 97606 NEG PRS WND THER DME>50 SQCM: CPT

## 2022-08-11 RX ORDER — SODIUM CHLORIDE 9 MG/ML
1000 INJECTION, SOLUTION INTRAVENOUS
Refills: 0 | Status: DISCONTINUED | OUTPATIENT
Start: 2022-08-11 | End: 2022-08-12

## 2022-08-11 RX ORDER — MAGNESIUM SULFATE 500 MG/ML
2 VIAL (ML) INJECTION ONCE
Refills: 0 | Status: COMPLETED | OUTPATIENT
Start: 2022-08-11 | End: 2022-08-11

## 2022-08-11 RX ORDER — PHENYLEPHRINE HYDROCHLORIDE 10 MG/ML
0.1 INJECTION INTRAVENOUS
Qty: 40 | Refills: 0 | Status: DISCONTINUED | OUTPATIENT
Start: 2022-08-11 | End: 2022-08-11

## 2022-08-11 RX ORDER — SODIUM CHLORIDE 9 MG/ML
1000 INJECTION, SOLUTION INTRAVENOUS ONCE
Refills: 0 | Status: COMPLETED | OUTPATIENT
Start: 2022-08-11 | End: 2022-08-11

## 2022-08-11 RX ORDER — SODIUM CHLORIDE 9 MG/ML
250 INJECTION INTRAMUSCULAR; INTRAVENOUS; SUBCUTANEOUS ONCE
Refills: 0 | Status: DISCONTINUED | OUTPATIENT
Start: 2022-08-11 | End: 2022-08-11

## 2022-08-11 RX ORDER — INSULIN HUMAN 100 [IU]/ML
10.5 INJECTION, SOLUTION SUBCUTANEOUS
Qty: 100 | Refills: 0 | Status: DISCONTINUED | OUTPATIENT
Start: 2022-08-11 | End: 2022-08-12

## 2022-08-11 RX ORDER — INSULIN GLARGINE 100 [IU]/ML
20 INJECTION, SOLUTION SUBCUTANEOUS ONCE
Refills: 0 | Status: COMPLETED | OUTPATIENT
Start: 2022-08-11 | End: 2022-08-11

## 2022-08-11 RX ORDER — CEFEPIME 1 G/1
2000 INJECTION, POWDER, FOR SOLUTION INTRAMUSCULAR; INTRAVENOUS EVERY 12 HOURS
Refills: 0 | Status: DISCONTINUED | OUTPATIENT
Start: 2022-08-11 | End: 2022-08-11

## 2022-08-11 RX ORDER — NOREPINEPHRINE BITARTRATE/D5W 8 MG/250ML
0.05 PLASTIC BAG, INJECTION (ML) INTRAVENOUS
Qty: 8 | Refills: 0 | Status: DISCONTINUED | OUTPATIENT
Start: 2022-08-11 | End: 2022-08-12

## 2022-08-11 RX ORDER — INSULIN GLARGINE 100 [IU]/ML
10 INJECTION, SOLUTION SUBCUTANEOUS ONCE
Refills: 0 | Status: COMPLETED | OUTPATIENT
Start: 2022-08-11 | End: 2022-08-11

## 2022-08-11 RX ORDER — PHYTONADIONE (VIT K1) 5 MG
5 TABLET ORAL ONCE
Refills: 0 | Status: COMPLETED | OUTPATIENT
Start: 2022-08-11 | End: 2022-08-11

## 2022-08-11 RX ORDER — ACETAMINOPHEN 500 MG
1000 TABLET ORAL EVERY 6 HOURS
Refills: 0 | Status: COMPLETED | OUTPATIENT
Start: 2022-08-11 | End: 2022-08-12

## 2022-08-11 RX ORDER — HYDROMORPHONE HYDROCHLORIDE 2 MG/ML
0.5 INJECTION INTRAMUSCULAR; INTRAVENOUS; SUBCUTANEOUS
Refills: 0 | Status: DISCONTINUED | OUTPATIENT
Start: 2022-08-11 | End: 2022-08-18

## 2022-08-11 RX ORDER — INSULIN HUMAN 100 [IU]/ML
9 INJECTION, SOLUTION SUBCUTANEOUS ONCE
Refills: 0 | Status: COMPLETED | OUTPATIENT
Start: 2022-08-11 | End: 2022-08-11

## 2022-08-11 RX ORDER — CHLORHEXIDINE GLUCONATE 213 G/1000ML
1 SOLUTION TOPICAL DAILY
Refills: 0 | Status: DISCONTINUED | OUTPATIENT
Start: 2022-08-11 | End: 2022-09-18

## 2022-08-11 RX ORDER — VANCOMYCIN HCL 1 G
1500 VIAL (EA) INTRAVENOUS EVERY 12 HOURS
Refills: 0 | Status: DISCONTINUED | OUTPATIENT
Start: 2022-08-11 | End: 2022-08-13

## 2022-08-11 RX ORDER — CHLORHEXIDINE GLUCONATE 213 G/1000ML
15 SOLUTION TOPICAL EVERY 12 HOURS
Refills: 0 | Status: DISCONTINUED | OUTPATIENT
Start: 2022-08-11 | End: 2022-08-15

## 2022-08-11 RX ORDER — MEROPENEM 1 G/30ML
1000 INJECTION INTRAVENOUS EVERY 8 HOURS
Refills: 0 | Status: DISCONTINUED | OUTPATIENT
Start: 2022-08-11 | End: 2022-08-13

## 2022-08-11 RX ORDER — PHENYLEPHRINE HYDROCHLORIDE 10 MG/ML
0.1 INJECTION INTRAVENOUS
Qty: 160 | Refills: 0 | Status: DISCONTINUED | OUTPATIENT
Start: 2022-08-11 | End: 2022-08-11

## 2022-08-11 RX ORDER — HYDROMORPHONE HYDROCHLORIDE 2 MG/ML
1 INJECTION INTRAMUSCULAR; INTRAVENOUS; SUBCUTANEOUS
Refills: 0 | Status: DISCONTINUED | OUTPATIENT
Start: 2022-08-11 | End: 2022-08-18

## 2022-08-11 RX ORDER — PANTOPRAZOLE SODIUM 20 MG/1
40 TABLET, DELAYED RELEASE ORAL EVERY 12 HOURS
Refills: 0 | Status: DISCONTINUED | OUTPATIENT
Start: 2022-08-11 | End: 2022-08-20

## 2022-08-11 RX ORDER — PROPOFOL 10 MG/ML
25 INJECTION, EMULSION INTRAVENOUS
Qty: 1000 | Refills: 0 | Status: DISCONTINUED | OUTPATIENT
Start: 2022-08-11 | End: 2022-08-13

## 2022-08-11 RX ADMIN — INSULIN HUMAN 9 UNIT(S): 100 INJECTION, SOLUTION SUBCUTANEOUS at 19:23

## 2022-08-11 RX ADMIN — Medication 400 MILLIGRAM(S): at 12:36

## 2022-08-11 RX ADMIN — ERGOCALCIFEROL 50000 UNIT(S): 1.25 CAPSULE ORAL at 12:37

## 2022-08-11 RX ADMIN — Medication 25 GRAM(S): at 12:36

## 2022-08-11 RX ADMIN — HEPARIN SODIUM 5000 UNIT(S): 5000 INJECTION INTRAVENOUS; SUBCUTANEOUS at 06:41

## 2022-08-11 RX ADMIN — HYDROMORPHONE HYDROCHLORIDE 0.5 MILLIGRAM(S): 2 INJECTION INTRAMUSCULAR; INTRAVENOUS; SUBCUTANEOUS at 20:13

## 2022-08-11 RX ADMIN — Medication 6: at 06:40

## 2022-08-11 RX ADMIN — Medication 6: at 12:19

## 2022-08-11 RX ADMIN — Medication 1 TABLET(S): at 12:37

## 2022-08-11 RX ADMIN — Medication 25 GRAM(S): at 10:35

## 2022-08-11 RX ADMIN — HEPARIN SODIUM 5000 UNIT(S): 5000 INJECTION INTRAVENOUS; SUBCUTANEOUS at 22:31

## 2022-08-11 RX ADMIN — Medication 8: at 17:06

## 2022-08-11 RX ADMIN — SODIUM CHLORIDE 100 MILLILITER(S): 9 INJECTION, SOLUTION INTRAVENOUS at 22:32

## 2022-08-11 RX ADMIN — Medication 100 MILLIGRAM(S): at 14:12

## 2022-08-11 RX ADMIN — SODIUM CHLORIDE 150 MILLILITER(S): 9 INJECTION, SOLUTION INTRAVENOUS at 12:35

## 2022-08-11 RX ADMIN — INSULIN GLARGINE 20 UNIT(S): 100 INJECTION, SOLUTION SUBCUTANEOUS at 08:10

## 2022-08-11 RX ADMIN — Medication 75 MICROGRAM(S): at 06:54

## 2022-08-11 RX ADMIN — SODIUM CHLORIDE 100 MILLILITER(S): 9 INJECTION, SOLUTION INTRAVENOUS at 05:59

## 2022-08-11 RX ADMIN — CEFEPIME 100 MILLIGRAM(S): 1 INJECTION, POWDER, FOR SOLUTION INTRAMUSCULAR; INTRAVENOUS at 12:36

## 2022-08-11 RX ADMIN — Medication 500 MILLIGRAM(S): at 12:37

## 2022-08-11 RX ADMIN — Medication 9.26 MICROGRAM(S)/KG/MIN: at 12:35

## 2022-08-11 RX ADMIN — HEPARIN SODIUM 5000 UNIT(S): 5000 INJECTION INTRAVENOUS; SUBCUTANEOUS at 14:12

## 2022-08-11 RX ADMIN — SODIUM CHLORIDE 150 MILLILITER(S): 9 INJECTION, SOLUTION INTRAVENOUS at 19:24

## 2022-08-11 RX ADMIN — MEROPENEM 100 MILLIGRAM(S): 1 INJECTION INTRAVENOUS at 22:05

## 2022-08-11 RX ADMIN — INSULIN HUMAN 3 UNIT(S)/HR: 100 INJECTION, SOLUTION SUBCUTANEOUS at 18:02

## 2022-08-11 RX ADMIN — SODIUM CHLORIDE 1000 MILLILITER(S): 9 INJECTION, SOLUTION INTRAVENOUS at 05:59

## 2022-08-11 RX ADMIN — PROPOFOL 14.8 MICROGRAM(S)/KG/MIN: 10 INJECTION, EMULSION INTRAVENOUS at 19:24

## 2022-08-11 RX ADMIN — CHLORHEXIDINE GLUCONATE 15 MILLILITER(S): 213 SOLUTION TOPICAL at 06:41

## 2022-08-11 RX ADMIN — INSULIN GLARGINE 10 UNIT(S): 100 INJECTION, SOLUTION SUBCUTANEOUS at 14:40

## 2022-08-11 RX ADMIN — PANTOPRAZOLE SODIUM 40 MILLIGRAM(S): 20 TABLET, DELAYED RELEASE ORAL at 18:02

## 2022-08-11 RX ADMIN — MEROPENEM 100 MILLIGRAM(S): 1 INJECTION INTRAVENOUS at 15:29

## 2022-08-11 RX ADMIN — Medication 100 MILLIGRAM(S): at 22:31

## 2022-08-11 RX ADMIN — HYDROMORPHONE HYDROCHLORIDE 0.5 MILLIGRAM(S): 2 INJECTION INTRAMUSCULAR; INTRAVENOUS; SUBCUTANEOUS at 20:30

## 2022-08-11 RX ADMIN — Medication 5 MILLIGRAM(S): at 12:36

## 2022-08-11 RX ADMIN — Medication 300 MILLIGRAM(S): at 12:20

## 2022-08-11 RX ADMIN — Medication 400 MILLIGRAM(S): at 17:00

## 2022-08-11 RX ADMIN — Medication 100 MILLIGRAM(S): at 06:41

## 2022-08-11 RX ADMIN — PROPOFOL 14.8 MICROGRAM(S)/KG/MIN: 10 INJECTION, EMULSION INTRAVENOUS at 12:35

## 2022-08-11 RX ADMIN — SODIUM CHLORIDE 1000 MILLILITER(S): 9 INJECTION, SOLUTION INTRAVENOUS at 18:28

## 2022-08-11 RX ADMIN — NYSTATIN CREAM 1 APPLICATION(S): 100000 CREAM TOPICAL at 17:01

## 2022-08-11 RX ADMIN — PHENYLEPHRINE HYDROCHLORIDE 1.85 MICROGRAM(S)/KG/MIN: 10 INJECTION INTRAVENOUS at 05:59

## 2022-08-11 RX ADMIN — NYSTATIN CREAM 1 APPLICATION(S): 100000 CREAM TOPICAL at 06:53

## 2022-08-11 RX ADMIN — CHLORHEXIDINE GLUCONATE 15 MILLILITER(S): 213 SOLUTION TOPICAL at 17:01

## 2022-08-11 RX ADMIN — PROPOFOL 14.8 MICROGRAM(S)/KG/MIN: 10 INJECTION, EMULSION INTRAVENOUS at 05:59

## 2022-08-11 RX ADMIN — Medication 9.26 MICROGRAM(S)/KG/MIN: at 19:23

## 2022-08-11 NOTE — BRIEF OPERATIVE NOTE - OPERATION/FINDINGS
large necrotic wound left flank connected to previously open sacral decub with exposed sacrum  extensive wide debridement of left flank to mid-back, not involving anus  wound vac placed (2.5 large black sponge used) with bridge to left anterior pannus

## 2022-08-11 NOTE — PROGRESS NOTE ADULT - ASSESSMENT
52F immobile 2/2 MS, poorly controlled T2DM, asthma, hypothyroidism, known chronic wounds sacrum (stage 4), vulvar/Rt thigh, and right calf. Recent April 2022 hospitalization for urosepsis. Presented from [place] with concerns for UTI and anemia (6.8 from baseline 8.0). Found to have new malodorous wound on L hip to L flank area, febrile to 103.7. Surgery consulted for potential necrotizing soft tissue infection.     PLAN:  -IVF:  -Pain Control with  -Nausea control PRN   -Diet:   -Abx:   -DVT Prophylaxis:   -OOB and ambulate  -Encourage IS  -Dispo:     B Team Surgery  [id number]  52F immobile 2/2 MS, poorly controlled T2DM, asthma, hypothyroidism, known chronic wounds sacrum (stage 4), vulvar/Rt thigh, and right calf. Recent April 2022 hospitalization for urosepsis. Presented from [place] with concerns for UTI and anemia (6.8 from baseline 8.0). Found to have new malodorous wound on L hip to L flank area, febrile to 103.7. Surgery consulted for potential necrotizing soft tissue infection.     PLAN:  - Pain Control  - Wean sedation and extubate per SICU  - NPO/ OGT in place  - Continue negative pressure wound care therapy  - Abx: Vanc/Clinda/Cefepime  - DM on Lantus 30, mISS  - DVT Prophylaxis: SQ  - Appreciate SICU care    B Team Surgery  [id number]

## 2022-08-11 NOTE — PROCEDURE NOTE - NSICDXPROCEDURE_GEN_ALL_CORE_FT
PROCEDURES:  Insertion of central venous catheter with ultrasound guidance 11-Aug-2022 17:58:15  Stephan Sotelo

## 2022-08-11 NOTE — PATIENT PROFILE ADULT - HISTORY OF COVID-19 VACCINATION
Detail Level: Zone
Initiate Treatment: Mupirocin ointment- aaa left thigh bid until healed
Initiate Treatment: Mupirocin ointment- aaa left lower leg bid until healed
Vaccine status unknown

## 2022-08-11 NOTE — PROCEDURE NOTE - ADDITIONAL PROCEDURE DETAILS
Left femoral vein triple lumen CVC inserted under ultrasound guidance via sterile Seldinger technique, good blood return on 3 lumens, flushes easily, guidewire visualized in lumen. Tolerated well. Right femoral vein triple lumen CVC inserted under ultrasound guidance via sterile Seldinger technique, good blood return on 3 lumens, flushes easily, guidewire visualized in lumen. Tolerated well.

## 2022-08-11 NOTE — PROGRESS NOTE ADULT - SUBJECTIVE AND OBJECTIVE BOX
SICU Daily Progress Note  =====================================================  Interval/Overnight Events:      - hypotensive requiring nayeli gtt, changed to levo peripherally.  - hyperglycemia, Lantus 20u ordered (30u home dose)  - OGT to LCWS  - metabolic acidosis bicarb 14 with incomplete respiratory compensation    HPI:  52F immobile 2/2 MS, poorly controlled T2DM, asthma, hypothyroidism, known chronic wounds sacrum (stage 4), vulvar/Rt thigh, and right calf. Recent April 2022 hospitalization for urosepsis. Presented from [facility] with concerns for UTI and anemia (6.8 from baseline 8.0). Found to have new malodorous wound on L flank area, concerning for necrotizing soft tissue infection. Febrile to 103.7. s/p debridement of wound L flank to midback.       ALLERGIES:  No Known Drug Allergies  Pineapple (Anaphylaxis)      --------------------------------------------------------------------------------------    MEDICATIONS:    Neurologic Medications  acetaminophen   IVPB .. 1000 milliGRAM(s) IV Intermittent every 6 hours  HYDROmorphone  Injectable 1 milliGRAM(s) IV Push every 3 hours PRN Severe Pain (7 - 10)  HYDROmorphone  Injectable 0.5 milliGRAM(s) IV Push every 3 hours PRN Moderate Pain (4 - 6)  propofol Infusion 25 MICROgram(s)/kG/Min IV Continuous <Continuous>    Respiratory Medications  ALBUTerol    90 MICROgram(s) HFA Inhaler 2 Puff(s) Inhalation every 6 hours PRN Shortness of Breath    Cardiovascular Medications  norepinephrine Infusion 0.05 MICROgram(s)/kG/Min IV Continuous <Continuous>  phenylephrine    Infusion 0.1 MICROgram(s)/kG/Min IV Continuous <Continuous>    Gastrointestinal Medications  ascorbic acid 500 milliGRAM(s) Oral daily  ergocalciferol 39112 Unit(s) Oral every week  lactated ringers. 1000 milliLiter(s) IV Continuous <Continuous>  multivitamin 1 Tablet(s) Oral daily    Hematologic/Oncologic Medications  heparin   Injectable 5000 Unit(s) SubCutaneous every 8 hours    Antimicrobial/Immunologic Medications  cefepime   IVPB 2000 milliGRAM(s) IV Intermittent every 12 hours  clindamycin IVPB 600 milliGRAM(s) IV Intermittent every 8 hours  vancomycin  IVPB 1500 milliGRAM(s) IV Intermittent every 12 hours    Endocrine/Metabolic Medications  dextrose 50% Injectable 25 Gram(s) IV Push once  dextrose 50% Injectable 12.5 Gram(s) IV Push once  insulin glargine Injectable (LANTUS) 10 Unit(s) SubCutaneous once  insulin lispro (ADMELOG) corrective regimen sliding scale   SubCutaneous every 6 hours  levothyroxine 75 MICROGram(s) Oral daily    Topical/Other Medications  chlorhexidine 0.12% Liquid 15 milliLiter(s) Oral Mucosa every 12 hours  nystatin Powder 1 Application(s) Topical two times a day    --------------------------------------------------------------------------------------    VITAL SIGNS:  T(C): 35.7 (08-11-22 @ 04:00), Max: 39.8 (08-10-22 @ 21:06)  HR: 90 (08-11-22 @ 12:00) (80 - 127)  BP: 86/57 (08-11-22 @ 06:00) (86/57 - 139/72)  RR: 20 (08-11-22 @ 11:00) (16 - 20)  SpO2: 100% (08-11-22 @ 12:00) (100% - 100%)  --------------------------------------------------------------------------------------    INS AND OUTS:    08-11-22 @ 07:01  -  08-11-22 @ 13:23  --------------------------------------------------------  IN: 1655.8 mL / OUT: 170 mL / NET: 1485.8 mL      --------------------------------------------------------------------------------------    EXAM    NEURO: NAD, sedated  HEENT: NC/AT  RESPIRATORY: intubated   Mode: AC/ CMV (Assist Control/ Continuous Mandatory Ventilation)  RR (machine): 20  TV (machine): 400  FiO2: 50  PEEP: 6  ITime: 0.71  MAP: 10  PIP: 20  CARDIO: RRR, S1S2  ABDOMEN: soft, nontender, nondistended, OGT to LCWS.  EXTREMITIES: no edema, no deformities.    --------------------------------------------------------------------------------------    LABS                        6.9    46.96 )-----------( 834      ( 11 Aug 2022 04:00 )             23.5   08-11    132<L>  |  102  |  18  ----------------------------<  267<H>  3.8   |  12<L>  |  0.61    Ca    8.1<L>      11 Aug 2022 04:00  Phos  4.1     08-11  Mg     1.30     08-11    TPro  7.3  /  Alb  2.1<L>  /  TBili  <0.2  /  DBili  x   /  AST  10  /  ALT  10  /  AlkPhos  247<H>  08-10  -------------------------------------------------------------------------------------- SICU Daily Progress Note  =====================================================  Interval/Overnight Events:      - hypotensive requiring nayeli gtt, changed to levo peripherally.  - hyperglycemia, Lantus 20u ordered (30u home dose)  - OGT to LCWS  - metabolic acidosis bicarb 14 with incomplete respiratory compensation    HPI:  52F immobile 2/2 MS, poorly controlled T2DM, asthma, hypothyroidism, known chronic wounds sacrum (stage 4), vulvar/Rt thigh, and right calf. Recent April 2022 hospitalization for urosepsis. Presented from [facility] with concerns for UTI and anemia (6.8 from baseline 8.0). Found to have new malodorous wound on L flank area, concerning for necrotizing soft tissue infection. Febrile to 103.7. s/p debridement of wound L flank to midback.       ALLERGIES:  No Known Drug Allergies  Pineapple (Anaphylaxis)      --------------------------------------------------------------------------------------    MEDICATIONS:    Neurologic Medications  acetaminophen   IVPB .. 1000 milliGRAM(s) IV Intermittent every 6 hours  HYDROmorphone  Injectable 1 milliGRAM(s) IV Push every 3 hours PRN Severe Pain (7 - 10)  HYDROmorphone  Injectable 0.5 milliGRAM(s) IV Push every 3 hours PRN Moderate Pain (4 - 6)  propofol Infusion 25 MICROgram(s)/kG/Min IV Continuous <Continuous>    Respiratory Medications  ALBUTerol    90 MICROgram(s) HFA Inhaler 2 Puff(s) Inhalation every 6 hours PRN Shortness of Breath    Cardiovascular Medications  norepinephrine Infusion 0.05 MICROgram(s)/kG/Min IV Continuous <Continuous>  phenylephrine    Infusion 0.1 MICROgram(s)/kG/Min IV Continuous <Continuous>    Gastrointestinal Medications  ascorbic acid 500 milliGRAM(s) Oral daily  ergocalciferol 52934 Unit(s) Oral every week  lactated ringers. 1000 milliLiter(s) IV Continuous <Continuous>  multivitamin 1 Tablet(s) Oral daily    Hematologic/Oncologic Medications  heparin   Injectable 5000 Unit(s) SubCutaneous every 8 hours    Antimicrobial/Immunologic Medications  cefepime   IVPB 2000 milliGRAM(s) IV Intermittent every 12 hours  clindamycin IVPB 600 milliGRAM(s) IV Intermittent every 8 hours  vancomycin  IVPB 1500 milliGRAM(s) IV Intermittent every 12 hours    Endocrine/Metabolic Medications  dextrose 50% Injectable 25 Gram(s) IV Push once  dextrose 50% Injectable 12.5 Gram(s) IV Push once  insulin glargine Injectable (LANTUS) 10 Unit(s) SubCutaneous once  insulin lispro (ADMELOG) corrective regimen sliding scale   SubCutaneous every 6 hours  levothyroxine 75 MICROGram(s) Oral daily    Topical/Other Medications  chlorhexidine 0.12% Liquid 15 milliLiter(s) Oral Mucosa every 12 hours  nystatin Powder 1 Application(s) Topical two times a day    --------------------------------------------------------------------------------------    VITAL SIGNS:  T(C): 35.7 (08-11-22 @ 04:00), Max: 39.8 (08-10-22 @ 21:06)  HR: 90 (08-11-22 @ 12:00) (80 - 127)  BP: 86/57 (08-11-22 @ 06:00) (86/57 - 139/72)  RR: 20 (08-11-22 @ 11:00) (16 - 20)  SpO2: 100% (08-11-22 @ 12:00) (100% - 100%)  --------------------------------------------------------------------------------------    INS AND OUTS:    08-11-22 @ 07:01  -  08-11-22 @ 13:23  --------------------------------------------------------  IN: 1655.8 mL / OUT: 170 mL / NET: 1485.8 mL      --------------------------------------------------------------------------------------    EXAM    NEURO: NAD, sedated  HEENT: NC/AT  RESPIRATORY: intubated   Mode: AC/ CMV (Assist Control/ Continuous Mandatory Ventilation)  RR (machine): 20  TV (machine): 400  FiO2: 50  PEEP: 6  ITime: 0.71  MAP: 10  PIP: 20  CARDIO: RRR, S1S2  ABDOMEN: soft, nontender, nondistended, OGT to LCWS.  EXTREMITIES: no edema, L FLANK/SACRUM wound vac in place with suction intact    --------------------------------------------------------------------------------------    LABS                        6.9    46.96 )-----------( 834      ( 11 Aug 2022 04:00 )             23.5   08-11    132<L>  |  102  |  18  ----------------------------<  267<H>  3.8   |  12<L>  |  0.61    Ca    8.1<L>      11 Aug 2022 04:00  Phos  4.1     08-11  Mg     1.30     08-11    TPro  7.3  /  Alb  2.1<L>  /  TBili  <0.2  /  DBili  x   /  AST  10  /  ALT  10  /  AlkPhos  247<H>  08-10  --------------------------------------------------------------------------------------

## 2022-08-11 NOTE — ED ADULT NURSE REASSESSMENT NOTE - NS ED NURSE REASSESS COMMENT FT1
report given to OR BOOKER Peter pt last meal was milk shake at around 11am (13 hrs ago). pt has no metal in the body, weight communicated with RN, pt is AAOx4, being transported to OR

## 2022-08-11 NOTE — DIETITIAN INITIAL EVALUATION ADULT - ADD RECOMMEND
1) Monitor weights, labs, BM's, skin integrity, FS; 2) Continue MVI supplement to meet micronutrient needs for healing;  3) Please obtain HgbA1c;  4) Enteral nutrition support as needed.

## 2022-08-11 NOTE — PROGRESS NOTE ADULT - ASSESSMENT
52F immobile 2/2 MS, poorly controlled T2DM, asthma, hypothyroidism, known chronic wounds sacrum (stage 4), vulvar/Rt thigh, and right calf. Recent April 2022 hospitalization for urosepsis. Presented from [facility] with concerns for UTI and anemia (6.8 from baseline 8.0). Found to have new malodorous wound on L hip to L flank area, concerning for necrotizing fasciitis. Febrile to 103.7. s/p debridement of wound L hip to flank.     PLAN  Neurologic:   - PMH multiple sclerosis  - sedation w propofol  - Monitor fever curve - IV tylenol    Respiratory:   - PMH asthma   - currently intubated  - Albuterol PRN when back on RA    Cardiovascular:   - A-line in place  - nayeli    Gastrointestinal/Nutrition:   - Diet: NPO except medications  - Vit C 500mg PO daily  - Vit D 50,000u PO daily  - Multivitamin  - IVF     Renal/Genitourinary:   - Rose  - Monitor I&Os  - 8/11 pt acidotic to 7.21. Lactate 4.2. AG 20.   - Repeat ABGs. Trend lactate    Hematologic:   - PMH chronic anemia. 6.9 hgb on admission. 2 FFP, 2 PRBCs administered in OR 8/11  - Monitor H&H. Trend WBC  - Transfuse PRN  - DVT PPx heparin subq q8h    Infectious Disease:   - Necrotizing fasciitis L hip to L flank, s/p debridement  - Multiple chronic pressure ulcers  - c/w cefepime, clindamycin, vancomycin  - monitor vanc troughs  - Nystatin powder topical BID for groin  - chlorhexidine q12h mouth swab    Endocrine:   - hx DM2 and hypothyroidism  - Lantus 30u at bedtime  - Lispro ISS   - currently holding 8u pre-meal ademlog while NPO  - Monitor FSG q6h  - c/w home levothyroxine 75mcg PO daily    Tubes/Lines/Drains:   - Rose  - A-line  - OG tube  - ET tube    Disposition:   - SICU 52F immobile 2/2 MS, poorly controlled T2DM, asthma, hypothyroidism, known chronic wounds sacrum (stage 4), vulvar/Rt thigh, and right calf. Recent April 2022 hospitalization for urosepsis. Presented from [facility] with concerns for UTI and anemia (6.8 from baseline 8.0). Found to have new malodorous wound on L flank area, concerning for necrotizing soft tissue infection. Febrile to 103.7. s/p debridement of wound L flank to midback.     PLAN  Neurologic:   - PMH multiple sclerosis  - sedation w propofol  - Monitor fever curve - IV tylenol    Respiratory:   - PMH asthma   - currently intubated  - Albuterol PRN when back on RA    Cardiovascular:   - A-line in place  - nayeli    Gastrointestinal/Nutrition:   - Diet: NPO except medications  - Vit C 500mg PO daily  - Vit D 50,000u PO daily  - Multivitamin  - IVF     Renal/Genitourinary:   - Rose  - Monitor I&Os  - 8/11 pt acidotic to 7.21. Lactate 4.2. AG 20.   - Repeat ABGs. Trend lactate    Hematologic:   - PMH chronic anemia. 6.9 hgb on admission. 2 FFP, 2 PRBCs administered in OR 8/11  - Monitor H&H. Trend WBC  - Transfuse PRN  - DVT PPx heparin subq q8h    Infectious Disease:   - Necrotizing soft tissue L flank to midback, s/p debridement  - Multiple chronic pressure ulcers  - c/w cefepime, clindamycin, vancomycin  - monitor vanc troughs  - Nystatin powder topical BID for groin  - chlorhexidine q12h mouth swab    Endocrine:   - hx DM2 and hypothyroidism  - Lantus 30u at bedtime  - Lispro ISS   - currently holding 8u pre-meal ademlog while NPO  - Monitor FSG q6h  - c/w home levothyroxine 75mcg PO daily    Tubes/Lines/Drains:   - Rose  - A-line  - OG tube  - ET tube    Disposition:   - SICU 52F immobile 2/2 MS, poorly controlled T2DM, asthma, hypothyroidism, known chronic wounds sacrum (stage 4), vulvar/Rt thigh, and right calf. Recent April 2022 hospitalization for urosepsis. Presented from [facility] with concerns for UTI and anemia (6.8 from baseline 8.0). Found to have new malodorous wound on L flank area, concerning for necrotizing soft tissue infection. Febrile to 103.7. s/p debridement of wound L flank to midback.     PLAN  Neurologic:   - PMH multiple sclerosis  - sedation w propofol   - Monitor fever curve - IV tylenol  - dilaudid prn     Respiratory:   - PMH asthma   - Albuterol PRN when back on RA  - intubated, AC 18/400/6/30  - trend ABG q4    Cardiovascular:   - A-line in place  - nayeli .2   - place central line and consider transition to levo    Gastrointestinal/Nutrition:   - Diet: NPO except medications, OG tube  - Vit C 500mg PO daily  - Vit D 50,000u PO daily  - Multivitamin  - IVF     Renal/Genitourinary:   - LR @ 100cc/hr  - Rose  - Monitor I&Os  - 8/11 pt acidotic to 7.21. Lactate 4.2. AG 20.   - Repeat ABGs. Trend lactate    Hematologic:   - PMH chronic anemia. 6.9 hgb on admission. 2 FFP, 2 PRBCs administered in OR 8/11, additional unit PRBC today  - Monitor H&H. Trend WBC  - Transfuse PRN  - DVT PPx heparin subq q8h  - PO vitamin K    Infectious Disease:   - Necrotizing soft tissue L flank to midback, s/p debridement, wound vac on L flank  - Multiple chronic pressure ulcers  - c/w cefepime, clindamycin, vancomycin  - monitor vanc troughs  - Nystatin powder topical BID for groin  - chlorhexidine q12h mouth swab  - f/u Bcx, Ucx, wound cx  - possible return to OR tomorrow    Endocrine:   - hx DM2 and hypothyroidism  - 20 units of lantus this AM, will continue with AM lantus  - Lispro ISS   - currently holding 8u pre-meal ademlog while NPO  - Monitor FSG q6h  - c/w home levothyroxine 75mcg PO daily    Tubes/Lines/Drains:   - Rose  - A-line  - OG tube  - ET tube  - 3 PIV    Disposition:   - SICU 52F immobile 2/2 MS, poorly controlled T2DM, asthma, hypothyroidism, known chronic wounds sacrum (stage 4), vulvar/Rt thigh, and right calf. Recent April 2022 hospitalization for urosepsis. Presented from [facility] with concerns for UTI and anemia (6.8 from baseline 8.0). Found to have new malodorous wound on L flank area, concerning for necrotizing soft tissue infection. Febrile to 103.7. s/p debridement of wound L flank to midback.     PLAN  Neurologic:   - PMH multiple sclerosis  - sedation w propofol   - Monitor fever curve - IV tylenol  - dilaudid prn     Respiratory:   - PMH asthma   - Albuterol PRN when back on RA  - intubated, AC 18/400/6/30  - trend ABG q4    Cardiovascular:   - A-line in place  - nayeli .2, will switch to levo  - place central line    Gastrointestinal/Nutrition:   - Diet: NPO except medications, OG tube  - Vit C 500mg PO daily  - Vit D 50,000u PO daily  - Multivitamin  - IVF     Renal/Genitourinary:   - LR @ 100cc/hr  - Rose  - Monitor I&Os  - 8/11 pt acidotic to 7.21. Lactate 4.2. AG 20.   - Repeat ABGs. Trend lactate    Hematologic:   - PMH chronic anemia. 6.9 hgb on admission. 2 FFP, 2 PRBCs administered in OR 8/11, additional unit PRBC today  - Monitor H&H. Trend WBC  - Transfuse PRN  - DVT PPx heparin subq q8h  - PO vitamin K    Infectious Disease:   - Necrotizing soft tissue L flank to midback, s/p debridement, wound vac on L flank  - Multiple chronic pressure ulcers  - c/w cefepime, clindamycin, vancomycin  - monitor vanc troughs  - Nystatin powder topical BID for groin  - chlorhexidine q12h mouth swab  - f/u Bcx, Ucx, wound cx  - possible return to OR tomorrow    Endocrine:   - hx DM2 and hypothyroidism  - 20 units of lantus this AM, will continue with AM lantus  - Lispro ISS   - currently holding 8u pre-meal ademlog while NPO  - Monitor FSG q6h  - c/w home levothyroxine 75mcg PO daily    Tubes/Lines/Drains:   - Rose  - A-line  - OG tube  - ET tube  - 3 PIV    Disposition:   - SICU

## 2022-08-11 NOTE — DIETITIAN INITIAL EVALUATION ADULT - OTHER INFO
52F immobile 2/2 MS, poorly controlled T2DM, asthma, hypothyroidism, known chronic wounds sacrum (stage 4), vulvar/Rt thigh, and right calf. Recent April 2022 hospitalization for urosepsis. On cefepime/flagyl for chronic osteo 2/2 unstageable sacral decubitus ulcer, dakins BID packing. Presented from [facility] with concerns for UTI and anemia (6.8 from baseline 8.0) and new malodorous wound on L hip to L flank area. Surgery consulted for potential necrotizing soft tissue infection.

## 2022-08-11 NOTE — DIETITIAN INITIAL EVALUATION ADULT - PERTINENT LABORATORY DATA
08-11    132<L>  |  102  |  18  ----------------------------<  267<H>  3.8   |  12<L>  |  0.61    Ca    8.1<L>      11 Aug 2022 04:00  Phos  4.1     08-11  Mg     1.30     08-11    TPro  7.3  /  Alb  2.1<L>  /  TBili  <0.2  /  DBili  x   /  AST  10  /  ALT  10  /  AlkPhos  247<H>  08-10  POCT Blood Glucose.: 296 mg/dL (08-11-22 @ 06:40)  A1C with Estimated Average Glucose Result: 14.2 % (03-27-22 @ 19:13)

## 2022-08-11 NOTE — DIETITIAN INITIAL EVALUATION ADULT - ENTERAL
If NPO prolonged, consider initiation of enteral nutrition support w/Glucerna 1.5 @20mL/h and advance as tolerated to goal of 45mL/h x24h to provide 1620kcal w/88gm protein and add 1 pack no carb prosource/d to increase protein intake to 103gms/d to meet current needs. If pt continues on oral levothyroxine, change to 22 hour schedule with goal rate of TF @49mL/h to avoid potential food/med interaction.

## 2022-08-11 NOTE — DIETITIAN INITIAL EVALUATION ADULT - ORAL INTAKE PTA/DIET HISTORY
Pt is currently on mechanical ventilation/sedated on propofol.  Extensive chart review conducted to obtain nutrition related information.  OGT placed.  Spoke wRN - possible return to OR today.  Wound care team with pt at time of visit - pt noted with multiple pressure injuries in various locations from Stage III, Stage IV and unstageable.  Pt was transferred from [facility]-transfer notes reviewed.  Pt was ordered for a No Concentrated Sweets diet + David 240mL 2x/d, Glucerna 1.2 3x/d and LPS (protein modular) 60mL.  Pt also receiving ergocalciferol, MVI, FeSO4, and Folic Acid at [facility].  Pt noted not to have difficulty swallowing foods/fluids.  Pt w/recent [facility] admission 3-4/2022-Pt was receiving consistent carbohydrate diet w/evening snack.  Wt reported as 110kg.  Pressure injuries indicated at that time as well.  Pt was noted with poor food intake, but expressed desire to lose more weight.  She related to RDN that she weighed up to 426lbs in 2012 with weight down to 242.5 at time of admission.

## 2022-08-11 NOTE — PROGRESS NOTE ADULT - SUBJECTIVE AND OBJECTIVE BOX
S/P washout and debridement  B TEAM Surgery Progress Note  Patient is a 52y old  Female who presents with a chief complaint of Soft tissue infection (11 Aug 2022 03:47)      INTERVAL EVENTS: Patient is POD#0 s/p extensive wide debridement of left flank to mid-back for large necrotic wound left flank connected to previously open sacral decub with exposed sacrum. Recovering in SICU intubated, sedated.  SUBJECTIVE: Patient seen and examined at bedside with surgical team, patient intubated, sedated, unable to offer complaints or ROS.     Vital Signs Last 24 Hrs  T(C): 35.7 (11 Aug 2022 04:00), Max: 39.8 (10 Aug 2022 21:06)  T(F): 96.3 (11 Aug 2022 04:00), Max: 103.7 (10 Aug 2022 21:06)  HR: 88 (11 Aug 2022 07:00) (84 - 127)  BP: 86/57 (11 Aug 2022 06:00) (86/57 - 139/72)  BP(mean): 67 (11 Aug 2022 06:00) (67 - 101)  RR: 20 (11 Aug 2022 07:00) (16 - 20)  SpO2: 100% (11 Aug 2022 07:00) (100% - 100%)    Parameters below as of 11 Aug 2022 04:00  Patient On (Oxygen Delivery Method): ventilator    O2 Concentration (%): 80I&O's Detail  MEDICATIONS  (STANDING):  acetaminophen   IVPB .. 1000 milliGRAM(s) IV Intermittent every 6 hours  ascorbic acid 500 milliGRAM(s) Oral daily  cefepime   IVPB 2000 milliGRAM(s) IV Intermittent every 12 hours  chlorhexidine 0.12% Liquid 15 milliLiter(s) Oral Mucosa every 12 hours  clindamycin IVPB 600 milliGRAM(s) IV Intermittent every 8 hours  dextrose 50% Injectable 25 Gram(s) IV Push once  dextrose 50% Injectable 12.5 Gram(s) IV Push once  ergocalciferol 44831 Unit(s) Oral every week  heparin   Injectable 5000 Unit(s) SubCutaneous every 8 hours  insulin glargine Injectable (LANTUS) 20 Unit(s) SubCutaneous once  insulin glargine Injectable (LANTUS) 30 Unit(s) SubCutaneous at bedtime  insulin lispro (ADMELOG) corrective regimen sliding scale   SubCutaneous every 6 hours  lactated ringers. 1000 milliLiter(s) (100 mL/Hr) IV Continuous <Continuous>  levothyroxine 75 MICROGram(s) Oral daily  magnesium sulfate  IVPB 2 Gram(s) IV Intermittent once  magnesium sulfate  IVPB 2 Gram(s) IV Intermittent once  multivitamin 1 Tablet(s) Oral daily  nystatin Powder 1 Application(s) Topical two times a day  phenylephrine    Infusion 0.1 MICROgram(s)/kG/Min (1.85 mL/Hr) IV Continuous <Continuous>  propofol Infusion 25 MICROgram(s)/kG/Min (14.8 mL/Hr) IV Continuous <Continuous>  vancomycin  IVPB 1500 milliGRAM(s) IV Intermittent every 12 hours    MEDICATIONS  (PRN):  ALBUTerol    90 MICROgram(s) HFA Inhaler 2 Puff(s) Inhalation every 6 hours PRN Shortness of Breath  HYDROmorphone  Injectable 1 milliGRAM(s) IV Push every 3 hours PRN Severe Pain (7 - 10)  HYDROmorphone  Injectable 0.5 milliGRAM(s) IV Push every 3 hours PRN Moderate Pain (4 - 6)      PHYSICAL EXAM:  Constitutional: RASS -4, NAD  Respiratory: Equal chest rise b/l.  Abdomen: Soft, nondistended, NTTP. No rebound or guarding. Vac in place extending from left flank to mid back.   Extremities: WWP    LABS:             ABG - ( 11 Aug 2022 04:00 )  pH, Arterial: 7.25  pH, Blood: x     /  pCO2: 29    /  pO2: 198   / HCO3: 13    / Base Excess: -13.3 /  SaO2: 99.2                 6.9    46.96 )-----------( 834      ( 11 Aug 2022 04:00 )             23.5     08-11    132<L>  |  102  |  18  ----------------------------<  267<H>  3.8   |  12<L>  |  0.61    Ca    8.1<L>      11 Aug 2022 04:00  Phos  4.1     08-  Mg     1.30     08-    TPro  7.3  /  Alb  2.1<L>  /  TBili  <0.2  /  DBili  x   /  AST  10  /  ALT  10  /  AlkPhos  247<H>  08-10    PT/INR - ( 11 Aug 2022 04:00 )   PT: 23.7 sec;   INR: 2.03 ratio         PTT - ( 11 Aug 2022 04:00 )  PTT:33.1 sec  LIVER FUNCTIONS - ( 10 Aug 2022 22:30 )  Alb: 2.1 g/dL / Pro: 7.3 g/dL / ALK PHOS: 247 U/L / ALT: 10 U/L / AST: 10 U/L / GGT: x           Urinalysis Basic - ( 10 Aug 2022 22:30 )    Color: Yellow / Appearance: Turbid / S.017 / pH: x  Gluc: x / Ketone: Negative  / Bili: Negative / Urobili: <2 mg/dL   Blood: x / Protein: 100 mg/dL / Nitrite: Negative   Leuk Esterase: Large / RBC: 25-50 /HPF / WBC >50 /HPF   Sq Epi: x / Non Sq Epi: x / Bacteria: Many      ABO Interpretation: B (08-10-22 @ 23:28)

## 2022-08-11 NOTE — PATIENT PROFILE ADULT - FALL HARM RISK - HARM RISK INTERVENTIONS

## 2022-08-11 NOTE — BRIEF OPERATIVE NOTE - NSICDXBRIEFPROCEDURE_GEN_ALL_CORE_FT
PROCEDURES:  Irrigation and excisional debridement of dermis and epidermis 11-Aug-2022 04:07:58  Alice Frost

## 2022-08-11 NOTE — PATIENT PROFILE ADULT - SAFE PLACE TO LIVE
Michele Bennett MD, VI                       [facility]                     04/16/2020    The patient location is: Home  The chief complaint leading to consultation is: CVA on MRI  Visit type: audiovisual  Total time spent with patient: 13 minutes  Each patient to whom he or she provides medical services by telemedicine is:  (1) informed of the relationship between the physician and patient and the respective role of any other health care provider with respect to management of the patient; and (2) notified that he or she may decline to receive medical services by telemedicine and may withdraw from such care at any time.    Notes:     HPI:  Luis Felipe Hawk is a 58 y.o. male with   Patient Active Problem List   Diagnosis    Coronary artery disease involving native coronary artery of native heart with angina pectoris 2013 [facility] and stent mLCx    Smoker 1 PPD    Essential hypertension    Dyslipidemia    Type 2 diabetes mellitus without complication, without long-term current use of insulin    Adenocarcinoma, lung, right    Mass of right lung    being managed by PCP and specialists who is here today for evaluation of carotid artery occlusive disease.  Patient has not been previously diagnosed with carotid stenosis.  No history of TIA.  + history of CVA in 2000.  No amaurosis fugax.  No lateralizing symptoms.  No prior carotid surgery or stenting.  No prior neck radiation or neck surgery.  Does ambulate on own without functional limitation.  No chest pain and shortness of breath.  He is not on daily Aspirin.  No Plavix.   No statin.  BP is well controlled.  He is being managed for metastatic lung CA.    no MI  Tobacco use: active  Anticoagulation: no    Past Medical History:   Diagnosis Date    CAD (coronary artery disease) 2013    one stent placed    DMII (diabetes mellitus, type 2)     Hypertension     Mass of right lung     Mass of upper lobe of right lung 3/16/2020    3/13/2020  CT chest: Right upper lobe mass obliterating the anterior segmental bronchus. This is associated with right hilar and paramediastinal adenopathy.  This is concerning for bronchogenic carcinoma.  Sampling recommended.     Past Surgical History:   Procedure Laterality Date    CV STENT      X 1    LUNG BIOPSY N/A 4/1/2020    Procedure: BIOPSY, LUNG;  Surgeon: Dagoberto Diagnostic Provider;  [facility]: [facility];  Service: Radiology;  Laterality: N/A;  730AM STARTRN PHONE PREOP 3/31/2020     Family History   Problem Relation Age of Onset    Peripheral vascular disease Mother     Heart disease Father     No Known Problems Brother     No Known Problems Brother     No Known Problems Son     No Known Problems Son     No Known Problems Daughter     No Known Problems Daughter     No Known Problems Daughter      Social History     Socioeconomic History    Marital status: Single     Spouse name: Not on file    Number of children: Not on file    Years of education: Not on file    Highest education level: Not on file   Occupational History    Occupation:      Employer: [employer]   Social Needs    Financial resource strain: Not on file    Food insecurity:     Worry: Not on file     Inability: Not on file    Transportation needs:     Medical: Not on file     Non-medical: Not on file   Tobacco Use    Smoking status: Current Every Day Smoker     Packs/day: 1.00     Years: 43.00     Pack years: 43.00     Types: Cigarettes    Smokeless tobacco: Never Used   Substance and Sexual Activity    Alcohol use: Yes     Alcohol/week: 3.0 standard drinks     Types: 3 Shots of liquor per week     Frequency: 2-4 times a month    Drug use: No    Sexual activity: Yes     Partners: Female   Lifestyle    Physical activity:     Days per week: Not on file     Minutes per session: Not on file    Stress: Not on file   Relationships    Social connections:     Talks on phone: Not on file     Gets together: Not on file      Attends Yarsani service: Not on file     Active member of club or organization: Not on file     Attends meetings of clubs or organizations: Not on file     Relationship status: Not on file   Other Topics Concern    Not on file   Social History Narrative    Not on file       Current Outpatient Medications:     albuterol (PROVENTIL/VENTOLIN HFA) 90 mcg/actuation inhaler, Inhale 2 puffs into the lungs every 6 (six) hours as needed for Wheezing. Whichever brand covered by insurance, Disp: 18 g, Rfl: 0    aspirin (ECOTRIN) 81 MG EC tablet, Take 1 tablet (81 mg total) by mouth once daily., Disp: 30 tablet, Rfl: 11    atorvastatin (LIPITOR) 80 MG tablet, Take 1 tablet (80 mg total) by mouth once daily., Disp: 90 tablet, Rfl: 3    benzonatate (TESSALON) 100 MG capsule, Take 1 capsule (100 mg total) by mouth 3 (three) times daily as needed for Cough., Disp: 30 capsule, Rfl: 6    blood-glucose meter kit, To check BG 1 times daily, to use with insurance preferred meter, Disp: 1 each, Rfl: 0    cyproheptadine (PERIACTIN) 4 mg tablet, Take 1 tablet (4 mg total) by mouth 4 (four) times daily., Disp: 120 tablet, Rfl: 0    hydroCHLOROthiazide (HYDRODIURIL) 25 MG tablet, TK 1 T PO ONE TIME PER DAY FOR BP, Disp: 90 tablet, Rfl: 3    metFORMIN (GLUCOPHAGE) 500 MG tablet, 1 po qAM x 1 wk; then 1 po BID x 1 wk; then 2 AM/1 PM x 1 wk; then 2 BID maintenance. Take with food, Disp: 120 tablet, Rfl: 5    metoprolol succinate (TOPROL-XL) 25 MG 24 hr tablet, Take 1 tablet (25 mg total) by mouth once daily., Disp: 90 tablet, Rfl: 3    morphine (MS CONTIN) 30 MG 12 hr tablet, Take 1 tablet (30 mg total) by mouth 2 (two) times daily., Disp: 60 tablet, Rfl: 0    oxyCODONE (ROXICODONE) 5 MG immediate release tablet, Take 1 tablet (5 mg total) by mouth every 4 (four) hours as needed for Pain., Disp: 90 tablet, Rfl: 0    polyethylene glycol (MIRALAX) 17 gram PwPk, Take 17 g by mouth 2 (two) times daily as needed (Constipation).,  Disp: 10 each, Rfl: 6    senna-docusate 8.6-50 mg (PERICOLACE) 8.6-50 mg per tablet, Take 2 tablets by mouth once daily., Disp: 60 tablet, Rfl: 6    REVIEW OF SYSTEMS:  General: No fevers or chills; ENT: No sore throat; Allergy and Immunology: no persistent infections; Hematological and Lymphatic: No history of bleeding or easy bruising; Endocrine: negative; Respiratory: no cough, shortness of breath, or wheezing; Cardiovascular: no chest pain or dyspnea on exertion; Gastrointestinal: no abdominal pain/back, change in bowel habits, or bloody stools; Genito-Urinary: no dysuria, trouble voiding, or hematuria; Musculoskeletal: negative; Neurological: no TIA or stroke symptoms; Psychiatric: no nervousness, anxiety or depression.    PHYSICAL EXAM:                General appearance:  Alert, well-appearing, and in no distress.  Oriented to person, place, and time                    Neurological: Normal speech, no focal findings noted; All extremities with  sensation to light touch.  Tongue  midline.            Musculoskeletal: Digits/nail without cyanosis/clubbing.  Strength 5/5 all extremities.                    Neck: Supple, no significant adenopathy                  Chest:  No respiratory distress.                Cardiac: No use of accessory muscles            Abdomen: Nondistended      Extremities: Normal color    Skin: No tissue loss    LAB RESULTS:  No results found for: CBC  Lab Results   Component Value Date    LABPROT 10.6 04/01/2020    INR 1.0 04/01/2020     Lab Results   Component Value Date     03/13/2020    K 4.0 03/13/2020     03/13/2020    CO2 22 (L) 03/13/2020     (H) 03/13/2020    BUN 11 03/13/2020    CREATININE 1.0 03/13/2020    CALCIUM 8.9 03/13/2020    ANIONGAP 10 03/13/2020    EGFRNONAA >60 03/13/2020     Lab Results   Component Value Date    WBC 7.72 03/13/2020    RBC 4.97 03/13/2020    HGB 14.3 03/13/2020    HCT 43.3 03/13/2020    MCV 87 03/13/2020    MCH 28.8 03/13/2020    [facility]  33.0 03/13/2020    RDW 13.1 03/13/2020     03/13/2020    MPV 10.1 03/13/2020    GRAN 4.0 03/13/2020    GRAN 52.2 03/13/2020    LYMPH 2.9 03/13/2020    LYMPH 36.9 03/13/2020    MONO 0.7 03/13/2020    MONO 8.7 03/13/2020    EOS 0.1 03/13/2020    BASO 0.02 03/13/2020    EOSINOPHIL 1.6 03/13/2020    BASOPHIL 0.3 03/13/2020    DIFFMETHOD Automated 03/13/2020     .  Lab Results   Component Value Date    HGBA1C 10.1 (H) 03/13/2020       IMAGING:  All pertinent imaging has been reviewed and interpreted independently.    IMP/PLAN:  58 y.o. male with   Patient Active Problem List   Diagnosis    Coronary artery disease involving native coronary artery of native heart with angina pectoris 2013 [facility] and stent mLCx    Smoker 1 PPD    Essential hypertension    Dyslipidemia    Type 2 diabetes mellitus without complication, without long-term current use of insulin    Adenocarcinoma, lung, right    Mass of right lung    being managed by PCP and specialists who is here today for evaluation of findings of chronic infarct on MRI brain.    -Concern for carotid artery stenosis, no recent stroke or TIA - recommend continued best medical therapy with ASA, statin and BP control to prevent secondary stroke. Will assist with arranging a PCP for medical mgmt and statin initiation.  -Will obtain carotid US  -Heart healthy lifestyle  -Exercise  -I discussed smoking cessation at length with this patient, including treatment options of nicotine replacement therapy and management of the addiction with assistance by [facility].  The patient states understanding that continued smoking will increase the chances of stenosis progression and other cardiovascular morbidity.  -RTC 4 weeks virtual visit for routine surveillance with carotid US     I spent 20 minutes evaluating this patient and greater than 50% of the time was spent counseling, coordinator care and discussing the plan of care.  All questions were answered and  patient stated understanding with agreement with the above treatment plan.    Michele Bennett MD RPVI  Vascular and Endovascular Surgery     no

## 2022-08-11 NOTE — DIETITIAN INITIAL EVALUATION ADULT - NUTRITIONGOAL OUTCOME1
if NPO prolonged, consider alternate means of nutrition support.  Continue MVI supplement to meet micronutrient needs.  Protein modulars as needed.

## 2022-08-11 NOTE — CONSULT NOTE ADULT - ASSESSMENT
Assessment/Plan: 52F immobile 2/2 MS, poorly controlled T2DM, asthma, hypothyroidism, known chronic wounds sacrum (stage 4), vulvar/Rt thigh, and right calf. Recent April 2022 hospitalization for urosepsis. Presented from [place] with concerns for UTI and anemia (6.8 from baseline 8.0). Found to have new malodorous wound on L flank area, concerning for necrotizing soft tissue infection. Febrile to 103.7. S/p excisional debridement in OR of wound L flank to midback/sacrum by general surgery. Now with NPWT/VAC therapy, currently managed by general surgery and surgical ICU team.    Wound surgery consult requested to assist with management of multiple pressure injuries present on admission.     Right axilla full thickness injury of unknown etiology  -area within intertriginous fold exposed to moisture.  -wound base clean, no purulent drainage, no odor. No s/s of acute skin infection/cellulitis, no suspected candidiasis within this area.  -Topical recommendations: Pack with aquacel hydrofiber, cover with silicone foam with border. Change daily.    Right anterior to lateral abdomen linear deep tissue pressure injury  - (+) induration without fluctuance beneath purple-maroon discoloration.  - Topical recommendations: liquid barrier film daily.  - Monitor for changes in tissue type.    Right trochanter stage 4 pressure injury  -Chronic stage 4 pressure injury. Narrow ulceration; visible base clean. Unable to palpate bone. (+) serous drainage. No purulence, no odor.   -Periwound skin without induration, no increased warmth, no edema, no erythema, no crepitus. No overt s/s of acute skin infection/cellulitis.  -Wound dimensions improving since previous admission in april.  -Topical recommendations: pack with Aquacel hydrofiber, leaving 2" out at end, cover with silicone foam with border. Change daily.    Right knee superficial abrasion  -clean dermis exposed. No associated cellulitis.  -Topical recommendations: silicone foam with border, change daily.    Left ischium mixed unstagable pressure injury and moisture associated dermatitis  -affected area within intertriginous fold, irregular borders.  -Wound base with fixed moist slough; no induration, no fluctuance, no crepitus.  -Periwound skin without induration, no increased warmth, no edema, no erythema, no crepitus. No overt s/s of acute skin infection/cellulitis.  -No surgical debridement indicated. Will use enzymatic debridement with santyl (collagenase).  -Topical recommendations- Apply Collagenase nahun thickness to wound base, cover with silicone with border, change daily.    Left lateral lower leg stage 4 pressure injury  -wound base clean, agranular with viable tendon exposed. (+) serous drainage. No purulent drainage no odor.   -Periwound skin without induration, no increased warmth, no edema, no erythema, no crepitus. No overt s/s of acute skin infection/cellulitis.  -Improving since previous admission in april.  -Topical recommendations: pack with Aquacel hydrofiber, leaving 2" out at end, cover with silicone foam with border. Change daily.    Bilateral heels unstageable pressure injuries  -Fixed eschar, no fluctuance. No drainage. No associated cellulitis.   -Consider Podiatry consult  -Consider obtaining baseline MARK/PVR. Cool midfoot to distal toes, capillary refill >3 seconds, no overt ischemic changes noted.   -Topical recommendations; paint with betadine, apply abdominal pad, kerlix wrap. Change daily.   -Off load heels with complete cair boots.    Moisture associated dermatitis   -Bilateral axilla, submammary, abdominal pannus without suspected candidiasis. Recommend; cleanse with luke-warm soap and water, dry well. Apply Interdry textile sheeting leaving 2" out at end to wick, remove to wash & dry affected area, then replace. Individual sheeting may be used for up to 5 days unless soiled. Inspect skin daily.   -Bilateral groin and medial thighs with (+) suspected candidiasis. Agree with Nystatin powder twice a day. Cleanse with luke-warm water soap and water, dry well. Apply nystatin powder twice a day.    Left flank to sacrum s/p excision debridement in OR for necrotizing fascitis   -NPWT/VAC therapy currently managed by general surgery/SICU team.  -Abx per primary team.  -Consider use of Envella bed to minimize pressure to affected area.    DM type 2, hyperglycemia  -mgmt per primary team  -consider HgbA1C  -consider endocrine consult; per H&P poorly controlled DM type 2, now s/p debridement of necrotizing fascitis, multiple full thickness pressure injuries.  -When medically appropriate consider Diabetes education    Additional recommendations:  -Consider Envella bed. Currently on low airloss support surface.  -Continue turning and positioning w/ offloading assistive devices as per protocol  -Continue w/ Pericare as per protocol. While bowel management system is in place: Cleanse caden-rectal area with perineal spray when indicated. Apply liquid barrier film to caden-rectal skin. Apply 4x4 silicone foam dressing without border and cut to mid dressing with a "Y" shape. Change dressing every and apply liquid barrier film every shift. Check balloon every shift and record volume.   -Continue use of complete cair boots  -Nutrition recommendations appreciated with medically appropriate for patient with multiple full thickness pressure injuries,  now s/p debridement of necrotizing fascitis with NPWT/VAC in place.    Remainder of care per primary team.  Will continue to follow periodically throughout hospitalization. Please reconsult earlier if needed.    Upon discharge f/u as outpatient at [facility] [address] [place] [address] [phone]  All findings and plan discussed with SICU team.    Thank you for this consult  DEMAR Skinner, CWOCN (pager #[phone])    If after 4PM or before 7:30AM on Mon-Friday or weekend/holiday please contact general surgery for urgent matters.   Team A- [id number]   Team B- [id number]  For non-urgent matters e-mail [email or web address]    I spent 70 minutes face to face with this patient of which more than 50% of the time was spent counseling & coordinating care of this pt

## 2022-08-11 NOTE — DIETITIAN INITIAL EVALUATION ADULT - NSFNSPHYEXAMSKINFT_GEN_A_CORE
Pressure Injury 1: Left:,heel, Unstageable  Pressure Injury 2: Right:,heel, Unstageable  Pressure Injury 3: Left:,ankle, Unstageable  Pressure Injury 4: Left:,lower calf, Unstageable  Pressure Injury 5: Left Upper Calf, Unstageable  Pressure Injury 6: Right:,trochanter, Unstageable  Pressure Injury 7: R Knee, Unstageable  Pressure Injury 8: R Armpit, Stage III  Pressure Injury 9: sacrum, Stage IV  Pressure Injury 10: none, none  Pressure Injury 11: none, none

## 2022-08-11 NOTE — DIETITIAN INITIAL EVALUATION ADULT - NS FNS WEIGHT CHANGE REASON
pt had expressed desire to lose weight.  Unable to ascertain if wt loss was intentional or unintentional at this time 2/2 pt intubated/sedated/intentional

## 2022-08-11 NOTE — DIETITIAN INITIAL EVALUATION ADULT - PERTINENT MEDS FT
MEDICATIONS  (STANDING):  acetaminophen   IVPB .. 1000 milliGRAM(s) IV Intermittent every 6 hours  ascorbic acid 500 milliGRAM(s) Oral daily  cefepime   IVPB 2000 milliGRAM(s) IV Intermittent every 12 hours  chlorhexidine 0.12% Liquid 15 milliLiter(s) Oral Mucosa every 12 hours  clindamycin IVPB 600 milliGRAM(s) IV Intermittent every 8 hours  dextrose 50% Injectable 25 Gram(s) IV Push once  dextrose 50% Injectable 12.5 Gram(s) IV Push once  ergocalciferol 75416 Unit(s) Oral every week  heparin   Injectable 5000 Unit(s) SubCutaneous every 8 hours  insulin lispro (ADMELOG) corrective regimen sliding scale   SubCutaneous every 6 hours  lactated ringers. 1000 milliLiter(s) (150 mL/Hr) IV Continuous <Continuous>  levothyroxine 75 MICROGram(s) Oral daily  magnesium sulfate  IVPB 2 Gram(s) IV Intermittent once  magnesium sulfate  IVPB 2 Gram(s) IV Intermittent once  multivitamin 1 Tablet(s) Oral daily  norepinephrine Infusion 0.05 MICROgram(s)/kG/Min (9.26 mL/Hr) IV Continuous <Continuous>  nystatin Powder 1 Application(s) Topical two times a day  phenylephrine    Infusion 0.1 MICROgram(s)/kG/Min (1.85 mL/Hr) IV Continuous <Continuous>  phytonadione   Solution 5 milliGRAM(s) Enteral Tube once  propofol Infusion 25 MICROgram(s)/kG/Min (14.8 mL/Hr) IV Continuous <Continuous>  vancomycin  IVPB 1500 milliGRAM(s) IV Intermittent every 12 hours    MEDICATIONS  (PRN):  ALBUTerol    90 MICROgram(s) HFA Inhaler 2 Puff(s) Inhalation every 6 hours PRN Shortness of Breath  HYDROmorphone  Injectable 1 milliGRAM(s) IV Push every 3 hours PRN Severe Pain (7 - 10)  HYDROmorphone  Injectable 0.5 milliGRAM(s) IV Push every 3 hours PRN Moderate Pain (4 - 6)

## 2022-08-11 NOTE — PROGRESS NOTE ADULT - ASSESSMENT
52F immobile 2/2 MS, poorly controlled T2DM, asthma, hypothyroidism, known chronic wounds sacrum (stage 4), vulvar/Rt thigh, and right calf. Recent April 2022 hospitalization for urosepsis. Presented from [place] with concerns for UTI and anemia (6.8 from baseline 8.0). Found to have new malodorous wound on L flank area, concerning for necrotizing soft tissue infection. Febrile to 103.7. s/p debridement of wound L flank to midback.     PLAN  Neurologic:   - PMH multiple sclerosis  - sedation w propofol   - Monitor fever curve - IV tylenol  - dilaudid prn     Respiratory:   - PMH asthma   - Albuterol PRN when back on RA  - intubated, AC 20/400/6/30  - trend ABG q4    Cardiovascular:   - A-line in place  - nayeli .2, will switch to levo  - place central line    Gastrointestinal/Nutrition:   - Diet: NPO except medications, OG tube  - Vit C 500mg PO daily  - Vit D 50,000u PO daily  - Multivitamin  - IVF     Renal/Genitourinary:   - LR @ 100cc/hr  - Rose  - Monitor I&Os  - 8/11 pt acidotic to 7.21. Lactate 4.2. AG 20.   - Repeat ABGs q4. Trend lactate    Hematologic:   - PMH chronic anemia. 6.9 hgb on admission. 2 FFP, 2 PRBCs administered in OR 8/11, additional unit PRBC today  - Monitor H&H. Trend WBC  - Transfuse PRN  - DVT PPx heparin subq q8h  - PO vitamin K    Infectious Disease:   - Necrotizing soft tissue L flank to midback, s/p debridement, wound vac on L flank  - Multiple chronic pressure ulcers  - c/w cefepime, clindamycin, vancomycin  - monitor vanc troughs  - Nystatin powder topical BID for groin  - chlorhexidine q12h mouth swab  - f/u Bcx, Ucx, wound cx  - possible return to OR tomorrow    Endocrine:   - hx DM2 and hypothyroidism  - 20 units of lantus this AM, will continue with AM lantus (redose in evening depending on insulin requirements)  - Lispro ISS   - currently holding 8u pre-meal ademlog while NPO  - Monitor FSG q6h  - c/w home levothyroxine 75mcg PO daily    Tubes/Lines/Drains:   - Rose  - A-line  - OG tube  - ET tube  - 3 PIV  - R fem central line    Disposition: SICU  - SICU

## 2022-08-11 NOTE — PATIENT PROFILE ADULT - FUNCTIONAL ASSESSMENT - BASIC MOBILITY 6.
1-calculated by average/Not able to assess (calculate score using [facility] averaging method)

## 2022-08-11 NOTE — CONSULT NOTE ADULT - SUBJECTIVE AND OBJECTIVE BOX
Wound SURGERY CONSULT NOTE    HPI:  53yo F immobile 2/2 MS, poorly controlled T2DM, asthma, hypothyroidism, known chronic wounds sacrum (stage 4), vulvar/Rt thigh, and right calf. Recent April 2022 hospitalization for urosepsis. On cefepime/flagyl for chronic osteo 2/2 unstageable sacral decubitus ulcer, dakins BID packing. Presented from [facility] with concerns for UTI and anemia (6.8 from baseline 8.0) and new malodorous wound on L hip to L flank area. General surgery was consulted on admission for potential necrotizing soft tissue infection.  (10 Aug 2022 22:48)    Patient s/p excisional surgical debridement in OR by general surgery of large necrotic wound of left flank connected to previously open sacral decub, not involving anus. NPWT/VAC therapy was placed in OR. Received PRBC x 2, FFP x 2. Patient requiring SICU post operative management. Remained intubated, sedated post operatively.   Patient known to Wound surgery service line from on previous admission for multiple pressure injures. Wound consult requested to assist w/ management of      remaining pressure injuries present on admission. Prior to admission in March, 2022 patient lived at home, had hospital bed and was care for by her brother, received visiting nurse services. Patient was seen by wound surgery service line from sacral/gluteal cleft unstageable pressure injury, right buttock unstageable pressure injury, right trochanter stage 4 pressure injury, right lateral thigh stage 4 pressure injury, and multiple deep tissue pressure injuries to bilateral lower extremities. Recommendations were made for Dakins BID, right lateral thigh wound with recommendations for aquacel hydrofiber, and bilateral lower extremity wounds recommendations for betadine. Patient discharged to [facility] at that time. On encounter today patient intubated and sedated, unable to obtain subjective data regarding wound management prior to readmission.    Current Diet: Diet, NPO:   Except Medications (08-10-22 @ 23:28)      PAST MEDICAL & SURGICAL HISTORY:  DM (diabetes mellitus)  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  MS (multiple sclerosis)      REVIEW OF SYSTEMS: Pt unable to offer    MEDICATIONS  (STANDING):  acetaminophen   IVPB .. 1000 milliGRAM(s) IV Intermittent every 6 hours  ascorbic acid 500 milliGRAM(s) Oral daily  cefepime   IVPB 2000 milliGRAM(s) IV Intermittent every 12 hours  chlorhexidine 0.12% Liquid 15 milliLiter(s) Oral Mucosa every 12 hours  clindamycin IVPB 600 milliGRAM(s) IV Intermittent every 8 hours  dextrose 50% Injectable 25 Gram(s) IV Push once  dextrose 50% Injectable 12.5 Gram(s) IV Push once  ergocalciferol 11953 Unit(s) Oral every week  heparin   Injectable 5000 Unit(s) SubCutaneous every 8 hours  insulin glargine Injectable (LANTUS) 10 Unit(s) SubCutaneous once  insulin lispro (ADMELOG) corrective regimen sliding scale   SubCutaneous every 6 hours  lactated ringers. 1000 milliLiter(s) (150 mL/Hr) IV Continuous <Continuous>  levothyroxine 75 MICROGram(s) Oral daily  multivitamin 1 Tablet(s) Oral daily  norepinephrine Infusion 0.05 MICROgram(s)/kG/Min (9.26 mL/Hr) IV Continuous <Continuous>  nystatin Powder 1 Application(s) Topical two times a day  phenylephrine    Infusion 0.1 MICROgram(s)/kG/Min (1.85 mL/Hr) IV Continuous <Continuous>  propofol Infusion 25 MICROgram(s)/kG/Min (14.8 mL/Hr) IV Continuous <Continuous>  vancomycin  IVPB 1500 milliGRAM(s) IV Intermittent every 12 hours    MEDICATIONS  (PRN):  ALBUTerol    90 MICROgram(s) HFA Inhaler 2 Puff(s) Inhalation every 6 hours PRN Shortness of Breath  HYDROmorphone  Injectable 1 milliGRAM(s) IV Push every 3 hours PRN Severe Pain (7 - 10)  HYDROmorphone  Injectable 0.5 milliGRAM(s) IV Push every 3 hours PRN Moderate Pain (4 - 6)      Allergies    No Known Drug Allergies  Pineapple (Anaphylaxis)    Intolerances        SOCIAL HISTORY: Resident at [facility]. Per previous documentation patient nonsmoker, no alcohol or illicit drug was. Patient was predominantly bedbound.     FAMILY HISTORY: unable to obtain pt intubated/sedated.      PHYSICAL EXAM:  Vital Signs Last 24 Hrs  T(C): 35.7 (11 Aug 2022 04:00), Max: 39.8 (10 Aug 2022 21:06)  T(F): 96.3 (11 Aug 2022 04:00), Max: 103.7 (10 Aug 2022 21:06)  HR: 90 (11 Aug 2022 12:00) (80 - 127)  BP: 86/57 (11 Aug 2022 06:00) (86/57 - 139/72)  BP(mean): 67 (11 Aug 2022 06:00) (67 - 101)  RR: 20 (11 Aug 2022 11:00) (16 - 20)  SpO2: 100% (11 Aug 2022 12:00) (100% - 100%)    Parameters below as of 11 Aug 2022 08:00  Patient On (Oxygen Delivery Method): ventilator  O2 Concentration (%): 30  Wt: 98.8 kg (08-)  BMI: 36.3 kg/m2 (08-11-22)    Constitutional: Intubated, sedated. Obese.  (+) low airloss support surface, (+) fluidized positioning devices, (+) complete cair boots  HEENT:  NC/AT, eyes closed, nonicteric, +orogastric tube, nares clear, mucosa moist  Cardiovascular: rate regular per monitor, +vasopressor support  Respiratory: intubated on AC/CMV /RR 20/FiO2 30%/PEEP 6, equal chest rise.  Gastrointestinal: large abdominal pannus, soft NT/ND, (+) bowel management device in place, no stool in bedside drainage bag to gravity.  : (+) indwelling toussaint catheter  Musculoskeletal: Flaccid b/u upper and lower extremities.  Back: NPWT/VAC therapy in place sacrum to left flank. Sponge collapsed, seal intact; dressing c/d/i.   Vascular: B/L LE hyperpigmentation with dry-flaky skin to dorsal toes, trace edema bilaterally, +1 dp pulses, capillary refill >3 seconds. Cool midfoot to distal toes.  Right knee abrasion- 1cmx0.5cmx0.1cm, pink minimally moist dermis. No drainage. No associated cellulitis. Silicone foam with border applied.  Right heel unstageable pressure injury- [id number]- black, hard firmly attached eschar in center with circumferential purple-maroon discoloration. Non-fluctuance, no induration noted. No associated cellulitis. Betadine, abdominal pad, kerlix wrap applied.  Left heel unstageable pressure injury- 3cmx3.4cqd7cu- 100% fixed black, dry eschar. No drainage. No fluctuance. Periwound skin intact, no associated cellulitis. Betadine, abdominal pad, kerlix applied.  Left lateral lower leg- stage 4 pressure injury- 3.2cmx1.5cmx0.8cm, wound base with viable tendon exposed, agranular base. Small-moderate serous drainage, no odor. Periwound skin with hypopigmentation and soft tissue contraction. No associated cellulitis. Aquacel hydrofiber, silicone foam with border applied.  Skin:  as noted above.  Right axilla- wound of unknown etiology within intertriginous fold- 1.5cmx1.0tvp1vd, wound base flat, pink agranular moist base. Small-moderate serous drainage, no odor. Periwound skin with hypopigmentation. No induration, no fluctuance. Periwound skin without edema, erythema, increased warmth, no induration. No overt s/s of acute skin infection/cellulitis. Aquacel ribbon and Silicone foam with border applied.  Intertriginous folds with increased moisture with chronic hyperpigmentation, no candidiasis noted to bilateral axilla, submammary, abdominal pannus.  Moisture associated dermatitis with suspected candidiasis to bilateral groin and medial thighs.  Right anterior to lateral abdomen- linear deep tissue pressure injury- [id number]. 100% purple-maroon discoloration,(+) induration without fluctuance beneath area of discoloration that does not extend beyond wound borders. Liquid barrier film applied.  Right trochanter- chronic stage 4 pressure injury- 2jve2fel0.5cm, unable to probe to bone. Due to wound dimensions unable to visualize wound base in its entirety, visible base pink-moist granular. Moderate serous drainage, no odor. Periwound skin with hypopigmentation, soft tissue contraction. No induration, no fluctuance, no crepitus, no erythema, no edema, no increased warmth. No overt s/s of acute skin infection/cellulitis. Packed with Aquacel ribbon, covered with silicone foam with border.  Right lateral thigh hypopigmentation noted with soft tissue contraction.  Left ischium within intertriginous fold- mixed etiology unstageable pressure injury and moisture associated dermatitis- [id number], unable to determine true anatomical depth due to necrotic tissue, irregular border 100% moist adherent slough. Small serous drainage. No induration, no fluctuance. Periwound skin without edema, erythema, increased warmth, no induration. No overt s/s of acute skin infection/cellulitis. Silicone foam with border applied.    LABS/ CULTURES/ RADIOLOGY:                        6.9    46.96 )-----------( 834      ( 11 Aug 2022 04:00 )             23.5       132  |  102  |  18  ----------------------------<  267      [08-11-22 @ 04:00]  3.8   |  12  |  0.61        Ca     8.1     [08-11-22 @ 04:00]      Mg     1.30     [08-11-22 @ 04:00]      Phos  4.1     [08-11-22 @ 04:00]    TPro  7.3  /  Alb  2.1  /  TBili  <0.2  /  DBili  x   /  AST  10  /  ALT  10  /  AlkPhos  247  [08-10-22 @ 22:30]    PT/INR: PT 23.7 , INR 2.03       [08-11-22 @ 04:00]  PTT: 33.1       [08-11-22 @ 04:00]            < from: CT Abdomen and Pelvis No Cont (08.10.22 @ 23:30) >  ACC: [id number] EXAM:  CT ABDOMEN AND PELVIS                          PROCEDURE DATE:  08/10/2022          INTERPRETATION:  CLINICAL INFORMATION: Left hip abscess with sacral   ulceration, evaluate for necrotizing fasciitis.    COMPARISON: CT abdomenand pelvis from 3/28/2022    CONTRAST/COMPLICATIONS:  IV Contrast: None  Oral Contrast: None  Complications: None reported    PROCEDURE:  CT of the Abdomen and Pelvis was performed, with inferior extension into   the mid thigh.  Sagittal and coronal reformats were performed.    FINDINGS:  Evaluation of the solid organs and vasculature is limited without   intravenous contrast.    LOWER CHEST: Elevated right hemidiaphragm.    LIVER: Hepatomegaly and steatosis.  BILE DUCTS: Normal caliber.  GALLBLADDER: Cholelithiasis.  SPLEEN: Within normal limits.  PANCREAS: Within normal limits.  ADRENALS: Within normal limits.  KIDNEYS/URETERS: Within normal limits.    BLADDER: Toussaint catheter.  REPRODUCTIVE ORGANS: Fibroid uterus.    BOWEL: No bowel obstruction. Appendix is normal. Colonic diverticulosis.  PERITONEUM: No ascites.  VESSELS: Atherosclerotic changes.  RETROPERITONEUM/LYMPH NODES: No lymphadenopathy.  ABDOMINAL WALL: Abdominal wall incompletely imaged likely related to body   habitus. Largeopen sacral decubitus wound extending to bone with packing   material. Foci of air in the left gluteus yvette musculature and   superior to the wound. Infiltrative changes and large amount of   subcutaneous air in the inferior left posterior abdominal wall extending   to the lateral pelvic wall. No drainable fluid.  BONES: Absence of the coccyx and distal sacrum, may related to prior   resection.    IMPRESSION:  Large open sacral decubitus wound extending to bone. Foci of air in the   left gluteus musculature and superior to the wound. 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    Absence of the coccyx and distal sacrum, may related to prior resection.   Correlate with prior surgical history.        --- End of Report ---          MESSI WHITFIELD MD; Resident Radiology  This document has been electronically signed.  TAO NICHOLAS MD; Attending Radiologist  This document has been electronically signed. Aug 11 2022 11:56AM    < end of copied text >

## 2022-08-11 NOTE — PROGRESS NOTE ADULT - SUBJECTIVE AND OBJECTIVE BOX
SICU DAILY PROGRESS NOTE    24 HOUR EVENTS:  - Pt arrived at floor 3:40AM  - pt received vancomycin, cefepime, clindamycin  - 2 FFP and 2 PRBCs -- Hgb to 7.1 per blood gas -- will likely require another unit  - Pt acidotic w pH 7.21, pCO2 34, bicarb 12. Anion gap of 20. Lactate 4.2. 1L LR @ 100 mL/hr. Repeat ABGs, trend lactate.  - CXR ordered for OG and ET tube placement      PHYSICAL EXAM:  General: sedated and intubated  Neurologic: sedated  Resp: 13/470/6/60  Cardiac: appears well perfused, extremities warm  GI/abd: soft, nondistended. Wound vac L hip to flank. flexi-seal in rectum  : Rose draining turbid urine  Extremities: feet and calves wrapped with kerlix    NEURO  Meds:ALBUTerol    90 MICROgram(s) HFA Inhaler 2 Puff(s) Inhalation every 6 hours PRN Shortness of Breath      RESPIRATORY  Mechanical Ventilation: HR: 117 (08-10-22 @ 23:56) (117 - 127)  BP: 91/50 (08-10-22 @ 23:56) (91/50 - 139/72)  BP(mean): --  ABP: --  ABP(mean): --  Wt(kg): --  CVP(cm H2O): --  ABG - ( 11 Aug 2022 02:40 )  pH: 7.21  /  pCO2: 34    /  pO2: 251   / HCO3: 14    / Base Excess: -13.1 /  SaO2: 99.3    Lactate: x                Meds:    CARDIOVASCULAR  VBG - ( 10 Aug 2022 22:30 )  pH: 7.32  /  pCO2: 30    /  pO2: 25    / HCO3: 16    / Base Excess: -9.7  /  SaO2: 30.1   Lactate: 4.4              Cardiac Rhythm:  Meds:    GI/NUTRITION  Diet:  Meds:    GENITOURINARY/RENAL  Meds:    ascorbic acid 500 milliGRAM(s) Oral daily  dextrose 5%. 1000 milliLiter(s) IV Continuous <Continuous>  dextrose 5%. 1000 milliLiter(s) IV Continuous <Continuous>  ergocalciferol 40234 Unit(s) Oral every week  lactated ringers. 1000 milliLiter(s) IV Continuous <Continuous>  multivitamin 1 Tablet(s) Oral daily    08-10    137  |  103  |  17  ----------------------------<  164<H>  3.9   |  14<L>  |  0.62    Ca    8.9      10 Aug 2022 22:30    TPro  7.3  /  Alb  2.1<L>  /  TBili  <0.2  /  DBili  x   /  AST  10  /  ALT  10  /  AlkPhos  247<H>  08-10    [ ] Rose catheter, indication: urine output monitoring in critically ill patient    HEMATOLOGIC  Meds: heparin   Injectable 5000 Unit(s) SubCutaneous every 8 hours    [x] DVT Prophylaxis                        5.9    31.78 )-----------( 920      ( 10 Aug 2022 22:30 )             22.5     PT/INR - ( 10 Aug 2022 22:30 )   PT: 25.6 sec;   INR: 2.19 ratio         PTT - ( 10 Aug 2022 22:30 )  PTT:28.2 sec  Transfusion     [ ] PRBC   [ ] Platelets   [ ] FFP   [ ] Cryoprecipitate    INFECTIOUS DISEASES  T(C): 36.8 (08-10-22 @ 23:56), Max: 39.8 (08-10-22 @ 21:06)  Wt(kg): --  WBC Count: 31.78 K/uL (08-10 @ 22:30)    Recent Cultures:    Meds: vancomycin  IVPB. 1000 milliGRAM(s) IV Intermittent once      ENDOCRINE  Capillary Blood Glucose    Meds: dextrose 50% Injectable 25 Gram(s) IV Push once  dextrose 50% Injectable 12.5 Gram(s) IV Push once  dextrose 50% Injectable 25 Gram(s) IV Push once  dextrose Oral Gel 15 Gram(s) Oral once PRN  glucagon  Injectable 1 milliGRAM(s) IntraMuscular once  insulin glargine Injectable (LANTUS) 30 Unit(s) SubCutaneous at bedtime  insulin lispro (ADMELOG) corrective regimen sliding scale   SubCutaneous every 6 hours  levothyroxine 75 MICROGram(s) Oral daily      OTHER MEDICATIONS:  nystatin Powder 1 Application(s) Topical two times a day      ACCESS DEVICES:  [ ] Peripheral IV  [ ] Central Venous Line	[ ] R	[ ] L	[ ] IJ	[ ] Fem	[ ] SC	Placed:   [ ] Arterial Line		[ ] R	[ ] L	[ ] Fem	[ ] Rad	[ ] Ax	Placed:   [ ] PICC:					[ ] Mediport  [ X ] Urinary Catheter, Date Placed: 8/10  [ ] Necessity of urinary, arterial, and venous catheters discussed   SICU DAILY PROGRESS NOTE    24 HOUR EVENTS:  - Pt arrived at floor 3:40AM  - pt received vancomycin, cefepime, clindamycin  - 2 FFP and 2 PRBCs -- Hgb to 7.1 per blood gas -- will likely require another unit  - Pt acidotic w pH 7.21, pCO2 34, bicarb 12. Anion gap of 20. Lactate 4.2. 1L LR @ 100 mL/hr. Repeat ABGs, trend lactate.  - CXR ordered for OG and ET tube placement      PHYSICAL EXAM:  General: sedated and intubated  Neurologic: sedated  Resp: 13/470/6/60  Cardiac: appears well perfused, extremities warm  GI/abd: soft, nondistended. Wound vac L flank to mid-back flexi-seal in rectum  : Rose draining turbid urine  Extremities: feet and calves wrapped with kerlix    NEURO  Meds:ALBUTerol    90 MICROgram(s) HFA Inhaler 2 Puff(s) Inhalation every 6 hours PRN Shortness of Breath      RESPIRATORY  Mechanical Ventilation: HR: 117 (08-10-22 @ 23:56) (117 - 127)  BP: 91/50 (08-10-22 @ 23:56) (91/50 - 139/72)  BP(mean): --  ABP: --  ABP(mean): --  Wt(kg): --  CVP(cm H2O): --  ABG - ( 11 Aug 2022 02:40 )  pH: 7.21  /  pCO2: 34    /  pO2: 251   / HCO3: 14    / Base Excess: -13.1 /  SaO2: 99.3    Lactate: x                Meds:    CARDIOVASCULAR  VBG - ( 10 Aug 2022 22:30 )  pH: 7.32  /  pCO2: 30    /  pO2: 25    / HCO3: 16    / Base Excess: -9.7  /  SaO2: 30.1   Lactate: 4.4              Cardiac Rhythm:  Meds:    GI/NUTRITION  Diet:  Meds:    GENITOURINARY/RENAL  Meds:    ascorbic acid 500 milliGRAM(s) Oral daily  dextrose 5%. 1000 milliLiter(s) IV Continuous <Continuous>  dextrose 5%. 1000 milliLiter(s) IV Continuous <Continuous>  ergocalciferol 46562 Unit(s) Oral every week  lactated ringers. 1000 milliLiter(s) IV Continuous <Continuous>  multivitamin 1 Tablet(s) Oral daily    08-10    137  |  103  |  17  ----------------------------<  164<H>  3.9   |  14<L>  |  0.62    Ca    8.9      10 Aug 2022 22:30    TPro  7.3  /  Alb  2.1<L>  /  TBili  <0.2  /  DBili  x   /  AST  10  /  ALT  10  /  AlkPhos  247<H>  08-10    [ ] Rose catheter, indication: urine output monitoring in critically ill patient    HEMATOLOGIC  Meds: heparin   Injectable 5000 Unit(s) SubCutaneous every 8 hours    [x] DVT Prophylaxis                        5.9    31.78 )-----------( 920      ( 10 Aug 2022 22:30 )             22.5     PT/INR - ( 10 Aug 2022 22:30 )   PT: 25.6 sec;   INR: 2.19 ratio         PTT - ( 10 Aug 2022 22:30 )  PTT:28.2 sec  Transfusion     [ ] PRBC   [ ] Platelets   [ ] FFP   [ ] Cryoprecipitate    INFECTIOUS DISEASES  T(C): 36.8 (08-10-22 @ 23:56), Max: 39.8 (08-10-22 @ 21:06)  Wt(kg): --  WBC Count: 31.78 K/uL (08-10 @ 22:30)    Recent Cultures:    Meds: vancomycin  IVPB. 1000 milliGRAM(s) IV Intermittent once      ENDOCRINE  Capillary Blood Glucose    Meds: dextrose 50% Injectable 25 Gram(s) IV Push once  dextrose 50% Injectable 12.5 Gram(s) IV Push once  dextrose 50% Injectable 25 Gram(s) IV Push once  dextrose Oral Gel 15 Gram(s) Oral once PRN  glucagon  Injectable 1 milliGRAM(s) IntraMuscular once  insulin glargine Injectable (LANTUS) 30 Unit(s) SubCutaneous at bedtime  insulin lispro (ADMELOG) corrective regimen sliding scale   SubCutaneous every 6 hours  levothyroxine 75 MICROGram(s) Oral daily      OTHER MEDICATIONS:  nystatin Powder 1 Application(s) Topical two times a day      ACCESS DEVICES:  [ ] Peripheral IV  [ ] Central Venous Line	[ ] R	[ ] L	[ ] IJ	[ ] Fem	[ ] SC	Placed:   [ ] Arterial Line		[ ] R	[ ] L	[ ] Fem	[ ] Rad	[ ] Ax	Placed:   [ ] PICC:					[ ] Mediport  [ X ] Urinary Catheter, Date Placed: 8/10  [ ] Necessity of urinary, arterial, and venous catheters discussed   SICU DAILY PROGRESS NOTE    24 HOUR EVENTS:  - Pt arrived at floor 3:40AM  - pt received vancomycin, cefepime, clindamycin  - 2 FFP and 2 PRBCs -- Hgb to 7.1 per blood gas -- will likely require another unit  - Pt acidotic w pH 7.21, pCO2 34, bicarb 12. Anion gap of 20. Lactate 4.2. 1L LR @ 100 mL/hr. Repeat ABGs, trend lactate.  - CXR ordered for OG and ET tube placement      PHYSICAL EXAM:  General: sedated and intubated. OG tube in place  Neurologic: sedated  Resp: AC 20/370/6/60  Cardiac: appears well perfused, extremities warm  GI/abd: soft, nondistended. Wound vac L flank to mid-back. Flexi-seal in rectum  : Rose draining turbid urine  Extremities: L and R heel protected with Allevyn dressings. Pressure ulcer on L hip      NEURO  Meds:ALBUTerol    90 MICROgram(s) HFA Inhaler 2 Puff(s) Inhalation every 6 hours PRN Shortness of Breath      RESPIRATORY  Mechanical Ventilation: HR: 117 (08-10-22 @ 23:56) (117 - 127)  BP: 91/50 (08-10-22 @ 23:56) (91/50 - 139/72)  BP(mean): --  ABP: --  ABP(mean): --  Wt(kg): --  CVP(cm H2O): --  ABG - ( 11 Aug 2022 02:40 )  pH: 7.21  /  pCO2: 34    /  pO2: 251   / HCO3: 14    / Base Excess: -13.1 /  SaO2: 99.3    Lactate: x                Meds:    CARDIOVASCULAR  VBG - ( 10 Aug 2022 22:30 )  pH: 7.32  /  pCO2: 30    /  pO2: 25    / HCO3: 16    / Base Excess: -9.7  /  SaO2: 30.1   Lactate: 4.4              Cardiac Rhythm:  Meds:    GI/NUTRITION  Diet:  Meds:    GENITOURINARY/RENAL  Meds:    ascorbic acid 500 milliGRAM(s) Oral daily  dextrose 5%. 1000 milliLiter(s) IV Continuous <Continuous>  dextrose 5%. 1000 milliLiter(s) IV Continuous <Continuous>  ergocalciferol 27539 Unit(s) Oral every week  lactated ringers. 1000 milliLiter(s) IV Continuous <Continuous>  multivitamin 1 Tablet(s) Oral daily    08-10    137  |  103  |  17  ----------------------------<  164<H>  3.9   |  14<L>  |  0.62    Ca    8.9      10 Aug 2022 22:30    TPro  7.3  /  Alb  2.1<L>  /  TBili  <0.2  /  DBili  x   /  AST  10  /  ALT  10  /  AlkPhos  247<H>  08-10    [ ] Rose catheter, indication: urine output monitoring in critically ill patient    HEMATOLOGIC  Meds: heparin   Injectable 5000 Unit(s) SubCutaneous every 8 hours    [x] DVT Prophylaxis                        5.9    31.78 )-----------( 920      ( 10 Aug 2022 22:30 )             22.5     PT/INR - ( 10 Aug 2022 22:30 )   PT: 25.6 sec;   INR: 2.19 ratio         PTT - ( 10 Aug 2022 22:30 )  PTT:28.2 sec  Transfusion     [ ] PRBC   [ ] Platelets   [ ] FFP   [ ] Cryoprecipitate    INFECTIOUS DISEASES  T(C): 36.8 (08-10-22 @ 23:56), Max: 39.8 (08-10-22 @ 21:06)  Wt(kg): --  WBC Count: 31.78 K/uL (08-10 @ 22:30)    Recent Cultures:    Meds: vancomycin  IVPB. 1000 milliGRAM(s) IV Intermittent once      ENDOCRINE  Capillary Blood Glucose    Meds: dextrose 50% Injectable 25 Gram(s) IV Push once  dextrose 50% Injectable 12.5 Gram(s) IV Push once  dextrose 50% Injectable 25 Gram(s) IV Push once  dextrose Oral Gel 15 Gram(s) Oral once PRN  glucagon  Injectable 1 milliGRAM(s) IntraMuscular once  insulin glargine Injectable (LANTUS) 30 Unit(s) SubCutaneous at bedtime  insulin lispro (ADMELOG) corrective regimen sliding scale   SubCutaneous every 6 hours  levothyroxine 75 MICROGram(s) Oral daily      OTHER MEDICATIONS:  nystatin Powder 1 Application(s) Topical two times a day      ACCESS DEVICES:  [ X ] Peripheral IVx2  [ ] Central Venous Line	[ ] R	[ ] L	[ ] IJ	[ ] Fem	[ ] SC	Placed:   [X ] Arterial Line		[ X ] R	[ ] L	[ ] Fem	[ X ] Rad	[ ] Ax	Placed:   [ ] PICC:					[ ] Mediport  [ X ] Urinary Catheter, Date Placed: 8/10  [ ] Necessity of urinary, arterial, and venous catheters discussed

## 2022-08-12 DIAGNOSIS — E87.2 ACIDOSIS: ICD-10-CM

## 2022-08-12 LAB
-  STAPHYLOCOCCUS EPIDERMIDIS, METHICILLIN RESISTANT: SIGNIFICANT CHANGE UP
A1C WITH ESTIMATED AVERAGE GLUCOSE RESULT: 5.5 % — SIGNIFICANT CHANGE UP (ref 4–5.6)
ALBUMIN SERPL ELPH-MCNC: 1.5 G/DL — LOW (ref 3.3–5)
ALP SERPL-CCNC: 172 U/L — HIGH (ref 40–120)
ALT FLD-CCNC: 9 U/L — SIGNIFICANT CHANGE UP (ref 4–33)
ANION GAP SERPL CALC-SCNC: 16 MMOL/L — HIGH (ref 7–14)
ANION GAP SERPL CALC-SCNC: 18 MMOL/L — HIGH (ref 7–14)
APTT BLD: 34.5 SEC — SIGNIFICANT CHANGE UP (ref 27–36.3)
APTT BLD: 34.9 SEC — SIGNIFICANT CHANGE UP (ref 27–36.3)
AST SERPL-CCNC: 18 U/L — SIGNIFICANT CHANGE UP (ref 4–32)
BASE EXCESS BLDV CALC-SCNC: -14.2 MMOL/L — LOW (ref -2–3)
BILIRUB SERPL-MCNC: 0.4 MG/DL — SIGNIFICANT CHANGE UP (ref 0.2–1.2)
BLOOD GAS ARTERIAL - LYTES,HGB,ICA,LACT RESULT: SIGNIFICANT CHANGE UP
BLOOD GAS VENOUS COMPREHENSIVE RESULT: SIGNIFICANT CHANGE UP
BUN SERPL-MCNC: 16 MG/DL — SIGNIFICANT CHANGE UP (ref 7–23)
BUN SERPL-MCNC: 19 MG/DL — SIGNIFICANT CHANGE UP (ref 7–23)
CALCIUM SERPL-MCNC: 7.7 MG/DL — LOW (ref 8.4–10.5)
CALCIUM SERPL-MCNC: 7.8 MG/DL — LOW (ref 8.4–10.5)
CHLORIDE BLDV-SCNC: 103 MMOL/L — SIGNIFICANT CHANGE UP (ref 96–108)
CHLORIDE SERPL-SCNC: 101 MMOL/L — SIGNIFICANT CHANGE UP (ref 98–107)
CHLORIDE SERPL-SCNC: 102 MMOL/L — SIGNIFICANT CHANGE UP (ref 98–107)
CK MB BLD-MCNC: 25.8 % — HIGH (ref 0–2.5)
CK MB BLD-MCNC: 35.6 % — HIGH (ref 0–2.5)
CK MB CFR SERPL CALC: 12.1 NG/ML — HIGH
CK MB CFR SERPL CALC: 15.5 NG/ML — HIGH
CK SERPL-CCNC: 34 U/L — SIGNIFICANT CHANGE UP (ref 25–170)
CK SERPL-CCNC: 60 U/L — SIGNIFICANT CHANGE UP (ref 25–170)
CO2 BLDV-SCNC: 14.2 MMOL/L — LOW (ref 22–26)
CO2 SERPL-SCNC: 13 MMOL/L — LOW (ref 22–31)
CO2 SERPL-SCNC: 14 MMOL/L — LOW (ref 22–31)
CREAT SERPL-MCNC: 0.57 MG/DL — SIGNIFICANT CHANGE UP (ref 0.5–1.3)
CREAT SERPL-MCNC: 0.59 MG/DL — SIGNIFICANT CHANGE UP (ref 0.5–1.3)
CULTURE RESULTS: SIGNIFICANT CHANGE UP
EGFR: 110 ML/MIN/1.73M2 — SIGNIFICANT CHANGE UP
EGFR: 111 ML/MIN/1.73M2 — SIGNIFICANT CHANGE UP
ESTIMATED AVERAGE GLUCOSE: 111 — SIGNIFICANT CHANGE UP
GAS PNL BLDV: 129 MMOL/L — LOW (ref 136–145)
GLUCOSE BLDC GLUCOMTR-MCNC: 109 MG/DL — HIGH (ref 70–99)
GLUCOSE BLDC GLUCOMTR-MCNC: 140 MG/DL — HIGH (ref 70–99)
GLUCOSE BLDC GLUCOMTR-MCNC: 141 MG/DL — HIGH (ref 70–99)
GLUCOSE BLDC GLUCOMTR-MCNC: 146 MG/DL — HIGH (ref 70–99)
GLUCOSE BLDC GLUCOMTR-MCNC: 150 MG/DL — HIGH (ref 70–99)
GLUCOSE BLDC GLUCOMTR-MCNC: 151 MG/DL — HIGH (ref 70–99)
GLUCOSE BLDC GLUCOMTR-MCNC: 165 MG/DL — HIGH (ref 70–99)
GLUCOSE BLDC GLUCOMTR-MCNC: 184 MG/DL — HIGH (ref 70–99)
GLUCOSE BLDC GLUCOMTR-MCNC: 228 MG/DL — HIGH (ref 70–99)
GLUCOSE BLDC GLUCOMTR-MCNC: 239 MG/DL — HIGH (ref 70–99)
GLUCOSE BLDC GLUCOMTR-MCNC: 245 MG/DL — HIGH (ref 70–99)
GLUCOSE BLDV-MCNC: 257 MG/DL — HIGH (ref 70–99)
GLUCOSE SERPL-MCNC: 200 MG/DL — HIGH (ref 70–99)
GLUCOSE SERPL-MCNC: 252 MG/DL — HIGH (ref 70–99)
GRAM STN FLD: SIGNIFICANT CHANGE UP
GRAM STN FLD: SIGNIFICANT CHANGE UP
HCO3 BLDV-SCNC: 13 MMOL/L — LOW (ref 22–29)
HCT VFR BLD CALC: 28.8 % — LOW (ref 34.5–45)
HCT VFR BLD CALC: 30.9 % — LOW (ref 34.5–45)
HCT VFR BLDA CALC: 30 % — LOW (ref 34.5–46.5)
HGB BLD CALC-MCNC: 9.9 G/DL — LOW (ref 11.5–15.5)
HGB BLD-MCNC: 9.2 G/DL — LOW (ref 11.5–15.5)
HGB BLD-MCNC: 9.4 G/DL — LOW (ref 11.5–15.5)
INR BLD: 1.95 RATIO — HIGH (ref 0.88–1.16)
INR BLD: 2.38 RATIO — HIGH (ref 0.88–1.16)
LACTATE BLDV-MCNC: 3.5 MMOL/L — HIGH (ref 0.5–2)
MAGNESIUM SERPL-MCNC: 2.4 MG/DL — SIGNIFICANT CHANGE UP (ref 1.6–2.6)
MCHC RBC-ENTMCNC: 25.4 PG — LOW (ref 27–34)
MCHC RBC-ENTMCNC: 25.8 PG — LOW (ref 27–34)
MCHC RBC-ENTMCNC: 30.4 GM/DL — LOW (ref 32–36)
MCHC RBC-ENTMCNC: 31.9 GM/DL — LOW (ref 32–36)
MCV RBC AUTO: 80.9 FL — SIGNIFICANT CHANGE UP (ref 80–100)
MCV RBC AUTO: 83.5 FL — SIGNIFICANT CHANGE UP (ref 80–100)
METHOD TYPE: SIGNIFICANT CHANGE UP
NRBC # BLD: 0 /100 WBCS — SIGNIFICANT CHANGE UP
NRBC # BLD: 0 /100 WBCS — SIGNIFICANT CHANGE UP
NRBC # FLD: 0.13 K/UL — HIGH
NRBC # FLD: 0.2 K/UL — HIGH
ORGANISM # SPEC MICROSCOPIC CNT: SIGNIFICANT CHANGE UP
ORGANISM # SPEC MICROSCOPIC CNT: SIGNIFICANT CHANGE UP
PCO2 BLDV: 35 MMHG — LOW (ref 39–42)
PH BLDV: 7.18 — LOW (ref 7.32–7.43)
PHOSPHATE SERPL-MCNC: 3 MG/DL — SIGNIFICANT CHANGE UP (ref 2.5–4.5)
PHOSPHATE SERPL-MCNC: 3.4 MG/DL — SIGNIFICANT CHANGE UP (ref 2.5–4.5)
PLATELET # BLD AUTO: 673 K/UL — HIGH (ref 150–400)
PLATELET # BLD AUTO: 740 K/UL — HIGH (ref 150–400)
PO2 BLDV: 38 MMHG — SIGNIFICANT CHANGE UP
POTASSIUM BLDV-SCNC: 4.8 MMOL/L — SIGNIFICANT CHANGE UP (ref 3.5–5.1)
POTASSIUM SERPL-MCNC: 3.3 MMOL/L — LOW (ref 3.5–5.3)
POTASSIUM SERPL-MCNC: 4.4 MMOL/L — SIGNIFICANT CHANGE UP (ref 3.5–5.3)
POTASSIUM SERPL-SCNC: 3.3 MMOL/L — LOW (ref 3.5–5.3)
POTASSIUM SERPL-SCNC: 4.4 MMOL/L — SIGNIFICANT CHANGE UP (ref 3.5–5.3)
PROT SERPL-MCNC: 5.8 G/DL — LOW (ref 6–8.3)
PROTHROM AB SERPL-ACNC: 22.8 SEC — HIGH (ref 10.5–13.4)
PROTHROM AB SERPL-ACNC: 27.8 SEC — HIGH (ref 10.5–13.4)
RBC # BLD: 3.56 M/UL — LOW (ref 3.8–5.2)
RBC # BLD: 3.7 M/UL — LOW (ref 3.8–5.2)
RBC # FLD: 15.8 % — HIGH (ref 10.3–14.5)
RBC # FLD: 16.9 % — HIGH (ref 10.3–14.5)
SAO2 % BLDV: 63.3 % — SIGNIFICANT CHANGE UP
SODIUM SERPL-SCNC: 131 MMOL/L — LOW (ref 135–145)
SODIUM SERPL-SCNC: 133 MMOL/L — LOW (ref 135–145)
SPECIMEN SOURCE: SIGNIFICANT CHANGE UP
SPECIMEN SOURCE: SIGNIFICANT CHANGE UP
TROPONIN T, HIGH SENSITIVITY RESULT: 308 NG/L — CRITICAL HIGH
TROPONIN T, HIGH SENSITIVITY RESULT: 338 NG/L — CRITICAL HIGH
WBC # BLD: 40.76 K/UL — CRITICAL HIGH (ref 3.8–10.5)
WBC # BLD: 45.96 K/UL — CRITICAL HIGH (ref 3.8–10.5)
WBC # FLD AUTO: 40.76 K/UL — CRITICAL HIGH (ref 3.8–10.5)
WBC # FLD AUTO: 45.96 K/UL — CRITICAL HIGH (ref 3.8–10.5)

## 2022-08-12 PROCEDURE — 36556 INSERT NON-TUNNEL CV CATH: CPT

## 2022-08-12 PROCEDURE — 99291 CRITICAL CARE FIRST HOUR: CPT | Mod: 25

## 2022-08-12 PROCEDURE — 71045 X-RAY EXAM CHEST 1 VIEW: CPT | Mod: 26,77

## 2022-08-12 PROCEDURE — 93010 ELECTROCARDIOGRAM REPORT: CPT

## 2022-08-12 PROCEDURE — 97608 NEG PRS WND THER NDME>50SQCM: CPT | Mod: 58

## 2022-08-12 PROCEDURE — 99292 CRITICAL CARE ADDL 30 MIN: CPT | Mod: 25

## 2022-08-12 PROCEDURE — 71045 X-RAY EXAM CHEST 1 VIEW: CPT | Mod: 26,76

## 2022-08-12 PROCEDURE — 93306 TTE W/DOPPLER COMPLETE: CPT | Mod: 26

## 2022-08-12 RX ORDER — GLUCAGON INJECTION, SOLUTION 0.5 MG/.1ML
1 INJECTION, SOLUTION SUBCUTANEOUS ONCE
Refills: 0 | Status: DISCONTINUED | OUTPATIENT
Start: 2022-08-12 | End: 2022-08-12

## 2022-08-12 RX ORDER — VASOPRESSIN 20 [USP'U]/ML
0.03 INJECTION INTRAVENOUS
Qty: 50 | Refills: 0 | Status: DISCONTINUED | OUTPATIENT
Start: 2022-08-12 | End: 2022-08-13

## 2022-08-12 RX ORDER — HYDROCORTISONE 20 MG
50 TABLET ORAL EVERY 6 HOURS
Refills: 0 | Status: DISCONTINUED | OUTPATIENT
Start: 2022-08-12 | End: 2022-08-12

## 2022-08-12 RX ORDER — DEXTROSE 50 % IN WATER 50 %
25 SYRINGE (ML) INTRAVENOUS ONCE
Refills: 0 | Status: DISCONTINUED | OUTPATIENT
Start: 2022-08-12 | End: 2022-08-12

## 2022-08-12 RX ORDER — SODIUM CHLORIDE 9 MG/ML
1000 INJECTION, SOLUTION INTRAVENOUS
Refills: 0 | Status: DISCONTINUED | OUTPATIENT
Start: 2022-08-12 | End: 2022-08-16

## 2022-08-12 RX ORDER — CALCIUM GLUCONATE 100 MG/ML
1 VIAL (ML) INTRAVENOUS ONCE
Refills: 0 | Status: COMPLETED | OUTPATIENT
Start: 2022-08-12 | End: 2022-08-12

## 2022-08-12 RX ORDER — DOBUTAMINE HCL 250MG/20ML
2.5 VIAL (ML) INTRAVENOUS
Qty: 500 | Refills: 0 | Status: DISCONTINUED | OUTPATIENT
Start: 2022-08-12 | End: 2022-08-14

## 2022-08-12 RX ORDER — INSULIN GLARGINE 100 [IU]/ML
40 INJECTION, SOLUTION SUBCUTANEOUS ONCE
Refills: 0 | Status: COMPLETED | OUTPATIENT
Start: 2022-08-12 | End: 2022-08-12

## 2022-08-12 RX ORDER — POTASSIUM CHLORIDE 20 MEQ
20 PACKET (EA) ORAL
Refills: 0 | Status: COMPLETED | OUTPATIENT
Start: 2022-08-12 | End: 2022-08-12

## 2022-08-12 RX ORDER — SODIUM BICARBONATE 1 MEQ/ML
0.06 SYRINGE (ML) INTRAVENOUS
Qty: 75 | Refills: 0 | Status: DISCONTINUED | OUTPATIENT
Start: 2022-08-12 | End: 2022-08-13

## 2022-08-12 RX ORDER — MAGNESIUM SULFATE 500 MG/ML
2 VIAL (ML) INJECTION ONCE
Refills: 0 | Status: COMPLETED | OUTPATIENT
Start: 2022-08-12 | End: 2022-08-12

## 2022-08-12 RX ORDER — SODIUM BICARBONATE 1 MEQ/ML
50 SYRINGE (ML) INTRAVENOUS ONCE
Refills: 0 | Status: COMPLETED | OUTPATIENT
Start: 2022-08-12 | End: 2022-08-12

## 2022-08-12 RX ORDER — EPINEPHRINE 0.3 MG/.3ML
0.1 INJECTION INTRAMUSCULAR; SUBCUTANEOUS
Qty: 4 | Refills: 0 | Status: DISCONTINUED | OUTPATIENT
Start: 2022-08-12 | End: 2022-08-13

## 2022-08-12 RX ORDER — NOREPINEPHRINE BITARTRATE/D5W 8 MG/250ML
0.05 PLASTIC BAG, INJECTION (ML) INTRAVENOUS
Qty: 16 | Refills: 0 | Status: DISCONTINUED | OUTPATIENT
Start: 2022-08-12 | End: 2022-08-18

## 2022-08-12 RX ORDER — FENTANYL CITRATE 50 UG/ML
0.5 INJECTION INTRAVENOUS
Qty: 2500 | Refills: 0 | Status: DISCONTINUED | OUTPATIENT
Start: 2022-08-12 | End: 2022-08-12

## 2022-08-12 RX ORDER — INSULIN LISPRO 100/ML
VIAL (ML) SUBCUTANEOUS EVERY 6 HOURS
Refills: 0 | Status: DISCONTINUED | OUTPATIENT
Start: 2022-08-12 | End: 2022-08-12

## 2022-08-12 RX ORDER — FENTANYL CITRATE 50 UG/ML
0.5 INJECTION INTRAVENOUS
Qty: 2500 | Refills: 0 | Status: DISCONTINUED | OUTPATIENT
Start: 2022-08-12 | End: 2022-08-14

## 2022-08-12 RX ORDER — INSULIN LISPRO 100/ML
VIAL (ML) SUBCUTANEOUS EVERY 6 HOURS
Refills: 0 | Status: DISCONTINUED | OUTPATIENT
Start: 2022-08-12 | End: 2022-08-21

## 2022-08-12 RX ORDER — CHLORHEXIDINE GLUCONATE 213 G/1000ML
1 SOLUTION TOPICAL
Refills: 0 | Status: DISCONTINUED | OUTPATIENT
Start: 2022-08-12 | End: 2022-08-13

## 2022-08-12 RX ORDER — SODIUM CHLORIDE 9 MG/ML
1000 INJECTION, SOLUTION INTRAVENOUS
Refills: 0 | Status: DISCONTINUED | OUTPATIENT
Start: 2022-08-12 | End: 2022-08-12

## 2022-08-12 RX ORDER — SODIUM CHLORIDE 9 MG/ML
10 INJECTION INTRAMUSCULAR; INTRAVENOUS; SUBCUTANEOUS
Refills: 0 | Status: DISCONTINUED | OUTPATIENT
Start: 2022-08-12 | End: 2022-09-18

## 2022-08-12 RX ORDER — HYDROCORTISONE 20 MG
100 TABLET ORAL ONCE
Refills: 0 | Status: DISCONTINUED | OUTPATIENT
Start: 2022-08-12 | End: 2022-08-12

## 2022-08-12 RX ORDER — PHYTONADIONE (VIT K1) 5 MG
10 TABLET ORAL ONCE
Refills: 0 | Status: COMPLETED | OUTPATIENT
Start: 2022-08-12 | End: 2022-08-12

## 2022-08-12 RX ORDER — INSULIN LISPRO 100/ML
5 VIAL (ML) SUBCUTANEOUS EVERY 6 HOURS
Refills: 0 | Status: DISCONTINUED | OUTPATIENT
Start: 2022-08-12 | End: 2022-08-21

## 2022-08-12 RX ORDER — DEXTROSE 50 % IN WATER 50 %
15 SYRINGE (ML) INTRAVENOUS ONCE
Refills: 0 | Status: DISCONTINUED | OUTPATIENT
Start: 2022-08-12 | End: 2022-08-12

## 2022-08-12 RX ORDER — HYDROCORTISONE 20 MG
100 TABLET ORAL ONCE
Refills: 0 | Status: COMPLETED | OUTPATIENT
Start: 2022-08-12 | End: 2022-08-12

## 2022-08-12 RX ORDER — POTASSIUM CHLORIDE 20 MEQ
40 PACKET (EA) ORAL ONCE
Refills: 0 | Status: COMPLETED | OUTPATIENT
Start: 2022-08-12 | End: 2022-08-12

## 2022-08-12 RX ORDER — LEVOTHYROXINE SODIUM 125 MCG
56 TABLET ORAL AT BEDTIME
Refills: 0 | Status: DISCONTINUED | OUTPATIENT
Start: 2022-08-12 | End: 2022-08-14

## 2022-08-12 RX ORDER — KETAMINE HYDROCHLORIDE 100 MG/ML
0.25 INJECTION INTRAMUSCULAR; INTRAVENOUS
Qty: 1000 | Refills: 0 | Status: DISCONTINUED | OUTPATIENT
Start: 2022-08-12 | End: 2022-08-12

## 2022-08-12 RX ORDER — HYDROCORTISONE 20 MG
50 TABLET ORAL EVERY 6 HOURS
Refills: 0 | Status: DISCONTINUED | OUTPATIENT
Start: 2022-08-12 | End: 2022-08-13

## 2022-08-12 RX ORDER — DEXTROSE 50 % IN WATER 50 %
12.5 SYRINGE (ML) INTRAVENOUS ONCE
Refills: 0 | Status: DISCONTINUED | OUTPATIENT
Start: 2022-08-12 | End: 2022-08-12

## 2022-08-12 RX ADMIN — Medication 40 MILLIEQUIVALENT(S): at 02:25

## 2022-08-12 RX ADMIN — EPINEPHRINE 37.1 MICROGRAM(S)/KG/MIN: 0.3 INJECTION INTRAMUSCULAR; SUBCUTANEOUS at 16:32

## 2022-08-12 RX ADMIN — HYDROMORPHONE HYDROCHLORIDE 1 MILLIGRAM(S): 2 INJECTION INTRAMUSCULAR; INTRAVENOUS; SUBCUTANEOUS at 05:30

## 2022-08-12 RX ADMIN — Medication 50 MILLIEQUIVALENT(S): at 18:08

## 2022-08-12 RX ADMIN — NYSTATIN CREAM 1 APPLICATION(S): 100000 CREAM TOPICAL at 06:31

## 2022-08-12 RX ADMIN — Medication 50 MILLIEQUIVALENT(S): at 06:30

## 2022-08-12 RX ADMIN — Medication 56 MICROGRAM(S): at 05:30

## 2022-08-12 RX ADMIN — PANTOPRAZOLE SODIUM 40 MILLIGRAM(S): 20 TABLET, DELAYED RELEASE ORAL at 05:18

## 2022-08-12 RX ADMIN — MEROPENEM 100 MILLIGRAM(S): 1 INJECTION INTRAVENOUS at 05:17

## 2022-08-12 RX ADMIN — PROPOFOL 14.8 MICROGRAM(S)/KG/MIN: 10 INJECTION, EMULSION INTRAVENOUS at 16:32

## 2022-08-12 RX ADMIN — HEPARIN SODIUM 5000 UNIT(S): 5000 INJECTION INTRAVENOUS; SUBCUTANEOUS at 21:12

## 2022-08-12 RX ADMIN — Medication 102 MILLIGRAM(S): at 13:15

## 2022-08-12 RX ADMIN — SODIUM CHLORIDE 30 MILLILITER(S): 9 INJECTION, SOLUTION INTRAVENOUS at 19:39

## 2022-08-12 RX ADMIN — Medication 300 MILLIGRAM(S): at 15:13

## 2022-08-12 RX ADMIN — Medication 4: at 23:32

## 2022-08-12 RX ADMIN — Medication 5 UNIT(S): at 23:33

## 2022-08-12 RX ADMIN — Medication 4.63 MICROGRAM(S)/KG/MIN: at 19:36

## 2022-08-12 RX ADMIN — INSULIN GLARGINE 40 UNIT(S): 100 INJECTION, SOLUTION SUBCUTANEOUS at 03:54

## 2022-08-12 RX ADMIN — Medication 500 MILLIGRAM(S): at 13:15

## 2022-08-12 RX ADMIN — Medication 100 MILLIGRAM(S): at 05:17

## 2022-08-12 RX ADMIN — Medication 400 MILLIGRAM(S): at 00:23

## 2022-08-12 RX ADMIN — Medication 1 TABLET(S): at 13:15

## 2022-08-12 RX ADMIN — Medication 5 UNIT(S): at 17:29

## 2022-08-12 RX ADMIN — Medication 100 MILLIGRAM(S): at 21:55

## 2022-08-12 RX ADMIN — Medication 25 GRAM(S): at 19:05

## 2022-08-12 RX ADMIN — HYDROMORPHONE HYDROCHLORIDE 0.5 MILLIGRAM(S): 2 INJECTION INTRAMUSCULAR; INTRAVENOUS; SUBCUTANEOUS at 00:22

## 2022-08-12 RX ADMIN — Medication 400 MILLIGRAM(S): at 05:18

## 2022-08-12 RX ADMIN — FENTANYL CITRATE 4.94 MICROGRAM(S)/KG/HR: 50 INJECTION INTRAVENOUS at 16:32

## 2022-08-12 RX ADMIN — CHLORHEXIDINE GLUCONATE 1 APPLICATION(S): 213 SOLUTION TOPICAL at 13:14

## 2022-08-12 RX ADMIN — NYSTATIN CREAM 1 APPLICATION(S): 100000 CREAM TOPICAL at 17:29

## 2022-08-12 RX ADMIN — VASOPRESSIN 1.8 UNIT(S)/MIN: 20 INJECTION INTRAVENOUS at 02:25

## 2022-08-12 RX ADMIN — Medication 50 MILLIEQUIVALENT(S): at 08:13

## 2022-08-12 RX ADMIN — Medication 100 MILLIGRAM(S): at 17:20

## 2022-08-12 RX ADMIN — Medication 50 MILLIEQUIVALENT(S): at 01:05

## 2022-08-12 RX ADMIN — MEROPENEM 100 MILLIGRAM(S): 1 INJECTION INTRAVENOUS at 13:15

## 2022-08-12 RX ADMIN — Medication 50 MILLIEQUIVALENT(S): at 19:21

## 2022-08-12 RX ADMIN — Medication 100 GRAM(S): at 18:08

## 2022-08-12 RX ADMIN — CHLORHEXIDINE GLUCONATE 15 MILLILITER(S): 213 SOLUTION TOPICAL at 17:20

## 2022-08-12 RX ADMIN — Medication 4: at 17:29

## 2022-08-12 RX ADMIN — SODIUM CHLORIDE 100 MILLILITER(S): 9 INJECTION, SOLUTION INTRAVENOUS at 16:32

## 2022-08-12 RX ADMIN — MEROPENEM 100 MILLIGRAM(S): 1 INJECTION INTRAVENOUS at 21:12

## 2022-08-12 RX ADMIN — Medication 300 MILLIGRAM(S): at 00:23

## 2022-08-12 RX ADMIN — HYDROMORPHONE HYDROCHLORIDE 1 MILLIGRAM(S): 2 INJECTION INTRAMUSCULAR; INTRAVENOUS; SUBCUTANEOUS at 05:16

## 2022-08-12 RX ADMIN — PANTOPRAZOLE SODIUM 40 MILLIGRAM(S): 20 TABLET, DELAYED RELEASE ORAL at 17:20

## 2022-08-12 RX ADMIN — Medication 50 MILLIGRAM(S): at 23:32

## 2022-08-12 RX ADMIN — HYDROMORPHONE HYDROCHLORIDE 0.5 MILLIGRAM(S): 2 INJECTION INTRAMUSCULAR; INTRAVENOUS; SUBCUTANEOUS at 00:40

## 2022-08-12 RX ADMIN — Medication 4.63 MICROGRAM(S)/KG/MIN: at 16:31

## 2022-08-12 RX ADMIN — Medication 75 MEQ/KG/HR: at 17:21

## 2022-08-12 RX ADMIN — EPINEPHRINE 37.1 MICROGRAM(S)/KG/MIN: 0.3 INJECTION INTRAMUSCULAR; SUBCUTANEOUS at 19:35

## 2022-08-12 RX ADMIN — VASOPRESSIN 1.8 UNIT(S)/MIN: 20 INJECTION INTRAVENOUS at 19:34

## 2022-08-12 RX ADMIN — PROPOFOL 14.8 MICROGRAM(S)/KG/MIN: 10 INJECTION, EMULSION INTRAVENOUS at 19:36

## 2022-08-12 RX ADMIN — VASOPRESSIN 1.8 UNIT(S)/MIN: 20 INJECTION INTRAVENOUS at 16:32

## 2022-08-12 RX ADMIN — HEPARIN SODIUM 5000 UNIT(S): 5000 INJECTION INTRAVENOUS; SUBCUTANEOUS at 05:18

## 2022-08-12 RX ADMIN — Medication 100 MILLIGRAM(S): at 13:15

## 2022-08-12 RX ADMIN — Medication 14.8 MICROGRAM(S)/KG/MIN: at 19:35

## 2022-08-12 RX ADMIN — FENTANYL CITRATE 4.94 MICROGRAM(S)/KG/HR: 50 INJECTION INTRAVENOUS at 19:37

## 2022-08-12 RX ADMIN — HEPARIN SODIUM 5000 UNIT(S): 5000 INJECTION INTRAVENOUS; SUBCUTANEOUS at 13:14

## 2022-08-12 RX ADMIN — Medication 4: at 13:36

## 2022-08-12 RX ADMIN — Medication 75 MEQ/KG/HR: at 19:38

## 2022-08-12 RX ADMIN — CHLORHEXIDINE GLUCONATE 15 MILLILITER(S): 213 SOLUTION TOPICAL at 05:18

## 2022-08-12 NOTE — CONSULT NOTE ADULT - ASSESSMENT
52F immobile 2/2 MS, poorly controlled T2DM, asthma, hypothyroidism, known chronic wounds sacrum (stage 4), vulvar/Rt thigh, and right calf. Recent April 2022 hospitalization for urosepsis. On cefepime/flagyl for chronic osteo 2/2 unstageable sacral decubitus ulcer, dakins BID packing. Presented from [facility] with necrotizing fasciitis, POD 1.   Cardiology consulted for TTE with EF 10%, which is likely in the setting of critical illness/stress cardiomyopathy with probable microvascular ischemia in the setting of severe sepsis. Patient's widened pulse pressures are also further suggestive of vasodilatory  of shock. No component of dynamic obstruction noted on TTE. Appreciate excellent care of SICU team    Plan   - In the setting of HDUS and pressor requirements, would hold from GDMT at present time  - Would prioritize reducing propofol as sedative agent in the setting of septic shock given it's hemodynamic effects, in favor of ketamine  - Repeat TTE when out of acute illness period

## 2022-08-12 NOTE — CHART NOTE - NSCHARTNOTEFT_GEN_A_CORE
Patient with increasing vasopressor requirements in the PM.    Serial ABG with persistent acidosis, worsening LA 1.8 --> 3.0, HCO3 15-->11. Respiratory compensation via mechanical ventilation.  Cardiology consulted earlier in PM in the setting of TTE findings consistent with SEVERE global systolic dysfunction, EF 5-10%. Initially, stable on Levo/Vaso, tolerated proning for wound vac change (revealed healthy appearing tissues, not requiring further debridement at this time, wound vac reapplied).  However in the afternoon, patient with uptrending levo requirements. Epi gtt was started in the setting of HF with plan to wean Levo in favor of Epi.  Unable to wean levo with addition of Epi c/b run of SVT ~180 bpm with coinciding drop in BP, self limited ~10 seconds. In the setting of worsening acidosis and shock the patient was stressed dosed w/ steroids and started on a Bicarb gtt @ 75meq @ 75cc/hr. TF held, OGT decompressed to LCWS. Abdominal exam is soft, nontender, nondistended. Peak pressures 22. No concern for acute abdomen at this time. More likely cardiogenic shock.    Cardiology called back to discuss further action.  Recommended change epi to dobutamine @ 5mcg/kg/min, monitor for hypotension/arrhythmia.  [facility] was consulted by the CCU team for evaluation of transfer for possible [facility] ECMO. Per CCU fellow, patient deemed not a candidate for [facility] ECMO given comorbidities.    A repeat VBG was drawn for cardiology. PH on VBG 7.18. Amp bicarb given.   Nephrology consulted for evaluation of possible CVVH for acidemia.    During this time, the brother Abdifatah Gonzales was communicated with closely. Consentable to CVVH if offered, consented to HD cathter placement in IJ/fem if necessary.  The patients critical condition was heavily shared with the brother. Advised to come bedside as her prognosis is poor and she has very quickly decompensated this afternoon.   Abdifatah understands the gravity of his sisters condition and that she may succumb to her illnesses tonight. All questions addressed and answered.    All plans closely discussed with SICU Attending Dr. Biju Sanford    Disposition:  Patient accepted to CCU.   CCU fellow at bedside, placed introducer with plan to transfer patient to CCU for swan catheter placement and titration of ionotropes.  Nephrology recommending NO CVVH and to increase NaHCO3 gtt to 150cc/hr + loop diuretics. These recommendations was communicated to the CCU by myself.  Awaiting CCU bed transfer. Patient with increasing vasopressor requirements in the PM.    Serial ABG with persistent acidosis, worsening LA 1.8 --> 3.0, HCO3 15-->11. Respiratory compensation via mechanical ventilation.  Cardiology consulted earlier in PM in the setting of TTE findings consistent with SEVERE global systolic dysfunction, EF 5-10%. Initially, stable on Levo/Vaso, tolerated proning for wound vac change (revealed healthy appearing tissues, not requiring further debridement at this time, wound vac reapplied).  However in the afternoon, patient with uptrending levo requirements. Epi gtt was started in the setting of HF with plan to wean Levo in favor of Epi.  Unable to wean levo with addition of Epi c/b run of SVT ~180 bpm with coinciding drop in BP, self limited ~10 seconds. In the setting of worsening acidosis and shock the patient was stressed dosed w/ steroids and started on a Bicarb gtt @ 75meq @ 75cc/hr. TF held, OGT decompressed to LCWS. Abdominal exam is soft, nontender, nondistended. Peak pressures 22. No concern for acute abdomen at this time. Viable tissues on wound vac change. Urosepsis source control w/ toussaint and broad spectrum antibiotic tailored based on previous UA cultures. More likely cardiogenic shock.    Cardiology called back to discuss further action.  Recommended change epi to dobutamine @ 5mcg/kg/min, monitor for hypotension/arrhythmia.  [facility] was consulted by the CCU team for evaluation of transfer for possible [facility] ECMO. Per CCU fellow, patient deemed not a candidate for VA ECMO given comorbidities.    A repeat VBG was drawn for cardiology. PH on VBG 7.18. Amp bicarb given.   Nephrology consulted for evaluation of possible CVVH for acidemia.    During this time, the brother Abdifatah Gonzales was communicated with closely. Consentable to CVVH if offered, consented to HD cathter placement in IJ/fem if necessary.  The patients critical condition was heavily shared with the brother. Advised to come bedside as her prognosis is poor and she has very quickly decompensated this afternoon.   Abdifatah understands the gravity of his sisters condition and that she may succumb to her illnesses tonight. All questions addressed and answered.    All plans closely discussed with SICU Attending Dr. Biju Sanford    Disposition:  Patient accepted to CCU.   CCU fellow at bedside, placed introducer with plan to transfer patient to CCU for swan catheter placement and titration of ionotropes.  Nephrology recommending NO CVVH and to increase NaHCO3 gtt to 150cc/hr + loop diuretics. These recommendations was communicated to the CCU by myself.  Awaiting CCU bed transfer.

## 2022-08-12 NOTE — CONSULT NOTE ADULT - SUBJECTIVE AND OBJECTIVE BOX
[facility] DIVISION OF KIDNEY DISEASES AND HYPERTENSION -- [phone]  -- INITIAL CONSULT NOTE  --------------------------------------------------------------------------------  HPI: 52 y F with PMH CHF, immobility, bedbound presenting for AMS, admitted for sepsis in the setting of large sacral wound, pt underwent surgical debridement on 8/11/22. Nephrology consulted for worsening metabolic acidosis. Blood gas significant for pH 7.26 on ABG and 7.18 on VBG today, SCO2 13 and lactate 3.4. She is currently on 4 pressors to maintain blood pressure and receiving sodium bicarbonate infusion at 75 cc/hr. Patient seen & examined. She is intubated and sedated. ROS unable to be obtained.      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      MS (multiple sclerosis)        FAMILY HISTORY:    PAST SOCIAL HISTORY:    ALLERGIES & MEDICATIONS  --------------------------------------------------------------------------------  Allergies    No Known Drug Allergies  Pineapple (Anaphylaxis)    Intolerances      Standing Inpatient Medications  ascorbic acid 500 milliGRAM(s) Oral daily  chlorhexidine 0.12% Liquid 15 milliLiter(s) Oral Mucosa every 12 hours  chlorhexidine 2% Cloths 1 Application(s) Topical daily  chlorhexidine 4% Liquid 1 Application(s) Topical <User Schedule>  clindamycin IVPB 600 milliGRAM(s) IV Intermittent every 8 hours  DOBUTamine Infusion 5 MICROgram(s)/kG/Min IV Continuous <Continuous>  EPINEPHrine    Infusion 0.1 MICROgram(s)/kG/Min IV Continuous <Continuous>  ergocalciferol 23068 Unit(s) Oral every week  fentaNYL   Infusion. 0.5 MICROgram(s)/kG/Hr IV Continuous <Continuous>  heparin   Injectable 5000 Unit(s) SubCutaneous every 8 hours  hydrocortisone sodium succinate Injectable 50 milliGRAM(s) IV Push every 6 hours  insulin lispro (ADMELOG) corrective regimen sliding scale   SubCutaneous every 6 hours  insulin lispro Injectable (ADMELOG) 5 Unit(s) SubCutaneous every 6 hours  lactated ringers. 1000 milliLiter(s) IV Continuous <Continuous>  levothyroxine Injectable 56 MICROGram(s) IV Push at bedtime  magnesium sulfate  IVPB 2 Gram(s) IV Intermittent once  meropenem  IVPB 1000 milliGRAM(s) IV Intermittent every 8 hours  multivitamin 1 Tablet(s) Oral daily  norepinephrine Infusion 0.05 MICROgram(s)/kG/Min IV Continuous <Continuous>  nystatin Powder 1 Application(s) Topical two times a day  pantoprazole  Injectable 40 milliGRAM(s) IV Push every 12 hours  propofol Infusion 25 MICROgram(s)/kG/Min IV Continuous <Continuous>  sodium bicarbonate  Infusion 0.057 mEq/kG/Hr IV Continuous <Continuous>  vancomycin  IVPB 1500 milliGRAM(s) IV Intermittent every 12 hours  vasopressin Infusion 0.03 Unit(s)/Min IV Continuous <Continuous>    PRN Inpatient Medications  ALBUTerol    90 MICROgram(s) HFA Inhaler 2 Puff(s) Inhalation every 6 hours PRN  HYDROmorphone  Injectable 1 milliGRAM(s) IV Push every 3 hours PRN  HYDROmorphone  Injectable 0.5 milliGRAM(s) IV Push every 3 hours PRN  sodium chloride 0.9% lock flush 10 milliLiter(s) IV Push every 1 hour PRN      REVIEW OF SYSTEMS  --------------------------------------------------------------------------------  ROS unable to be obtained.     VITALS/PHYSICAL EXAM  --------------------------------------------------------------------------------  T(C): 36.8 (08-12-22 @ 16:00), Max: 37.1 (08-12-22 @ 08:00)  HR: 113 (08-12-22 @ 18:00) (64 - 120)  BP: --  RR: 18 (08-12-22 @ 18:00) (16 - 20)  SpO2: 100% (08-12-22 @ 18:00) (100% - 100%)  Wt(kg): --    Weight (kg): 98.8 (08-11-22 @ 04:17)      08-11-22 @ 07:01  -  08-12-22 @ 07:00  --------------------------------------------------------  IN: 7285.9 mL / OUT: 2150 mL / NET: 5135.9 mL    08-12-22 @ 07:01  -  08-12-22 @ 18:57  --------------------------------------------------------  IN: 2866 mL / OUT: 580 mL / NET: 2286 mL      Physical Exam:  	Gen: obese female  	HEENT: Intubated  	Pulm: CTA B/L  	CV: S1S2  	Abd: Soft, +BS, NT/DND              : +Rose draining yellow urine  	Ext: No LE edema B/L  	Neuro: Sedated, opens eyes spontaneously  	Skin: Warm and dry  	Vascular access: Right femoral CVC    LABS/STUDIES  --------------------------------------------------------------------------------              9.4    40.76 >-----------<  740      [08-11-22 @ 23:45]              30.9     133  |  102  |  19  ----------------------------<  200      [08-11-22 @ 23:45]  3.3   |  13  |  0.59        Ca     7.8     [08-11-22 @ 23:45]      Mg     1.90     [08-11-22 @ 17:47]      Phos  3.4     [08-11-22 @ 23:45]    TPro  5.8  /  Alb  1.5  /  TBili  0.4  /  DBili  x   /  AST  18  /  ALT  9   /  AlkPhos  172  [08-11-22 @ 23:45]    PT/INR: PT 27.8 , INR 2.38       [08-11-22 @ 23:45]  PTT: 34.9       [08-11-22 @ 23:45]    CK 34      [08-12-22 @ 03:00]    Creatinine Trend:  SCr 0.59 [08-11 @ 23:45]  SCr 0.61 [08-11 @ 17:47]  SCr 0.61 [08-11 @ 04:00]  SCr 0.62 [08-10 @ 22:30]    Urinalysis - [08-10-22 @ 22:30]      Color Yellow / Appearance Turbid / SG 1.017 / pH 6.5      Gluc Negative / Ketone Negative  / Bili Negative / Urobili <2 mg/dL       Blood Moderate / Protein 100 mg/dL / Leuk Est Large / Nitrite Negative      RBC 25-50 / WBC >50 / Hyaline  / Gran  / Sq Epi  / Non Sq Epi  / Bacteria Many      Iron 24, TIBC 164, %sat 15      [03-28-22 @ 06:28]  Ferritin 251      [04-07-22 @ 07:12]  Vitamin D (25OH) 12.5      [04-04-22 @ 09:46]  TSH 3.86      [03-28-22 @ 12:53]       [facility] DIVISION OF KIDNEY DISEASES AND HYPERTENSION -- [phone]  -- INITIAL CONSULT NOTE  --------------------------------------------------------------------------------    HPI: 52 y F with PMH CHF, immobility, bedbound presenting for AMS, admitted for sepsis in the setting of large sacral wound, pt underwent surgical debridement on 8/11/22. Nephrology consulted for worsening metabolic acidosis. Blood gas significant for pH 7.26 on ABG and 7.18 on VBG today, SCO2 13 and lactate 3.4. She is currently on 4 pressors to maintain blood pressure and receiving sodium bicarbonate infusion at 75 cc/hr. Patient seen & examined. She is intubated and sedated. ROS unable to be obtained.    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      MS (multiple sclerosis)        FAMILY HISTORY: unable    PAST SOCIAL HISTORY: unable    ALLERGIES & MEDICATIONS  --------------------------------------------------------------------------------  Allergies    No Known Drug Allergies  Pineapple (Anaphylaxis)    Intolerances      Standing Inpatient Medications  ascorbic acid 500 milliGRAM(s) Oral daily  chlorhexidine 0.12% Liquid 15 milliLiter(s) Oral Mucosa every 12 hours  chlorhexidine 2% Cloths 1 Application(s) Topical daily  chlorhexidine 4% Liquid 1 Application(s) Topical <User Schedule>  clindamycin IVPB 600 milliGRAM(s) IV Intermittent every 8 hours  DOBUTamine Infusion 5 MICROgram(s)/kG/Min IV Continuous <Continuous>  EPINEPHrine    Infusion 0.1 MICROgram(s)/kG/Min IV Continuous <Continuous>  ergocalciferol 25295 Unit(s) Oral every week  fentaNYL   Infusion. 0.5 MICROgram(s)/kG/Hr IV Continuous <Continuous>  heparin   Injectable 5000 Unit(s) SubCutaneous every 8 hours  hydrocortisone sodium succinate Injectable 50 milliGRAM(s) IV Push every 6 hours  insulin lispro (ADMELOG) corrective regimen sliding scale   SubCutaneous every 6 hours  insulin lispro Injectable (ADMELOG) 5 Unit(s) SubCutaneous every 6 hours  lactated ringers. 1000 milliLiter(s) IV Continuous <Continuous>  levothyroxine Injectable 56 MICROGram(s) IV Push at bedtime  magnesium sulfate  IVPB 2 Gram(s) IV Intermittent once  meropenem  IVPB 1000 milliGRAM(s) IV Intermittent every 8 hours  multivitamin 1 Tablet(s) Oral daily  norepinephrine Infusion 0.05 MICROgram(s)/kG/Min IV Continuous <Continuous>  nystatin Powder 1 Application(s) Topical two times a day  pantoprazole  Injectable 40 milliGRAM(s) IV Push every 12 hours  propofol Infusion 25 MICROgram(s)/kG/Min IV Continuous <Continuous>  sodium bicarbonate  Infusion 0.057 mEq/kG/Hr IV Continuous <Continuous>  vancomycin  IVPB 1500 milliGRAM(s) IV Intermittent every 12 hours  vasopressin Infusion 0.03 Unit(s)/Min IV Continuous <Continuous>    PRN Inpatient Medications  ALBUTerol    90 MICROgram(s) HFA Inhaler 2 Puff(s) Inhalation every 6 hours PRN  HYDROmorphone  Injectable 1 milliGRAM(s) IV Push every 3 hours PRN  HYDROmorphone  Injectable 0.5 milliGRAM(s) IV Push every 3 hours PRN  sodium chloride 0.9% lock flush 10 milliLiter(s) IV Push every 1 hour PRN      REVIEW OF SYSTEMS  --------------------------------------------------------------------------------  ROS unable to be obtained.     VITALS/PHYSICAL EXAM  --------------------------------------------------------------------------------  T(C): 36.8 (08-12-22 @ 16:00), Max: 37.1 (08-12-22 @ 08:00)  HR: 113 (08-12-22 @ 18:00) (64 - 120)  BP: --  RR: 18 (08-12-22 @ 18:00) (16 - 20)  SpO2: 100% (08-12-22 @ 18:00) (100% - 100%)  Wt(kg): --    Weight (kg): 98.8 (08-11-22 @ 04:17)      08-11-22 @ 07:01  -  08-12-22 @ 07:00  --------------------------------------------------------  IN: 7285.9 mL / OUT: 2150 mL / NET: 5135.9 mL    08-12-22 @ 07:01  -  08-12-22 @ 18:57  --------------------------------------------------------  IN: 2866 mL / OUT: 580 mL / NET: 2286 mL      Physical Exam:  	Gen: obese female  	HEENT: Intubated  	Pulm: CTA B/L  	CV: S1S2  	Abd: Soft, +BS, NT/DND              : +Rose draining yellow urine  	Ext: No LE edema B/L  	Neuro: Sedated, opens eyes spontaneously  	Skin: Warm and dry  	Vascular access: Right femoral CVC    LABS/STUDIES  --------------------------------------------------------------------------------              9.4    40.76 >-----------<  740      [08-11-22 @ 23:45]              30.9     133  |  102  |  19  ----------------------------<  200      [08-11-22 @ 23:45]  3.3   |  13  |  0.59        Ca     7.8     [08-11-22 @ 23:45]      Mg     1.90     [08-11-22 @ 17:47]      Phos  3.4     [08-11-22 @ 23:45]    TPro  5.8  /  Alb  1.5  /  TBili  0.4  /  DBili  x   /  AST  18  /  ALT  9   /  AlkPhos  172  [08-11-22 @ 23:45]    PT/INR: PT 27.8 , INR 2.38       [08-11-22 @ 23:45]  PTT: 34.9       [08-11-22 @ 23:45]    CK 34      [08-12-22 @ 03:00]    Creatinine Trend:  SCr 0.59 [08-11 @ 23:45]  SCr 0.61 [08-11 @ 17:47]  SCr 0.61 [08-11 @ 04:00]  SCr 0.62 [08-10 @ 22:30]    Urinalysis - [08-10-22 @ 22:30]      Color Yellow / Appearance Turbid / SG 1.017 / pH 6.5      Gluc Negative / Ketone Negative  / Bili Negative / Urobili <2 mg/dL       Blood Moderate / Protein 100 mg/dL / Leuk Est Large / Nitrite Negative      RBC 25-50 / WBC >50 / Hyaline  / Gran  / Sq Epi  / Non Sq Epi  / Bacteria Many      Iron 24, TIBC 164, %sat 15      [03-28-22 @ 06:28]  Ferritin 251      [04-07-22 @ 07:12]  Vitamin D (25OH) 12.5      [04-04-22 @ 09:46]  TSH 3.86      [03-28-22 @ 12:53]

## 2022-08-12 NOTE — CONSULT NOTE ADULT - ATTENDING COMMENTS
Pt critically ill with nec fasc s/p surgery, septic shock  severely reduced LV function of unclear etiology - suspect a component of critical illness cardiomyopathy.  Troponin relatively flat. No acute ischemic ST changes. anteroseptal infarct pattern with low voltage.  NSVT in the setting of severely reduced LVEF and increased adrenergic tone + levophed + epi.    Her BP cannot tolerate GDMT at this time.  She does not appear overtly volume overloaded with trace dependent edema.    Treatment involves supportive care and treatment of her underlying issue.  Normal CI by flotrac. wide pulse pressure supports vasodilation despite the use of vasopressors.  Recommend transition propofol to alternate agent as propofol can have negative inotropic properties. Pt critically ill with nec fasc s/p surgery, septic shock  severely reduced LV function of unclear etiology - suspect a component of critical illness cardiomyopathy.  Troponin relatively flat. No acute ischemic ST changes. anteroseptal infarct pattern with low voltage.  NSVT in the setting of severely reduced LVEF and increased adrenergic tone + levophed + epi.    Her BP cannot tolerate GDMT at this time.  She does not appear overtly volume overloaded with trace dependent edema.    Treatment involves supportive care and treatment of her underlying issue.  Normal CI by flotrac. wide pulse pressure supports vasodilation despite the use of vasopressors.  Recommend transition propofol to alternate agent as propofol can have negative inotropic properties.    NSVT on tele with what appears to also be AIVR vs. intermittent bundle branch block. If significant ventricular ectopy is present she will need an amiodarone gtt and consideration of decreasing epi/norepi as tolerated. Pt critically ill with nec fasc s/p surgery, septic shock  severely reduced LV function of unclear etiology - suspect a component of critical illness cardiomyopathy.  Troponin relatively flat. No acute ischemic ST changes. anteroseptal infarct pattern with low voltage.  NSVT in the setting of severely reduced LVEF and increased adrenergic tone + levophed + epi.    Her BP cannot tolerate GDMT at this time.  She does not appear overtly volume overloaded with trace dependent edema.    Treatment involves supportive care and treatment of her underlying issue.  Normal CI by flotrac. wide pulse pressure supports vasodilation despite the use of vasopressors. given the severely depressed EF there is the possibility that there is a cardiogenic component (i.e. relatively low CI given increased demand)  Recommend transition propofol to alternate agent as propofol can have negative inotropic properties.    NSVT on tele with what appears to also be AIVR vs. intermittent bundle branch block. If significant ventricular ectopy is present she will need an amiodarone gtt and consideration of decreasing epi/norepi as tolerated.    If increasing pressor requirements consider trial of inotropes - can start with dobutamine at 5. Pt critically ill with nec fasc s/p surgery, septic shock  severely reduced LV function of unclear etiology - suspect a component of critical illness cardiomyopathy.  Troponin relatively flat. No acute ischemic ST changes. anteroseptal infarct pattern with low voltage.  NSVT in the setting of severely reduced LVEF and increased adrenergic tone + levophed + epi.    Her BP cannot tolerate GDMT at this time.  She does not appear overtly volume overloaded with trace dependent edema.    Treatment involves supportive care and treatment of her underlying issue.  Normal CI by flotrac. wide pulse pressure supports vasodilation despite the use of vasopressors. given the severely depressed EF there is the possibility that there is a cardiogenic component (i.e. relatively low CI given increased demand)  Recommend transition propofol to alternate agent as propofol can have negative inotropic properties.    NSVT on tele with what appears to also be AIVR vs. intermittent bundle branch block. If significant ventricular ectopy is present she will need an amiodarone gtt and consideration of decreasing epi/norepi as tolerated.    If increasing pressor requirements consider trial of inotropes - can start with dobutamine at 5. will reach out to the shock team for candidacy for mechanical support.

## 2022-08-12 NOTE — CHART NOTE - NSCHARTNOTEFT_GEN_A_CORE
Brief Cardiology Note:    Patient seen multiple times tonight at bedside- she is 51yo female with mixed shock (septic and possible cardiac) currently on three pressors (Epi, Vaso, Levo) and  2.5. Positive blood cultures with GPCs from 8/10. Post op day 1 of extensive wide debridement of left flank to mid-back necrotic wound. Cardiology consulted earlier today for EF 5-10% on TTE. Was accepted during the daytime to the CCU (by day Cardiology team) and was waiting on bed. She is critically ill, prognosis is guarded. Discussed with brother who wants Full code for now with ongoing goals of care discussions if patient were to continue to decline clinically. Day Cardiology team discussed patient with Shock Team- felt not to be a candidate for mechanical support/advanced therapies at this time due to comorbidities.     Will continue Dobutamine, wean pressors as able for goal MAP > 65. Continue broad spectrum antibiotics. Can trend O2 off central Sat (most recent in 60s). Trend lactate and perfusion labs. Followed by Nephrology, no urgent indication for dialysis at this time.       Felipe Pitts MD  Cardiology Fellow - PGY 5 Brief Cardiology Note:    Patient seen multiple times tonight at bedside- she is 53yo female with mixed shock (septic and possible cardiac) currently on three pressors (Epi, Vaso, Levo) and  2.5. Positive blood cultures with GPCs from 8/10. Post op day 1 of extensive wide debridement of left flank to mid-back necrotic wound. Cardiology consulted earlier today for EF 5-10% on TTE. Was accepted during the daytime to the CCU (by day Cardiology team) and was waiting on bed. She is critically ill, prognosis is guarded. Discussed with brother who wants Full code for now with ongoing goals of care discussions if patient were to continue to decline clinically. Day Cardiology team discussed patient with Shock Team- felt not to be a candidate for mechanical support/advanced therapies at this time due to comorbidities.     Will continue Dobutamine, wean pressors as able for goal MAP > 65. Continue broad spectrum antibiotics. Trend lactate and perfusion labs. Followed by Nephrology, no urgent indication for dialysis at this time.     Will place PA catheter tonight.       Felipe Pitts MD  Cardiology Fellow - PGY 5

## 2022-08-12 NOTE — CONSULT NOTE ADULT - ATTENDING COMMENTS
Patient seen and evaluated this morning.  Case discussed yesterday evening 8/12 with Dr. Emery and Cardiology attending Shahnaz.  Patient on multiple pressors but with good urine output and no evidence of renal failure on BMP (potassium creatinine BUN all acceptable).  At that time recommended increase rate of bicarbonate infusion repeat pH.  PH this morning much improved and patient continues to have good urine out put.  There is presently no indication for CRRT.  Can use diuretics as needed to optimize fluid balance.

## 2022-08-12 NOTE — CONSULT NOTE ADULT - SUBJECTIVE AND OBJECTIVE BOX
CARDIOLOGY FELLOW CONSULT NOTE    HPI:  52F immobile 2/2 MS, poorly controlled T2DM, asthma, hypothyroidism, known chronic wounds sacrum (stage 4), vulvar/Rt thigh, and right calf. Recent 2022 hospitalization for urosepsis. On cefepime/flagyl for chronic osteo 2/2 unstageable sacral decubitus ulcer, dakins BID packing. Presented from [place] with concerns for UTI and anemia (6.8 from baseline 8.0) and new malodorous wound on L hip to L flank area. Surgery consulted for potential necrotizing soft tissue infection.  s/p debridement of wound L flank to midback, s/p wound vac. She has developed worsening sepsis and acidosis for which she is sedated on propofol, ketamine, and on multiple pressors including levo, vaso and epi. She is being treated with BSA for presumed septic shock (mw, vanc)  Cardiology consulted given global systolic dysfunction appreciated on TTE. hstrop 300s  Tele with PVcs, NSVTs, intermittent LBBB.     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 1000 milliGRAM(s) IV Intermittent once  cefepime   IVPB 2000 milliGRAM(s) IV Intermittent once  clindamycin IVPB 900 milliGRAM(s) IV Intermittent once  sodium chloride 0.9% Bolus 1000 milliLiter(s) IV Bolus once  vancomycin  IVPB. 1000 milliGRAM(s) IV Intermittent once    MEDICATIONS  (PRN):      Allergies    No Known Drug Allergies  Pineapple (Anaphylaxis)    Intolerances        SOCIAL HISTORY:    FAMILY HISTORY:          Physical Exam:  General: Morbidly obese, sedated and intubated  HEENT: NC/AT, EOMI  Pulmonary: vnetilator breath sounds   Abdominal: obese, large pannus   Extremities: poor muscle tone  Skin: +wound vac  Neuro: A/O x 3-4, CNs II-XII grossly intact    Vital Signs Last 24 Hrs  T(C): 39.8 (10 Aug 2022 21:06), Max: 39.8 (10 Aug 2022 21:06)  T(F): 103.7 (10 Aug 2022 21:06), Max: 103.7 (10 Aug 2022 21:06)  HR: 127 (10 Aug 2022 20:10) (124 - 127)  BP: 139/72 (10 Aug 2022 20:10) (123/56 - 139/72)  BP(mean): --  RR: 20 (10 Aug 2022 20:10) (18 - 20)  SpO2: 100% (10 Aug 2022 20:10) (100% - 100%)    Parameters below as of 10 Aug 2022 20:10  Patient On (Oxygen Delivery Method): room air        I&O's Summary          LABS:                        5.9    31.78 )-----------( 920      ( 10 Aug 2022 22:30 )             22.5         Urinalysis Basic - ( 10 Aug 2022 22:30 )    Color: Yellow / Appearance: Turbid / S.017 / pH: x  Gluc: x / Ketone: Negative  / Bili: Negative / Urobili: <2 mg/dL   Blood: x / Protein: 100 mg/dL / Nitrite: Negative   Leuk Esterase: Large / RBC: 25-50 /HPF / WBC >50 /HPF   Sq Epi: x / Non Sq Epi: x / Bacteria: Many                CAPILLARY BLOOD GLUCOSE            Cultures:      RADIOLOGY & ADDITIONAL STUDIES:               (10 Aug 2022 22:48)      ECGsinus rhythm, normal axis, q waves anteriorly, and low voltages in limb leads    Labs: Personally reviewed                        9.4    40.76 )-----------( 740      ( 11 Aug 2022 23:45 )             30.9     08-11    133<L>  |  102  |  19  ----------------------------<  200<H>  3.3<L>   |  13<L>  |  0.59    Ca    7.8<L>      11 Aug 2022 23:45  Phos  3.4     08-11  Mg     1.90     08-11    TPro  5.8<L>  /  Alb  1.5<L>  /  TBili  0.4  /  DBili  x   /  AST  18  /  ALT  9   /  AlkPhos  172<H>  08-11    PT/INR - ( 11 Aug 2022 23:45 )   PT: 27.8 sec;   INR: 2.38 ratio         PTT - ( 11 Aug 2022 23:45 )  PTT:34.9 sec    CARDIAC MARKERS ( 12 Aug 2022 03:00 )  308 ng/L / x     / x     / 34 U/L / x     / 12.1 ng/mL  CARDIAC MARKERS ( 11 Aug 2022 23:45 )  338 ng/L / x     / x     / 60 U/L / x     / 15.5 ng/mL    DIMENSIONS:  Dimensions:     Normal Values:  LA:     3.3 cm    2.0 - 4.0 cm  Ao:     2.7 cm    2.0 - 3.8 cm  SEPTUM: 0.7 cm    0.6 - 1.2 cm  PWT:    0.7 cm    0.6 - 1.1 cm  LVIDd:  4.7 cm    3.0 - 5.6 cm  LVIDs:  3.9 cm    1.8 - 4.0 cm  Derived Variables:  LVMI: 50 g/m2  RWT: 0.29  Fractional short: 17 %  Ejection Fraction (Visual Estimate): 5-10 %  ------------------------------------------------------------------------  OBSERVATIONS:  Mitral Valve: Normal mitral valve. Mild mitral  regurgitation.  Aortic Root: Normal aortic root.  Aortic Valve: Normal trileaflet aortic valve. Minimal  aortic regurgitation.  Left Atrium: Normal left atrium.  Left Ventricle: Endocardial visualization enhanced with  intravenous injection of echo contrast (Definity). Severe  segmental left ventricular systolic dysfunction. Only the  basal segments are moving.  No left ventricular thrombus.  Severe left ventricular enlargement. Moderate diastolic  dysfunction (Stage II).  Right Heart: Normal right atrium. Right ventricular  enlargement with decreased right ventricular systolic  function. Normal tricuspid valve. Mild tricuspid  regurgitation. Normal pulmonic valve.  Pericardium/PleuraNormal pericardium with no pericardial  effusion.  Hemodynamic: Estimated right ventricular systolic pressure  equals 37 mm Hg, assuming right atrial pressure equals 10  mm Hg, consistent with borderline pulmonary hypertension.  ------------------------------------------------------------------------  CONCLUSIONS:  1. Endocardial visualization enhanced with intravenous  injection of echo contrast (Definity). Severe segmental  left ventricular systolic dysfunction. Only the basal  segments are moving.  No left ventricular thrombus.  Estimated LVEF 10%.  2. Moderate diastolic dysfunction (Stage II).  3. Right ventricular enlargement with decreased right  ventricular systolic function.

## 2022-08-12 NOTE — PROGRESS NOTE ADULT - SUBJECTIVE AND OBJECTIVE BOX
SICU DAILY PROGRESS NOTE    24 HOUR EVENTS:  - Runs of PVCs, wide QRS - STAT labs. pending trops - ?start amiodorone  - FSG high >250 despite total 30U lantus, ISS -> started insulin drip  - glucose 289 to 282 to 201. insulin 8 to 9.5 to 10.5  - started D5  - R femoral central line placed -> switch nayeli to levo  - f/u betahydroxy  - prior urosepsis cx growing ESBL E.Coli resistant to cefepime -> switch to meropenem   - 4PM AB.27, pCo2 26, HCO3 12  - start Vit K PO; monitor coag qd  - per primary team, no plans for RTOR  - bedside TTE - apical akinesis. f/u formal TTE    PHYSICAL EXAM:  General: sedated and intubated  Neurologic: sedated  Resp: AC 20/400/6/30  Cardiac: appears well perfused, extremities warm  GI/abd: soft, nondistended. Wound vac L flank to mid-back. Flexi-seal in rectum, OG tube to suction with blood-tinged output  : Rose  Extremities: L and R heel protected with Allevyn dressings, cam boots. multiple chronic pressure [facility]        NEURO  Meds:ALBUTerol    90 MICROgram(s) HFA Inhaler 2 Puff(s) Inhalation every 6 hours PRN Shortness of Breath      RESPIRATORY  Mechanical Ventilation: HR: 74 (22 @ 00:00) (64 - 92)  BP: 86/57 (22 @ 06:00) (86/57 - 124/89)  BP(mean): 67 (22 @ 06:00) (67 - 101)  ABP: 101/53 (22 @ 00:00) (77/48 - 158/67)  ABP(mean): 72 (22 @ 00:00) (57 - 99)  Wt(kg): --  CVP(cm H2O): --  ABG - ( 11 Aug 2022 23:45 )  pH: 7.29  /  pCO2: 26    /  pO2: 144   / HCO3: 12    / Base Excess: -12.7 /  SaO2: 98.9    Lactate: x                Meds:    CARDIOVASCULAR  VBG - ( 10 Aug 2022 22:30 )  pH: 7.32  /  pCO2: 30    /  pO2: 25    / HCO3: 16    / Base Excess: -9.7  /  SaO2: 30.1   Lactate: 4.4              Cardiac Rhythm:  Meds:norepinephrine Infusion 0.05 MICROgram(s)/kG/Min IV Continuous <Continuous>      GI/NUTRITION  Diet:  Meds:pantoprazole  Injectable 40 milliGRAM(s) IV Push every 12 hours      GENITOURINARY/RENAL  Meds:   @ 07:01  -   @ 00:39  --------------------------------------------------------  IN:    dextrose 5% + lactated ringers: 100 mL    Insulin: 10.5 mL    Insulin: 9 mL    Insulin: 18.5 mL    IV PiggyBack: 900 mL    Lactated Ringers: 2250 mL    Lactated Ringers Bolus: 1000 mL    Norepinephrine: 324.3 mL    Phenylephrine: 30 mL    PRBCs (Packed Red Blood Cells): 300 mL    Propofol: 191.5 mL  Total IN: 5133.8 mL    OUT:    Indwelling Catheter - Urethral (mL): 860 mL    Nasogastric/Oral tube (mL): 200 mL    VAC (Vacuum Assisted Closure) System (mL): 200 mL  Total OUT: 1260 mL    Total NET: 3873.8 mL        Weight (kg): 98.8 ( @ 04:17)  ascorbic acid 500 milliGRAM(s) Oral daily  dextrose 5% + lactated ringers. 1000 milliLiter(s) IV Continuous <Continuous>  ergocalciferol 55930 Unit(s) Oral every week  multivitamin 1 Tablet(s) Oral daily  potassium chloride   Solution 40 milliEquivalent(s) Oral once  potassium chloride  20 mEq/100 mL IVPB 20 milliEquivalent(s) IV Intermittent every 2 hours        133<L>  |  102  |  19  ----------------------------<  200<H>  3.3<L>   |  13<L>  |  0.59    Ca    7.8<L>      11 Aug 2022 23:45  Phos  3.4       Mg     1.90         TPro  5.8<L>  /  Alb  1.5<L>  /  TBili  0.4  /  DBili  x   /  AST  18  /  ALT  9   /  AlkPhos  172<H>      [ ] Rose catheter, indication: urine output monitoring in critically ill patient    HEMATOLOGIC  Meds: heparin   Injectable 5000 Unit(s) SubCutaneous every 8 hours    [x] DVT Prophylaxis                        9.4    40.76 )-----------( 740      ( 11 Aug 2022 23:45 )             30.9     PT/INR - ( 11 Aug 2022 23:45 )   PT: 27.8 sec;   INR: 2.38 ratio         PTT - ( 11 Aug 2022 23:45 )  PTT:34.9 sec  Transfusion     [ ] PRBC   [ ] Platelets   [ ] FFP   [ ] Cryoprecipitate    INFECTIOUS DISEASES  T(C): 36.6 (22 @ 20:00), Max: 36.8 (22 @ 16:00)  Wt(kg): --  WBC Count: 40.76 K/uL ( @ 23:45)  WBC Count: 44.03 K/uL ( @ 13:22)  WBC Count: 46.96 K/uL ( @ 04:00)    Recent Cultures:  Specimen Source: .Other,  @ 04:49; Results --; Gram Stain: --; Organism: --    Meds: clindamycin IVPB 600 milliGRAM(s) IV Intermittent every 8 hours  meropenem  IVPB 1000 milliGRAM(s) IV Intermittent every 8 hours  vancomycin  IVPB 1500 milliGRAM(s) IV Intermittent every 12 hours      ENDOCRINE  Capillary Blood Glucose    Meds: dextrose 50% Injectable 25 Gram(s) IV Push once  dextrose 50% Injectable 12.5 Gram(s) IV Push once  insulin lispro (ADMELOG) corrective regimen sliding scale   SubCutaneous every 6 hours  insulin regular Infusion 10.5 Unit(s)/Hr IV Continuous <Continuous>  levothyroxine 75 MICROGram(s) Oral daily      OTHER MEDICATIONS:  chlorhexidine 0.12% Liquid 15 milliLiter(s) Oral Mucosa every 12 hours  chlorhexidine 2% Cloths 1 Application(s) Topical daily  nystatin Powder 1 Application(s) Topical two times a day      ACCESS DEVICES:  [x] Peripheral IV  [x] Central Venous Line	[x] R	[ ] L	[ ] IJ	[x] Fem	[ ] SC	Placed:   [x] Arterial Line		[x] R	[ ] L	[ ] Fem	[x] Rad	[ ] Ax	Placed:   [ ] PICC:					[ ] Mediport  [ ] Urinary Catheter, Date Placed:   [ ] Necessity of urinary, arterial, and venous catheters discussed    IMAGING:

## 2022-08-12 NOTE — PROGRESS NOTE ADULT - ASSESSMENT
52F immobile 2/2 MS, poorly controlled T2DM, asthma, hypothyroidism, known chronic wounds sacrum (stage 4), vulvar/Rt thigh, and right calf. Recent April 2022 hospitalization for urosepsis. Presented from [place] with concerns for UTI and anemia (6.8 from baseline 8.0). Found to have new malodorous wound on L flank area, concerning for necrotizing soft tissue infection. Febrile to 103.7. s/p debridement of wound L flank to midback.     PLAN:  -IVF:  -Pain Control with  -Nausea control PRN   -Diet:   -Abx:   -DVT Prophylaxis:   -OOB and ambulate  -Encourage IS  -Dispo:     B Team Surgery  [id number]  52F immobile 2/2 MS, poorly controlled T2DM, asthma, hypothyroidism, known chronic wounds sacrum (stage 4), vulvar/Rt thigh, and right calf. Recent April 2022 hospitalization for urosepsis. Presented from [place] with concerns for UTI and anemia (6.8 from baseline 8.0). Found to have new malodorous wound on L flank area, concerning for necrotizing soft tissue infection. Febrile to 103.7. s/p debridement of wound L flank to midback.     PLAN:  - Bedside vac change revealed viable wound base, no concern for ongoing NSTI; no need for RTOR for further debridement at this time  - Pain Control, sedation per SICU  - Vasopressor support for severe sepsis per SICU  - NPO/ OGT in place/ Start tube feeds  - Abx: Vanc/Clinda/Cefepime  - Uncontrolled DM req insulin gtt; transitioned back to basal/bolus  - DVT Prophylaxis: SQH  - Consider heparin gtt for ?acute coronary event  - Appreciate SICU care    B Team Surgery  [id number]     B Team Surgery  [id number]

## 2022-08-12 NOTE — CONSULT NOTE ADULT - PROBLEM SELECTOR RECOMMENDATION 9
Pt with metabolic acidosis in the setting of elevated lactate from septic vs cardiogenic shock. Pt pH on ABG 7.26 and VBG 7.18, lactate 3. Pt is currently receiving sodium bicarbonate infusion. At this time pt is on 4 pressors for blood pressure support. Pt is unlikely to tolerate CRRT at this time. She is urinating well and not hyperkalemic. Recommend increasing sodium bicarbonate infusion to 125 cc/hr and loop diurectics with goal net negative fluid balance. Monitor blood gas and BMP. Consent for CRRT is in the chart in the event that pt may require it.    If you have any questions, please feel free to contact alejandro Emery  Nephrology Fellow  [phone] / [employer] Teams(Preferred)  (After 5pm or on weekends please page the on-call fellow)

## 2022-08-12 NOTE — PROGRESS NOTE ADULT - SUBJECTIVE AND OBJECTIVE BOX
No acute overnight events  B TEAM Surgery Progress Note  Patient is a 52y old  Female who presents with a chief complaint of Soft tissue infection (12 Aug 2022 10:11)      INTERVAL EVENTS: Patient is POD#1 s/p extensive wide debridement of left flank to mid-back necrotic wound. Patient with cardiac event overnight with runs of PVCs, wide QRS, troponin 338-->308. Echo this morning showed severe LV dysfunction with estimated LVEF 10%. Started on insulin drip for hyperglycemia. Continues to be in severe sepsis requiring vasopressor support.     SUBJECTIVE: Patient seen and examined at bedside with surgical team, patient intuabated, sedated. Unable to offer complaints or ROS.    OBJECTIVE:    Vital Signs Last 24 Hrs  T(C): 36.7 (12 Aug 2022 04:00), Max: 36.8 (11 Aug 2022 16:00)  T(F): 98 (12 Aug 2022 04:00), Max: 98.3 (11 Aug 2022 16:00)  HR: 68 (12 Aug 2022 09:00) (64 - 90)  BP: --  BP(mean): --  RR: 20 (12 Aug 2022 09:00) (16 - 20)  SpO2: 100% (12 Aug 2022 09:00) (100% - 100%)    Parameters below as of 12 Aug 2022 08:00  Patient On (Oxygen Delivery Method): ventilator    O2 Concentration (%): 30I&O's Detail    11 Aug 2022 07:01  -  12 Aug 2022 07:00  --------------------------------------------------------  IN:    dextrose 5% + lactated ringers: 900 mL    Insulin: 9 mL    Insulin: 18.5 mL    Insulin: 10.5 mL    Insulin: 69 mL    IV PiggyBack: 1900 mL    Lactated Ringers: 2250 mL    Lactated Ringers Bolus: 1000 mL    Norepinephrine: 540.9 mL    Phenylephrine: 30 mL    PRBCs (Packed Red Blood Cells): 300 mL    Propofol: 247.2 mL    Vasopressin: 10.8 mL  Total IN: 7285.9 mL    OUT:    Indwelling Catheter - Urethral (mL): 1400 mL    Nasogastric/Oral tube (mL): 350 mL    VAC (Vacuum Assisted Closure) System (mL): 400 mL  Total OUT: 2150 mL    Total NET: 5135.9 mL      12 Aug 2022 07:01  -  12 Aug 2022 10:24  --------------------------------------------------------  IN:    dextrose 5% + lactated ringers: 200 mL    IV PiggyBack: 100 mL    Norepinephrine: 51.8 mL    Propofol: 11.8 mL    Vasopressin: 3.6 mL  Total IN: 367.2 mL    OUT:    Indwelling Catheter - Urethral (mL): 100 mL  Total OUT: 100 mL    Total NET: 267.2 mL      MEDICATIONS  (STANDING):  ascorbic acid 500 milliGRAM(s) Oral daily  chlorhexidine 0.12% Liquid 15 milliLiter(s) Oral Mucosa every 12 hours  chlorhexidine 2% Cloths 1 Application(s) Topical daily  chlorhexidine 4% Liquid 1 Application(s) Topical <User Schedule>  clindamycin IVPB 600 milliGRAM(s) IV Intermittent every 8 hours  dextrose 5% + lactated ringers. 1000 milliLiter(s) (100 mL/Hr) IV Continuous <Continuous>  dextrose 5%. 1000 milliLiter(s) (50 mL/Hr) IV Continuous <Continuous>  dextrose 5%. 1000 milliLiter(s) (100 mL/Hr) IV Continuous <Continuous>  dextrose 50% Injectable 25 Gram(s) IV Push once  dextrose 50% Injectable 12.5 Gram(s) IV Push once  dextrose 50% Injectable 25 Gram(s) IV Push once  ergocalciferol 53381 Unit(s) Oral every week  fentaNYL   Infusion. 0.5 MICROgram(s)/kG/Hr (4.94 mL/Hr) IV Continuous <Continuous>  glucagon  Injectable 1 milliGRAM(s) IntraMuscular once  heparin   Injectable 5000 Unit(s) SubCutaneous every 8 hours  insulin lispro (ADMELOG) corrective regimen sliding scale   SubCutaneous every 6 hours  ketamine Infusion. 0.25 mG/kG/Hr (2.47 mL/Hr) IV Continuous <Continuous>  levothyroxine Injectable 56 MICROGram(s) IV Push at bedtime  meropenem  IVPB 1000 milliGRAM(s) IV Intermittent every 8 hours  multivitamin 1 Tablet(s) Oral daily  norepinephrine Infusion 0.05 MICROgram(s)/kG/Min (9.26 mL/Hr) IV Continuous <Continuous>  nystatin Powder 1 Application(s) Topical two times a day  pantoprazole  Injectable 40 milliGRAM(s) IV Push every 12 hours  phytonadione  IVPB 10 milliGRAM(s) IV Intermittent once  potassium chloride  20 mEq/100 mL IVPB 20 milliEquivalent(s) IV Intermittent every 2 hours  propofol Infusion 25 MICROgram(s)/kG/Min (14.8 mL/Hr) IV Continuous <Continuous>  vancomycin  IVPB 1500 milliGRAM(s) IV Intermittent every 12 hours  vasopressin Infusion 0.03 Unit(s)/Min (1.8 mL/Hr) IV Continuous <Continuous>    MEDICATIONS  (PRN):  ALBUTerol    90 MICROgram(s) HFA Inhaler 2 Puff(s) Inhalation every 6 hours PRN Shortness of Breath  dextrose Oral Gel 15 Gram(s) Oral once PRN Blood Glucose LESS THAN 70 milliGRAM(s)/deciliter  HYDROmorphone  Injectable 1 milliGRAM(s) IV Push every 3 hours PRN Severe Pain (7 - 10)  HYDROmorphone  Injectable 0.5 milliGRAM(s) IV Push every 3 hours PRN Moderate Pain (4 - 6)  sodium chloride 0.9% lock flush 10 milliLiter(s) IV Push every 1 hour PRN Pre/post blood products, medications, blood draw, and to maintain line patency      PHYSICAL EXAM:  Constitutional: RASS -4. NAD  Respiratory: Mechanically ventilating, equal chest rise b/l. CTAB.  Abdomen: Soft, nondistended, NTTP.   Sacrum: Large sacral wound base pink, no necrosis, no purulent drainage, not malodorous. Wound vac replaced bedside.    LABS:                        9.4    40.76 )-----------( 740      ( 11 Aug 2022 23:45 )             30.9     08-11    133<L>  |  102  |  19  ----------------------------<  200<H>  3.3<L>   |  13<L>  |  0.59    Ca    7.8<L>      11 Aug 2022 23:45  Phos  3.4     08-11  Mg     1.90     08-11    TPro  5.8<L>  /  Alb  1.5<L>  /  TBili  0.4  /  DBili  x   /  AST  18  /  ALT  9   /  AlkPhos  172<H>      PT/INR - ( 11 Aug 2022 23:45 )   PT: 27.8 sec;   INR: 2.38 ratio         PTT - ( 11 Aug 2022 23:45 )  PTT:34.9 sec  LIVER FUNCTIONS - ( 11 Aug 2022 23:45 )  Alb: 1.5 g/dL / Pro: 5.8 g/dL / ALK PHOS: 172 U/L / ALT: 9 U/L / AST: 18 U/L / GGT: x           Urinalysis Basic - ( 10 Aug 2022 22:30 )    Color: Yellow / Appearance: Turbid / S.017 / pH: x  Gluc: x / Ketone: Negative  / Bili: Negative / Urobili: <2 mg/dL   Blood: x / Protein: 100 mg/dL / Nitrite: Negative   Leuk Esterase: Large / RBC: 25-50 /HPF / WBC >50 /HPF   Sq Epi: x / Non Sq Epi: x / Bacteria: Many      ABO Interpretation: B (22 @ 10:28)      IMAGING:  ------------------------------------------------------------------------  PROCEDURE: Transthoracic echocardiogram with 2-D, M-Mode  and complete spectral and color flow Doppler.  Intravenous ultrasound enhancing agent was administered for  improved left ventricular endocardial border definition.  Following the intravenous injection of ultrasound enhancing  agent, harmonic imaging was performed.  INDICATION: Cardiogenic shock (R57.0)  ------------------------------------------------------------------------  DIMENSIONS:  Dimensions:     Normal Values:  LA:     3.3 cm    2.0 - 4.0 cm  Ao:     2.7 cm    2.0 - 3.8 cm  SEPTUM: 0.7 cm    0.6 - 1.2 cm  PWT:    0.7 cm    0.6 - 1.1 cm  LVIDd:  4.7 cm    3.0 - 5.6 cm  LVIDs:  3.9 cm    1.8 - 4.0 cm  Derived Variables:  LVMI: 50 g/m2  RWT: 0.29  Fractional short: 17 %  Ejection Fraction (Visual Estimate): 5-10 %  ------------------------------------------------------------------------  OBSERVATIONS:  Mitral Valve: Normal mitral valve. Mild mitral  regurgitation.  Aortic Root: Normal aortic root.  Aortic Valve: Normal trileaflet aortic valve. Minimal  aortic regurgitation.  Left Atrium: Normal left atrium.  Left Ventricle: Endocardial visualization enhanced with  intravenous injection of echo contrast (Definity). Severe  segmental left ventricular systolic dysfunction. Only the  basal segments are moving.  No left ventricular thrombus.  Severe left ventricular enlargement. Moderate diastolic  dysfunction (Stage II).  Right Heart: Normal right atrium. Right ventricular  enlargement with decreased right ventricular systolic  function. Normal tricuspid valve. Mild tricuspid  regurgitation. Normal pulmonic valve.  Pericardium/PleuraNormal pericardium with no pericardial  effusion.  Hemodynamic: Estimated right ventricular systolic pressure  equals 37 mm Hg, assuming right atrial pressure equals 10  mm Hg, consistent with borderline pulmonary hypertension.  ------------------------------------------------------------------------  CONCLUSIONS:  1. Endocardial visualization enhanced with intravenous  injection of echo contrast (Definity). Severe segmental  left ventricular systolic dysfunction. Only the basal  segments are moving.  No left ventricular thrombus.  Estimated LVEF 10%.  2. Moderate diastolic dysfunction (Stage II).  3. Right ventricular enlargement with decreased right  ventricular systolic function.

## 2022-08-12 NOTE — PROGRESS NOTE ADULT - ASSESSMENT
52F immobile 2/2 MS, poorly controlled T2DM, asthma, hypothyroidism, known chronic wounds sacrum (stage 4), vulvar/Rt thigh, and right calf. Recent April 2022 hospitalization for urosepsis. Presented from [place] with concerns for UTI and anemia (6.8 from baseline 8.0). Found to have new malodorous wound on L flank area, concerning for necrotizing soft tissue infection. Febrile to 103.7. s/p debridement of wound L flank to midback.    PLAN  Neurologic:   - PMH multiple sclerosis  - give dilaudid to wean propofol  - Monitor fever curve - IV tylenol    Respiratory:   - PMH asthma   - Albuterol 2 puffs Q6  - intubated, AC 20/400/6/30  - trend ABG Q6    Cardiovascular:   - A-line in place  - nayeli 0.2, will switch to levo 0.13 > 0.15  - R fem central line    Gastrointestinal/Nutrition:   - Diet: NPO except medications, OG tube - bloody output  - +protonix  - Vit C 500mg PO daily  - Vit D 50,000u PO daily  - Multivitamin  - IVF     Renal/Genitourinary:   - D5 LR @ 100cc/hr  - Rose  - Monitor I&Os  - 8/11 pt acidotic to 7.26 -> 7.29. Lactate 2.7 -> 2.7  - Trend lactate    Hematologic:   - PMH chronic anemia. 6.9 hgb on admission. 2 FFP, 2 PRBCs administered in OR 8/11, additional unit PRBC today  - Monitor H&H. Trend WBC  - Transfuse PRN  - DVT PPx heparin subq q8h  - PO vitamin K PRN for elevated INR    Infectious Disease:   - Necrotizing soft tissue L flank to midback, s/p debridement, wound vac on L flank/back  - Multiple chronic pressure ulcers - wound care  - meropenem, clindamycin, vancomycin  - monitor vanc troughs  - Nystatin powder topical BID for groin  - chlorhexidine q12h mouth swab  - f/u Bcx, Ucx, wound cx  - RTOR plans pending    Endocrine:   - hx DM2 and hypothyroidism  - 20 units of lantus this AM  - Lantus s/p insulin GTT  - Lispro ISS   - currently holding 8u pre-meal ademlog while NPO  - Monitor FSG  - c/w home levothyroxine 75mcg PO daily    Tubes/Lines/Drains:   - Rose  - A-line  - OG tube  - ET tube  - rectal tube  - 3 PIV  - R fem central line    Disposition:   - SICU       52F immobile 2/2 MS, poorly controlled T2DM, asthma, hypothyroidism, known chronic wounds sacrum (stage 4), vulvar/Rt thigh, and right calf. Recent April 2022 hospitalization for urosepsis. Presented from [place] with concerns for UTI and anemia (6.8 from baseline 8.0). Found to have new malodorous wound on L flank area, concerning for necrotizing soft tissue infection. Febrile to 103.7. s/p debridement of wound L flank to midback.    PLAN  Neurologic:   - PMH multiple sclerosis  - give dilaudid to wean propofol  - add fentanyl  - add ketamine  - Monitor fever curve - IV tylenol    Respiratory:   - PMH asthma   - Albuterol 2 puffs Q6  - intubated, AC 20/400/6/30  - trend ABG Q6  - daily CXR    Cardiovascular:   - A-line in place  - nayeli 0.2, will switch to levo 0.13 > 0.15  - R fem central line  - trop plateau, likely demand ischemia.   - POCUS yesterday  - Echo today  - ABGs q6hrs   - possibly septic induced cardiomyopathy, likely well perfused    Gastrointestinal/Nutrition:   - Diet: NPO except medications, OG tube - bloody output  - hold tube feed while pending RTOR possibly today  - +protonix  - Vit C 500mg PO daily  - Vit D 50,000u PO daily  - Multivitamin  - IVF     Renal/Genitourinary:   - D5 LR @ 100cc/hr  - Rose  - Monitor I&Os  - 8/11 pt acidotic to 7.26 -> 7.29 -> 7.31. Lactate 2.7 -> 2.7 ->  - Trend lactate    Hematologic:   - PMH chronic anemia. 6.9 hgb on admission. 2 FFP, 2 PRBCs administered in OR 8/11, additional unit PRBC 8/11  - Monitor H&H. Trend WBC  - Transfuse PRN  - DVT PPx heparin subq q8h  - PO vitamin K PRN for elevated INR 8/11, additional dose 10 IV vitamin K today given continued elevated INR    Infectious Disease:   - Necrotizing soft tissue L flank to midback, s/p debridement, wound vac on L flank/back  - Multiple chronic pressure ulcers - wound care  - continue meropenem, clindamycin, vancomycin  - monitor vanc troughs  - Nystatin powder topical BID for groin  - chlorhexidine q12h mouth swab  - f/u Bcx, Ucx, wound cx  - RTOR plans pending evaluation today    Endocrine:   - hx DM2 and hypothyroidism  - 40 units of Lantus s/p insulin GTT  - Lispro ISS   - currently holding 8u pre-meal ademlog while NPO  - Monitor FSG  - c/w home levothyroxine 75mcg PO daily    Tubes/Lines/Drains:   - Rose  - A-line  - OG tube  - ET tube  - rectal tube  - 3 PIV  - R fem central line    Disposition:   - SICU       52F immobile 2/2 MS, poorly controlled T2DM, asthma, hypothyroidism, known chronic wounds sacrum (stage 4), vulvar/Rt thigh, and right calf. Recent April 2022 hospitalization for urosepsis. Presented from [place] with concerns for UTI and anemia (6.8 from baseline 8.0). Found to have new malodorous wound on L flank area, concerning for necrotizing soft tissue infection. Febrile to 103.7. s/p debridement of wound L flank to midback.    PLAN  Neurologic:   - PMH multiple sclerosis  - give dilaudid to wean propofol  - add fentanyl  - add ketamine  - Monitor fever curve - IV tylenol    Respiratory:   - PMH asthma   - Albuterol 2 puffs Q6  - intubated, AC 20/400/6/30  - SBT as tolerated  - trend ABG Q6  - daily CXR    Cardiovascular:   - A-line, R fem central line  - on levophed gtt, vasopressin gtt   - trop plateau 338 -> 308  - 8/12 TTE: estimated EF 5-10%, severe LV systolic dysfx, moderate diastolic dysfx, RV enlargement with decreased RV systolic fx  - cardiac findings likely demand ischemia and cardiomyopathy secondary to sepsis   - cardiology consult     Gastrointestinal/Nutrition:   - Diet: NPO except medications; OG tube - bloody output  - hold tube feeds while pending RTOR possibly today  - +protonix  - Vit C 500mg PO daily  - Vit D 50,000u PO daily  - Multivitamin  - IVF     Renal/Genitourinary:   - D5 LR @ 100cc/hr  - Rose  - Monitor I&Os  - 8/12 pH 7.31,   - Trend lactate; last lactate 2.0     Hematologic:   - PMH chronic anemia. 6.9 hgb on admission. 2 FFP, 2 PRBCs administered in OR 8/11, additional unit PRBC 8/11  - 8/12 post transfusion Hgb 9.4   - Monitor H&H. Trend WBC  - Transfuse PRN  - DVT PPx heparin subq q8h  - PO vitamin K PRN for elevated INR 8/11, additional dose 10 IV vitamin K today given continued elevated INR    Infectious Disease:   - Necrotizing soft tissue L flank to midback, s/p debridement, wound vac on L flank/back  - Multiple chronic pressure ulcers - wound care  - continue meropenem (cover ESBL E.Coli in urine on prior admission), clindamycin, vancomycin  - monitor vanc troughs  - 8/10 blood cx growing staph epidermidis in 1 bottle, other bottle NG, likely contaminant   - f/u urine cx, wound cx  - Nystatin powder topical BID for groin  - Chlorhexidine q12h mouth swab  - RTOR plans pending evaluation today    Endocrine:   - hx DM2 and hypothyroidism  - s/p insulin GTT  - 40U lantus given overnight   - Lispro ISS   - monitor FSG  - c/w home levothyroxine 75mcg PO daily    Tubes/Lines/Drains:   - Rose  - A-line  - OG tube  - ET tube  - rectal tube  - 3 PIV  - R fem central line    Disposition:   - SICU       52F immobile 2/2 MS, poorly controlled T2DM, asthma, hypothyroidism, known chronic wounds sacrum (stage 4), vulvar/Rt thigh, and right calf. Recent April 2022 hospitalization for urosepsis. Presented from [place] with concerns for UTI and anemia (6.8 from baseline 8.0). Found to have new malodorous wound on L flank area, concerning for necrotizing soft tissue infection. Febrile to 103.7. s/p debridement of wound L flank to midback.    PLAN  Neurologic:   - PMH multiple sclerosis  - sedated with propofol, wean as tolerated  - pain: dilaudid PRN   - ketamine and fentanyl drip for wound vac check today  - Monitor fever curve - IV tylenol    Respiratory:   - PMH asthma   - Albuterol 2 puffs Q6  - intubated, AC 20/400/6/30  - SBT as tolerated  - trend ABG Q6  - daily CXR    Cardiovascular:   - A-line, R fem central line  - on levophed gtt, vasopressin gtt   - trop plateau 338 -> 308  - 8/12 TTE: estimated EF 5-10%, severe LV systolic dysfx, moderate diastolic dysfx, RV enlargement with decreased RV systolic fx  - cardiac findings likely demand ischemia and cardiomyopathy secondary to sepsis   - cardiology consult     Gastrointestinal/Nutrition:   - Diet: NPO except medications; OG tube - bloody output  - hold tube feeds while pending RTOR possibly today  - +protonix  - Vit C 500mg PO daily  - Vit D 50,000u PO daily  - Multivitamin  - IVF     Renal/Genitourinary:   - D5 LR @ 100cc/hr  - Rose  - Monitor I&Os  - 8/12 pH 7.31,   - Trend lactate; last lactate 2.0     Hematologic:   - PMH chronic anemia. 6.9 hgb on admission. 2 FFP, 2 PRBCs administered in OR 8/11, additional unit PRBC 8/11  - 8/12 post transfusion Hgb 9.4   - Monitor H&H. Trend WBC  - Transfuse PRN  - DVT PPx heparin subq q8h  - PO vitamin K PRN for elevated INR 8/11, additional dose 10 IV vitamin K today given continued elevated INR    Infectious Disease:   - Necrotizing soft tissue L flank to midback, s/p debridement, wound vac on L flank/back  - Multiple chronic pressure ulcers - wound care  - continue meropenem (cover ESBL E.Coli in urine on prior admission), clindamycin, vancomycin  - monitor vanc troughs  - 8/10 blood cx growing staph epidermidis in 1 bottle, other bottle NG, likely contaminant   - f/u urine cx, wound cx  - Nystatin powder topical BID for groin  - Chlorhexidine q12h mouth swab  - RTOR plans pending evaluation today    Endocrine:   - hx DM2 (8/12 A1c 5.5) and hypothyroidism  - s/p insulin GTT  - 40U lantus given overnight   - Lispro ISS   - monitor FSG  - c/w home levothyroxine 75mcg PO daily    Tubes/Lines/Drains:   - Rose  - A-line  - OG tube  - ET tube  - rectal tube  - 3 PIV  - R fem central line    Disposition:   - SICU       52F immobile 2/2 MS, poorly controlled T2DM, asthma, hypothyroidism, known chronic wounds sacrum (stage 4), vulvar/Rt thigh, and right calf. Recent April 2022 hospitalization for urosepsis. Presented from [place] with concerns for UTI and anemia (6.8 from baseline 8.0). Found to have new malodorous wound on L flank area, concerning for necrotizing soft tissue infection. Febrile to 103.7. s/p debridement of wound L flank to midback.    PLAN  Neurologic:   - PMH multiple sclerosis  - sedated with propofol, wean as tolerated  - pain: dilaudid PRN   - ketamine and fentanyl drip for wound vac check today  - Monitor fever curve - IV tylenol    Respiratory:   - PMH asthma   - Albuterol 2 puffs Q6  - intubated, AC 20/400/6/30  - SBT as tolerated  - trend ABG Q6  - daily CXR    Cardiovascular:   - A-line, R fem central line  - on levophed gtt, vasopressin gtt   - trop plateau 338 -> 308  - 8/12 TTE: estimated EF 5-10%, severe LV systolic dysfx, moderate diastolic dysfx, RV enlargement with decreased RV systolic fx  - cardiac findings likely demand ischemia and cardiomyopathy secondary to sepsis   - cardiology consult     Gastrointestinal/Nutrition:   - Diet: NPO except medications; OG tube - bloody output  - hold tube feeds while pending RTOR possibly today  - +protonix  - Vit C 500mg PO daily  - Vit D 50,000u PO daily  - Multivitamin  - IVF     Renal/Genitourinary:   - D5 LR @ 100cc/hr  - Rose  - Monitor I&Os  - 8/12 pH 7.31,   - Trend lactate; last lactate 2.0     Hematologic:   - PMH chronic anemia. 6.9 hgb on admission. 2 FFP, 2 PRBCs administered in OR 8/11, additional unit PRBC 8/11  - 8/12 post transfusion Hgb 9.4   - Monitor H&H. Trend WBC  - Transfuse PRN  - DVT PPx heparin subq q8h  - PO vitamin K PRN for elevated INR 8/11, additional dose 10 IV vitamin K today given continued elevated INR    Infectious Disease:   - Necrotizing soft tissue L flank to midback, s/p debridement, wound vac on L flank/back  - Multiple chronic pressure ulcers - wound care  - continue meropenem (cover ESBL E.Coli in urine on prior admission), clindamycin, vancomycin  - monitor vanc troughs  - 8/10 blood cx growing staph epidermidis in 1 bottle, other bottle NGTD, likely contaminant   - f/u urine cx, wound cx  - Nystatin powder topical BID for groin  - Chlorhexidine q12h mouth swab  - RTOR plans pending evaluation today    Endocrine:   - hx DM2 (8/12 A1c 5.5) and hypothyroidism  - s/p insulin GTT  - 40U lantus given overnight   - Lispro ISS   - monitor FSG  - c/w home levothyroxine 75mcg PO daily    Tubes/Lines/Drains:   - Rose  - A-line  - OG tube  - ET tube  - rectal tube  - 3 PIV  - R fem central line    Disposition:   - SICU       52F immobile 2/2 MS, poorly controlled T2DM, asthma, hypothyroidism, known chronic wounds sacrum (stage 4), vulvar/Rt thigh, and right calf. Recent April 2022 hospitalization for urosepsis. Presented from [place] with concerns for UTI and anemia (6.8 from baseline 8.0). Found to have new malodorous wound on L flank area, concerning for necrotizing soft tissue infection. Febrile to 103.7. s/p debridement of wound L flank to midback.    PLAN  Neurologic:   - PMH multiple sclerosis  - sedated with propofol, wean as tolerated  - pain: dilaudid PRN   - ketamine and fentanyl drip for wound vac check today  - Monitor fever curve - IV tylenol    Respiratory:   - PMH asthma   - Albuterol 2 puffs Q6  - intubated, AC 20/400/6/30  - SBT as tolerated  - trend ABG Q6  - daily CXR    Cardiovascular:   - A-line, R fem central line  - on levophed gtt, vasopressin gtt   - trop plateau 338 -> 308  - 8/12 TTE: estimated EF 5-10%, severe LV systolic dysfx, moderate diastolic dysfx, RV enlargement with decreased RV systolic fx  - cardiac findings likely demand ischemia and cardiomyopathy secondary to sepsis   - cardiology consult     Gastrointestinal/Nutrition:   - Diet: NPO except medications; OG tube - bloody output  - hold tube feeds while pending RTOR possibly today  - +protonix  - Vit C 500mg PO daily  - Vit D 50,000u PO daily  - Multivitamin  - IVF     Renal/Genitourinary:   - D5 LR @ 100cc/hr  - Rose  - Monitor I&Os  - 8/12 acidotic to 7.31 from 7.29   - Trend lactate; last lactate 2.0     Hematologic:   - PMH chronic anemia. 6.9 hgb on admission. 2 FFP, 2 PRBCs administered in OR 8/11, additional unit PRBC 8/11  - 8/12 post transfusion Hgb 9.4   - Monitor H&H. Trend WBC  - Transfuse PRN  - DVT PPx heparin subq q8h  - PO vitamin K PRN for elevated INR 8/11, additional dose 10 IV vitamin K today given continued elevated INR    Infectious Disease:   - Necrotizing soft tissue L flank to midback, s/p debridement, wound vac on L flank/back  - Multiple chronic pressure ulcers - wound care  - continue meropenem (cover ESBL E.Coli in urine on prior admission), clindamycin, vancomycin  - monitor vanc troughs  - 8/10 blood cx growing staph epidermidis in 1 bottle, other bottle NGTD, likely contaminant   - f/u urine cx, wound cx  - Nystatin powder topical BID for groin  - Chlorhexidine q12h mouth swab  - RTOR plans pending evaluation today    Endocrine:   - hx DM2 (8/12 A1c 5.5) and hypothyroidism  - s/p insulin GTT  - 40U lantus given overnight   - Lispro ISS   - monitor FSG  - c/w home levothyroxine 75mcg PO daily    Tubes/Lines/Drains:   - Rose  - A-line  - OG tube  - ET tube  - rectal tube  - 3 PIV  - R fem central line    Disposition:   - SICU       52F immobile 2/2 MS, poorly controlled T2DM, asthma, hypothyroidism, known chronic wounds sacrum (stage 4), vulvar/Rt thigh, and right calf. Recent April 2022 hospitalization for urosepsis. Presented from [place] with concerns for UTI and anemia (6.8 from baseline 8.0). Found to have new malodorous wound on L flank area, concerning for necrotizing soft tissue infection. Febrile to 103.7. s/p debridement of wound L flank to midback.    PLAN  Neurologic:   - PMH multiple sclerosis  - sedated with propofol, wean as tolerated  - pain: dilaudid PRN   - ketamine and fentanyl drip for wound vac check today  - Monitor fever curve - IV tylenol    Respiratory:   - PMH asthma   - Albuterol 2 puffs Q6  - intubated, AC 20/400/6/30  - SBT as tolerated  - trend ABG Q6  - daily CXR    Cardiovascular:   - A-line, R fem central line  - on levophed gtt, vasopressin gtt   - trop plateau 338 -> 308  - 8/12 TTE: estimated EF 5-10%, severe LV systolic dysfx, moderate diastolic dysfx, RV enlargement with decreased RV systolic fx  - cardiac findings likely demand ischemia and cardiomyopathy secondary to sepsis   - cardiology consult     Gastrointestinal/Nutrition:   - Diet: NPO except medications; OG tube - bloody output  - hold tube feeds while pending RTOR possibly today  - +protonix  - Vit C 500mg PO daily  - Vit D 50,000u PO daily  - Multivitamin  - IVF     Renal/Genitourinary:   - D5 LR @ 100cc/hr  - Rose  - Monitor I&Os  - 8/12 pH 7.31 from 7.29   - Trend lactate; last lactate 2.0     Hematologic:   - PMH chronic anemia. 6.9 hgb on admission. 2 FFP, 2 PRBCs administered in OR 8/11, additional unit PRBC 8/11  - 8/12 post transfusion Hgb 9.4   - Monitor H&H. Trend WBC  - Transfuse PRN  - DVT PPx heparin subq q8h  - PO vitamin K PRN for elevated INR 8/11, additional dose 10 IV vitamin K today given continued elevated INR    Infectious Disease:   - Necrotizing soft tissue L flank to midback, s/p debridement, wound vac on L flank/back  - Multiple chronic pressure ulcers - wound care  - continue meropenem (cover ESBL E.Coli in urine on prior admission), clindamycin, vancomycin  - monitor vanc troughs  - 8/10 blood cx growing staph epidermidis in 1 bottle, other bottle NGTD, likely contaminant   - f/u urine cx, wound cx  - Nystatin powder topical BID for groin  - Chlorhexidine q12h mouth swab  - RTOR plans pending evaluation today    Endocrine:   - hx DM2 (8/12 A1c 5.5) and hypothyroidism  - s/p insulin GTT  - 40U lantus given overnight   - Lispro ISS   - monitor FSG  - c/w home levothyroxine 75mcg PO daily    Tubes/Lines/Drains:   - Rose  - A-line  - OG tube  - ET tube  - rectal tube  - 3 PIV  - R fem central line    Disposition:   - SICU

## 2022-08-12 NOTE — PROGRESS NOTE ADULT - SUBJECTIVE AND OBJECTIVE BOX
POST ANESTHESIA EVALUATION    52y Female POSTOP DAY 1 S/P     MENTAL STATUS: Patient participation [  ] Awake     [  ] Arousable     [  ] Sedated    AIRWAY PATENCY: [  ] Satisfactory  [  ] Other:     Vital Signs Last 24 Hrs  T(C): 36.7 (12 Aug 2022 04:00), Max: 36.8 (11 Aug 2022 16:00)  T(F): 98 (12 Aug 2022 04:00), Max: 98.3 (11 Aug 2022 16:00)  HR: 68 (12 Aug 2022 09:00) (64 - 90)  BP: --  BP(mean): --  RR: 20 (12 Aug 2022 09:00) (16 - 20)  SpO2: 100% (12 Aug 2022 09:00) (100% - 100%)    Parameters below as of 12 Aug 2022 08:00  Patient On (Oxygen Delivery Method): ventilator    O2 Concentration (%): 30  I&O's Summary    11 Aug 2022 07:01  -  12 Aug 2022 07:00  --------------------------------------------------------  IN: 7285.9 mL / OUT: 2150 mL / NET: 5135.9 mL    12 Aug 2022 07:01  -  12 Aug 2022 10:11  --------------------------------------------------------  IN: 367.2 mL / OUT: 100 mL / NET: 267.2 mL          NAUSEA/ VOMITTING:  [x ] NONE  [  ] CONTROLLED [  ] OTHER     PAIN: [  x] CONTROLLED WITH CURRENT REGIMEN  [  ] OTHER    [ x ] NO APPARENT ANESTHESIA COMPLICATIONS      Comments:

## 2022-08-12 NOTE — PROCEDURE NOTE - ADDITIONAL PROCEDURE DETAILS
When advancing wire, patient with VT and A-line flat with loss of pulse x 10 seconds, but recovered thereafter. NSVT x 10 seconds during advancement of guidewire

## 2022-08-13 LAB
ALBUMIN SERPL ELPH-MCNC: 1.7 G/DL — LOW (ref 3.3–5)
ALBUMIN SERPL ELPH-MCNC: 1.7 G/DL — LOW (ref 3.3–5)
ALP SERPL-CCNC: 207 U/L — HIGH (ref 40–120)
ALP SERPL-CCNC: 223 U/L — HIGH (ref 40–120)
ALT FLD-CCNC: 11 U/L — SIGNIFICANT CHANGE UP (ref 4–33)
ALT FLD-CCNC: 11 U/L — SIGNIFICANT CHANGE UP (ref 4–33)
ANION GAP SERPL CALC-SCNC: 15 MMOL/L — HIGH (ref 7–14)
ANION GAP SERPL CALC-SCNC: 18 MMOL/L — HIGH (ref 7–14)
AST SERPL-CCNC: 37 U/L — HIGH (ref 4–32)
AST SERPL-CCNC: 41 U/L — HIGH (ref 4–32)
BASE EXCESS BLDA CALC-SCNC: -4.9 MMOL/L — LOW (ref -2–3)
BASE EXCESS BLDV CALC-SCNC: -3.8 MMOL/L — LOW (ref -2–3)
BILIRUB SERPL-MCNC: 0.2 MG/DL — SIGNIFICANT CHANGE UP (ref 0.2–1.2)
BILIRUB SERPL-MCNC: 0.2 MG/DL — SIGNIFICANT CHANGE UP (ref 0.2–1.2)
BLOOD GAS ARTERIAL - LYTES,HGB,ICA,LACT RESULT: SIGNIFICANT CHANGE UP
BLOOD GAS ARTERIAL - LYTES,HGB,ICA,LACT RESULT: SIGNIFICANT CHANGE UP
BLOOD GAS VENOUS COMPREHENSIVE RESULT: SIGNIFICANT CHANGE UP
BUN SERPL-MCNC: 14 MG/DL — SIGNIFICANT CHANGE UP (ref 7–23)
BUN SERPL-MCNC: 15 MG/DL — SIGNIFICANT CHANGE UP (ref 7–23)
CALCIUM SERPL-MCNC: 7.4 MG/DL — LOW (ref 8.4–10.5)
CALCIUM SERPL-MCNC: 7.6 MG/DL — LOW (ref 8.4–10.5)
CHLORIDE BLDV-SCNC: 103 MMOL/L — SIGNIFICANT CHANGE UP (ref 96–108)
CHLORIDE SERPL-SCNC: 102 MMOL/L — SIGNIFICANT CHANGE UP (ref 98–107)
CHLORIDE SERPL-SCNC: 99 MMOL/L — SIGNIFICANT CHANGE UP (ref 98–107)
CK MB BLD-MCNC: 19.2 % — HIGH (ref 0–2.5)
CK MB CFR SERPL CALC: 2.5 NG/ML — SIGNIFICANT CHANGE UP
CK SERPL-CCNC: 13 U/L — LOW (ref 25–170)
CO2 BLDA-SCNC: 18 MMOL/L — LOW (ref 19–24)
CO2 BLDV-SCNC: 20.6 MMOL/L — LOW (ref 22–26)
CO2 SERPL-SCNC: 15 MMOL/L — LOW (ref 22–31)
CO2 SERPL-SCNC: 17 MMOL/L — LOW (ref 22–31)
CREAT SERPL-MCNC: 0.51 MG/DL — SIGNIFICANT CHANGE UP (ref 0.5–1.3)
CREAT SERPL-MCNC: 0.52 MG/DL — SIGNIFICANT CHANGE UP (ref 0.5–1.3)
CULTURE RESULTS: SIGNIFICANT CHANGE UP
EGFR: 113 ML/MIN/1.73M2 — SIGNIFICANT CHANGE UP
EGFR: 114 ML/MIN/1.73M2 — SIGNIFICANT CHANGE UP
GAS PNL BLDA: SIGNIFICANT CHANGE UP
GAS PNL BLDV: 132 MMOL/L — LOW (ref 136–145)
GAS PNL BLDV: SIGNIFICANT CHANGE UP
GAS PNL BLDV: SIGNIFICANT CHANGE UP
GLUCOSE BLDC GLUCOMTR-MCNC: 138 MG/DL — HIGH (ref 70–99)
GLUCOSE BLDC GLUCOMTR-MCNC: 149 MG/DL — HIGH (ref 70–99)
GLUCOSE BLDC GLUCOMTR-MCNC: 203 MG/DL — HIGH (ref 70–99)
GLUCOSE BLDC GLUCOMTR-MCNC: 232 MG/DL — HIGH (ref 70–99)
GLUCOSE BLDV-MCNC: 192 MG/DL — HIGH (ref 70–99)
GLUCOSE SERPL-MCNC: 205 MG/DL — HIGH (ref 70–99)
GLUCOSE SERPL-MCNC: 237 MG/DL — HIGH (ref 70–99)
HCO3 BLDA-SCNC: 18 MMOL/L — LOW (ref 21–28)
HCO3 BLDV-SCNC: 20 MMOL/L — LOW (ref 22–29)
HCT VFR BLD CALC: 28.9 % — LOW (ref 34.5–45)
HCT VFR BLDA CALC: 26 % — LOW (ref 34.5–46.5)
HGB BLD CALC-MCNC: 8.7 G/DL — LOW (ref 11.5–15.5)
HGB BLD-MCNC: 9 G/DL — LOW (ref 11.5–15.5)
LACTATE BLDV-MCNC: 1.5 MMOL/L — SIGNIFICANT CHANGE UP (ref 0.5–2)
MAGNESIUM SERPL-MCNC: 1.6 MG/DL — SIGNIFICANT CHANGE UP (ref 1.6–2.6)
MCHC RBC-ENTMCNC: 25.6 PG — LOW (ref 27–34)
MCHC RBC-ENTMCNC: 31.1 GM/DL — LOW (ref 32–36)
MCV RBC AUTO: 82.1 FL — SIGNIFICANT CHANGE UP (ref 80–100)
NRBC # BLD: 0 /100 WBCS — SIGNIFICANT CHANGE UP
NRBC # FLD: 0.14 K/UL — HIGH
PCO2 BLDA: 26 MMHG — LOW (ref 32–35)
PCO2 BLDV: 29 MMHG — LOW (ref 39–42)
PH BLDA: 7.44 — SIGNIFICANT CHANGE UP (ref 7.35–7.45)
PH BLDV: 7.44 — HIGH (ref 7.32–7.43)
PHOSPHATE SERPL-MCNC: 2.6 MG/DL — SIGNIFICANT CHANGE UP (ref 2.5–4.5)
PLATELET # BLD AUTO: 700 K/UL — HIGH (ref 150–400)
PO2 BLDA: 154 MMHG — HIGH (ref 83–108)
PO2 BLDV: 36 MMHG — SIGNIFICANT CHANGE UP
POTASSIUM BLDV-SCNC: 3.4 MMOL/L — LOW (ref 3.5–5.1)
POTASSIUM SERPL-MCNC: 3.5 MMOL/L — SIGNIFICANT CHANGE UP (ref 3.5–5.3)
POTASSIUM SERPL-MCNC: 4 MMOL/L — SIGNIFICANT CHANGE UP (ref 3.5–5.3)
POTASSIUM SERPL-SCNC: 3.5 MMOL/L — SIGNIFICANT CHANGE UP (ref 3.5–5.3)
POTASSIUM SERPL-SCNC: 4 MMOL/L — SIGNIFICANT CHANGE UP (ref 3.5–5.3)
PROT SERPL-MCNC: 5.3 G/DL — LOW (ref 6–8.3)
PROT SERPL-MCNC: 5.6 G/DL — LOW (ref 6–8.3)
RBC # BLD: 3.52 M/UL — LOW (ref 3.8–5.2)
RBC # FLD: 16.9 % — HIGH (ref 10.3–14.5)
SAO2 % BLDA: 99.1 % — HIGH (ref 94–98)
SAO2 % BLDV: 60.6 % — SIGNIFICANT CHANGE UP
SODIUM SERPL-SCNC: 132 MMOL/L — LOW (ref 135–145)
SODIUM SERPL-SCNC: 134 MMOL/L — LOW (ref 135–145)
SPECIMEN SOURCE: SIGNIFICANT CHANGE UP
TROPONIN T, HIGH SENSITIVITY RESULT: 169 NG/L — CRITICAL HIGH
VANCOMYCIN TROUGH SERPL-MCNC: 30.8 UG/ML — CRITICAL HIGH (ref 10–20)
WBC # BLD: 41.59 K/UL — CRITICAL HIGH (ref 3.8–10.5)
WBC # FLD AUTO: 41.59 K/UL — CRITICAL HIGH (ref 3.8–10.5)

## 2022-08-13 PROCEDURE — 71045 X-RAY EXAM CHEST 1 VIEW: CPT | Mod: 26

## 2022-08-13 PROCEDURE — 99223 1ST HOSP IP/OBS HIGH 75: CPT

## 2022-08-13 PROCEDURE — 99232 SBSQ HOSP IP/OBS MODERATE 35: CPT

## 2022-08-13 PROCEDURE — 99231 SBSQ HOSP IP/OBS SF/LOW 25: CPT | Mod: GC

## 2022-08-13 RX ORDER — MIDAZOLAM HYDROCHLORIDE 1 MG/ML
2 INJECTION, SOLUTION INTRAMUSCULAR; INTRAVENOUS ONCE
Refills: 0 | Status: DISCONTINUED | OUTPATIENT
Start: 2022-08-13 | End: 2022-08-13

## 2022-08-13 RX ORDER — POTASSIUM CHLORIDE 20 MEQ
20 PACKET (EA) ORAL ONCE
Refills: 0 | Status: COMPLETED | OUTPATIENT
Start: 2022-08-13 | End: 2022-08-13

## 2022-08-13 RX ORDER — AMPICILLIN SODIUM AND SULBACTAM SODIUM 250; 125 MG/ML; MG/ML
3 INJECTION, POWDER, FOR SUSPENSION INTRAMUSCULAR; INTRAVENOUS ONCE
Refills: 0 | Status: COMPLETED | OUTPATIENT
Start: 2022-08-13 | End: 2022-08-13

## 2022-08-13 RX ORDER — HYDROCORTISONE 20 MG
25 TABLET ORAL EVERY 6 HOURS
Refills: 0 | Status: DISCONTINUED | OUTPATIENT
Start: 2022-08-13 | End: 2022-08-14

## 2022-08-13 RX ORDER — MAGNESIUM SULFATE 500 MG/ML
2 VIAL (ML) INJECTION ONCE
Refills: 0 | Status: COMPLETED | OUTPATIENT
Start: 2022-08-13 | End: 2022-08-13

## 2022-08-13 RX ORDER — SODIUM BICARBONATE 1 MEQ/ML
0.02 SYRINGE (ML) INTRAVENOUS
Qty: 75 | Refills: 0 | Status: DISCONTINUED | OUTPATIENT
Start: 2022-08-13 | End: 2022-08-13

## 2022-08-13 RX ORDER — ACETAMINOPHEN 500 MG
1000 TABLET ORAL ONCE
Refills: 0 | Status: COMPLETED | OUTPATIENT
Start: 2022-08-13 | End: 2022-08-13

## 2022-08-13 RX ORDER — COLLAGENASE CLOSTRIDIUM HIST. 250 UNIT/G
1 OINTMENT (GRAM) TOPICAL DAILY
Refills: 0 | Status: DISCONTINUED | OUTPATIENT
Start: 2022-08-13 | End: 2022-09-18

## 2022-08-13 RX ORDER — AMPICILLIN SODIUM AND SULBACTAM SODIUM 250; 125 MG/ML; MG/ML
3 INJECTION, POWDER, FOR SUSPENSION INTRAMUSCULAR; INTRAVENOUS EVERY 6 HOURS
Refills: 0 | Status: DISCONTINUED | OUTPATIENT
Start: 2022-08-14 | End: 2022-08-25

## 2022-08-13 RX ORDER — MIDAZOLAM HYDROCHLORIDE 1 MG/ML
0.02 INJECTION, SOLUTION INTRAMUSCULAR; INTRAVENOUS
Qty: 100 | Refills: 0 | Status: DISCONTINUED | OUTPATIENT
Start: 2022-08-13 | End: 2022-08-13

## 2022-08-13 RX ORDER — AMPICILLIN SODIUM AND SULBACTAM SODIUM 250; 125 MG/ML; MG/ML
INJECTION, POWDER, FOR SUSPENSION INTRAMUSCULAR; INTRAVENOUS
Refills: 0 | Status: DISCONTINUED | OUTPATIENT
Start: 2022-08-13 | End: 2022-08-25

## 2022-08-13 RX ADMIN — Medication 25 MILLIGRAM(S): at 23:55

## 2022-08-13 RX ADMIN — Medication 400 MILLIGRAM(S): at 05:23

## 2022-08-13 RX ADMIN — MIDAZOLAM HYDROCHLORIDE 2 MILLIGRAM(S): 1 INJECTION, SOLUTION INTRAMUSCULAR; INTRAVENOUS at 06:27

## 2022-08-13 RX ADMIN — Medication 4: at 05:33

## 2022-08-13 RX ADMIN — PANTOPRAZOLE SODIUM 40 MILLIGRAM(S): 20 TABLET, DELAYED RELEASE ORAL at 05:13

## 2022-08-13 RX ADMIN — MEROPENEM 100 MILLIGRAM(S): 1 INJECTION INTRAVENOUS at 13:49

## 2022-08-13 RX ADMIN — Medication 50 MILLIGRAM(S): at 05:15

## 2022-08-13 RX ADMIN — Medication 500 MILLIGRAM(S): at 13:30

## 2022-08-13 RX ADMIN — MIDAZOLAM HYDROCHLORIDE 1.98 MG/KG/HR: 1 INJECTION, SOLUTION INTRAMUSCULAR; INTRAVENOUS at 06:27

## 2022-08-13 RX ADMIN — HEPARIN SODIUM 5000 UNIT(S): 5000 INJECTION INTRAVENOUS; SUBCUTANEOUS at 05:30

## 2022-08-13 RX ADMIN — Medication 7.41 MICROGRAM(S)/KG/MIN: at 19:57

## 2022-08-13 RX ADMIN — Medication 4: at 13:32

## 2022-08-13 RX ADMIN — Medication 1000 MILLIGRAM(S): at 05:53

## 2022-08-13 RX ADMIN — Medication 25 MILLIGRAM(S): at 12:30

## 2022-08-13 RX ADMIN — Medication 1 TABLET(S): at 13:30

## 2022-08-13 RX ADMIN — Medication 4.63 MICROGRAM(S)/KG/MIN: at 19:58

## 2022-08-13 RX ADMIN — Medication 5 UNIT(S): at 17:51

## 2022-08-13 RX ADMIN — Medication 14.8 MICROGRAM(S)/KG/MIN: at 10:22

## 2022-08-13 RX ADMIN — FENTANYL CITRATE 4.94 MICROGRAM(S)/KG/HR: 50 INJECTION INTRAVENOUS at 19:58

## 2022-08-13 RX ADMIN — MIDAZOLAM HYDROCHLORIDE 1.98 MG/KG/HR: 1 INJECTION, SOLUTION INTRAMUSCULAR; INTRAVENOUS at 10:22

## 2022-08-13 RX ADMIN — Medication 1 APPLICATION(S): at 17:11

## 2022-08-13 RX ADMIN — AMPICILLIN SODIUM AND SULBACTAM SODIUM 200 GRAM(S): 250; 125 INJECTION, POWDER, FOR SUSPENSION INTRAMUSCULAR; INTRAVENOUS at 18:27

## 2022-08-13 RX ADMIN — NYSTATIN CREAM 1 APPLICATION(S): 100000 CREAM TOPICAL at 05:30

## 2022-08-13 RX ADMIN — Medication 25 GRAM(S): at 06:27

## 2022-08-13 RX ADMIN — SODIUM CHLORIDE 30 MILLILITER(S): 9 INJECTION, SOLUTION INTRAVENOUS at 10:24

## 2022-08-13 RX ADMIN — Medication 25 MEQ/KG/HR: at 18:04

## 2022-08-13 RX ADMIN — CHLORHEXIDINE GLUCONATE 1 APPLICATION(S): 213 SOLUTION TOPICAL at 13:31

## 2022-08-13 RX ADMIN — CHLORHEXIDINE GLUCONATE 15 MILLILITER(S): 213 SOLUTION TOPICAL at 17:10

## 2022-08-13 RX ADMIN — CHLORHEXIDINE GLUCONATE 15 MILLILITER(S): 213 SOLUTION TOPICAL at 05:17

## 2022-08-13 RX ADMIN — NYSTATIN CREAM 1 APPLICATION(S): 100000 CREAM TOPICAL at 17:50

## 2022-08-13 RX ADMIN — HEPARIN SODIUM 5000 UNIT(S): 5000 INJECTION INTRAVENOUS; SUBCUTANEOUS at 13:39

## 2022-08-13 RX ADMIN — MEROPENEM 100 MILLIGRAM(S): 1 INJECTION INTRAVENOUS at 05:09

## 2022-08-13 RX ADMIN — Medication 100 MILLIEQUIVALENT(S): at 13:58

## 2022-08-13 RX ADMIN — Medication 0: at 23:58

## 2022-08-13 RX ADMIN — Medication 4.63 MICROGRAM(S)/KG/MIN: at 10:22

## 2022-08-13 RX ADMIN — VASOPRESSIN 1.8 UNIT(S)/MIN: 20 INJECTION INTRAVENOUS at 10:21

## 2022-08-13 RX ADMIN — PANTOPRAZOLE SODIUM 40 MILLIGRAM(S): 20 TABLET, DELAYED RELEASE ORAL at 17:12

## 2022-08-13 RX ADMIN — Medication 1000 MILLIGRAM(S): at 18:02

## 2022-08-13 RX ADMIN — Medication 5 UNIT(S): at 05:34

## 2022-08-13 RX ADMIN — Medication 5 UNIT(S): at 13:32

## 2022-08-13 RX ADMIN — Medication 100 MILLIGRAM(S): at 05:08

## 2022-08-13 RX ADMIN — Medication 400 MILLIGRAM(S): at 17:08

## 2022-08-13 RX ADMIN — FENTANYL CITRATE 4.94 MICROGRAM(S)/KG/HR: 50 INJECTION INTRAVENOUS at 10:24

## 2022-08-13 RX ADMIN — Medication 56 MICROGRAM(S): at 05:05

## 2022-08-13 RX ADMIN — CHLORHEXIDINE GLUCONATE 1 APPLICATION(S): 213 SOLUTION TOPICAL at 05:30

## 2022-08-13 RX ADMIN — AMPICILLIN SODIUM AND SULBACTAM SODIUM 200 GRAM(S): 250; 125 INJECTION, POWDER, FOR SUSPENSION INTRAMUSCULAR; INTRAVENOUS at 23:56

## 2022-08-13 RX ADMIN — Medication 75 MEQ/KG/HR: at 10:22

## 2022-08-13 RX ADMIN — HEPARIN SODIUM 5000 UNIT(S): 5000 INJECTION INTRAVENOUS; SUBCUTANEOUS at 22:03

## 2022-08-13 RX ADMIN — Medication 25 MILLIGRAM(S): at 17:51

## 2022-08-13 RX ADMIN — Medication 5 UNIT(S): at 23:57

## 2022-08-13 NOTE — CONSULT NOTE ADULT - ATTENDING COMMENTS
Extensive necrotizing sacral wound.   Debrided 8/11 in OR - only with alpha Strep.   Unclear significance of CoNS on blood cultures but will cover given critical illness and potential focus.   Vanc trough was done too early but was pretty high. I think 1GM q12h will be appropriate for her.   No clear indication for carbapenem. Unasyn to cover gram negatives/anaerobes given proximity to GI/ tracts.   Poor long term prognosis.     Epi aHrvey MD   Infectious Disease   Available on TEAMS. After 5PM and on weekends please page fellow on call or call [phone]

## 2022-08-13 NOTE — CHART NOTE - NSCHARTNOTEFT_GEN_A_CORE
Afternoon hemodynamics/labs done:  CVP 11, PCWP 14  CO/CI 5.84/2.84, , MVO2 60.6% off vasopressin but remains on Dobutamine at 2.5 mcg/kg/min and Levophed 0.2.  Lactate 1.5  ABG stable. Afternoon hemodynamics/labs done:  CVP 11, PCWP 14  CO/CI 5.84/2.84, , MVO2 60.6% off vasopressin but remains on Dobutamine at 2.5 mcg/kg/min and Levophed 0.2.  Lactate 1.5  AB.49/22/133/17/98.5 on AC18/6/40%/550  Will decrease AC rate of 18 to 16 and recheck ABG. Afternoon hemodynamics/labs done:  CVP 11, PCWP 14  CO/CI 5.84/2.84, , MVO2 60.6% off vasopressin but remains on Dobutamine at 2.5 mcg/kg/min and Levophed 0.2.  Lactate 1.5  AB.49/22/133/17/98.5 on AC18/6/40%/550  Will decrease AC rate of 18 to 16 and recheck ABG.    1600 Addendum:  While turning patient in bed this afternoon, rectal temp taken and noted to be 102.4, this did not correlate with [place] temp. ID in attendance, tylenol given and will followup their recs for antibiotic adjustment. Large wound site across back, dressing intact, wound vac in place. Afternoon hemodynamics/labs done:  CVP 11, PCWP 14  CO/CI 5.84/2.84, , MVO2 60.6% off vasopressin but remains on Dobutamine at 2.5 mcg/kg/min and Levophed 0.2.  Lactate 1.5  AB.49/22/133/17/98.5 on AC18/6/40%/550  Will decrease AC rate of 18 to 16 and recheck ABG.    1600 Addendum:  While turning patient in bed this afternoon, rectal temp taken and noted to be 102.4, this did not correlate with [place] temp, patient felt very warm to touch. ID in attendance, tylenol given and will followup their recs for antibiotic adjustment. Large wound site noted across back, dressing intact, wound vac in place. Afternoon hemodynamics/labs done:  CVP 11, PCWP 14  CO/CI 5.84/2.84, , MVO2 60.6% off vasopressin but remains on Dobutamine at 2.5 mcg/kg/min and Levophed 0.2.  Lactate 1.5  AB.49/22/133/17/98.5 on AC18/6/40%/550  Will decrease AC rate of 18 to 16 and recheck ABG.    1600 Addendum:  While turning patient in bed this afternoon, rectal temp taken and noted to be 102.4, this did not correlate with [place] temp, patient felt very warm to touch. ID in attendance, tylenol given and will followup their recs for antibiotic adjustment. Large wound site noted across back, dressing intact, wound vac in place.    173 addendum:  ABG on AC 16: 7.44/26/154/18/99.1%  Off versed and vasopressin  Opens eyes and follows simple commands  Will continue to taper levophed as BP allows.  Check hemodynamics tonight.  Verbal ID recs: D/C meropenem              repeat blood cultures X 2              start Unasyn 3 grams IVPB Q6h              check vanco level in am, if < 15 restart vanco              will followup official consult Afternoon hemodynamics/labs done:  CVP 11, PCWP 14  CO/CI 5.84/2.84, , MVO2 60.6% off vasopressin but remains on Dobutamine at 2.5 mcg/kg/min and Levophed 0.2.  Lactate 1.5  AB.49/22/133/17/98.5 on AC18/6/40%/550  Will decrease AC rate of 18 to 16 and recheck ABG.    1600 Addendum:  While turning patient in bed this afternoon, rectal temp taken and noted to be 102.4, this did not correlate with [place] temp, patient felt very warm to touch. ID in attendance, tylenol given and will followup their recs for antibiotic adjustment. Large wound site noted across back, dressing intact, wound vac in place.    173 addendum:  ABG on AC 16: 7.44/26/154/18/99.1%  Off versed and vasopressin  Opens eyes and follows simple commands  Will continue to taper levophed as BP allows, maintain Dobutamine at 2.5 mcg/kg/min for now  Check hemodynamics tonight.  Verbal ID recs: D/C meropenem              repeat blood cultures X 2              start Unasyn 3 grams IVPB Q6h              check vanco level in am, if < 15 restart vanco              will followup official consult Afternoon hemodynamics/labs done:  CVP 11, PCWP 14  CO/CI 5.84/2.84, , MVO2 60.6% off vasopressin but remains on Dobutamine at 2.5 mcg/kg/min and Levophed 0.2.  Lactate 1.5  AB.49/22/133/17/98.5 on AC18/6/40%/550  Will decrease AC rate of 18 to 16 and recheck ABG.    1600 Addendum:  While turning patient in bed this afternoon, rectal temp taken and noted to be 102.4, this did not correlate with [place] temp as noted earlier, patient felt very warm to touch. ID in attendance, tylenol given and will followup their recs for antibiotic adjustment. Large wound site noted across back, dressing intact, wound vac in place.    173 addendum:  ABG on AC 16: 7.44/26/154/18/99.1%  Off versed and vasopressin  Opens eyes and follows simple commands  Will continue to taper levophed as BP allows, maintain Dobutamine at 2.5 mcg/kg/min for now  Check hemodynamics tonight.  Verbal ID recs: D/C meropenem              repeat blood cultures X 2              start Unasyn 3 grams IVPB Q6h              check vanco level in am, if < 15 restart vanco              will followup official consult

## 2022-08-13 NOTE — CONSULT NOTE ADULT - ASSESSMENT
52F immobile 2/2 MS, poorly controlled T2DM, asthma, hypothyroidism, known chronic wounds sacrum (stage 4), vulvar/Rt thigh, and right calf, now with worsening wound    ID is consulted for antibiotic management  Concern for necrotizing wound infection  CT showed large sacral wound with extensive involvement with subQ gas  S/p urgent surgical debridement 8/11  Was on 2 pressors, also on dobutamine for inotropic support for cardiogenic shock  Febrile and significant leukocytosis  BCx 2/4 MRSE; will treat it as true pathogen due to extensive sacral wound  UA showed pyuria, UCx yeast like cell  WCx grew strep anginosis  TTE showed EF 10%, no vegetation  Vancomycin trough 30 8/12    Overall necrotizing wound infection s/p surgical debridement with wound vac  Will continue vancomycin for MRSE bacteremia; high trough now so can repeat in AM and redose if < 15  No need for ESBL coverage; can use Unasyn for GNR and anaerobes coverage as it is a sacral wound      IMPRESSION:  Necrotizing wound infection  MRSE bacteremia  Leukocytosis  Fever    RECOMMENDATIONS:  - D/C meropenem, start IV Unasyn 3gm q6hrs  - Check vancomycin level in AM, if <15, can restart IV vancomycin 1250mg q12hrs  - Repeat 2 sets of blood culture  - Follow up surgical culture  - Surgery follow up  - Trend WBC, fever curve, transaminases, creatinine daily  - Will continue to follow      Patient is seen and examined with attending and case is discussed with primary team      Jyoti Burton D.O.  PGY-5 Infectious Diseases Fellow  Please contact me via page or text through [employer] Teams  If after 5PM or on weekends, please call [phone]     52F immobile 2/2 MS, poorly controlled T2DM, asthma, hypothyroidism, known chronic wounds sacrum (stage 4), vulvar/Rt thigh, and right calf, now with worsening wound    ID is consulted for antibiotic management  Concern for necrotizing wound infection  CT showed large sacral wound with extensive involvement with subQ gas  S/p urgent surgical debridement 8/11  Was on 2 pressors, also on dobutamine for inotropic support for cardiogenic shock  Febrile and significant leukocytosis  BCx 2/4 MRSE; will treat it as true pathogen due to extensive sacral wound  UA showed pyuria, UCx yeast like cell  WCx grew strep anginosis  TTE showed EF 10%, no vegetation  Vancomycin trough 30 8/12    Overall necrotizing wound infection s/p surgical debridement with wound vac  Will continue vancomycin for MRSE bacteremia; high trough now so can repeat in AM and redose if < 15  No need for ESBL coverage; can use Unasyn for GNR and anaerobes coverage as it is a sacral wound      IMPRESSION:  Necrotizing wound infection  MRSE bacteremia  Leukocytosis  Fever    RECOMMENDATIONS:  - D/C meropenem, start IV Unasyn 3gm q6hrs  - Check vancomycin level in AM, if <15, can restart IV vancomycin 1GM q12hrs  - Repeat 2 sets of blood culture  - Follow up surgical culture  - Surgery follow up  - Trend WBC, fever curve, transaminases, creatinine daily  - Will continue to follow      Patient is seen and examined with attending and case is discussed with primary team      Jyoti Burton D.O.  PGY-5 Infectious Diseases Fellow  Please contact me via page or text through [employer] Teams  If after 5PM or on weekends, please call [phone]

## 2022-08-13 NOTE — CONSULT NOTE ADULT - SUBJECTIVE AND OBJECTIVE BOX
INFECTIOUS DISEASE CONSULT NOTE    Patient is a 52y old  Female who presents with a chief complaint of Soft tissue infection (13 Aug 2022 08:52)    HPI:  52F immobile 2/2 MS, poorly controlled T2DM, asthma, hypothyroidism, known chronic wounds sacrum (stage 4), vulvar/Rt thigh, and right calf. Recent April 2022 hospitalization for urosepsis. On cefepime/flagyl for chronic osteo 2/2 unstageable sacral decubitus ulcer, dakins BID packing. Presented from [place] with concerns for UTI and anemia (6.8 from baseline 8.0) and new malodorous wound on L hip to L flank area. Surgery consulted for potential necrotizing soft tissue infection.     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      MS (multiple sclerosis)      No significant past surgical history      SOCIAL HISTORY:  Unable to obtain as patient is intubated     FAMILY HISTORY:  Unable to obtain as patient is intubated     Allergies:  No Known Drug Allergies  Pineapple (Anaphylaxis)      ANTIMICROBIALS:  meropenem  IVPB 1000 every 8 hours      ANTIMICROBIALS (past 90 days):  MEDICATIONS  (STANDING):    cefepime   IVPB   100 mL/Hr IV Intermittent (08-11-22 @ 12:36)    cefepime   IVPB   100 mL/Hr IV Intermittent (08-10-22 @ 23:46)    clindamycin IVPB   100 mL/Hr IV Intermittent (08-13-22 @ 05:08)   100 mL/Hr IV Intermittent (08-12-22 @ 21:55)   100 mL/Hr IV Intermittent (08-12-22 @ 13:15)   100 mL/Hr IV Intermittent (08-12-22 @ 05:17)   100 mL/Hr IV Intermittent (08-11-22 @ 22:31)   100 mL/Hr IV Intermittent (08-11-22 @ 14:12)   100 mL/Hr IV Intermittent (08-11-22 @ 06:41)    clindamycin IVPB   100 mL/Hr IV Intermittent (08-10-22 @ 23:04)    meropenem  IVPB   100 mL/Hr IV Intermittent (08-13-22 @ 13:49)   100 mL/Hr IV Intermittent (08-13-22 @ 05:09)   100 mL/Hr IV Intermittent (08-12-22 @ 21:12)   100 mL/Hr IV Intermittent (08-12-22 @ 13:15)   100 mL/Hr IV Intermittent (08-12-22 @ 05:17)   100 mL/Hr IV Intermittent (08-11-22 @ 22:05)   100 mL/Hr IV Intermittent (08-11-22 @ 15:29)    vancomycin  IVPB   300 mL/Hr IV Intermittent (08-12-22 @ 15:13)   300 mL/Hr IV Intermittent (08-12-22 @ 00:23)   300 mL/Hr IV Intermittent (08-11-22 @ 12:20)        OTHER MEDS:   MEDICATIONS  (STANDING):  ALBUTerol    90 MICROgram(s) HFA Inhaler 2 every 6 hours PRN  DOBUTamine Infusion 2.5 <Continuous>  fentaNYL   Infusion. 0.5 <Continuous>  heparin   Injectable 5000 every 8 hours  hydrocortisone sodium succinate Injectable 25 every 6 hours  HYDROmorphone  Injectable 1 every 3 hours PRN  HYDROmorphone  Injectable 0.5 every 3 hours PRN  insulin lispro (ADMELOG) corrective regimen sliding scale  every 6 hours  insulin lispro Injectable (ADMELOG) 5 every 6 hours  levothyroxine Injectable 56 at bedtime  norepinephrine Infusion 0.05 <Continuous>  pantoprazole  Injectable 40 every 12 hours      VITALS:  Vital Signs Last 24 Hrs  T(F): 102.4 (08-13-22 @ 17:00), Max: 103.7 (08-10-22 @ 21:06)    Vital Signs Last 24 Hrs  HR: 108 (08-13-22 @ 17:00) (99 - 126)  BP: --  RR: 16 (08-13-22 @ 17:00)  SpO2: 100% (08-13-22 @ 17:00) (94% - 100%)  Wt(kg): --    EXAM:  GENERAL: Intubated and sedated  HEAD: No head lesions  EYES: Conjunctiva pink and cornea white  ENT: Normal external ears and nose, no discharges; moist mucous membranes; + ET tube  NECK: Supple, nontender to palpation; no JVD  CHEST/LUNG: Clear to auscultation bilaterally  HEART: S1 S2  ABDOMEN: Soft, nontender, nondistended; normoactive bowel sounds  EXTREMITIES: No clubbing, cyanosis, or petal edema  NERVOUS SYSTEM:  Intubated and sedated  MSK: No joint erythema, swelling  SKIN: Large area of sacral wound now under wound vac, multiple area of skin ulcers. no superficial thrombophlebitis    Labs:                        9.0    41.59 )-----------( 700      ( 13 Aug 2022 03:19 )             28.9     08-13    134<L>  |  102  |  15  ----------------------------<  205<H>  3.5   |  17<L>  |  0.51    Ca    7.4<L>      13 Aug 2022 12:45  Phos  2.6     08-13  Mg     1.60     08-13    TPro  5.3<L>  /  Alb  1.7<L>  /  TBili  0.2  /  DBili  x   /  AST  41<H>  /  ALT  11  /  AlkPhos  207<H>  08-13      WBC Trend:  WBC Count: 41.59 (08-13-22 @ 03:19)  WBC Count: 45.96 (08-12-22 @ 21:27)  WBC Count: 40.76 (08-11-22 @ 23:45)  WBC Count: 44.03 (08-11-22 @ 13:22)      Auto Neutrophil #: 25.49 K/uL (08-10-22 @ 22:30)  Auto Neutrophil #: 3.72 K/uL (04-10-22 @ 07:32)  Auto Neutrophil #: 5.03 K/uL (04-09-22 @ 07:37)  Auto Neutrophil #: 3.99 K/uL (04-08-22 @ 06:42)  Auto Neutrophil #: 4.09 K/uL (04-06-22 @ 07:25)      Creatine Trend:  Creatinine, Serum: 0.51 (08-13)  Creatinine, Serum: 0.52 (08-13)  Creatinine, Serum: 0.57 (08-12)  Creatinine, Serum: 0.59 (08-11)      Liver Biochemical Testing Trend:  Alanine Aminotransferase (ALT/SGPT): 11 (08-13)  Alanine Aminotransferase (ALT/SGPT): 11 (08-13)  Alanine Aminotransferase (ALT/SGPT): 9 (08-11)  Alanine Aminotransferase (ALT/SGPT): 10 (08-10)  Alanine Aminotransferase (ALT/SGPT): 9 (04-07)  Aspartate Aminotransferase (AST/SGOT): 41 (08-13-22 @ 12:45)  Aspartate Aminotransferase (AST/SGOT): 37 (08-13-22 @ 03:19)  Aspartate Aminotransferase (AST/SGOT): 18 (08-11-22 @ 23:45)  Aspartate Aminotransferase (AST/SGOT): 10 (08-10-22 @ 22:30)  Aspartate Aminotransferase (AST/SGOT): 25 (04-07-22 @ 07:12)  Bilirubin Total, Serum: 0.2 (08-13)  Bilirubin Total, Serum: 0.2 (08-13)  Bilirubin Total, Serum: 0.4 (08-11)  Bilirubin Total, Serum: <0.2 (08-10)  Bilirubin Total, Serum: <0.2 (04-07)      Trend LDH  04-07-22 @ 07:12  321<H>  04-04-22 @ 06:40  326<H>  03-28-22 @ 06:28  387<H>      Auto Eosinophil %: 0.0 % (08-10-22 @ 22:30)          MICROBIOLOGY:  Vancomycin Level, Trough: 30.8 (08-12 @ 23:23)    MRSA PCR Result.: NotDetec (03-30-22 @ 15:46)  MRSA PCR Result.: NotDetec (03-29-22 @ 20:01)      Culture - Acid Fast - Other w/Smear (collected 11 Aug 2022 04:49)  Source: .Other  Preliminary Report:    Culture is being performed.    Culture - Fungal, Other (collected 11 Aug 2022 04:49)  Source: .Other  Preliminary Report:    Testing in progress    Culture - Other (collected 11 Aug 2022 04:49)  Source: .Other  Preliminary Report:    Numerous Streptococcus anginosus "Susceptibilities not performed"    Culture - Urine (collected 10 Aug 2022 22:30)  Source: Clean Catch Clean Catch (Midstream)  Final Report:    >100,000 CFU/ml Candida glabrata "Susceptibilities not performed"    Culture - Blood (collected 10 Aug 2022 22:30)  Source: .Blood Blood-Peripheral  Preliminary Report:    Growth in anaerobic bottle: Gram Positive Cocci in Clusters    Culture - Blood (collected 10 Aug 2022 22:20)  Source: .Blood Blood-Venous  Final Report:    Growth in aerobic bottle: Staphylococcus epidermidis    Coag Negative Staphylococcus    Single set isolate, possible contaminant. Contact    Microbiology if susceptibility testing clinically    indicated.    ***Blood Panel PCR results on this specimen are available    approximately 3 hours after the Gram stain result.***    Gram stain, PCR, and/or culture results may not always    correspond due to difference in methodologies.    ************************************************************    This PCR assay was performed by multiplex PCR. This    Assay tests for 66 bacterial and resistance gene targets.    Please refer to the [facility] test directory    at [email or web address] for details.  Organism: Blood Culture PCR  Organism: Blood Culture PCR    Sensitivities:      -  Staphylococcus epidermidis, Methicillin resistant: Detec      Method Type: PCR    Culture - Blood (collected 30 Mar 2022 18:35)  Source: .Blood Blood  Final Report:    No Growth Final    Culture - Urine (collected 28 Mar 2022 08:19)  Source: Catheterized Catheterized  Final Report:    >=3 organisms. Probable collection contamination. including    >100,000 CFU/ml Escherichia coli ESBL    >100,000 CFU/ml Escherichia coli #2    Normal Urogenital brayan present  Organism: Escherichia coli  Escherichia coli ESBL  Organism: Escherichia coli ESBL    Sensitivities:      -  Amikacin: S <=16      -  Amoxicillin/Clavulanic Acid: S <=8/4 Consider reserving for cystitis when ampicillin/sulbactam is resistant      -  Ampicillin: R >16 These ampicillin results predict results for amoxicillin      -  Ampicillin/Sulbactam: R >16/8 Enterobacter, Klebsiella aerogenes, Citrobacter, and Serratia may develop resistance during prolonged therapy (3-4 days)      -  Aztreonam: R 16      -  Cefazolin: R >16 ([email or web address]_[email or web address]) For uncomplicated UTI with K. pneumoniae, E. coli, or P. mirablis: DHAVAL <=16 is sensitive and DHAVAL >=32 is resistant. This also predicts results for oral agents cefaclor, cefdinir, cefpodoxime, cefprozil, cefuroxime axetil, cephalexin and locarbef for uncomplicated UTI. Note that some isolates may be susceptible to these agents while testing resistant to cefazolin.      -  Cefepime: R >16      -  Ceftriaxone: R >32 Enterobacter, Klebsiella aerogenes, Citrobacter, and Serratia may develop resistance during prolonged therapy      -  Ciprofloxacin: R >2      -  Ertapenem: S <=0.5      -  Gentamicin: S <=2      -  Imipenem: S <=1      -  Levofloxacin: R >4      -  Meropenem: S <=1      -  Nitrofurantoin: S <=32 Should not be used to treat pyelonephritis      -  Piperacillin/Tazobactam: S <=8      -  Tigecycline: S <=2      -  Tobramycin: S <=2      -  Trimethoprim/Sulfamethoxazole: S <=0.5/9.5      Method Type: DHAVAL  Organism: Escherichia coli    Sensitivities:      -  Amikacin: S <=16      -  Amoxicillin/Clavulanic Acid: S <=8/4 Consider reserving for cystitis when ampicillin/sulbactam is resistant      -  Ampicillin: R >16 These ampicillin results predict results for amoxicillin      -  Ampicillin/Sulbactam: R >16/8 Enterobacter, Klebsiella aerogenes, Citrobacter, and Serratia may develop resistance during prolonged therapy (3-4 days)      -  Aztreonam: S <=4      -  Cefazolin: S 8 (MIC_CL_COM_[email or web address]) For uncomplicated UTI with K. pneumoniae, E. coli, or P. mirablis: DHAVAL <=16 is sensitive and DHAVAL >=32 is resistant. This also predicts results for oral agents cefaclor, cefdinir, cefpodoxime, cefprozil, cefuroxime axetil, cephalexin and locarbef for uncomplicated UTI. Note that some isolates may be susceptible to these agents while testing resistant to cefazolin.      -  Cefepime: S <=2      -  Cefoxitin: S <=8      -  Ceftriaxone: S <=1 Enterobacter, Klebsiella aerogenes, Citrobacter, and Serratia may develop resistance during prolonged therapy      -  Ciprofloxacin: S <=0.25      -  Ertapenem: S <=0.5      -  Gentamicin: S <=2      -  Imipenem: S <=1      -  Levofloxacin: S <=0.5      -  Meropenem: S <=1      -  Nitrofurantoin: S <=32 Should not be used to treat pyelonephritis      -  Piperacillin/Tazobactam: S <=8      -  Tigecycline: S <=2      -  Tobramycin: S <=2      -  Trimethoprim/Sulfamethoxazole: R >2/38      Method Type: DHAVAL    Culture - Blood (collected 28 Mar 2022 01:53)  Source: .Blood Blood  Final Report:    Growth in anaerobic bottle: Staphylococcus epidermidis    Coag Negative Staphylococcus    Single set isolate, possible contaminant. Contact    Microbiology if susceptibility testing clinically    indicated.    ***Blood Panel PCR results on this specimen are available    approximately 3 hours after the Gram stain result.***    Gram stain, PCR, and/or culture results may not always    correspond due to difference in methodologies.    ************************************************************    This PCR assay was performed by multiplex PCR. This    Assay tests for 66 bacterial and resistance gene targets.    Please refer to the [facility] test directory    at [email or web address] for details.  Organism: Blood Culture PCR  Organism: Blood Culture PCR    Sensitivities:      -  Staphylococcus epidermidis: Detec      Method Type: PCR    Troponin T, High Sensitivity Result: 169 (08-13)  Troponin T, High Sensitivity Result: 308 (08-12)  Troponin T, High Sensitivity Result: 338 (08-11)    Blood Gas Venous - Lactate: 1.5 (08-13 @ 12:45)  Blood Gas Arterial, Lactate: 1.5 (08-13 @ 12:45)  Blood Gas Arterial, Lactate: 2.1 (08-13 @ 03:19)  Blood Gas Venous - Lactate: 2.0 (08-13 @ 03:19)    A1C with Estimated Average Glucose Result: 5.5 % (08-12-22 @ 00:50)  A1C with Estimated Average Glucose Result: 14.2 % (03-27-22 @ 19:13)      RADIOLOGY:  imaging below personally reviewed    < from: CT Abdomen and Pelvis No Cont (08.10.22 @ 23:30) >  Large open sacral decubitus wound extending to bone. Foci of air in the   left gluteus musculature and superior to the wound. 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    Absence of the coccyx and distal sacrum, may related to prior resection.   Correlate with prior surgical history.    < end of copied text >   INFECTIOUS DISEASE CONSULT NOTE    Patient is a 52y old  Female who presents with a chief complaint of Soft tissue infection (13 Aug 2022 08:52)    HPI:  52F immobile 2/2 MS, poorly controlled T2DM, asthma, hypothyroidism, known chronic wounds sacrum (stage 4), vulvar/Rt thigh, and right calf. Recent April 2022 hospitalization for urosepsis. On cefepime/flagyl for chronic osteo 2/2 unstageable sacral decubitus ulcer, dakins BID packing. Presented from [place] with concerns for UTI and anemia (6.8 from baseline 8.0) and new malodorous wound on L hip to L flank area. Surgery consulted for potential necrotizing soft tissue infection.     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      MS (multiple sclerosis)      No significant past surgical history      SOCIAL HISTORY:  Unable to obtain as patient is intubated     FAMILY HISTORY:  Unable to obtain as patient is intubated     Allergies:  No Known Drug Allergies  Pineapple (Anaphylaxis)      ANTIMICROBIALS:  meropenem  IVPB 1000 every 8 hours      ANTIMICROBIALS (past 90 days):  MEDICATIONS  (STANDING):    cefepime   IVPB   100 mL/Hr IV Intermittent (08-11-22 @ 12:36)    cefepime   IVPB   100 mL/Hr IV Intermittent (08-10-22 @ 23:46)    clindamycin IVPB   100 mL/Hr IV Intermittent (08-13-22 @ 05:08)   100 mL/Hr IV Intermittent (08-12-22 @ 21:55)   100 mL/Hr IV Intermittent (08-12-22 @ 13:15)   100 mL/Hr IV Intermittent (08-12-22 @ 05:17)   100 mL/Hr IV Intermittent (08-11-22 @ 22:31)   100 mL/Hr IV Intermittent (08-11-22 @ 14:12)   100 mL/Hr IV Intermittent (08-11-22 @ 06:41)    clindamycin IVPB   100 mL/Hr IV Intermittent (08-10-22 @ 23:04)    meropenem  IVPB   100 mL/Hr IV Intermittent (08-13-22 @ 13:49)   100 mL/Hr IV Intermittent (08-13-22 @ 05:09)   100 mL/Hr IV Intermittent (08-12-22 @ 21:12)   100 mL/Hr IV Intermittent (08-12-22 @ 13:15)   100 mL/Hr IV Intermittent (08-12-22 @ 05:17)   100 mL/Hr IV Intermittent (08-11-22 @ 22:05)   100 mL/Hr IV Intermittent (08-11-22 @ 15:29)    vancomycin  IVPB   300 mL/Hr IV Intermittent (08-12-22 @ 15:13)   300 mL/Hr IV Intermittent (08-12-22 @ 00:23)   300 mL/Hr IV Intermittent (08-11-22 @ 12:20)        OTHER MEDS:   MEDICATIONS  (STANDING):  ALBUTerol    90 MICROgram(s) HFA Inhaler 2 every 6 hours PRN  DOBUTamine Infusion 2.5 <Continuous>  fentaNYL   Infusion. 0.5 <Continuous>  heparin   Injectable 5000 every 8 hours  hydrocortisone sodium succinate Injectable 25 every 6 hours  HYDROmorphone  Injectable 1 every 3 hours PRN  HYDROmorphone  Injectable 0.5 every 3 hours PRN  insulin lispro (ADMELOG) corrective regimen sliding scale  every 6 hours  insulin lispro Injectable (ADMELOG) 5 every 6 hours  levothyroxine Injectable 56 at bedtime  norepinephrine Infusion 0.05 <Continuous>  pantoprazole  Injectable 40 every 12 hours      VITALS:  Vital Signs Last 24 Hrs  T(F): 102.4 (08-13-22 @ 17:00), Max: 103.7 (08-10-22 @ 21:06)    Vital Signs Last 24 Hrs  HR: 108 (08-13-22 @ 17:00) (99 - 126)  BP: --  RR: 16 (08-13-22 @ 17:00)  SpO2: 100% (08-13-22 @ 17:00) (94% - 100%)  Wt(kg): --    EXAM:  GENERAL: Intubated and sedated  HEAD: No head lesions  EYES: grossly clear   ENT: grossly normal. + ET tube  NECK: no gross mass   CHEST/LUNG: mechanically ventilated   HEART: regular rate   ABDOMEN: Soft, nontender, nondistended; normoactive bowel sounds  EXTREMITIES: No clubbing, cyanosis, or petal edema  NERVOUS SYSTEM:  Intubated and sedated  MSK: No joint erythema, swelling  SKIN: Large wound to lumbosacral region across from left side to right flank, now under wound vac. few superficial left buttock/ischial ulcers not infected appearing. no superficial thrombophlebitis    Labs:                        9.0    41.59 )-----------( 700      ( 13 Aug 2022 03:19 )             28.9     08-13    134<L>  |  102  |  15  ----------------------------<  205<H>  3.5   |  17<L>  |  0.51    Ca    7.4<L>      13 Aug 2022 12:45  Phos  2.6     08-13  Mg     1.60     08-13    TPro  5.3<L>  /  Alb  1.7<L>  /  TBili  0.2  /  DBili  x   /  AST  41<H>  /  ALT  11  /  AlkPhos  207<H>  08-13      WBC Trend:  WBC Count: 41.59 (08-13-22 @ 03:19)  WBC Count: 45.96 (08-12-22 @ 21:27)  WBC Count: 40.76 (08-11-22 @ 23:45)  WBC Count: 44.03 (08-11-22 @ 13:22)      Auto Neutrophil #: 25.49 K/uL (08-10-22 @ 22:30)  Auto Neutrophil #: 3.72 K/uL (04-10-22 @ 07:32)  Auto Neutrophil #: 5.03 K/uL (04-09-22 @ 07:37)  Auto Neutrophil #: 3.99 K/uL (04-08-22 @ 06:42)  Auto Neutrophil #: 4.09 K/uL (04-06-22 @ 07:25)      Creatine Trend:  Creatinine, Serum: 0.51 (08-13)  Creatinine, Serum: 0.52 (08-13)  Creatinine, Serum: 0.57 (08-12)  Creatinine, Serum: 0.59 (08-11)      Liver Biochemical Testing Trend:  Alanine Aminotransferase (ALT/SGPT): 11 (08-13)  Alanine Aminotransferase (ALT/SGPT): 11 (08-13)  Alanine Aminotransferase (ALT/SGPT): 9 (08-11)  Alanine Aminotransferase (ALT/SGPT): 10 (08-10)  Alanine Aminotransferase (ALT/SGPT): 9 (04-07)  Aspartate Aminotransferase (AST/SGOT): 41 (08-13-22 @ 12:45)  Aspartate Aminotransferase (AST/SGOT): 37 (08-13-22 @ 03:19)  Aspartate Aminotransferase (AST/SGOT): 18 (08-11-22 @ 23:45)  Aspartate Aminotransferase (AST/SGOT): 10 (08-10-22 @ 22:30)  Aspartate Aminotransferase (AST/SGOT): 25 (04-07-22 @ 07:12)  Bilirubin Total, Serum: 0.2 (08-13)  Bilirubin Total, Serum: 0.2 (08-13)  Bilirubin Total, Serum: 0.4 (08-11)  Bilirubin Total, Serum: <0.2 (08-10)  Bilirubin Total, Serum: <0.2 (04-07)      Trend LDH  04-07-22 @ 07:12  321<H>  04-04-22 @ 06:40  326<H>  03-28-22 @ 06:28  387<H>      Auto Eosinophil %: 0.0 % (08-10-22 @ 22:30)          MICROBIOLOGY:  Vancomycin Level, Trough: 30.8 (08-12 @ 23:23)    MRSA PCR Result.: NotDetec (03-30-22 @ 15:46)  MRSA PCR Result.: NotDetec (03-29-22 @ 20:01)      Culture - Acid Fast - Other w/Smear (collected 11 Aug 2022 04:49)  Source: .Other  Preliminary Report:    Culture is being performed.    Culture - Fungal, Other (collected 11 Aug 2022 04:49)  Source: .Other  Preliminary Report:    Testing in progress    Culture - Other (collected 11 Aug 2022 04:49)  Source: .Other  Preliminary Report:    Numerous Streptococcus anginosus "Susceptibilities not performed"    Culture - Urine (collected 10 Aug 2022 22:30)  Source: Clean Catch Clean Catch (Midstream)  Final Report:    >100,000 CFU/ml Candida glabrata "Susceptibilities not performed"    Culture - Blood (collected 10 Aug 2022 22:30)  Source: .Blood Blood-Peripheral  Preliminary Report:    Growth in anaerobic bottle: Gram Positive Cocci in Clusters    Culture - Blood (collected 10 Aug 2022 22:20)  Source: .Blood Blood-Venous  Final Report:    Growth in aerobic bottle: Staphylococcus epidermidis    Coag Negative Staphylococcus    Single set isolate, possible contaminant. Contact    Microbiology if susceptibility testing clinically    indicated.    ***Blood Panel PCR results on this specimen are available    approximately 3 hours after the Gram stain result.***    Gram stain, PCR, and/or culture results may not always    correspond due to difference in methodologies.    ************************************************************    This PCR assay was performed by multiplex PCR. This    Assay tests for 66 bacterial and resistance gene targets.    Please refer to the [facility] test directory    at [email or web address] for details.  Organism: Blood Culture PCR  Organism: Blood Culture PCR    Sensitivities:      -  Staphylococcus epidermidis, Methicillin resistant: Detec      Method Type: PCR    Culture - Blood (collected 30 Mar 2022 18:35)  Source: .Blood Blood  Final Report:    No Growth Final    Culture - Urine (collected 28 Mar 2022 08:19)  Source: Catheterized Catheterized  Final Report:    >=3 organisms. Probable collection contamination. including    >100,000 CFU/ml Escherichia coli ESBL    >100,000 CFU/ml Escherichia coli #2    Normal Urogenital brayan present  Organism: Escherichia coli  Escherichia coli ESBL  Organism: Escherichia coli ESBL    Sensitivities:      -  Amikacin: S <=16      -  Amoxicillin/Clavulanic Acid: S <=8/4 Consider reserving for cystitis when ampicillin/sulbactam is resistant      -  Ampicillin: R >16 These ampicillin results predict results for amoxicillin      -  Ampicillin/Sulbactam: R >16/8 Enterobacter, Klebsiella aerogenes, Citrobacter, and Serratia may develop resistance during prolonged therapy (3-4 days)      -  Aztreonam: R 16      -  Cefazolin: R >16 (MIC_CL_COM_ENTERIC_CEFAZU) For uncomplicated UTI with K. pneumoniae, E. coli, or P. mirablis: DHAVAL <=16 is sensitive and DHAVAL >=32 is resistant. This also predicts results for oral agents cefaclor, cefdinir, cefpodoxime, cefprozil, cefuroxime axetil, cephalexin and locarbef for uncomplicated UTI. Note that some isolates may be susceptible to these agents while testing resistant to cefazolin.      -  Cefepime: R >16      -  Ceftriaxone: R >32 Enterobacter, Klebsiella aerogenes, Citrobacter, and Serratia may develop resistance during prolonged therapy      -  Ciprofloxacin: R >2      -  Ertapenem: S <=0.5      -  Gentamicin: S <=2      -  Imipenem: S <=1      -  Levofloxacin: R >4      -  Meropenem: S <=1      -  Nitrofurantoin: S <=32 Should not be used to treat pyelonephritis      -  Piperacillin/Tazobactam: S <=8      -  Tigecycline: S <=2      -  Tobramycin: S <=2      -  Trimethoprim/Sulfamethoxazole: S <=0.5/9.5      Method Type: DHAVAL  Organism: Escherichia coli    Sensitivities:      -  Amikacin: S <=16      -  Amoxicillin/Clavulanic Acid: S <=8/4 Consider reserving for cystitis when ampicillin/sulbactam is resistant      -  Ampicillin: R >16 These ampicillin results predict results for amoxicillin      -  Ampicillin/Sulbactam: R >16/8 Enterobacter, Klebsiella aerogenes, Citrobacter, and Serratia may develop resistance during prolonged therapy (3-4 days)      -  Aztreonam: S <=4      -  Cefazolin: S 8 (MIC_CL_COM_ENTERIC_CEFAZU) For uncomplicated UTI with K. pneumoniae, E. coli, or P. mirablis: DHAVAL <=16 is sensitive and DHAVAL >=32 is resistant. This also predicts results for oral agents cefaclor, cefdinir, cefpodoxime, cefprozil, cefuroxime axetil, cephalexin and locarbef for uncomplicated UTI. Note that some isolates may be susceptible to these agents while testing resistant to cefazolin.      -  Cefepime: S <=2      -  Cefoxitin: S <=8      -  Ceftriaxone: S <=1 Enterobacter, Klebsiella aerogenes, Citrobacter, and Serratia may develop resistance during prolonged therapy      -  Ciprofloxacin: S <=0.25      -  Ertapenem: S <=0.5      -  Gentamicin: S <=2      -  Imipenem: S <=1      -  Levofloxacin: S <=0.5      -  Meropenem: S <=1      -  Nitrofurantoin: S <=32 Should not be used to treat pyelonephritis      -  Piperacillin/Tazobactam: S <=8      -  Tigecycline: S <=2      -  Tobramycin: S <=2      -  Trimethoprim/Sulfamethoxazole: R >2/38      Method Type: DHAVAL    Culture - Blood (collected 28 Mar 2022 01:53)  Source: .Blood Blood  Final Report:    Growth in anaerobic bottle: Staphylococcus epidermidis    Coag Negative Staphylococcus    Single set isolate, possible contaminant. Contact    Microbiology if susceptibility testing clinically    indicated.    ***Blood Panel PCR results on this specimen are available    approximately 3 hours after the Gram stain result.***    Gram stain, PCR, and/or culture results may not always    correspond due to difference in methodologies.    ************************************************************    This PCR assay was performed by multiplex PCR. This    Assay tests for 66 bacterial and resistance gene targets.    Please refer to the [facility] test directory    at [email or web address] for details.  Organism: Blood Culture PCR  Organism: Blood Culture PCR    Sensitivities:      -  Staphylococcus epidermidis: Detec      Method Type: PCR    Troponin T, High Sensitivity Result: 169 (08-13)  Troponin T, High Sensitivity Result: 308 (08-12)  Troponin T, High Sensitivity Result: 338 (08-11)    Blood Gas Venous - Lactate: 1.5 (08-13 @ 12:45)  Blood Gas Arterial, Lactate: 1.5 (08-13 @ 12:45)  Blood Gas Arterial, Lactate: 2.1 (08-13 @ 03:19)  Blood Gas Venous - Lactate: 2.0 (08-13 @ 03:19)    A1C with Estimated Average Glucose Result: 5.5 % (08-12-22 @ 00:50)  A1C with Estimated Average Glucose Result: 14.2 % (03-27-22 @ 19:13)      RADIOLOGY:  imaging below personally reviewed    < from: CT Abdomen and Pelvis No Cont (08.10.22 @ 23:30) >  Large open sacral decubitus wound extending to bone. Foci of air in the   left gluteus musculature and superior to the wound. 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    Absence of the coccyx and distal sacrum, may related to prior resection.   Correlate with prior surgical history.    < end of copied text >

## 2022-08-13 NOTE — PROGRESS NOTE ADULT - SUBJECTIVE AND OBJECTIVE BOX
Subjective/Objective: Received intubated and sedated. Presently on fentanyl/versed for sedation, vasopressin, levophed, Dobutamine at 2.5 mcg/kg/min, and bicarb drip.      MEDICATIONS  (STANDING):  ascorbic acid 500 milliGRAM(s) Oral daily  chlorhexidine 0.12% Liquid 15 milliLiter(s) Oral Mucosa every 12 hours  chlorhexidine 2% Cloths 1 Application(s) Topical daily  chlorhexidine 4% Liquid 1 Application(s) Topical <User Schedule>  clindamycin IVPB 600 milliGRAM(s) IV Intermittent every 8 hours  DOBUTamine Infusion 2.5 MICROgram(s)/kG/Min (7.41 mL/Hr) IV Continuous <Continuous>  ergocalciferol 43479 Unit(s) Oral every week  fentaNYL   Infusion. 0.5 MICROgram(s)/kG/Hr (4.94 mL/Hr) IV Continuous <Continuous>  heparin   Injectable 5000 Unit(s) SubCutaneous every 8 hours  hydrocortisone sodium succinate Injectable 50 milliGRAM(s) IV Push every 6 hours  insulin lispro (ADMELOG) corrective regimen sliding scale   SubCutaneous every 6 hours  insulin lispro Injectable (ADMELOG) 5 Unit(s) SubCutaneous every 6 hours  lactated ringers. 1000 milliLiter(s) (30 mL/Hr) IV Continuous <Continuous>  levothyroxine Injectable 56 MICROGram(s) IV Push at bedtime  meropenem  IVPB 1000 milliGRAM(s) IV Intermittent every 8 hours  midazolam Infusion 0.02 mG/kG/Hr (1.98 mL/Hr) IV Continuous <Continuous>  multivitamin 1 Tablet(s) Oral daily  norepinephrine Infusion 0.05 MICROgram(s)/kG/Min (4.63 mL/Hr) IV Continuous <Continuous>  nystatin Powder 1 Application(s) Topical two times a day  pantoprazole  Injectable 40 milliGRAM(s) IV Push every 12 hours  sodium bicarbonate  Infusion 0.057 mEq/kG/Hr (75 mL/Hr) IV Continuous <Continuous>  vasopressin Infusion 0.03 Unit(s)/Min (1.8 mL/Hr) IV Continuous <Continuous>    MEDICATIONS  (PRN):  ALBUTerol    90 MICROgram(s) HFA Inhaler 2 Puff(s) Inhalation every 6 hours PRN Shortness of Breath  HYDROmorphone  Injectable 1 milliGRAM(s) IV Push every 3 hours PRN Severe Pain (7 - 10)  HYDROmorphone  Injectable 0.5 milliGRAM(s) IV Push every 3 hours PRN Moderate Pain (4 - 6)  sodium chloride 0.9% lock flush 10 milliLiter(s) IV Push every 1 hour PRN Pre/post blood products, medications, blood draw, and to maintain line patency          Vital Signs Last 24 Hrs  T(C): 39.1 (13 Aug 2022 05:00), Max: 39.1 (13 Aug 2022 05:00)  T(F): 102.3 (13 Aug 2022 05:00), Max: 102.3 (13 Aug 2022 05:00)  HR: 103 (13 Aug 2022 07:00) (68 - 126)  BP: --  BP(mean): --  RR: 18 (13 Aug 2022 07:00) (17 - 20)  SpO2: 100% (13 Aug 2022 07:00) (94% - 100%)    Parameters below as of 13 Aug 2022 07:00  Patient On (Oxygen Delivery Method): ventilator    O2 Concentration (%): 50  I&O's Detail    12 Aug 2022 07:01  -  13 Aug 2022 07:00  --------------------------------------------------------  IN:    dextrose 5% + lactated ringers: 600 mL    DOBUTamine: 14.8 mL    DOBUTamine: 103.6 mL    EPINEPHrine: 96.2 mL    FentaNYL: 272.1 mL    FentaNYL: 49.9 mL    Glucerna 1.5: 40 mL    IV PiggyBack: 850 mL    Ketamine: 14.8 mL    Lactated Ringers: 1270 mL    Midazolam: 2 mL    Norepinephrine: 328.4 mL    Norepinephrine: 464.2 mL    Propofol: 242.5 mL    Sodium Bicarbonate: 1125 mL    Vasopressin: 43.2 mL  Total IN: 5516.7 mL    OUT:    Indwelling Catheter - Urethral (mL): 1685 mL    Nasogastric/Oral tube (mL): 100 mL    VAC (Vacuum Assisted Closure) System (mL): 100 mL  Total OUT: 1885 mL    Total NET: 3631.7 mL            PHYSICAL EXAM  GEN:  RESP:  CV:  GI:  EXT:  NEURO:  PSYCH:        EKG/ TELEM:    LABS:                          9.0    41.59 )-----------( 700      ( 13 Aug 2022 03:19 )             28.9     PT/INR - ( 12 Aug 2022 21:27 )   PT: 22.8 sec;   INR: 1.95 ratio         PTT - ( 12 Aug 2022 21:27 )  PTT:34.5 sec  13 Aug 2022 03:19    132<L>  |  99     |  14     ----------------------------<  237<H>  4.0     |  15<L>  |  0.52   12 Aug 2022 21:27    131<L>  |  101    |  16     ----------------------------<  252<H>  4.4     |  14<L>  |  0.57     Ca    7.6<L>      13 Aug 2022 03:19  Ca    7.7<L>      12 Aug 2022 21:27  Phos  2.6       13 Aug 2022 03:19  Phos  3.0       12 Aug 2022 21:27  Mg     1.60      13 Aug 2022 03:19  Mg     2.40      12 Aug 2022 21:27    TPro  5.6<L>  /  Alb  1.7<L>  /  TBili  0.2    /  DBili  x      /  AST  37<H>  /  ALT  11     /  AlkPhos  223<H>  13 Aug 2022 03:19  TPro  5.8<L>  /  Alb  1.5<L>  /  TBili  0.4    /  DBili  x      /  AST  18     /  ALT  9      /  AlkPhos  172<H>  11 Aug 2022 23:45    CARDIAC MARKERS ( 13 Aug 2022 03:19 )  x     / x     / 13 U/L / x     / 2.5 ng/mL  CARDIAC MARKERS ( 12 Aug 2022 03:00 )  x     / x     / 34 U/L / x     / 12.1 ng/mL  CARDIAC MARKERS ( 11 Aug 2022 23:45 )  x     / x     / 60 U/L / x     / 15.5 ng/mL        Creatine Kinase, Serum: 13 U/L (08-13-22 @ 03:19)  Creatine Kinase, Serum: 34 U/L (08-12-22 @ 03:00)  Creatine Kinase, Serum: 60 U/L (08-11-22 @ 23:45)              Diagnostic testing:        Assessment and Plan:         Subjective/Objective: Received intubated and sedated. Presently on fentanyl/versed for sedation, vasopressin, levophed, Dobutamine at 2.5 mcg/kg/min, and bicarb drip.      MEDICATIONS  (STANDING):  ascorbic acid 500 milliGRAM(s) Oral daily  chlorhexidine 0.12% Liquid 15 milliLiter(s) Oral Mucosa every 12 hours  chlorhexidine 2% Cloths 1 Application(s) Topical daily  chlorhexidine 4% Liquid 1 Application(s) Topical <User Schedule>  clindamycin IVPB 600 milliGRAM(s) IV Intermittent every 8 hours  DOBUTamine Infusion 2.5 MICROgram(s)/kG/Min (7.41 mL/Hr) IV Continuous <Continuous>  ergocalciferol 34368 Unit(s) Oral every week  fentaNYL   Infusion. 0.5 MICROgram(s)/kG/Hr (4.94 mL/Hr) IV Continuous <Continuous>  heparin   Injectable 5000 Unit(s) SubCutaneous every 8 hours  hydrocortisone sodium succinate Injectable 50 milliGRAM(s) IV Push every 6 hours  insulin lispro (ADMELOG) corrective regimen sliding scale   SubCutaneous every 6 hours  insulin lispro Injectable (ADMELOG) 5 Unit(s) SubCutaneous every 6 hours  lactated ringers. 1000 milliLiter(s) (30 mL/Hr) IV Continuous <Continuous>  levothyroxine Injectable 56 MICROGram(s) IV Push at bedtime  meropenem  IVPB 1000 milliGRAM(s) IV Intermittent every 8 hours  midazolam Infusion 0.02 mG/kG/Hr (1.98 mL/Hr) IV Continuous <Continuous>  multivitamin 1 Tablet(s) Oral daily  norepinephrine Infusion 0.05 MICROgram(s)/kG/Min (4.63 mL/Hr) IV Continuous <Continuous>  nystatin Powder 1 Application(s) Topical two times a day  pantoprazole  Injectable 40 milliGRAM(s) IV Push every 12 hours  sodium bicarbonate  Infusion 0.057 mEq/kG/Hr (75 mL/Hr) IV Continuous <Continuous>  vasopressin Infusion 0.03 Unit(s)/Min (1.8 mL/Hr) IV Continuous <Continuous>    MEDICATIONS  (PRN):  ALBUTerol    90 MICROgram(s) HFA Inhaler 2 Puff(s) Inhalation every 6 hours PRN Shortness of Breath  HYDROmorphone  Injectable 1 milliGRAM(s) IV Push every 3 hours PRN Severe Pain (7 - 10)  HYDROmorphone  Injectable 0.5 milliGRAM(s) IV Push every 3 hours PRN Moderate Pain (4 - 6)  sodium chloride 0.9% lock flush 10 milliLiter(s) IV Push every 1 hour PRN Pre/post blood products, medications, blood draw, and to maintain line patency          Vital Signs Last 24 Hrs  T(C): 39.1 (13 Aug 2022 05:00), Max: 39.1 (13 Aug 2022 05:00)  T(F): 102.3 (13 Aug 2022 05:00), Max: 102.3 (13 Aug 2022 05:00)  HR: 103 (13 Aug 2022 07:00) (68 - 126)  BP: --  BP(mean): --  RR: 18 (13 Aug 2022 07:00) (17 - 20)  SpO2: 100% (13 Aug 2022 07:00) (94% - 100%)    Parameters below as of 13 Aug 2022 07:00  Patient On (Oxygen Delivery Method): ventilator    O2 Concentration (%): 50  I&O's Detail    12 Aug 2022 07:01  -  13 Aug 2022 07:00  --------------------------------------------------------  IN:    dextrose 5% + lactated ringers: 600 mL    DOBUTamine: 14.8 mL    DOBUTamine: 103.6 mL    EPINEPHrine: 96.2 mL    FentaNYL: 272.1 mL    FentaNYL: 49.9 mL    Glucerna 1.5: 40 mL    IV PiggyBack: 850 mL    Ketamine: 14.8 mL    Lactated Ringers: 1270 mL    Midazolam: 2 mL    Norepinephrine: 328.4 mL    Norepinephrine: 464.2 mL    Propofol: 242.5 mL    Sodium Bicarbonate: 1125 mL    Vasopressin: 43.2 mL  Total IN: 5516.7 mL    OUT:    Indwelling Catheter - Urethral (mL): 1685 mL    Nasogastric/Oral tube (mL): 100 mL    VAC (Vacuum Assisted Closure) System (mL): 100 mL  Total OUT: 1885 mL    Total NET: 3631.7 mL    0300 Hemodynamics: CO/CI 6.4/3.0, PCWP 18, CVP 6, , MVO2 65.4%  0750: CVP 10, PAD 28/19, PCWP 14    PHYSICAL EXAM  GEN: NAD, skin W & D, intubated and sedated  RESP: CTA ant  CV: nl S1S2, no M/R/C  GI: soft, NT/ND, BS +  EXT: B/L LE dressings in place but no evidence of edema  NEURO: intubated and sedated    EKG/ TELEM: sinus tachycardia occ C    LABS:                          9.0    41.59 )-----------( 700      ( 13 Aug 2022 03:19 )             28.9     PT/INR - ( 12 Aug 2022 21:27 )   PT: 22.8 sec;   INR: 1.95 ratio         PTT - ( 12 Aug 2022 21:27 )  PTT:34.5 sec  13 Aug 2022 03:19    132<L>  |  99     |  14     ----------------------------<  237<H>  4.0     |  15<L>  |  0.52     12 Aug 2022 21:27    131<L>  |  101    |  16     ----------------------------<  252<H>  4.4     |  14<L>  |  0.57     Ca    7.6<L>      13 Aug 2022 03:19  Ca    7.7<L>      12 Aug 2022 21:27  Phos  2.6       13 Aug 2022 03:19  Phos  3.0       12 Aug 2022 21:27  Mg     1.60      13 Aug 2022 03:19  Mg     2.40      12 Aug 2022 21:27    TPro  5.6<L>  /  Alb  1.7<L>  /  TBili  0.2    /  DBili  x      /  AST  37<H>  /  ALT  11     /  AlkPhos  223<H>  13 Aug 2022 03:19  TPro  5.8<L>  /  Alb  1.5<L>  /  TBili  0.4    /  DBili  x      /  AST  18     /  ALT  9      /  AlkPhos  172<H>  11 Aug 2022 23:45    CARDIAC MARKERS ( 13 Aug 2022 03:19 )  x     / x     / 13 U/L / x     / 2.5 ng/mL  CARDIAC MARKERS ( 12 Aug 2022 03:00 )  x     / x     / 34 U/L / x     / 12.1 ng/mL  CARDIAC MARKERS ( 11 Aug 2022 23:45 )  x     / x     / 60 U/L / x     / 15.5 ng/mL        Creatine Kinase, Serum: 13 U/L (08-13-22 @ 03:19)  Creatine Kinase, Serum: 34 U/L (08-12-22 @ 03:00)  Creatine Kinase, Serum: 60 U/L (08-11-22 @ 23:45)      Diagnostic testing:        Patient name: BOY CERON  YOB: 1970   Age: 52 (F)   MR#: [id number]  Study Date: 8/12/2022  [facility] RDCS  Study quality: Technically good  Referring Physician: Bryant Zacarias MD  Blood Pressure: 96/50 mmHg  Height: 168 cm  Weight: 100 kg  BSA: 2.1 m2  ------------------------------------------------------------------------  PROCEDURE: Transthoracic echocardiogram with 2-D, M-Mode  and complete spectral and color flow Doppler.  Intravenous ultrasound enhancing agent was administered for  improved left ventricular endocardial border definition.  Following the intravenous injection of ultrasound enhancing  agent, harmonic imaging was performed.  INDICATION: Cardiogenic shock (R57.0)  ------------------------------------------------------------------------  DIMENSIONS:  Dimensions:     Normal Values:  LA:     3.3 cm    2.0 - 4.0 cm  Ao:     2.7 cm    2.0 - 3.8 cm  SEPTUM: 0.7 cm    0.6 - 1.2 cm  PWT:    0.7 cm    0.6 - 1.1 cm  LVIDd:  4.7 cm    3.0 - 5.6 cm  LVIDs:  3.9 cm    1.8 - 4.0 cm  Derived Variables:  LVMI: 50 g/m2  RWT: 0.29  Fractional short: 17 %  Ejection Fraction (Visual Estimate): 5-10 %  ------------------------------------------------------------------------  OBSERVATIONS:  Mitral Valve: Normal mitral valve. Mild mitral  regurgitation.  Aortic Root: Normal aortic root.  Aortic Valve: Normal trileaflet aortic valve. Minimal  aortic regurgitation.  Left Atrium: Normal left atrium.  Left Ventricle: Endocardial visualization enhanced with  intravenous injection of echo contrast (Definity). Severe  segmental left ventricular systolic dysfunction. Only the  basal segments are moving.  No left ventricular thrombus.  Severe left ventricular enlargement. Moderate diastolic  dysfunction (Stage II).  Right Heart: Normal right atrium. Right ventricular  enlargement with decreased right ventricular systolic  function. Normal tricuspid valve. Mild tricuspid  regurgitation. Normal pulmonic valve.  Pericardium/PleuraNormal pericardium with no pericardial  effusion.  Hemodynamic: Estimated right ventricular systolic pressure  equals 37 mm Hg, assuming right atrial pressure equals 10  mm Hg, consistent with borderline pulmonary hypertension.  ------------------------------------------------------------------------  CONCLUSIONS:  1. Endocardial visualization enhanced with intravenous  injection of echo contrast (Definity). Severe segmental  left ventricular systolic dysfunction. Only the basal  segments are moving.  No left ventricular thrombus.  Estimated LVEF 10%.  2. Moderate diastolic dysfunction (Stage II).  3. Right ventricular enlargement with decreased right  ventricular systolic function.  ------------------------------------------------------------------------  Confirmed on  8/12/2022 - 10:22:54 by Gurwinder Reyes M.D.  ------------------------------------------------------------------------

## 2022-08-13 NOTE — PROGRESS NOTE ADULT - ASSESSMENT
52F immobile 2/2 h/o MS, DM2, asthma, hypothyroidism, and known sacral wounds presents from [place] with urosepsis and anemia with new L flank wound with concern for necrotizing soft tissue infection. Now s/p debridement of wound with worsening sepsis, acidosis, and severe global LV dysfunction, transferred to CCU for [place] and inotrope support.     NEURO:  - intubated and sedated on fentanyl and versed    RESP;  - intubated  - Vent settings: AC 18/ 6/ 550/50%-- ABG 7.43/24/193/16/99.3%    CV:  # severe LV dysfunction  - EF 5 - 10%  - concern for cardiomyopathy of unknown etiology but may be R/T sepsis and critical illness  - continue Dobutamine at 2.5 mcg/kg/min  - dose not appear volume overloaded at this time  - wean levophed and vasopressin as tolerated  - lactate trended down, now 2.0  - hold off on GDMT at this time  - eventual repeat ECHO to reassess LV function once patient is out of critical phase    GI:  - NGT in place  - NPO at present, will consider starting feeds    /renal:  # metabolic acidosis  - appears improved on Bicarb drip  - follow labs  - f/u any further renal recs  - Scr stable    ENDO:  # DM2  - A1c 5.5  - continue FS/ IHSS as indicated    ID:  # s/p L flank wound debridement  - 102.3, leukocytosis slowly improving  - continue meropenem, discontinue vancomycin and clindamycin per surgery recs  - no plan for further debridement, wound care to change vac M/W/F prn per surgery recs    DVT PPx:  - Heparin SQ 52F immobile 2/2 h/o MS, DM2, asthma, hypothyroidism, and known sacral wounds presents from [place] with urosepsis and anemia with new L flank wound with concern for necrotizing soft tissue infection. Now s/p debridement of wound with worsening sepsis, acidosis, and severe global LV dysfunction, transferred to CCU for [place] and inotrope support.     NEURO:  - intubated and sedated on fentanyl and versed    RESP;  - intubated  - Vent settings: AC 18/ 6/ 550/50%-- ABG 7.43/24/193/16/99.3%    CV:  # severe LV dysfunction  - EF 5 - 10%  - concern for cardiomyopathy of unknown etiology but may be R/T sepsis and critical illness  - continue Dobutamine at 2.5 mcg/kg/min  - dose not appear volume overloaded at this time  - wean levophed and vasopressin as tolerated  - lactate trended down, now 2.0  - hold off on GDMT at this time  - eventual repeat ECHO to reassess LV function once patient is out of critical phase    GI:  - NGT in place  - NPO at present, will consider starting feeds    /renal:  # metabolic acidosis  - appears improved on Bicarb drip  - follow labs  - f/u any further renal recs  - Scr stable    ENDO:  # DM2  - A1c 5.5  - continue FS/ IHSS as indicated    ID:  # s/p L flank wound debridement  - 102.3 via axillary but normal via central [place], leukocytosis slowly improving  - continue meropenem, discontinue vancomycin and clindamycin per surgery recs  - no plan for further debridement, wound care to change vac M/W/F prn per surgery recs    DVT PPx:  - Heparin SQ 52F immobile 2/2 h/o MS, DM2, asthma, hypothyroidism, and known sacral wounds presents from [place] with urosepsis and anemia with new L flank wound with concern for necrotizing soft tissue infection. Now s/p debridement of wound with worsening sepsis, acidosis, and severe global LV dysfunction, transferred to CCU for [place] and inotrope support.     NEURO:  - intubated and sedated on fentanyl and versed    RESP;  - intubated  - Vent settings: AC 18/ 6/ 550/50%-- ABG 7.43/24/193/16/99.3%    CV:  # severe LV dysfunction  - EF 5 - 10%  - concern for cardiomyopathy of unknown etiology but may be R/T sepsis and critical illness  - continue Dobutamine at 2.5 mcg/kg/min  - hemodynamics as noted in VS section  - dose not appear volume overloaded at this time  - wean levophed and vasopressin as tolerated  - lactate trended down, now 2.0  - hold off on GDMT at this time  - eventual repeat ECHO to reassess LV function once patient is out of critical phase    GI:  - NGT in place  - NPO at present, will consider starting feeds    /renal:  # metabolic acidosis  - appears improved on Bicarb drip  - follow labs  - f/u any further renal recs  - Scr stable    ENDO:  # DM2  - A1c 5.5  - continue FS/ IHSS as indicated    ID:  # s/p L flank wound debridement  - 102.3 via axillary but normal via central [place], leukocytosis slowly improving  - continue meropenem, discontinue vancomycin and clindamycin per surgery recs  - no plan for further debridement, wound care to change vac M/W/F prn per surgery recs    DVT PPx:  - Heparin SQ 52F immobile 2/2 h/o MS, DM2, asthma, hypothyroidism, and known sacral wounds presents from [place] with urosepsis and anemia with new L flank wound with concern for necrotizing soft tissue infection. Now s/p debridement of wound with worsening sepsis, acidosis, and severe global LV dysfunction, transferred to CCU for [place] and inotrope support.     NEURO:  - intubated and sedated on fentanyl and versed    RESP;  - intubated  - Vent settings: AC 18/ 6/ 550/50%-- ABG 7.43/24/193/16/99.3%    CV:  # severe LV dysfunction  - EF 5 - 10%  - concern for cardiomyopathy of unknown etiology but may be R/T sepsis and critical illness  - continue Dobutamine at 2.5 mcg/kg/min  - hemodynamics as noted in VS section  - dose not appear volume overloaded at this time  - wean levophed and vasopressin as tolerated  - lactate trended down, now 2.0  - hold off on GDMT at this time  - eventual repeat ECHO to reassess LV function once patient is out of critical phase    GI:  - NGT in place  - NPO at present, + residual reported overnight and feeds held. No residual at present, will consider restarting trickle feeds and then advance as tolerated    /renal:  # metabolic acidosis  - appears improved on Bicarb drip  - follow labs  - f/u any further renal recs  - Scr stable    ENDO:  # DM2  - A1c 5.5  - FS elevated but now on hydrocortisone, unclear reason, ? stress steroids, will discuss need  - continue FS/ IHSS as indicated    ID:  # s/p L flank wound debridement  - 102.3 via axillary but normal via central [place], leukocytosis slowly improving  - continue meropenem, discontinue vancomycin and clindamycin per surgery recs  - no plan for further debridement, wound care to change vac M/W/F prn per surgery recs    DVT PPx:  - Heparin SQ 52F immobile 2/2 h/o MS, DM2, asthma, hypothyroidism, and known sacral wounds presents from [place] with urosepsis and anemia with new L flank wound with concern for necrotizing soft tissue infection. Now s/p debridement of wound with worsening sepsis, acidosis, and severe global LV dysfunction, transferred to CCU for [place] and inotrope support.     NEURO:  - intubated and sedated on fentanyl and versed    RESP;  - intubated  - Vent settings: AC 18/ 6/ 550/50%-- ABG 7.43/24/193/16/99.3%    CV:  # severe LV dysfunction  - EF 5 - 10%  - concern for cardiomyopathy of unknown etiology but may be R/T sepsis and critical illness  - continue Dobutamine at 2.5 mcg/kg/min  - hemodynamics as noted in VS section  - dose not appear volume overloaded at this time  - wean levophed and vasopressin as tolerated  - lactate trended down, now 2.0  - hold off on GDMT at this time  - eventual repeat ECHO to reassess LV function once patient is out of critical phase    GI:  - NGT in place  - NPO at present, + residual reported overnight and feeds held. No residual at present, will consider restarting trickle feeds and then advance as tolerated    /renal:  # metabolic acidosis  - appears improved on Bicarb drip  - follow labs  - f/u any further renal recs  - Scr stable    ENDO:  # DM2  - A1c 5.5  - FS elevated but now on hydrocortisone, unclear reason, ? stress steroids, will discuss need  - continue FS/ IHSS as indicated    ID:  # s/p L flank wound debridement  - 102.3 via axillary but normal via central [place] unclear why but is consistent in documentation, leukocytosis slowly improving  - continue meropenem, discontinue vancomycin and clindamycin per surgery recs  - no plan for further debridement, wound care to change vac M/W/F prn per surgery recs    DVT PPx:  - Heparin SQ 52F immobile 2/2 h/o MS, DM2, asthma, hypothyroidism, and known sacral wounds presents from [place] with urosepsis and anemia with new L flank wound with concern for necrotizing soft tissue infection. Now s/p debridement of wound with worsening sepsis, acidosis, and severe global LV dysfunction, transferred to CCU for [place] and inotrope support.     NEURO:  - intubated and sedated on fentanyl and versed    RESP;  - intubated  - Vent settings: AC 18/ 6/ 550/50%-- ABG 7.43/24/193/16/99.3%    CV:  # severe LV dysfunction  - EF 5 - 10%  - concern for cardiomyopathy of unknown etiology but may be R/T sepsis and critical illness  - continue Dobutamine at 2.5 mcg/kg/min  - hemodynamics as noted in VS section  - does not appear volume overloaded at this time  - wean levophed and vasopressin as tolerated  - lactate trended down, now 2.0  - hold off on GDMT at this time  - eventual repeat ECHO to reassess LV function once patient is out of critical phase, ordered for Monday, 8/15.     GI:  - NGT in place  - NPO at present, + residual reported overnight and feeds held. No residual at present, will consider restarting trickle feeds and then advance as tolerated    /renal:  # metabolic acidosis  - appears improved on Bicarb drip  - follow labs  - f/u any further renal recs  - Scr stable    ENDO:  # DM2  - A1c 5.5  - FS elevated but now on hydrocortisone, unclear reason, ? stress steroids, will discuss need  - continue FS/ IHSS as indicated    ID:  # s/p L flank wound debridement  - 102.3 via axillary but normal via central [place] unclear why but is consistent in documentation, leukocytosis slowly improving  - continue meropenem, discontinue vancomycin and clindamycin per surgery recs  - no plan for further debridement, wound care to change vac M/W/F prn per surgery recs    DVT PPx:  - Heparin SQ 52F immobile 2/2 h/o MS, DM2, asthma, hypothyroidism, and known sacral wounds presents from [place] with urosepsis and anemia with new L flank wound with concern for necrotizing soft tissue infection. Now s/p debridement of wound with worsening sepsis, acidosis, and severe global LV dysfunction, transferred to CCU for [place] and inotrope support.     NEURO:  - intubated and sedated on fentanyl and versed    RESP;  - intubated  - Vent settings: AC 18/ 6/ 550/50%-- ABG 7.43/24/193/16/99.3%    CV:  # severe LV dysfunction  - EF 5 - 10%  - concern for cardiomyopathy of unknown etiology but may be R/T sepsis and critical illness  - continue Dobutamine at 2.5 mcg/kg/min  - hemodynamics as noted in VS section  - does not appear volume overloaded at this time  - wean levophed and vasopressin as tolerated  - lactate trended down, now 2.0  - hold off on GDMT at this time  - eventual repeat ECHO to reassess LV function once patient is out of critical phase, ordered for Monday, 8/15.     GI:  - NGT in place  - NPO at present, + residual reported overnight and feeds held. No residual at present, will consider restarting trickle feeds and then advance as tolerated    /renal:  # metabolic acidosis  - appears improved on Bicarb drip  - follow labs  - f/u any further renal recs  - Scr stable    ENDO:  # DM2  - A1c 5.5  - FS elevated but now on hydrocortisone, unclear reason, ? stress steroids, will discuss need  - continue FS/ IHSS as indicated    ID:  # s/p L flank wound debridement  - 102.3 via axillary but normal via central [place] unclear why but is consistent in documentation discussed with attending, will follow [place] temps  - leukocytosis slowly improving  - continue meropenem, discontinue vancomycin and clindamycin per surgery recs  - no plan for further debridement, wound care to change vac M/W/F prn per surgery recs  - ID consult- f/u recs    DVT PPx:  - Heparin SQ

## 2022-08-13 NOTE — PROGRESS NOTE ADULT - ASSESSMENT
52F immobile 2/2 MS, poorly controlled T2DM, asthma, hypothyroidism, known chronic wounds sacrum (stage 4), vulvar/Rt thigh, and right calf. Recent April 2022 hospitalization for urosepsis. Presented from [place] with concerns for UTI and anemia (6.8 from baseline 8.0). Found to have new malodorous wound on L flank area, concerning for necrotizing soft tissue infection. Febrile to 103.7. s/p debridement of wound L flank to midback.     PLAN:  -IVF:  -Pain Control with  -Nausea control PRN   -Diet:   -Abx:   -DVT Prophylaxis:   -OOB and ambulate  -Encourage IS  -Dispo:     B Team Surgery  [id number]

## 2022-08-14 LAB
-  AMPICILLIN/SULBACTAM: SIGNIFICANT CHANGE UP
-  CEFAZOLIN: SIGNIFICANT CHANGE UP
-  CLINDAMYCIN: SIGNIFICANT CHANGE UP
-  ERYTHROMYCIN: SIGNIFICANT CHANGE UP
-  GENTAMICIN: SIGNIFICANT CHANGE UP
-  OXACILLIN: SIGNIFICANT CHANGE UP
-  PENICILLIN: SIGNIFICANT CHANGE UP
-  RIFAMPIN: SIGNIFICANT CHANGE UP
-  TETRACYCLINE: SIGNIFICANT CHANGE UP
-  TRIMETHOPRIM/SULFAMETHOXAZOLE: SIGNIFICANT CHANGE UP
-  VANCOMYCIN: SIGNIFICANT CHANGE UP
ALBUMIN SERPL ELPH-MCNC: 1.7 G/DL — LOW (ref 3.3–5)
ALBUMIN SERPL ELPH-MCNC: 1.7 G/DL — LOW (ref 3.3–5)
ALP SERPL-CCNC: 183 U/L — HIGH (ref 40–120)
ALP SERPL-CCNC: 203 U/L — HIGH (ref 40–120)
ALT FLD-CCNC: 11 U/L — SIGNIFICANT CHANGE UP (ref 4–33)
ALT FLD-CCNC: 8 U/L — SIGNIFICANT CHANGE UP (ref 4–33)
ANION GAP SERPL CALC-SCNC: 14 MMOL/L — SIGNIFICANT CHANGE UP (ref 7–14)
ANION GAP SERPL CALC-SCNC: 16 MMOL/L — HIGH (ref 7–14)
AST SERPL-CCNC: 16 U/L — SIGNIFICANT CHANGE UP (ref 4–32)
AST SERPL-CCNC: 25 U/L — SIGNIFICANT CHANGE UP (ref 4–32)
BASE EXCESS BLDA CALC-SCNC: -2.1 MMOL/L — LOW (ref -2–3)
BASE EXCESS BLDV CALC-SCNC: -2.6 MMOL/L — LOW (ref -2–3)
BASE EXCESS BLDV CALC-SCNC: -2.7 MMOL/L — LOW (ref -2–3)
BILIRUB SERPL-MCNC: 0.2 MG/DL — SIGNIFICANT CHANGE UP (ref 0.2–1.2)
BILIRUB SERPL-MCNC: 0.3 MG/DL — SIGNIFICANT CHANGE UP (ref 0.2–1.2)
BLD GP AB SCN SERPL QL: NEGATIVE — SIGNIFICANT CHANGE UP
BLOOD GAS ARTERIAL - LYTES,HGB,ICA,LACT RESULT: SIGNIFICANT CHANGE UP
BLOOD GAS VENOUS COMPREHENSIVE RESULT: SIGNIFICANT CHANGE UP
BLOOD GAS VENOUS COMPREHENSIVE RESULT: SIGNIFICANT CHANGE UP
BUN SERPL-MCNC: 16 MG/DL — SIGNIFICANT CHANGE UP (ref 7–23)
BUN SERPL-MCNC: 17 MG/DL — SIGNIFICANT CHANGE UP (ref 7–23)
CALCIUM SERPL-MCNC: 7.1 MG/DL — LOW (ref 8.4–10.5)
CALCIUM SERPL-MCNC: 7.5 MG/DL — LOW (ref 8.4–10.5)
CHLORIDE BLDV-SCNC: 106 MMOL/L — SIGNIFICANT CHANGE UP (ref 96–108)
CHLORIDE BLDV-SCNC: 107 MMOL/L — SIGNIFICANT CHANGE UP (ref 96–108)
CHLORIDE SERPL-SCNC: 102 MMOL/L — SIGNIFICANT CHANGE UP (ref 98–107)
CHLORIDE SERPL-SCNC: 106 MMOL/L — SIGNIFICANT CHANGE UP (ref 98–107)
CO2 BLDA-SCNC: 20 MMOL/L — SIGNIFICANT CHANGE UP (ref 19–24)
CO2 BLDV-SCNC: 22.1 MMOL/L — SIGNIFICANT CHANGE UP (ref 22–26)
CO2 BLDV-SCNC: 22.1 MMOL/L — SIGNIFICANT CHANGE UP (ref 22–26)
CO2 SERPL-SCNC: 19 MMOL/L — LOW (ref 22–31)
CO2 SERPL-SCNC: 20 MMOL/L — LOW (ref 22–31)
CREAT SERPL-MCNC: 0.48 MG/DL — LOW (ref 0.5–1.3)
CREAT SERPL-MCNC: 0.51 MG/DL — SIGNIFICANT CHANGE UP (ref 0.5–1.3)
CULTURE RESULTS: SIGNIFICANT CHANGE UP
EGFR: 114 ML/MIN/1.73M2 — SIGNIFICANT CHANGE UP
EGFR: 115 ML/MIN/1.73M2 — SIGNIFICANT CHANGE UP
GAS PNL BLDV: 135 MMOL/L — LOW (ref 136–145)
GAS PNL BLDV: 136 MMOL/L — SIGNIFICANT CHANGE UP (ref 136–145)
GLUCOSE BLDC GLUCOMTR-MCNC: 126 MG/DL — HIGH (ref 70–99)
GLUCOSE BLDC GLUCOMTR-MCNC: 135 MG/DL — HIGH (ref 70–99)
GLUCOSE BLDC GLUCOMTR-MCNC: 146 MG/DL — HIGH (ref 70–99)
GLUCOSE BLDV-MCNC: 118 MG/DL — HIGH (ref 70–99)
GLUCOSE BLDV-MCNC: 141 MG/DL — HIGH (ref 70–99)
GLUCOSE SERPL-MCNC: 126 MG/DL — HIGH (ref 70–99)
GLUCOSE SERPL-MCNC: 136 MG/DL — HIGH (ref 70–99)
HCO3 BLDA-SCNC: 19 MMOL/L — LOW (ref 21–28)
HCO3 BLDV-SCNC: 21 MMOL/L — LOW (ref 22–29)
HCO3 BLDV-SCNC: 21 MMOL/L — LOW (ref 22–29)
HCT VFR BLD CALC: 24.4 % — LOW (ref 34.5–45)
HCT VFR BLDA CALC: 23 % — LOW (ref 34.5–46.5)
HCT VFR BLDA CALC: 23 % — LOW (ref 34.5–46.5)
HGB BLD CALC-MCNC: 7.5 G/DL — LOW (ref 11.5–15.5)
HGB BLD CALC-MCNC: 7.7 G/DL — LOW (ref 11.5–15.5)
HGB BLD-MCNC: 8.1 G/DL — LOW (ref 11.5–15.5)
LACTATE BLDV-MCNC: 1.1 MMOL/L — SIGNIFICANT CHANGE UP (ref 0.5–2)
LACTATE BLDV-MCNC: 1.5 MMOL/L — SIGNIFICANT CHANGE UP (ref 0.5–2)
MAGNESIUM SERPL-MCNC: 1.7 MG/DL — SIGNIFICANT CHANGE UP (ref 1.6–2.6)
MAGNESIUM SERPL-MCNC: 1.8 MG/DL — SIGNIFICANT CHANGE UP (ref 1.6–2.6)
MCHC RBC-ENTMCNC: 26.2 PG — LOW (ref 27–34)
MCHC RBC-ENTMCNC: 33.2 GM/DL — SIGNIFICANT CHANGE UP (ref 32–36)
MCV RBC AUTO: 79 FL — LOW (ref 80–100)
METHOD TYPE: SIGNIFICANT CHANGE UP
NRBC # BLD: 0 /100 WBCS — SIGNIFICANT CHANGE UP
NRBC # FLD: 0.12 K/UL — HIGH
ORGANISM # SPEC MICROSCOPIC CNT: SIGNIFICANT CHANGE UP
ORGANISM # SPEC MICROSCOPIC CNT: SIGNIFICANT CHANGE UP
PCO2 BLDA: 24 MMHG — LOW (ref 32–35)
PCO2 BLDV: 31 MMHG — LOW (ref 39–42)
PCO2 BLDV: 31 MMHG — LOW (ref 39–42)
PH BLDA: 7.51 — HIGH (ref 7.35–7.45)
PH BLDV: 7.44 — HIGH (ref 7.32–7.43)
PH BLDV: 7.44 — HIGH (ref 7.32–7.43)
PHOSPHATE SERPL-MCNC: 1.8 MG/DL — LOW (ref 2.5–4.5)
PHOSPHATE SERPL-MCNC: 2.1 MG/DL — LOW (ref 2.5–4.5)
PLATELET # BLD AUTO: 500 K/UL — HIGH (ref 150–400)
PO2 BLDA: 123 MMHG — HIGH (ref 83–108)
PO2 BLDV: 34 MMHG — SIGNIFICANT CHANGE UP
PO2 BLDV: 38 MMHG — SIGNIFICANT CHANGE UP
POTASSIUM BLDV-SCNC: 3.4 MMOL/L — LOW (ref 3.5–5.1)
POTASSIUM BLDV-SCNC: 3.8 MMOL/L — SIGNIFICANT CHANGE UP (ref 3.5–5.1)
POTASSIUM SERPL-MCNC: 3.4 MMOL/L — LOW (ref 3.5–5.3)
POTASSIUM SERPL-MCNC: 3.8 MMOL/L — SIGNIFICANT CHANGE UP (ref 3.5–5.3)
POTASSIUM SERPL-SCNC: 3.4 MMOL/L — LOW (ref 3.5–5.3)
POTASSIUM SERPL-SCNC: 3.8 MMOL/L — SIGNIFICANT CHANGE UP (ref 3.5–5.3)
PROT SERPL-MCNC: 5 G/DL — LOW (ref 6–8.3)
PROT SERPL-MCNC: 5.3 G/DL — LOW (ref 6–8.3)
RBC # BLD: 3.09 M/UL — LOW (ref 3.8–5.2)
RBC # FLD: 17.2 % — HIGH (ref 10.3–14.5)
RH IG SCN BLD-IMP: POSITIVE — SIGNIFICANT CHANGE UP
SAO2 % BLDA: 99.1 % — HIGH (ref 94–98)
SAO2 % BLDV: 59.7 % — SIGNIFICANT CHANGE UP
SAO2 % BLDV: 66.8 % — SIGNIFICANT CHANGE UP
SODIUM SERPL-SCNC: 137 MMOL/L — SIGNIFICANT CHANGE UP (ref 135–145)
SODIUM SERPL-SCNC: 140 MMOL/L — SIGNIFICANT CHANGE UP (ref 135–145)
SPECIMEN SOURCE: SIGNIFICANT CHANGE UP
VANCOMYCIN FLD-MCNC: 15.2 UG/ML — SIGNIFICANT CHANGE UP
WBC # BLD: 38.63 K/UL — HIGH (ref 3.8–10.5)
WBC # FLD AUTO: 38.63 K/UL — HIGH (ref 3.8–10.5)

## 2022-08-14 PROCEDURE — 71045 X-RAY EXAM CHEST 1 VIEW: CPT | Mod: 26

## 2022-08-14 PROCEDURE — 99232 SBSQ HOSP IP/OBS MODERATE 35: CPT

## 2022-08-14 PROCEDURE — 36569 INSJ PICC 5 YR+ W/O IMAGING: CPT

## 2022-08-14 PROCEDURE — 99232 SBSQ HOSP IP/OBS MODERATE 35: CPT | Mod: GC

## 2022-08-14 PROCEDURE — 99233 SBSQ HOSP IP/OBS HIGH 50: CPT

## 2022-08-14 PROCEDURE — 76937 US GUIDE VASCULAR ACCESS: CPT | Mod: 26,59

## 2022-08-14 RX ORDER — ACETAMINOPHEN 500 MG
650 TABLET ORAL EVERY 6 HOURS
Refills: 0 | Status: DISCONTINUED | OUTPATIENT
Start: 2022-08-14 | End: 2022-08-20

## 2022-08-14 RX ORDER — MAGNESIUM SULFATE 500 MG/ML
2 VIAL (ML) INJECTION ONCE
Refills: 0 | Status: COMPLETED | OUTPATIENT
Start: 2022-08-14 | End: 2022-08-14

## 2022-08-14 RX ORDER — LEVOTHYROXINE SODIUM 125 MCG
56 TABLET ORAL
Refills: 0 | Status: DISCONTINUED | OUTPATIENT
Start: 2022-08-15 | End: 2022-08-20

## 2022-08-14 RX ORDER — HYDROCORTISONE 20 MG
25 TABLET ORAL EVERY 12 HOURS
Refills: 0 | Status: DISCONTINUED | OUTPATIENT
Start: 2022-08-14 | End: 2022-08-15

## 2022-08-14 RX ORDER — ACETAMINOPHEN 500 MG
1000 TABLET ORAL ONCE
Refills: 0 | Status: COMPLETED | OUTPATIENT
Start: 2022-08-14 | End: 2022-08-14

## 2022-08-14 RX ORDER — VANCOMYCIN HCL 1 G
1000 VIAL (EA) INTRAVENOUS EVERY 12 HOURS
Refills: 0 | Status: DISCONTINUED | OUTPATIENT
Start: 2022-08-14 | End: 2022-08-17

## 2022-08-14 RX ORDER — VANCOMYCIN HCL 1 G
VIAL (EA) INTRAVENOUS
Refills: 0 | Status: DISCONTINUED | OUTPATIENT
Start: 2022-08-14 | End: 2022-08-17

## 2022-08-14 RX ORDER — VANCOMYCIN HCL 1 G
1000 VIAL (EA) INTRAVENOUS ONCE
Refills: 0 | Status: COMPLETED | OUTPATIENT
Start: 2022-08-14 | End: 2022-08-14

## 2022-08-14 RX ORDER — POTASSIUM PHOSPHATE, MONOBASIC POTASSIUM PHOSPHATE, DIBASIC 236; 224 MG/ML; MG/ML
15 INJECTION, SOLUTION INTRAVENOUS ONCE
Refills: 0 | Status: COMPLETED | OUTPATIENT
Start: 2022-08-14 | End: 2022-08-14

## 2022-08-14 RX ORDER — VANCOMYCIN HCL 1 G
VIAL (EA) INTRAVENOUS
Refills: 0 | Status: DISCONTINUED | OUTPATIENT
Start: 2022-08-14 | End: 2022-08-14

## 2022-08-14 RX ADMIN — Medication 25 GRAM(S): at 04:56

## 2022-08-14 RX ADMIN — FENTANYL CITRATE 4.94 MICROGRAM(S)/KG/HR: 50 INJECTION INTRAVENOUS at 07:53

## 2022-08-14 RX ADMIN — POTASSIUM PHOSPHATE, MONOBASIC POTASSIUM PHOSPHATE, DIBASIC 62.5 MILLIMOLE(S): 236; 224 INJECTION, SOLUTION INTRAVENOUS at 05:09

## 2022-08-14 RX ADMIN — Medication 1 TABLET(S): at 12:14

## 2022-08-14 RX ADMIN — Medication 250 MILLIGRAM(S): at 21:53

## 2022-08-14 RX ADMIN — PANTOPRAZOLE SODIUM 40 MILLIGRAM(S): 20 TABLET, DELAYED RELEASE ORAL at 05:20

## 2022-08-14 RX ADMIN — HYDROMORPHONE HYDROCHLORIDE 1 MILLIGRAM(S): 2 INJECTION INTRAMUSCULAR; INTRAVENOUS; SUBCUTANEOUS at 20:37

## 2022-08-14 RX ADMIN — SODIUM CHLORIDE 30 MILLILITER(S): 9 INJECTION, SOLUTION INTRAVENOUS at 20:06

## 2022-08-14 RX ADMIN — Medication 5 UNIT(S): at 05:30

## 2022-08-14 RX ADMIN — Medication 500 MILLIGRAM(S): at 12:14

## 2022-08-14 RX ADMIN — AMPICILLIN SODIUM AND SULBACTAM SODIUM 200 GRAM(S): 250; 125 INJECTION, POWDER, FOR SUSPENSION INTRAMUSCULAR; INTRAVENOUS at 17:42

## 2022-08-14 RX ADMIN — Medication 5 UNIT(S): at 13:09

## 2022-08-14 RX ADMIN — Medication 4.63 MICROGRAM(S)/KG/MIN: at 07:53

## 2022-08-14 RX ADMIN — CHLORHEXIDINE GLUCONATE 1 APPLICATION(S): 213 SOLUTION TOPICAL at 12:15

## 2022-08-14 RX ADMIN — Medication 650 MILLIGRAM(S): at 16:14

## 2022-08-14 RX ADMIN — Medication 250 MILLIGRAM(S): at 12:13

## 2022-08-14 RX ADMIN — Medication 650 MILLIGRAM(S): at 17:00

## 2022-08-14 RX ADMIN — Medication 7.41 MICROGRAM(S)/KG/MIN: at 07:52

## 2022-08-14 RX ADMIN — Medication 0: at 05:31

## 2022-08-14 RX ADMIN — AMPICILLIN SODIUM AND SULBACTAM SODIUM 200 GRAM(S): 250; 125 INJECTION, POWDER, FOR SUSPENSION INTRAMUSCULAR; INTRAVENOUS at 06:41

## 2022-08-14 RX ADMIN — HEPARIN SODIUM 5000 UNIT(S): 5000 INJECTION INTRAVENOUS; SUBCUTANEOUS at 14:33

## 2022-08-14 RX ADMIN — AMPICILLIN SODIUM AND SULBACTAM SODIUM 200 GRAM(S): 250; 125 INJECTION, POWDER, FOR SUSPENSION INTRAMUSCULAR; INTRAVENOUS at 12:13

## 2022-08-14 RX ADMIN — Medication 1 APPLICATION(S): at 16:14

## 2022-08-14 RX ADMIN — NYSTATIN CREAM 1 APPLICATION(S): 100000 CREAM TOPICAL at 05:20

## 2022-08-14 RX ADMIN — Medication 25 MILLIGRAM(S): at 17:25

## 2022-08-14 RX ADMIN — Medication 4.63 MICROGRAM(S)/KG/MIN: at 20:06

## 2022-08-14 RX ADMIN — Medication 1000 MILLIGRAM(S): at 07:09

## 2022-08-14 RX ADMIN — HYDROMORPHONE HYDROCHLORIDE 1 MILLIGRAM(S): 2 INJECTION INTRAMUSCULAR; INTRAVENOUS; SUBCUTANEOUS at 20:22

## 2022-08-14 RX ADMIN — CHLORHEXIDINE GLUCONATE 15 MILLILITER(S): 213 SOLUTION TOPICAL at 05:19

## 2022-08-14 RX ADMIN — PANTOPRAZOLE SODIUM 40 MILLIGRAM(S): 20 TABLET, DELAYED RELEASE ORAL at 17:25

## 2022-08-14 RX ADMIN — Medication 25 MILLIGRAM(S): at 05:19

## 2022-08-14 RX ADMIN — SODIUM CHLORIDE 30 MILLILITER(S): 9 INJECTION, SOLUTION INTRAVENOUS at 08:19

## 2022-08-14 RX ADMIN — SODIUM CHLORIDE 10 MILLILITER(S): 9 INJECTION INTRAMUSCULAR; INTRAVENOUS; SUBCUTANEOUS at 20:05

## 2022-08-14 RX ADMIN — HEPARIN SODIUM 5000 UNIT(S): 5000 INJECTION INTRAVENOUS; SUBCUTANEOUS at 21:53

## 2022-08-14 RX ADMIN — Medication 400 MILLIGRAM(S): at 06:39

## 2022-08-14 RX ADMIN — NYSTATIN CREAM 1 APPLICATION(S): 100000 CREAM TOPICAL at 17:27

## 2022-08-14 RX ADMIN — Medication 56 MICROGRAM(S): at 07:55

## 2022-08-14 RX ADMIN — Medication 5 UNIT(S): at 17:26

## 2022-08-14 RX ADMIN — HEPARIN SODIUM 5000 UNIT(S): 5000 INJECTION INTRAVENOUS; SUBCUTANEOUS at 05:18

## 2022-08-14 RX ADMIN — CHLORHEXIDINE GLUCONATE 15 MILLILITER(S): 213 SOLUTION TOPICAL at 17:25

## 2022-08-14 NOTE — PROGRESS NOTE ADULT - ASSESSMENT
52F immobile 2/2 h/o MS, DM2, asthma, hypothyroidism, and known sacral wounds presents from [place] with urosepsis and anemia with new L flank wound with concern for necrotizing soft tissue infection. Now s/p debridement of wound with worsening sepsis, acidosis, and severe global LV dysfunction, transferred to CCU for [place] and inotrope support.     NEURO:  - remains intubated and sedated on fentanyl only    RESP;  - intubated  - Vent settings: AC 16/ 6/ 550/30%-- ABG 7.49/26/147/20/98.6%  - bicarb drip discontinued    CV:  # severe LV dysfunction  - EF 5 - 10%  - concern for cardiomyopathy of unknown etiology but may be R/T sepsis and critical illness  - 0800 hemodynamics: CVP 7-8, PCWP 13-14, CO/CI 7.33/3.57, MVO2 68.6%,   - will discontinue Dobutamine, recheck labs at 1400, if okay will remove [place]  - vasopressin discontinued 8/13, continuing to wean levophed  - lactate continues to trend down, 1.1 today  - hold off on GDMT at this time  - eventual repeat ECHO to reassess LV function once patient is out of critical phase, ordered for Monday, 8/15    GI:  - NGT in place  - tolerating trickle feeds at present, will not advance as yet as we are attempting CPAP trials    /renal:  # metabolic acidosis  - appears resolved  - off bicarb drip at present  - follow labs  - f/u any further renal recs  - Scr stable    ENDO:  # DM2  - A1c 5.5  - FS elevated but now better controlled  - was started on steroids post op, ?stress dose, now tapering steroids. Decrease hydrocortisone to 25 mg IVP BID.   - continue FS/ IHSS as indicated    ID:  # s/p L flank wound debridement  - dressings intact and wound vac in place  - persistent fevers to 101 but leukocytosis slowly improving, tylenol given  - now following rectal or axillary readings, [place] temp not likely accurate, noted no correlation to rectal or axillary temp yesterday afternoon  - ID consult noted and appreciated  - repeat blood cultures sent as requested  - changed meropenem to Unasyn 3 grams IV Q6h  - Vanco level 15.2 this am, restarted Vancomycin 1000 mg IVPB Q12H per verbal ID recs of today  - no plan for further debridement, wound care to change vac M/W/F prn per surgery recs      DVT PPx:  - Heparin SQ     52F immobile 2/2 h/o MS, DM2, asthma, hypothyroidism, and known sacral wounds presents from [place] with urosepsis and anemia with new L flank wound with concern for necrotizing soft tissue infection. Now s/p debridement of wound with worsening sepsis, acidosis, and severe global LV dysfunction of unknown etiology, transferred to CCU for [place] and inotrope support.     NEURO:  - remains intubated and sedated on fentanyl only    RESP;  - intubated  - Vent settings: AC 16/ 6/ 550/30%-- ABG 7.49/26/147/20/98.6%  - bicarb drip discontinued    CV:  # severe LV dysfunction  - EF 5 - 10%  - concern for cardiomyopathy of unknown etiology but may be R/T sepsis and critical illness  - 0800 hemodynamics: CVP 7-8, PCWP 13-14, CO/CI 7.33/3.57, MVO2 68.6%,   - will discontinue Dobutamine, recheck labs at 1400, if okay will remove [place]  - vasopressin discontinued 8/13, continuing to wean levophed  - lactate continues to trend down, 1.1 today  - hold off on GDMT at this time  - eventual repeat ECHO to reassess LV function once patient is out of critical phase, ordered for Monday, 8/15    GI:  - NGT in place  - tolerating trickle feeds at present, will not advance as yet as we are attempting CPAP trials    /renal:  # metabolic acidosis  - appears resolved  - off bicarb drip at present  - follow labs  - f/u any further renal recs  - Scr stable    ENDO:  # DM2  - A1c 5.5  - FS elevated but now better controlled  - was started on steroids post op, ?stress dose, now tapering steroids. Decrease hydrocortisone to 25 mg IVP BID.   - continue FS/ IHSS as indicated    ID:  # s/p L flank wound debridement  - dressings intact and wound vac in place  - persistent fevers to 101 but leukocytosis slowly improving, tylenol given  - now following rectal or axillary readings, [place] temp not likely accurate, noted no correlation to rectal or axillary temp yesterday afternoon  - ID consult noted and appreciated  - repeat blood cultures sent as requested  - changed meropenem to Unasyn 3 grams IV Q6h  - Vanco level 15.2 this am, restarted Vancomycin 1000 mg IVPB Q12H per verbal ID recs of today  - no plan for further debridement, wound care to change vac M/W/F prn per surgery recs      DVT PPx:  - Heparin SQ

## 2022-08-14 NOTE — PROGRESS NOTE ADULT - SUBJECTIVE AND OBJECTIVE BOX
[facility] Division of Kidney Diseases & Hypertension  FOLLOW UP NOTE  [phone]--------------------------------------------------------------------------------  HPI: 52 y F with PMH CHF, immobility, bedbound presenting for AMS, admitted for sepsis in the setting of large sacral wound, pt underwent surgical debridement on 8/11/22. Pt being seen for metabolic acidosis.    Pt seen and evaluated bedside in the CCU this morning. Pt intubated, and off sedation. FiO2 is 30%. Pt currently on levophed drip. ROS unobtainable due to intubation.      PAST HISTORY  --------------------------------------------------------------------------------  No significant changes to PMH, PSH, FHx, SHx, unless otherwise noted    ALLERGIES & MEDICATIONS  --------------------------------------------------------------------------------  Allergies    No Known Drug Allergies  P[place] (Anaphylaxis)    Intolerances      Standing Inpatient Medications  ampicillin/sulbactam  IVPB      ampicillin/sulbactam  IVPB 3 Gram(s) IV Intermittent every 6 hours  ascorbic acid 500 milliGRAM(s) Oral daily  chlorhexidine 0.12% Liquid 15 milliLiter(s) Oral Mucosa every 12 hours  chlorhexidine 2% Cloths 1 Application(s) Topical daily  collagenase Ointment 1 Application(s) Topical daily  ergocalciferol 79011 Unit(s) Oral every week  heparin   Injectable 5000 Unit(s) SubCutaneous every 8 hours  hydrocortisone sodium succinate Injectable 25 milliGRAM(s) IV Push every 12 hours  insulin lispro (ADMELOG) corrective regimen sliding scale   SubCutaneous every 6 hours  insulin lispro Injectable (ADMELOG) 5 Unit(s) SubCutaneous every 6 hours  lactated ringers. 1000 milliLiter(s) IV Continuous <Continuous>  levothyroxine Injectable 56 MICROGram(s) IV Push <User Schedule>  multivitamin 1 Tablet(s) Oral daily  norepinephrine Infusion 0.05 MICROgram(s)/kG/Min IV Continuous <Continuous>  nystatin Powder 1 Application(s) Topical two times a day  pantoprazole  Injectable 40 milliGRAM(s) IV Push every 12 hours  vancomycin  IVPB      vancomycin  IVPB 1000 milliGRAM(s) IV Intermittent every 12 hours    PRN Inpatient Medications  acetaminophen    Suspension .. 650 milliGRAM(s) Oral every 6 hours PRN  ALBUTerol    90 MICROgram(s) HFA Inhaler 2 Puff(s) Inhalation every 6 hours PRN  HYDROmorphone  Injectable 1 milliGRAM(s) IV Push every 3 hours PRN  HYDROmorphone  Injectable 0.5 milliGRAM(s) IV Push every 3 hours PRN  sodium chloride 0.9% lock flush 10 milliLiter(s) IV Push every 1 hour PRN      REVIEW OF SYSTEMS  --------------------------------------------------------------------------------  ROS unobtainable due to intubation.    VITALS/PHYSICAL EXAM  --------------------------------------------------------------------------------  T(C): 38.6 (08-14-22 @ 07:32), Max: 39.1 (08-13-22 @ 17:00)  HR: 100 (08-14-22 @ 12:00) (93 - 114)  BP: --  RR: 16 (08-14-22 @ 12:00) (16 - 17)  SpO2: 100% (08-14-22 @ 12:00) (99% - 100%)  Wt(kg): --        08-13-22 @ 07:01  -  08-14-22 @ 07:00  --------------------------------------------------------  IN: 2863.6 mL / OUT: 1420 mL / NET: 1443.6 mL    08-14-22 @ 07:01  -  08-14-22 @ 14:21  --------------------------------------------------------  IN: 413.1 mL / OUT: 110 mL / NET: 303.1 mL      Physical Exam:  Gen: obese female  HEENT: Intubated  Pulm: CTA B/L  CV: S1S2  Abd: Soft, +BS, NT/DND  : +Rose draining yellow urine  Ext: No LE edema B/L  Neuro: Off sedation, opens eyes spontaneously. Not following commands  Skin: Warm and dry  Vascular access: Right femoral CVC    LABS/STUDIES  --------------------------------------------------------------------------------              8.1    38.63 >-----------<  500      [08-14-22 @ 00:25]              24.4     137  |  102  |  16  ----------------------------<  136      [08-14-22 @ 00:25]  3.8   |  19  |  0.51        Ca     7.5     [08-14-22 @ 00:25]      Mg     1.70     [08-14-22 @ 00:25]      Phos  1.8     [08-14-22 @ 00:25]    TPro  5.3  /  Alb  1.7  /  TBili  0.2  /  DBili  x   /  AST  25  /  ALT  11  /  AlkPhos  203  [08-14-22 @ 00:25]    PT/INR: PT 22.8 , INR 1.95       [08-12-22 @ 21:27]  PTT: 34.5       [08-12-22 @ 21:27]    CK 13      [08-13-22 @ 03:19]    Creatinine Trend:  SCr 0.51 [08-14 @ 00:25]  SCr 0.51 [08-13 @ 12:45]  SCr 0.52 [08-13 @ 03:19]  SCr 0.57 [08-12 @ 21:27]  SCr 0.59 [08-11 @ 23:45]    Urinalysis - [08-10-22 @ 22:30]      Color Yellow / Appearance Turbid / SG 1.017 / pH 6.5      Gluc Negative / Ketone Negative  / Bili Negative / Urobili <2 mg/dL       Blood Moderate / Protein 100 mg/dL / Leuk Est Large / Nitrite Negative      RBC 25-50 / WBC >50 / Hyaline  / Gran  / Sq Epi  / Non Sq Epi  / Bacteria Many

## 2022-08-14 NOTE — PROGRESS NOTE ADULT - SUBJECTIVE AND OBJECTIVE BOX
Subjective/Objective: Remains intubated and sedated, on fentanyl but opens eyes to name and withdraws to pain. Continues on Dobutamine at 2.5 mcg/kg/min and levophed.     MEDICATIONS  (STANDING):  ampicillin/sulbactam  IVPB      ampicillin/sulbactam  IVPB 3 Gram(s) IV Intermittent every 6 hours  ascorbic acid 500 milliGRAM(s) Oral daily  chlorhexidine 0.12% Liquid 15 milliLiter(s) Oral Mucosa every 12 hours  chlorhexidine 2% Cloths 1 Application(s) Topical daily  collagenase Ointment 1 Application(s) Topical daily  ergocalciferol 67242 Unit(s) Oral every week  fentaNYL   Infusion. 0.5 MICROgram(s)/kG/Hr (4.94 mL/Hr) IV Continuous <Continuous>  heparin   Injectable 5000 Unit(s) SubCutaneous every 8 hours  hydrocortisone sodium succinate Injectable 25 milliGRAM(s) IV Push every 12 hours  insulin lispro (ADMELOG) corrective regimen sliding scale   SubCutaneous every 6 hours  insulin lispro Injectable (ADMELOG) 5 Unit(s) SubCutaneous every 6 hours  lactated ringers. 1000 milliLiter(s) (30 mL/Hr) IV Continuous <Continuous>  levothyroxine Injectable 56 MICROGram(s) IV Push <User Schedule>  multivitamin 1 Tablet(s) Oral daily  norepinephrine Infusion 0.05 MICROgram(s)/kG/Min (4.63 mL/Hr) IV Continuous <Continuous>  nystatin Powder 1 Application(s) Topical two times a day  pantoprazole  Injectable 40 milliGRAM(s) IV Push every 12 hours  vancomycin  IVPB        MEDICATIONS  (PRN):  ALBUTerol    90 MICROgram(s) HFA Inhaler 2 Puff(s) Inhalation every 6 hours PRN Shortness of Breath  HYDROmorphone  Injectable 1 milliGRAM(s) IV Push every 3 hours PRN Severe Pain (7 - 10)  HYDROmorphone  Injectable 0.5 milliGRAM(s) IV Push every 3 hours PRN Moderate Pain (4 - 6)  sodium chloride 0.9% lock flush 10 milliLiter(s) IV Push every 1 hour PRN Pre/post blood products, medications, blood draw, and to maintain line patency    Vital Signs Last 24 Hrs  T(C): 38.6 (14 Aug 2022 07:32), Max: 39.1 (13 Aug 2022 17:00)  T(F): 101.4 (14 Aug 2022 07:32), Max: 102.4 (13 Aug 2022 17:00)  HR: 99 (14 Aug 2022 10:40) (99 - 114)  BP: --  BP(mean): --  RR: 16 (14 Aug 2022 08:00) (16 - 18)  SpO2: 100% (14 Aug 2022 10:40) (99% - 100%)    Parameters below as of 14 Aug 2022 07:32      O2 Concentration (%): 30  I&O's Detail    13 Aug 2022 07:01  -  14 Aug 2022 07:00  --------------------------------------------------------  IN:    DOBUTamine: 170.2 mL    Enteral Tube Flush: 60 mL    FentaNYL: 277.3 mL    Glucerna 1.5: 190 mL    IV PiggyBack: 550 mL    Lactated Ringers: 660 mL    Midazolam: 12 mL    Norepinephrine: 411.7 mL    Sodium Bicarbonate: 225 mL    Sodium Bicarbonate: 300 mL    Vasopressin: 7.4 mL  Total IN: 2863.6 mL    OUT:    Indwelling Catheter - Urethral (mL): 1290 mL    Nasogastric/Oral tube (mL): 0 mL    VAC (Vacuum Assisted Closure) System (mL): 130 mL  Total OUT: 1420 mL    Total NET: 1443.6 mL      14 Aug 2022 07:01  -  14 Aug 2022 11:06  --------------------------------------------------------  IN:    DOBUTamine: 14.8 mL    Enteral Tube Flush: 20 mL    FentaNYL: 19.8 mL    Glucerna 1.5: 20 mL    Lactated Ringers: 60 mL    Norepinephrine: 32.4 mL  Total IN: 167 mL    OUT:    Indwelling Catheter - Urethral (mL): 40 mL  Total OUT: 40 mL    Total NET: 127 mL      HEMODYNAMICS:    0800: CVP 7-8, PAD 30/16, PCWP 13-14  CO/CI 7.33/3.57, MVO2 68.6%,       PHYSICAL EXAM  GEN: NAD, skin W & D, intubated and sedated  RESP: CTA ant  CV: nl S1S2, no M/R/C  GI: soft, NT/ND, BS +  EXT: no C/C/E. LE dressings in place  NEURO: intubated and sedated, opens eyes to name.       EKG/ TELEM: sinus tachycardia/ NSR    LABS:                          8.1    38.63 )-----------( 500      ( 14 Aug 2022 00:25 )             24.4     PT/INR - ( 12 Aug 2022 21:27 )   PT: 22.8 sec;   INR: 1.95 ratio         PTT - ( 12 Aug 2022 21:27 )  PTT:34.5 sec  14 Aug 2022 00:25    137    |  102    |  16     ----------------------------<  136<H>  3.8     |  19<L>  |  0.51     13 Aug 2022 12:45    134<L>  |  102    |  15     ----------------------------<  205<H>  3.5     |  17<L>  |  0.51     Ca    7.5<L>      14 Aug 2022 00:25  Ca    7.4<L>      13 Aug 2022 12:45  Phos  1.8<L>     14 Aug 2022 00:25  Phos  2.6       13 Aug 2022 03:19  Mg     1.70      14 Aug 2022 00:25  Mg     1.60      13 Aug 2022 03:19    TPro  5.3<L>  /  Alb  1.7<L>  /  TBili  0.2    /  DBili  x      /  AST  25     /  ALT  11     /  AlkPhos  203<H>  14 Aug 2022 00:25  TPro  5.3<L>  /  Alb  1.7<L>  /  TBili  0.2    /  DBili  x      /  AST  41<H>  /  ALT  11     /  AlkPhos  207<H>  13 Aug 2022 12:45    CARDIAC MARKERS ( 13 Aug 2022 03:19 )  x     / x     / 13 U/L / x     / 2.5 ng/mL        Creatine Kinase, Serum: 13 U/L (08-13-22 @ 03:19)  Creatine Kinase, Serum: 34 U/L (08-12-22 @ 03:00)  Creatine Kinase, Serum: 60 U/L (08-11-22 @ 23:45)

## 2022-08-14 NOTE — CHART NOTE - NSCHARTNOTEFT_GEN_A_CORE
Dobutamine discontinued this am. Repeat hemodynamics off Dobutamine as follows: CVP 9, CO/CI 6.52/3.18, , MVO2 59.7%  Continuing to taper levophed, presently on 0.075 mcg    ABG this afternoon: 7.51/24/123/19/99.1%, AC rate decreased to 14.   Attempted CPAP trial earlier but patient was not awake enough to tolerate. She has been off sedation since 1100, opens eyes to name but not following any other commands. Baseline mental status is not known but per H&P she was A & O X 3-4 on admission. If mental status does not improve by tomorrow, should consider CT head.     H & H has been slowly dropping, now 7.5/23. Transfusion consent obtained from brotherAbdifatah (HCP) and plan to transfuse 1U PRBCs. Trend H & H.    Invasive lines: RIJ introducer with [place]. Will remove [place], keep introducer at this time as patient is still on pressors.                       RFV central line access, will plan to remove once PRBCs are complete.     All discussed with Dr. Reyes, CCU attending. Dobutamine discontinued this am. Repeat hemodynamics off Dobutamine as follows: CVP 9, CO/CI 6.52/3.18, , MVO2 59.7%  Continuing to taper levophed, presently on 0.075 mcg    ABG this afternoon: 7.51/24/123/19/99.1%, AC rate decreased to 14.   Attempted CPAP trial earlier but patient was not awake enough to tolerate. She has been off sedation since 1100, opens eyes to name but not following any other commands. Also not breathing over vent. Baseline mental status is not known but per H&P she was A & O X 3-4 on admission. If mental status does not improve by tomorrow, should consider CT head.     H & H has been slowly dropping, now 7.5/23, unclear etiology (numerous blood draws, wound vac drainage). Flexiseal in place but no BM as yet. Transfusion consent obtained from brotherAbdifatah (HCP) and plan to transfuse 1U PRBCs. Trend H & H.    Invasive lines: RIJ introducer with [place]. Will remove [place], keep introducer at this time as patient is still on pressors.                       RFV central line access, will plan to remove once PRBCs are complete.     All discussed with Dr. Reyes, CCU attending.

## 2022-08-14 NOTE — PROCEDURE NOTE - NSPROCDETAILS_GEN_ALL_CORE
guidewire recovered/lumen(s) aspirated and flushed/sterile dressing applied/sterile technique, catheter placed/ultrasound guidance with use of sterile gel and probe cove
guidewire recovered/lumen(s) aspirated and flushed/sterile dressing applied/sterile technique, catheter placed/ultrasound guidance with use of sterile gel and probe cove
location identified, draped/prepped, sterile technique used/sterile dressing applied/sterile technique, catheter placed/ultrasound guidance

## 2022-08-14 NOTE — PROGRESS NOTE ADULT - SUBJECTIVE AND OBJECTIVE BOX
Follow Up: Sepsis    Interval History/ROS: Still with fevers. Improving pressor requirements. Remains intubated.     Allergies  No Known Drug Allergies  [place] (Anaphylaxis)        ANTIMICROBIALS:  ampicillin/sulbactam  IVPB    ampicillin/sulbactam  IVPB 3 every 6 hours  vancomycin  IVPB    vancomycin  IVPB 1000 every 12 hours      OTHER MEDS:  acetaminophen    Suspension .. 650 milliGRAM(s) Oral every 6 hours PRN  ALBUTerol    90 MICROgram(s) HFA Inhaler 2 Puff(s) Inhalation every 6 hours PRN  ascorbic acid 500 milliGRAM(s) Oral daily  chlorhexidine 0.12% Liquid 15 milliLiter(s) Oral Mucosa every 12 hours  chlorhexidine 2% Cloths 1 Application(s) Topical daily  collagenase Ointment 1 Application(s) Topical daily  ergocalciferol 06772 Unit(s) Oral every week  heparin   Injectable 5000 Unit(s) SubCutaneous every 8 hours  hydrocortisone sodium succinate Injectable 25 milliGRAM(s) IV Push every 12 hours  HYDROmorphone  Injectable 1 milliGRAM(s) IV Push every 3 hours PRN  HYDROmorphone  Injectable 0.5 milliGRAM(s) IV Push every 3 hours PRN  insulin lispro (ADMELOG) corrective regimen sliding scale   SubCutaneous every 6 hours  insulin lispro Injectable (ADMELOG) 5 Unit(s) SubCutaneous every 6 hours  lactated ringers. 1000 milliLiter(s) IV Continuous <Continuous>  levothyroxine Injectable 56 MICROGram(s) IV Push <User Schedule>  multivitamin 1 Tablet(s) Oral daily  norepinephrine Infusion 0.05 MICROgram(s)/kG/Min IV Continuous <Continuous>  nystatin Powder 1 Application(s) Topical two times a day  pantoprazole  Injectable 40 milliGRAM(s) IV Push every 12 hours  sodium chloride 0.9% lock flush 10 milliLiter(s) IV Push every 1 hour PRN      Vital Signs Last 24 Hrs  T(C): 38.6 (14 Aug 2022 07:32), Max: 39.1 (13 Aug 2022 17:00)  T(F): 101.4 (14 Aug 2022 07:32), Max: 102.4 (13 Aug 2022 17:00)  HR: 100 (14 Aug 2022 12:00) (93 - 114)  BP: --  BP(mean): --  RR: 16 (14 Aug 2022 12:00) (16 - 17)  SpO2: 100% (14 Aug 2022 12:00) (99% - 100%)    Parameters below as of 14 Aug 2022 08:00      O2 Concentration (%): 30    Physical Exam:  General: obese, eyes open   Cardio: regular rate   Respiratory: intubated mechanically ventilated   abd: nondistended, soft, nontender   Skin: no rash                          8.1    38.63 )-----------( 500      ( 14 Aug 2022 00:25 )             24.4       08-14    137  |  102  |  16  ----------------------------<  136<H>  3.8   |  19<L>  |  0.51    Ca    7.5<L>      14 Aug 2022 00:25  Phos  1.8     08-14  Mg     1.70     08-14    TPro  5.3<L>  /  Alb  1.7<L>  /  TBili  0.2  /  DBili  x   /  AST  25  /  ALT  11  /  AlkPhos  203<H>  08-14          MICROBIOLOGY:  Culture - Acid Fast - Other w/Smear (collected 08-11-22 @ 04:49)  Source: .Other  Preliminary Report (08-13-22 @ 15:04):    Culture is being performed.    Culture - Fungal, Other (collected 08-11-22 @ 04:49)  Source: .Other  Preliminary Report (08-12-22 @ 07:46):    Testing in progress    Culture - Other (collected 08-11-22 @ 04:49)  Source: .Other  Preliminary Report (08-12-22 @ 14:49):    Numerous Streptococcus anginosus "Susceptibilities not performed"    Culture - Urine (collected 08-10-22 @ 22:30)  Source: Clean Catch Clean Catch (Midstream)  Final Report (08-13-22 @ 17:06):    >100,000 CFU/ml Candida glabrata "Susceptibilities not performed"    Culture - Blood (collected 08-10-22 @ 22:30)  Source: .Blood Blood-Peripheral  Gram Stain (08-12-22 @ 22:56):    Growth in anaerobic bottle: Gram Positive Cocci in Clusters  Preliminary Report (08-14-22 @ 06:49):    Growth in anaerobic bottle: Staphylococcus epidermidis    Culture - Blood (collected 08-10-22 @ 22:20)  Source: .Blood Blood-Venous  Gram Stain (08-12-22 @ 01:57):    Growth in aerobic bottle: Gram Positive Cocci in Clusters  Final Report (08-12-22 @ 22:30):    Growth in aerobic bottle: Staphylococcus epidermidis    Coag Negative Staphylococcus    Single set isolate, possible contaminant. Contact    Microbiology if susceptibility testing clinically    indicated.    ***Blood Panel PCR results on this specimen are available    approximately 3 hours after the Gram stain result.***    Gram stain, PCR, and/or culture results may not always    correspond due to difference in methodologies.    ************************************************************    This PCR assay was performed by multiplex PCR. This    Assay tests for 66 bacterial and resistance gene targets.    Please refer to the [facility] test directory    at [email or web address] for details.  Organism: Blood Culture PCR (08-12-22 @ 22:30)  Organism: Blood Culture PCR (08-12-22 @ 22:30)      -  Staphylococcus epidermidis, Methicillin resistant: Detec      Method Type: PCR    Vancomycin Level, Random: 15.2 ug/mL (08-14-22 @ 02:09)    RADIOLOGY:  Images below reviewed personally  Xray Chest 1 View- PORTABLE-Urgent (Xray Chest 1 View- PORTABLE-Urgent .) (08.13.22 @ 00:00)   Tracheal tube tip appropriately positioned above the vahe. Right IJ   approach [place] catheter tip in right interlobar pulmonary artery. It   should be pulled back to more central position.  Heart/Vascular: Cardiomediastinal silhouette is within normal limits.  Pulmonary: Redemonstration of elevation right hemidiaphragm with   compression linear atelectasis in the right lower zone. Remaining   visualized lungs are clear.  No sizable pleural effusion. No pneumothorax.  Bones: No acute bony finding.    CT Abdomen and Pelvis No Cont (08.10.22 @ 23:30)   Large open sacral decubitus wound extending to bone. Foci of air in the   left gluteus musculature and superior to the wound. 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  Absence of the coccyx and distal sacrum, may related to prior resection.   Correlate with prior surgical history.

## 2022-08-14 NOTE — PROCEDURE NOTE - NSINDICATIONS_GEN_A_CORE
critical illness
venous access
critical illness/emergency venous access/hemodynamic monitoring/volume resuscitation
critical illness/hemodynamic monitoring

## 2022-08-14 NOTE — PROGRESS NOTE ADULT - ASSESSMENT
52F MS bedbound with chronic sacral decubitus, BMI 36, DM.   Here 8/10 with fever, necrotizing sacral wound.   s/p wide OR surgical debridement 8/11.   In CCU for shock, LV dysfunction, possible critical illness cardiomyopathy.   Continued fevers could be post-op inflammation at this point.   Covering CoNS on blood although possible contaminant and Strep anginosus from wound.   Candiduria unclear significance. Common in this setting and CT reassuring.   No pneumonia on XR.   Shock improving. Remains intubated.   Poor long term prognosis with high risk for recurrent infections.     Suggest  -Vancomycin 1GM IV q12h, monitor creatinine and troughs, goal 10-15   -Unasyn 3GM IV q6h   -f/u final and repeat cultures   -local wound care   -would hold off on antifungal for now     Discussed with CCU     Epi Harvey MD   Infectious Disease   Available on TEAMS. After 5PM and on weekends please page fellow on call or call [phone]

## 2022-08-14 NOTE — PROCEDURE NOTE - NSINFORMCONSENT_GEN_A_CORE
This was an emergent procedure.
brother consent in chart/Benefits, risks, and possible complications of procedure explained to patient/caregiver who verbalized understanding and gave written consent.

## 2022-08-14 NOTE — PROGRESS NOTE ADULT - PROBLEM SELECTOR PLAN 1
Pt initially with metabolic acidosis in the setting of elevated lactate likely from septic shock. Pt now alkalotic, ABG pH: 7.49, pCO2: 26, and SCO2: 19. Likely due to respiratory alkalosis with renal compensation. SCr within normal limits. Pt is non-oliguric, Documented urine output is ~1.3L over the past 24 hours. Recommend to monitor blood gas and BMP.    Metabolic acidosis now resolved. Nephrology signing off, please reach out if any changes.    If you have any questions, please feel free to contact me  Najma Carney  Nephrology Fellow  [phone] / [employer] Teams(Preferred)  (After 5pm or on weekends please page the on-call fellow)

## 2022-08-15 ENCOUNTER — TRANSCRIPTION ENCOUNTER (OUTPATIENT)
Age: 52
End: 2022-08-15

## 2022-08-15 LAB
-  AMPICILLIN: SIGNIFICANT CHANGE UP
-  DAPTOMYCIN: SIGNIFICANT CHANGE UP
-  LEVOFLOXACIN: SIGNIFICANT CHANGE UP
-  LINEZOLID: SIGNIFICANT CHANGE UP
-  TETRACYCLINE: SIGNIFICANT CHANGE UP
-  VANCOMYCIN: SIGNIFICANT CHANGE UP
ALBUMIN SERPL ELPH-MCNC: 1.7 G/DL — LOW (ref 3.3–5)
ALP SERPL-CCNC: 216 U/L — HIGH (ref 40–120)
ALT FLD-CCNC: 8 U/L — SIGNIFICANT CHANGE UP (ref 4–33)
ANION GAP SERPL CALC-SCNC: 14 MMOL/L — SIGNIFICANT CHANGE UP (ref 7–14)
AST SERPL-CCNC: 18 U/L — SIGNIFICANT CHANGE UP (ref 4–32)
BASE EXCESS BLDA CALC-SCNC: -3.8 MMOL/L — LOW (ref -2–3)
BILIRUB SERPL-MCNC: 0.4 MG/DL — SIGNIFICANT CHANGE UP (ref 0.2–1.2)
BLOOD GAS ARTERIAL - LYTES,HGB,ICA,LACT RESULT: SIGNIFICANT CHANGE UP
BUN SERPL-MCNC: 18 MG/DL — SIGNIFICANT CHANGE UP (ref 7–23)
CALCIUM SERPL-MCNC: 7.3 MG/DL — LOW (ref 8.4–10.5)
CHLORIDE SERPL-SCNC: 103 MMOL/L — SIGNIFICANT CHANGE UP (ref 98–107)
CO2 BLDA-SCNC: 20 MMOL/L — SIGNIFICANT CHANGE UP (ref 19–24)
CO2 SERPL-SCNC: 18 MMOL/L — LOW (ref 22–31)
CREAT SERPL-MCNC: 0.44 MG/DL — LOW (ref 0.5–1.3)
CULTURE RESULTS: SIGNIFICANT CHANGE UP
EGFR: 118 ML/MIN/1.73M2 — SIGNIFICANT CHANGE UP
GLUCOSE BLDC GLUCOMTR-MCNC: 137 MG/DL — HIGH (ref 70–99)
GLUCOSE BLDC GLUCOMTR-MCNC: 168 MG/DL — HIGH (ref 70–99)
GLUCOSE BLDC GLUCOMTR-MCNC: 185 MG/DL — HIGH (ref 70–99)
GLUCOSE BLDC GLUCOMTR-MCNC: 195 MG/DL — HIGH (ref 70–99)
GLUCOSE SERPL-MCNC: 185 MG/DL — HIGH (ref 70–99)
HCO3 BLDA-SCNC: 19 MMOL/L — LOW (ref 21–28)
HCT VFR BLD CALC: 26.6 % — LOW (ref 34.5–45)
HGB BLD-MCNC: 8.8 G/DL — LOW (ref 11.5–15.5)
MAGNESIUM SERPL-MCNC: 1.8 MG/DL — SIGNIFICANT CHANGE UP (ref 1.6–2.6)
MCHC RBC-ENTMCNC: 26.7 PG — LOW (ref 27–34)
MCHC RBC-ENTMCNC: 33.1 GM/DL — SIGNIFICANT CHANGE UP (ref 32–36)
MCV RBC AUTO: 80.9 FL — SIGNIFICANT CHANGE UP (ref 80–100)
METHOD TYPE: SIGNIFICANT CHANGE UP
NRBC # BLD: 0 /100 WBCS — SIGNIFICANT CHANGE UP
NRBC # FLD: 0.03 K/UL — HIGH
ORGANISM # SPEC MICROSCOPIC CNT: SIGNIFICANT CHANGE UP
ORGANISM # SPEC MICROSCOPIC CNT: SIGNIFICANT CHANGE UP
PCO2 BLDA: 28 MMHG — LOW (ref 32–35)
PH BLDA: 7.44 — SIGNIFICANT CHANGE UP (ref 7.35–7.45)
PHOSPHATE SERPL-MCNC: 2 MG/DL — LOW (ref 2.5–4.5)
PLATELET # BLD AUTO: 358 K/UL — SIGNIFICANT CHANGE UP (ref 150–400)
PO2 BLDA: 115 MMHG — HIGH (ref 83–108)
POTASSIUM SERPL-MCNC: 3.5 MMOL/L — SIGNIFICANT CHANGE UP (ref 3.5–5.3)
POTASSIUM SERPL-SCNC: 3.5 MMOL/L — SIGNIFICANT CHANGE UP (ref 3.5–5.3)
PROT SERPL-MCNC: 5 G/DL — LOW (ref 6–8.3)
RBC # BLD: 3.29 M/UL — LOW (ref 3.8–5.2)
RBC # FLD: 16.7 % — HIGH (ref 10.3–14.5)
SAO2 % BLDA: 98.7 % — HIGH (ref 94–98)
SODIUM SERPL-SCNC: 135 MMOL/L — SIGNIFICANT CHANGE UP (ref 135–145)
SPECIMEN SOURCE: SIGNIFICANT CHANGE UP
WBC # BLD: 28.68 K/UL — HIGH (ref 3.8–10.5)
WBC # FLD AUTO: 28.68 K/UL — HIGH (ref 3.8–10.5)

## 2022-08-15 PROCEDURE — 71045 X-RAY EXAM CHEST 1 VIEW: CPT | Mod: 26

## 2022-08-15 PROCEDURE — 99233 SBSQ HOSP IP/OBS HIGH 50: CPT

## 2022-08-15 PROCEDURE — 93306 TTE W/DOPPLER COMPLETE: CPT | Mod: 26

## 2022-08-15 PROCEDURE — 99291 CRITICAL CARE FIRST HOUR: CPT

## 2022-08-15 RX ORDER — POTASSIUM CHLORIDE 20 MEQ
40 PACKET (EA) ORAL ONCE
Refills: 0 | Status: COMPLETED | OUTPATIENT
Start: 2022-08-15 | End: 2022-08-15

## 2022-08-15 RX ORDER — MAGNESIUM SULFATE 500 MG/ML
2 VIAL (ML) INJECTION ONCE
Refills: 0 | Status: COMPLETED | OUTPATIENT
Start: 2022-08-15 | End: 2022-08-15

## 2022-08-15 RX ORDER — POTASSIUM PHOSPHATE, MONOBASIC POTASSIUM PHOSPHATE, DIBASIC 236; 224 MG/ML; MG/ML
15 INJECTION, SOLUTION INTRAVENOUS ONCE
Refills: 0 | Status: DISCONTINUED | OUTPATIENT
Start: 2022-08-15 | End: 2022-08-15

## 2022-08-15 RX ORDER — MULTIVIT-MIN/FERROUS GLUCONATE 9 MG/15 ML
15 LIQUID (ML) ORAL DAILY
Refills: 0 | Status: DISCONTINUED | OUTPATIENT
Start: 2022-08-15 | End: 2022-08-20

## 2022-08-15 RX ORDER — POTASSIUM PHOSPHATE, MONOBASIC POTASSIUM PHOSPHATE, DIBASIC 236; 224 MG/ML; MG/ML
15 INJECTION, SOLUTION INTRAVENOUS ONCE
Refills: 0 | Status: COMPLETED | OUTPATIENT
Start: 2022-08-15 | End: 2022-08-15

## 2022-08-15 RX ORDER — CHLORHEXIDINE GLUCONATE 213 G/1000ML
15 SOLUTION TOPICAL EVERY 12 HOURS
Refills: 0 | Status: DISCONTINUED | OUTPATIENT
Start: 2022-08-15 | End: 2022-08-18

## 2022-08-15 RX ORDER — CHOLECALCIFEROL (VITAMIN D3) 125 MCG
2000 CAPSULE ORAL DAILY
Refills: 0 | Status: DISCONTINUED | OUTPATIENT
Start: 2022-08-15 | End: 2022-09-18

## 2022-08-15 RX ADMIN — NYSTATIN CREAM 1 APPLICATION(S): 100000 CREAM TOPICAL at 05:07

## 2022-08-15 RX ADMIN — PANTOPRAZOLE SODIUM 40 MILLIGRAM(S): 20 TABLET, DELAYED RELEASE ORAL at 05:08

## 2022-08-15 RX ADMIN — Medication 250 MILLIGRAM(S): at 21:11

## 2022-08-15 RX ADMIN — Medication 25 MILLIGRAM(S): at 05:07

## 2022-08-15 RX ADMIN — Medication 2 DROP(S): at 23:56

## 2022-08-15 RX ADMIN — Medication 4.63 MICROGRAM(S)/KG/MIN: at 20:10

## 2022-08-15 RX ADMIN — HEPARIN SODIUM 5000 UNIT(S): 5000 INJECTION INTRAVENOUS; SUBCUTANEOUS at 05:07

## 2022-08-15 RX ADMIN — AMPICILLIN SODIUM AND SULBACTAM SODIUM 200 GRAM(S): 250; 125 INJECTION, POWDER, FOR SUSPENSION INTRAMUSCULAR; INTRAVENOUS at 12:12

## 2022-08-15 RX ADMIN — AMPICILLIN SODIUM AND SULBACTAM SODIUM 200 GRAM(S): 250; 125 INJECTION, POWDER, FOR SUSPENSION INTRAMUSCULAR; INTRAVENOUS at 23:56

## 2022-08-15 RX ADMIN — Medication 2 DROP(S): at 18:13

## 2022-08-15 RX ADMIN — Medication 5 UNIT(S): at 00:21

## 2022-08-15 RX ADMIN — Medication 2: at 00:21

## 2022-08-15 RX ADMIN — CHLORHEXIDINE GLUCONATE 15 MILLILITER(S): 213 SOLUTION TOPICAL at 18:29

## 2022-08-15 RX ADMIN — HEPARIN SODIUM 5000 UNIT(S): 5000 INJECTION INTRAVENOUS; SUBCUTANEOUS at 21:11

## 2022-08-15 RX ADMIN — Medication 1 TABLET(S): at 12:13

## 2022-08-15 RX ADMIN — Medication 5 UNIT(S): at 12:17

## 2022-08-15 RX ADMIN — AMPICILLIN SODIUM AND SULBACTAM SODIUM 200 GRAM(S): 250; 125 INJECTION, POWDER, FOR SUSPENSION INTRAMUSCULAR; INTRAVENOUS at 05:07

## 2022-08-15 RX ADMIN — CHLORHEXIDINE GLUCONATE 15 MILLILITER(S): 213 SOLUTION TOPICAL at 05:08

## 2022-08-15 RX ADMIN — Medication 500 MILLIGRAM(S): at 12:13

## 2022-08-15 RX ADMIN — Medication 40 MILLIEQUIVALENT(S): at 05:06

## 2022-08-15 RX ADMIN — HYDROMORPHONE HYDROCHLORIDE 1 MILLIGRAM(S): 2 INJECTION INTRAMUSCULAR; INTRAVENOUS; SUBCUTANEOUS at 00:30

## 2022-08-15 RX ADMIN — AMPICILLIN SODIUM AND SULBACTAM SODIUM 200 GRAM(S): 250; 125 INJECTION, POWDER, FOR SUSPENSION INTRAMUSCULAR; INTRAVENOUS at 00:20

## 2022-08-15 RX ADMIN — Medication 2: at 12:17

## 2022-08-15 RX ADMIN — Medication 250 MILLIGRAM(S): at 11:42

## 2022-08-15 RX ADMIN — PANTOPRAZOLE SODIUM 40 MILLIGRAM(S): 20 TABLET, DELAYED RELEASE ORAL at 18:13

## 2022-08-15 RX ADMIN — Medication 2: at 05:44

## 2022-08-15 RX ADMIN — Medication 1 APPLICATION(S): at 12:20

## 2022-08-15 RX ADMIN — POTASSIUM PHOSPHATE, MONOBASIC POTASSIUM PHOSPHATE, DIBASIC 62.5 MILLIMOLE(S): 236; 224 INJECTION, SOLUTION INTRAVENOUS at 05:06

## 2022-08-15 RX ADMIN — CHLORHEXIDINE GLUCONATE 1 APPLICATION(S): 213 SOLUTION TOPICAL at 15:00

## 2022-08-15 RX ADMIN — NYSTATIN CREAM 1 APPLICATION(S): 100000 CREAM TOPICAL at 18:14

## 2022-08-15 RX ADMIN — Medication 56 MICROGRAM(S): at 05:44

## 2022-08-15 RX ADMIN — HYDROMORPHONE HYDROCHLORIDE 1 MILLIGRAM(S): 2 INJECTION INTRAMUSCULAR; INTRAVENOUS; SUBCUTANEOUS at 00:15

## 2022-08-15 RX ADMIN — HEPARIN SODIUM 5000 UNIT(S): 5000 INJECTION INTRAVENOUS; SUBCUTANEOUS at 15:00

## 2022-08-15 RX ADMIN — AMPICILLIN SODIUM AND SULBACTAM SODIUM 200 GRAM(S): 250; 125 INJECTION, POWDER, FOR SUSPENSION INTRAMUSCULAR; INTRAVENOUS at 18:14

## 2022-08-15 RX ADMIN — Medication 5 UNIT(S): at 18:32

## 2022-08-15 RX ADMIN — SODIUM CHLORIDE 30 MILLILITER(S): 9 INJECTION, SOLUTION INTRAVENOUS at 20:10

## 2022-08-15 RX ADMIN — Medication 25 GRAM(S): at 05:06

## 2022-08-15 RX ADMIN — Medication 5 UNIT(S): at 05:45

## 2022-08-15 NOTE — PROGRESS NOTE ADULT - ASSESSMENT
52F immobile 2/2 h/o MS, DM2, asthma, hypothyroidism, and known sacral wounds presents from [place] with urosepsis and anemia with new L flank wound with concern for necrotizing soft tissue infection. Now s/p debridement of wound with worsening sepsis, acidosis, and severe global LV dysfunction of unknown etiology, transferred to CCU for [place] and inotrope support. Pt is s/p swan removal and off all sedation, weaning off pressors.    NEURO:  - remains intubated (CPAP), off all sedation    RESP;  - intubated  - Vent settings: AC 16/ 6/ 550/30%-- ABG 7.49/26/147/20/98.6%  - bicarb drip discontinued    CV:  # severe LV dysfunction  - EF 5 - 10%  - concern for cardiomyopathy of unknown etiology but may be R/T sepsis and critical illness  - will discontinue Dobutamine, s/p swan removal  - vasopressin discontinued 8/13, continuing to wean levophed  - lactate continues to trend down, 1.0 today on ABG  - hold off on GDMT at this time  - eventual repeat ECHO to reassess LV function once patient is out of critical phase, ordered for Monday, 8/15    GI:  - NGT in place  - tolerating trickle feeds at present, will not advance as yet as we are attempting CPAP trials    /renal:  # metabolic acidosis  - appears resolved  - off bicarb drip at present  - follow labs  - f/u any further renal recs  - Scr stable    ENDO:  # DM2  - A1c 5.5  - FS elevated but now better controlled  - was started on steroids post op, ?stress dose, now tapering steroids. Decrease hydrocortisone to 25 mg IVP BID.   - continue FS/ IHSS as indicated    ID:  # s/p L flank wound debridement  - dressings intact and wound vac in place  - WBC 28.8 and no fevers overnight, tylenol PRN if pt spikes fever  - now following rectal or axillary readings  - ID consult noted and appreciated  - repeat blood cultures sent as requested  - changed meropenem to Unasyn 3 grams IV Q6h  - Vanco level 15.2 this am, restarted Vancomycin 1000 mg IVPB Q12H per verbal ID recs of today  - no plan for further debridement, wound care to change vac M/W/F prn per surgery recs      DVT PPx:  - Heparin SQ 52F immobile 2/2 h/o MS, DM2, asthma, hypothyroidism, and known sacral wounds presents from [place] with urosepsis and anemia with new L flank wound with concern for necrotizing soft tissue infection. Now s/p debridement of wound with worsening sepsis, acidosis, and severe global LV dysfunction of unknown etiology, transferred to CCU for [place] and inotrope support. Pt is s/p swan removal and off all sedation, weaning off pressors.    NEURO:  - remains intubated (CPAP), off all sedation  - CT head to assess neuro status?    RESP;  - intubated  - Vent settings: CPAP/PSV 12/5/2/30% - breathing at 11-13 breaths/min with tidal volumes of 540mL  - bicarb drip discontinued    CV:  # severe LV dysfunction  - EF 5 - 10%  - concern for cardiomyopathy of unknown etiology but may be R/T sepsis and critical illness  - s/p Dobutamine, s/p swan removal  - vasopressin discontinued 8/13, continuing to wean levophed, currently 0.04 mg/kg/min  - lactate continues to trend down, 1.0 today on ABG  - hold off on GDMT at this time  - f/u repeat ECHO to reassess LV function    GI:  - NGT in place  - tolerating trickle feeds at present, will not advance as yet as we are attempting CPAP trials    /renal:  # metabolic acidosis  - appears resolved  - off bicarb drip at present  - follow labs  - f/u any further renal recs  - Scr stable    ENDO:  # DM2  - A1c 5.5  - FS elevated but now better controlled  - was started on steroids post op, ?stress dose, now tapering steroids. Decrease hydrocortisone to 25 mg IVP BID.   - continue FS/ IHSS as indicated    ID:  # s/p L flank wound debridement  - dressings intact and wound vac in place  - WBC 28.8 and no fevers overnight, tylenol PRN if pt spikes fever  - now following rectal or axillary readings  - ID consult noted and appreciated  - repeat blood cultures sent as requested  - changed meropenem to Unasyn 3 grams IV Q6h  - Vanco level 15.2 this am, restarted Vancomycin 1000 mg IVPB Q12H per verbal ID recs of today  - no plan for further debridement, wound care to change vac M/W/F prn per surgery recs      DVT PPx:  - Heparin SQ 52F immobile 2/2 h/o MS, DM2, asthma, hypothyroidism, and known sacral wounds presents from [place] with urosepsis and anemia with new L flank wound with concern for necrotizing soft tissue infection. Now s/p debridement of wound with worsening sepsis, acidosis, and severe global LV dysfunction of unknown etiology, transferred to CCU for [place] and inotrope support. Pt is s/p swan removal and off all sedation, weaning off pressors.    NEURO:  - remains intubated (CPAP), off all sedation  - CT head to assess neuro status if no improvement in 24 hours    RESP;  - intubated  - Vent settings: CPAP/PSV 12/5/2/30% - breathing at 11-13 breaths/min with tidal volumes of 540mL  - bicarb drip discontinued  - check blood gas on CPAP    CV:  # severe LV dysfunction  - EF 5 - 10%  - concern for cardiomyopathy of unknown etiology but may be R/T sepsis and critical illness  - s/p Dobutamine, s/p swan removal  - vasopressin discontinued 8/13, continuing to wean levophed, currently 0.04 mg/kg/min  - lactate continues to trend down, 1.0 today on ABG  - hold off on GDMT at this time  - f/u repeat ECHO to reassess LV function    GI:  - NGT in place  - tolerating trickle feeds at present, will not advance as yet as we are attempting CPAP trials    /renal:  # metabolic acidosis  - appears resolved  - off bicarb drip at present  - follow labs  - f/u any further renal recs  - Scr stable    ENDO:  # DM2  - A1c 5.5  - FS elevated but now better controlled  - was started on steroids post op, ?stress dose, now tapering steroids. Decrease hydrocortisone to 25 mg IVP BID.   - continue FS/ IHSS as indicated    HEME:  - Hb dropped to 8.1 yesterday from 9, s/p 1 unit pRBCs, now 8.8  - unlikely 2/2 bleeding  - continue to monitor Hb    ID:  # s/p L flank wound debridement  - dressings intact and wound vac in place  - WBC 28.8 and no fevers overnight, tylenol PRN if pt spikes fever  - now following rectal or axillary readings  - ID consult noted and appreciated  - repeat blood cultures sent as requested  - changed meropenem to Unasyn 3 grams IV Q6h  - Vanco level 15.2 this am, restarted Vancomycin 1000 mg IVPB Q12H per verbal ID recs of today  - no plan for further debridement, wound care to change vac M/W/F prn per surgery recs      DVT PPx:  - Heparin SQ 52F immobile 2/2 h/o MS, DM2, asthma, hypothyroidism, and known sacral wounds presents from [place] with urosepsis and anemia with new L flank wound with concern for necrotizing soft tissue infection. Now s/p debridement of wound with worsening sepsis, acidosis, and severe global LV dysfunction of unknown etiology, transferred to CCU for [place] and inotrope support. Pt is s/p swan removal and off all sedation, weaning off pressors.    NEURO:  - remains intubated (CPAP), off all sedation  - CT head to assess neuro status if no improvement in 24 hours    RESP;  - intubated  - Vent settings: CPAP/PSV 12/5/2/30% - breathing at 11-13 breaths/min with tidal volumes of 540mL  - bicarb drip discontinued  - check blood gas on CPAP    CV:  # severe LV dysfunction  - EF 5 - 10%  - concern for cardiomyopathy of unknown etiology but may be R/T sepsis and critical illness  - s/p Dobutamine, s/p swan removal  - vasopressin discontinued 8/13, continuing to wean levophed, currently 0.04 mg/kg/min  - lactate continues to trend down, 1.0 today on ABG  - hold off on GDMT at this time  - f/u repeat ECHO to reassess LV function    GI:  - NGT in place  - tolerating trickle feeds at present, will not advance as yet as we are attempting CPAP trials    /renal:  # metabolic acidosis  - appears resolved  - off bicarb drip at present  - follow labs  - f/u any further renal recs  - Scr stable    ENDO:  # DM2  - A1c 5.5  - FS elevated but now better controlled  - was started on steroids post op, ?stress dose, was tapered, d/c'ed on 8/15 given concern of sepsis  - continue FS/ IHSS as indicated    HEME:  - Hb dropped to 8.1 yesterday from 9, s/p 1 unit pRBCs, now 8.8  - unlikely 2/2 bleeding  - continue to monitor Hb    ID:  # s/p L flank wound debridement  - dressings intact and wound vac in place  - WBC 28.8 and no fevers overnight, tylenol PRN if pt spikes fever  - now following rectal or axillary readings  - ID consult noted and appreciated  - repeat blood cultures sent as requested  - changed meropenem to Unasyn 3 grams IV Q6h  - Vanco level 15.2 this am, restarted Vancomycin 1000 mg IVPB Q12H per verbal ID recs of today  - no plan for further debridement, wound care to change vac M/W/F prn per surgery recs      DVT PPx:  - Heparin SQ 52F immobile 2/2 h/o MS, DM2, asthma, hypothyroidism, and known sacral wounds presents from [place] with urosepsis and anemia with new L flank wound with concern for necrotizing soft tissue infection. Was s/p debridement of wound with worsening sepsis, acidosis, and severe global LV dysfunction of unknown etiology, transferred to CCU for [place] and inotrope support. Pt is s/p swan removal and off all sedation, weaning off pressors.    NEURO:  - remains intubated (CPAP), off all sedation  - CT head to assess neuro status if no improvement in 24 hours    RESP;  - intubated  - Vent settings: CPAP/PSV 12/5/2/30% - breathing at 11-13 breaths/min with tidal volumes of 540mL  - bicarb drip discontinued  - check blood gas on CPAP    CV:  # severe LV dysfunction  - EF 5 - 10%  - concern for cardiomyopathy of unknown etiology but may be R/T sepsis and critical illness  - s/p Dobutamine, s/p swan removal  - vasopressin discontinued 8/13, continuing to wean levophed, currently 0.04 mg/kg/min  - lactate continues to trend down, 1.0 today on ABG  - hold off on GDMT at this time  - f/u repeat ECHO to reassess LV function    GI:  - NGT in place  - tolerating trickle feeds at present, will not advance as yet as we are attempting CPAP trials    /renal:  # metabolic acidosis  - appears resolved  - off bicarb drip at present  - follow labs  - f/u any further renal recs  - Scr stable    ENDO:  # DM2  - A1c 5.5  - FS elevated but now better controlled  - was started on steroids post op, ?stress dose, was tapered, d/c'ed on 8/15 given concern of sepsis  - continue FS/ IHSS as indicated    HEME:  - Hb dropped to 8.1 yesterday from 9, s/p 1 unit pRBCs, now 8.8  - unlikely 2/2 bleeding  - continue to monitor Hb    ID:  # s/p L flank wound debridement  - dressings intact and wound vac in place  - WBC 28.8 and no fevers overnight, tylenol PRN if pt spikes fever  - now following rectal or axillary readings  - ID consult noted and appreciated  - repeat blood cultures sent as requested  - changed meropenem to Unasyn 3 grams IV Q6h  - Vanco level 15.2 this am, restarted Vancomycin 1000 mg IVPB Q12H per verbal ID recs of today  - no plan for further debridement, wound care to change vac M/W/F prn per surgery recs      DVT PPx:  - Heparin SQ

## 2022-08-15 NOTE — DISCHARGE NOTE PROVIDER - NSDCFUADDAPPT_GEN_ALL_CORE_FT
Follow up with Infectious Disease Dr. Epi Harvey within 1 month of [facility] discharge. Upon discharge follow up as outpatient at [facility] [address] [facility] [phone].   Follow up outpatient with cardiology routinely.  Receive daily medical care at [facility].  Follow up with Infectious Disease Dr. Epi Harvey within 1 month of [facility] discharge. Upon discharge follow up as outpatient at [facility] [address] [facility] [phone].   Follow up outpatient with cardiology, primary care, podiatry, ophthalmology routinely.

## 2022-08-15 NOTE — PROGRESS NOTE ADULT - SUBJECTIVE AND OBJECTIVE BOX
Al Duenas MD PGY1    PATIENT:  BOY CERON  [id number]    CHIEF COMPLAINT:  Patient is a 52y old  Female who presents with a chief complaint of Soft tissue infection (14 Aug 2022 14:21)      INTERVAL HISTORY/OVERNIGHT EVENTS: Pt is off all sedation, swan d/c'ed overnight. K+ and Mg+ repleted. s/p 1 unit pRBCs. Pt was examined at bedside.    MEDICATIONS:  MEDICATIONS  (STANDING):  ampicillin/sulbactam  IVPB      ampicillin/sulbactam  IVPB 3 Gram(s) IV Intermittent every 6 hours  ascorbic acid 500 milliGRAM(s) Oral daily  chlorhexidine 0.12% Liquid 15 milliLiter(s) Oral Mucosa every 12 hours  chlorhexidine 2% Cloths 1 Application(s) Topical daily  collagenase Ointment 1 Application(s) Topical daily  ergocalciferol 32598 Unit(s) Oral every week  heparin   Injectable 5000 Unit(s) SubCutaneous every 8 hours  hydrocortisone sodium succinate Injectable 25 milliGRAM(s) IV Push every 12 hours  insulin lispro (ADMELOG) corrective regimen sliding scale   SubCutaneous every 6 hours  insulin lispro Injectable (ADMELOG) 5 Unit(s) SubCutaneous every 6 hours  lactated ringers. 1000 milliLiter(s) (30 mL/Hr) IV Continuous <Continuous>  levothyroxine Injectable 56 MICROGram(s) IV Push <User Schedule>  multivitamin 1 Tablet(s) Oral daily  norepinephrine Infusion 0.05 MICROgram(s)/kG/Min (4.63 mL/Hr) IV Continuous <Continuous>  nystatin Powder 1 Application(s) Topical two times a day  pantoprazole  Injectable 40 milliGRAM(s) IV Push every 12 hours  vancomycin  IVPB      vancomycin  IVPB 1000 milliGRAM(s) IV Intermittent every 12 hours    MEDICATIONS  (PRN):  acetaminophen    Suspension .. 650 milliGRAM(s) Oral every 6 hours PRN Temp greater or equal to 38.5C (101.3F)  ALBUTerol    90 MICROgram(s) HFA Inhaler 2 Puff(s) Inhalation every 6 hours PRN Shortness of Breath  HYDROmorphone  Injectable 1 milliGRAM(s) IV Push every 3 hours PRN Severe Pain (7 - 10)  HYDROmorphone  Injectable 0.5 milliGRAM(s) IV Push every 3 hours PRN Moderate Pain (4 - 6)  sodium chloride 0.9% lock flush 10 milliLiter(s) IV Push every 1 hour PRN Pre/post blood products, medications, blood draw, and to maintain line patency      ALLERGIES:  Allergies    No Known Drug Allergies  Pineapple (Anaphylaxis)    Intolerances        OBJECTIVE:  ICU Vital Signs Last 24 Hrs  T(C): 37.1 (15 Aug 2022 08:00), Max: 38.5 (14 Aug 2022 13:00)  T(F): 98.8 (15 Aug 2022 08:00), Max: 101.3 (14 Aug 2022 13:00)  HR: 98 (15 Aug 2022 08:05) (82 - 119)  BP: --  BP(mean): --  ABP: 106/57 (15 Aug 2022 08:00) (81/45 - 125/59)  ABP(mean): 74 (15 Aug 2022 08:00) (57 - 81)  RR: 12 (15 Aug 2022 08:00) (12 - 17)  SpO2: 100% (15 Aug 2022 08:05) (100% - 100%)    O2 Parameters below as of 15 Aug 2022 08:00  Patient On (Oxygen Delivery Method): ventilator    O2 Concentration (%): 30      Mode: CPAP with PS  FiO2: 30  PEEP: 5  PS: 12  MAP: 8    Adult Advanced Hemodynamics Last 24 Hrs  CVP(mm Hg): 10 (14 Aug 2022 23:00) (5 - 241)  CVP(cm H2O): --  CO: --  CI: --  PA: 34/19 (14 Aug 2022 23:00) (25/15 - 39/25)  PA(mean): 24 (14 Aug 2022 23:00) (18 - 30)  PCWP: --  SVR: --  SVRI: --  PVR: --  PVRI: --  CAPILLARY BLOOD GLUCOSE      POCT Blood Glucose.: 168 mg/dL (15 Aug 2022 05:43)  POCT Blood Glucose.: 185 mg/dL (15 Aug 2022 00:13)  POCT Blood Glucose.: 135 mg/dL (14 Aug 2022 17:24)  POCT Blood Glucose.: 126 mg/dL (14 Aug 2022 13:07)    CAPILLARY BLOOD GLUCOSE      POCT Blood Glucose.: 168 mg/dL (15 Aug 2022 05:43)    I&O's Summary    14 Aug 2022 07:01  -  15 Aug 2022 07:00  --------------------------------------------------------  IN: 2597.4 mL / OUT: 1630 mL / NET: 967.4 mL      Daily     Daily Weight in k.5 (15 Aug 2022 00:00)    PHYSICAL EXAMINATION:  General: WN/WD NAD  HEENT: PERRLA, EOMI, moist mucous membranes  Neurology: A&Ox3, nonfocal, ARGUETA x 4  Respiratory: CTA B/L, normal respiratory effort, no wheezes, crackles, rales  CV: RRR, S1S2, no murmurs, rubs or gallops  Abdominal: Soft, NT, ND +BS, Last BM  Extremities: No edema, + peripheral pulses  Incisions:   Tubes:    LABS:  ABG - ( 15 Aug 2022 00:14 )  pH, Arterial: 7.49  pH, Blood: x     /  pCO2: 26    /  pO2: 144   / HCO3: 20    / Base Excess: -2.7  /  SaO2: 98.8                                    8.8    28.68 )-----------( 358      ( 15 Aug 2022 00:14 )             26.6     08-15    135  |  103  |  18  ----------------------------<  185<H>  3.5   |  18<L>  |  0.44<L>    Ca    7.3<L>      15 Aug 2022 00:14  Phos  2.0     08-15  Mg     1.80     08-15    TPro  5.0<L>  /  Alb  1.7<L>  /  TBili  0.4  /  DBili  x   /  AST  18  /  ALT  8   /  AlkPhos  216<H>  08-15    LIVER FUNCTIONS - ( 15 Aug 2022 00:14 )  Alb: 1.7 g/dL / Pro: 5.0 g/dL / ALK PHOS: 216 U/L / ALT: 8 U/L / AST: 18 U/L / GGT: x                       TELEMETRY:     EKG:     IMAGING:       Al Duenas MD PGY1    PATIENT:  BOY CERON  [id number]    CHIEF COMPLAINT:  Patient is a 52y old  Female who presents with a chief complaint of Soft tissue infection (14 Aug 2022 14:21)      INTERVAL HISTORY/OVERNIGHT EVENTS: Pt is off all sedation, eyes are open but pt does not respond to name or commands. swan d/c'ed overnight. K+ and Mg+ repleted. s/p 1 unit pRBCs. Pt was examined at bedside.    MEDICATIONS:  MEDICATIONS  (STANDING):  ampicillin/sulbactam  IVPB      ampicillin/sulbactam  IVPB 3 Gram(s) IV Intermittent every 6 hours  ascorbic acid 500 milliGRAM(s) Oral daily  chlorhexidine 0.12% Liquid 15 milliLiter(s) Oral Mucosa every 12 hours  chlorhexidine 2% Cloths 1 Application(s) Topical daily  collagenase Ointment 1 Application(s) Topical daily  ergocalciferol 24119 Unit(s) Oral every week  heparin   Injectable 5000 Unit(s) SubCutaneous every 8 hours  hydrocortisone sodium succinate Injectable 25 milliGRAM(s) IV Push every 12 hours  insulin lispro (ADMELOG) corrective regimen sliding scale   SubCutaneous every 6 hours  insulin lispro Injectable (ADMELOG) 5 Unit(s) SubCutaneous every 6 hours  lactated ringers. 1000 milliLiter(s) (30 mL/Hr) IV Continuous <Continuous>  levothyroxine Injectable 56 MICROGram(s) IV Push <User Schedule>  multivitamin 1 Tablet(s) Oral daily  norepinephrine Infusion 0.04 MICROgram(s)/kG/Min (4.63 mL/Hr) IV Continuous <Continuous>  nystatin Powder 1 Application(s) Topical two times a day  pantoprazole  Injectable 40 milliGRAM(s) IV Push every 12 hours  vancomycin  IVPB      vancomycin  IVPB 1000 milliGRAM(s) IV Intermittent every 12 hours    MEDICATIONS  (PRN):  acetaminophen    Suspension .. 650 milliGRAM(s) Oral every 6 hours PRN Temp greater or equal to 38.5C (101.3F)  ALBUTerol    90 MICROgram(s) HFA Inhaler 2 Puff(s) Inhalation every 6 hours PRN Shortness of Breath  HYDROmorphone  Injectable 1 milliGRAM(s) IV Push every 3 hours PRN Severe Pain (7 - 10)  HYDROmorphone  Injectable 0.5 milliGRAM(s) IV Push every 3 hours PRN Moderate Pain (4 - 6)  sodium chloride 0.9% lock flush 10 milliLiter(s) IV Push every 1 hour PRN Pre/post blood products, medications, blood draw, and to maintain line patency      ALLERGIES:  Allergies    No Known Drug Allergies  Pineapple (Anaphylaxis)    Intolerances        OBJECTIVE:  ICU Vital Signs Last 24 Hrs  T(C): 37.1 (15 Aug 2022 08:00), Max: 38.5 (14 Aug 2022 13:00)  T(F): 98.8 (15 Aug 2022 08:00), Max: 101.3 (14 Aug 2022 13:00)  HR: 98 (15 Aug 2022 08:05) (82 - 119)  BP: --  BP(mean): --  ABP: 106/57 (15 Aug 2022 08:00) (81/45 - 125/59)  ABP(mean): 74 (15 Aug 2022 08:00) (57 - 81)  RR: 12 (15 Aug 2022 08:00) (12 - 17)  SpO2: 100% (15 Aug 2022 08:05) (100% - 100%)    O2 Parameters below as of 15 Aug 2022 08:00  Patient On (Oxygen Delivery Method): ventilator    O2 Concentration (%): 30    Mode: CPAP with PS  FiO2: 30  PEEP: 5  PS: 12  MAP: 8    Adult Advanced Hemodynamics Last 24 Hrs  CVP(mm Hg): 10 (14 Aug 2022 23:00) (5 - 241)  CVP(cm H2O): --  CO: --  CI: --  PA: 34/19 (14 Aug 2022 23:00) (25/15 - 39/25)  PA(mean): 24 (14 Aug 2022 23:00) (18 - 30)  PCWP: --  SVR: --  SVRI: --  PVR: --  PVRI: --  CAPILLARY BLOOD GLUCOSE      POCT Blood Glucose.: 168 mg/dL (15 Aug 2022 05:43)  POCT Blood Glucose.: 185 mg/dL (15 Aug 2022 00:13)  POCT Blood Glucose.: 135 mg/dL (14 Aug 2022 17:24)  POCT Blood Glucose.: 126 mg/dL (14 Aug 2022 13:07)    CAPILLARY BLOOD GLUCOSE      POCT Blood Glucose.: 168 mg/dL (15 Aug 2022 05:43)    I&O's Summary    14 Aug 2022 07:01  -  15 Aug 2022 07:00  --------------------------------------------------------  IN: 2597.4 mL / OUT: 1630 mL / NET: 967.4 mL      Daily     Daily Weight in k.5 (15 Aug 2022 00:00)    PHYSICAL EXAMINATION:  General: NAD, intubated  HEENT: conjunctiva and sclera clear  Neurology: A&Ox0  Respiratory: CTA B/L anteriorly  CV: RRR, S1S2, no murmurs, rubs or gallops  Abdominal: Soft, NT, ND +BS  Extremities: No edema, + peripheral pulses, dressings c/d/i   lines: midline, IJ, peripheral IV    LABS:  ABG - ( 15 Aug 2022 00:14 )  pH, Arterial: 7.49  pH, Blood: x     /  pCO2: 26    /  pO2: 144   / HCO3: 20    / Base Excess: -2.7  /  SaO2: 98.8                            8.8    28.68 )-----------( 358      ( 15 Aug 2022 00:14 )             26.6     08-15    135  |  103  |  18  ----------------------------<  185<H>  3.5   |  18<L>  |  0.44<L>    Ca    7.3<L>      15 Aug 2022 00:14  Phos  2.0     08-15  Mg     1.80     08-15    TPro  5.0<L>  /  Alb  1.7<L>  /  TBili  0.4  /  DBili  x   /  AST  18  /  ALT  8   /  AlkPhos  216<H>  08-15    LIVER FUNCTIONS - ( 15 Aug 2022 00:14 )  Alb: 1.7 g/dL / Pro: 5.0 g/dL / ALK PHOS: 216 U/L / ALT: 8 U/L / AST: 18 U/L / GGT: x                       TELEMETRY:     EKG:     IMAGING:       Al Duenas MD PGY1    PATIENT:  BOY CERON  [id number]    CHIEF COMPLAINT:  Patient is a 52y old  Female who presents with a chief complaint of Soft tissue infection (14 Aug 2022 14:21)      INTERVAL HISTORY/OVERNIGHT EVENTS: Pt is off all sedation, eyes are open but pt does not respond to name or commands. swan d/c'ed overnight. K+ and Mg+ repleted. s/p 1 unit pRBCs. Pt was examined at bedside.    MEDICATIONS:  MEDICATIONS  (STANDING):  ampicillin/sulbactam  IVPB      ampicillin/sulbactam  IVPB 3 Gram(s) IV Intermittent every 6 hours  ascorbic acid 500 milliGRAM(s) Oral daily  chlorhexidine 0.12% Liquid 15 milliLiter(s) Oral Mucosa every 12 hours  chlorhexidine 2% Cloths 1 Application(s) Topical daily  collagenase Ointment 1 Application(s) Topical daily  ergocalciferol 57726 Unit(s) Oral every week  heparin   Injectable 5000 Unit(s) SubCutaneous every 8 hours  hydrocortisone sodium succinate Injectable 25 milliGRAM(s) IV Push every 12 hours  insulin lispro (ADMELOG) corrective regimen sliding scale   SubCutaneous every 6 hours  insulin lispro Injectable (ADMELOG) 5 Unit(s) SubCutaneous every 6 hours  lactated ringers. 1000 milliLiter(s) (30 mL/Hr) IV Continuous <Continuous>  levothyroxine Injectable 56 MICROGram(s) IV Push <User Schedule>  multivitamin 1 Tablet(s) Oral daily  norepinephrine Infusion 0.04 MICROgram(s)/kG/Min (4.63 mL/Hr) IV Continuous <Continuous>  nystatin Powder 1 Application(s) Topical two times a day  pantoprazole  Injectable 40 milliGRAM(s) IV Push every 12 hours  vancomycin  IVPB      vancomycin  IVPB 1000 milliGRAM(s) IV Intermittent every 12 hours    MEDICATIONS  (PRN):  acetaminophen    Suspension .. 650 milliGRAM(s) Oral every 6 hours PRN Temp greater or equal to 38.5C (101.3F)  ALBUTerol    90 MICROgram(s) HFA Inhaler 2 Puff(s) Inhalation every 6 hours PRN Shortness of Breath  HYDROmorphone  Injectable 1 milliGRAM(s) IV Push every 3 hours PRN Severe Pain (7 - 10)  HYDROmorphone  Injectable 0.5 milliGRAM(s) IV Push every 3 hours PRN Moderate Pain (4 - 6)  sodium chloride 0.9% lock flush 10 milliLiter(s) IV Push every 1 hour PRN Pre/post blood products, medications, blood draw, and to maintain line patency      ALLERGIES:  Allergies    No Known Drug Allergies  Pineapple (Anaphylaxis)    Intolerances        OBJECTIVE:  ICU Vital Signs Last 24 Hrs  T(C): 37.1 (15 Aug 2022 08:00), Max: 38.5 (14 Aug 2022 13:00)  T(F): 98.8 (15 Aug 2022 08:00), Max: 101.3 (14 Aug 2022 13:00)  HR: 98 (15 Aug 2022 08:05) (82 - 119)  BP: --  BP(mean): --  ABP: 106/57 (15 Aug 2022 08:00) (81/45 - 125/59)  ABP(mean): 74 (15 Aug 2022 08:00) (57 - 81)  RR: 12 (15 Aug 2022 08:00) (12 - 17)  SpO2: 100% (15 Aug 2022 08:05) (100% - 100%)    O2 Parameters below as of 15 Aug 2022 08:00  Patient On (Oxygen Delivery Method): ventilator    O2 Concentration (%): 30    Mode: CPAP with PS  FiO2: 30  PEEP: 5  PS: 12  MAP: 8    Adult Advanced Hemodynamics Last 24 Hrs  CVP(mm Hg): 10 (14 Aug 2022 23:00) (5 - 241)  CVP(cm H2O): --  CO: --  CI: --  PA: 34/19 (14 Aug 2022 23:00) (25/15 - 39/25)  PA(mean): 24 (14 Aug 2022 23:00) (18 - 30)  PCWP: --  SVR: --  SVRI: --  PVR: --  PVRI: --  CAPILLARY BLOOD GLUCOSE      POCT Blood Glucose.: 168 mg/dL (15 Aug 2022 05:43)  POCT Blood Glucose.: 185 mg/dL (15 Aug 2022 00:13)  POCT Blood Glucose.: 135 mg/dL (14 Aug 2022 17:24)  POCT Blood Glucose.: 126 mg/dL (14 Aug 2022 13:07)    CAPILLARY BLOOD GLUCOSE      POCT Blood Glucose.: 168 mg/dL (15 Aug 2022 05:43)    I&O's Summary    14 Aug 2022 07:01  -  15 Aug 2022 07:00  --------------------------------------------------------  IN: 2597.4 mL / OUT: 1630 mL / NET: 967.4 mL      Daily     Daily Weight in k.5 (15 Aug 2022 00:00)    PHYSICAL EXAMINATION:  General: NAD, intubated  HEENT: conjunctiva and sclera clear  Neurology: A&Ox0  Respiratory: CTA B/L anteriorly  CV: RRR, S1S2, no murmurs, rubs or gallops  Abdominal: Soft, NT, ND +BS  Extremities: No edema, + peripheral pulses, dressings c/d/i   lines: midline, IJ, peripheral IV, toussaint    LABS:  ABG - ( 15 Aug 2022 00:14 )  pH, Arterial: 7.49  pH, Blood: x     /  pCO2: 26    /  pO2: 144   / HCO3: 20    / Base Excess: -2.7  /  SaO2: 98.8                            8.8    28.68 )-----------( 358      ( 15 Aug 2022 00:14 )             26.6     08-15    135  |  103  |  18  ----------------------------<  185<H>  3.5   |  18<L>  |  0.44<L>    Ca    7.3<L>      15 Aug 2022 00:14  Phos  2.0     08-15  Mg     1.80     08-15    TPro  5.0<L>  /  Alb  1.7<L>  /  TBili  0.4  /  DBili  x   /  AST  18  /  ALT  8   /  AlkPhos  216<H>  08-15    LIVER FUNCTIONS - ( 15 Aug 2022 00:14 )  Alb: 1.7 g/dL / Pro: 5.0 g/dL / ALK PHOS: 216 U/L / ALT: 8 U/L / AST: 18 U/L / GGT: x                       TELEMETRY:     EKG:     IMAGING:

## 2022-08-15 NOTE — DISCHARGE NOTE PROVIDER - HOSPITAL COURSE
52F immobile 2/2 MS, poorly controlled T2DM, asthma, hypothyroidism, known chronic wounds sacrum (stage 4), vulvar/Rt thigh, and right calf. Recent April 2022 hospitalization for urosepsis. On cefepime/flagyl for chronic osteo 2/2 unstageable sacral decubitus ulcer, dakins BID packing. Presented from [place] with concerns for UTI and anemia (6.8 from baseline 8.0) and new malodorous wound on L hip to L flank area. Surgery was consulted for potential necrotizing soft tissue infection. Surgery performed Irrigation and excisional debridement of her necrotizing fasciitis. Following the surgery, the patient was in septic shock and had an ejection fraction of 10% and the patient was transferred to the CCU for management. Pt was started on stress dose steroid taper. In the CCU, the patient was started on pressors, RIJ swan was floated. ID recommended switching abx to unasyn and vancomycin. The pt was weaned off sedatives and put on CPAP/PSV. 52F immobile 2/2 MS, poorly controlled T2DM, asthma, hypothyroidism, known chronic wounds sacrum (stage 4), vulvar/Rt thigh, and right calf. Recent April 2022 hospitalization for urosepsis. On cefepime/flagyl for chronic osteo 2/2 unstageable sacral decubitus ulcer, dakins BID packing. Presented from [place] with concerns for UTI and anemia (6.8 from baseline 8.0) and new malodorous wound on L hip to L flank area. Surgery was consulted for potential necrotizing soft tissue infection. Surgery performed Irrigation and excisional debridement of her necrotizing fasciitis. Following the surgery, the patient was in septic shock and had an ejection fraction of 10% and the patient was transferred to the CCU for management. Pt was started on stress dose steroid taper. In the CCU, the patient was started on pressors, RIJ swan was floated. ID recommended switching abx to unasyn and vancomycin. The pt was weaned off sedatives and put on CPAP/PSV during the day and back on AC overnight to allow her lungs to rest. Pt had dressing changes for her sacral ulcer by wound care 3x/week. Pt's mental status improved off sedation. 52F immobile 2/2 MS, poorly controlled T2DM, asthma, hypothyroidism, known chronic wounds sacrum (stage 4), vulvar/Rt thigh, and right calf. Recent April 2022 hospitalization for urosepsis. On cefepime/flagyl for chronic osteo 2/2 unstageable sacral decubitus ulcer, dakins BID packing. Presented from [place] with concerns for UTI and anemia (6.8 from baseline 8.0) and new malodorous wound on L hip to L flank area. Surgery was consulted for potential necrotizing soft tissue infection. Surgery performed Irrigation and excisional debridement of her necrotizing fasciitis. Following the surgery, the patient was in septic shock and had an ejection fraction of 10% and the patient was transferred to the CCU for management. Pt was started on stress dose steroid taper. In the CCU, the patient was started on pressors, RIJ swan was floated. ID recommended switching abx to unasyn and vancomycin. The pt was weaned off sedatives and put on CPAP/PSV during the day and back on AC overnight to allow her lungs to rest. Pt had dressing changes for her sacral ulcer by wound care 3x/week. Repeat TTE showed severe LV segmental dysfunction, MR, and an EF of 5-10%. Pt's mental status improved off sedation. 52F immobile 2/2 MS, poorly controlled T2DM, asthma, hypothyroidism, known chronic wounds sacrum (stage 4), vulvar/Rt thigh, and right calf. Recent April 2022 hospitalization for urosepsis. On cefepime/flagyl for chronic osteo 2/2 unstageable sacral decubitus ulcer, dakins BID packing. Presented from [place] with concerns for UTI and anemia (6.8 from baseline 8.0) and new malodorous wound on L hip to L flank area. Surgery was consulted for potential necrotizing soft tissue infection. Surgery performed Irrigation and excisional debridement of her necrotizing fasciitis. Following the surgery, the patient was in septic shock and had an ejection fraction of 10% and the patient was transferred to the CCU for management. Pt was started on stress dose steroid taper. In the CCU, the patient was started on pressors, RIJ swan was floated. ID recommended switching abx to unasyn and vancomycin. The pt was weaned off sedatives and put on CPAP/PSV during the day and back on AC overnight to allow her lungs to rest. Pt had dressing changes for her sacral ulcer by wound care 3x/week. Repeat TTE showed severe LV segmental dysfunction, MR, and an EF of 5-10%. Pt's mental status improved off sedation and was extubated and put on face tent. Pressors were discontinued along with vancomycin. Pt's Hb dropped down to 7.3 and was given two half units of pRBCs. 52F immobile 2/2 MS, poorly controlled T2DM, asthma, hypothyroidism, known chronic wounds sacrum (stage 4), vulvar/Rt thigh, and right calf. Recent April 2022 hospitalization for urosepsis. On cefepime/flagyl for chronic osteo 2/2 unstageable sacral decubitus ulcer, dakins BID packing. Presented from [place] with concerns for UTI and anemia (6.8 from baseline 8.0) and new malodorous wound on L hip to L flank area. Surgery was consulted for potential necrotizing soft tissue infection. Surgery performed Irrigation and excisional debridement of her necrotizing fasciitis. Following the surgery, the patient was in septic shock and had an ejection fraction of 10% and the patient was transferred to the CCU for management. Pt was started on stress dose steroid taper. In the CCU, the patient was started on pressors, RIJ swan was floated. ID recommended switching abx to unasyn and vancomycin. The pt was weaned off sedatives and put on CPAP/PSV during the day and back on AC overnight to allow her lungs to rest. Pt had dressing changes for her sacral ulcer by wound care 3x/week. Repeat TTE showed severe LV segmental dysfunction, MR, and an EF of 5-10%. Pt's mental status improved off sedation and was extubated and put on face tent. Pressors were discontinued along with vancomycin. Pt's Hb dropped down to 7.3 and was given two half units of pRBCs. Due to pt's continued anemia, a workup was performed and suggested possible anemia of chronic disease.    52F bedbound due to MS, DM2, asthma, hypothyroidism, and known sacral wounds presents from [place] with urosepsis and anemia with new L flank wound with concern for necrotizing soft tissue infection s/p debridement of wound, hospital course c/b severe global LV dysfunction of unknown etiology- likely stress induced cardiomyopathy, s/p CCU course for [employer] and inotropic support, now s/p swan removal and transferred back to medicine, completed unasyn 8/25, course further complicated by new pseudomonal bacteremia, restarted on IV abx.       Bacteremia due to Pseudomonas  -Pt meets sepsis criteria  w/ fevers and leukocytosis. Likely 2/2 UTI vs soft tissue infection on right upper thigh  - culture data sig for 9/5 with VRE and Kleb in blood and then 9/7 with pseudomonas  - CTAP w/ cystitis   - Currently on zosyn + dapto per ID recs  -- CONTINUE THROUGH 9/23/22 as per ID.   - c/w daptomycin 09/06; monitor CPK weekly on dapto, last on 9/18: 33 (WNL); Next can be drawn 9/22 or 9/23  (Dapto ends 9/23)    - repeat blood cultures 9/10/22: No growth final x2  - ID recs appreciated: complete zosyn/dapto with end date 9/23, provided no other culture data returns positive   - continue midline on discharge-  to [facility] for wound care, antibiotics     Necrotizing soft tissue infection  - s/p L flank wound debridement  - if w/ persistent fevers/leukocytosis, will benefit from re-evaluation for further debridement   - current abx as above ; wound vac in place. wound care recommendations included in DC, to be continued at [facility].  - pain control prn.     Thrombocytosis   - likely related to infection / reactive   - if continues to rise, patient may need aspirin ppx.      Urinary retention  - Pt w. incontinence and intermittent urinary retention. Suspect  neurogenic bladder from MS.  - Passed TOV. Voiding freely with PrimaFit in place/    Electrolyte abnormality   - c/w daily KCL 20MEQ QD (to be continued on discharge).     Acute systolic heart failure    # severe LV dysfunction; HFrEF 5 - 10%; likely 2/2 stress induced cardiomyopathy  - s/p CCU course for swan and inotropic support now s/p swan removal   - c/w metoprolol-XL 25QD  - c/w Lasix 20mg QD per Cards recs  - c/w potassium supplementation on Lasix   - c/w lisinopril 2.5mg QD   - Outpatient cardiology follow up.     Anemia  - iron studies c/w AOCD likely 2/2 infections; B12 and folic acid wnl  - stool guaiac positive  - c/w PPI qd  - retic count elevated  - monitor CBC, Hb stable ~8.    Diabetes  - HbA1c 5.5  - c/w Lantus @ 16U qhs & increase lispro 8U TID  -> change lantus to 14 on discharge due to intermittent AM low FSBG 70-90 recently.   - Monitor fingersticks TIDAC and QHS.    Need for prophylactic measure.   DIET: DASH regular  DVT: SQH  DISPO: Accepted at [facility] for wound care, antibiotics.     As discussed with Dr. HAKEEM Quispe on 9/18/22, patient is medically optimized and stable for discharge to [facility] for further wound vac, wound care, antibiotics as above.  All discharge medications confirmed with attending.

## 2022-08-15 NOTE — CHART NOTE - NSCHARTNOTEFT_GEN_A_CORE
Right IJ [place] pavan catheter dc'd with aseptic technique without any complications. Cap placed to swan port and Rt IJ introducer left in for pressor infusion.

## 2022-08-15 NOTE — DISCHARGE NOTE PROVIDER - NSDCFUADDINST_GEN_ALL_CORE_FT
please see [facility] instructions in next section as per [facility].  Bedbound. please see [facility] instructions in next section as per [facility].

## 2022-08-15 NOTE — PROGRESS NOTE ADULT - SUBJECTIVE AND OBJECTIVE BOX
Follow Up: wound infection    Interval History/ROS: Fevers and shock better. Put back on low dose pressors. Remains intubated. Poor mental status.     Allergies  No Known Drug Allergies  Pineapple (Anaphylaxis)        ANTIMICROBIALS:  ampicillin/sulbactam  IVPB    ampicillin/sulbactam  IVPB 3 every 6 hours  vancomycin  IVPB    vancomycin  IVPB 1000 every 12 hours      OTHER MEDS:  acetaminophen    Suspension .. 650 milliGRAM(s) Oral every 6 hours PRN  ALBUTerol    90 MICROgram(s) HFA Inhaler 2 Puff(s) Inhalation every 6 hours PRN  artificial tears (preservative free) Ophthalmic Solution 2 Drop(s) Both EYES four times a day  ascorbic acid 500 milliGRAM(s) Oral daily  chlorhexidine 0.12% Liquid 15 milliLiter(s) Oral Mucosa every 12 hours  chlorhexidine 2% Cloths 1 Application(s) Topical daily  cholecalciferol 2000 Unit(s) Oral daily  collagenase Ointment 1 Application(s) Topical daily  heparin   Injectable 5000 Unit(s) SubCutaneous every 8 hours  HYDROmorphone  Injectable 1 milliGRAM(s) IV Push every 3 hours PRN  HYDROmorphone  Injectable 0.5 milliGRAM(s) IV Push every 3 hours PRN  insulin lispro (ADMELOG) corrective regimen sliding scale   SubCutaneous every 6 hours  insulin lispro Injectable (ADMELOG) 5 Unit(s) SubCutaneous every 6 hours  lactated ringers. 1000 milliLiter(s) IV Continuous <Continuous>  levothyroxine Injectable 56 MICROGram(s) IV Push <User Schedule>  multivitamin/minerals/iron Oral Solution (CENTRUM) 15 milliLiter(s) Oral daily  norepinephrine Infusion 0.05 MICROgram(s)/kG/Min IV Continuous <Continuous>  nystatin Powder 1 Application(s) Topical two times a day  pantoprazole  Injectable 40 milliGRAM(s) IV Push every 12 hours  sodium chloride 0.9% lock flush 10 milliLiter(s) IV Push every 1 hour PRN      Vital Signs Last 24 Hrs  T(C): 36.9 (15 Aug 2022 12:00), Max: 37.9 (14 Aug 2022 18:00)  T(F): 98.5 (15 Aug 2022 12:00), Max: 100.3 (14 Aug 2022 18:00)  HR: 101 (15 Aug 2022 17:07) (82 - 113)  BP: --  BP(mean): --  RR: 12 (15 Aug 2022 17:00) (9 - 15)  SpO2: 100% (15 Aug 2022 17:07) (95% - 100%)    Parameters below as of 15 Aug 2022 16:00  Patient On (Oxygen Delivery Method): ventilator,CPAP        Physical Exam:  General: awake, obese, not responsive   Cardio: regular rate   Respiratory: intubated mechanically ventilated  abd: nondistended, soft  Skin: no rash                          8.8    28.68 )-----------( 358      ( 15 Aug 2022 00:14 )             26.6       08-15    135  |  103  |  18  ----------------------------<  185<H>  3.5   |  18<L>  |  0.44<L>    Ca    7.3<L>      15 Aug 2022 00:14  Phos  2.0     08-15  Mg     1.80     08-15    TPro  5.0<L>  /  Alb  1.7<L>  /  TBili  0.4  /  DBili  x   /  AST  18  /  ALT  8   /  AlkPhos  216<H>  08-15          MICROBIOLOGY:  Culture - Blood (collected 08-14-22 @ 00:18)  Source: .Blood Blood  Preliminary Report (08-15-22 @ 03:01):    No growth to date.    Culture - Blood (collected 08-14-22 @ 00:18)  Source: .Blood Blood  Preliminary Report (08-15-22 @ 03:01):    No growth to date.    Culture - Blood (collected 08-13-22 @ 18:30)  Source: .Blood Blood-Venous  Preliminary Report (08-14-22 @ 21:01):    No growth to date.    Culture - Blood (collected 08-13-22 @ 18:00)  Source: .Blood Blood-Peripheral  Preliminary Report (08-14-22 @ 21:01):    No growth to date.    Culture - Acid Fast - Other w/Smear (collected 08-11-22 @ 04:49)  Source: .Other  Preliminary Report (08-13-22 @ 15:04):    Culture is being performed.    Culture - Fungal, Other (collected 08-11-22 @ 04:49)  Source: .Other  Preliminary Report (08-15-22 @ 13:38):    Rare Candida glabrata    Culture - Other (collected 08-11-22 @ 04:49)  Source: .Other  Final Report (08-15-22 @ 14:50):    Numerous Streptococcus anginosus "Susceptibilities not performed"    Rare Enterococcus faecium (vancomycin resistant)  Organism: Enterococcus faecium (vancomycin resistant) (08-15-22 @ 14:50)  Organism: Enterococcus faecium (vancomycin resistant) (08-15-22 @ 14:50)      -  Ampicillin: R >8 Predicts results to ampicillin/sulbactam, amoxacillin-clavulanate and  piperacillin-tazobactam.      -  Daptomycin: SDD 4 The breakpoint for SDD (sensitive dose dependent)is based on a dosage regimen of 8-12 mg/kg administered every 24 h in adults and is intended for serious infections due to E. faecium. Consultation with an infectious diseases specialist is recommended.      -  Levofloxacin: R >4      -  Linezolid: S 1      -  Tetra/Doxy: R >8      -  Vancomycin: R >16      Method Type: DHAVAL    Culture - Urine (collected 08-10-22 @ 22:30)  Source: Clean Catch Clean Catch (Midstream)  Final Report (08-13-22 @ 17:06):    >100,000 CFU/ml Candida glabrata "Susceptibilities not performed"    Culture - Blood (collected 08-10-22 @ 22:30)  Source: .Blood Blood-Peripheral  Gram Stain (08-12-22 @ 22:56):    Growth in anaerobic bottle: Gram Positive Cocci in Clusters  Final Report (08-14-22 @ 16:03):    Growth in anaerobic bottle: Staphylococcus epidermidis  Organism: Staphylococcus epidermidis (08-14-22 @ 16:03)  Organism: Staphylococcus epidermidis (08-14-22 @ 16:03)      -  Ampicillin/Sulbactam: R <=8/4      -  Cefazolin: R <=4      -  Clindamycin: S <=0.25      -  Erythromycin: S <=0.25      -  Gentamicin: S <=1 Should not be used as monotherapy      -  Oxacillin: R >2      -  Penicillin: R 8      -  Rifampin: S <=1 Should not be used as monotherapy      -  Tetra/Doxy: S <=1      -  Trimethoprim/Sulfamethoxazole: S <=0.5/9.5      -  Vancomycin: S 2      Method Type: DHAVAL    Culture - Blood (collected 08-10-22 @ 22:20)  Source: .Blood Blood-Venous  Gram Stain (08-12-22 @ 01:57):    Growth in aerobic bottle: Gram Positive Cocci in Clusters  Final Report (08-12-22 @ 22:30):    Growth in aerobic bottle: Staphylococcus epidermidis    Coag Negative Staphylococcus    Single set isolate, possible contaminant. Contact    Microbiology if susceptibility testing clinically    indicated.    ***Blood Panel PCR results on this specimen are available    approximately 3 hours after the Gram stain result.***    Gram stain, PCR, and/or culture results may not always    correspond due to difference in methodologies.    ************************************************************    This PCR assay was performed by multiplex PCR. This    Assay tests for 66 bacterial and resistance gene targets.    Please refer to the [facility] test directory    at [email or web address] for details.  Organism: Blood Culture PCR (08-12-22 @ 22:30)  Organism: Blood Culture PCR (08-12-22 @ 22:30)      -  Staphylococcus epidermidis, Methicillin resistant: Detec      Method Type: PCR    RADIOLOGY:  Images below reviewed personally  Xray Chest 1 View- PORTABLE-Routine (Xray Chest 1 View- PORTABLE-Routine in AM.) (08.14.22 @ 11:54)   ETT tip above vahe. Right IJ catheter tip at the right   pulmonary artery. Cardiomegaly. Elevated right hemidiaphragm.    CT Abdomen and Pelvis No Cont (08.10.22 @ 23:30)   Large open sacral decubitus wound extending to bone. Foci of air in the   left gluteus musculature and superior to the wound. 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  Absence of the coccyx and distal sacrum, may related to prior resection.   Correlate with prior surgical history.

## 2022-08-15 NOTE — DISCHARGE NOTE PROVIDER - NSDCCPTREATMENT_GEN_ALL_CORE_FT
PRINCIPAL PROCEDURE  Procedure: Transthoracic echocardiography  Findings and Treatment: A repeat echocardiogram showed that you had severe segmental left ventricular systolic dysfunction, mitral regurgitation, and an ejection fraction of 5-10%.

## 2022-08-15 NOTE — DISCHARGE NOTE PROVIDER - CARE PROVIDER_API CALL
Epi Harvey)  Internal Medicine  [address] [place] [address] [id number]  Phone: [phone]  Fax: [phone]  Follow Up Time: 1 month

## 2022-08-15 NOTE — PROGRESS NOTE ADULT - ASSESSMENT
52F BMI 36, DM, MS bedbound with chronic sacral decubitus.   Here 8/10 with fever, necrotizing sacral wound.   s/p wide OR surgical debridement 8/11.   In CCU for shock, LV dysfunction, possible critical illness cardiomyopathy.   Covering CoNS on blood although possible contaminant and Strep anginosus from wound.   Better without targeting VRE and Candida from wound or Candida in urine.   Now on low dose pressors but remains intubated with poor mental status.   Poor long term prognosis with high risk for recurrent infections.     Suggest  -Vancomycin 1GM IV q12h, monitor creatinine and troughs, goal 10-15   -Unasyn 3GM IV q6h   -no clear indication to broaden/change regimen if improving   -monitor cultures   -local wound care     Discussed with CCU     Epi Harvey MD   Infectious Disease   Available on TEAMS. After 5PM and on weekends please page fellow on call or call [phone]

## 2022-08-15 NOTE — DISCHARGE NOTE PROVIDER - NSDCCPCAREPLAN_GEN_ALL_CORE_FT
PRINCIPAL DISCHARGE DIAGNOSIS  Diagnosis: Necrotizing fasciitis  Assessment and Plan of Treatment: Continue wound care and the Wound VAC which is in place. Continue IV Antibiotic Zosyn 3.375grams IV every 8 hours (or, as per ability at [place]: 4.5grams every 8 hours over 30 min can be done outpatient for ease, as per our Infectious disease team) and Daptomycin 700mg every 24 hours;  LAST DOSAGES/ COMPLETE TREATMENT ON 9/23/22.  [place] can monitor your labs (CBC, BUN, Creatinine) as per provider discretion; however Creatine Kinase (CPK) should be monitored weekly when on Dapto. Dr. Epi Harvey ID from [facility] can be reached at [phone] if needed. Follow up with Dr. Epi Harvey ID within 4 weeks of discharge. Return to hospital for Fever, altered mental status, shortness of breath, chest pain, or other new concerning symptom.          SECONDARY DISCHARGE DIAGNOSES  Diagnosis: Thrombocytosis  Assessment and Plan of Treatment: Monitor with labs as outpatient. Your providers may consider starting Aspirin 81mg for you if your platelets continue to rise. At this time we are not discharging you on Aspirin.    Diagnosis: Acute systolic heart failure  Assessment and Plan of Treatment: You have severe Left Ventricular dysfunction; Heart Failure with Reduced Ejection Fraction (HFrEF) of 5 - 10%; likely due to stress induced cardiomyopathy. You required the Cardiac Care Unit (CCU) for a [place] catheter and pressor support. Now you have been stable post [place] catheter removal. Continue your cardiac medications of Metoprolol, Lasix, Lisinopril, and daily and potassium supplementation while on Lasix : daily KCL 20MEQ.  Follow up outpatient with cardiology routinely.       Diagnosis: Encounter for wound care  Assessment and Plan of Treatment: Wound surgery follow up for multiple pressure injuries.  Right axilla full thickness injury of unknown etiology  -Topical recommendations: Clean with NS, pat dry. Apply aquacel hydrofiber, cover with silicone foam with border. Change daily.  Right lateral trunk/flank wound within intertriginous fold complicated by pressure  -mixed tissue type, purple-maroon discoloration, fixed eschar.  -Topical recommendations: Clean with NS, pat dry. Apply medihoney gel to wound base, cover with silicone foam with border. Change daily.  Right trochanter stage 4 pressure injury  -No bone exposed  -Chronic stage 4 pressure injury. Narrow ulceration; visible base clean. Unable to palpate bone. (+) serosanguinous drainage. No purulence, no odor.   -Periwound skin without induration, no increased warmth, no edema, no erythema, no crepitus. No overt s/s of acute skin infection/cellulitis.  -Topical recommendations: pack with Aquacel hydrofiber, leaving 2" out at end, cover with silicone foam with border. Change daily.  Right buttock and left ischium mixed unstagable pressure injury and incontinence/moisture associated cellulitis.   -Mixed tissue types; eschar/slough, pink dermis, re-epithelialization.  -Topical Recommendations: Clean with NS. Pat dry. Apply collagenase nahun thickness to wound base, cover with silicone foam with border. Change daily or PRN if soiled/compromised.   Left lateral lower leg: proximal stage 4 pressure injury, distal stage 3 pressure injury:   -proximal wound base clean, agranular with viable tendon exposed 9minimal). (+) serous drainage. No purulent drainage no odor.   -Periwound skin of all without induration, no increased warmth, no edema, no erythema, no crepitus. No overt s/s of acute skin infection/cellulitis.  -Topical recommendations: pack with Aquacel hydrofiber, leaving 2" out at end, cover with silicone foam with border. Change daily.      Diagnosis: Encounter for wound care  Assessment and Plan of Treatment: Continued:   Left lateral malleolus unstageable pressure injury  -fixed slough.  -paint with betadine daily, cover with 4x4 gauze, wrap with kerlix. Change daily.  Bilateral heels unstageable pressure injuries  -stable eschar  -f/u Podiatry   Moisture associated dermatitis   -Bilateral axilla, submammary, abdominal pannus, bilateral groinwithout suspected candidiasis. Recommend; cleanse with luke-warm soap and water, dry well. Apply Interdry textile sheeting leaving 2" out at end to wick, remove to wash & dry affected area, then replace. Individual sheeting may be used for up to 5 days unless soiled. Inspect skin daily.   Right medial thigh  -area of induration; with stable cluster of stable eschar.  -Topical recommendations: apply liquid barrier film daily, cover with silicone foam with border.  Left flank to sacrum s/p excision debridement in OR for necrotizing fascitis with large wound  -NPWT/VAC therapy currently managed by WO team  -Consider use of Envella bed to minimize pressure to affected area.  Additional recommendations:  -Consider Envella bed. Currently on low airloss support surface.  -Continue turning and positioning w/ offloading assistive devices as per protocol  -Continue w/ Pericare as per protocol.   -Continue use of complete cair boots  -Nutrition recommendations appreciated when medically appropriate for patient with multiple full thickness pressure injuries,  now s/p debridement of necrotizing fascitis with NPWT/VAC in place.  Upon discharge follow up as outpatient at [facility] [address] [facility] [phone].     PRINCIPAL DISCHARGE DIAGNOSIS  Diagnosis: Necrotizing fasciitis  Assessment and Plan of Treatment: You have necrotizing fasciitis (dead skin spreading an infection) which led to Bacteremia (systemic blood infection). Continue wound care and the Wound VAC which is in place, at [facility]. Continue IV Antibiotic Zosyn 3.375grams IV every 8 hours (or, as per ability at [place]: 4.5grams every 8 hours over 30 min can be done outpatient for ease, as per our Infectious disease team) and Daptomycin 700mg every 24 hours;  LAST DOSAGES/ COMPLETE TREATMENT ON 9/23/22.  [place] can monitor your labs (CBC, BUN, Creatinine) as per provider discretion; however Creatine Kinase (CPK) should be monitored weekly when on Dapto. Dr. Epi JUSTICE from [facility] can be reached at [phone] if needed. Follow up with Dr. Epi JUSTICE within 4 weeks of discharge. Return to hospital for Fever, altered mental status, shortness of breath, chest pain, or other new concerning symptom.          SECONDARY DISCHARGE DIAGNOSES  Diagnosis: Thrombocytosis  Assessment and Plan of Treatment: Monitor with labs as outpatient. Your providers may consider starting Aspirin 81mg for you if your platelets continue to rise. At this time we are not discharging you on Aspirin.    Diagnosis: Anemia  Assessment and Plan of Treatment: Iron studies are consistent with Anemia of chronic disease likely due to your infections; B12 and folic acid were normal level. Stool guaiac positive (there was blood found in your stool); Continue to take daily Protonix/Pantoprazole to decrease stomach acid and prevent stomach bleeding; continue to monitor for black or bright red stools, lightheadedness, shortness of breath, or other concerning symptoms of anemia and return to hospital /call 911 if these occur. Monitor anemia with CBC lab regularly; your Hemoglobin has been stable around 8 here at [facility].       Diagnosis: Acute systolic heart failure  Assessment and Plan of Treatment: You have severe Left Ventricular dysfunction; Heart Failure with Reduced Ejection Fraction (HFrEF) of 5 - 10%; likely due to stress induced cardiomyopathy. You required the Cardiac Care Unit (CCU) for a [place] catheter and pressor support. Now you have been stable post [place] catheter removal. Continue your cardiac medications of Metoprolol, Lasix, Lisinopril, and daily and potassium supplementation while on Lasix : daily KCL 20MEQ.  Follow up outpatient with cardiology routinely and for an ischemic workup. Call 911 for chest pain, shortness of breath, fainting, altered mental status, or other new concerning symptom.       Diagnosis: Urinary retention  Assessment and Plan of Treatment: You had urinary retention while at [facility] however this resolved. You needed a Rose but now have been voiding on your own with a PrimaFit in place. Monitor for reoccurence of urinary retention or infection such as difficulty or no urination for greater than 6 hours, abdominal distention, flank pain, abdominal pain, urinary burning, fever or chills. Return to hospital and or notify medical provider immediately if this occurs as you may need another urinary catheter placed, Urology consult, and/or treatment for infection.    Diagnosis: Diabetes  Assessment and Plan of Treatment: A1c 5.5%. Continue insulin as ordered in this discharge, (or as per [facility] providers if they want to resume your home antidiabetic regimen).  Monitor for hypoglycemia (low blood sugar) symptoms such as tiredness, sweating, dizziness. Check blood sugars 4 times per day: before meals and at bedtime. Call medical provider any reading below 70 or greater than 400. See Primary care routinely for management and monitoring of your A1c. See podiatry and ophthalmology routinely for check ups.    Diagnosis: Encounter for wound care  Assessment and Plan of Treatment: Wound surgery follow up for multiple pressure injuries.  Right axilla full thickness injury of unknown etiology  -Topical recommendations: Clean with NS, pat dry. Apply aquacel hydrofiber, cover with silicone foam with border. Change daily.  Right lateral trunk/flank wound within intertriginous fold complicated by pressure  -mixed tissue type, purple-maroon discoloration, fixed eschar.  -Topical recommendations: Clean with NS, pat dry. Apply medihoney gel to wound base, cover with silicone foam with border. Change daily.  Right trochanter stage 4 pressure injury  -No bone exposed  -Chronic stage 4 pressure injury. Narrow ulceration; visible base clean. Unable to palpate bone. (+) serosanguinous drainage. No purulence, no odor.   -Periwound skin without induration, no increased warmth, no edema, no erythema, no crepitus. No overt s/s of acute skin infection/cellulitis.  -Topical recommendations: pack with Aquacel hydrofiber, leaving 2" out at end, cover with silicone foam with border. Change daily.  Right buttock and left ischium mixed unstagable pressure injury and incontinence/moisture associated cellulitis.   -Mixed tissue types; eschar/slough, pink dermis, re-epithelialization.  -Topical Recommendations: Clean with NS. Pat dry. Apply collagenase nahun thickness to wound base, cover with silicone foam with border. Change daily or PRN if soiled/compromised.   Left lateral lower leg: proximal stage 4 pressure injury, distal stage 3 pressure injury:   -proximal wound base clean, agranular with viable tendon exposed 9minimal). (+) serous drainage. No purulent drainage no odor.   -Periwound skin of all without induration, no increased warmth, no edema, no erythema, no crepitus. No overt s/s of acute skin infection/cellulitis.  -Topical recommendations: pack with Aquacel hydrofiber, leaving 2" out at end, cover with silicone foam with border. Change daily.      Diagnosis: Encounter for wound care  Assessment and Plan of Treatment: Continued:   Left lateral malleolus unstageable pressure injury  -fixed slough.  -paint with betadine daily, cover with 4x4 gauze, wrap with kerlix. Change daily.  Bilateral heels unstageable pressure injuries  -stable eschar  -f/u Podiatry   Moisture associated dermatitis   -Bilateral axilla, submammary, abdominal pannus, bilateral groinwithout suspected candidiasis. Recommend; cleanse with luke-warm soap and water, dry well. Apply Interdry textile sheeting leaving 2" out at end to wick, remove to wash & dry affected area, then replace. Individual sheeting may be used for up to 5 days unless soiled. Inspect skin daily.   Right medial thigh  -area of induration; with stable cluster of stable eschar.  -Topical recommendations: apply liquid barrier film daily, cover with silicone foam with border.  Left flank to sacrum s/p excision debridement in OR for necrotizing fascitis with large wound  -NPWT/VAC therapy currently managed by [facility] team  -Consider use of Envella bed to minimize pressure to affected area.  Additional recommendations:  -Consider Envella bed. Currently on low airloss support surface.  -Continue turning and positioning w/ offloading assistive devices as per protocol  -Continue w/ Pericare as per protocol.   -Continue use of complete cair boots  -Nutrition recommendations appreciated when medically appropriate for patient with multiple full thickness pressure injuries,  now s/p debridement of necrotizing fascitis with NPWT/VAC in place.  Upon discharge follow up as outpatient at [facility] [address] [place] [address] [phone].    Diagnosis: Pressure injury of skin of both heels  Assessment and Plan of Treatment: As per Podiatry:   Deep tissue injury of the posterior heel bilaterally   Deep tissue injury of the posterior heel with well adhered dry eschars B/L, no signs of infection, no purulence, no drainage, no erythema, no malodor, no hyperkeratotic surrounding the surface.   - Continue Z-flows at all times, bilaterally.  - Pod Plan: Local wound care. No surgical intervention at this time.      Diagnosis: Hypothyroidism  Assessment and Plan of Treatment: Your medical history of hypothyroid was unclearYou were on synthroid possibly as a home medication - however this was not continued at [facility] . Monitor your Thyroid Function Tests every 4-6 weeks as an outpatient to evaluate your thyroid function and see if you need medication at that time. Call 911 for any severe fatigue, lethargy, unresponsiveness, heart palpitations, chest pain, altered mental status, or other new concerning symptom.

## 2022-08-15 NOTE — PROGRESS NOTE ADULT - ASSESSMENT
51 y/o F w/ PMH uncontrolled DM2 now s/p debridement for nec fasc of flank.      -Repeat Echo today  -VAC change today, MWF schedule  -Rest of care per primary    Igor Bermudez MD  PGY-2  B Team Surgery  #[id number] 53 y/o F w/ PMH uncontrolled DM2 now s/p debridement for nec fasc of flank.      -Repeat Echo today  -VAC change today, MWF schedule  -Extubate as able  -Wean off pressors as able  -Rest of care per primary    Igor Bermudez MD  PGY-2  B Team Surgery  #[id number] 53 y/o F w/ PMH uncontrolled DM2 now s/p debridement for nec fasc of flank c/b cardiomyopathy w/ EF 5% requiring dobutamine drip in CCU.       -Repeat Echo today  -VAC change today, MWF schedule  -Extubate as able  -Wean off pressors as able  -Rest of care per primary    Igor Bermudez MD  PGY-2  B Team Surgery  #[id number]

## 2022-08-15 NOTE — PROGRESS NOTE ADULT - SUBJECTIVE AND OBJECTIVE BOX
Surgery Progress Note    Subjective:     Patient seen and examined at bedside. POD 4. Karla removed. Overnight CPAP failed 2/2 over sedation despite being off drips. Not following commands. Dobutamine off, Levo .04 downtrending. Patient currently intubated with RASS -3. WBC decreased to 20. 1U PRBC given. Off bicarb, no longer acidotic.     OBJECTIVE:     T(C): 36.9 (08-15-22 @ 12:00), Max: 38.5 (08-14-22 @ 13:00)  HR: 98 (08-15-22 @ 10:00) (82 - 119)  BP: --  RR: 11 (08-15-22 @ 10:00) (10 - 16)  SpO2: 100% (08-15-22 @ 09:00) (100% - 100%)  Wt(kg): --    I&O's Detail    14 Aug 2022 07:01  -  15 Aug 2022 07:00  --------------------------------------------------------  IN:    DOBUTamine: 14.8 mL    Enteral Tube Flush: 170 mL    FentaNYL: 28.8 mL    Glucerna 1.5: 410 mL    IV PiggyBack: 750 mL    Lactated Ringers: 720 mL    Norepinephrine: 153.8 mL    PRBCs (Packed Red Blood Cells): 350 mL  Total IN: 2597.4 mL    OUT:    Indwelling Catheter - Urethral (mL): 1530 mL    VAC (Vacuum Assisted Closure) System (mL): 100 mL  Total OUT: 1630 mL    Total NET: 967.4 mL      15 Aug 2022 07:01  -  15 Aug 2022 12:44  --------------------------------------------------------  IN:    Glucerna 1.5: 60 mL    Lactated Ringers: 90 mL    Norepinephrine: 11.1 mL  Total IN: 161.1 mL    OUT:    Indwelling Catheter - Urethral (mL): 135 mL  Total OUT: 135 mL    Total NET: 26.1 mL          PHYSICAL EXAM:    General: Intubated, sedated young woman. Obese  HEENT- R IJ, NGT  Extremities: A-line. L flank to midback covered by VAC, good seal  - Rose in    MEDICATIONS  (STANDING):  ampicillin/sulbactam  IVPB      ampicillin/sulbactam  IVPB 3 Gram(s) IV Intermittent every 6 hours  ascorbic acid 500 milliGRAM(s) Oral daily  chlorhexidine 0.12% Liquid 15 milliLiter(s) Oral Mucosa every 12 hours  chlorhexidine 2% Cloths 1 Application(s) Topical daily  collagenase Ointment 1 Application(s) Topical daily  ergocalciferol 34537 Unit(s) Oral every week  heparin   Injectable 5000 Unit(s) SubCutaneous every 8 hours  insulin lispro (ADMELOG) corrective regimen sliding scale   SubCutaneous every 6 hours  insulin lispro Injectable (ADMELOG) 5 Unit(s) SubCutaneous every 6 hours  lactated ringers. 1000 milliLiter(s) (30 mL/Hr) IV Continuous <Continuous>  levothyroxine Injectable 56 MICROGram(s) IV Push <User Schedule>  multivitamin 1 Tablet(s) Oral daily  norepinephrine Infusion 0.05 MICROgram(s)/kG/Min (4.63 mL/Hr) IV Continuous <Continuous>  nystatin Powder 1 Application(s) Topical two times a day  pantoprazole  Injectable 40 milliGRAM(s) IV Push every 12 hours  vancomycin  IVPB 1000 milliGRAM(s) IV Intermittent every 12 hours  vancomycin  IVPB        MEDICATIONS  (PRN):  acetaminophen    Suspension .. 650 milliGRAM(s) Oral every 6 hours PRN Temp greater or equal to 38.5C (101.3F)  ALBUTerol    90 MICROgram(s) HFA Inhaler 2 Puff(s) Inhalation every 6 hours PRN Shortness of Breath  HYDROmorphone  Injectable 1 milliGRAM(s) IV Push every 3 hours PRN Severe Pain (7 - 10)  HYDROmorphone  Injectable 0.5 milliGRAM(s) IV Push every 3 hours PRN Moderate Pain (4 - 6)  sodium chloride 0.9% lock flush 10 milliLiter(s) IV Push every 1 hour PRN Pre/post blood products, medications, blood draw, and to maintain line patency      LABS:                          8.8    28.68 )-----------( 358      ( 15 Aug 2022 00:14 )             26.6     08-15    135  |  103  |  18  ----------------------------<  185<H>  3.5   |  18<L>  |  0.44<L>    Ca    7.3<L>      15 Aug 2022 00:14  Phos  2.0     08-15  Mg     1.80     08-15    TPro  5.0<L>  /  Alb  1.7<L>  /  TBili  0.4  /  DBili  x   /  AST  18  /  ALT  8   /  AlkPhos  216<H>  08-15               Surgery Progress Note    Subjective:     Patient seen and examined at bedside. POD 4. Karla removed. Overnight CPAP failed 2/2 over sedation despite being off drips. Not following commands. Dobutamine off, Levo .04 downtrending. Patient currently intubated with RASS -3. Vent settings minimal, FiO2 30 PEEP 5. WBC decreased to 20. 1U PRBC given. Off bicarb, no longer acidotic.     OBJECTIVE:     T(C): 36.9 (08-15-22 @ 12:00), Max: 38.5 (08-14-22 @ 13:00)  HR: 98 (08-15-22 @ 10:00) (82 - 119)  BP: --  RR: 11 (08-15-22 @ 10:00) (10 - 16)  SpO2: 100% (08-15-22 @ 09:00) (100% - 100%)  Wt(kg): --    I&O's Detail    14 Aug 2022 07:01  -  15 Aug 2022 07:00  --------------------------------------------------------  IN:    DOBUTamine: 14.8 mL    Enteral Tube Flush: 170 mL    FentaNYL: 28.8 mL    Glucerna 1.5: 410 mL    IV PiggyBack: 750 mL    Lactated Ringers: 720 mL    Norepinephrine: 153.8 mL    PRBCs (Packed Red Blood Cells): 350 mL  Total IN: 2597.4 mL    OUT:    Indwelling Catheter - Urethral (mL): 1530 mL    VAC (Vacuum Assisted Closure) System (mL): 100 mL  Total OUT: 1630 mL    Total NET: 967.4 mL      15 Aug 2022 07:01  -  15 Aug 2022 12:44  --------------------------------------------------------  IN:    Glucerna 1.5: 60 mL    Lactated Ringers: 90 mL    Norepinephrine: 11.1 mL  Total IN: 161.1 mL    OUT:    Indwelling Catheter - Urethral (mL): 135 mL  Total OUT: 135 mL    Total NET: 26.1 mL          PHYSICAL EXAM:    General: Intubated, sedated young woman. Obese  HEENT- R IJ, NGT  Extremities: A-line. L flank to midback covered by VAC, good seal  - Rose in    MEDICATIONS  (STANDING):  ampicillin/sulbactam  IVPB      ampicillin/sulbactam  IVPB 3 Gram(s) IV Intermittent every 6 hours  ascorbic acid 500 milliGRAM(s) Oral daily  chlorhexidine 0.12% Liquid 15 milliLiter(s) Oral Mucosa every 12 hours  chlorhexidine 2% Cloths 1 Application(s) Topical daily  collagenase Ointment 1 Application(s) Topical daily  ergocalciferol 82008 Unit(s) Oral every week  heparin   Injectable 5000 Unit(s) SubCutaneous every 8 hours  insulin lispro (ADMELOG) corrective regimen sliding scale   SubCutaneous every 6 hours  insulin lispro Injectable (ADMELOG) 5 Unit(s) SubCutaneous every 6 hours  lactated ringers. 1000 milliLiter(s) (30 mL/Hr) IV Continuous <Continuous>  levothyroxine Injectable 56 MICROGram(s) IV Push <User Schedule>  multivitamin 1 Tablet(s) Oral daily  norepinephrine Infusion 0.05 MICROgram(s)/kG/Min (4.63 mL/Hr) IV Continuous <Continuous>  nystatin Powder 1 Application(s) Topical two times a day  pantoprazole  Injectable 40 milliGRAM(s) IV Push every 12 hours  vancomycin  IVPB 1000 milliGRAM(s) IV Intermittent every 12 hours  vancomycin  IVPB        MEDICATIONS  (PRN):  acetaminophen    Suspension .. 650 milliGRAM(s) Oral every 6 hours PRN Temp greater or equal to 38.5C (101.3F)  ALBUTerol    90 MICROgram(s) HFA Inhaler 2 Puff(s) Inhalation every 6 hours PRN Shortness of Breath  HYDROmorphone  Injectable 1 milliGRAM(s) IV Push every 3 hours PRN Severe Pain (7 - 10)  HYDROmorphone  Injectable 0.5 milliGRAM(s) IV Push every 3 hours PRN Moderate Pain (4 - 6)  sodium chloride 0.9% lock flush 10 milliLiter(s) IV Push every 1 hour PRN Pre/post blood products, medications, blood draw, and to maintain line patency      LABS:                          8.8    28.68 )-----------( 358      ( 15 Aug 2022 00:14 )             26.6     08-15    135  |  103  |  18  ----------------------------<  185<H>  3.5   |  18<L>  |  0.44<L>    Ca    7.3<L>      15 Aug 2022 00:14  Phos  2.0     08-15  Mg     1.80     08-15    TPro  5.0<L>  /  Alb  1.7<L>  /  TBili  0.4  /  DBili  x   /  AST  18  /  ALT  8   /  AlkPhos  216<H>  08-15

## 2022-08-15 NOTE — DISCHARGE NOTE PROVIDER - NSDCMRMEDTOKEN_GEN_ALL_CORE_FT
acetaminophen 325 mg oral tablet: 2 tab(s) orally every 6 hours, As needed, Temp greater or equal to 38C (100.4F), Mild Pain (1 - 3), Moderate Pain (4 - 6)  albuterol 90 mcg/inh inhalation aerosol: 2 puff(s) inhaled every 6 hours, As needed, Shortness of Breath  Basaglar KwikPen 100 units/mL subcutaneous solution: 30 unit(s) subcutaneous once a day (at bedtime)  cefepime 1 g intravenous injection: 1 gram(s) intravenous every 12 hours  ergocalciferol: 60931 unit(s) orally once a week  on Mondays  ferrous sulfate 325 mg (65 mg elemental iron) oral tablet: 1 tab(s) orally once a day  Flagyl 500 mg oral tablet: 1 tab(s) orally 3 times a day  folic acid 1 mg oral tablet: 1 tab(s) orally once a day  meclizine 25 mg oral tablet: 1 tab(s) orally 2 times a day  metFORMIN 1000 mg oral tablet: 1 tab(s) orally 2 times a day  Multiple Vitamins oral tablet: 1 tab(s) orally once a day  NovoLOG FlexPen 100 units/mL injectable solution: 8 unit(s) injectable 3 times a day (before meals)  nystatin 100,000 units/g topical powder: 1 application topically 3 times a day  Procrit 40,000 units/mL preservative-free injectable solution: 02619 unit(s) injectable once a week  Synthroid 75 mcg (0.075 mg) oral tablet: 1 tab(s) orally once a day  Trulicity Pen 0.75 mg/0.5 mL subcutaneous solution: 0.75 milligram(s) subcutaneous once a week Monday   acetaminophen 325 mg oral tablet: 2 tab(s) orally every 6 hours, As needed, Mild Pain (1 - 3), Moderate Pain (4 - 6)  do not exceed total acetaminophen dose per day of 3500mg in 24 hours.   albuterol 90 mcg/inh inhalation aerosol: 2 puff(s) inhaled every 6 hours, As needed, Shortness of Breath  ascorbic acid 500 mg oral tablet: 1 tab(s) orally once a day  cadexomer iodine 0.9% topical gel: 1 application topically once a day  chlorhexidine 2% topical pad: 1 application topically once a day  cholecalciferol oral tablet: 2000 unit(s) orally once a day  collagenase 250 units/g topical ointment: 1 application topically once a day  DAPTOmycin 500 mg intravenous injection: 700 milligram(s) intravenous every 24 hours  (to confirm, correct dose as per ID is 700mg q24h) given over 30 minutes. Last dose is 9/23/22.   furosemide 20 mg oral tablet: 1 tab(s) orally once a day  heparin: 5000 unit(s) subcutaneous every 8 hours  insulin glargine 100 units/mL subcutaneous solution: 14 unit(s) subcutaneous once a day (at bedtime)  insulin lispro 100 units/mL injectable solution: 8 unit(s) subcutaneous 3 times a day (before meals)   (do not take if not eating)  lisinopril 2.5 mg oral tablet: 1 tab(s) orally once a day  meclizine 12.5 mg oral tablet: 1 tab(s) orally 2 times a day, As needed, Dizziness  metoprolol succinate 25 mg oral tablet, extended release: 1 tab(s) orally once a day  Multiple Vitamins oral tablet: 1 tab(s) orally once a day  nystatin 100,000 units/g topical powder: 1 application topically 2 times a day  oxycodone-acetaminophen 5 mg-325 mg oral tablet: 1 tab(s) orally every 4 hours, As needed, Severe Pain (7 - 10)  do not exceed total acetaminophen dose per day of 3500mg in 24 hours.   pantoprazole 40 mg oral delayed release tablet: 1 tab(s) orally once a day (before a meal)  piperacillin-tazobactam: 3.375 gram(s) intravenous every 8 hours (over a 4 hour infusion time). Last day of treatment is 9/23/22.   potassium chloride 20 mEq oral tablet, extended release: 1 tab(s) orally once a day  sodium chloride 0.9% injectable solution: 10 milliliter(s) intravenous every 1 to 2 hours, As Needed pre/post use of Midline to maintain Midline patency

## 2022-08-16 LAB
ALBUMIN SERPL ELPH-MCNC: 1.8 G/DL — LOW (ref 3.3–5)
ALP SERPL-CCNC: 307 U/L — HIGH (ref 40–120)
ALT FLD-CCNC: 10 U/L — SIGNIFICANT CHANGE UP (ref 4–33)
ANION GAP SERPL CALC-SCNC: 13 MMOL/L — SIGNIFICANT CHANGE UP (ref 7–14)
AST SERPL-CCNC: 20 U/L — SIGNIFICANT CHANGE UP (ref 4–32)
BILIRUB SERPL-MCNC: 0.3 MG/DL — SIGNIFICANT CHANGE UP (ref 0.2–1.2)
BUN SERPL-MCNC: 19 MG/DL — SIGNIFICANT CHANGE UP (ref 7–23)
CALCIUM SERPL-MCNC: 7.5 MG/DL — LOW (ref 8.4–10.5)
CHLORIDE SERPL-SCNC: 106 MMOL/L — SIGNIFICANT CHANGE UP (ref 98–107)
CO2 SERPL-SCNC: 20 MMOL/L — LOW (ref 22–31)
CREAT SERPL-MCNC: 0.35 MG/DL — LOW (ref 0.5–1.3)
EGFR: 124 ML/MIN/1.73M2 — SIGNIFICANT CHANGE UP
GAS PNL BLDA: SIGNIFICANT CHANGE UP
GLUCOSE BLDC GLUCOMTR-MCNC: 109 MG/DL — HIGH (ref 70–99)
GLUCOSE BLDC GLUCOMTR-MCNC: 112 MG/DL — HIGH (ref 70–99)
GLUCOSE BLDC GLUCOMTR-MCNC: 135 MG/DL — HIGH (ref 70–99)
GLUCOSE BLDC GLUCOMTR-MCNC: 139 MG/DL — HIGH (ref 70–99)
GLUCOSE BLDC GLUCOMTR-MCNC: 143 MG/DL — HIGH (ref 70–99)
GLUCOSE SERPL-MCNC: 135 MG/DL — HIGH (ref 70–99)
HCT VFR BLD CALC: 26.6 % — LOW (ref 34.5–45)
HGB BLD-MCNC: 8.6 G/DL — LOW (ref 11.5–15.5)
MAGNESIUM SERPL-MCNC: 1.8 MG/DL — SIGNIFICANT CHANGE UP (ref 1.6–2.6)
MCHC RBC-ENTMCNC: 26.9 PG — LOW (ref 27–34)
MCHC RBC-ENTMCNC: 32.3 GM/DL — SIGNIFICANT CHANGE UP (ref 32–36)
MCV RBC AUTO: 83.1 FL — SIGNIFICANT CHANGE UP (ref 80–100)
NRBC # BLD: 0 /100 WBCS — SIGNIFICANT CHANGE UP
NRBC # FLD: 0 K/UL — SIGNIFICANT CHANGE UP
PHOSPHATE SERPL-MCNC: 2 MG/DL — LOW (ref 2.5–4.5)
PLATELET # BLD AUTO: 348 K/UL — SIGNIFICANT CHANGE UP (ref 150–400)
POTASSIUM SERPL-MCNC: 3.8 MMOL/L — SIGNIFICANT CHANGE UP (ref 3.5–5.3)
POTASSIUM SERPL-SCNC: 3.8 MMOL/L — SIGNIFICANT CHANGE UP (ref 3.5–5.3)
PROT SERPL-MCNC: 5.1 G/DL — LOW (ref 6–8.3)
RBC # BLD: 3.2 M/UL — LOW (ref 3.8–5.2)
RBC # FLD: 17.5 % — HIGH (ref 10.3–14.5)
SODIUM SERPL-SCNC: 139 MMOL/L — SIGNIFICANT CHANGE UP (ref 135–145)
WBC # BLD: 28.61 K/UL — HIGH (ref 3.8–10.5)
WBC # FLD AUTO: 28.61 K/UL — HIGH (ref 3.8–10.5)

## 2022-08-16 PROCEDURE — 99291 CRITICAL CARE FIRST HOUR: CPT

## 2022-08-16 PROCEDURE — 99232 SBSQ HOSP IP/OBS MODERATE 35: CPT

## 2022-08-16 PROCEDURE — 71045 X-RAY EXAM CHEST 1 VIEW: CPT | Mod: 26

## 2022-08-16 RX ORDER — MAGNESIUM SULFATE 500 MG/ML
2 VIAL (ML) INJECTION ONCE
Refills: 0 | Status: COMPLETED | OUTPATIENT
Start: 2022-08-16 | End: 2022-08-16

## 2022-08-16 RX ORDER — POTASSIUM PHOSPHATE, MONOBASIC POTASSIUM PHOSPHATE, DIBASIC 236; 224 MG/ML; MG/ML
30 INJECTION, SOLUTION INTRAVENOUS ONCE
Refills: 0 | Status: DISCONTINUED | OUTPATIENT
Start: 2022-08-16 | End: 2022-08-16

## 2022-08-16 RX ORDER — POTASSIUM CHLORIDE 20 MEQ
20 PACKET (EA) ORAL ONCE
Refills: 0 | Status: COMPLETED | OUTPATIENT
Start: 2022-08-16 | End: 2022-08-16

## 2022-08-16 RX ORDER — SODIUM,POTASSIUM PHOSPHATES 278-250MG
1 POWDER IN PACKET (EA) ORAL ONCE
Refills: 0 | Status: COMPLETED | OUTPATIENT
Start: 2022-08-16 | End: 2022-08-16

## 2022-08-16 RX ORDER — SODIUM CHLORIDE 9 MG/ML
1000 INJECTION, SOLUTION INTRAVENOUS
Refills: 0 | Status: DISCONTINUED | OUTPATIENT
Start: 2022-08-16 | End: 2022-08-22

## 2022-08-16 RX ADMIN — Medication 2 DROP(S): at 23:03

## 2022-08-16 RX ADMIN — Medication 5 UNIT(S): at 18:07

## 2022-08-16 RX ADMIN — HYDROMORPHONE HYDROCHLORIDE 1 MILLIGRAM(S): 2 INJECTION INTRAMUSCULAR; INTRAVENOUS; SUBCUTANEOUS at 00:00

## 2022-08-16 RX ADMIN — Medication 500 MILLIGRAM(S): at 12:17

## 2022-08-16 RX ADMIN — Medication 1 APPLICATION(S): at 12:16

## 2022-08-16 RX ADMIN — CHLORHEXIDINE GLUCONATE 15 MILLILITER(S): 213 SOLUTION TOPICAL at 05:15

## 2022-08-16 RX ADMIN — HEPARIN SODIUM 5000 UNIT(S): 5000 INJECTION INTRAVENOUS; SUBCUTANEOUS at 15:34

## 2022-08-16 RX ADMIN — Medication 2 DROP(S): at 05:16

## 2022-08-16 RX ADMIN — PANTOPRAZOLE SODIUM 40 MILLIGRAM(S): 20 TABLET, DELAYED RELEASE ORAL at 17:47

## 2022-08-16 RX ADMIN — HEPARIN SODIUM 5000 UNIT(S): 5000 INJECTION INTRAVENOUS; SUBCUTANEOUS at 05:15

## 2022-08-16 RX ADMIN — Medication 5 UNIT(S): at 12:18

## 2022-08-16 RX ADMIN — Medication 2 DROP(S): at 17:47

## 2022-08-16 RX ADMIN — AMPICILLIN SODIUM AND SULBACTAM SODIUM 200 GRAM(S): 250; 125 INJECTION, POWDER, FOR SUSPENSION INTRAMUSCULAR; INTRAVENOUS at 17:47

## 2022-08-16 RX ADMIN — CHLORHEXIDINE GLUCONATE 1 APPLICATION(S): 213 SOLUTION TOPICAL at 12:14

## 2022-08-16 RX ADMIN — Medication 250 MILLIGRAM(S): at 17:46

## 2022-08-16 RX ADMIN — AMPICILLIN SODIUM AND SULBACTAM SODIUM 200 GRAM(S): 250; 125 INJECTION, POWDER, FOR SUSPENSION INTRAMUSCULAR; INTRAVENOUS at 05:16

## 2022-08-16 RX ADMIN — Medication 5 UNIT(S): at 06:38

## 2022-08-16 RX ADMIN — Medication 2 DROP(S): at 12:16

## 2022-08-16 RX ADMIN — Medication 2000 UNIT(S): at 12:16

## 2022-08-16 RX ADMIN — Medication 56 MICROGRAM(S): at 05:16

## 2022-08-16 RX ADMIN — Medication 25 GRAM(S): at 05:16

## 2022-08-16 RX ADMIN — Medication 5 UNIT(S): at 00:14

## 2022-08-16 RX ADMIN — Medication 15 MILLILITER(S): at 12:16

## 2022-08-16 RX ADMIN — NYSTATIN CREAM 1 APPLICATION(S): 100000 CREAM TOPICAL at 17:47

## 2022-08-16 RX ADMIN — PANTOPRAZOLE SODIUM 40 MILLIGRAM(S): 20 TABLET, DELAYED RELEASE ORAL at 05:15

## 2022-08-16 RX ADMIN — Medication 20 MILLIEQUIVALENT(S): at 05:16

## 2022-08-16 RX ADMIN — Medication 1 PACKET(S): at 12:17

## 2022-08-16 RX ADMIN — AMPICILLIN SODIUM AND SULBACTAM SODIUM 200 GRAM(S): 250; 125 INJECTION, POWDER, FOR SUSPENSION INTRAMUSCULAR; INTRAVENOUS at 23:04

## 2022-08-16 RX ADMIN — AMPICILLIN SODIUM AND SULBACTAM SODIUM 200 GRAM(S): 250; 125 INJECTION, POWDER, FOR SUSPENSION INTRAMUSCULAR; INTRAVENOUS at 12:17

## 2022-08-16 RX ADMIN — NYSTATIN CREAM 1 APPLICATION(S): 100000 CREAM TOPICAL at 05:18

## 2022-08-16 RX ADMIN — Medication 5 UNIT(S): at 23:05

## 2022-08-16 RX ADMIN — CHLORHEXIDINE GLUCONATE 15 MILLILITER(S): 213 SOLUTION TOPICAL at 17:48

## 2022-08-16 RX ADMIN — HYDROMORPHONE HYDROCHLORIDE 1 MILLIGRAM(S): 2 INJECTION INTRAMUSCULAR; INTRAVENOUS; SUBCUTANEOUS at 00:15

## 2022-08-16 RX ADMIN — HEPARIN SODIUM 5000 UNIT(S): 5000 INJECTION INTRAVENOUS; SUBCUTANEOUS at 22:08

## 2022-08-16 RX ADMIN — Medication 250 MILLIGRAM(S): at 05:17

## 2022-08-16 NOTE — PROGRESS NOTE ADULT - ASSESSMENT
52F BMI 36, DM, MS bedbound with chronic sacral decubitus.   Here 8/10 with fever, necrotizing sacral wound.   s/p wide OR surgical debridement 8/11.   In CCU for shock, LV dysfunction, possible critical illness cardiomyopathy.   Covering CoNS on blood although possible contaminant and Strep anginosus from wound.   The VRE and Candida from wound or Candida in urine don't seem clinically relevant.   Improved - low dose pressors, mental status better.   Poor long term prognosis with high risk for recurrent infections.     Suggest  -Unasyn 3GM IV q6h   -complete Vancomycin tomorrow for a 1 week course for possible Staph bacteremia  -monitor cultures   -no clear indication to broaden/change regimen if improved/stable  -anticipate a 2-week course of antibiotics from debridement   -local wound care     Discussed with CCU   I will be away tomorrow, returning Thurs. If follow up is needed tomorrow please page on call fellow.     Epi Harvey MD   Infectious Disease   Available on TEAMS. After 5PM and on weekends please page fellow on call or call [phone]

## 2022-08-16 NOTE — PROGRESS NOTE ADULT - ASSESSMENT
52F immobile 2/2 h/o MS, DM2, asthma, hypothyroidism, and known sacral wounds presents from [place] with urosepsis and anemia with new L flank wound with concern for necrotizing soft tissue infection. Was s/p debridement of wound with worsening sepsis, acidosis, and severe global LV dysfunction of unknown etiology, transferred to CCU for [place] and inotrope support. Pt is s/p swan removal and off all sedation, weaning off pressors. Repeat echo showed EF of 5-10% and signs suggestive of possible stress induced cardiomyopathy. Pt's mental status is improving off sedation.     NEURO:  - remains intubated (CPAP), off all sedation  - no CT head needed at this time as pt appears to be improving    RESP;  - intubated  - Vent settings: CPAP/PSV 10/5/2/30% - breathing at 20 breaths/min with tidal volumes of 250mL  - bicarb drip discontinued  - Blood gas on CPAP wnl    CV:  # severe LV dysfunction  - EF 5 - 10%  - concern for cardiomyopathy of unknown etiology but may be R/T sepsis and critical illness  - s/p Dobutamine, s/p swan removal  - vasopressin discontinued 8/13, continuing to wean levophed, currently 0.04 mg/kg/min  - lactate wnl  - hold off on GDMT at this time  - repeat ECHO to reassess LV function showed EF of 5-10%     GI:  - NGT in place  - tolerating trickle feeds at present, will not advance as yet as we are attempting CPAP trials    /renal:  # metabolic acidosis  - appears resolved  - off bicarb drip at present  - follow labs  - f/u any further renal recs  - Scr stable    ENDO:  # DM2  - A1c 5.5  - FS elevated but now better controlled  - was started on steroids post op, ?stress dose, was tapered, d/c'ed on 8/15 given concern of sepsis  - continue FS/ IHSS as indicated    HEME:  - Hb dropped to 8.1 on 8/14 from 9, s/p 1 unit pRBCs, now stable  - unlikely 2/2 bleeding  - continue to monitor Hb    ID:  # s/p L flank wound debridement  - dressings intact and wound vac in place  - WBC 28.8 and no fevers overnight, tylenol PRN if pt spikes fever  - now following rectal or axillary readings  - ID consult noted and appreciated  - repeat blood cultures sent as requested  - changed meropenem to Unasyn 3 grams IV Q6h  - Vanco level 15.2 on 5/14, restarted Vancomycin 1000 mg IVPB Q12H per ID recs   - no plan for further debridement, wound care to change vac M/W/F prn per surgery recs      DVT PPx:  - Heparin SQ 52F immobile 2/2 h/o MS, DM2, asthma, hypothyroidism, and known sacral wounds presents from [place] with urosepsis and anemia with new L flank wound with concern for necrotizing soft tissue infection. Was s/p debridement of wound with worsening sepsis, acidosis, and severe global LV dysfunction of unknown etiology, transferred to CCU for [place] and inotrope support. Pt is s/p swan removal and off all sedation, weaning off pressors. Repeat echo showed EF of 5-10% and signs suggestive of possible stress induced cardiomyopathy. Pt's mental status is improving off sedation.     NEURO:  - remains intubated (CPAP), off all sedation  - no CT head needed at this time as pt appears to be improving    RESP;  - intubated  - Vent settings: CPAP/PSV 10/5/2/30% - breathing at 20 breaths/min with tidal volumes of 250mL  - bicarb drip discontinued  - Blood gas on CPAP wnl    CV:  # severe LV dysfunction  - EF 5 - 10%  - concern for cardiomyopathy of unknown etiology but may be R/T sepsis and critical illness  - s/p Dobutamine, s/p swan removal  - vasopressin discontinued 8/13, continuing to wean levophed, currently 0.04 mg/kg/min  - lactate wnl  - hold off on GDMT at this time  - repeat ECHO to reassess LV function showed EF of 5-10%     GI:  - NGT in place  - tolerating trickle feeds at present, will not advance as yet as we are attempting CPAP trials    /renal:  # metabolic acidosis  - appears resolved  - off bicarb drip at present  - follow labs  - f/u any further renal recs  - Scr stable    ENDO:  # DM2  - A1c 5.5  - FS elevated but now better controlled  - was started on steroids post op, ?stress dose, was tapered, d/c'ed on 8/15 given concern of sepsis  - continue FS/ IHSS as indicated    HEME:  - Hb dropped to 8.1 on 8/14 from 9, s/p 1 unit pRBCs, now stable  - unlikely 2/2 bleeding  - continue to monitor Hb    ID:  # s/p L flank wound debridement  - dressings intact and wound vac in place  - WBC 28.8 and no fevers overnight, tylenol PRN if pt spikes fever  - now following rectal or axillary readings  - ID consult noted and appreciated  - repeat blood cultures sent as requested  - changed meropenem to Unasyn 3 grams IV Q6h  - Vanco level 15.2 on 5/14, restarted Vancomycin 1000 mg IVPB Q12H per ID recs   - no plan for further debridement, wound care to change vac M/W/F prn per surgery recs  - blood cx from 8/11 grew VRE, no need to change abx management at this time per ID recs      DVT PPx:  - Heparin SQ

## 2022-08-16 NOTE — PROGRESS NOTE ADULT - ASSESSMENT
51 y/o F w/ PMH uncontrolled DM2 now s/p debridement for nec fasc of flank c/b cardiomyopathy w/ EF 5% requiring dobutamine drip in CCU.         -VAC change today, MWF schedule  -Extubate as able  -Wean off pressors as able  -Rest of care per primary    Igor Bermudez MD  PGY-2  B Team Surgery  #[id number]

## 2022-08-16 NOTE — PROGRESS NOTE ADULT - SUBJECTIVE AND OBJECTIVE BOX
Surgery Progress Note    Subjective:     Patient seen and examined at bedside. Improved responsiveness on exam and per patient. CPAP trial this AM. Levo .06. Echo yesterday with persistently low EF. WBC 28.     OBJECTIVE:     T(C): 37.1 (08-16-22 @ 08:00), Max: 37.1 (08-16-22 @ 08:00)  HR: 94 (08-16-22 @ 08:00) (71 - 109)  BP: --  RR: 14 (08-16-22 @ 08:00) (9 - 21)  SpO2: 100% (08-16-22 @ 08:00) (95% - 100%)  Wt(kg): --    I&O's Detail    15 Aug 2022 07:01  -  16 Aug 2022 07:00  --------------------------------------------------------  IN:    Enteral Tube Flush: 70 mL    Glucerna 1.5: 420 mL    IV PiggyBack: 1199 mL    Lactated Ringers: 720 mL    Norepinephrine: 59.9 mL  Total IN: 2468.9 mL    OUT:    Indwelling Catheter - Urethral (mL): 775 mL    VAC (Vacuum Assisted Closure) System (mL): 150 mL  Total OUT: 925 mL    Total NET: 1543.9 mL          PHYSICAL EXAM:    General: Intubated, sedated young woman. Obese  HEENT- R IJ, NGT  Extremities: A-line. L flank to midback covered by VAC, good seal  - Rose in    MEDICATIONS  (STANDING):  ampicillin/sulbactam  IVPB      ampicillin/sulbactam  IVPB 3 Gram(s) IV Intermittent every 6 hours  artificial tears (preservative free) Ophthalmic Solution 2 Drop(s) Both EYES four times a day  ascorbic acid 500 milliGRAM(s) Oral daily  chlorhexidine 0.12% Liquid 15 milliLiter(s) Oral Mucosa every 12 hours  chlorhexidine 2% Cloths 1 Application(s) Topical daily  cholecalciferol 2000 Unit(s) Oral daily  collagenase Ointment 1 Application(s) Topical daily  heparin   Injectable 5000 Unit(s) SubCutaneous every 8 hours  insulin lispro (ADMELOG) corrective regimen sliding scale   SubCutaneous every 6 hours  insulin lispro Injectable (ADMELOG) 5 Unit(s) SubCutaneous every 6 hours  lactated ringers. 1000 milliLiter(s) (30 mL/Hr) IV Continuous <Continuous>  levothyroxine Injectable 56 MICROGram(s) IV Push <User Schedule>  multivitamin/minerals/iron Oral Solution (CENTRUM) 15 milliLiter(s) Oral daily  norepinephrine Infusion 0.05 MICROgram(s)/kG/Min (4.63 mL/Hr) IV Continuous <Continuous>  nystatin Powder 1 Application(s) Topical two times a day  pantoprazole  Injectable 40 milliGRAM(s) IV Push every 12 hours  potassium phosphate IVPB 30 milliMole(s) IV Intermittent once  vancomycin  IVPB      vancomycin  IVPB 1000 milliGRAM(s) IV Intermittent every 12 hours    MEDICATIONS  (PRN):  acetaminophen    Suspension .. 650 milliGRAM(s) Oral every 6 hours PRN Temp greater or equal to 38.5C (101.3F)  ALBUTerol    90 MICROgram(s) HFA Inhaler 2 Puff(s) Inhalation every 6 hours PRN Shortness of Breath  HYDROmorphone  Injectable 1 milliGRAM(s) IV Push every 3 hours PRN Severe Pain (7 - 10)  HYDROmorphone  Injectable 0.5 milliGRAM(s) IV Push every 3 hours PRN Moderate Pain (4 - 6)  sodium chloride 0.9% lock flush 10 milliLiter(s) IV Push every 1 hour PRN Pre/post blood products, medications, blood draw, and to maintain line patency      LABS:                          8.6    28.61 )-----------( 348      ( 16 Aug 2022 00:12 )             26.6     08-16    139  |  106  |  19  ----------------------------<  135<H>  3.8   |  20<L>  |  0.35<L>    Ca    7.5<L>      16 Aug 2022 00:12  Phos  2.0     08-16  Mg     1.80     08-16    TPro  5.1<L>  /  Alb  1.8<L>  /  TBili  0.3  /  DBili  x   /  AST  20  /  ALT  10  /  AlkPhos  307<H>  08-16

## 2022-08-16 NOTE — PROGRESS NOTE ADULT - SUBJECTIVE AND OBJECTIVE BOX
Al Duenas MD PGY1    PATIENT:  BOY CERON  [id number]    CHIEF COMPLAINT:  Patient is a 52y old  Female who presents with a chief complaint of Soft tissue infection (15 Aug 2022 17:27)      INTERVAL HISTORY/OVERNIGHT EVENTS: Pt was on AC overnight and put back on CPAP/PSV at 6am today. No acute events overnight. Pt is more alert today. Blinks in response to bright light and moves fingers when asked to squeeze my fingers.      MEDICATIONS:  MEDICATIONS  (STANDING):  ampicillin/sulbactam  IVPB      ampicillin/sulbactam  IVPB 3 Gram(s) IV Intermittent every 6 hours  artificial tears (preservative free) Ophthalmic Solution 2 Drop(s) Both EYES four times a day  ascorbic acid 500 milliGRAM(s) Oral daily  chlorhexidine 0.12% Liquid 15 milliLiter(s) Oral Mucosa every 12 hours  chlorhexidine 2% Cloths 1 Application(s) Topical daily  cholecalciferol 2000 Unit(s) Oral daily  collagenase Ointment 1 Application(s) Topical daily  heparin   Injectable 5000 Unit(s) SubCutaneous every 8 hours  insulin lispro (ADMELOG) corrective regimen sliding scale   SubCutaneous every 6 hours  insulin lispro Injectable (ADMELOG) 5 Unit(s) SubCutaneous every 6 hours  lactated ringers. 1000 milliLiter(s) (30 mL/Hr) IV Continuous <Continuous>  levothyroxine Injectable 56 MICROGram(s) IV Push <User Schedule>  multivitamin/minerals/iron Oral Solution (CENTRUM) 15 milliLiter(s) Oral daily  norepinephrine Infusion 0.05 MICROgram(s)/kG/Min (4.63 mL/Hr) IV Continuous <Continuous>  nystatin Powder 1 Application(s) Topical two times a day  pantoprazole  Injectable 40 milliGRAM(s) IV Push every 12 hours  vancomycin  IVPB      vancomycin  IVPB 1000 milliGRAM(s) IV Intermittent every 12 hours    MEDICATIONS  (PRN):  acetaminophen    Suspension .. 650 milliGRAM(s) Oral every 6 hours PRN Temp greater or equal to 38.5C (101.3F)  ALBUTerol    90 MICROgram(s) HFA Inhaler 2 Puff(s) Inhalation every 6 hours PRN Shortness of Breath  HYDROmorphone  Injectable 1 milliGRAM(s) IV Push every 3 hours PRN Severe Pain (7 - 10)  HYDROmorphone  Injectable 0.5 milliGRAM(s) IV Push every 3 hours PRN Moderate Pain (4 - 6)  sodium chloride 0.9% lock flush 10 milliLiter(s) IV Push every 1 hour PRN Pre/post blood products, medications, blood draw, and to maintain line patency      ALLERGIES:  Allergies    No Known Drug Allergies  Pineapple (Anaphylaxis)    Intolerances        OBJECTIVE:  ICU Vital Signs Last 24 Hrs  T(C): 36.7 (16 Aug 2022 04:00), Max: 37.1 (15 Aug 2022 08:00)  T(F): 98.1 (16 Aug 2022 04:00), Max: 98.8 (15 Aug 2022 08:00)  HR: 96 (16 Aug 2022 07:06) (71 - 109)  BP: --  BP(mean): --  ABP: 100/56 (16 Aug 2022 06:00) (93/52 - 145/80)  ABP(mean): 74 (16 Aug 2022 06:00) (68 - 106)  RR: 15 (16 Aug 2022 06:00) (9 - 21)  SpO2: 100% (16 Aug 2022 07:06) (95% - 100%)    O2 Parameters below as of 16 Aug 2022 06:00  Patient On (Oxygen Delivery Method): ventilator    O2 Concentration (%): 30      Mode: CPAP with PS  FiO2: 30  PEEP: 5  PS: 10  MAP: 8  PIP: 16    Adult Advanced Hemodynamics Last 24 Hrs  CVP(mm Hg): --  CVP(cm H2O): --  CO: --  CI: --  PA: --  PA(mean): --  PCWP: --  SVR: --  SVRI: --  PVR: --  PVRI: --  CAPILLARY BLOOD GLUCOSE      POCT Blood Glucose.: 143 mg/dL (16 Aug 2022 06:36)  POCT Blood Glucose.: 135 mg/dL (16 Aug 2022 00:11)  POCT Blood Glucose.: 137 mg/dL (15 Aug 2022 18:18)  POCT Blood Glucose.: 195 mg/dL (15 Aug 2022 12:15)    CAPILLARY BLOOD GLUCOSE      POCT Blood Glucose.: 143 mg/dL (16 Aug 2022 06:36)    I&O's Summary    15 Aug 2022 07:01  -  16 Aug 2022 07:00  --------------------------------------------------------  IN: 2468.9 mL / OUT: 925 mL / NET: 1543.9 mL      Daily     Daily Weight in k (16 Aug 2022 00:00)    PHYSICAL EXAMINATION:  General: NAD, intubated  HEENT: conjunctiva and sclera clear  Neurology: A&Ox0  Respiratory: CTA B/L anteriorly, some increased breath sounds  CV: RRR, S1S2, no murmurs, rubs or gallops  Abdominal: Soft, NT, ND +BS  Extremities: No edema, + peripheral pulses, dressings c/d/i   lines: midline, IJ, peripheral IV, toussaint      LABS:  ABG - ( 16 Aug 2022 00:12 )  pH, Arterial: 7.48  pH, Blood: x     /  pCO2: 29    /  pO2: 171   / HCO3: 22    / Base Excess: -1.4  /  SaO2: 99.5                            8.6    28.61 )-----------( 348      ( 16 Aug 2022 00:12 )             26.6     08-16    139  |  106  |  19  ----------------------------<  135<H>  3.8   |  20<L>  |  0.35<L>    Ca    7.5<L>      16 Aug 2022 00:12  Phos  2.0     -  Mg     1.80         TPro  5.1<L>  /  Alb  1.8<L>  /  TBili  0.3  /  DBili  x   /  AST  20  /  ALT  10  /  AlkPhos  307<H>  08    LIVER FUNCTIONS - ( 16 Aug 2022 00:12 )  Alb: 1.8 g/dL / Pro: 5.1 g/dL / ALK PHOS: 307 U/L / ALT: 10 U/L / AST: 20 U/L / GGT: x                       TELEMETRY:     EKG:     IMAGING:

## 2022-08-16 NOTE — PROGRESS NOTE ADULT - SUBJECTIVE AND OBJECTIVE BOX
Follow Up: wound infection    Interval History/ROS: Afebrile. More alert and responsive. Remains intubated. Low dose pressors.     Allergies  No Known Drug Allergies  Pineapple (Anaphylaxis)        ANTIMICROBIALS:  ampicillin/sulbactam  IVPB    ampicillin/sulbactam  IVPB 3 every 6 hours  vancomycin  IVPB 1000 every 12 hours  vancomycin  IVPB        OTHER MEDS:  acetaminophen    Suspension .. 650 milliGRAM(s) Oral every 6 hours PRN  ALBUTerol    90 MICROgram(s) HFA Inhaler 2 Puff(s) Inhalation every 6 hours PRN  artificial tears (preservative free) Ophthalmic Solution 2 Drop(s) Both EYES four times a day  ascorbic acid 500 milliGRAM(s) Oral daily  chlorhexidine 0.12% Liquid 15 milliLiter(s) Oral Mucosa every 12 hours  chlorhexidine 2% Cloths 1 Application(s) Topical daily  cholecalciferol 2000 Unit(s) Oral daily  collagenase Ointment 1 Application(s) Topical daily  heparin   Injectable 5000 Unit(s) SubCutaneous every 8 hours  HYDROmorphone  Injectable 1 milliGRAM(s) IV Push every 3 hours PRN  HYDROmorphone  Injectable 0.5 milliGRAM(s) IV Push every 3 hours PRN  insulin lispro (ADMELOG) corrective regimen sliding scale   SubCutaneous every 6 hours  insulin lispro Injectable (ADMELOG) 5 Unit(s) SubCutaneous every 6 hours  lactated ringers. 1000 milliLiter(s) IV Continuous <Continuous>  levothyroxine Injectable 56 MICROGram(s) IV Push <User Schedule>  multivitamin/minerals/iron Oral Solution (CENTRUM) 15 milliLiter(s) Oral daily  norepinephrine Infusion 0.05 MICROgram(s)/kG/Min IV Continuous <Continuous>  nystatin Powder 1 Application(s) Topical two times a day  pantoprazole  Injectable 40 milliGRAM(s) IV Push every 12 hours  sodium chloride 0.9% lock flush 10 milliLiter(s) IV Push every 1 hour PRN      Vital Signs Last 24 Hrs  T(C): 37.1 (16 Aug 2022 08:00), Max: 37.1 (16 Aug 2022 08:00)  T(F): 98.7 (16 Aug 2022 08:00), Max: 98.7 (16 Aug 2022 08:00)  HR: 109 (16 Aug 2022 13:00) (71 - 109)  BP: --  BP(mean): --  RR: 17 (16 Aug 2022 13:00) (10 - 21)  SpO2: 99% (16 Aug 2022 13:00) (99% - 100%)    Parameters below as of 16 Aug 2022 12:00  Patient On (Oxygen Delivery Method): ventilator,CPAP        Physical Exam:  General: non toxic, obese, makes eye contact, looks around   Cardio: regular rate   Respiratory: mechanically ventilated   abd: soft, nontender   Skin: no rash. woundvac in place                           8.6    28.61 )-----------( 348      ( 16 Aug 2022 00:12 )             26.6       08-16    139  |  106  |  19  ----------------------------<  135<H>  3.8   |  20<L>  |  0.35<L>    Ca    7.5<L>      16 Aug 2022 00:12  Phos  2.0     08-16  Mg     1.80     08-16    TPro  5.1<L>  /  Alb  1.8<L>  /  TBili  0.3  /  DBili  x   /  AST  20  /  ALT  10  /  AlkPhos  307<H>  08-16          MICROBIOLOGY:  Culture - Blood (collected 08-14-22 @ 00:18)  Source: .Blood Blood  Preliminary Report (08-15-22 @ 03:01):    No growth to date.    Culture - Blood (collected 08-14-22 @ 00:18)  Source: .Blood Blood  Preliminary Report (08-15-22 @ 03:01):    No growth to date.    Culture - Blood (collected 08-13-22 @ 18:30)  Source: .Blood Blood-Venous  Preliminary Report (08-14-22 @ 21:01):    No growth to date.    Culture - Blood (collected 08-13-22 @ 18:00)  Source: .Blood Blood-Peripheral  Preliminary Report (08-14-22 @ 21:01):    No growth to date.    Culture - Acid Fast - Other w/Smear (collected 08-11-22 @ 04:49)  Source: .Other  Preliminary Report (08-13-22 @ 15:04):    Culture is being performed.    Culture - Fungal, Other (collected 08-11-22 @ 04:49)  Source: .Other  Preliminary Report (08-15-22 @ 13:38):    Rare Candida glabrata    Culture - Other (collected 08-11-22 @ 04:49)  Source: .Other  Final Report (08-15-22 @ 14:50):    Numerous Streptococcus anginosus "Susceptibilities not performed"    Rare Enterococcus faecium (vancomycin resistant)  Organism: Enterococcus faecium (vancomycin resistant) (08-15-22 @ 14:50)  Organism: Enterococcus faecium (vancomycin resistant) (08-15-22 @ 14:50)      -  Ampicillin: R >8 Predicts results to ampicillin/sulbactam, amoxacillin-clavulanate and  piperacillin-tazobactam.      -  Daptomycin: SDD 4 The breakpoint for SDD (sensitive dose dependent)is based on a dosage regimen of 8-12 mg/kg administered every 24 h in adults and is intended for serious infections due to E. faecium. Consultation with an infectious diseases specialist is recommended.      -  Levofloxacin: R >4      -  Linezolid: S 1      -  Tetra/Doxy: R >8      -  Vancomycin: R >16      Method Type: DHAVAL    Culture - Urine (collected 08-10-22 @ 22:30)  Source: Clean Catch Clean Catch (Midstream)  Final Report (08-13-22 @ 17:06):    >100,000 CFU/ml Candida glabrata "Susceptibilities not performed"    Culture - Blood (collected 08-10-22 @ 22:30)  Source: .Blood Blood-Peripheral  Gram Stain (08-12-22 @ 22:56):    Growth in anaerobic bottle: Gram Positive Cocci in Clusters  Final Report (08-14-22 @ 16:03):    Growth in anaerobic bottle: Staphylococcus epidermidis  Organism: Staphylococcus epidermidis (08-14-22 @ 16:03)  Organism: Staphylococcus epidermidis (08-14-22 @ 16:03)      -  Ampicillin/Sulbactam: R <=8/4      -  Cefazolin: R <=4      -  Clindamycin: S <=0.25      -  Erythromycin: S <=0.25      -  Gentamicin: S <=1 Should not be used as monotherapy      -  Oxacillin: R >2      -  Penicillin: R 8      -  Rifampin: S <=1 Should not be used as monotherapy      -  Tetra/Doxy: S <=1      -  Trimethoprim/Sulfamethoxazole: S <=0.5/9.5      -  Vancomycin: S 2      Method Type: DHAVAL    Culture - Blood (collected 08-10-22 @ 22:20)  Source: .Blood Blood-Venous  Gram Stain (08-12-22 @ 01:57):    Growth in aerobic bottle: Gram Positive Cocci in Clusters  Final Report (08-12-22 @ 22:30):    Growth in aerobic bottle: Staphylococcus epidermidis    Coag Negative Staphylococcus    Single set isolate, possible contaminant. Contact    Microbiology if susceptibility testing clinically    indicated.    ***Blood Panel PCR results on this specimen are available    approximately 3 hours after the Gram stain result.***    Gram stain, PCR, and/or culture results may not always    correspond due to difference in methodologies.    ************************************************************    This PCR assay was performed by multiplex PCR. This    Assay tests for 66 bacterial and resistance gene targets.    Please refer to the [facility] test directory    at [email or web address] for details.  Organism: Blood Culture PCR (08-12-22 @ 22:30)  Organism: Blood Culture PCR (08-12-22 @ 22:30)      -  Staphylococcus epidermidis, Methicillin resistant: Detec      Method Type: PCR    RADIOLOGY:  Images below reviewed personally  Xray Chest 1 View- PORTABLE-Routine (Xray Chest 1 View- PORTABLE-Routine in AM.) (08.14.22 @ 11:54)   ETT tip above vahe. Right IJ catheter tip at the right   pulmonary artery. Cardiomegaly. Elevated right hemidiaphragm.    CT Abdomen and Pelvis No Cont (08.10.22 @ 23:30)   Large open sacral decubitus wound extending to bone. Foci of air in the   left gluteus musculature and superior to the wound. 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  Absence of the coccyx and distal sacrum, may related to prior resection.   Correlate with prior surgical history.

## 2022-08-17 LAB
ALBUMIN SERPL ELPH-MCNC: 2 G/DL — LOW (ref 3.3–5)
ALP SERPL-CCNC: 301 U/L — HIGH (ref 40–120)
ALT FLD-CCNC: 10 U/L — SIGNIFICANT CHANGE UP (ref 4–33)
ANION GAP SERPL CALC-SCNC: 11 MMOL/L — SIGNIFICANT CHANGE UP (ref 7–14)
AST SERPL-CCNC: 18 U/L — SIGNIFICANT CHANGE UP (ref 4–32)
BASE EXCESS BLDA CALC-SCNC: -0.3 MMOL/L — SIGNIFICANT CHANGE UP (ref -2–3)
BILIRUB SERPL-MCNC: 0.2 MG/DL — SIGNIFICANT CHANGE UP (ref 0.2–1.2)
BUN SERPL-MCNC: 14 MG/DL — SIGNIFICANT CHANGE UP (ref 7–23)
CALCIUM SERPL-MCNC: 7.7 MG/DL — LOW (ref 8.4–10.5)
CHLORIDE SERPL-SCNC: 109 MMOL/L — HIGH (ref 98–107)
CO2 BLDA-SCNC: 24 MMOL/L — SIGNIFICANT CHANGE UP (ref 19–24)
CO2 SERPL-SCNC: 22 MMOL/L — SIGNIFICANT CHANGE UP (ref 22–31)
CREAT SERPL-MCNC: 0.31 MG/DL — LOW (ref 0.5–1.3)
EGFR: 128 ML/MIN/1.73M2 — SIGNIFICANT CHANGE UP
GLUCOSE BLDC GLUCOMTR-MCNC: 108 MG/DL — HIGH (ref 70–99)
GLUCOSE BLDC GLUCOMTR-MCNC: 110 MG/DL — HIGH (ref 70–99)
GLUCOSE BLDC GLUCOMTR-MCNC: 117 MG/DL — HIGH (ref 70–99)
GLUCOSE BLDC GLUCOMTR-MCNC: 121 MG/DL — HIGH (ref 70–99)
GLUCOSE SERPL-MCNC: 120 MG/DL — HIGH (ref 70–99)
HCO3 BLDA-SCNC: 23 MMOL/L — SIGNIFICANT CHANGE UP (ref 21–28)
HCT VFR BLD CALC: 26.5 % — LOW (ref 34.5–45)
HGB BLD-MCNC: 8.4 G/DL — LOW (ref 11.5–15.5)
MAGNESIUM SERPL-MCNC: 1.7 MG/DL — SIGNIFICANT CHANGE UP (ref 1.6–2.6)
MCHC RBC-ENTMCNC: 26.8 PG — LOW (ref 27–34)
MCHC RBC-ENTMCNC: 31.7 GM/DL — LOW (ref 32–36)
MCV RBC AUTO: 84.7 FL — SIGNIFICANT CHANGE UP (ref 80–100)
NRBC # BLD: 0 /100 WBCS — SIGNIFICANT CHANGE UP (ref 0–0)
NRBC # FLD: 0 K/UL — SIGNIFICANT CHANGE UP (ref 0–0)
PCO2 BLDA: 32 MMHG — SIGNIFICANT CHANGE UP (ref 32–35)
PH BLDA: 7.46 — HIGH (ref 7.35–7.45)
PHOSPHATE SERPL-MCNC: 3 MG/DL — SIGNIFICANT CHANGE UP (ref 2.5–4.5)
PLATELET # BLD AUTO: 417 K/UL — HIGH (ref 150–400)
PO2 BLDA: 129 MMHG — HIGH (ref 83–108)
POTASSIUM SERPL-MCNC: 4.2 MMOL/L — SIGNIFICANT CHANGE UP (ref 3.5–5.3)
POTASSIUM SERPL-SCNC: 4.2 MMOL/L — SIGNIFICANT CHANGE UP (ref 3.5–5.3)
PROT SERPL-MCNC: 5.2 G/DL — LOW (ref 6–8.3)
RBC # BLD: 3.13 M/UL — LOW (ref 3.8–5.2)
RBC # FLD: 19.5 % — HIGH (ref 10.3–14.5)
SAO2 % BLDA: 99.4 % — HIGH (ref 94–98)
SARS-COV-2 RNA SPEC QL NAA+PROBE: SIGNIFICANT CHANGE UP
SODIUM SERPL-SCNC: 142 MMOL/L — SIGNIFICANT CHANGE UP (ref 135–145)
VANCOMYCIN TROUGH SERPL-MCNC: 22.5 UG/ML — HIGH (ref 10–20)
WBC # BLD: 24.3 K/UL — HIGH (ref 3.8–10.5)
WBC # FLD AUTO: 24.3 K/UL — HIGH (ref 3.8–10.5)

## 2022-08-17 PROCEDURE — 99291 CRITICAL CARE FIRST HOUR: CPT

## 2022-08-17 RX ORDER — MAGNESIUM SULFATE 500 MG/ML
4 VIAL (ML) INJECTION ONCE
Refills: 0 | Status: COMPLETED | OUTPATIENT
Start: 2022-08-17 | End: 2022-08-17

## 2022-08-17 RX ORDER — VANCOMYCIN HCL 1 G
750 VIAL (EA) INTRAVENOUS ONCE
Refills: 0 | Status: COMPLETED | OUTPATIENT
Start: 2022-08-17 | End: 2022-08-17

## 2022-08-17 RX ADMIN — Medication 5 UNIT(S): at 17:13

## 2022-08-17 RX ADMIN — Medication 25 GRAM(S): at 07:32

## 2022-08-17 RX ADMIN — HYDROMORPHONE HYDROCHLORIDE 0.5 MILLIGRAM(S): 2 INJECTION INTRAMUSCULAR; INTRAVENOUS; SUBCUTANEOUS at 00:55

## 2022-08-17 RX ADMIN — Medication 56 MICROGRAM(S): at 05:20

## 2022-08-17 RX ADMIN — SODIUM CHLORIDE 10 MILLILITER(S): 9 INJECTION, SOLUTION INTRAVENOUS at 22:38

## 2022-08-17 RX ADMIN — HEPARIN SODIUM 5000 UNIT(S): 5000 INJECTION INTRAVENOUS; SUBCUTANEOUS at 14:34

## 2022-08-17 RX ADMIN — Medication 5 UNIT(S): at 05:09

## 2022-08-17 RX ADMIN — Medication 1 APPLICATION(S): at 12:24

## 2022-08-17 RX ADMIN — Medication 5 UNIT(S): at 12:22

## 2022-08-17 RX ADMIN — PANTOPRAZOLE SODIUM 40 MILLIGRAM(S): 20 TABLET, DELAYED RELEASE ORAL at 05:17

## 2022-08-17 RX ADMIN — Medication 5 UNIT(S): at 23:36

## 2022-08-17 RX ADMIN — AMPICILLIN SODIUM AND SULBACTAM SODIUM 200 GRAM(S): 250; 125 INJECTION, POWDER, FOR SUSPENSION INTRAMUSCULAR; INTRAVENOUS at 12:23

## 2022-08-17 RX ADMIN — CHLORHEXIDINE GLUCONATE 15 MILLILITER(S): 213 SOLUTION TOPICAL at 05:16

## 2022-08-17 RX ADMIN — Medication 2 DROP(S): at 05:20

## 2022-08-17 RX ADMIN — Medication 500 MILLIGRAM(S): at 12:24

## 2022-08-17 RX ADMIN — Medication 2 DROP(S): at 23:37

## 2022-08-17 RX ADMIN — Medication 2 DROP(S): at 12:23

## 2022-08-17 RX ADMIN — Medication 15 MILLILITER(S): at 12:25

## 2022-08-17 RX ADMIN — PANTOPRAZOLE SODIUM 40 MILLIGRAM(S): 20 TABLET, DELAYED RELEASE ORAL at 17:15

## 2022-08-17 RX ADMIN — Medication 0: at 12:22

## 2022-08-17 RX ADMIN — HEPARIN SODIUM 5000 UNIT(S): 5000 INJECTION INTRAVENOUS; SUBCUTANEOUS at 05:17

## 2022-08-17 RX ADMIN — AMPICILLIN SODIUM AND SULBACTAM SODIUM 200 GRAM(S): 250; 125 INJECTION, POWDER, FOR SUSPENSION INTRAMUSCULAR; INTRAVENOUS at 05:10

## 2022-08-17 RX ADMIN — HEPARIN SODIUM 5000 UNIT(S): 5000 INJECTION INTRAVENOUS; SUBCUTANEOUS at 21:10

## 2022-08-17 RX ADMIN — Medication 0: at 17:13

## 2022-08-17 RX ADMIN — NYSTATIN CREAM 1 APPLICATION(S): 100000 CREAM TOPICAL at 05:15

## 2022-08-17 RX ADMIN — HYDROMORPHONE HYDROCHLORIDE 0.5 MILLIGRAM(S): 2 INJECTION INTRAMUSCULAR; INTRAVENOUS; SUBCUTANEOUS at 00:25

## 2022-08-17 RX ADMIN — Medication 4.63 MICROGRAM(S)/KG/MIN: at 22:39

## 2022-08-17 RX ADMIN — Medication 0: at 23:36

## 2022-08-17 RX ADMIN — Medication 250 MILLIGRAM(S): at 17:33

## 2022-08-17 RX ADMIN — CHLORHEXIDINE GLUCONATE 15 MILLILITER(S): 213 SOLUTION TOPICAL at 17:16

## 2022-08-17 RX ADMIN — Medication 0: at 05:10

## 2022-08-17 RX ADMIN — NYSTATIN CREAM 1 APPLICATION(S): 100000 CREAM TOPICAL at 17:15

## 2022-08-17 RX ADMIN — Medication 250 MILLIGRAM(S): at 05:30

## 2022-08-17 RX ADMIN — CHLORHEXIDINE GLUCONATE 1 APPLICATION(S): 213 SOLUTION TOPICAL at 12:24

## 2022-08-17 RX ADMIN — AMPICILLIN SODIUM AND SULBACTAM SODIUM 200 GRAM(S): 250; 125 INJECTION, POWDER, FOR SUSPENSION INTRAMUSCULAR; INTRAVENOUS at 17:12

## 2022-08-17 RX ADMIN — Medication 2 DROP(S): at 17:14

## 2022-08-17 RX ADMIN — AMPICILLIN SODIUM AND SULBACTAM SODIUM 200 GRAM(S): 250; 125 INJECTION, POWDER, FOR SUSPENSION INTRAMUSCULAR; INTRAVENOUS at 23:27

## 2022-08-17 RX ADMIN — Medication 2000 UNIT(S): at 12:25

## 2022-08-17 NOTE — PROGRESS NOTE ADULT - ASSESSMENT
52F immobile 2/2 h/o MS, DM2, asthma, hypothyroidism, and known sacral wounds presents from [place] with urosepsis and anemia with new L flank wound with concern for necrotizing soft tissue infection. Was s/p debridement of wound with worsening sepsis, acidosis, and severe global LV dysfunction of unknown etiology, transferred to CCU for [place] and inotrope support. Pt is s/p swan removal and off all sedation, weaning off pressors. Repeat echo showed EF of 5-10% and signs suggestive of possible stress induced cardiomyopathy. Pt's mental status is improving off sedation.     NEURO:  - remains intubated (CPAP), off all sedation  - no CT head needed at this time as pt appears to be improving    RESP;  - intubated  - Vent settings: CPAP/PSV 10/5/2/30% - breathing at 20 breaths/min with tidal volumes of 250mL  - bicarb drip discontinued  - Blood gas on CPAP wnl    CV:  # severe LV dysfunction  - EF 5 - 10%  - concern for cardiomyopathy of unknown etiology but may be R/T sepsis and critical illness  - s/p Dobutamine, s/p swan removal  - vasopressin discontinued 8/13, continuing to wean levophed, currently 0.04 mg/kg/min  - lactate wnl  - hold off on GDMT at this time  - repeat ECHO to reassess LV function showed EF of 5-10%     GI:  - NGT in place  - tolerating trickle feeds at present, will not advance as yet as we are attempting CPAP trials    /renal:  # metabolic acidosis  - appears resolved  - off bicarb drip at present  - follow labs  - f/u any further renal recs  - Scr stable    ENDO:  # DM2  - A1c 5.5  - FS elevated but now better controlled  - was started on steroids post op, ?stress dose, was tapered, d/c'ed on 8/15 given concern of sepsis  - continue FS/ IHSS as indicated    HEME:  - Hb dropped to 8.1 on 8/14 from 9, s/p 1 unit pRBCs, now stable  - unlikely 2/2 bleeding  - continue to monitor Hb    ID:  # s/p L flank wound debridement  - dressings intact and wound vac in place  - WBC 28.8 and no fevers overnight, tylenol PRN if pt spikes fever  - now following rectal or axillary readings  - ID consult noted and appreciated  - no plan for further debridement, wound care to change vac M/W/F prn per surgery recs  - blood cx from 8/11 grew VRE, no need to change abx management at this time per ID recs  - Unasyn 3 grams IV Q6h until 8/25 per ID  - Vanc trough 22.5, dose was lowered to 750mg for final dose at 17:30 on 8/17 per ID      DVT PPx:  - Heparin SQ 52F immobile 2/2 h/o MS, DM2, asthma, hypothyroidism, and known sacral wounds presents from [place] with urosepsis and anemia with new L flank wound with concern for necrotizing soft tissue infection. Was s/p debridement of wound with worsening sepsis, acidosis, and severe global LV dysfunction of unknown etiology, transferred to CCU for [place] and inotrope support. Pt is s/p swan removal and off all sedation, weaning off pressors. Repeat echo showed EF of 5-10% and signs suggestive of possible stress induced cardiomyopathy. Pt's mental status is improving off sedation.     NEURO:  - remains intubated (CPAP), off all sedation  - no CT head needed at this time as pt appears to be improving    RESP;  - intubated  - Vent settings: CPAP/PSV 10/5/2/30% - breathing at 13breaths/min with tidal volumes of 430mL  - bicarb drip discontinued  - Blood gas on CPAP wnl    CV:  # severe LV dysfunction  - EF 5 - 10%  - concern for cardiomyopathy of unknown etiology but may be R/T sepsis and critical illness  - s/p Dobutamine, s/p swan removal  - vasopressin discontinued 8/13, continuing to wean levophed, currently 0.04 mg/kg/min  - lactate wnl  - hold off on GDMT at this time  - repeat ECHO to reassess LV function showed EF of 5-10%     GI:  - NGT in place  - tolerating trickle feeds at present, will not advance as yet as we are attempting CPAP trials    /renal:  # metabolic acidosis  - appears resolved  - off bicarb drip at present  - follow labs  - f/u any further renal recs  - Scr stable    ENDO:  # DM2  - A1c 5.5  - FS elevated but now better controlled  - was started on steroids post op, ?stress dose, was tapered, d/c'ed on 8/15 given concern of sepsis  - continue FS/ IHSS as indicated    HEME:  - Hb dropped to 8.1 on 8/14 from 9, s/p 1 unit pRBCs, now stable  - unlikely 2/2 bleeding  - continue to monitor Hb    ID:  # s/p L flank wound debridement  - dressings intact and wound vac in place  - WBC 28.8 and no fevers overnight, tylenol PRN if pt spikes fever  - now following rectal or axillary readings  - ID consult noted and appreciated  - no plan for further debridement, wound care to change vac M/W/F prn per surgery recs  - blood cx from 8/11 grew VRE, no need to change abx management at this time per ID recs  - Unasyn 3 grams IV Q6h until 8/25 per ID  - Vanc trough 22.5, dose was lowered to 750mg for final dose at 17:30 on 8/17 per ID      DVT PPx:  - Heparin SQ 52F immobile 2/2 h/o MS, DM2, asthma, hypothyroidism, and known sacral wounds presents from [place] with urosepsis and anemia with new L flank wound with concern for necrotizing soft tissue infection. Was s/p debridement of wound with worsening sepsis, acidosis, and severe global LV dysfunction of unknown etiology, transferred to CCU for [place] and inotrope support. Pt is s/p swan removal and off all sedation, weaning off pressors. Repeat echo showed EF of 5-10% and signs suggestive of possible stress induced cardiomyopathy. Pt's mental status is improving off sedation.     NEURO:  - remains intubated (CPAP), off all sedation  - no CT head needed at this time as pt appears to be improving    RESP;  - intubated  - Vent settings: CPAP/PSV 10/5/2/30% - breathing at 13breaths/min with tidal volumes of 430mL  - bicarb drip discontinued  - Blood gas on CPAP wnl    CV:  # severe LV dysfunction  - EF 5 - 10%  - concern for cardiomyopathy of unknown etiology but may be R/T sepsis and critical illness  - s/p Dobutamine, s/p swan removal  - vasopressin discontinued 8/13, currently off levophed, continue to monitor  - lactate wnl  - hold off on GDMT at this time  - repeat ECHO to reassess LV function showed EF of 5-10%     GI:  - NGT in place  - tolerating trickle feeds at present, will not advance as yet as we are attempting CPAP trials    /renal:  # metabolic acidosis  - appears resolved  - off bicarb drip at present  - follow labs  - f/u any further renal recs  - Scr stable    ENDO:  # DM2  - A1c 5.5  - FS elevated but now better controlled  - was started on steroids post op, ?stress dose, was tapered, d/c'ed on 8/15 given concern of sepsis  - continue FS/ IHSS as indicated    HEME:  - Hb dropped to 8.1 on 8/14 from 9, s/p 1 unit pRBCs, now stable  - unlikely 2/2 bleeding  - continue to monitor Hb    ID:  # s/p L flank wound debridement  - dressings intact and wound vac in place  - WBC 28.8 and no fevers overnight, tylenol PRN if pt spikes fever  - now following rectal or axillary readings  - ID consult noted and appreciated  - no plan for further debridement, wound care to change vac M/W/F prn per surgery recs  - blood cx from 8/11 grew VRE, no need to change abx management at this time per ID recs  - Unasyn 3 grams IV Q6h until 8/25 per ID  - Vanc trough 22.5, dose was lowered to 750mg for final dose at 17:30 on 8/17 per ID      DVT PPx:  - Heparin SQ

## 2022-08-17 NOTE — PROGRESS NOTE ADULT - ASSESSMENT
53 y/o F w/ PMH uncontrolled DM2 now s/p debridement for nec fasc of flank c/b cardiomyopathy w/ EF 5% requiring dobutamine drip in CCU.         -VAC change today, MWF schedule  -Extubate as able  -Wean off pressors as able  -Rest of care per primary    Igor Bermudez MD  PGY-2  B Team Surgery  #[id number]

## 2022-08-17 NOTE — PROGRESS NOTE ADULT - SUBJECTIVE AND OBJECTIVE BOX
Al Duenas MD PGY1    PATIENT:  BOY CERON  [id number]    CHIEF COMPLAINT:  Patient is a 52y old  Female who presents with a chief complaint of Soft tissue infection (16 Aug 2022 14:48)      INTERVAL HISTORY/OVERNIGHT EVENTS: Pt on AC overnight, put back in CPAP/PSV in the morning. Pt was more awake. Night float spoke to family and per family pt was able to converse at baseline and was only able to use upper extremities and was able to self feed.     MEDICATIONS:  MEDICATIONS  (STANDING):  ampicillin/sulbactam  IVPB      ampicillin/sulbactam  IVPB 3 Gram(s) IV Intermittent every 6 hours  artificial tears (preservative free) Ophthalmic Solution 2 Drop(s) Both EYES four times a day  ascorbic acid 500 milliGRAM(s) Oral daily  chlorhexidine 0.12% Liquid 15 milliLiter(s) Oral Mucosa every 12 hours  chlorhexidine 2% Cloths 1 Application(s) Topical daily  cholecalciferol 2000 Unit(s) Oral daily  collagenase Ointment 1 Application(s) Topical daily  heparin   Injectable 5000 Unit(s) SubCutaneous every 8 hours  insulin lispro (ADMELOG) corrective regimen sliding scale   SubCutaneous every 6 hours  insulin lispro Injectable (ADMELOG) 5 Unit(s) SubCutaneous every 6 hours  lactated ringers. 1000 milliLiter(s) (10 mL/Hr) IV Continuous <Continuous>  levothyroxine Injectable 56 MICROGram(s) IV Push <User Schedule>  multivitamin/minerals/iron Oral Solution (CENTRUM) 15 milliLiter(s) Oral daily  norepinephrine Infusion 0.05 MICROgram(s)/kG/Min (4.63 mL/Hr) IV Continuous <Continuous>  nystatin Powder 1 Application(s) Topical two times a day  pantoprazole  Injectable 40 milliGRAM(s) IV Push every 12 hours  vancomycin  IVPB 750 milliGRAM(s) IV Intermittent once    MEDICATIONS  (PRN):  acetaminophen    Suspension .. 650 milliGRAM(s) Oral every 6 hours PRN Temp greater or equal to 38.5C (101.3F)  ALBUTerol    90 MICROgram(s) HFA Inhaler 2 Puff(s) Inhalation every 6 hours PRN Shortness of Breath  HYDROmorphone  Injectable 1 milliGRAM(s) IV Push every 3 hours PRN Severe Pain (7 - 10)  HYDROmorphone  Injectable 0.5 milliGRAM(s) IV Push every 3 hours PRN Moderate Pain (4 - 6)  sodium chloride 0.9% lock flush 10 milliLiter(s) IV Push every 1 hour PRN Pre/post blood products, medications, blood draw, and to maintain line patency      ALLERGIES:  Allergies    No Known Drug Allergies  Pineapple (Anaphylaxis)    Intolerances        OBJECTIVE:  ICU Vital Signs Last 24 Hrs  T(C): 37 (17 Aug 2022 04:00), Max: 37.1 (16 Aug 2022 08:00)  T(F): 98.6 (17 Aug 2022 04:00), Max: 98.8 (16 Aug 2022 16:00)  HR: 95 (17 Aug 2022 07:11) (79 - 118)  BP: --  BP(mean): --  ABP: 90/48 (17 Aug 2022 07:00) (87/48 - 114/65)  ABP(mean): 62 (17 Aug 2022 07:00) (62 - 84)  RR: 14 (17 Aug 2022 07:00) (12 - 23)  SpO2: 100% (17 Aug 2022 07:11) (99% - 100%)    O2 Parameters below as of 17 Aug 2022 06:00  Patient On (Oxygen Delivery Method): BiPAP/CPAP          Mode: CPAP with PS  FiO2: 30  PEEP: 5  PS: 10  MAP: 8    Adult Advanced Hemodynamics Last 24 Hrs  CVP(mm Hg): --  CVP(cm H2O): --  CO: --  CI: --  PA: --  PA(mean): --  PCWP: --  SVR: --  SVRI: --  PVR: --  PVRI: --  CAPILLARY BLOOD GLUCOSE      POCT Blood Glucose.: 121 mg/dL (17 Aug 2022 05:03)  POCT Blood Glucose.: 109 mg/dL (16 Aug 2022 23:02)  POCT Blood Glucose.: 139 mg/dL (16 Aug 2022 17:45)  POCT Blood Glucose.: 112 mg/dL (16 Aug 2022 12:01)    CAPILLARY BLOOD GLUCOSE      POCT Blood Glucose.: 121 mg/dL (17 Aug 2022 05:03)    I&O's Summary    16 Aug 2022 07:01  -  17 Aug 2022 07:00  --------------------------------------------------------  IN: 1790.9 mL / OUT: 1115 mL / NET: 675.9 mL      PHYSICAL EXAMINATION:  General: NAD, intubated  HEENT: conjunctiva and sclera clear  Neurology: A&Ox0  Respiratory: CTA B/L anteriorly,  CV: RRR, S1S2, no murmurs, rubs or gallops  Abdominal: Soft, NT, ND +BS  Extremities: No edema, + peripheral pulses, dressings c/d/i   lines: midline, IJ, peripheral IV, toussaint    LABS:  ABG - ( 17 Aug 2022 04:30 )  pH, Arterial: 7.46  pH, Blood: x     /  pCO2: 32    /  pO2: 129   / HCO3: 23    / Base Excess: -0.3  /  SaO2: 99.4                                    8.4    24.30 )-----------( 417      ( 17 Aug 2022 04:30 )             26.5     08-17    142  |  109<H>  |  14  ----------------------------<  120<H>  4.2   |  22  |  0.31<L>    Ca    7.7<L>      17 Aug 2022 04:30  Phos  3.0     08-17  Mg     1.70     08-17    TPro  5.2<L>  /  Alb  2.0<L>  /  TBili  0.2  /  DBili  x   /  AST  18  /  ALT  10  /  AlkPhos  301<H>  08-17    LIVER FUNCTIONS - ( 17 Aug 2022 04:30 )  Alb: 2.0 g/dL / Pro: 5.2 g/dL / ALK PHOS: 301 U/L / ALT: 10 U/L / AST: 18 U/L / GGT: x                       TELEMETRY:     EKG:     IMAGING:       Al Duenas MD PGY1    PATIENT:  BOY CERON  [id number]    CHIEF COMPLAINT:  Patient is a 52y old  Female who presents with a chief complaint of Soft tissue infection (16 Aug 2022 14:48)      INTERVAL HISTORY/OVERNIGHT EVENTS: Pt on AC overnight, put back in CPAP/PSV in the morning. Pt was more awake, blinks when commanded and and is able to squeeze fingers. Night float spoke to family and per family pt was able to converse at baseline and was only able to use upper extremities and was able to self feed.     MEDICATIONS:  MEDICATIONS  (STANDING):  ampicillin/sulbactam  IVPB      ampicillin/sulbactam  IVPB 3 Gram(s) IV Intermittent every 6 hours  artificial tears (preservative free) Ophthalmic Solution 2 Drop(s) Both EYES four times a day  ascorbic acid 500 milliGRAM(s) Oral daily  chlorhexidine 0.12% Liquid 15 milliLiter(s) Oral Mucosa every 12 hours  chlorhexidine 2% Cloths 1 Application(s) Topical daily  cholecalciferol 2000 Unit(s) Oral daily  collagenase Ointment 1 Application(s) Topical daily  heparin   Injectable 5000 Unit(s) SubCutaneous every 8 hours  insulin lispro (ADMELOG) corrective regimen sliding scale   SubCutaneous every 6 hours  insulin lispro Injectable (ADMELOG) 5 Unit(s) SubCutaneous every 6 hours  lactated ringers. 1000 milliLiter(s) (10 mL/Hr) IV Continuous <Continuous>  levothyroxine Injectable 56 MICROGram(s) IV Push <User Schedule>  multivitamin/minerals/iron Oral Solution (CENTRUM) 15 milliLiter(s) Oral daily  norepinephrine Infusion 0.05 MICROgram(s)/kG/Min (4.63 mL/Hr) IV Continuous <Continuous>  nystatin Powder 1 Application(s) Topical two times a day  pantoprazole  Injectable 40 milliGRAM(s) IV Push every 12 hours  vancomycin  IVPB 750 milliGRAM(s) IV Intermittent once    MEDICATIONS  (PRN):  acetaminophen    Suspension .. 650 milliGRAM(s) Oral every 6 hours PRN Temp greater or equal to 38.5C (101.3F)  ALBUTerol    90 MICROgram(s) HFA Inhaler 2 Puff(s) Inhalation every 6 hours PRN Shortness of Breath  HYDROmorphone  Injectable 1 milliGRAM(s) IV Push every 3 hours PRN Severe Pain (7 - 10)  HYDROmorphone  Injectable 0.5 milliGRAM(s) IV Push every 3 hours PRN Moderate Pain (4 - 6)  sodium chloride 0.9% lock flush 10 milliLiter(s) IV Push every 1 hour PRN Pre/post blood products, medications, blood draw, and to maintain line patency      ALLERGIES:  Allergies    No Known Drug Allergies  Pineapple (Anaphylaxis)    Intolerances        OBJECTIVE:  ICU Vital Signs Last 24 Hrs  T(C): 37 (17 Aug 2022 04:00), Max: 37.1 (16 Aug 2022 08:00)  T(F): 98.6 (17 Aug 2022 04:00), Max: 98.8 (16 Aug 2022 16:00)  HR: 95 (17 Aug 2022 07:11) (79 - 118)  BP: --  BP(mean): --  ABP: 90/48 (17 Aug 2022 07:00) (87/48 - 114/65)  ABP(mean): 62 (17 Aug 2022 07:00) (62 - 84)  RR: 14 (17 Aug 2022 07:00) (12 - 23)  SpO2: 100% (17 Aug 2022 07:11) (99% - 100%)    O2 Parameters below as of 17 Aug 2022 06:00  Patient On (Oxygen Delivery Method): BiPAP/CPAP          Mode: CPAP with PS  FiO2: 30  PEEP: 5  PS: 10  MAP: 8    Adult Advanced Hemodynamics Last 24 Hrs  CVP(mm Hg): --  CVP(cm H2O): --  CO: --  CI: --  PA: --  PA(mean): --  PCWP: --  SVR: --  SVRI: --  PVR: --  PVRI: --  CAPILLARY BLOOD GLUCOSE      POCT Blood Glucose.: 121 mg/dL (17 Aug 2022 05:03)  POCT Blood Glucose.: 109 mg/dL (16 Aug 2022 23:02)  POCT Blood Glucose.: 139 mg/dL (16 Aug 2022 17:45)  POCT Blood Glucose.: 112 mg/dL (16 Aug 2022 12:01)    CAPILLARY BLOOD GLUCOSE      POCT Blood Glucose.: 121 mg/dL (17 Aug 2022 05:03)    I&O's Summary    16 Aug 2022 07:01  -  17 Aug 2022 07:00  --------------------------------------------------------  IN: 1790.9 mL / OUT: 1115 mL / NET: 675.9 mL      PHYSICAL EXAMINATION:  General: NAD, intubated  HEENT: conjunctiva and sclera clear  Neurology: A&Ox0  Respiratory: increased breath sounds b/l  CV: RRR, S1S2, no murmurs, rubs or gallops  Abdominal: Soft, NT, ND +BS  Extremities: No edema, + peripheral pulses, dressings c/d/i   lines: midline, IJ, peripheral IV, toussaint    LABS:  ABG - ( 17 Aug 2022 04:30 )  pH, Arterial: 7.46  pH, Blood: x     /  pCO2: 32    /  pO2: 129   / HCO3: 23    / Base Excess: -0.3  /  SaO2: 99.4                                    8.4    24.30 )-----------( 417      ( 17 Aug 2022 04:30 )             26.5     08-17    142  |  109<H>  |  14  ----------------------------<  120<H>  4.2   |  22  |  0.31<L>    Ca    7.7<L>      17 Aug 2022 04:30  Phos  3.0     08-17  Mg     1.70     08-17    TPro  5.2<L>  /  Alb  2.0<L>  /  TBili  0.2  /  DBili  x   /  AST  18  /  ALT  10  /  AlkPhos  301<H>  08-17    LIVER FUNCTIONS - ( 17 Aug 2022 04:30 )  Alb: 2.0 g/dL / Pro: 5.2 g/dL / ALK PHOS: 301 U/L / ALT: 10 U/L / AST: 18 U/L / GGT: x                       TELEMETRY:     EKG:     IMAGING:

## 2022-08-17 NOTE — PROGRESS NOTE ADULT - SUBJECTIVE AND OBJECTIVE BOX
Surgery Progress Note    Subjective:     Patient seen and examined at bedside. Mental status not significantly changed. CPAP mode, RSBI 31. Off pressors at time of visit. Per primary team, for possible CTH today to evaluate etiology of mental state alterations. Vanc to fall off today     OBJECTIVE:     T(C): 36.8 (08-17-22 @ 12:00), Max: 37.1 (08-16-22 @ 16:00)  HR: 110 (08-17-22 @ 13:00) (79 - 118)  BP: --  RR: 16 (08-17-22 @ 13:00) (13 - 23)  SpO2: 100% (08-17-22 @ 13:00) (99% - 100%)  Wt(kg): --    I&O's Detail    16 Aug 2022 07:01  -  17 Aug 2022 07:00  --------------------------------------------------------  IN:    Enteral Tube Flush: 40 mL    Glucerna 1.5: 540 mL    IV PiggyBack: 900 mL    Lactated Ringers: 90 mL    Lactated Ringers: 210 mL    Norepinephrine: 40.9 mL  Total IN: 1820.9 mL    OUT:    Indwelling Catheter - Urethral (mL): 990 mL    VAC (Vacuum Assisted Closure) System (mL): 200 mL  Total OUT: 1190 mL    Total NET: 630.9 mL      17 Aug 2022 07:01  -  17 Aug 2022 13:17  --------------------------------------------------------  IN:    Enteral Tube Flush: 60 mL    Glucerna 1.5: 120 mL    IV PiggyBack: 200 mL    Lactated Ringers: 60 mL  Total IN: 440 mL    OUT:    Indwelling Catheter - Urethral (mL): 285 mL  Total OUT: 285 mL    Total NET: 155 mL          PHYSICAL EXAM:    General: Intubated, sedated young woman. Obese  HEENT- R IJ, NGT  Extremities: A-line. L flank to midback covered by VAC, good seal  - Rose in      MEDICATIONS  (STANDING):  ampicillin/sulbactam  IVPB      ampicillin/sulbactam  IVPB 3 Gram(s) IV Intermittent every 6 hours  artificial tears (preservative free) Ophthalmic Solution 2 Drop(s) Both EYES four times a day  ascorbic acid 500 milliGRAM(s) Oral daily  chlorhexidine 0.12% Liquid 15 milliLiter(s) Oral Mucosa every 12 hours  chlorhexidine 2% Cloths 1 Application(s) Topical daily  cholecalciferol 2000 Unit(s) Oral daily  collagenase Ointment 1 Application(s) Topical daily  heparin   Injectable 5000 Unit(s) SubCutaneous every 8 hours  insulin lispro (ADMELOG) corrective regimen sliding scale   SubCutaneous every 6 hours  insulin lispro Injectable (ADMELOG) 5 Unit(s) SubCutaneous every 6 hours  lactated ringers. 1000 milliLiter(s) (10 mL/Hr) IV Continuous <Continuous>  levothyroxine Injectable 56 MICROGram(s) IV Push <User Schedule>  multivitamin/minerals/iron Oral Solution (CENTRUM) 15 milliLiter(s) Oral daily  norepinephrine Infusion 0.05 MICROgram(s)/kG/Min (4.63 mL/Hr) IV Continuous <Continuous>  nystatin Powder 1 Application(s) Topical two times a day  pantoprazole  Injectable 40 milliGRAM(s) IV Push every 12 hours  vancomycin  IVPB 750 milliGRAM(s) IV Intermittent once    MEDICATIONS  (PRN):  acetaminophen    Suspension .. 650 milliGRAM(s) Oral every 6 hours PRN Temp greater or equal to 38.5C (101.3F)  ALBUTerol    90 MICROgram(s) HFA Inhaler 2 Puff(s) Inhalation every 6 hours PRN Shortness of Breath  HYDROmorphone  Injectable 1 milliGRAM(s) IV Push every 3 hours PRN Severe Pain (7 - 10)  HYDROmorphone  Injectable 0.5 milliGRAM(s) IV Push every 3 hours PRN Moderate Pain (4 - 6)  sodium chloride 0.9% lock flush 10 milliLiter(s) IV Push every 1 hour PRN Pre/post blood products, medications, blood draw, and to maintain line patency      LABS:                          8.4    24.30 )-----------( 417      ( 17 Aug 2022 04:30 )             26.5     08-17    142  |  109<H>  |  14  ----------------------------<  120<H>  4.2   |  22  |  0.31<L>    Ca    7.7<L>      17 Aug 2022 04:30  Phos  3.0     08-17  Mg     1.70     08-17    TPro  5.2<L>  /  Alb  2.0<L>  /  TBili  0.2  /  DBili  x   /  AST  18  /  ALT  10  /  AlkPhos  301<H>  08-17

## 2022-08-18 LAB
ALBUMIN SERPL ELPH-MCNC: 1.7 G/DL — LOW (ref 3.3–5)
ALP SERPL-CCNC: 255 U/L — HIGH (ref 40–120)
ALT FLD-CCNC: 10 U/L — SIGNIFICANT CHANGE UP (ref 4–33)
ANION GAP SERPL CALC-SCNC: 13 MMOL/L — SIGNIFICANT CHANGE UP (ref 7–14)
AST SERPL-CCNC: 15 U/L — SIGNIFICANT CHANGE UP (ref 4–32)
BASE EXCESS BLDA CALC-SCNC: -0.9 MMOL/L — SIGNIFICANT CHANGE UP (ref -2–3)
BASE EXCESS BLDA CALC-SCNC: -1.6 MMOL/L — SIGNIFICANT CHANGE UP (ref -2–3)
BILIRUB SERPL-MCNC: 0.2 MG/DL — SIGNIFICANT CHANGE UP (ref 0.2–1.2)
BUN SERPL-MCNC: 11 MG/DL — SIGNIFICANT CHANGE UP (ref 7–23)
CALCIUM SERPL-MCNC: 7.4 MG/DL — LOW (ref 8.4–10.5)
CHLORIDE SERPL-SCNC: 107 MMOL/L — SIGNIFICANT CHANGE UP (ref 98–107)
CO2 BLDA-SCNC: 22 MMOL/L — SIGNIFICANT CHANGE UP (ref 19–24)
CO2 BLDA-SCNC: 23 MMOL/L — SIGNIFICANT CHANGE UP (ref 19–24)
CO2 SERPL-SCNC: 20 MMOL/L — LOW (ref 22–31)
CREAT SERPL-MCNC: 0.31 MG/DL — LOW (ref 0.5–1.3)
CULTURE RESULTS: SIGNIFICANT CHANGE UP
CULTURE RESULTS: SIGNIFICANT CHANGE UP
EGFR: 128 ML/MIN/1.73M2 — SIGNIFICANT CHANGE UP
GAS PNL BLDA: SIGNIFICANT CHANGE UP
GLUCOSE BLDC GLUCOMTR-MCNC: 100 MG/DL — HIGH (ref 70–99)
GLUCOSE BLDC GLUCOMTR-MCNC: 105 MG/DL — HIGH (ref 70–99)
GLUCOSE BLDC GLUCOMTR-MCNC: 112 MG/DL — HIGH (ref 70–99)
GLUCOSE BLDC GLUCOMTR-MCNC: 116 MG/DL — HIGH (ref 70–99)
GLUCOSE SERPL-MCNC: 96 MG/DL — SIGNIFICANT CHANGE UP (ref 70–99)
HCO3 BLDA-SCNC: 22 MMOL/L — SIGNIFICANT CHANGE UP (ref 21–28)
HCO3 BLDA-SCNC: 22 MMOL/L — SIGNIFICANT CHANGE UP (ref 21–28)
HCT VFR BLD CALC: 26 % — LOW (ref 34.5–45)
HGB BLD-MCNC: 7.8 G/DL — LOW (ref 11.5–15.5)
MAGNESIUM SERPL-MCNC: 1.9 MG/DL — SIGNIFICANT CHANGE UP (ref 1.6–2.6)
MCHC RBC-ENTMCNC: 26.2 PG — LOW (ref 27–34)
MCHC RBC-ENTMCNC: 30 GM/DL — LOW (ref 32–36)
MCV RBC AUTO: 87.2 FL — SIGNIFICANT CHANGE UP (ref 80–100)
NRBC # BLD: 0 /100 WBCS — SIGNIFICANT CHANGE UP (ref 0–0)
NRBC # FLD: 0 K/UL — SIGNIFICANT CHANGE UP (ref 0–0)
PCO2 BLDA: 31 MMHG — LOW (ref 32–35)
PCO2 BLDA: 31 MMHG — LOW (ref 32–35)
PH BLDA: 7.45 — SIGNIFICANT CHANGE UP (ref 7.35–7.45)
PH BLDA: 7.46 — HIGH (ref 7.35–7.45)
PHOSPHATE SERPL-MCNC: 3.5 MG/DL — SIGNIFICANT CHANGE UP (ref 2.5–4.5)
PLATELET # BLD AUTO: 475 K/UL — HIGH (ref 150–400)
PO2 BLDA: 107 MMHG — SIGNIFICANT CHANGE UP (ref 83–108)
PO2 BLDA: 138 MMHG — HIGH (ref 83–108)
POTASSIUM SERPL-MCNC: 3.4 MMOL/L — LOW (ref 3.5–5.3)
POTASSIUM SERPL-SCNC: 3.4 MMOL/L — LOW (ref 3.5–5.3)
PROT SERPL-MCNC: 4.9 G/DL — LOW (ref 6–8.3)
RBC # BLD: 2.98 M/UL — LOW (ref 3.8–5.2)
RBC # FLD: 20.2 % — HIGH (ref 10.3–14.5)
SAO2 % BLDA: 100 % — HIGH (ref 94–98)
SAO2 % BLDA: 98.6 % — HIGH (ref 94–98)
SODIUM SERPL-SCNC: 140 MMOL/L — SIGNIFICANT CHANGE UP (ref 135–145)
SPECIMEN SOURCE: SIGNIFICANT CHANGE UP
SPECIMEN SOURCE: SIGNIFICANT CHANGE UP
WBC # BLD: 21.75 K/UL — HIGH (ref 3.8–10.5)
WBC # FLD AUTO: 21.75 K/UL — HIGH (ref 3.8–10.5)

## 2022-08-18 PROCEDURE — 71045 X-RAY EXAM CHEST 1 VIEW: CPT | Mod: 26

## 2022-08-18 PROCEDURE — 99291 CRITICAL CARE FIRST HOUR: CPT

## 2022-08-18 RX ORDER — HYDROMORPHONE HYDROCHLORIDE 2 MG/ML
0.5 INJECTION INTRAMUSCULAR; INTRAVENOUS; SUBCUTANEOUS
Refills: 0 | Status: DISCONTINUED | OUTPATIENT
Start: 2022-08-18 | End: 2022-08-20

## 2022-08-18 RX ORDER — HYDROMORPHONE HYDROCHLORIDE 2 MG/ML
1 INJECTION INTRAMUSCULAR; INTRAVENOUS; SUBCUTANEOUS
Refills: 0 | Status: DISCONTINUED | OUTPATIENT
Start: 2022-08-18 | End: 2022-08-20

## 2022-08-18 RX ORDER — POTASSIUM CHLORIDE 20 MEQ
40 PACKET (EA) ORAL EVERY 4 HOURS
Refills: 0 | Status: COMPLETED | OUTPATIENT
Start: 2022-08-18 | End: 2022-08-18

## 2022-08-18 RX ORDER — MAGNESIUM SULFATE 500 MG/ML
2 VIAL (ML) INJECTION ONCE
Refills: 0 | Status: COMPLETED | OUTPATIENT
Start: 2022-08-18 | End: 2022-08-18

## 2022-08-18 RX ADMIN — NYSTATIN CREAM 1 APPLICATION(S): 100000 CREAM TOPICAL at 17:33

## 2022-08-18 RX ADMIN — SODIUM CHLORIDE 10 MILLILITER(S): 9 INJECTION, SOLUTION INTRAVENOUS at 09:45

## 2022-08-18 RX ADMIN — CHLORHEXIDINE GLUCONATE 1 APPLICATION(S): 213 SOLUTION TOPICAL at 11:37

## 2022-08-18 RX ADMIN — Medication 2 DROP(S): at 05:25

## 2022-08-18 RX ADMIN — Medication 2 DROP(S): at 23:32

## 2022-08-18 RX ADMIN — AMPICILLIN SODIUM AND SULBACTAM SODIUM 200 GRAM(S): 250; 125 INJECTION, POWDER, FOR SUSPENSION INTRAMUSCULAR; INTRAVENOUS at 17:32

## 2022-08-18 RX ADMIN — Medication 2000 UNIT(S): at 11:37

## 2022-08-18 RX ADMIN — AMPICILLIN SODIUM AND SULBACTAM SODIUM 200 GRAM(S): 250; 125 INJECTION, POWDER, FOR SUSPENSION INTRAMUSCULAR; INTRAVENOUS at 11:37

## 2022-08-18 RX ADMIN — Medication 40 MILLIEQUIVALENT(S): at 05:27

## 2022-08-18 RX ADMIN — Medication 2 DROP(S): at 11:37

## 2022-08-18 RX ADMIN — Medication 5 UNIT(S): at 17:35

## 2022-08-18 RX ADMIN — Medication 5 UNIT(S): at 11:39

## 2022-08-18 RX ADMIN — Medication 56 MICROGRAM(S): at 06:21

## 2022-08-18 RX ADMIN — Medication 15 MILLILITER(S): at 14:34

## 2022-08-18 RX ADMIN — AMPICILLIN SODIUM AND SULBACTAM SODIUM 200 GRAM(S): 250; 125 INJECTION, POWDER, FOR SUSPENSION INTRAMUSCULAR; INTRAVENOUS at 23:31

## 2022-08-18 RX ADMIN — PANTOPRAZOLE SODIUM 40 MILLIGRAM(S): 20 TABLET, DELAYED RELEASE ORAL at 05:24

## 2022-08-18 RX ADMIN — CHLORHEXIDINE GLUCONATE 15 MILLILITER(S): 213 SOLUTION TOPICAL at 05:26

## 2022-08-18 RX ADMIN — PANTOPRAZOLE SODIUM 40 MILLIGRAM(S): 20 TABLET, DELAYED RELEASE ORAL at 17:36

## 2022-08-18 RX ADMIN — Medication 1 APPLICATION(S): at 14:34

## 2022-08-18 RX ADMIN — NYSTATIN CREAM 1 APPLICATION(S): 100000 CREAM TOPICAL at 06:22

## 2022-08-18 RX ADMIN — HEPARIN SODIUM 5000 UNIT(S): 5000 INJECTION INTRAVENOUS; SUBCUTANEOUS at 05:26

## 2022-08-18 RX ADMIN — Medication 40 MILLIEQUIVALENT(S): at 10:05

## 2022-08-18 RX ADMIN — Medication 500 MILLIGRAM(S): at 11:37

## 2022-08-18 RX ADMIN — Medication 0: at 05:24

## 2022-08-18 RX ADMIN — Medication 4.63 MICROGRAM(S)/KG/MIN: at 09:44

## 2022-08-18 RX ADMIN — Medication 25 GRAM(S): at 04:25

## 2022-08-18 RX ADMIN — Medication 2 DROP(S): at 17:33

## 2022-08-18 RX ADMIN — HEPARIN SODIUM 5000 UNIT(S): 5000 INJECTION INTRAVENOUS; SUBCUTANEOUS at 14:34

## 2022-08-18 RX ADMIN — AMPICILLIN SODIUM AND SULBACTAM SODIUM 200 GRAM(S): 250; 125 INJECTION, POWDER, FOR SUSPENSION INTRAMUSCULAR; INTRAVENOUS at 05:25

## 2022-08-18 RX ADMIN — HEPARIN SODIUM 5000 UNIT(S): 5000 INJECTION INTRAVENOUS; SUBCUTANEOUS at 22:24

## 2022-08-18 NOTE — PROGRESS NOTE ADULT - ASSESSMENT
52F immobile 2/2 h/o MS, DM2, asthma, hypothyroidism, and known sacral wounds presents from [facility] with urosepsis and anemia with new L flank wound with concern for necrotizing soft tissue infection. Was s/p debridement of wound with worsening sepsis, acidosis, and severe global LV dysfunction of unknown etiology, transferred to CCU for [place] and inotrope support. Pt is s/p swan removal and off all sedation, weaning off pressors. Repeat echo showed EF of 5-10% and signs suggestive of possible stress induced cardiomyopathy. Pt's mental status is improving off sedation.     NEURO:  - remains intubated (CPAP), off all sedation  - no CT head needed at this time as pt appears to be improving    RESP;  - intubated  - Vent settings: CPAP/PSV 10/5/2/30% - breathing at 18breaths/min with tidal volumes of 3-400mL  - bicarb drip discontinued  - Blood gas on CPAP wnl    CV:  # severe LV dysfunction  - EF 5 - 10%  - concern for cardiomyopathy of unknown etiology but may be R/T sepsis and critical illness  - s/p Dobutamine, s/p swan removal  - vasopressin discontinued 8/13, currently off levophed, continue to monitor  - lactate wnl  - hold off on GDMT at this time  - repeat ECHO to reassess LV function showed EF of 5-10%     GI:  - NGT in place  - feeds held for possible extubation today    /renal:  # metabolic acidosis  - appears resolved  - off bicarb drip at present  - follow labs  - f/u any further renal recs  - Scr stable    ENDO:  # DM2  - A1c 5.5  - FS elevated but now better controlled  - was started on steroids post op, ?stress dose, was tapered, d/c'ed on 8/15 given concern of sepsis  - continue FS/ IHSS as indicated    HEME:  - Hb dropped to 8.1 on 8/14 from 9, s/p 1 unit pRBCs, now stable  - unlikely 2/2 bleeding  - continue to monitor Hb    ID:  # s/p L flank wound debridement  - dressings intact and wound vac in place  - WBC 28.8 and no fevers overnight, tylenol PRN if pt spikes fever  - now following rectal or axillary readings  - ID consult noted and appreciated  - no plan for further debridement, wound care to change vac M/W/F prn per surgery recs  - blood cx from 8/11 grew VRE, no need to change abx management at this time per ID recs  - Unasyn 3 grams IV Q6h until 8/25 per ID  - Vanc d/c on 8/17      DVT PPx:  - Heparin SQ

## 2022-08-18 NOTE — PROGRESS NOTE ADULT - SUBJECTIVE AND OBJECTIVE BOX
Al Duenas MD PGY1    PATIENT:  BOY CERON  [id number]    CHIEF COMPLAINT:  Patient is a 52y old  Female who presents with a chief complaint of Soft tissue infection (17 Aug 2022 13:17)      INTERVAL HISTORY/OVERNIGHT EVENTS:      REVIEW OF SYSTEMS:    Constitutional:     [ ] negative [ ] fevers [ ] chills [ ] weight loss [ ] weight gain  HEENT:                  [ ] negative [ ] dry eyes [ ] eye irritation [ ] postnasal drip [ ] nasal congestion  CV:                         [ ] negative  [ ] chest pain [ ] orthopnea [ ] palpitations [ ] murmur  Resp:                     [ ] negative [ ] cough [ ] shortness of breath [ ] dyspnea [ ] wheezing [ ] sputum [ ] hemoptysis  GI:                          [ ] negative [ ] nausea [ ] vomiting [ ] diarrhea [ ] constipation [ ] abd pain [ ] dysphagia   :                        [ ] negative [ ] dysuria [ ] nocturia [ ] hematuria [ ] increased urinary frequency  Musculoskeletal: [ ] negative [ ] back pain [ ] myalgias [ ] arthralgias [ ] fracture  Skin:                       [ ] negative [ ] rash [ ] itch  Neurological:        [ ] negative [ ] headache [ ] dizziness [ ] syncope [ ] weakness [ ] numbness  Psychiatric:           [ ] negative [ ] anxiety [ ] depression  Endocrine:            [ ] negative [ ] diabetes [ ] thyroid problem  Heme/Lymph:      [ ] negative [ ] anemia [ ] bleeding problem  Allergic/Immune: [ ] negative [ ] itchy eyes [ ] nasal discharge [ ] hives [ ] angioedema    [ ] All other systems negative  [ ] Unable to assess ROS because ________.    MEDICATIONS:  MEDICATIONS  (STANDING):  ampicillin/sulbactam  IVPB      ampicillin/sulbactam  IVPB 3 Gram(s) IV Intermittent every 6 hours  artificial tears (preservative free) Ophthalmic Solution 2 Drop(s) Both EYES four times a day  ascorbic acid 500 milliGRAM(s) Oral daily  chlorhexidine 0.12% Liquid 15 milliLiter(s) Oral Mucosa every 12 hours  chlorhexidine 2% Cloths 1 Application(s) Topical daily  cholecalciferol 2000 Unit(s) Oral daily  collagenase Ointment 1 Application(s) Topical daily  heparin   Injectable 5000 Unit(s) SubCutaneous every 8 hours  insulin lispro (ADMELOG) corrective regimen sliding scale   SubCutaneous every 6 hours  insulin lispro Injectable (ADMELOG) 5 Unit(s) SubCutaneous every 6 hours  lactated ringers. 1000 milliLiter(s) (10 mL/Hr) IV Continuous <Continuous>  levothyroxine Injectable 56 MICROGram(s) IV Push <User Schedule>  multivitamin/minerals/iron Oral Solution (CENTRUM) 15 milliLiter(s) Oral daily  norepinephrine Infusion 0.05 MICROgram(s)/kG/Min (4.63 mL/Hr) IV Continuous <Continuous>  nystatin Powder 1 Application(s) Topical two times a day  pantoprazole  Injectable 40 milliGRAM(s) IV Push every 12 hours  potassium chloride   Powder 40 milliEquivalent(s) Oral every 4 hours    MEDICATIONS  (PRN):  acetaminophen    Suspension .. 650 milliGRAM(s) Oral every 6 hours PRN Temp greater or equal to 38.5C (101.3F)  ALBUTerol    90 MICROgram(s) HFA Inhaler 2 Puff(s) Inhalation every 6 hours PRN Shortness of Breath  HYDROmorphone  Injectable 1 milliGRAM(s) IV Push every 3 hours PRN Severe Pain (7 - 10)  HYDROmorphone  Injectable 0.5 milliGRAM(s) IV Push every 3 hours PRN Moderate Pain (4 - 6)  sodium chloride 0.9% lock flush 10 milliLiter(s) IV Push every 1 hour PRN Pre/post blood products, medications, blood draw, and to maintain line patency      ALLERGIES:  Allergies    No Known Drug Allergies  Pineapple (Anaphylaxis)    Intolerances        OBJECTIVE:  ICU Vital Signs Last 24 Hrs  T(C): 37.1 (18 Aug 2022 04:00), Max: 37.7 (17 Aug 2022 19:45)  T(F): 98.8 (18 Aug 2022 04:00), Max: 99.9 (17 Aug 2022 19:45)  HR: 92 (18 Aug 2022 06:00) (80 - 116)  BP: --  BP(mean): --  ABP: 117/58 (18 Aug 2022 06:00) (90/42 - 134/73)  ABP(mean): 79 (18 Aug 2022 06:00) (59 - 97)  RR: 19 (18 Aug 2022 06:00) (12 - 20)  SpO2: 100% (18 Aug 2022 06:00) (99% - 100%)    O2 Parameters below as of 18 Aug 2022 06:00  Patient On (Oxygen Delivery Method): BiPAP/CPAP          Mode: AC/ CMV (Assist Control/ Continuous Mandatory Ventilation)  RR (machine): 14  TV (machine): 500  FiO2: 30  PEEP: 5  ITime: 0.7  MAP: 10  PIP: 20    Adult Advanced Hemodynamics Last 24 Hrs  CVP(mm Hg): --  CVP(cm H2O): --  CO: --  CI: --  PA: --  PA(mean): --  PCWP: --  SVR: --  SVRI: --  PVR: --  PVRI: --  CAPILLARY BLOOD GLUCOSE      POCT Blood Glucose.: 112 mg/dL (18 Aug 2022 05:17)  POCT Blood Glucose.: 108 mg/dL (17 Aug 2022 23:25)  POCT Blood Glucose.: 117 mg/dL (17 Aug 2022 17:08)  POCT Blood Glucose.: 110 mg/dL (17 Aug 2022 12:21)    CAPILLARY BLOOD GLUCOSE      POCT Blood Glucose.: 112 mg/dL (18 Aug 2022 05:17)    I&O's Summary    17 Aug 2022 07:01  -  18 Aug 2022 07:00  --------------------------------------------------------  IN: 1435 mL / OUT: 1345 mL / NET: 90 mL      Daily     Daily Weight in k.4 (18 Aug 2022 05:00)    PHYSICAL EXAMINATION:  General: WN/WD NAD  HEENT: PERRLA, EOMI, moist mucous membranes  Neurology: A&Ox3, nonfocal, ARGUETA x 4  Respiratory: CTA B/L, normal respiratory effort, no wheezes, crackles, rales  CV: RRR, S1S2, no murmurs, rubs or gallops  Abdominal: Soft, NT, ND +BS, Last BM  Extremities: No edema, + peripheral pulses  Incisions:   Tubes:    LABS:  ABG - ( 18 Aug 2022 00:28 )  pH, Arterial: 7.45  pH, Blood: x     /  pCO2: 31    /  pO2: 138   / HCO3: 22    / Base Excess: -1.6  /  SaO2: 98.6                                    7.8    21.75 )-----------( 475      ( 18 Aug 2022 00:28 )             26.0     08-18    140  |  107  |  11  ----------------------------<  96  3.4<L>   |  20<L>  |  0.31<L>    Ca    7.4<L>      18 Aug 2022 00:28  Phos  3.5     -  Mg     1.90         TPro  4.9<L>  /  Alb  1.7<L>  /  TBili  0.2  /  DBili  x   /  AST  15  /  ALT  10  /  AlkPhos  255<H>      LIVER FUNCTIONS - ( 18 Aug 2022 00:28 )  Alb: 1.7 g/dL / Pro: 4.9 g/dL / ALK PHOS: 255 U/L / ALT: 10 U/L / AST: 15 U/L / GGT: x                       TELEMETRY:     EKG:     IMAGING:       Al Duenas MD PGY1    PATIENT:  BOY CERON  [id number]    CHIEF COMPLAINT:  Patient is a 52y old  Female who presents with a chief complaint of Soft tissue infection (17 Aug 2022 13:17)      INTERVAL HISTORY/OVERNIGHT EVENTS: No acute events overnight. Pt opens eyes when called. pt is not squeezing fingers today on command and does not follow finger with eyes.      REVIEW OF SYSTEMS:    Constitutional:     [ ] negative [ ] fevers [ ] chills [ ] weight loss [ ] weight gain  HEENT:                  [ ] negative [ ] dry eyes [ ] eye irritation [ ] postnasal drip [ ] nasal congestion  CV:                         [ ] negative  [ ] chest pain [ ] orthopnea [ ] palpitations [ ] murmur  Resp:                     [ ] negative [ ] cough [ ] shortness of breath [ ] dyspnea [ ] wheezing [ ] sputum [ ] hemoptysis  GI:                          [ ] negative [ ] nausea [ ] vomiting [ ] diarrhea [ ] constipation [ ] abd pain [ ] dysphagia   :                        [ ] negative [ ] dysuria [ ] nocturia [ ] hematuria [ ] increased urinary frequency  Musculoskeletal: [ ] negative [ ] back pain [ ] myalgias [ ] arthralgias [ ] fracture  Skin:                       [ ] negative [ ] rash [ ] itch  Neurological:        [ ] negative [ ] headache [ ] dizziness [ ] syncope [ ] weakness [ ] numbness  Psychiatric:           [ ] negative [ ] anxiety [ ] depression  Endocrine:            [ ] negative [ ] diabetes [ ] thyroid problem  Heme/Lymph:      [ ] negative [ ] anemia [ ] bleeding problem  Allergic/Immune: [ ] negative [ ] itchy eyes [ ] nasal discharge [ ] hives [ ] angioedema    [ ] All other systems negative  [X] Unable to assess ROS because pt is intubated.    MEDICATIONS:  MEDICATIONS  (STANDING):  ampicillin/sulbactam  IVPB      ampicillin/sulbactam  IVPB 3 Gram(s) IV Intermittent every 6 hours  artificial tears (preservative free) Ophthalmic Solution 2 Drop(s) Both EYES four times a day  ascorbic acid 500 milliGRAM(s) Oral daily  chlorhexidine 0.12% Liquid 15 milliLiter(s) Oral Mucosa every 12 hours  chlorhexidine 2% Cloths 1 Application(s) Topical daily  cholecalciferol 2000 Unit(s) Oral daily  collagenase Ointment 1 Application(s) Topical daily  heparin   Injectable 5000 Unit(s) SubCutaneous every 8 hours  insulin lispro (ADMELOG) corrective regimen sliding scale   SubCutaneous every 6 hours  insulin lispro Injectable (ADMELOG) 5 Unit(s) SubCutaneous every 6 hours  lactated ringers. 1000 milliLiter(s) (10 mL/Hr) IV Continuous <Continuous>  levothyroxine Injectable 56 MICROGram(s) IV Push <User Schedule>  multivitamin/minerals/iron Oral Solution (CENTRUM) 15 milliLiter(s) Oral daily  norepinephrine Infusion 0.05 MICROgram(s)/kG/Min (4.63 mL/Hr) IV Continuous <Continuous>  nystatin Powder 1 Application(s) Topical two times a day  pantoprazole  Injectable 40 milliGRAM(s) IV Push every 12 hours  potassium chloride   Powder 40 milliEquivalent(s) Oral every 4 hours    MEDICATIONS  (PRN):  acetaminophen    Suspension .. 650 milliGRAM(s) Oral every 6 hours PRN Temp greater or equal to 38.5C (101.3F)  ALBUTerol    90 MICROgram(s) HFA Inhaler 2 Puff(s) Inhalation every 6 hours PRN Shortness of Breath  HYDROmorphone  Injectable 1 milliGRAM(s) IV Push every 3 hours PRN Severe Pain (7 - 10)  HYDROmorphone  Injectable 0.5 milliGRAM(s) IV Push every 3 hours PRN Moderate Pain (4 - 6)  sodium chloride 0.9% lock flush 10 milliLiter(s) IV Push every 1 hour PRN Pre/post blood products, medications, blood draw, and to maintain line patency      ALLERGIES:  Allergies    No Known Drug Allergies  Pineapple (Anaphylaxis)    Intolerances        OBJECTIVE:  ICU Vital Signs Last 24 Hrs  T(C): 37.1 (18 Aug 2022 04:00), Max: 37.7 (17 Aug 2022 19:45)  T(F): 98.8 (18 Aug 2022 04:00), Max: 99.9 (17 Aug 2022 19:45)  HR: 92 (18 Aug 2022 06:00) (80 - 116)  BP: --  BP(mean): --  ABP: 117/58 (18 Aug 2022 06:00) (90/42 - 134/73)  ABP(mean): 79 (18 Aug 2022 06:00) (59 - 97)  RR: 19 (18 Aug 2022 06:00) (12 - 20)  SpO2: 100% (18 Aug 2022 06:00) (99% - 100%)    O2 Parameters below as of 18 Aug 2022 06:00  Patient On (Oxygen Delivery Method): BiPAP/CPAP      POCT Blood Glucose.: 112 mg/dL (18 Aug 2022 05:17)  POCT Blood Glucose.: 108 mg/dL (17 Aug 2022 23:25)  POCT Blood Glucose.: 117 mg/dL (17 Aug 2022 17:08)  POCT Blood Glucose.: 110 mg/dL (17 Aug 2022 12:21)    CAPILLARY BLOOD GLUCOSE      POCT Blood Glucose.: 112 mg/dL (18 Aug 2022 05:17)    I&O's Summary    17 Aug 2022 07:01  -  18 Aug 2022 07:00  --------------------------------------------------------  IN: 1435 mL / OUT: 1345 mL / NET: 90 mL      Daily     Daily Weight in k.4 (18 Aug 2022 05:00)    PHYSICAL EXAMINATION:  General: NAD, intubated  HEENT: conjunctiva and sclera clear  Neurology: A&Ox0  Respiratory: increased breath sounds b/l  CV: RRR, S1S2, no murmurs, rubs or gallops  Abdominal: Soft, NT, ND +BS  Extremities: No edema, + peripheral pulses, dressings c/d/i   lines: midline, IJ, peripheral IV, toussaint      LABS:  ABG - ( 18 Aug 2022 00:28 )  pH, Arterial: 7.45  pH, Blood: x     /  pCO2: 31    /  pO2: 138   / HCO3: 22    / Base Excess: -1.6  /  SaO2: 98.6                   7.8    21.75 )-----------( 475      ( 18 Aug 2022 00:28 )             26.0     08-18    140  |  107  |  11  ----------------------------<  96  3.4<L>   |  20<L>  |  0.31<L>    Ca    7.4<L>      18 Aug 2022 00:28  Phos  3.5     08-18  Mg     1.90     08-18    TPro  4.9<L>  /  Alb  1.7<L>  /  TBili  0.2  /  DBili  x   /  AST  15  /  ALT  10  /  AlkPhos  255<H>  08-18    LIVER FUNCTIONS - ( 18 Aug 2022 00:28 )  Alb: 1.7 g/dL / Pro: 4.9 g/dL / ALK PHOS: 255 U/L / ALT: 10 U/L / AST: 15 U/L / GGT: x

## 2022-08-19 LAB
ALBUMIN SERPL ELPH-MCNC: 1.6 G/DL — LOW (ref 3.3–5)
ALP SERPL-CCNC: 213 U/L — HIGH (ref 40–120)
ALT FLD-CCNC: 7 U/L — SIGNIFICANT CHANGE UP (ref 4–33)
ANION GAP SERPL CALC-SCNC: 12 MMOL/L — SIGNIFICANT CHANGE UP (ref 7–14)
AST SERPL-CCNC: 13 U/L — SIGNIFICANT CHANGE UP (ref 4–32)
BILIRUB SERPL-MCNC: 0.2 MG/DL — SIGNIFICANT CHANGE UP (ref 0.2–1.2)
BLD GP AB SCN SERPL QL: NEGATIVE — SIGNIFICANT CHANGE UP
BUN SERPL-MCNC: 8 MG/DL — SIGNIFICANT CHANGE UP (ref 7–23)
CALCIUM SERPL-MCNC: 7.3 MG/DL — LOW (ref 8.4–10.5)
CHLORIDE SERPL-SCNC: 108 MMOL/L — HIGH (ref 98–107)
CO2 SERPL-SCNC: 21 MMOL/L — LOW (ref 22–31)
CREAT SERPL-MCNC: 0.31 MG/DL — LOW (ref 0.5–1.3)
CULTURE RESULTS: SIGNIFICANT CHANGE UP
CULTURE RESULTS: SIGNIFICANT CHANGE UP
EGFR: 128 ML/MIN/1.73M2 — SIGNIFICANT CHANGE UP
FERRITIN SERPL-MCNC: 547 NG/ML — HIGH (ref 15–150)
FOLATE SERPL-MCNC: 10.9 NG/ML — SIGNIFICANT CHANGE UP (ref 3.1–17.5)
GLUCOSE BLDC GLUCOMTR-MCNC: 120 MG/DL — HIGH (ref 70–99)
GLUCOSE BLDC GLUCOMTR-MCNC: 138 MG/DL — HIGH (ref 70–99)
GLUCOSE BLDC GLUCOMTR-MCNC: 80 MG/DL — SIGNIFICANT CHANGE UP (ref 70–99)
GLUCOSE BLDC GLUCOMTR-MCNC: 91 MG/DL — SIGNIFICANT CHANGE UP (ref 70–99)
GLUCOSE SERPL-MCNC: 84 MG/DL — SIGNIFICANT CHANGE UP (ref 70–99)
HCT VFR BLD CALC: 24.3 % — LOW (ref 34.5–45)
HCT VFR BLD CALC: 28.2 % — LOW (ref 34.5–45)
HGB BLD-MCNC: 7.3 G/DL — LOW (ref 11.5–15.5)
HGB BLD-MCNC: 8.8 G/DL — LOW (ref 11.5–15.5)
IRON SATN MFR SERPL: 43 % — SIGNIFICANT CHANGE UP (ref 14–50)
IRON SATN MFR SERPL: 44 UG/DL — SIGNIFICANT CHANGE UP (ref 30–160)
MAGNESIUM SERPL-MCNC: 1.6 MG/DL — SIGNIFICANT CHANGE UP (ref 1.6–2.6)
MCHC RBC-ENTMCNC: 26.6 PG — LOW (ref 27–34)
MCHC RBC-ENTMCNC: 27.2 PG — SIGNIFICANT CHANGE UP (ref 27–34)
MCHC RBC-ENTMCNC: 30 GM/DL — LOW (ref 32–36)
MCHC RBC-ENTMCNC: 31.2 GM/DL — LOW (ref 32–36)
MCV RBC AUTO: 87 FL — SIGNIFICANT CHANGE UP (ref 80–100)
MCV RBC AUTO: 88.7 FL — SIGNIFICANT CHANGE UP (ref 80–100)
NRBC # BLD: 0 /100 WBCS — SIGNIFICANT CHANGE UP (ref 0–0)
NRBC # BLD: 0 /100 WBCS — SIGNIFICANT CHANGE UP (ref 0–0)
NRBC # FLD: 0 K/UL — SIGNIFICANT CHANGE UP (ref 0–0)
NRBC # FLD: 0 K/UL — SIGNIFICANT CHANGE UP (ref 0–0)
PHOSPHATE SERPL-MCNC: 3.3 MG/DL — SIGNIFICANT CHANGE UP (ref 2.5–4.5)
PLATELET # BLD AUTO: 483 K/UL — HIGH (ref 150–400)
PLATELET # BLD AUTO: 506 K/UL — HIGH (ref 150–400)
POTASSIUM SERPL-MCNC: 3.9 MMOL/L — SIGNIFICANT CHANGE UP (ref 3.5–5.3)
POTASSIUM SERPL-SCNC: 3.9 MMOL/L — SIGNIFICANT CHANGE UP (ref 3.5–5.3)
PROT SERPL-MCNC: 4.6 G/DL — LOW (ref 6–8.3)
RBC # BLD: 2.74 M/UL — LOW (ref 3.8–5.2)
RBC # BLD: 3.21 M/UL — LOW (ref 3.8–5.2)
RBC # BLD: 3.21 M/UL — LOW (ref 3.8–5.2)
RBC # FLD: 19.8 % — HIGH (ref 10.3–14.5)
RBC # FLD: 20.7 % — HIGH (ref 10.3–14.5)
RETICS #: 207.4 K/UL — HIGH (ref 25–125)
RETICS/RBC NFR: 6.5 % — HIGH (ref 0.5–2.5)
RH IG SCN BLD-IMP: POSITIVE — SIGNIFICANT CHANGE UP
SODIUM SERPL-SCNC: 141 MMOL/L — SIGNIFICANT CHANGE UP (ref 135–145)
SPECIMEN SOURCE: SIGNIFICANT CHANGE UP
SPECIMEN SOURCE: SIGNIFICANT CHANGE UP
SURGICAL PATHOLOGY STUDY: SIGNIFICANT CHANGE UP
TIBC SERPL-MCNC: 103 UG/DL — LOW (ref 220–430)
TRANSFERRIN SERPL-MCNC: 77 MG/DL — LOW (ref 200–360)
UIBC SERPL-MCNC: 59 UG/DL — LOW (ref 110–370)
VIT B12 SERPL-MCNC: 716 PG/ML — SIGNIFICANT CHANGE UP (ref 200–900)
WBC # BLD: 15.86 K/UL — HIGH (ref 3.8–10.5)
WBC # BLD: 17.29 K/UL — HIGH (ref 3.8–10.5)
WBC # FLD AUTO: 15.86 K/UL — HIGH (ref 3.8–10.5)
WBC # FLD AUTO: 17.29 K/UL — HIGH (ref 3.8–10.5)

## 2022-08-19 PROCEDURE — 99233 SBSQ HOSP IP/OBS HIGH 50: CPT

## 2022-08-19 RX ORDER — FUROSEMIDE 40 MG
40 TABLET ORAL ONCE
Refills: 0 | Status: COMPLETED | OUTPATIENT
Start: 2022-08-19 | End: 2022-08-19

## 2022-08-19 RX ORDER — KETOROLAC TROMETHAMINE 30 MG/ML
30 SYRINGE (ML) INJECTION ONCE
Refills: 0 | Status: DISCONTINUED | OUTPATIENT
Start: 2022-08-19 | End: 2022-08-19

## 2022-08-19 RX ORDER — MAGNESIUM SULFATE 500 MG/ML
2 VIAL (ML) INJECTION ONCE
Refills: 0 | Status: COMPLETED | OUTPATIENT
Start: 2022-08-19 | End: 2022-08-19

## 2022-08-19 RX ORDER — ACETAMINOPHEN 500 MG
1000 TABLET ORAL ONCE
Refills: 0 | Status: COMPLETED | OUTPATIENT
Start: 2022-08-19 | End: 2022-08-19

## 2022-08-19 RX ADMIN — PANTOPRAZOLE SODIUM 40 MILLIGRAM(S): 20 TABLET, DELAYED RELEASE ORAL at 05:44

## 2022-08-19 RX ADMIN — HEPARIN SODIUM 5000 UNIT(S): 5000 INJECTION INTRAVENOUS; SUBCUTANEOUS at 05:44

## 2022-08-19 RX ADMIN — Medication 30 MILLIGRAM(S): at 11:30

## 2022-08-19 RX ADMIN — SODIUM CHLORIDE 10 MILLILITER(S): 9 INJECTION, SOLUTION INTRAVENOUS at 20:31

## 2022-08-19 RX ADMIN — Medication 1000 MILLIGRAM(S): at 12:42

## 2022-08-19 RX ADMIN — HEPARIN SODIUM 5000 UNIT(S): 5000 INJECTION INTRAVENOUS; SUBCUTANEOUS at 21:38

## 2022-08-19 RX ADMIN — Medication 2000 UNIT(S): at 15:13

## 2022-08-19 RX ADMIN — NYSTATIN CREAM 1 APPLICATION(S): 100000 CREAM TOPICAL at 17:33

## 2022-08-19 RX ADMIN — AMPICILLIN SODIUM AND SULBACTAM SODIUM 200 GRAM(S): 250; 125 INJECTION, POWDER, FOR SUSPENSION INTRAMUSCULAR; INTRAVENOUS at 12:17

## 2022-08-19 RX ADMIN — Medication 56 MICROGRAM(S): at 05:45

## 2022-08-19 RX ADMIN — Medication 2 DROP(S): at 12:00

## 2022-08-19 RX ADMIN — AMPICILLIN SODIUM AND SULBACTAM SODIUM 200 GRAM(S): 250; 125 INJECTION, POWDER, FOR SUSPENSION INTRAMUSCULAR; INTRAVENOUS at 17:38

## 2022-08-19 RX ADMIN — Medication 25 GRAM(S): at 05:44

## 2022-08-19 RX ADMIN — Medication 40 MILLIGRAM(S): at 11:01

## 2022-08-19 RX ADMIN — CHLORHEXIDINE GLUCONATE 1 APPLICATION(S): 213 SOLUTION TOPICAL at 15:13

## 2022-08-19 RX ADMIN — Medication 400 MILLIGRAM(S): at 12:17

## 2022-08-19 RX ADMIN — HEPARIN SODIUM 5000 UNIT(S): 5000 INJECTION INTRAVENOUS; SUBCUTANEOUS at 15:13

## 2022-08-19 RX ADMIN — NYSTATIN CREAM 1 APPLICATION(S): 100000 CREAM TOPICAL at 05:44

## 2022-08-19 RX ADMIN — PANTOPRAZOLE SODIUM 40 MILLIGRAM(S): 20 TABLET, DELAYED RELEASE ORAL at 17:34

## 2022-08-19 RX ADMIN — Medication 2 DROP(S): at 05:44

## 2022-08-19 RX ADMIN — Medication 500 MILLIGRAM(S): at 15:13

## 2022-08-19 RX ADMIN — Medication 1 APPLICATION(S): at 12:01

## 2022-08-19 RX ADMIN — Medication 5 UNIT(S): at 23:32

## 2022-08-19 RX ADMIN — AMPICILLIN SODIUM AND SULBACTAM SODIUM 200 GRAM(S): 250; 125 INJECTION, POWDER, FOR SUSPENSION INTRAMUSCULAR; INTRAVENOUS at 05:47

## 2022-08-19 RX ADMIN — AMPICILLIN SODIUM AND SULBACTAM SODIUM 200 GRAM(S): 250; 125 INJECTION, POWDER, FOR SUSPENSION INTRAMUSCULAR; INTRAVENOUS at 23:31

## 2022-08-19 RX ADMIN — Medication 5 UNIT(S): at 17:29

## 2022-08-19 RX ADMIN — Medication 30 MILLIGRAM(S): at 12:42

## 2022-08-19 RX ADMIN — Medication 0: at 23:32

## 2022-08-19 NOTE — PROGRESS NOTE ADULT - SUBJECTIVE AND OBJECTIVE BOX
Al Duenas MD PGY1    PATIENT:  BYO CERON  [id number]    CHIEF COMPLAINT:  Patient is a 52y old  Female who presents with a chief complaint of Soft tissue infection (18 Aug 2022 07:19)      INTERVAL HISTORY/OVERNIGHT EVENTS: Pt extubated on face tent. Pt is A&O x3. Pt passed 30cc speech and swallow but choked on 90cc, plan to repeat today. pt Hb dropped to 7.3 and is getting 1 unit pRBCs. Pt examined at bedside in NAD. Pt is hungry. Pt is able to move upper extremities but is complaining of some pain in her arms. Denies any chest pain, abdominal pain, SOB, n/v, f/c.      REVIEW OF SYSTEMS:    Constitutional:     [ ] negative [ ] fevers [ ] chills [ ] weight loss [ ] weight gain  HEENT:                  [ ] negative [ ] dry eyes [ ] eye irritation [ ] postnasal drip [ ] nasal congestion  CV:                         [ ] negative  [ ] chest pain [ ] orthopnea [ ] palpitations [ ] murmur  Resp:                     [ ] negative [ ] cough [ ] shortness of breath [ ] dyspnea [ ] wheezing [ ] sputum [ ] hemoptysis  GI:                          [ ] negative [ ] nausea [ ] vomiting [ ] diarrhea [ ] constipation [ ] abd pain [ ] dysphagia   :                        [ ] negative [ ] dysuria [ ] nocturia [ ] hematuria [ ] increased urinary frequency  Musculoskeletal: [ ] negative [ ] back pain [ ] myalgias [ ] arthralgias [ ] fracture  Skin:                       [ ] negative [ ] rash [ ] itch  Neurological:        [ ] negative [ ] headache [ ] dizziness [ ] syncope [ ] weakness [ ] numbness  Psychiatric:           [ ] negative [ ] anxiety [ ] depression  Endocrine:            [ ] negative [ ] diabetes [ ] thyroid problem  Heme/Lymph:      [ ] negative [ ] anemia [ ] bleeding problem  Allergic/Immune: [ ] negative [ ] itchy eyes [ ] nasal discharge [ ] hives [ ] angioedema    [ ] All other systems negative  [ ] Unable to assess ROS because ________.    MEDICATIONS:  MEDICATIONS  (STANDING):  ampicillin/sulbactam  IVPB      ampicillin/sulbactam  IVPB 3 Gram(s) IV Intermittent every 6 hours  artificial tears (preservative free) Ophthalmic Solution 2 Drop(s) Both EYES four times a day  ascorbic acid 500 milliGRAM(s) Oral daily  chlorhexidine 2% Cloths 1 Application(s) Topical daily  cholecalciferol 2000 Unit(s) Oral daily  collagenase Ointment 1 Application(s) Topical daily  heparin   Injectable 5000 Unit(s) SubCutaneous every 8 hours  insulin lispro (ADMELOG) corrective regimen sliding scale   SubCutaneous every 6 hours  insulin lispro Injectable (ADMELOG) 5 Unit(s) SubCutaneous every 6 hours  lactated ringers. 1000 milliLiter(s) (10 mL/Hr) IV Continuous <Continuous>  levothyroxine Injectable 56 MICROGram(s) IV Push <User Schedule>  multivitamin/minerals/iron Oral Solution (CENTRUM) 15 milliLiter(s) Oral daily  nystatin Powder 1 Application(s) Topical two times a day  pantoprazole  Injectable 40 milliGRAM(s) IV Push every 12 hours    MEDICATIONS  (PRN):  acetaminophen    Suspension .. 650 milliGRAM(s) Oral every 6 hours PRN Temp greater or equal to 38.5C (101.3F)  ALBUTerol    90 MICROgram(s) HFA Inhaler 2 Puff(s) Inhalation every 6 hours PRN Shortness of Breath  HYDROmorphone  Injectable 1 milliGRAM(s) IV Push every 3 hours PRN Severe Pain (7 - 10)  HYDROmorphone  Injectable 0.5 milliGRAM(s) IV Push every 3 hours PRN Moderate Pain (4 - 6)  sodium chloride 0.9% lock flush 10 milliLiter(s) IV Push every 1 hour PRN Pre/post blood products, medications, blood draw, and to maintain line patency      ALLERGIES:  Allergies    No Known Drug Allergies  Pineapple (Anaphylaxis)    Intolerances        OBJECTIVE:  ICU Vital Signs Last 24 Hrs  T(C): 37.4 (19 Aug 2022 04:00), Max: 37.6 (18 Aug 2022 16:00)  T(F): 99.3 (19 Aug 2022 04:00), Max: 99.6 (18 Aug 2022 16:00)  HR: 104 (19 Aug 2022 07:00) (94 - 112)  BP: --  BP(mean): --  ABP: 112/55 (19 Aug 2022 07:00) (89/49 - 135/66)  ABP(mean): 75 (19 Aug 2022 07:00) (64 - 91)  RR: 12 (19 Aug 2022 07:00) (12 - 29)  SpO2: 100% (19 Aug 2022 07:00) (97% - 100%)    O2 Parameters below as of 19 Aug 2022 07:00  Patient On (Oxygen Delivery Method): face tent    O2 Concentration (%): 55      Mode: standby      POCT Blood Glucose.: 80 mg/dL (19 Aug 2022 05:43)  POCT Blood Glucose.: 100 mg/dL (18 Aug 2022 23:34)  POCT Blood Glucose.: 116 mg/dL (18 Aug 2022 17:28)  POCT Blood Glucose.: 105 mg/dL (18 Aug 2022 11:24)    CAPILLARY BLOOD GLUCOSE      POCT Blood Glucose.: 80 mg/dL (19 Aug 2022 05:43)    I&O's Summary    18 Aug 2022 07:01  -  19 Aug 2022 07:00  --------------------------------------------------------  IN: 345.5 mL / OUT: 1220 mL / NET: -874.5 mL      PHYSICAL EXAMINATION:  General: NAD, intubated  HEENT: conjunctiva and sclera clear  Neurology: A&Ox3  Respiratory: CTABL  CV: RRR, S1S2, no murmurs, rubs or gallops  Abdominal: Soft, NT, ND +BS  Extremities: edematous, + peripheral pulses, dressings c/d/i   lines: midline, IJ, peripheral IV, toussaint    LABS:  ABG - ( 18 Aug 2022 15:41 )  pH, Arterial: 7.46  pH, Blood: x     /  pCO2: 31    /  pO2: 107   / HCO3: 22    / Base Excess: -0.9  /  SaO2: 100.0                           7.3    17.29 )-----------( 483      ( 19 Aug 2022 03:00 )             24.3     08-19    141  |  108<H>  |  8   ----------------------------<  84  3.9   |  21<L>  |  0.31<L>    Ca    7.3<L>      19 Aug 2022 03:00  Phos  3.3     08-19  Mg     1.60     08-19    TPro  4.6<L>  /  Alb  1.6<L>  /  TBili  0.2  /  DBili  x   /  AST  13  /  ALT  7   /  AlkPhos  213<H>  08-19    LIVER FUNCTIONS - ( 19 Aug 2022 03:00 )  Alb: 1.6 g/dL / Pro: 4.6 g/dL / ALK PHOS: 213 U/L / ALT: 7 U/L / AST: 13 U/L / GGT: x                       TELEMETRY:     EKG:     IMAGING:       Al Duenas MD PGY1    PATIENT:  BOY CERON  [id number]    CHIEF COMPLAINT:  Patient is a 52y old  Female who presents with a chief complaint of Soft tissue infection (18 Aug 2022 07:19)      INTERVAL HISTORY/OVERNIGHT EVENTS: Pt extubated on face tent. Pt is A&O x3. Pt passed 30cc speech and swallow but choked on 90cc, plan to repeat today. pt Hb dropped to 7.3 and is getting 1 unit pRBCs. Pt examined at bedside in NAD. Pt is hungry. Pt is able to move upper extremities but is complaining of some pain in her arms. Denies any chest pain, abdominal pain, SOB, n/v, f/c.      REVIEW OF SYSTEMS:    Constitutional:     [ ] negative [ ] fevers [ ] chills [ ] weight loss [ ] weight gain  HEENT:                  [ ] negative [ ] dry eyes [ ] eye irritation [ ] postnasal drip [ ] nasal congestion  CV:                         [ ] negative  [ ] chest pain [ ] orthopnea [ ] palpitations [ ] murmur  Resp:                     [ ] negative [ ] cough [ ] shortness of breath [ ] dyspnea [ ] wheezing [ ] sputum [ ] hemoptysis  GI:                          [ ] negative [ ] nausea [ ] vomiting [ ] diarrhea [ ] constipation [ ] abd pain [ ] dysphagia   :                        [ ] negative [ ] dysuria [ ] nocturia [ ] hematuria [ ] increased urinary frequency  Musculoskeletal: [ ] negative [ ] back pain [ ] myalgias [ ] arthralgias [ ] fracture  Skin:                       [ ] negative [ ] rash [ ] itch  Neurological:        [ ] negative [ ] headache [ ] dizziness [ ] syncope [ ] weakness [ ] numbness  Psychiatric:           [ ] negative [ ] anxiety [ ] depression  Endocrine:            [ ] negative [ ] diabetes [ ] thyroid problem  Heme/Lymph:      [ ] negative [ ] anemia [ ] bleeding problem  Allergic/Immune: [ ] negative [ ] itchy eyes [ ] nasal discharge [ ] hives [ ] angioedema    [ ] All other systems negative  [ ] Unable to assess ROS because ________.    MEDICATIONS:  MEDICATIONS  (STANDING):  ampicillin/sulbactam  IVPB      ampicillin/sulbactam  IVPB 3 Gram(s) IV Intermittent every 6 hours  artificial tears (preservative free) Ophthalmic Solution 2 Drop(s) Both EYES four times a day  ascorbic acid 500 milliGRAM(s) Oral daily  chlorhexidine 2% Cloths 1 Application(s) Topical daily  cholecalciferol 2000 Unit(s) Oral daily  collagenase Ointment 1 Application(s) Topical daily  heparin   Injectable 5000 Unit(s) SubCutaneous every 8 hours  insulin lispro (ADMELOG) corrective regimen sliding scale   SubCutaneous every 6 hours  insulin lispro Injectable (ADMELOG) 5 Unit(s) SubCutaneous every 6 hours  lactated ringers. 1000 milliLiter(s) (10 mL/Hr) IV Continuous <Continuous>  levothyroxine Injectable 56 MICROGram(s) IV Push <User Schedule>  multivitamin/minerals/iron Oral Solution (CENTRUM) 15 milliLiter(s) Oral daily  nystatin Powder 1 Application(s) Topical two times a day  pantoprazole  Injectable 40 milliGRAM(s) IV Push every 12 hours    MEDICATIONS  (PRN):  acetaminophen    Suspension .. 650 milliGRAM(s) Oral every 6 hours PRN Temp greater or equal to 38.5C (101.3F)  ALBUTerol    90 MICROgram(s) HFA Inhaler 2 Puff(s) Inhalation every 6 hours PRN Shortness of Breath  HYDROmorphone  Injectable 1 milliGRAM(s) IV Push every 3 hours PRN Severe Pain (7 - 10)  HYDROmorphone  Injectable 0.5 milliGRAM(s) IV Push every 3 hours PRN Moderate Pain (4 - 6)  sodium chloride 0.9% lock flush 10 milliLiter(s) IV Push every 1 hour PRN Pre/post blood products, medications, blood draw, and to maintain line patency      ALLERGIES:  Allergies    No Known Drug Allergies  Pineapple (Anaphylaxis)    Intolerances        OBJECTIVE:  ICU Vital Signs Last 24 Hrs  T(C): 37.4 (19 Aug 2022 04:00), Max: 37.6 (18 Aug 2022 16:00)  T(F): 99.3 (19 Aug 2022 04:00), Max: 99.6 (18 Aug 2022 16:00)  HR: 104 (19 Aug 2022 07:00) (94 - 112)  BP: --  BP(mean): --  ABP: 112/55 (19 Aug 2022 07:00) (89/49 - 135/66)  ABP(mean): 75 (19 Aug 2022 07:00) (64 - 91)  RR: 12 (19 Aug 2022 07:00) (12 - 29)  SpO2: 100% (19 Aug 2022 07:00) (97% - 100%)    O2 Parameters below as of 19 Aug 2022 07:00  Patient On (Oxygen Delivery Method): face tent    O2 Concentration (%): 55      Mode: standby      POCT Blood Glucose.: 80 mg/dL (19 Aug 2022 05:43)  POCT Blood Glucose.: 100 mg/dL (18 Aug 2022 23:34)  POCT Blood Glucose.: 116 mg/dL (18 Aug 2022 17:28)  POCT Blood Glucose.: 105 mg/dL (18 Aug 2022 11:24)    CAPILLARY BLOOD GLUCOSE      POCT Blood Glucose.: 80 mg/dL (19 Aug 2022 05:43)    I&O's Summary    18 Aug 2022 07:01  -  19 Aug 2022 07:00  --------------------------------------------------------  IN: 345.5 mL / OUT: 1220 mL / NET: -874.5 mL      PHYSICAL EXAMINATION:  General: NAD on face tent  HEENT: conjunctiva and sclera clear  Neurology: A&Ox3  Respiratory: CTABL  CV: RRR, S1S2, no murmurs, rubs or gallops  Abdominal: Soft, NT, ND +BS  Extremities: edematous, + peripheral pulses, dressings c/d/i   lines: , IJ, peripheral IV, toussaint, A line    LABS:  ABG - ( 18 Aug 2022 15:41 )  pH, Arterial: 7.46  pH, Blood: x     /  pCO2: 31    /  pO2: 107   / HCO3: 22    / Base Excess: -0.9  /  SaO2: 100.0                           7.3    17.29 )-----------( 483      ( 19 Aug 2022 03:00 )             24.3     08-19    141  |  108<H>  |  8   ----------------------------<  84  3.9   |  21<L>  |  0.31<L>    Ca    7.3<L>      19 Aug 2022 03:00  Phos  3.3     08-19  Mg     1.60     08-19    TPro  4.6<L>  /  Alb  1.6<L>  /  TBili  0.2  /  DBili  x   /  AST  13  /  ALT  7   /  AlkPhos  213<H>  08-19    LIVER FUNCTIONS - ( 19 Aug 2022 03:00 )  Alb: 1.6 g/dL / Pro: 4.6 g/dL / ALK PHOS: 213 U/L / ALT: 7 U/L / AST: 13 U/L / GGT: x                       TELEMETRY:     EKG:     IMAGING:

## 2022-08-19 NOTE — PROGRESS NOTE ADULT - SUBJECTIVE AND OBJECTIVE BOX
[facility]-- WOUND TEAM -- FOLLOW UP NOTE  --------------------------------------------------------------------------------    subjective: Patient seen collaboratively with primary RN, safe patient handling, [facility] nursing team for NPWT/VAC dressing change managed by general surgery post OR debridement, and follow up of multiple full thickness pressure injuries. Denies lying on right side, conversing minimally with primary RN during dressing change. Denies SOB, CP, n/v, fever chills. Tolerating dressing change well. Per RN received pain medication prior to change, required additional dose. Receiving PRBC.    Interval HPI/24 hour events:   8/13 transferred from SICU to CCU for [place] and inotropic support. [place] eventually d/c'd.  Remains in CCU.  Extubated to face tent  Off vasopressors  Hgb 7.3, receiving PRBC 1 unit.      Chart reviewed including labs and relevant images      Diet:      ROS: General, skin see above  All other systems negative.      ALLERGIES & MEDICATIONS  --------------------------------------------------------------------------------  Allergies    No Known Drug Allergies  Pineapple (Anaphylaxis)    Intolerances          STANDING INPATIENT MEDICATIONS    ampicillin/sulbactam  IVPB      ampicillin/sulbactam  IVPB 3 Gram(s) IV Intermittent every 6 hours  artificial tears (preservative free) Ophthalmic Solution 2 Drop(s) Both EYES four times a day  ascorbic acid 500 milliGRAM(s) Oral daily  chlorhexidine 2% Cloths 1 Application(s) Topical daily  cholecalciferol 2000 Unit(s) Oral daily  collagenase Ointment 1 Application(s) Topical daily  heparin   Injectable 5000 Unit(s) SubCutaneous every 8 hours  insulin lispro (ADMELOG) corrective regimen sliding scale   SubCutaneous every 6 hours  insulin lispro Injectable (ADMELOG) 5 Unit(s) SubCutaneous every 6 hours  lactated ringers. 1000 milliLiter(s) IV Continuous <Continuous>  levothyroxine Injectable 56 MICROGram(s) IV Push <User Schedule>  multivitamin/minerals/iron Oral Solution (CENTRUM) 15 milliLiter(s) Oral daily  nystatin Powder 1 Application(s) Topical two times a day  pantoprazole  Injectable 40 milliGRAM(s) IV Push every 12 hours      PRN INPATIENT MEDICATION  acetaminophen    Suspension .. 650 milliGRAM(s) Oral every 6 hours PRN  ALBUTerol    90 MICROgram(s) HFA Inhaler 2 Puff(s) Inhalation every 6 hours PRN  HYDROmorphone  Injectable 0.5 milliGRAM(s) IV Push every 3 hours PRN  HYDROmorphone  Injectable 1 milliGRAM(s) IV Push every 3 hours PRN  sodium chloride 0.9% lock flush 10 milliLiter(s) IV Push every 1 hour PRN        Vital signs:  T(C): 37.2 (08-19-22 @ 13:00), Max: 37.6 (08-18-22 @ 16:00)  HR: 107 (08-19-22 @ 14:00) (101 - 112)  BP: --  RR: 23 (08-19-22 @ 14:00) (12 - 29)  SpO2: 100% (08-19-22 @ 14:00) (97% - 100%)  Wt(kg): 104.4 kg (08-18-22)        08-18-22 @ 07:01  -  08-19-22 @ 07:00  --------------------------------------------------------  IN: 345.5 mL / OUT: 1220 mL / NET: -874.5 mL    08-19-22 @ 07:01  -  08-19-22 @ 14:20  --------------------------------------------------------  IN: 200 mL / OUT: 1000 mL / NET: -800 mL      Constitutional: A&O x 2, alert. Reporting pain/discomfort during dressing change. Receiving PRBC 1 unit.  (+) low airloss support surface, (+) fluidized positioning devices, (+) complete cair boots  HEENT:  NC/AT, nonicteric, nares clear, mucosa moist  Cardiovascular: tachycardic  Respiratory: face tent 50%, nonlabored, equal chest expansion  Gastrointestinal: large abdominal pannus, soft NT/ND, incontinent stool  : (+) indwelling toussaint catheter  Musculoskeletal: Flaccid b/u upper and lower extremities.  Back: NPWT/VAC therapy in place sacrum to left flank. Changed by WOC team, dressing collapsed. Seal intact.  Vascular: B/L LE hyperpigmentation with dry-flaky skin to dorsal toes, trace edema bilaterally, +1 dp pulses, capillary refill >3 seconds. Cool midfoot to distal toes.  Right knee abrasion- 0.5cmx0.5cmx0,1cm (prev 1cmx0.5cmx0.1cm), pink minimally moist dermis. No drainage. No associated cellulitis. Silicone foam with border changed.  Right heel unstageable pressure injury- 1.5cmx1.5cmx0 (prev [id number])- black, hard firmly attached eschar in center with circumferential purple-maroon discoloration. Non-fluctuance, no induration noted. No associated cellulitis. Betadine, abdominal pad, kerlix wrap applied.  Left heel unstageable pressure injury- 3cmx3.7hdr6ek- 100% fixed black, dry eschar. No drainage. No fluctuance. Periwound skin intact, no associated cellulitis. Betadine, abdominal pad, kerlix applied.  Left lateral malleolus unstageable pressure injury- 2cmx1.5cmx0.1cm- tan-moist firmly attached slough. Scant serous drainage, no odor. Periwound skin intact, no associated cellulitis. Betadine, abdominal pad, kerlix applied.  Left lateral lower leg- Proximal - stage 4 pressure injury- 3.2cmx1.5cmx0.8cm, wound base with viable tendon exposed, agranular base. Small-moderate serosangenous drainage, no odor. Periwound skin with hypopigmentation and soft tissue contraction. Satellite ulceration noted at 6 o'clock to lateral-distal lower leg unstageable pressure injury 1cmx0.5cmx0.3cm- 100% tan-moist firmly attached slough. No associated cellulitis. Aquacel hydrofiber, silicone foam with border applied to proximal wound, collagenase nahun thickness and silicone foam with border applied to distal.  Skin:  as noted above.  Right axilla- wound of unknown etiology within intertriginous fold- 9rzk0uys0.8cm (prev 1.5cmx1.9bvr9hs), wound base flat, pink agranular moist base. Small-moderate serous drainage, no odor. Periwound skin with hypopigmentation. No induration, no fluctuance. Periwound skin without edema, erythema, increased warmth, no induration. No overt s/s of acute skin infection/cellulitis. Aquacel ribbon and Silicone foam with border applied.  Intertriginous folds with increased moisture with chronic hyperpigmentation, no candidiasis noted to bilateral axilla, submammary, abdominal pannus.  Moisture associated dermatitis with suspected candidiasis to bilateral groin and medial thighs.  Right anterior to lateral abdomen- linear deep tissue pressure injury- 3ifp80jzs7.2cm (prev [id number]). 90% purple-maroon discoloration,10% pink-dermis with fibrin (+) induration without fluctuance beneath area of discoloration that does not extend beyond wound borders. Liquid barrier film, silicone foam with border applied.  Right trochanter- chronic stage 4 pressure injury- [id number] (prev 9epp8hur4.5cm), unable to probe to bone. Due to wound dimensions unable to visualize wound base in its entirety, visible base pink-moist granular. Small-moderate serous drainage, no odor. Periwound skin with hypopigmentation, soft tissue contraction. No induration, no fluctuance, no crepitus, no erythema, no edema, no increased warmth. No overt s/s of acute skin infection/cellulitis. Packed with Aquacel ribbon, covered with silicone foam with border.  Right lateral thigh and right ischium hypopigmentation noted with soft tissue contraction.  Left ischium within intertriginous fold- mixed etiology unstageable pressure injury and moisture associated dermatitis- 4cmx1.5cmx0.2cm (prev 8kfa3gkw1.2cm) satellite ulceration noted 4cm from wound edge at 10 o'clock 1cmx1.5cmx0.2cm, unable to determine true anatomical depth due to necrotic tissue, irregular border 100% moistening adherent slough, beginning to lift. Small serous drainage. No induration, no fluctuance. Periwound skin without edema, erythema, increased warmth, no induration. No overt s/s of acute skin infection/cellulitis. Collagenase, Silicone foam with border applied.      LABS/ CULTURES/ RADIOLOGY:              7.3    17.29 >-----------<  483      [08-19-22 @ 03:00]              24.3     141  |  108  |  8   ----------------------------<  84      [08-19-22 @ 03:00]  3.9   |  21  |  0.31        Ca     7.3     [08-19-22 @ 03:00]      Mg     1.60     [08-19-22 @ 03:00]      Phos  3.3     [08-19-22 @ 03:00]    TPro  4.6  /  Alb  1.6  /  TBili  0.2  /  DBili  x   /  AST  13  /  ALT  7   /  AlkPhos  213  [08-19-22 @ 03:00]            CAPILLARY BLOOD GLUCOSE      POCT Blood Glucose.: 91 mg/dL (19 Aug 2022 12:24)  POCT Blood Glucose.: 80 mg/dL (19 Aug 2022 05:43)  POCT Blood Glucose.: 100 mg/dL (18 Aug 2022 23:34)  POCT Blood Glucose.: 116 mg/dL (18 Aug 2022 17:28)        A1C with Estimated Average Glucose Result: 5.5 % (08-12-22 @ 00:50)  A1C with Estimated Average Glucose Result: 14.2 % (03-27-22 @ 19:13)      Culture - Blood (collected 08-14-22 @ 00:18)  Source: .Blood Blood  Final Report (08-19-22 @ 03:00):    No Growth Final    Culture - Blood (collected 08-14-22 @ 00:18)  Source: .Blood Blood  Final Report (08-19-22 @ 03:00):    No Growth Final    Culture - Blood (collected 08-13-22 @ 18:30)  Source: .Blood Blood-Venous  Final Report (08-18-22 @ 21:00):    No Growth Final

## 2022-08-19 NOTE — SWALLOW BEDSIDE ASSESSMENT ADULT - COMMENTS
Per CCU Resident/Attending Note 8/18/22: "52F immobile 2/2 h/o MS, DM2, asthma, hypothyroidism, and known sacral wounds presents from [place] with urosepsis and anemia with new L flank wound with concern for necrotizing soft tissue infection. Was s/p debridement of wound with worsening sepsis, acidosis, and severe global LV dysfunction of unknown etiology, transferred to CCU for [place] and inotrope support. Pt is s/p swan removal and off all sedation, weaning off pressors. Repeat echo showed EF of 5-10% and signs suggestive of possible stress induced cardiomyopathy. Pt's mental status is improving off sedation."     CXR 8/16/22: " RIGHT lung base linear atelectasis. RIGHT posterior lung base cannot be assessed due to elevated diaphragm. LEFT lower zone multifocal ill-defined airspace opacities/infiltrates. No pneumothorax"    Patient received upright in bed, awake, alert and  communicative with RN present at bedside. Face Tent in place, though transitioned to nasal canula (4L) prior to provision of PO Trials. Spo2 remained between 98 and 100% throughout the evaluation.

## 2022-08-19 NOTE — SWALLOW BEDSIDE ASSESSMENT ADULT - ASR SWALLOW RECOMMEND DIAG
consider cinesophagram at MD's discretion if there are concerns that left lower zone multifocal ill-defined airspace opacities/infiltrates per CXR are secondary to aspiration/VFSS/MBS

## 2022-08-19 NOTE — SWALLOW BEDSIDE ASSESSMENT ADULT - SWALLOW EVAL: DIAGNOSIS
Patient presents with oropharyngeal dysphagia given thin liquids, mildly thick liquids, moderately thick liquids, puree and regular solids marked by adequate bolus containment, slowed bolus manipulation, slowed mastication with reduced ability to break down regular solids resulting in mild residue covering lingual and right lateral sulci, requiring clinician cue to initiate lingual sweep/utilize thin liquid washes to clear. Pharyngeal stage marked by observed initiation of the pharyngeal swallow trigger judged via laryngeal palpation. Patient noted with  immediate cough response following large cup sip of thin liquids, though not reduplicated on additional thin liquid trials.

## 2022-08-19 NOTE — PROGRESS NOTE ADULT - ASSESSMENT
Assessment/Plan: 52F immobile 2/2 MS, poorly controlled T2DM, asthma, hypothyroidism, known chronic wounds sacrum (stage 4), vulvar/Rt thigh, and right calf. Recent April 2022 hospitalization for urosepsis. Presented from [place] with concerns for UTI and anemia (6.8 from baseline 8.0). Found to have new malodorous wound on L flank area, concerning for necrotizing soft tissue infection. Febrile to 103.7. S/p excisional debridement in OR of wound L flank to midback/sacrum by general surgery. Now with NPWT/VAC therapy, currently managed by general surgery and changed by [facility] nursing team. Currently in CCU, originally transferred for SWAN and inotropic support. SWAN has since been d/c, patient off pressors and inotropes. Extubated to face tent. Afebrile, leukocytosis downtrending, remains on ABx IV mgmt per ID/primary team.    Wound surgery follow up for multiple pressure injuries.  Overall wounds stable, no major changes. Will continue current management.    Right axilla full thickness injury of unknown etiology  -area within intertriginous fold exposed to moisture.  -wound base clean, no purulent drainage, no odor. No s/s of acute skin infection/cellulitis, no suspected candidiasis within this area.  -Topical recommendations: Pack with aquacel hydrofiber, cover with silicone foam with border. Change daily.    Right anterior to lateral abdomen linear deep tissue pressure injury  - (+) induration without fluctuance beneath purple-maroon discoloration, small area with skin slippage exposing pink dermis with fibrin.  - Topical recommendations: liquid barrier film, allow to dry, cover with silicone foam with border. Change daily.  - Monitor for changes in tissue type.    Right trochanter stage 4 pressure injury  -Chronic stage 4 pressure injury. Narrow ulceration; visible base clean. Unable to palpate bone. (+) serous drainage. No purulence, no odor.   -Periwound skin without induration, no increased warmth, no edema, no erythema, no crepitus. No overt s/s of acute skin infection/cellulitis.  -Wound dimensions improving since previous admission in april.  -Topical recommendations: pack with Aquacel hydrofiber, leaving 2" out at end, cover with silicone foam with border. Change daily.    Right knee superficial abrasion  -improving  -clean dermis exposed. No associated cellulitis.  -Topical recommendations: silicone foam with border, change daily.    Left ischium mixed unstagable pressure injury and moisture associated dermatitis  -affected area within intertriginous fold, irregular borders.  -Wound base with fixed moistening slough beginning to life; no induration, no fluctuance, no crepitus.  -Periwound skin without induration, no increased warmth, no edema, no erythema, no crepitus. No overt s/s of acute skin infection/cellulitis.  -No surgical debridement indicated. Will continue enzymatic debridement with santyl (collagenase).  -Topical recommendations- Apply Collagenase nahun thickness to wound base, cover with silicone with border, change daily.    Left lateral lower leg: proximal stage 4 pressure injury, distal unstagable pressure injury, Left lateral malleolus unstageable pressure injury  -proximal wound base clean, agranular with viable tendon exposed. (+) serous drainage. No purulent drainage no odor.   -Distal lateral lower leg and lateral malleolus- with moist adherent slough. Scant serous drainage, no odor.  -Periwound skin of all without induration, no increased warmth, no edema, no erythema, no crepitus. No overt s/s of acute skin infection/cellulitis.  -Proximal wound improving since previous admission in april.  -Topical recommendations: proximal: pack with Aquacel hydrofiber, leaving 2" out at end, cover with silicone foam with border. Change daily.  Lateral distal leg- collagenase, foam with border. Change daily.    Bilateral heels unstageable pressure injuries  -Fixed eschar, no fluctuance. No drainage. No associated cellulitis.   -Consider Podiatry consult  -Consider obtaining baseline MARK/PVR. Cool midfoot to distal toes, capillary refill >3 seconds, no overt ischemic changes noted.   -Topical recommendations; paint left lateral malleolus and bilateral heels with betadine, apply abdominal pad, kerlix wrap. Change daily.   -Off load heels with complete cair boots.    Moisture associated dermatitis   -Bilateral axilla, submammary, abdominal pannus without suspected candidiasis. Recommend; cleanse with luke-warm soap and water, dry well. Apply Interdry textile sheeting leaving 2" out at end to wick, remove to wash & dry affected area, then replace. Individual sheeting may be used for up to 5 days unless soiled. Inspect skin daily.   -Bilateral groin and medial thighs with improving suspected candidiasis. Agree with Nystatin powder twice a day. Cleanse with luke-warm water soap and water, dry well. Apply nystatin powder twice a day.    Left flank to sacrum s/p excision debridement in OR for necrotizing fascitis   -NPWT/VAC therapy currently managed by general surgery/WOC team  -Abx per primary team/ID  -Consider use of Envella bed to minimize pressure to affected area.    DM type 2, hyperglycemia  -mgmt per primary team  -HgbA1C 8/12/22 --> 5.5  -consider endocrine consult; per H&P history of being a poorly controlled DM type 2, now s/p debridement of necrotizing fascitis, multiple full thickness pressure injuries.  -When medically appropriate consider Diabetes education    Additional recommendations:  -Consider Envella bed. Currently on low airloss support surface.  -Continue turning and positioning w/ offloading assistive devices as per protocol  -Continue w/ Pericare as per protocol.   -Continue use of complete cair boots  -Nutrition recommendations appreciated when medically appropriate for patient with multiple full thickness pressure injuries,  now s/p debridement of necrotizing fascitis with NPWT/VAC in place.    Remainder of care per primary team.  Will continue to follow periodically throughout hospitalization. Please reconsult earlier if needed.    Upon discharge f/u as outpatient at [facility] [address] [place], [place] [phone]  All findings and plan discussed with primary team.    Thank you.  DEMAR Skinner, [email or web address] (pager #[phone])    If after 4PM or before 7:30AM on Mon-Friday or weekend/holiday please contact general surgery for urgent matters.   Team A- [id number]   Team B- [id number]  For non-urgent matters e-mail [email or web address]    I spent 35 minutes face to face with this patient of which more than 50% of the time was spent counseling & coordinating care of this pt

## 2022-08-19 NOTE — PROGRESS NOTE ADULT - ASSESSMENT
52F immobile 2/2 h/o MS, DM2, asthma, hypothyroidism, and known sacral wounds presents from [place] with urosepsis and anemia with new L flank wound with concern for necrotizing soft tissue infection. Was s/p debridement of wound with worsening sepsis, acidosis, and severe global LV dysfunction of unknown etiology, transferred to CCU for [place] and inotrope support. Pt is s/p swan removal and off all sedation, weaning off pressors. Repeat echo showed EF of 5-10% and signs suggestive of possible stress induced cardiomyopathy. Pt's mental status is improving off sedation.     NEURO:  - extubate , on face tent  - Pt knew her full name, location, who was president, and her     RESP;  - extubated  - on fact tent (55%)  - ABG on face tent    CV:  # severe LV dysfunction  - EF 5 - 10%  - concern for cardiomyopathy of unknown etiology but may be R/T sepsis and critical illness  - s/p Dobutamine, s/p swan removal  - vasopressin discontinued , currently off levophed, continue to monitor  - lactate wnl  - hold off on GDMT at this time  - repeat ECHO to reassess LV function showed EF of 5-10%     GI:  - NGT in place  - feeds held for possible extubation today    /renal:  # metabolic acidosis  - appears resolved  - off bicarb drip at present  - follow labs  - f/u any further renal recs  - Scr stable    ENDO:  # DM2  - A1c 5.5  - FS elevated but now better controlled  - was started on steroids post op, ?stress dose, was tapered, d/c'ed on 8/15 given concern of sepsis  - continue FS/ IHSS as indicated    HEME:  - Hb dropped to 8.1 on  from 9, s/p 1 unit pRBCs, now stable  - unlikely 2/2 bleeding  - Hb 7.3 on  and got 1 unit pRBCs  - continue to monitor Hb    ID:  # s/p L flank wound debridement  - dressings intact and wound vac in place  - WBC 28.8 and no fevers overnight, tylenol PRN if pt spikes fever  - now following rectal or axillary readings  - ID consult noted and appreciated  - no plan for further debridement, wound care to change vac M/W/F prn per surgery recs  - blood cx from  grew VRE, no need to change abx management at this time per ID recs  - Unasyn 3 grams IV Q6h until  per ID  - Vanc d/c on       DVT PPx:  - Heparin SQ 52F immobile 2/2 h/o MS, DM2, asthma, hypothyroidism, and known sacral wounds presents from [place] with urosepsis and anemia with new L flank wound with concern for necrotizing soft tissue infection. Was s/p debridement of wound with worsening sepsis, acidosis, and severe global LV dysfunction of unknown etiology, transferred to CCU for [place] and inotrope support. Pt is s/p swan removal and off all sedation, weaning off pressors. Repeat echo showed EF of 5-10% and signs suggestive of possible stress induced cardiomyopathy. Pt's mental status is improving off sedation.     NEURO:  - extubate , on face tent  - Pt knew her full name, location, who was president, and her     RESP;  - extubated  - on fact tent (55%)    CV:  # severe LV dysfunction  - EF 5 - 10%  - concern for cardiomyopathy of unknown etiology but may be R/T sepsis and critical illness  - s/p Dobutamine, s/p swan removal  - vasopressin discontinued , currently off levophed, continue to monitor  - lactate wnl  - hold off on GDMT at this time  - repeat ECHO to reassess LV function showed EF of 5-10%     GI:  - NGT in place  - feeds held for possible extubation today    /renal:  # metabolic acidosis  - appears resolved  - off bicarb drip at present  - follow labs  - f/u any further renal recs  - Scr stable    ENDO:  # DM2  - A1c 5.5  - FS elevated but now better controlled  - was started on steroids post op, ?stress dose, was tapered, d/c'ed on 8/15 given concern of sepsis  - continue FS/ IHSS as indicated    HEME:  - Hb dropped to 8.1 on  from 9, s/p 1 unit pRBCs, now stable  - unlikely 2/2 bleeding  - Hb 7.3 on  and got 1 unit pRBCs  - continue to monitor Hb    ID:  # s/p L flank wound debridement  - dressings intact and wound vac in place  - WBC 28.8 and no fevers overnight, tylenol PRN if pt spikes fever  - now following rectal or axillary readings  - ID consult noted and appreciated  - no plan for further debridement, wound care to change vac M/W/F prn per surgery recs  - blood cx from  grew VRE, no need to change abx management at this time per ID recs  - Unasyn 3 grams IV Q6h until  per ID  - Vanc d/c on       DVT PPx:  - Heparin SQ 52F immobile 2/2 h/o MS, DM2, asthma, hypothyroidism, and known sacral wounds presents from [place] with urosepsis and anemia with new L flank wound with concern for necrotizing soft tissue infection. Was s/p debridement of wound with worsening sepsis, acidosis, and severe global LV dysfunction of unknown etiology, transferred to CCU for [place] and inotrope support. Pt is s/p swan removal and off all sedation, weaning off pressors. Repeat echo showed EF of 5-10% and signs suggestive of possible stress induced cardiomyopathy. Pt's mental status is improving off sedation.     NEURO:  - extubate , on face tent  - Pt knew her full name, location, who was president, and her     RESP;  - extubated  - on fact tent (55%)    CV:  # severe LV dysfunction  - EF 5 - 10%  - concern for cardiomyopathy of unknown etiology but may be R/T sepsis and critical illness  - s/p Dobutamine, s/p swan removal  - vasopressin discontinued , currently off levophed, continue to monitor  - repeat ECHO to reassess LV function showed EF of 5-10%   - start losartin 12.5 qday on     GI:  - f/u speech and swallow    /renal:  # metabolic acidosis  - appears resolved  - off bicarb drip at present  - follow labs  - f/u any further renal recs  - Scr stable    ENDO:  # DM2  - A1c 5.5  - FS elevated but now better controlled  - was started on steroids post op, ?stress dose, was tapered, d/c'ed on 8/15 given concern of sepsis  - continue FS/ IHSS as indicated    HEME:  - Hb dropped to 8.1 on  from 9, s/p 1 unit pRBCs, now stable  - unlikely 2/2 bleeding  - Hb 7.3 on  and got 1 unit pRBCs  - continue to monitor Hb  - iron studies, stool guaic, and retic count    ID:  # s/p L flank wound debridement  - dressings intact and wound vac in place  - WBC 28.8 and no fevers overnight, tylenol PRN if pt spikes fever  - now following rectal or axillary readings  - ID consult noted and appreciated  - no plan for further debridement, wound care to change vac M/W/F prn per surgery recs  - blood cx from  grew VRE, no need to change abx management at this time per ID recs  - Unasyn 3 grams IV Q6h until  per ID  - Vanc d/c on       DVT PPx:  - Heparin SQ 52F immobile 2/2 h/o MS, DM2, asthma, hypothyroidism, and known sacral wounds presents from [place] with urosepsis and anemia with new L flank wound with concern for necrotizing soft tissue infection. Was s/p debridement of wound with worsening sepsis, acidosis, and severe global LV dysfunction of unknown etiology, transferred to CCU for [place] and inotrope support. Pt is s/p swan removal and off all sedation, weaning off pressors. Repeat echo showed EF of 5-10% and signs suggestive of possible stress induced cardiomyopathy. Pt's mental status is improving off sedation.     NEURO:  - extubate , on face tent  - Pt knew her full name, location, who was president, and her     RESP;  - extubated  - on fact tent (55%)  - Lasix 40mg IVP today to help improve pleural effusions    CV:  # severe LV dysfunction  - EF 5 - 10%  - concern for cardiomyopathy of unknown etiology but may be R/T sepsis and critical illness  - s/p Dobutamine, s/p swan removal  - vasopressin discontinued , currently off levophed, continue to monitor  - repeat ECHO to reassess LV function showed EF of 5-10%   - start losartin 12.5 qday on     GI:  - f/u speech and swallow    /renal:  # metabolic acidosis  - appears resolved  - off bicarb drip at present  - follow labs  - f/u any further renal recs  - Scr stable    ENDO:  # DM2  - A1c 5.5  - FS elevated but now better controlled  - was started on steroids post op, ?stress dose, was tapered, d/c'ed on 8/15 given concern of sepsis  - continue FS/ IHSS as indicated    HEME:  - Hb dropped to 8.1 on  from 9, s/p 1 unit pRBCs, now stable  - unlikely 2/2 bleeding  - Hb 7.3 on  and got 1 unit pRBCs  - continue to monitor Hb  - iron studies, stool guaic, and retic count    ID:  # s/p L flank wound debridement  - dressings intact and wound vac in place  - WBC 28.8 and no fevers overnight, tylenol PRN if pt spikes fever  - now following rectal or axillary readings  - ID consult noted and appreciated  - no plan for further debridement, wound care to change vac M/W/F prn per surgery recs  - blood cx from  grew VRE, no need to change abx management at this time per ID recs  - Unasyn 3 grams IV Q6h until  per ID  - Vanc d/c on       DVT PPx:  - Heparin SQ

## 2022-08-20 LAB
ALBUMIN SERPL ELPH-MCNC: 1.6 G/DL — LOW (ref 3.3–5)
ALBUMIN SERPL ELPH-MCNC: 1.6 G/DL — LOW (ref 3.3–5)
ALP SERPL-CCNC: 189 U/L — HIGH (ref 40–120)
ALP SERPL-CCNC: 195 U/L — HIGH (ref 40–120)
ALT FLD-CCNC: 9 U/L — SIGNIFICANT CHANGE UP (ref 4–33)
ALT FLD-CCNC: 9 U/L — SIGNIFICANT CHANGE UP (ref 4–33)
ANION GAP SERPL CALC-SCNC: 10 MMOL/L — SIGNIFICANT CHANGE UP (ref 7–14)
ANION GAP SERPL CALC-SCNC: 11 MMOL/L — SIGNIFICANT CHANGE UP (ref 7–14)
AST SERPL-CCNC: 11 U/L — SIGNIFICANT CHANGE UP (ref 4–32)
AST SERPL-CCNC: 11 U/L — SIGNIFICANT CHANGE UP (ref 4–32)
BILIRUB SERPL-MCNC: 0.2 MG/DL — SIGNIFICANT CHANGE UP (ref 0.2–1.2)
BILIRUB SERPL-MCNC: 0.3 MG/DL — SIGNIFICANT CHANGE UP (ref 0.2–1.2)
BUN SERPL-MCNC: 5 MG/DL — LOW (ref 7–23)
BUN SERPL-MCNC: 6 MG/DL — LOW (ref 7–23)
CALCIUM SERPL-MCNC: 7.1 MG/DL — LOW (ref 8.4–10.5)
CALCIUM SERPL-MCNC: 7.3 MG/DL — LOW (ref 8.4–10.5)
CHLORIDE SERPL-SCNC: 109 MMOL/L — HIGH (ref 98–107)
CHLORIDE SERPL-SCNC: 109 MMOL/L — HIGH (ref 98–107)
CO2 SERPL-SCNC: 22 MMOL/L — SIGNIFICANT CHANGE UP (ref 22–31)
CO2 SERPL-SCNC: 22 MMOL/L — SIGNIFICANT CHANGE UP (ref 22–31)
CREAT SERPL-MCNC: 0.33 MG/DL — LOW (ref 0.5–1.3)
CREAT SERPL-MCNC: 0.33 MG/DL — LOW (ref 0.5–1.3)
EGFR: 126 ML/MIN/1.73M2 — SIGNIFICANT CHANGE UP
EGFR: 126 ML/MIN/1.73M2 — SIGNIFICANT CHANGE UP
GLUCOSE BLDC GLUCOMTR-MCNC: 100 MG/DL — HIGH (ref 70–99)
GLUCOSE BLDC GLUCOMTR-MCNC: 103 MG/DL — HIGH (ref 70–99)
GLUCOSE BLDC GLUCOMTR-MCNC: 105 MG/DL — HIGH (ref 70–99)
GLUCOSE BLDC GLUCOMTR-MCNC: 87 MG/DL — SIGNIFICANT CHANGE UP (ref 70–99)
GLUCOSE SERPL-MCNC: 78 MG/DL — SIGNIFICANT CHANGE UP (ref 70–99)
GLUCOSE SERPL-MCNC: 98 MG/DL — SIGNIFICANT CHANGE UP (ref 70–99)
HCT VFR BLD CALC: 27.1 % — LOW (ref 34.5–45)
HCT VFR BLD CALC: 27.6 % — LOW (ref 34.5–45)
HGB BLD-MCNC: 8.3 G/DL — LOW (ref 11.5–15.5)
HGB BLD-MCNC: 8.5 G/DL — LOW (ref 11.5–15.5)
MAGNESIUM SERPL-MCNC: 1.4 MG/DL — LOW (ref 1.6–2.6)
MAGNESIUM SERPL-MCNC: 2 MG/DL — SIGNIFICANT CHANGE UP (ref 1.6–2.6)
MCHC RBC-ENTMCNC: 26.2 PG — LOW (ref 27–34)
MCHC RBC-ENTMCNC: 26.6 PG — LOW (ref 27–34)
MCHC RBC-ENTMCNC: 30.6 GM/DL — LOW (ref 32–36)
MCHC RBC-ENTMCNC: 30.8 GM/DL — LOW (ref 32–36)
MCV RBC AUTO: 85.5 FL — SIGNIFICANT CHANGE UP (ref 80–100)
MCV RBC AUTO: 86.3 FL — SIGNIFICANT CHANGE UP (ref 80–100)
NRBC # BLD: 0 /100 WBCS — SIGNIFICANT CHANGE UP (ref 0–0)
NRBC # BLD: 0 /100 WBCS — SIGNIFICANT CHANGE UP (ref 0–0)
NRBC # FLD: 0 K/UL — SIGNIFICANT CHANGE UP (ref 0–0)
NRBC # FLD: 0 K/UL — SIGNIFICANT CHANGE UP (ref 0–0)
OB PNL STL: POSITIVE
PHOSPHATE SERPL-MCNC: 3.1 MG/DL — SIGNIFICANT CHANGE UP (ref 2.5–4.5)
PHOSPHATE SERPL-MCNC: 3.3 MG/DL — SIGNIFICANT CHANGE UP (ref 2.5–4.5)
PLATELET # BLD AUTO: 497 K/UL — HIGH (ref 150–400)
PLATELET # BLD AUTO: 505 K/UL — HIGH (ref 150–400)
POTASSIUM SERPL-MCNC: 3.1 MMOL/L — LOW (ref 3.5–5.3)
POTASSIUM SERPL-MCNC: 3.6 MMOL/L — SIGNIFICANT CHANGE UP (ref 3.5–5.3)
POTASSIUM SERPL-SCNC: 3.1 MMOL/L — LOW (ref 3.5–5.3)
POTASSIUM SERPL-SCNC: 3.6 MMOL/L — SIGNIFICANT CHANGE UP (ref 3.5–5.3)
PROT SERPL-MCNC: 4.6 G/DL — LOW (ref 6–8.3)
PROT SERPL-MCNC: 4.7 G/DL — LOW (ref 6–8.3)
RBC # BLD: 3.17 M/UL — LOW (ref 3.8–5.2)
RBC # BLD: 3.2 M/UL — LOW (ref 3.8–5.2)
RBC # FLD: 20.4 % — HIGH (ref 10.3–14.5)
RBC # FLD: 20.4 % — HIGH (ref 10.3–14.5)
SODIUM SERPL-SCNC: 141 MMOL/L — SIGNIFICANT CHANGE UP (ref 135–145)
SODIUM SERPL-SCNC: 142 MMOL/L — SIGNIFICANT CHANGE UP (ref 135–145)
WBC # BLD: 13.84 K/UL — HIGH (ref 3.8–10.5)
WBC # BLD: 14.34 K/UL — HIGH (ref 3.8–10.5)
WBC # FLD AUTO: 13.84 K/UL — HIGH (ref 3.8–10.5)
WBC # FLD AUTO: 14.34 K/UL — HIGH (ref 3.8–10.5)

## 2022-08-20 RX ORDER — HYDROMORPHONE HYDROCHLORIDE 2 MG/ML
2 INJECTION INTRAMUSCULAR; INTRAVENOUS; SUBCUTANEOUS
Refills: 0 | Status: DISCONTINUED | OUTPATIENT
Start: 2022-08-20 | End: 2022-08-25

## 2022-08-20 RX ORDER — MAGNESIUM SULFATE 500 MG/ML
2 VIAL (ML) INJECTION
Refills: 0 | Status: COMPLETED | OUTPATIENT
Start: 2022-08-20 | End: 2022-08-20

## 2022-08-20 RX ORDER — POTASSIUM CHLORIDE 20 MEQ
40 PACKET (EA) ORAL ONCE
Refills: 0 | Status: DISCONTINUED | OUTPATIENT
Start: 2022-08-20 | End: 2022-08-20

## 2022-08-20 RX ORDER — ACETAMINOPHEN 500 MG
650 TABLET ORAL EVERY 6 HOURS
Refills: 0 | Status: DISCONTINUED | OUTPATIENT
Start: 2022-08-20 | End: 2022-09-07

## 2022-08-20 RX ORDER — PANTOPRAZOLE SODIUM 20 MG/1
40 TABLET, DELAYED RELEASE ORAL
Refills: 0 | Status: DISCONTINUED | OUTPATIENT
Start: 2022-08-20 | End: 2022-09-18

## 2022-08-20 RX ORDER — HYDROMORPHONE HYDROCHLORIDE 2 MG/ML
1 INJECTION INTRAMUSCULAR; INTRAVENOUS; SUBCUTANEOUS
Refills: 0 | Status: DISCONTINUED | OUTPATIENT
Start: 2022-08-20 | End: 2022-08-25

## 2022-08-20 RX ORDER — POTASSIUM CHLORIDE 20 MEQ
10 PACKET (EA) ORAL
Refills: 0 | Status: COMPLETED | OUTPATIENT
Start: 2022-08-20 | End: 2022-08-20

## 2022-08-20 RX ORDER — MULTIVIT-MIN/FERROUS GLUCONATE 9 MG/15 ML
15 LIQUID (ML) ORAL DAILY
Refills: 0 | Status: DISCONTINUED | OUTPATIENT
Start: 2022-08-20 | End: 2022-08-22

## 2022-08-20 RX ORDER — POTASSIUM CHLORIDE 20 MEQ
40 PACKET (EA) ORAL EVERY 4 HOURS
Refills: 0 | Status: DISCONTINUED | OUTPATIENT
Start: 2022-08-20 | End: 2022-08-20

## 2022-08-20 RX ORDER — POTASSIUM CHLORIDE 20 MEQ
10 PACKET (EA) ORAL
Refills: 0 | Status: DISCONTINUED | OUTPATIENT
Start: 2022-08-20 | End: 2022-08-20

## 2022-08-20 RX ADMIN — Medication 25 GRAM(S): at 06:04

## 2022-08-20 RX ADMIN — HEPARIN SODIUM 5000 UNIT(S): 5000 INJECTION INTRAVENOUS; SUBCUTANEOUS at 23:34

## 2022-08-20 RX ADMIN — Medication 500 MILLIGRAM(S): at 17:30

## 2022-08-20 RX ADMIN — AMPICILLIN SODIUM AND SULBACTAM SODIUM 200 GRAM(S): 250; 125 INJECTION, POWDER, FOR SUSPENSION INTRAMUSCULAR; INTRAVENOUS at 23:34

## 2022-08-20 RX ADMIN — Medication 100 MILLIEQUIVALENT(S): at 10:32

## 2022-08-20 RX ADMIN — PANTOPRAZOLE SODIUM 40 MILLIGRAM(S): 20 TABLET, DELAYED RELEASE ORAL at 06:01

## 2022-08-20 RX ADMIN — NYSTATIN CREAM 1 APPLICATION(S): 100000 CREAM TOPICAL at 17:52

## 2022-08-20 RX ADMIN — Medication 100 MILLIEQUIVALENT(S): at 06:54

## 2022-08-20 RX ADMIN — AMPICILLIN SODIUM AND SULBACTAM SODIUM 200 GRAM(S): 250; 125 INJECTION, POWDER, FOR SUSPENSION INTRAMUSCULAR; INTRAVENOUS at 17:53

## 2022-08-20 RX ADMIN — Medication 56 MICROGRAM(S): at 06:43

## 2022-08-20 RX ADMIN — AMPICILLIN SODIUM AND SULBACTAM SODIUM 200 GRAM(S): 250; 125 INJECTION, POWDER, FOR SUSPENSION INTRAMUSCULAR; INTRAVENOUS at 06:42

## 2022-08-20 RX ADMIN — SODIUM CHLORIDE 10 MILLILITER(S): 9 INJECTION, SOLUTION INTRAVENOUS at 20:52

## 2022-08-20 RX ADMIN — Medication 100 MILLIEQUIVALENT(S): at 09:24

## 2022-08-20 RX ADMIN — HEPARIN SODIUM 5000 UNIT(S): 5000 INJECTION INTRAVENOUS; SUBCUTANEOUS at 06:01

## 2022-08-20 RX ADMIN — Medication 25 GRAM(S): at 03:21

## 2022-08-20 RX ADMIN — NYSTATIN CREAM 1 APPLICATION(S): 100000 CREAM TOPICAL at 06:13

## 2022-08-20 RX ADMIN — Medication 100 MILLIEQUIVALENT(S): at 17:06

## 2022-08-20 RX ADMIN — Medication 2000 UNIT(S): at 17:31

## 2022-08-20 RX ADMIN — Medication 0: at 06:12

## 2022-08-20 RX ADMIN — HEPARIN SODIUM 5000 UNIT(S): 5000 INJECTION INTRAVENOUS; SUBCUTANEOUS at 13:18

## 2022-08-20 RX ADMIN — AMPICILLIN SODIUM AND SULBACTAM SODIUM 200 GRAM(S): 250; 125 INJECTION, POWDER, FOR SUSPENSION INTRAMUSCULAR; INTRAVENOUS at 12:36

## 2022-08-20 RX ADMIN — Medication 1 APPLICATION(S): at 12:39

## 2022-08-20 RX ADMIN — CHLORHEXIDINE GLUCONATE 1 APPLICATION(S): 213 SOLUTION TOPICAL at 12:38

## 2022-08-20 RX ADMIN — Medication 0: at 23:43

## 2022-08-20 RX ADMIN — Medication 100 MILLIEQUIVALENT(S): at 08:07

## 2022-08-20 RX ADMIN — SODIUM CHLORIDE 10 MILLILITER(S): 9 INJECTION, SOLUTION INTRAVENOUS at 09:25

## 2022-08-20 NOTE — CONSULT NOTE ADULT - CONSULT REASON
Multiple pressure injuries
B/L heel deep tissue injuries
Concern for necrotizing fasciitis
Sacral wound
Down EF
Metabolic acidosis

## 2022-08-20 NOTE — PROGRESS NOTE ADULT - SUBJECTIVE AND OBJECTIVE BOX
Al Duenas MD PGY1    PATIENT:  BOY CERON  [id number]    CHIEF COMPLAINT:  Patient is a 52y old  Female who presents with a chief complaint of Soft tissue infection (20 Aug 2022 00:38)      INTERVAL HISTORY/OVERNIGHT EVENTS: No acute events overnight. Pt's Hb stable. Pt has no complaints. s/p central line removal yesterday evening.    MEDICATIONS:  MEDICATIONS  (STANDING):  ampicillin/sulbactam  IVPB      ampicillin/sulbactam  IVPB 3 Gram(s) IV Intermittent every 6 hours  ascorbic acid 500 milliGRAM(s) Oral daily  chlorhexidine 2% Cloths 1 Application(s) Topical daily  cholecalciferol 2000 Unit(s) Oral daily  collagenase Ointment 1 Application(s) Topical daily  heparin   Injectable 5000 Unit(s) SubCutaneous every 8 hours  insulin lispro (ADMELOG) corrective regimen sliding scale   SubCutaneous every 6 hours  insulin lispro Injectable (ADMELOG) 5 Unit(s) SubCutaneous every 6 hours  lactated ringers. 1000 milliLiter(s) (10 mL/Hr) IV Continuous <Continuous>  levothyroxine Injectable 56 MICROGram(s) IV Push <User Schedule>  multivitamin/minerals/iron Oral Solution (CENTRUM) 15 milliLiter(s) Oral daily  nystatin Powder 1 Application(s) Topical two times a day  pantoprazole  Injectable 40 milliGRAM(s) IV Push every 12 hours  potassium chloride  10 mEq/100 mL IVPB 10 milliEquivalent(s) IV Intermittent every 1 hour    MEDICATIONS  (PRN):  acetaminophen    Suspension .. 650 milliGRAM(s) Oral every 6 hours PRN Temp greater or equal to 38.5C (101.3F)  ALBUTerol    90 MICROgram(s) HFA Inhaler 2 Puff(s) Inhalation every 6 hours PRN Shortness of Breath  HYDROmorphone  Injectable 1 milliGRAM(s) IV Push every 3 hours PRN Severe Pain (7 - 10)  HYDROmorphone  Injectable 0.5 milliGRAM(s) IV Push every 3 hours PRN Moderate Pain (4 - 6)  sodium chloride 0.9% lock flush 10 milliLiter(s) IV Push every 1 hour PRN Pre/post blood products, medications, blood draw, and to maintain line patency      ALLERGIES:  Allergies    No Known Drug Allergies  Pineapple (Anaphylaxis)    Intolerances        OBJECTIVE:  ICU Vital Signs Last 24 Hrs  T(C): 37.1 (20 Aug 2022 04:00), Max: 37.3 (19 Aug 2022 16:00)  T(F): 98.7 (20 Aug 2022 04:00), Max: 99.1 (19 Aug 2022 16:00)  HR: 113 (20 Aug 2022 06:00) (99 - 114)  BP: --  BP(mean): --  ABP: 114/54 (20 Aug 2022 06:00) (81/44 - 120/57)  ABP(mean): 74 (20 Aug 2022 06:00) (59 - 81)  RR: 21 (20 Aug 2022 06:00) (11 - 23)  SpO2: 97% (20 Aug 2022 06:00) (96% - 100%)    O2 Parameters below as of 20 Aug 2022 06:00  Patient On (Oxygen Delivery Method): nasal cannula          POCT Blood Glucose.: 87 mg/dL (20 Aug 2022 05:55)  POCT Blood Glucose.: 138 mg/dL (19 Aug 2022 23:29)  POCT Blood Glucose.: 120 mg/dL (19 Aug 2022 17:14)  POCT Blood Glucose.: 91 mg/dL (19 Aug 2022 12:24)    CAPILLARY BLOOD GLUCOSE      POCT Blood Glucose.: 87 mg/dL (20 Aug 2022 05:55)    I&O's Summary    19 Aug 2022 07:01  -  20 Aug 2022 07:00  --------------------------------------------------------  IN: 1600 mL / OUT: 1665 mL / NET: -65 mL      Daily     Daily     PHYSICAL EXAMINATION:  General: morbidly obese, NAD  HEENT: PERRLA, EOMI, moist mucous membranes  Neurology: A&Ox3,   Respiratory: CTA B/L, normal respiratory effort, no wheezes, crackles, rales  CV: RRR, S1S2, no murmurs, rubs or gallops  Abdominal: Soft, NT, ND   Extremities: edema in all 4 extremities  Tubes: A-line, peripheral IVs    LABS:  ABG - ( 18 Aug 2022 15:41 )  pH, Arterial: 7.46  pH, Blood: x     /  pCO2: 31    /  pO2: 107   / HCO3: 22    / Base Excess: -0.9  /  SaO2: 100.0                               8.3    13.84 )-----------( 497      ( 20 Aug 2022 01:04 )             27.1     08-20    141  |  109<H>  |  6<L>  ----------------------------<  78  3.1<L>   |  22  |  0.33<L>    Ca    7.3<L>      20 Aug 2022 01:04  Phos  3.1     08-20  Mg     1.40     08-20    TPro  4.7<L>  /  Alb  1.6<L>  /  TBili  0.3  /  DBili  x   /  AST  11  /  ALT  9   /  AlkPhos  195<H>  08-20    LIVER FUNCTIONS - ( 20 Aug 2022 01:04 )  Alb: 1.6 g/dL / Pro: 4.7 g/dL / ALK PHOS: 195 U/L / ALT: 9 U/L / AST: 11 U/L / GGT: x                       TELEMETRY:     EKG:     IMAGING:

## 2022-08-20 NOTE — CONSULT NOTE ADULT - CONSULT REQUESTED DATE/TIME
12-Aug-2022 15:52
11-Aug-2022 13:22
13-Aug-2022 17:47
20-Aug-2022 00:38
10-Aug-2022 22:25
12-Aug-2022 18:57

## 2022-08-20 NOTE — CONSULT NOTE ADULT - SUBJECTIVE AND OBJECTIVE BOX
Patient is a 52y old  Female who presents with a chief complaint of Soft tissue infection (19 Aug 2022 11:45)      HPI:  HPI:  52F immobile 2/2 MS, poorly controlled T2DM, asthma, hypothyroidism, known chronic wounds sacrum (stage 4), vulvar/Rt thigh, and right calf. Recent 2022 hospitalization for urosepsis. On cefepime/flagyl for chronic osteo 2/2 unstageable sacral decubitus ulcer, dakins BID packing. Presented from [place] with concerns for UTI and anemia (6.8 from baseline 8.0) and new malodorous wound on L hip to L flank area. Surgery consulted for potential necrotizing soft tissue infection.            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 1000 milliGRAM(s) IV Intermittent once  cefepime   IVPB 2000 milliGRAM(s) IV Intermittent once  clindamycin IVPB 900 milliGRAM(s) IV Intermittent once  sodium chloride 0.9% Bolus 1000 milliLiter(s) IV Bolus once  vancomycin  IVPB. 1000 milliGRAM(s) IV Intermittent once    MEDICATIONS  (PRN):      Allergies    No Known Drug Allergies  Pineapple (Anaphylaxis)    Intolerances        SOCIAL HISTORY:    FAMILY HISTORY:          Physical Exam:  General: NAD, resting comfortably, warm to touch   HEENT: NC/AT, EOMI  Pulmonary: normal resp effort, patent airway  Abdominal: obese  Extremities: poor muscle tone  Skin: sacral wound stage 4, r thigh wound tracking to labia, L thigh wound, L hip/flank malodorous dark green wound with surrounding erythema and underlying crepitus tracking up the L flank  Neuro: A/O x 3-4, CNs II-XII grossly intact    Vital Signs Last 24 Hrs  T(C): 39.8 (10 Aug 2022 21:06), Max: 39.8 (10 Aug 2022 21:06)  T(F): 103.7 (10 Aug 2022 21:06), Max: 103.7 (10 Aug 2022 21:06)  HR: 127 (10 Aug 2022 20:10) (124 - 127)  BP: 139/72 (10 Aug 2022 20:10) (123/56 - 139/72)  BP(mean): --  RR: 20 (10 Aug 2022 20:10) (18 - 20)  SpO2: 100% (10 Aug 2022 20:10) (100% - 100%)    Parameters below as of 10 Aug 2022 20:10  Patient On (Oxygen Delivery Method): room air        I&O's Summary          LABS:                        5.9    31.78 )-----------( 920      ( 10 Aug 2022 22:30 )             22.5         Urinalysis Basic - ( 10 Aug 2022 22:30 )    Color: Yellow / Appearance: Turbid / S.017 / pH: x  Gluc: x / Ketone: Negative  / Bili: Negative / Urobili: <2 mg/dL   Blood: x / Protein: 100 mg/dL / Nitrite: Negative   Leuk Esterase: Large / RBC: 25-50 /HPF / WBC >50 /HPF   Sq Epi: x / Non Sq Epi: x / Bacteria: Many                CAPILLARY BLOOD GLUCOSE            Cultures:      RADIOLOGY & ADDITIONAL STUDIES:               (10 Aug 2022 22:48)      PAST MEDICAL & SURGICAL HISTORY:  DM (diabetes mellitus)      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      MS (multiple sclerosis)      No significant past surgical history          MEDICATIONS  (STANDING):  ampicillin/sulbactam  IVPB      ampicillin/sulbactam  IVPB 3 Gram(s) IV Intermittent every 6 hours  ascorbic acid 500 milliGRAM(s) Oral daily  chlorhexidine 2% Cloths 1 Application(s) Topical daily  cholecalciferol 2000 Unit(s) Oral daily  collagenase Ointment 1 Application(s) Topical daily  heparin   Injectable 5000 Unit(s) SubCutaneous every 8 hours  insulin lispro (ADMELOG) corrective regimen sliding scale   SubCutaneous every 6 hours  insulin lispro Injectable (ADMELOG) 5 Unit(s) SubCutaneous every 6 hours  lactated ringers. 1000 milliLiter(s) (10 mL/Hr) IV Continuous <Continuous>  levothyroxine Injectable 56 MICROGram(s) IV Push <User Schedule>  multivitamin/minerals/iron Oral Solution (CENTRUM) 15 milliLiter(s) Oral daily  nystatin Powder 1 Application(s) Topical two times a day  pantoprazole  Injectable 40 milliGRAM(s) IV Push every 12 hours    MEDICATIONS  (PRN):  acetaminophen    Suspension .. 650 milliGRAM(s) Oral every 6 hours PRN Temp greater or equal to 38.5C (101.3F)  ALBUTerol    90 MICROgram(s) HFA Inhaler 2 Puff(s) Inhalation every 6 hours PRN Shortness of Breath  HYDROmorphone  Injectable 1 milliGRAM(s) IV Push every 3 hours PRN Severe Pain (7 - 10)  HYDROmorphone  Injectable 0.5 milliGRAM(s) IV Push every 3 hours PRN Moderate Pain (4 - 6)  sodium chloride 0.9% lock flush 10 milliLiter(s) IV Push every 1 hour PRN Pre/post blood products, medications, blood draw, and to maintain line patency      Allergies    No Known Drug Allergies  Pineapple (Anaphylaxis)    Intolerances        VITALS:    Vital Signs Last 24 Hrs  T(C): 36.6 (19 Aug 2022 20:00), Max: 37.4 (19 Aug 2022 04:00)  T(F): 97.8 (19 Aug 2022 20:00), Max: 99.3 (19 Aug 2022 04:00)  HR: 103 (19 Aug 2022 22:00) (99 - 112)  BP: --  BP(mean): --  RR: 13 (19 Aug 2022 22:00) (11 - 27)  SpO2: 98% (19 Aug 2022 22:00) (96% - 100%)    Parameters below as of 19 Aug 2022 22:00  Patient On (Oxygen Delivery Method): nasal cannula  O2 Flow (L/min): 2      LABS:                          8.8    15.86 )-----------( 506      ( 19 Aug 2022 17:00 )             28.2       08-19    141  |  108<H>  |  8   ----------------------------<  84  3.9   |  21<L>  |  0.31<L>    Ca    7.3<L>      19 Aug 2022 03:00  Phos  3.3     -19  Mg     1.60         TPro  4.6<L>  /  Alb  1.6<L>  /  TBili  0.2  /  DBili  x   /  AST  13  /  ALT  7   /  AlkPhos  213<H>        CAPILLARY BLOOD GLUCOSE      POCT Blood Glucose.: 138 mg/dL (19 Aug 2022 23:29)  POCT Blood Glucose.: 120 mg/dL (19 Aug 2022 17:14)  POCT Blood Glucose.: 91 mg/dL (19 Aug 2022 12:24)  POCT Blood Glucose.: 80 mg/dL (19 Aug 2022 05:43)          LOWER EXTREMITY PHYSICAL EXAM:    Vascular: DP/PT 2/4, B/L, CFT <3 seconds B/L, Temperature gradient warm to cool B/L.   Neuro: Epicritic sensation deminshed to the level of forefoot, B/L.  Musculoskeletal/Ortho: Immobilization 2/2 MS   Skin: Deep tissue injury of the posterior heel, B/L, no signs of infection, no purulence, no drainage, no erythema, no hyperkeratotic surrounding the surface.       RADIOLOGY & ADDITIONAL STUDIES:

## 2022-08-20 NOTE — CONSULT NOTE ADULT - REASON FOR ADMISSION
Soft tissue infection

## 2022-08-20 NOTE — PROGRESS NOTE ADULT - ASSESSMENT
52F immobile 2/2 h/o MS, DM2, asthma, hypothyroidism, and known sacral wounds presents from [place] with urosepsis and anemia with new L flank wound with concern for necrotizing soft tissue infection. Was s/p debridement of wound with worsening sepsis, acidosis, and severe global LV dysfunction of unknown etiology, transferred to CCU for [place] and inotrope support. Pt is s/p swan removal and off all sedation, weaning off pressors. Repeat echo showed EF of 5-10% and signs suggestive of possible stress induced cardiomyopathy. Pt's mental status is improving off sedation.     NEURO:  - extubate 8/18, on face tent  - A&Ox3    RESP;  - extubated  - on NC 2L    CV:  # severe LV dysfunction  - EF 5 - 10%  - concern for cardiomyopathy of unknown etiology but may be R/T sepsis and critical illness  - s/p Dobutamine, s/p swan removal  - vasopressin discontinued 8/13, currently off levophed, continue to monitor  - repeat ECHO to reassess LV function showed EF of 5-10%   - plan to start losartin 12.5 qday on 8/20    GI:  - moist and bite sized diet    /renal:  # metabolic acidosis  - appears resolved  - off bicarb drip at present  - follow labs  - f/u any further renal recs  - Scr stable    ENDO:  # DM2  - A1c 5.5  - FS elevated but now better controlled  - was started on steroids post op, ?stress dose, was tapered, d/c'ed on 8/15 given concern of sepsis  - continue FS/ IHSS as indicated    HEME:  - Hb dropped to 8.1 on 8/14 from 9, s/p 1 unit pRBCs, now stable  - unlikely 2/2 bleeding  - Hb 7.3 on 8/19 and got 1 unit pRBCs  - continue to monitor Hb  - iron studies suggestive of anemia of chronic disease  - stool guaic positive  - retic count elevated    ID:  # s/p L flank wound debridement  - dressings intact and wound vac in place  - WBC 28.8 and no fevers overnight, tylenol PRN if pt spikes fever  - now following rectal or axillary readings  - ID consult noted and appreciated  - no plan for further debridement, wound care to change vac M/W/F prn per surgery recs  - blood cx from 8/11 grew VRE, no need to change abx management at this time per ID recs  - Unasyn 3 grams IV Q6h until 8/25 per ID  - Vanc d/c on 8/17      DVT PPx:  - Heparin SQ

## 2022-08-21 LAB
ALBUMIN SERPL ELPH-MCNC: 1.5 G/DL — LOW (ref 3.3–5)
ALP SERPL-CCNC: 182 U/L — HIGH (ref 40–120)
ALT FLD-CCNC: 8 U/L — SIGNIFICANT CHANGE UP (ref 4–33)
ANION GAP SERPL CALC-SCNC: 10 MMOL/L — SIGNIFICANT CHANGE UP (ref 7–14)
AST SERPL-CCNC: 14 U/L — SIGNIFICANT CHANGE UP (ref 4–32)
BILIRUB SERPL-MCNC: 0.2 MG/DL — SIGNIFICANT CHANGE UP (ref 0.2–1.2)
BUN SERPL-MCNC: 5 MG/DL — LOW (ref 7–23)
CALCIUM SERPL-MCNC: 7.2 MG/DL — LOW (ref 8.4–10.5)
CHLORIDE SERPL-SCNC: 108 MMOL/L — HIGH (ref 98–107)
CO2 SERPL-SCNC: 23 MMOL/L — SIGNIFICANT CHANGE UP (ref 22–31)
CREAT SERPL-MCNC: 0.32 MG/DL — LOW (ref 0.5–1.3)
EGFR: 127 ML/MIN/1.73M2 — SIGNIFICANT CHANGE UP
GLUCOSE BLDC GLUCOMTR-MCNC: 104 MG/DL — HIGH (ref 70–99)
GLUCOSE BLDC GLUCOMTR-MCNC: 122 MG/DL — HIGH (ref 70–99)
GLUCOSE BLDC GLUCOMTR-MCNC: 194 MG/DL — HIGH (ref 70–99)
GLUCOSE SERPL-MCNC: 110 MG/DL — HIGH (ref 70–99)
HCT VFR BLD CALC: 25.9 % — LOW (ref 34.5–45)
HGB BLD-MCNC: 8 G/DL — LOW (ref 11.5–15.5)
MAGNESIUM SERPL-MCNC: 1.7 MG/DL — SIGNIFICANT CHANGE UP (ref 1.6–2.6)
MCHC RBC-ENTMCNC: 27 PG — SIGNIFICANT CHANGE UP (ref 27–34)
MCHC RBC-ENTMCNC: 30.9 GM/DL — LOW (ref 32–36)
MCV RBC AUTO: 87.5 FL — SIGNIFICANT CHANGE UP (ref 80–100)
NRBC # BLD: 0 /100 WBCS — SIGNIFICANT CHANGE UP (ref 0–0)
NRBC # FLD: 0 K/UL — SIGNIFICANT CHANGE UP (ref 0–0)
PHOSPHATE SERPL-MCNC: 3.1 MG/DL — SIGNIFICANT CHANGE UP (ref 2.5–4.5)
PLATELET # BLD AUTO: 504 K/UL — HIGH (ref 150–400)
POTASSIUM SERPL-MCNC: 3.6 MMOL/L — SIGNIFICANT CHANGE UP (ref 3.5–5.3)
POTASSIUM SERPL-SCNC: 3.6 MMOL/L — SIGNIFICANT CHANGE UP (ref 3.5–5.3)
PROT SERPL-MCNC: 4.4 G/DL — LOW (ref 6–8.3)
RBC # BLD: 2.96 M/UL — LOW (ref 3.8–5.2)
RBC # FLD: 20.3 % — HIGH (ref 10.3–14.5)
SODIUM SERPL-SCNC: 141 MMOL/L — SIGNIFICANT CHANGE UP (ref 135–145)
WBC # BLD: 9.9 K/UL — SIGNIFICANT CHANGE UP (ref 3.8–10.5)
WBC # FLD AUTO: 9.9 K/UL — SIGNIFICANT CHANGE UP (ref 3.8–10.5)

## 2022-08-21 PROCEDURE — 99233 SBSQ HOSP IP/OBS HIGH 50: CPT

## 2022-08-21 RX ORDER — METOPROLOL TARTRATE 50 MG
12.5 TABLET ORAL
Refills: 0 | Status: DISCONTINUED | OUTPATIENT
Start: 2022-08-21 | End: 2022-08-21

## 2022-08-21 RX ORDER — POTASSIUM CHLORIDE 20 MEQ
40 PACKET (EA) ORAL ONCE
Refills: 0 | Status: COMPLETED | OUTPATIENT
Start: 2022-08-21 | End: 2022-08-21

## 2022-08-21 RX ORDER — INSULIN LISPRO 100/ML
VIAL (ML) SUBCUTANEOUS
Refills: 0 | Status: DISCONTINUED | OUTPATIENT
Start: 2022-08-21 | End: 2022-09-18

## 2022-08-21 RX ORDER — INSULIN LISPRO 100/ML
5 VIAL (ML) SUBCUTANEOUS
Refills: 0 | Status: DISCONTINUED | OUTPATIENT
Start: 2022-08-21 | End: 2022-09-03

## 2022-08-21 RX ORDER — POTASSIUM CHLORIDE 20 MEQ
10 PACKET (EA) ORAL
Refills: 0 | Status: COMPLETED | OUTPATIENT
Start: 2022-08-21 | End: 2022-08-21

## 2022-08-21 RX ORDER — FUROSEMIDE 40 MG
40 TABLET ORAL DAILY
Refills: 0 | Status: DISCONTINUED | OUTPATIENT
Start: 2022-08-21 | End: 2022-08-30

## 2022-08-21 RX ORDER — MAGNESIUM SULFATE 500 MG/ML
2 VIAL (ML) INJECTION ONCE
Refills: 0 | Status: COMPLETED | OUTPATIENT
Start: 2022-08-21 | End: 2022-08-21

## 2022-08-21 RX ORDER — METOPROLOL TARTRATE 50 MG
12.5 TABLET ORAL
Refills: 0 | Status: DISCONTINUED | OUTPATIENT
Start: 2022-08-21 | End: 2022-08-24

## 2022-08-21 RX ORDER — POTASSIUM CHLORIDE 20 MEQ
40 PACKET (EA) ORAL ONCE
Refills: 0 | Status: DISCONTINUED | OUTPATIENT
Start: 2022-08-21 | End: 2022-08-21

## 2022-08-21 RX ADMIN — Medication 25 GRAM(S): at 03:34

## 2022-08-21 RX ADMIN — Medication 12.5 MILLIGRAM(S): at 18:52

## 2022-08-21 RX ADMIN — CHLORHEXIDINE GLUCONATE 1 APPLICATION(S): 213 SOLUTION TOPICAL at 12:07

## 2022-08-21 RX ADMIN — Medication 15 MILLILITER(S): at 12:00

## 2022-08-21 RX ADMIN — Medication 500 MILLIGRAM(S): at 12:00

## 2022-08-21 RX ADMIN — Medication 2: at 21:59

## 2022-08-21 RX ADMIN — Medication 100 MILLIEQUIVALENT(S): at 08:00

## 2022-08-21 RX ADMIN — Medication 650 MILLIGRAM(S): at 19:31

## 2022-08-21 RX ADMIN — AMPICILLIN SODIUM AND SULBACTAM SODIUM 200 GRAM(S): 250; 125 INJECTION, POWDER, FOR SUSPENSION INTRAMUSCULAR; INTRAVENOUS at 12:00

## 2022-08-21 RX ADMIN — HEPARIN SODIUM 5000 UNIT(S): 5000 INJECTION INTRAVENOUS; SUBCUTANEOUS at 15:15

## 2022-08-21 RX ADMIN — SODIUM CHLORIDE 10 MILLILITER(S): 9 INJECTION, SOLUTION INTRAVENOUS at 19:45

## 2022-08-21 RX ADMIN — Medication 650 MILLIGRAM(S): at 18:30

## 2022-08-21 RX ADMIN — Medication 100 MILLIEQUIVALENT(S): at 07:00

## 2022-08-21 RX ADMIN — Medication 100 MILLIEQUIVALENT(S): at 06:20

## 2022-08-21 RX ADMIN — HEPARIN SODIUM 5000 UNIT(S): 5000 INJECTION INTRAVENOUS; SUBCUTANEOUS at 05:01

## 2022-08-21 RX ADMIN — Medication 0: at 05:15

## 2022-08-21 RX ADMIN — Medication 1 APPLICATION(S): at 18:31

## 2022-08-21 RX ADMIN — NYSTATIN CREAM 1 APPLICATION(S): 100000 CREAM TOPICAL at 18:32

## 2022-08-21 RX ADMIN — Medication 2000 UNIT(S): at 12:00

## 2022-08-21 RX ADMIN — NYSTATIN CREAM 1 APPLICATION(S): 100000 CREAM TOPICAL at 05:15

## 2022-08-21 RX ADMIN — HEPARIN SODIUM 5000 UNIT(S): 5000 INJECTION INTRAVENOUS; SUBCUTANEOUS at 21:58

## 2022-08-21 RX ADMIN — Medication 40 MILLIGRAM(S): at 12:00

## 2022-08-21 RX ADMIN — AMPICILLIN SODIUM AND SULBACTAM SODIUM 200 GRAM(S): 250; 125 INJECTION, POWDER, FOR SUSPENSION INTRAMUSCULAR; INTRAVENOUS at 18:51

## 2022-08-21 RX ADMIN — AMPICILLIN SODIUM AND SULBACTAM SODIUM 200 GRAM(S): 250; 125 INJECTION, POWDER, FOR SUSPENSION INTRAMUSCULAR; INTRAVENOUS at 05:02

## 2022-08-21 NOTE — PROGRESS NOTE ADULT - ASSESSMENT
52F immobile 2/2 h/o MS, DM2, asthma, hypothyroidism, and known sacral wounds presents from [place] with urosepsis and anemia with new L flank wound with concern for necrotizing soft tissue infection. Was s/p debridement of wound with worsening sepsis, acidosis, and severe global LV dysfunction of unknown etiology, transferred to CCU for [place] and inotrope support. Pt is s/p swan removal and off all sedation, weaning off pressors. Repeat echo showed EF of 5-10% and signs suggestive of possible stress induced cardiomyopathy. Pt's mental status is improving off sedation.     NEURO:  - extubate 8/18, on face tent  - A&Ox3    RESP;  - extubated  - on NC 2L    CV:  # severe LV dysfunction  - EF 5 - 10%  - concern for cardiomyopathy of unknown etiology but may be R/T sepsis and critical illness  - s/p Dobutamine, s/p swan removal  - vasopressin discontinued 8/13, currently off levophed, continue to monitor  - repeat ECHO to reassess LV function showed EF of 5-10%   - plan to start losartin 12.5 qday on 8/20    GI:  - moist and bite sized diet    /renal:  # metabolic acidosis  - appears resolved  - off bicarb drip at present  - follow labs  - f/u any further renal recs  - Scr stable    ENDO:  # DM2  - A1c 5.5  - FS elevated but now better controlled  - was started on steroids post op, ?stress dose, was tapered, d/c'ed on 8/15 given concern of sepsis  - continue FS/ IHSS as indicated    HEME:  - Hb dropped to 8.1 on 8/14 from 9, s/p 1 unit pRBCs, now stable  - unlikely 2/2 bleeding  - Hb 7.3 on 8/19 and got 1 unit pRBCs  - continue to monitor Hb  - iron studies suggestive of anemia of chronic disease  - stool guaic positive  - retic count elevated    ID:  # s/p L flank wound debridement  - dressings intact and wound vac in place  - WBC 28.8 and no fevers overnight, tylenol PRN if pt spikes fever  - now following rectal or axillary readings  - ID consult noted and appreciated  - no plan for further debridement, wound care to change vac M/W/F prn per surgery recs  - blood cx from 8/11 grew VRE, no need to change abx management at this time per ID recs  - Unasyn 3 grams IV Q6h until 8/25 per ID  - Vanc d/c on 8/17      DVT PPx:  - Heparin SQ 52F immobile 2/2 h/o MS, DM2, asthma, hypothyroidism, and known sacral wounds presents from [place] with urosepsis and anemia with new L flank wound with concern for necrotizing soft tissue infection. Was s/p debridement of wound with worsening sepsis, acidosis, and severe global LV dysfunction of unknown etiology, transferred to CCU for [place] and inotrope support. Pt is s/p swan removal and off all sedation, weaning off pressors. Repeat echo showed EF of 5-10% and signs suggestive of possible stress induced cardiomyopathy. Pt's mental status is improving off sedation. Patient is stable for transfer to surgical unit.      NEURO:  - extubated 8/18, off sedation  - A&Ox3    RESP;  - extubated  - satting well on 2LNC    CV:  # severe LV dysfunction  - EF 5 - 10%  - concern for cardiomyopathy of unknown etiology but may be R/T sepsis and critical illness  - s/p Dobutamine, s/p swan removal  - vasopressin discontinued 8/13, currently off levophed, continue to monitor  - repeat ECHO to reassess LV function showed EF of 5-10%   - 8/21 started on lopressor 12.5 PO BID  - 8/21 started on lasix 40 PO qd    GI:  - moist and bite sized diet    /renal:  # metabolic acidosis  - appears resolved  - off bicarb drip at present  - follow labs  - f/u any further renal recs  - Scr stable    ENDO:  # DM2  - A1c 5.5  - FS elevated but now better controlled  - was started on steroids post op, ?stress dose, was tapered, d/c'ed on 8/15 given concern of sepsis  - continue FS/ IHSS as indicated    HEME:  - Hb stable (8.5 -> 8.0), continue to monitor (Hgb dayna of 5.9)  - iron studies suggestive of anemia of chronic disease  - stool guaic positive  - retic count elevated    ID:  # s/p L flank wound debridement  - dressings intact and wound vac in place  - WBC 28.8 and no fevers overnight, tylenol PRN if pt spikes fever  - now following rectal or axillary readings  - ID consult noted and appreciated  - no plan for further debridement, wound care to change vac M/W/F prn per surgery recs  - blood cx from 8/11 grew VRE, no need to change abx management at this time per ID recs  - Unasyn 3 grams IV Q6h until 8/25 per ID  - Vanc d/c on 8/17      DVT PPx:  - Heparin SQ 52F immobile 2/2 h/o MS, DM2, asthma, hypothyroidism, and known sacral wounds presents from [place] with urosepsis and anemia with new L flank wound with concern for necrotizing soft tissue infection. Was s/p debridement of wound with worsening sepsis, acidosis, and severe global LV dysfunction of unknown etiology, transferred to CCU for [place] and inotrope support. Pt is s/p swan removal and off all sedation, weaning off pressors. Repeat echo showed EF of 5-10% and signs suggestive of possible stress induced cardiomyopathy. Pt's mental status has returned to baseline off sedation. Pt started on lopressor 12.5 po bid and lasix 40 po qd for HFrEF. C/w Unasyn until 8/25 per ID. Patient is stable for transfer to surgical tele.     NEURO:  - extubated 8/18, off sedation  - A&Ox3    RESP;  - extubated  - satting well on 2LNC    CV:  # severe LV dysfunction  - EF 5 - 10%  - concern for cardiomyopathy of unknown etiology but may be R/T sepsis and critical illness  - s/p Dobutamine, s/p swan removal  - vasopressin discontinued 8/13, currently off levophed, continue to monitor  - repeat ECHO to reassess LV function showed EF of 5-10%   - 8/21 started on lopressor 12.5 PO BID  - 8/21 started on lasix 40 PO qd    GI:  - moist and bite sized diet    /renal:  # metabolic acidosis  - appears resolved  - off bicarb drip at present  - follow labs  - f/u any further renal recs  - Scr stable    ENDO:  # DM2  - A1c 5.5  - FS elevated but now better controlled  - was started on steroids post op, ?stress dose, was tapered, d/c'ed on 8/15 given concern of sepsis  - continue FS/ IHSS as indicated    HEME:  - Hb stable (8.5 -> 8.0), continue to monitor (Hgb dayna of 5.9)  - iron studies suggestive of anemia of chronic disease  - stool guaic positive  - retic count elevated    ID:  # s/p L flank wound debridement  - dressings intact and wound vac in place  - WBC 28.8 and no fevers overnight, tylenol PRN if pt spikes fever  - now following rectal or axillary readings  - ID consult noted and appreciated  - no plan for further debridement, wound care to change vac M/W/F prn per surgery recs  - blood cx from 8/11 grew VRE, no need to change abx management at this time per ID recs  - Unasyn 3 grams IV Q6h until 8/25 per ID  - Vanc d/c on 8/17      DVT PPx:  - Heparin SQ

## 2022-08-21 NOTE — PROGRESS NOTE ADULT - ATTENDING COMMENTS
Acute systolic heart failure  Off pressors  For gentle diuresis today  Initiate GDMT  Wound care/vac per surgical team.
I agree with the history, physical, and plan, which I have reviewed and edited where appropriate.  I agree with notes/assessment of health care providers on my service.  I have personally examined the patient.  I was physically present for the key portions of the evaluation and management (E/M) service provided.  I reviewed data and laboratory tests/x-rays and all pertinent electronic images.  The patient is a critical care patient with life threatening hemodynamic and metabolic instability in SICU.  Risk benefit analyses discussed.    The patient is in SICU with diagnosis mentioned in the note.    The plan is specified below.    52F immobile 2/2 MS, poorly controlled T2DM, asthma, hypothyroidism, known chronic wounds sacrum (stage 4), vulvar/Rt thigh, and right calf. Found to have new malodorous wound on L flank area, concerning for necrotizing soft tissue infection s/p debridement of wound L flank to midback.     PLAN  Neurologic: postoperative pain, sedation for vent tolerance, h/o multiple sclerosis  - sedation w propofol   - IV tylenol  - dilaudid prn     Respiratory: h/o asthma   - Albuterol PRN when back on RA  - intubated, AC 20/400/6/30  - trend ABG     Cardiovascular: septic shock   - A-line in place  - transition nayeli to levo  - place central line  - bedside echo     Gastrointestinal/Nutrition:   - Diet: NPO except medications, OG tube  - Vit C 500mg PO daily  - Vit D 50,000u PO daily  - Multivitamin    Renal/Genitourinary: metabolic acidosis, partially compensated by vent  - LR @ 100cc/hr  - Rose    Hematologic: acute on chronic anemia, s/p 2U PRBC   - PRBC x1 for hgb 6.9  - DVT PPx heparin subq q8h  - PO vitamin K    Infectious Disease: Necrotizing soft tissue L flank to midback, s/p debridement, UTI  - on cefepime, clindamycin, vancomycin  - Switch cefepime to meropenem for previously resistant urine culture of ESBL  - monitor vanc troughs  - f/u Bcx, Ucx, wound cx  - possible return to OR tomorrow    Endocrine: hx DM2 and hypothyroidism, hyperglycemic on admission   - 20 units of lantus this AM, will continue with AM lantus  - Lispro ISS   - currently holding 8u pre-meal ademlog while NPO  - Monitor FSG q6h  - c/w home levothyroxine 75mcg PO daily  - insulin gtt if glucose persistently elevated
Septic shock secondary to necrotizing soft tissue infection of flank/back  a. Transferred overnight  to CCU for further hemodynamic management  b.  S/P emergent excisional debridement PD# 2  c.  Recommend continue meropenem, discontinue vancomycin/clindamycin  d.  No plans for debridement ( wound inspected yesterday).  Wound care team to change wound VAC M-W-F, team prn  e.  Appreciate CCU care
I have personally interviewed and examined this patient, reviewed pertinent labs and imaging, and discussed the case with colleagues, residents, and physician assistants on B Team rounds.  More than 50% of this 35 minute encounter including face to face with the patient was spent counseling and/or coordination of care on necrotizing soft tissue infection.  Time included review of vitals, labs, imaging, discussion with consultants and coordination with the operating room/emergency department.    53yo F with MS, DM, sacral decub admitted 8/10 with necrotizing soft tissue infection of flank s/p debridement, complicated by acute onset heart failure likely septic cardiomyopathy. Transferred to CCU for advanced cardiac care with significant improvement. Now on low dose Levo, on CPAP, eyes open but not responding.     - Appreciate excellent CCU care   - Wound care to perform vac changes M/W/F  - Repeat TTE pending, per CCU team     The active care issues are:  1. necrotizing soft tissue infection s/p debridement   2. septic cardiomyopathy   3. DM     The Acute Care Surgery (B Team) Attending Group Practice:  Dr. Kenyatta Sanchez    urgent issues - spectra [id number]  nonurgent issues - [phone]  patient appointments or afterhours - [phone]
s/p excisional debridement + VAC  Remains intubated on Tray-Synephrine gtt  Continue IV abx  Appreciate SICU care
Necrotizing tissue infection involving, skin, subcutaneous tissue and fascia  a.  s/p emergent excisional debridement  b.  Worsening hemodynamic status overnight requiring increasing vasopressor support  c.  Continue IV abx, follow up cultures  d.  Wound re-evaluated, no need for emergent debridement  e.  VAC dressing re-applied  f.  Appreciate ICU care
acidosis resolved   will sign off   reconsult if needed
Patient with history of MS, bed bound s/p wide excisional debridement of large infected sacral decubitus that extended to left flank. Patient with increasing pressor requirements on levo and vaso, pocus revealed severe hypokinetic area in apex, repeat echo this am showed global systolic dysfunction.  N on propofol and fentanyl, target rass of 0 to -1  resp on minimal vent settings adequate gas exchange, cxr with clear air spaces  cv stress induced cardiomyopathy, supportive care, no levophed 0.14 and vasopressin, svv normal ci normal indicating well perfused, may consider diuresis to offload heart  gi npo, ivf start trophic feeds  gu/renal monitor uop  heme vte ppx  id on wm, clinda, vanc, awaiting speciation  endo monitor fs    The patient is a critical care patient with life threatening hemodynamic and metabolic instability in SICU.  I have personally interviewed when possible and examined the patient, reviewed data and laboratory tests/x-rays and all pertinent electronic images.  I was physically present for the key portions of the evaluation and management (E/M) service provided.   The SICU team has a constant risk benefit analyzes discussion with the primary team, all consultants, House Staff and PA's on all decisions.  These diagnoses are unrelated to the surgical procedure noted above.  I meet with family if needed to get further history, discuss the case and make care decisions for this patient who might not be able to participate.  Time involved in performance of separately billable procedures was not counted toward my critical care time. There is no overlap.  I spent 55-75 minutes ( 0800Hrs-0915Hrs in AM/ 1600Hrs-1715Hrs in PM, or other time indicated) of critical care time for the diagnoses, assessment, plan and interventions.  This time excludes time spent on separate procedures and teaching.
Septic shock secondary to necrotizing soft tissue fashion  a.   Admit to SICU  b.  S/P excisional debridement of back/flank  c.  Continue cefepime, clindamycin and vancomycin  d.  Continue IVF resuscitation  e.  Wean Tray-Synephrine gtt as tolerated    Respiratory abnormality with metabolic acidosis  a  With elevated lactate  b.  Serial ABG  c.  CPAP trial once metabolic deficits improved    DM, poorly controlled  a.  On Lantus, Iss

## 2022-08-21 NOTE — PROGRESS NOTE ADULT - SUBJECTIVE AND OBJECTIVE BOX
PATIENT:  BOY CERON  [id number]    INTERVAL HISTORY/OVERNIGHT EVENTS:    MEDICATIONS:  MEDICATIONS  (STANDING):  ampicillin/sulbactam  IVPB      ampicillin/sulbactam  IVPB 3 Gram(s) IV Intermittent every 6 hours  ascorbic acid 500 milliGRAM(s) Oral daily  chlorhexidine 2% Cloths 1 Application(s) Topical daily  cholecalciferol 2000 Unit(s) Oral daily  collagenase Ointment 1 Application(s) Topical daily  heparin   Injectable 5000 Unit(s) SubCutaneous every 8 hours  insulin lispro (ADMELOG) corrective regimen sliding scale   SubCutaneous every 6 hours  insulin lispro Injectable (ADMELOG) 5 Unit(s) SubCutaneous every 6 hours  lactated ringers. 1000 milliLiter(s) (10 mL/Hr) IV Continuous <Continuous>  multivitamin/minerals/iron Oral Solution (CENTRUM) 15 milliLiter(s) Oral daily  nystatin Powder 1 Application(s) Topical two times a day  pantoprazole    Tablet 40 milliGRAM(s) Oral before breakfast  potassium chloride  10 mEq/100 mL IVPB 10 milliEquivalent(s) IV Intermittent every 1 hour    MEDICATIONS  (PRN):  acetaminophen     Tablet .. 650 milliGRAM(s) Oral every 6 hours PRN Mild Pain (1 - 3)  ALBUTerol    90 MICROgram(s) HFA Inhaler 2 Puff(s) Inhalation every 6 hours PRN Shortness of Breath  HYDROmorphone   Tablet 2 milliGRAM(s) Oral every 3 hours PRN Severe Pain (7 - 10)  HYDROmorphone   Tablet 1 milliGRAM(s) Oral every 3 hours PRN Moderate Pain (4 - 6)  sodium chloride 0.9% lock flush 10 milliLiter(s) IV Push every 1 hour PRN Pre/post blood products, medications, blood draw, and to maintain line patency      ALLERGIES:  Allergies    No Known Drug Allergies  Pineapple (Anaphylaxis)    Intolerances        OBJECTIVE:  ICU Vital Signs Last 24 Hrs  T(C): 36.6 (21 Aug 2022 04:00), Max: 37.1 (20 Aug 2022 09:00)  T(F): 97.8 (21 Aug 2022 04:00), Max: 98.7 (20 Aug 2022 09:00)  HR: 91 (21 Aug 2022 06:00) (82 - 107)  BP: --  BP(mean): --  ABP: 110/51 (21 Aug 2022 06:00) (91/45 - 128/68)  ABP(mean): 72 (21 Aug 2022 06:00) (61 - 89)  RR: 10 (21 Aug 2022 06:00) (0 - 22)  SpO2: 100% (21 Aug 2022 06:00) (98% - 100%)    O2 Parameters below as of 21 Aug 2022 06:00  Patient On (Oxygen Delivery Method): nasal cannula  O2 Flow (L/min): 2          Adult Advanced Hemodynamics Last 24 Hrs  CVP(mm Hg): --  CVP(cm H2O): --  CO: --  CI: --  PA: --  PA(mean): --  PCWP: --  SVR: --  SVRI: --  PVR: --  PVRI: --  CAPILLARY BLOOD GLUCOSE      POCT Blood Glucose.: 104 mg/dL (21 Aug 2022 05:06)  POCT Blood Glucose.: 103 mg/dL (20 Aug 2022 23:39)  POCT Blood Glucose.: 105 mg/dL (20 Aug 2022 17:37)  POCT Blood Glucose.: 100 mg/dL (20 Aug 2022 12:00)    CAPILLARY BLOOD GLUCOSE      POCT Blood Glucose.: 104 mg/dL (21 Aug 2022 05:06)    I&O's Summary    20 Aug 2022 07:01  -  21 Aug 2022 07:00  --------------------------------------------------------  IN: 760 mL / OUT: 1315 mL / NET: -555 mL      Daily     Daily     PHYSICAL EXAMINATION:  General: WN/WD NAD  HEENT: PERRLA, EOMI, moist mucous membranes  Neurology: A&Ox3, nonfocal, ARGUETA x 4  Respiratory: CTA B/L, normal respiratory effort, no wheezes, crackles, rales  CV: RRR, S1S2, no murmurs, rubs or gallops  Abdominal: Soft, NT, ND +BS, Last BM  Extremities: No edema, + peripheral pulses  Incisions:   Tubes:    LABS:                          8.0    9.90  )-----------( 504      ( 21 Aug 2022 01:40 )             25.9     08-21    141  |  108<H>  |  5<L>  ----------------------------<  110<H>  3.6   |  23  |  0.32<L>    Ca    7.2<L>      21 Aug 2022 01:40  Phos  3.1     08-21  Mg     1.70     08-21    TPro  4.4<L>  /  Alb  1.5<L>  /  TBili  0.2  /  DBili  x   /  AST  14  /  ALT  8   /  AlkPhos  182<H>  08-21    LIVER FUNCTIONS - ( 21 Aug 2022 01:40 )  Alb: 1.5 g/dL / Pro: 4.4 g/dL / ALK PHOS: 182 U/L / ALT: 8 U/L / AST: 14 U/L / GGT: x                       TELEMETRY:     EKG:     IMAGING:       PATIENT:  BOY CERON  [id number]    INTERVAL HISTORY/OVERNIGHT EVENTS:    MEDICATIONS:  MEDICATIONS  (STANDING):  ampicillin/sulbactam  IVPB      ampicillin/sulbactam  IVPB 3 Gram(s) IV Intermittent every 6 hours  ascorbic acid 500 milliGRAM(s) Oral daily  chlorhexidine 2% Cloths 1 Application(s) Topical daily  cholecalciferol 2000 Unit(s) Oral daily  collagenase Ointment 1 Application(s) Topical daily  heparin   Injectable 5000 Unit(s) SubCutaneous every 8 hours  insulin lispro (ADMELOG) corrective regimen sliding scale   SubCutaneous every 6 hours  insulin lispro Injectable (ADMELOG) 5 Unit(s) SubCutaneous every 6 hours  lactated ringers. 1000 milliLiter(s) (10 mL/Hr) IV Continuous <Continuous>  multivitamin/minerals/iron Oral Solution (CENTRUM) 15 milliLiter(s) Oral daily  nystatin Powder 1 Application(s) Topical two times a day  pantoprazole    Tablet 40 milliGRAM(s) Oral before breakfast  potassium chloride  10 mEq/100 mL IVPB 10 milliEquivalent(s) IV Intermittent every 1 hour    MEDICATIONS  (PRN):  acetaminophen     Tablet .. 650 milliGRAM(s) Oral every 6 hours PRN Mild Pain (1 - 3)  ALBUTerol    90 MICROgram(s) HFA Inhaler 2 Puff(s) Inhalation every 6 hours PRN Shortness of Breath  HYDROmorphone   Tablet 2 milliGRAM(s) Oral every 3 hours PRN Severe Pain (7 - 10)  HYDROmorphone   Tablet 1 milliGRAM(s) Oral every 3 hours PRN Moderate Pain (4 - 6)  sodium chloride 0.9% lock flush 10 milliLiter(s) IV Push every 1 hour PRN Pre/post blood products, medications, blood draw, and to maintain line patency      ALLERGIES:  Allergies    No Known Drug Allergies  Pineapple (Anaphylaxis)    Intolerances        OBJECTIVE:  ICU Vital Signs Last 24 Hrs  T(C): 36.6 (21 Aug 2022 04:00), Max: 37.1 (20 Aug 2022 09:00)  T(F): 97.8 (21 Aug 2022 04:00), Max: 98.7 (20 Aug 2022 09:00)  HR: 91 (21 Aug 2022 06:00) (82 - 107)  BP: --  BP(mean): --  ABP: 110/51 (21 Aug 2022 06:00) (91/45 - 128/68)  ABP(mean): 72 (21 Aug 2022 06:00) (61 - 89)  RR: 10 (21 Aug 2022 06:00) (0 - 22)  SpO2: 100% (21 Aug 2022 06:00) (98% - 100%)    O2 Parameters below as of 21 Aug 2022 06:00  Patient On (Oxygen Delivery Method): nasal cannula  O2 Flow (L/min): 2          Adult Advanced Hemodynamics Last 24 Hrs  CVP(mm Hg): --  CVP(cm H2O): --  CO: --  CI: --  PA: --  PA(mean): --  PCWP: --  SVR: --  SVRI: --  PVR: --  PVRI: --  CAPILLARY BLOOD GLUCOSE      POCT Blood Glucose.: 104 mg/dL (21 Aug 2022 05:06)  POCT Blood Glucose.: 103 mg/dL (20 Aug 2022 23:39)  POCT Blood Glucose.: 105 mg/dL (20 Aug 2022 17:37)  POCT Blood Glucose.: 100 mg/dL (20 Aug 2022 12:00)    CAPILLARY BLOOD GLUCOSE      POCT Blood Glucose.: 104 mg/dL (21 Aug 2022 05:06)    I&O's Summary    20 Aug 2022 07:01  -  21 Aug 2022 07:00  --------------------------------------------------------  IN: 760 mL / OUT: 1315 mL / NET: -555 mL      Daily     Daily     PHYSICAL EXAMINATION:  General: morbidly obese, NAD  HEENT: PERRLA, EOMI, moist mucous membranes  Neurology: A&Ox3,   Respiratory: CTA B/L, normal respiratory effort, no wheezes, crackles, rales  CV: RRR, S1S2, no murmurs, rubs or gallops  Abdominal: Soft, NT, ND   Extremities: edema in all 4 extremities  Tubes: A-line, peripheral IVs    LABS:                          8.0    9.90  )-----------( 504      ( 21 Aug 2022 01:40 )             25.9     08-21    141  |  108<H>  |  5<L>  ----------------------------<  110<H>  3.6   |  23  |  0.32<L>    Ca    7.2<L>      21 Aug 2022 01:40  Phos  3.1     08-21  Mg     1.70     08-21    TPro  4.4<L>  /  Alb  1.5<L>  /  TBili  0.2  /  DBili  x   /  AST  14  /  ALT  8   /  AlkPhos  182<H>  08-21    LIVER FUNCTIONS - ( 21 Aug 2022 01:40 )  Alb: 1.5 g/dL / Pro: 4.4 g/dL / ALK PHOS: 182 U/L / ALT: 8 U/L / AST: 14 U/L / GGT: x                       TELEMETRY:     EKG:     IMAGING:       PATIENT:  BOY CERON  [id number]    INTERVAL HISTORY/OVERNIGHT EVENTS: No acute events overnight. Pt continues to endorse baseline hypothermia but denies chest pain, shortness of breath, palpitations, headache, fever, chills, n/v/c/d, or urinary symptoms.    MEDICATIONS:  MEDICATIONS  (STANDING):  ampicillin/sulbactam  IVPB      ampicillin/sulbactam  IVPB 3 Gram(s) IV Intermittent every 6 hours  ascorbic acid 500 milliGRAM(s) Oral daily  chlorhexidine 2% Cloths 1 Application(s) Topical daily  cholecalciferol 2000 Unit(s) Oral daily  collagenase Ointment 1 Application(s) Topical daily  heparin   Injectable 5000 Unit(s) SubCutaneous every 8 hours  insulin lispro (ADMELOG) corrective regimen sliding scale   SubCutaneous every 6 hours  insulin lispro Injectable (ADMELOG) 5 Unit(s) SubCutaneous every 6 hours  lactated ringers. 1000 milliLiter(s) (10 mL/Hr) IV Continuous <Continuous>  multivitamin/minerals/iron Oral Solution (CENTRUM) 15 milliLiter(s) Oral daily  nystatin Powder 1 Application(s) Topical two times a day  pantoprazole    Tablet 40 milliGRAM(s) Oral before breakfast  potassium chloride  10 mEq/100 mL IVPB 10 milliEquivalent(s) IV Intermittent every 1 hour    MEDICATIONS  (PRN):  acetaminophen     Tablet .. 650 milliGRAM(s) Oral every 6 hours PRN Mild Pain (1 - 3)  ALBUTerol    90 MICROgram(s) HFA Inhaler 2 Puff(s) Inhalation every 6 hours PRN Shortness of Breath  HYDROmorphone   Tablet 2 milliGRAM(s) Oral every 3 hours PRN Severe Pain (7 - 10)  HYDROmorphone   Tablet 1 milliGRAM(s) Oral every 3 hours PRN Moderate Pain (4 - 6)  sodium chloride 0.9% lock flush 10 milliLiter(s) IV Push every 1 hour PRN Pre/post blood products, medications, blood draw, and to maintain line patency      ALLERGIES:  Allergies    No Known Drug Allergies  Pineapple (Anaphylaxis)    Intolerances        OBJECTIVE:  ICU Vital Signs Last 24 Hrs  T(C): 36.6 (21 Aug 2022 04:00), Max: 37.1 (20 Aug 2022 09:00)  T(F): 97.8 (21 Aug 2022 04:00), Max: 98.7 (20 Aug 2022 09:00)  HR: 91 (21 Aug 2022 06:00) (82 - 107)  BP: --  BP(mean): --  ABP: 110/51 (21 Aug 2022 06:00) (91/45 - 128/68)  ABP(mean): 72 (21 Aug 2022 06:00) (61 - 89)  RR: 10 (21 Aug 2022 06:00) (0 - 22)  SpO2: 100% (21 Aug 2022 06:00) (98% - 100%)    O2 Parameters below as of 21 Aug 2022 06:00  Patient On (Oxygen Delivery Method): nasal cannula  O2 Flow (L/min): 2          Adult Advanced Hemodynamics Last 24 Hrs  CVP(mm Hg): --  CVP(cm H2O): --  CO: --  CI: --  PA: --  PA(mean): --  PCWP: --  SVR: --  SVRI: --  PVR: --  PVRI: --  CAPILLARY BLOOD GLUCOSE      POCT Blood Glucose.: 104 mg/dL (21 Aug 2022 05:06)  POCT Blood Glucose.: 103 mg/dL (20 Aug 2022 23:39)  POCT Blood Glucose.: 105 mg/dL (20 Aug 2022 17:37)  POCT Blood Glucose.: 100 mg/dL (20 Aug 2022 12:00)    CAPILLARY BLOOD GLUCOSE      POCT Blood Glucose.: 104 mg/dL (21 Aug 2022 05:06)    I&O's Summary    20 Aug 2022 07:01  -  21 Aug 2022 07:00  --------------------------------------------------------  IN: 760 mL / OUT: 1315 mL / NET: -555 mL      Daily     Daily     PHYSICAL EXAMINATION:  General: morbidly obese, NAD  HEENT: PERRLA, EOMI, moist mucous membranes  Neurology: A&Ox3,   Respiratory: CTA B/L apically  CV: RRR, S1S2, no murmurs, rubs or gallops  Abdominal: Soft, NT, ND   Extremities: +edema in all 4 extremities, decreased from prior exam.  Tubes: A-line, peripheral IVs    LABS:                          8.0    9.90  )-----------( 504      ( 21 Aug 2022 01:40 )             25.9     08-21    141  |  108<H>  |  5<L>  ----------------------------<  110<H>  3.6   |  23  |  0.32<L>    Ca    7.2<L>      21 Aug 2022 01:40  Phos  3.1     08-21  Mg     1.70     08-21    TPro  4.4<L>  /  Alb  1.5<L>  /  TBili  0.2  /  DBili  x   /  AST  14  /  ALT  8   /  AlkPhos  182<H>  08-21    LIVER FUNCTIONS - ( 21 Aug 2022 01:40 )  Alb: 1.5 g/dL / Pro: 4.4 g/dL / ALK PHOS: 182 U/L / ALT: 8 U/L / AST: 14 U/L / GGT: x                       TELEMETRY:     EKG:     IMAGING:

## 2022-08-21 NOTE — CHART NOTE - NSCHARTNOTEFT_GEN_A_CORE
CCU Transfer Note    Transfer from: CCU  Transfer to:  (  ) Medicine    (  ) Telemetry    (  ) RCU    (  ) Palliative    (  ) Stroke Unit    (X) Surgical Tele  Accepting physician:    MEDICATIONS:  STANDING MEDICATIONS  ampicillin/sulbactam  IVPB      ampicillin/sulbactam  IVPB 3 Gram(s) IV Intermittent every 6 hours  ascorbic acid 500 milliGRAM(s) Oral daily  chlorhexidine 2% Cloths 1 Application(s) Topical daily  cholecalciferol 2000 Unit(s) Oral daily  collagenase Ointment 1 Application(s) Topical daily  furosemide    Tablet 40 milliGRAM(s) Oral daily  heparin   Injectable 5000 Unit(s) SubCutaneous every 8 hours  insulin lispro (ADMELOG) corrective regimen sliding scale   SubCutaneous every 6 hours  insulin lispro Injectable (ADMELOG) 5 Unit(s) SubCutaneous every 6 hours  lactated ringers. 1000 milliLiter(s) IV Continuous <Continuous>  metoprolol tartrate 12.5 milliGRAM(s) Oral two times a day  multivitamin/minerals/iron Oral Solution (CENTRUM) 15 milliLiter(s) Oral daily  nystatin Powder 1 Application(s) Topical two times a day  pantoprazole    Tablet 40 milliGRAM(s) Oral before breakfast    PRN MEDICATIONS  acetaminophen     Tablet .. 650 milliGRAM(s) Oral every 6 hours PRN  ALBUTerol    90 MICROgram(s) HFA Inhaler 2 Puff(s) Inhalation every 6 hours PRN  HYDROmorphone   Tablet 2 milliGRAM(s) Oral every 3 hours PRN  HYDROmorphone   Tablet 1 milliGRAM(s) Oral every 3 hours PRN  sodium chloride 0.9% lock flush 10 milliLiter(s) IV Push every 1 hour PRN      VITAL SIGNS: Last 24 Hours  T(C): 36.4 (21 Aug 2022 12:00), Max: 37.1 (21 Aug 2022 00:00)  T(F): 97.6 (21 Aug 2022 12:00), Max: 98.7 (21 Aug 2022 00:00)  HR: 105 (21 Aug 2022 12:00) (82 - 107)  BP: --  BP(mean): --  RR: 17 (21 Aug 2022 12:00) (0 - 20)  SpO2: 99% (21 Aug 2022 12:00) (98% - 100%)    LABS:                        8.0    9.90  )-----------( 504      ( 21 Aug 2022 01:40 )             25.9     08-21    141  |  108<H>  |  5<L>  ----------------------------<  110<H>  3.6   |  23  |  0.32<L>    Ca    7.2<L>      21 Aug 2022 01:40  Phos  3.1     08-21  Mg     1.70     08-21    TPro  4.4<L>  /  Alb  1.5<L>  /  TBili  0.2  /  DBili  x   /  AST  14  /  ALT  8   /  AlkPhos  182<H>  08-21    RADIOLOGY:    CCU COURSE:      ASSESSMENT & PLAN:         For Follow-Up: CCU Transfer Note    Transfer from: CCU  Transfer to:  (  ) Medicine    (  ) Telemetry    (  ) RCU    (  ) Palliative    (  ) Stroke Unit    (X) Surgical Tele  Accepting physician:    MEDICATIONS:  STANDING MEDICATIONS  ampicillin/sulbactam  IVPB      ampicillin/sulbactam  IVPB 3 Gram(s) IV Intermittent every 6 hours  ascorbic acid 500 milliGRAM(s) Oral daily  chlorhexidine 2% Cloths 1 Application(s) Topical daily  cholecalciferol 2000 Unit(s) Oral daily  collagenase Ointment 1 Application(s) Topical daily  furosemide    Tablet 40 milliGRAM(s) Oral daily  heparin   Injectable 5000 Unit(s) SubCutaneous every 8 hours  insulin lispro (ADMELOG) corrective regimen sliding scale   SubCutaneous every 6 hours  insulin lispro Injectable (ADMELOG) 5 Unit(s) SubCutaneous every 6 hours  lactated ringers. 1000 milliLiter(s) IV Continuous <Continuous>  metoprolol tartrate 12.5 milliGRAM(s) Oral two times a day  multivitamin/minerals/iron Oral Solution (CENTRUM) 15 milliLiter(s) Oral daily  nystatin Powder 1 Application(s) Topical two times a day  pantoprazole    Tablet 40 milliGRAM(s) Oral before breakfast    PRN MEDICATIONS  acetaminophen     Tablet .. 650 milliGRAM(s) Oral every 6 hours PRN  ALBUTerol    90 MICROgram(s) HFA Inhaler 2 Puff(s) Inhalation every 6 hours PRN  HYDROmorphone   Tablet 2 milliGRAM(s) Oral every 3 hours PRN  HYDROmorphone   Tablet 1 milliGRAM(s) Oral every 3 hours PRN  sodium chloride 0.9% lock flush 10 milliLiter(s) IV Push every 1 hour PRN      VITAL SIGNS: Last 24 Hours  T(C): 36.4 (21 Aug 2022 12:00), Max: 37.1 (21 Aug 2022 00:00)  T(F): 97.6 (21 Aug 2022 12:00), Max: 98.7 (21 Aug 2022 00:00)  HR: 105 (21 Aug 2022 12:00) (82 - 107)  BP: --  BP(mean): --  RR: 17 (21 Aug 2022 12:00) (0 - 20)  SpO2: 99% (21 Aug 2022 12:00) (98% - 100%)    LABS:                        8.0    9.90  )-----------( 504      ( 21 Aug 2022 01:40 )             25.9     08-21    141  |  108<H>  |  5<L>  ----------------------------<  110<H>  3.6   |  23  |  0.32<L>    Ca    7.2<L>      21 Aug 2022 01:40  Phos  3.1     08-21  Mg     1.70     08-21    TPro  4.4<L>  /  Alb  1.5<L>  /  TBili  0.2  /  DBili  x   /  AST  14  /  ALT  8   /  AlkPhos  182<H>  08-21    RADIOLOGY:    CCU COURSE:      ASSESSMENT & PLAN:   52F immobile 2/2 h/o MS, DM2, asthma, hypothyroidism, and known sacral wounds presents from [place] with urosepsis and anemia with new L flank wound with concern for necrotizing soft tissue infection. Was s/p debridement of wound with worsening sepsis, acidosis, and severe global LV dysfunction of unknown etiology, transferred to CCU for [place] and inotrope support. Pt is s/p swan removal and off all sedation, weaning off pressors. Repeat echo showed EF of 5-10% and signs suggestive of possible stress induced cardiomyopathy. Pt's mental status has returned to baseline off sedation. Pt started on lopressor 12.5 po bid and lasix 40 po qd for HFrEF. C/w Unasyn until 8/25 per ID. Patient is stable for transfer to surgical tele.     NEURO:  - extubated 8/18, off sedation  - A&Ox3    RESP;  - extubated  - satting well on 2LNC    CV:  # severe LV dysfunction  - EF 5 - 10%  - concern for cardiomyopathy of unknown etiology but may be R/T sepsis and critical illness  - s/p Dobutamine, s/p swan removal  - vasopressin discontinued 8/13, currently off levophed, continue to monitor  - repeat ECHO to reassess LV function showed EF of 5-10%   - 8/21 started on lopressor 12.5 PO BID  - 8/21 started on lasix 40 PO qd    GI:  - moist and bite sized diet    /renal:  # metabolic acidosis  - appears resolved  - off bicarb drip at present  - follow labs  - f/u any further renal recs  - Scr stable    ENDO:  # DM2  - A1c 5.5  - FS elevated but now better controlled  - was started on steroids post op, ?stress dose, was tapered, d/c'ed on 8/15 given concern of sepsis  - continue FS/ IHSS as indicated    HEME:  - Hb stable (8.5 -> 8.0), continue to monitor (Hgb dayna of 5.9)  - iron studies suggestive of anemia of chronic disease  - stool guaic positive  - retic count elevated    ID:  # s/p L flank wound debridement  - dressings intact and wound vac in place  - WBC 28.8 and no fevers overnight, tylenol PRN if pt spikes fever  - now following rectal or axillary readings  - ID consult noted and appreciated  - no plan for further debridement, wound care to change vac M/W/F prn per surgery recs  - blood cx from 8/11 grew VRE, no need to change abx management at this time per ID recs  - Unasyn 3 grams IV Q6h until 8/25 per ID  - Vanc d/c on 8/17      DVT PPx:  - Heparin SQ    For Follow-Up:  [] c/w Unasyn 3g IV q6h until 8/25 per ID  [] c/w Lopressor 12.5 PO bid and lasix 40 po qd  [] CCU Transfer Note    Transfer from: CCU  Transfer to:  (  ) Medicine    (  ) Telemetry    (  ) RCU    (  ) Palliative    (  ) Stroke Unit    (X) Surgical Tele  Accepting physician:    MEDICATIONS:  STANDING MEDICATIONS  ampicillin/sulbactam  IVPB      ampicillin/sulbactam  IVPB 3 Gram(s) IV Intermittent every 6 hours  ascorbic acid 500 milliGRAM(s) Oral daily  chlorhexidine 2% Cloths 1 Application(s) Topical daily  cholecalciferol 2000 Unit(s) Oral daily  collagenase Ointment 1 Application(s) Topical daily  furosemide    Tablet 40 milliGRAM(s) Oral daily  heparin   Injectable 5000 Unit(s) SubCutaneous every 8 hours  insulin lispro (ADMELOG) corrective regimen sliding scale   SubCutaneous every 6 hours  insulin lispro Injectable (ADMELOG) 5 Unit(s) SubCutaneous every 6 hours  lactated ringers. 1000 milliLiter(s) IV Continuous <Continuous>  metoprolol tartrate 12.5 milliGRAM(s) Oral two times a day  multivitamin/minerals/iron Oral Solution (CENTRUM) 15 milliLiter(s) Oral daily  nystatin Powder 1 Application(s) Topical two times a day  pantoprazole    Tablet 40 milliGRAM(s) Oral before breakfast    PRN MEDICATIONS  acetaminophen     Tablet .. 650 milliGRAM(s) Oral every 6 hours PRN  ALBUTerol    90 MICROgram(s) HFA Inhaler 2 Puff(s) Inhalation every 6 hours PRN  HYDROmorphone   Tablet 2 milliGRAM(s) Oral every 3 hours PRN  HYDROmorphone   Tablet 1 milliGRAM(s) Oral every 3 hours PRN  sodium chloride 0.9% lock flush 10 milliLiter(s) IV Push every 1 hour PRN    VITAL SIGNS: Last 24 Hours  T(C): 36.4 (21 Aug 2022 12:00), Max: 37.1 (21 Aug 2022 00:00)  T(F): 97.6 (21 Aug 2022 12:00), Max: 98.7 (21 Aug 2022 00:00)  HR: 105 (21 Aug 2022 12:00) (82 - 107)  BP: --  BP(mean): --  RR: 17 (21 Aug 2022 12:00) (0 - 20)  SpO2: 99% (21 Aug 2022 12:00) (98% - 100%)    LABS:                        8.0    9.90  )-----------( 504      ( 21 Aug 2022 01:40 )             25.9     08-21    141  |  108<H>  |  5<L>  ----------------------------<  110<H>  3.6   |  23  |  0.32<L>    Ca    7.2<L>      21 Aug 2022 01:40  Phos  3.1     08-21  Mg     1.70     08-21    TPro  4.4<L>  /  Alb  1.5<L>  /  TBili  0.2  /  DBili  x   /  AST  14  /  ALT  8   /  AlkPhos  182<H>  08-21    RADIOLOGY:    CCU COURSE:   52F immobile 2/2 MS, poorly controlled T2DM, asthma, hypothyroidism, known chronic wounds sacrum (stage 4), vulvar/Rt thigh, and right calf. Recent April 2022 hospitalization for urosepsis. On cefepime/flagyl for chronic osteo 2/2 unstageable sacral decubitus ulcer, dakins BID packing. Presented from [place] with concerns for UTI and anemia (6.8 from baseline 8.0) and new malodorous wound on L hip to L flank area. Surgery was consulted for potential necrotizing soft tissue infection. Surgery performed Irrigation and excisional debridement of her necrotizing fasciitis. Following the surgery, the patient was in septic shock and had an ejection fraction of 10% and the patient was transferred to the CCU for management. Pt was started on stress dose steroid taper. In the CCU, the patient was started on pressors, RIJ swan was floated. ID recommended switching abx to unasyn and vancomycin. The pt was weaned off sedatives and put on CPAP/PSV during the day and back on AC overnight to allow her lungs to rest. Pt had dressing changes for her sacral ulcer by wound care 3x/week. Repeat TTE showed severe LV segmental dysfunction, MR, and an EF of 5-10%. Pt's mental status improved off sedation and was extubated and put on face tent. Pressors were discontinued along with vancomycin. Pt's Hb dropped down to 7.3 and was given two half units of pRBCs. Pt's mental status recovered to baseline, Hemoglobin stabilized, and pt started on Lopressor 12.5 po BID and Lasix 40 po qd.     ASSESSMENT & PLAN:   52F immobile 2/2 h/o MS, DM2, asthma, hypothyroidism, and known sacral wounds presents from [place] with urosepsis and anemia with new L flank wound with concern for necrotizing soft tissue infection. Was s/p debridement of wound with worsening sepsis, acidosis, and severe global LV dysfunction of unknown etiology, transferred to CCU for [place] and inotrope support. Pt is s/p swan removal and off all sedation, weaning off pressors. Repeat echo showed EF of 5-10% and signs suggestive of possible stress induced cardiomyopathy. Pt's mental status has returned to baseline off sedation. Pt started on lopressor 12.5 po bid and lasix 40 po qd for HFrEF. C/w Unasyn until 8/25 per ID. Patient is stable for transfer to surgical tele.     NEURO:  - extubated 8/18, off sedation  - A&Ox3    RESP;  - extubated  - satting well on 2LNC    CV:  # severe LV dysfunction  - EF 5 - 10%  - concern for cardiomyopathy of unknown etiology but may be R/T sepsis and critical illness  - s/p Dobutamine, s/p swan removal  - vasopressin discontinued 8/13, currently off levophed, continue to monitor  - repeat ECHO to reassess LV function showed EF of 5-10%   - 8/21 started on lopressor 12.5 PO BID  - 8/21 started on lasix 40 PO qd    GI:  - moist and bite sized diet    /renal:  # metabolic acidosis  - appears resolved  - off bicarb drip at present  - follow labs  - f/u any further renal recs  - Scr stable    ENDO:  # DM2  - A1c 5.5  - FS elevated but now better controlled  - was started on steroids post op, ?stress dose, was tapered, d/c'ed on 8/15 given concern of sepsis  - continue FS/ IHSS as indicated    HEME:  - Hb stable (8.5 -> 8.0), continue to monitor (Hgb dayna of 5.9)  - iron studies suggestive of anemia of chronic disease  - stool guaic positive  - retic count elevated    ID:  # s/p L flank wound debridement  - dressings intact and wound vac in place  - WBC 28.8 and no fevers overnight, tylenol PRN if pt spikes fever  - now following rectal or axillary readings  - ID consult noted and appreciated  - no plan for further debridement, wound care to change vac M/W/F prn per surgery recs  - blood cx from 8/11 grew VRE, no need to change abx management at this time per ID recs  - Unasyn 3 grams IV Q6h until 8/25 per ID  - Vanc d/c on 8/17      DVT PPx:  - Heparin SQ    For Follow-Up:  [] c/w Unasyn 3g IV q6h until 8/25 per ID  [] c/w Lopressor 12.5 PO bid and lasix 40 po qd  [] CCU Transfer Note    Transfer from: CCU  Transfer to:  (  ) Medicine    (  ) Telemetry    (  ) RCU    (  ) Palliative    (  ) Stroke Unit    (X) Surgical Tele  Accepting physician:    MEDICATIONS:  STANDING MEDICATIONS  ampicillin/sulbactam  IVPB      ampicillin/sulbactam  IVPB 3 Gram(s) IV Intermittent every 6 hours  ascorbic acid 500 milliGRAM(s) Oral daily  chlorhexidine 2% Cloths 1 Application(s) Topical daily  cholecalciferol 2000 Unit(s) Oral daily  collagenase Ointment 1 Application(s) Topical daily  furosemide    Tablet 40 milliGRAM(s) Oral daily  heparin   Injectable 5000 Unit(s) SubCutaneous every 8 hours  insulin lispro (ADMELOG) corrective regimen sliding scale   SubCutaneous every 6 hours  insulin lispro Injectable (ADMELOG) 5 Unit(s) SubCutaneous every 6 hours  lactated ringers. 1000 milliLiter(s) IV Continuous <Continuous>  metoprolol tartrate 12.5 milliGRAM(s) Oral two times a day  multivitamin/minerals/iron Oral Solution (CENTRUM) 15 milliLiter(s) Oral daily  nystatin Powder 1 Application(s) Topical two times a day  pantoprazole    Tablet 40 milliGRAM(s) Oral before breakfast    PRN MEDICATIONS  acetaminophen     Tablet .. 650 milliGRAM(s) Oral every 6 hours PRN  ALBUTerol    90 MICROgram(s) HFA Inhaler 2 Puff(s) Inhalation every 6 hours PRN  HYDROmorphone   Tablet 2 milliGRAM(s) Oral every 3 hours PRN  HYDROmorphone   Tablet 1 milliGRAM(s) Oral every 3 hours PRN  sodium chloride 0.9% lock flush 10 milliLiter(s) IV Push every 1 hour PRN    VITAL SIGNS: Last 24 Hours  T(C): 36.4 (21 Aug 2022 12:00), Max: 37.1 (21 Aug 2022 00:00)  T(F): 97.6 (21 Aug 2022 12:00), Max: 98.7 (21 Aug 2022 00:00)  HR: 105 (21 Aug 2022 12:00) (82 - 107)  BP: --  BP(mean): --  RR: 17 (21 Aug 2022 12:00) (0 - 20)  SpO2: 99% (21 Aug 2022 12:00) (98% - 100%)    LABS:                        8.0    9.90  )-----------( 504      ( 21 Aug 2022 01:40 )             25.9     08-21    141  |  108<H>  |  5<L>  ----------------------------<  110<H>  3.6   |  23  |  0.32<L>    Ca    7.2<L>      21 Aug 2022 01:40  Phos  3.1     08-21  Mg     1.70     08-21    TPro  4.4<L>  /  Alb  1.5<L>  /  TBili  0.2  /  DBili  x   /  AST  14  /  ALT  8   /  AlkPhos  182<H>  08-21    RADIOLOGY:    CCU COURSE:   52F immobile 2/2 MS, poorly controlled T2DM, asthma, hypothyroidism, known chronic wounds sacrum (stage 4), vulvar/Rt thigh, and right calf. Recent April 2022 hospitalization for urosepsis. On cefepime/flagyl for chronic osteo 2/2 unstageable sacral decubitus ulcer, dakins BID packing. Presented from [place] with concerns for UTI and anemia (6.8 from baseline 8.0) and new malodorous wound on L hip to L flank area. Surgery was consulted for potential necrotizing soft tissue infection. Surgery performed Irrigation and excisional debridement of her necrotizing fasciitis. Following the surgery, the patient was in septic shock and had an ejection fraction of 10% and the patient was transferred to the CCU for management. Pt was started on stress dose steroid taper. In the CCU, the patient was started on pressors, RIJ swan was floated (CVP 9, CO/CI 6.5/3.1, MVO2 59.2, [id number]). ID recommended switching abx to unasyn and vancomycin. The pt was weaned off sedatives and put on CPAP/PSV during the day and back on AC overnight to allow her lungs to rest. Pt had dressing changes for her sacral ulcer by wound care 3x/week. Repeat TTE showed severe LV segmental dysfunction, MR, and an EF of 5-10%. Pt's mental status improved off sedation and was extubated and put on face tent. Pressors were discontinued along with vancomycin. Pt's Hb dropped down to 7.3 and was given two half units of pRBCs. Pt's mental status recovered to baseline, Hemoglobin stabilized, and pt started on Lopressor 12.5 po BID and Lasix 40 po qd.     ASSESSMENT & PLAN:   52F immobile 2/2 h/o MS, DM2, asthma, hypothyroidism, and known sacral wounds presents from [place] with urosepsis and anemia with new L flank wound with concern for necrotizing soft tissue infection. Was s/p debridement of wound with worsening sepsis, acidosis, and severe global LV dysfunction of unknown etiology, transferred to CCU for [place] and inotrope support. Pt is s/p swan removal and off all sedation, weaning off pressors. Repeat echo showed EF of 5-10% and signs suggestive of possible stress induced cardiomyopathy. Pt's mental status has returned to baseline off sedation. Pt started on lopressor 12.5 po bid and lasix 40 po qd for HFrEF. C/w Unasyn until 8/25 per ID. Patient is stable for transfer to surgical tele.     NEURO:  - extubated 8/18, off sedation  - A&Ox3    RESP;  - extubated  - satting well on 2LNC    CV:  # severe LV dysfunction  - EF 5 - 10%  - concern for cardiomyopathy of unknown etiology but may be R/T sepsis and critical illness  - s/p Dobutamine, s/p swan removal  - vasopressin discontinued 8/13, currently off levophed, continue to monitor  - repeat ECHO to reassess LV function showed EF of 5-10%   - 8/21 started on lopressor 12.5 PO BID  - 8/21 started on lasix 40 PO qd    GI:  - moist and bite sized diet    /renal:  # metabolic acidosis  - appears resolved  - off bicarb drip at present  - follow labs  - f/u any further renal recs  - Scr stable    ENDO:  # DM2  - A1c 5.5  - FS elevated but now better controlled  - was started on steroids post op, ?stress dose, was tapered, d/c'ed on 8/15 given concern of sepsis  - continue FS/ IHSS as indicated    HEME:  - Hb stable (8.5 -> 8.0), continue to monitor (Hgb dayna of 5.9)  - iron studies suggestive of anemia of chronic disease  - stool guaic positive  - retic count elevated    ID:  # s/p L flank wound debridement  - dressings intact and wound vac in place  - WBC 28.8 and no fevers overnight, tylenol PRN if pt spikes fever  - now following rectal or axillary readings  - ID consult noted and appreciated  - no plan for further debridement, wound care to change vac M/W/F prn per surgery recs  - blood cx from 8/11 grew VRE, no need to change abx management at this time per ID recs  - Unasyn 3 grams IV Q6h until 8/25 per ID  - Vanc d/c on 8/17      DVT PPx:  - Heparin SQ    For Follow-Up:  [] c/w Unasyn 3g IV q6h until 8/25 per ID  [] c/w Lopressor 12.5 PO bid and lasix 40 po qd  [] CCU Transfer Note    Transfer from: CCU  Transfer to:  (  ) Medicine    (  ) Telemetry    (  ) RCU    (  ) Palliative    (  ) Stroke Unit    (X) Surgical Tele  Accepting physician:    MEDICATIONS:  STANDING MEDICATIONS  ampicillin/sulbactam  IVPB      ampicillin/sulbactam  IVPB 3 Gram(s) IV Intermittent every 6 hours  ascorbic acid 500 milliGRAM(s) Oral daily  chlorhexidine 2% Cloths 1 Application(s) Topical daily  cholecalciferol 2000 Unit(s) Oral daily  collagenase Ointment 1 Application(s) Topical daily  furosemide    Tablet 40 milliGRAM(s) Oral daily  heparin   Injectable 5000 Unit(s) SubCutaneous every 8 hours  insulin lispro (ADMELOG) corrective regimen sliding scale   SubCutaneous every 6 hours  insulin lispro Injectable (ADMELOG) 5 Unit(s) SubCutaneous every 6 hours  lactated ringers. 1000 milliLiter(s) IV Continuous <Continuous>  metoprolol tartrate 12.5 milliGRAM(s) Oral two times a day  multivitamin/minerals/iron Oral Solution (CENTRUM) 15 milliLiter(s) Oral daily  nystatin Powder 1 Application(s) Topical two times a day  pantoprazole    Tablet 40 milliGRAM(s) Oral before breakfast    PRN MEDICATIONS  acetaminophen     Tablet .. 650 milliGRAM(s) Oral every 6 hours PRN  ALBUTerol    90 MICROgram(s) HFA Inhaler 2 Puff(s) Inhalation every 6 hours PRN  HYDROmorphone   Tablet 2 milliGRAM(s) Oral every 3 hours PRN  HYDROmorphone   Tablet 1 milliGRAM(s) Oral every 3 hours PRN  sodium chloride 0.9% lock flush 10 milliLiter(s) IV Push every 1 hour PRN    VITAL SIGNS: Last 24 Hours  T(C): 36.4 (21 Aug 2022 12:00), Max: 37.1 (21 Aug 2022 00:00)  T(F): 97.6 (21 Aug 2022 12:00), Max: 98.7 (21 Aug 2022 00:00)  HR: 105 (21 Aug 2022 12:00) (82 - 107)  BP: --  BP(mean): --  RR: 17 (21 Aug 2022 12:00) (0 - 20)  SpO2: 99% (21 Aug 2022 12:00) (98% - 100%)    LABS:                        8.0    9.90  )-----------( 504      ( 21 Aug 2022 01:40 )             25.9     08-21    141  |  108<H>  |  5<L>  ----------------------------<  110<H>  3.6   |  23  |  0.32<L>    Ca    7.2<L>      21 Aug 2022 01:40  Phos  3.1     08-21  Mg     1.70     08-21    TPro  4.4<L>  /  Alb  1.5<L>  /  TBili  0.2  /  DBili  x   /  AST  14  /  ALT  8   /  AlkPhos  182<H>  08-21    RADIOLOGY:    CCU COURSE:   52F immobile 2/2 MS, poorly controlled T2DM, asthma, hypothyroidism, known chronic wounds sacrum (stage 4), vulvar/Rt thigh, and right calf. Recent April 2022 hospitalization for urosepsis. On cefepime/flagyl for chronic osteo 2/2 unstageable sacral decubitus ulcer, dakins BID packing. Presented from [place] with concerns for UTI and anemia (6.8 from baseline 8.0) and new malodorous wound on L hip to L flank area. Surgery was consulted for potential necrotizing soft tissue infection. Surgery performed Irrigation and excisional debridement of her necrotizing fasciitis. Following the surgery, the patient was in septic shock and had an ejection fraction of 10% and the patient was transferred to the CCU for management. Pt was started on stress dose steroid taper. In the CCU, the patient was started on pressors, RIJ swan was floated (CVP 9, CO/CI 6.5/3.1, MVO2 59.2, [id number]). ID recommended switching abx to unasyn and vancomycin. The pt was weaned off sedatives and put on CPAP/PSV during the day and back on AC overnight to allow her lungs to rest. Pt had dressing changes for her sacral ulcer by wound care 3x/week. Repeat TTE showed severe LV segmental dysfunction, MR, and an EF of 5-10%. Pt's mental status improved off sedation and was extubated and put on face tent. Pressors were discontinued along with vancomycin. Pt's Hb dropped down to 7.3 and was given two half units of pRBCs. Pt's mental status recovered to baseline, Hemoglobin stabilized, and pt started on Lopressor 12.5 po BID and Lasix 40 po qd.     ASSESSMENT & PLAN:   52F immobile 2/2 h/o MS, DM2, asthma, hypothyroidism, and known sacral wounds presents from [place] with urosepsis and anemia with new L flank wound with concern for necrotizing soft tissue infection. Was s/p debridement of wound with worsening sepsis, acidosis, and severe global LV dysfunction of unknown etiology, transferred to CCU for [place] and inotrope support. Pt is s/p swan removal and off all sedation, weaning off pressors. Repeat echo showed EF of 5-10% and signs suggestive of possible stress induced cardiomyopathy. Pt's mental status has returned to baseline off sedation. Pt started on lopressor 12.5 po bid and lasix 40 po qd for HFrEF. C/w Unasyn until 8/25 per ID. Patient is stable for transfer to surgical tele.     NEURO:  - extubated 8/18, off sedation  - A&Ox3    RESP;  - extubated  - satting well on 2LNC    CV:  # severe LV dysfunction  - EF 5 - 10%  - concern for cardiomyopathy of unknown etiology but may be R/T sepsis and critical illness  - s/p Dobutamine, s/p swan removal  - vasopressin discontinued 8/13, currently off levophed, continue to monitor  - repeat ECHO to reassess LV function showed EF of 5-10%   - 8/21 started on lopressor 12.5 PO BID  - 8/21 started on lasix 40 PO qd    GI:  - moist and bite sized diet    /renal:  # metabolic acidosis  - appears resolved  - off bicarb drip at present  - follow labs  - f/u any further renal recs  - Scr stable    ENDO:  # DM2  - A1c 5.5  - FS elevated but now better controlled  - was started on steroids post op, ?stress dose, was tapered, d/c'ed on 8/15 given concern of sepsis  - continue FS/ IHSS as indicated    HEME:  - Hb stable (8.5 -> 8.0), continue to monitor (Hgb dayna of 5.9)  - iron studies suggestive of anemia of chronic disease  - stool guaic positive  - retic count elevated    ID:  # s/p L flank wound debridement  - dressings intact and wound vac in place  - WBC 28.8 and no fevers overnight, tylenol PRN if pt spikes fever  - now following rectal or axillary readings  - ID consult noted and appreciated  - no plan for further debridement, wound care to change vac M/W/F prn per surgery recs  - blood cx from 8/11 grew VRE, no need to change abx management at this time per ID recs  - Unasyn 3 grams IV Q6h until 8/25 per ID  - Vanc d/c on 8/17      DVT PPx:  - Heparin SQ    For Follow-Up:  [] c/w Unasyn 3g IV q6h until 8/25 per ID  [] c/w Lopressor 12.5 PO bid and lasix 40 po qd  [] change to lopressor succinate before d/c CCU Transfer Note    Transfer from: CCU  Transfer to:  (  ) Medicine    (  ) Telemetry    (  ) RCU    (  ) Palliative    (  ) Stroke Unit    (X) Surgical Tele  Accepting physician:    MEDICATIONS:  STANDING MEDICATIONS  ampicillin/sulbactam  IVPB      ampicillin/sulbactam  IVPB 3 Gram(s) IV Intermittent every 6 hours  ascorbic acid 500 milliGRAM(s) Oral daily  chlorhexidine 2% Cloths 1 Application(s) Topical daily  cholecalciferol 2000 Unit(s) Oral daily  collagenase Ointment 1 Application(s) Topical daily  furosemide    Tablet 40 milliGRAM(s) Oral daily  heparin   Injectable 5000 Unit(s) SubCutaneous every 8 hours  insulin lispro (ADMELOG) corrective regimen sliding scale   SubCutaneous every 6 hours  insulin lispro Injectable (ADMELOG) 5 Unit(s) SubCutaneous every 6 hours  lactated ringers. 1000 milliLiter(s) IV Continuous <Continuous>  metoprolol tartrate 12.5 milliGRAM(s) Oral two times a day  multivitamin/minerals/iron Oral Solution (CENTRUM) 15 milliLiter(s) Oral daily  nystatin Powder 1 Application(s) Topical two times a day  pantoprazole    Tablet 40 milliGRAM(s) Oral before breakfast    PRN MEDICATIONS  acetaminophen     Tablet .. 650 milliGRAM(s) Oral every 6 hours PRN  ALBUTerol    90 MICROgram(s) HFA Inhaler 2 Puff(s) Inhalation every 6 hours PRN  HYDROmorphone   Tablet 2 milliGRAM(s) Oral every 3 hours PRN  HYDROmorphone   Tablet 1 milliGRAM(s) Oral every 3 hours PRN  sodium chloride 0.9% lock flush 10 milliLiter(s) IV Push every 1 hour PRN    VITAL SIGNS: Last 24 Hours  T(C): 36.4 (21 Aug 2022 12:00), Max: 37.1 (21 Aug 2022 00:00)  T(F): 97.6 (21 Aug 2022 12:00), Max: 98.7 (21 Aug 2022 00:00)  HR: 105 (21 Aug 2022 12:00) (82 - 107)  BP: --  BP(mean): --  RR: 17 (21 Aug 2022 12:00) (0 - 20)  SpO2: 99% (21 Aug 2022 12:00) (98% - 100%)    LABS:                        8.0    9.90  )-----------( 504      ( 21 Aug 2022 01:40 )             25.9     08-21    141  |  108<H>  |  5<L>  ----------------------------<  110<H>  3.6   |  23  |  0.32<L>    Ca    7.2<L>      21 Aug 2022 01:40  Phos  3.1     08-21  Mg     1.70     08-21    TPro  4.4<L>  /  Alb  1.5<L>  /  TBili  0.2  /  DBili  x   /  AST  14  /  ALT  8   /  AlkPhos  182<H>  08-21    RADIOLOGY:    CCU COURSE:   52F immobile 2/2 MS, poorly controlled T2DM, asthma, hypothyroidism, known chronic wounds sacrum (stage 4), vulvar/Rt thigh, and right calf. Recent April 2022 hospitalization for urosepsis. On cefepime/flagyl for chronic osteo 2/2 unstageable sacral decubitus ulcer, dakins BID packing. Presented from [place] with concerns for UTI and anemia (6.8 from baseline 8.0) and new malodorous wound on L hip to L flank area. Surgery was consulted for potential necrotizing soft tissue infection. Surgery performed Irrigation and excisional debridement of her necrotizing fasciitis. Following the surgery, the patient was in septic shock and had an ejection fraction of 10% and the patient was transferred to the CCU for management. Pt was started on stress dose steroid taper. In the CCU, the patient was started on pressors, RIJ swan was floated (CVP 9, CO/CI 6.5/3.1, MVO2 59.2, [id number]). ID recommended switching abx to unasyn and vancomycin. The pt was weaned off sedatives and put on CPAP/PSV during the day and back on AC overnight to allow her lungs to rest. Pt had dressing changes for her sacral ulcer by wound care 3x/week. Repeat TTE showed severe LV segmental dysfunction, MR, and an EF of 5-10%. Pt's mental status improved off sedation and was extubated and put on face tent. Pressors were discontinued along with vancomycin. Pt's Hb dropped down to 7.3 and was given two half units of pRBCs. Pt's mental status recovered to baseline, Hemoglobin stabilized, and pt started on Lopressor 12.5 po BID and Lasix 40 po qd.     ASSESSMENT & PLAN:   52F immobile 2/2 h/o MS, DM2, asthma, hypothyroidism, and known sacral wounds presents from [place] with urosepsis and anemia with new L flank wound with concern for necrotizing soft tissue infection. Was s/p debridement of wound with worsening sepsis, acidosis, and severe global LV dysfunction of unknown etiology, transferred to CCU for [place] and inotrope support. Pt is s/p swan removal and off all sedation, weaning off pressors. Repeat echo showed EF of 5-10% and signs suggestive of possible stress induced cardiomyopathy. Pt's mental status has returned to baseline off sedation. Pt started on lopressor 12.5 po bid and lasix 40 po qd for HFrEF. C/w Unasyn until 8/25 per ID. Patient is stable for transfer to surgical tele.     NEURO:  - extubated 8/18, off sedation  - A&Ox3    RESP;  - extubated  - satting well on 2LNC    CV:  # severe LV dysfunction  - EF 5 - 10%  - concern for cardiomyopathy of unknown etiology but may be R/T sepsis and critical illness  - s/p Dobutamine, s/p swan removal  - vasopressin discontinued 8/13, currently off levophed, continue to monitor  - repeat ECHO to reassess LV function showed EF of 5-10%   - 8/21 started on lopressor 12.5 PO BID  - 8/21 started on lasix 40 PO qd    GI:  - moist and bite sized diet    /renal:  # metabolic acidosis  - appears resolved  - off bicarb drip at present  - follow labs  - f/u any further renal recs  - Scr stable    ENDO:  # DM2  - A1c 5.5  - FS elevated but now better controlled  - was started on steroids post op, ?stress dose, was tapered, d/c'ed on 8/15 given concern of sepsis  - continue FS/ IHSS as indicated    HEME:  - Hb stable (8.5 -> 8.0), continue to monitor (Hgb dayna of 5.9)  - iron studies suggestive of anemia of chronic disease  - stool guaic positive  - retic count elevated    ID:  # s/p L flank wound debridement  - dressings intact and wound vac in place  - WBC 28.8 and no fevers overnight, tylenol PRN if pt spikes fever  - now following rectal or axillary readings  - ID consult noted and appreciated  - no plan for further debridement, wound care to change vac M/W/F prn per surgery recs  - blood cx from 8/11 grew VRE, no need to change abx management at this time per ID recs  - Unasyn 3 grams IV Q6h until 8/25 per ID  - Vanc d/c on 8/17      DVT PPx:  - Heparin SQ    For Follow-Up:  [] c/w Unasyn 3g IV q6h until 8/25 per ID  [] c/w Lopressor 12.5 PO bid and lasix 40 po qd  [] change to lopressor succinate before d/c  [] d/c Unasyn after 8/25 CCU Transfer Note    Transfer from: CCU  Transfer to:  (  ) Medicine    (  ) Telemetry    (  ) RCU    (  ) Palliative    (  ) Stroke Unit    (X) Surgical   Accepting physician:    MEDICATIONS:  STANDING MEDICATIONS  ampicillin/sulbactam  IVPB      ampicillin/sulbactam  IVPB 3 Gram(s) IV Intermittent every 6 hours  ascorbic acid 500 milliGRAM(s) Oral daily  chlorhexidine 2% Cloths 1 Application(s) Topical daily  cholecalciferol 2000 Unit(s) Oral daily  collagenase Ointment 1 Application(s) Topical daily  furosemide    Tablet 40 milliGRAM(s) Oral daily  heparin   Injectable 5000 Unit(s) SubCutaneous every 8 hours  insulin lispro (ADMELOG) corrective regimen sliding scale   SubCutaneous every 6 hours  insulin lispro Injectable (ADMELOG) 5 Unit(s) SubCutaneous every 6 hours  lactated ringers. 1000 milliLiter(s) IV Continuous <Continuous>  metoprolol tartrate 12.5 milliGRAM(s) Oral two times a day  multivitamin/minerals/iron Oral Solution (CENTRUM) 15 milliLiter(s) Oral daily  nystatin Powder 1 Application(s) Topical two times a day  pantoprazole    Tablet 40 milliGRAM(s) Oral before breakfast    PRN MEDICATIONS  acetaminophen     Tablet .. 650 milliGRAM(s) Oral every 6 hours PRN  ALBUTerol    90 MICROgram(s) HFA Inhaler 2 Puff(s) Inhalation every 6 hours PRN  HYDROmorphone   Tablet 2 milliGRAM(s) Oral every 3 hours PRN  HYDROmorphone   Tablet 1 milliGRAM(s) Oral every 3 hours PRN  sodium chloride 0.9% lock flush 10 milliLiter(s) IV Push every 1 hour PRN    VITAL SIGNS: Last 24 Hours  T(C): 36.4 (21 Aug 2022 12:00), Max: 37.1 (21 Aug 2022 00:00)  T(F): 97.6 (21 Aug 2022 12:00), Max: 98.7 (21 Aug 2022 00:00)  HR: 105 (21 Aug 2022 12:00) (82 - 107)  BP: --  BP(mean): --  RR: 17 (21 Aug 2022 12:00) (0 - 20)  SpO2: 99% (21 Aug 2022 12:00) (98% - 100%)    LABS:                        8.0    9.90  )-----------( 504      ( 21 Aug 2022 01:40 )             25.9     08-21    141  |  108<H>  |  5<L>  ----------------------------<  110<H>  3.6   |  23  |  0.32<L>    Ca    7.2<L>      21 Aug 2022 01:40  Phos  3.1     08-21  Mg     1.70     08-21    TPro  4.4<L>  /  Alb  1.5<L>  /  TBili  0.2  /  DBili  x   /  AST  14  /  ALT  8   /  AlkPhos  182<H>  08-21    RADIOLOGY:    CCU COURSE:   52F immobile 2/2 MS, poorly controlled T2DM, asthma, hypothyroidism, known chronic wounds sacrum (stage 4), vulvar/Rt thigh, and right calf. Recent April 2022 hospitalization for urosepsis. On cefepime/flagyl for chronic osteo 2/2 unstageable sacral decubitus ulcer, dakins BID packing. Presented from [place] with concerns for UTI and anemia (6.8 from baseline 8.0) and new malodorous wound on L hip to L flank area. Surgery was consulted for potential necrotizing soft tissue infection. Surgery performed Irrigation and excisional debridement of her necrotizing fasciitis. Following the surgery, the patient was in septic shock and had an ejection fraction of 10% and the patient was transferred to the CCU for management. Pt was started on stress dose steroid taper. In the CCU, the patient was started on pressors, RIJ swan was floated (CVP 9, CO/CI 6.5/3.1, MVO2 59.2, [id number]). ID recommended switching abx to unasyn and vancomycin. The pt was weaned off sedatives and put on CPAP/PSV during the day and back on AC overnight to allow her lungs to rest. Pt had dressing changes for her sacral ulcer by wound care 3x/week. Repeat TTE showed severe LV segmental dysfunction, MR, and an EF of 5-10%. Pt's mental status improved off sedation and was extubated and put on face tent. Pressors were discontinued along with vancomycin. Pt's Hb dropped down to 7.3 and was given two half units of pRBCs. Pt's mental status recovered to baseline, Hemoglobin stabilized, and pt started on Lopressor 12.5 po BID and Lasix 40 po qd.     ASSESSMENT & PLAN:   52F immobile 2/2 h/o MS, DM2, asthma, hypothyroidism, and known sacral wounds presents from [place] with urosepsis and anemia with new L flank wound with concern for necrotizing soft tissue infection. Was s/p debridement of wound with worsening sepsis, acidosis, and severe global LV dysfunction of unknown etiology, transferred to CCU for [place] and inotrope support. Pt is s/p swan removal and off all sedation, weaning off pressors. Repeat echo showed EF of 5-10% and signs suggestive of possible stress induced cardiomyopathy. Pt's mental status has returned to baseline off sedation. Pt started on lopressor 12.5 po bid and lasix 40 po qd for HFrEF. C/w Unasyn until 8/25 per ID. Patient is stable for transfer to surgical tele.     NEURO:  - extubated 8/18, off sedation  - A&Ox3    RESP;  - extubated  - satting well on 2LNC    CV:  # severe LV dysfunction  - EF 5 - 10%  - concern for cardiomyopathy of unknown etiology but may be R/T sepsis and critical illness  - s/p Dobutamine, s/p swan removal  - vasopressin discontinued 8/13, currently off levophed, continue to monitor  - repeat ECHO to reassess LV function showed EF of 5-10%   - 8/21 started on lopressor 12.5 PO BID  - 8/21 started on lasix 40 PO qd    GI:  - moist and bite sized diet    /renal:  # metabolic acidosis  - appears resolved  - off bicarb drip at present  - follow labs  - f/u any further renal recs  - Scr stable    ENDO:  # DM2  - A1c 5.5  - FS elevated but now better controlled  - was started on steroids post op, ?stress dose, was tapered, d/c'ed on 8/15 given concern of sepsis  - continue FS/ IHSS as indicated    HEME:  - Hb stable (8.5 -> 8.0), continue to monitor (Hgb dayna of 5.9)  - iron studies suggestive of anemia of chronic disease  - stool guaic positive  - retic count elevated    ID:  # s/p L flank wound debridement  - dressings intact and wound vac in place  - WBC 28.8 and no fevers overnight, tylenol PRN if pt spikes fever  - now following rectal or axillary readings  - ID consult noted and appreciated  - no plan for further debridement, wound care to change vac M/W/F prn per surgery recs  - blood cx from 8/11 grew VRE, no need to change abx management at this time per ID recs  - Unasyn 3 grams IV Q6h until 8/25 per ID  - Vanc d/c on 8/17      DVT PPx:  - Heparin SQ    Dispo:  down grade to surgical floor    For Follow-Up:  [] c/w Unasyn 3g IV q6h until 8/25 per ID  [] c/w Lopressor 12.5 PO bid and lasix 40 po qd  [] change to lopressor succinate before d/c  [] d/c Unasyn after 8/25 CCU Transfer Note    Transfer from: CCU  Transfer to:  ( X ) Medicine    (  ) Telemetry    (  ) RCU    (  ) Palliative    (  ) Stroke Unit    ( ) Surgical   Accepting physician:    MEDICATIONS:  STANDING MEDICATIONS  ampicillin/sulbactam  IVPB      ampicillin/sulbactam  IVPB 3 Gram(s) IV Intermittent every 6 hours  ascorbic acid 500 milliGRAM(s) Oral daily  chlorhexidine 2% Cloths 1 Application(s) Topical daily  cholecalciferol 2000 Unit(s) Oral daily  collagenase Ointment 1 Application(s) Topical daily  furosemide    Tablet 40 milliGRAM(s) Oral daily  heparin   Injectable 5000 Unit(s) SubCutaneous every 8 hours  insulin lispro (ADMELOG) corrective regimen sliding scale   SubCutaneous every 6 hours  insulin lispro Injectable (ADMELOG) 5 Unit(s) SubCutaneous every 6 hours  lactated ringers. 1000 milliLiter(s) IV Continuous <Continuous>  metoprolol tartrate 12.5 milliGRAM(s) Oral two times a day  multivitamin/minerals/iron Oral Solution (CENTRUM) 15 milliLiter(s) Oral daily  nystatin Powder 1 Application(s) Topical two times a day  pantoprazole    Tablet 40 milliGRAM(s) Oral before breakfast    PRN MEDICATIONS  acetaminophen     Tablet .. 650 milliGRAM(s) Oral every 6 hours PRN  ALBUTerol    90 MICROgram(s) HFA Inhaler 2 Puff(s) Inhalation every 6 hours PRN  HYDROmorphone   Tablet 2 milliGRAM(s) Oral every 3 hours PRN  HYDROmorphone   Tablet 1 milliGRAM(s) Oral every 3 hours PRN  sodium chloride 0.9% lock flush 10 milliLiter(s) IV Push every 1 hour PRN    VITAL SIGNS: Last 24 Hours  T(C): 36.4 (21 Aug 2022 12:00), Max: 37.1 (21 Aug 2022 00:00)  T(F): 97.6 (21 Aug 2022 12:00), Max: 98.7 (21 Aug 2022 00:00)  HR: 105 (21 Aug 2022 12:00) (82 - 107)  BP: --  BP(mean): --  RR: 17 (21 Aug 2022 12:00) (0 - 20)  SpO2: 99% (21 Aug 2022 12:00) (98% - 100%)    LABS:                        8.0    9.90  )-----------( 504      ( 21 Aug 2022 01:40 )             25.9     08-21    141  |  108<H>  |  5<L>  ----------------------------<  110<H>  3.6   |  23  |  0.32<L>    Ca    7.2<L>      21 Aug 2022 01:40  Phos  3.1     08-21  Mg     1.70     08-21    TPro  4.4<L>  /  Alb  1.5<L>  /  TBili  0.2  /  DBili  x   /  AST  14  /  ALT  8   /  AlkPhos  182<H>  08-21    RADIOLOGY:    CCU COURSE:   52F immobile 2/2 MS, poorly controlled T2DM, asthma, hypothyroidism, known chronic wounds sacrum (stage 4), vulvar/Rt thigh, and right calf. Recent April 2022 hospitalization for urosepsis. On cefepime/flagyl for chronic osteo 2/2 unstageable sacral decubitus ulcer, dakins BID packing. Presented from [place] with concerns for UTI and anemia (6.8 from baseline 8.0) and new malodorous wound on L hip to L flank area. Surgery was consulted for potential necrotizing soft tissue infection. Surgery performed Irrigation and excisional debridement of her necrotizing fasciitis. Following the surgery, the patient was in septic shock and had an ejection fraction of 10% and the patient was transferred to the CCU for management. Pt was started on stress dose steroid taper. In the CCU, the patient was started on pressors, RIJ swan was floated (CVP 9, CO/CI 6.5/3.1, MVO2 59.2, [id number]). ID recommended switching abx to unasyn and vancomycin. The pt was weaned off sedatives and put on CPAP/PSV during the day and back on AC overnight to allow her lungs to rest. Pt had dressing changes for her sacral ulcer by wound care 3x/week. Repeat TTE showed severe LV segmental dysfunction, MR, and an EF of 5-10%. Pt's mental status improved off sedation and was extubated and put on face tent. Pressors were discontinued along with vancomycin. Pt's Hb dropped down to 7.3 and was given two half units of pRBCs. Pt's mental status recovered to baseline, Hemoglobin stabilized, and pt started on Lopressor 12.5 po BID and Lasix 40 po qd.     ASSESSMENT & PLAN:   52F immobile 2/2 h/o MS, DM2, asthma, hypothyroidism, and known sacral wounds presents from [place] with urosepsis and anemia with new L flank wound with concern for necrotizing soft tissue infection. Was s/p debridement of wound with worsening sepsis, acidosis, and severe global LV dysfunction of unknown etiology, transferred to CCU for [place] and inotrope support. Pt is s/p swan removal and off all sedation, weaning off pressors. Repeat echo showed EF of 5-10% and signs suggestive of possible stress induced cardiomyopathy. Pt's mental status has returned to baseline off sedation. Pt started on lopressor 12.5 po bid and lasix 40 po qd for HFrEF. C/w Unasyn until 8/25 per ID. Patient is stable for transfer to surgical tele.     NEURO:  - extubated 8/18, off sedation  - A&Ox3    RESP;  - extubated  - satting well on 2LNC    CV:  # severe LV dysfunction  - EF 5 - 10%  - concern for cardiomyopathy of unknown etiology but may be R/T sepsis and critical illness  - s/p Dobutamine, s/p swan removal  - vasopressin discontinued 8/13, currently off levophed, continue to monitor  - repeat ECHO to reassess LV function showed EF of 5-10%   - 8/21 started on lopressor 12.5 PO BID  - 8/21 started on lasix 40 PO qd    GI:  - moist and bite sized diet    /renal:  # metabolic acidosis  - appears resolved  - off bicarb drip at present  - follow labs  - f/u any further renal recs  - Scr stable    ENDO:  # DM2  - A1c 5.5  - FS elevated but now better controlled  - was started on steroids post op, ?stress dose, was tapered, d/c'ed on 8/15 given concern of sepsis  - continue FS/ IHSS as indicated    HEME:  - Hb stable (8.5 -> 8.0), continue to monitor (Hgb dayna of 5.9)  - iron studies suggestive of anemia of chronic disease  - stool guaic positive  - retic count elevated    ID:  # s/p L flank wound debridement  - dressings intact and wound vac in place  - WBC 28.8 and no fevers overnight, tylenol PRN if pt spikes fever  - now following rectal or axillary readings  - ID consult noted and appreciated  - no plan for further debridement, wound care to change vac M/W/F prn per surgery recs  - blood cx from 8/11 grew VRE, no need to change abx management at this time per ID recs  - Unasyn 3 grams IV Q6h until 8/25 per ID  - Vanc d/c on 8/17      DVT PPx:  - Heparin SQ    Dispo:  down grade to surgical floor    For Follow-Up:  [] c/w Unasyn 3g IV q6h until 8/25 per ID  [] c/w Lopressor 12.5 PO bid and lasix 40 po qd  [] change to lopressor succinate before d/c  [] d/c Unasyn after 8/25 CCU Transfer Note    Transfer from: CCU  Transfer to:  ( X ) Medicine    (  ) Telemetry    (  ) RCU    (  ) Palliative    (  ) Stroke Unit    ( ) Surgical   Accepting physician:    MEDICATIONS:  STANDING MEDICATIONS  ampicillin/sulbactam  IVPB      ampicillin/sulbactam  IVPB 3 Gram(s) IV Intermittent every 6 hours  ascorbic acid 500 milliGRAM(s) Oral daily  chlorhexidine 2% Cloths 1 Application(s) Topical daily  cholecalciferol 2000 Unit(s) Oral daily  collagenase Ointment 1 Application(s) Topical daily  furosemide    Tablet 40 milliGRAM(s) Oral daily  heparin   Injectable 5000 Unit(s) SubCutaneous every 8 hours  insulin lispro (ADMELOG) corrective regimen sliding scale   SubCutaneous every 6 hours  insulin lispro Injectable (ADMELOG) 5 Unit(s) SubCutaneous every 6 hours  lactated ringers. 1000 milliLiter(s) IV Continuous <Continuous>  metoprolol tartrate 12.5 milliGRAM(s) Oral two times a day  multivitamin/minerals/iron Oral Solution (CENTRUM) 15 milliLiter(s) Oral daily  nystatin Powder 1 Application(s) Topical two times a day  pantoprazole    Tablet 40 milliGRAM(s) Oral before breakfast    PRN MEDICATIONS  acetaminophen     Tablet .. 650 milliGRAM(s) Oral every 6 hours PRN  ALBUTerol    90 MICROgram(s) HFA Inhaler 2 Puff(s) Inhalation every 6 hours PRN  HYDROmorphone   Tablet 2 milliGRAM(s) Oral every 3 hours PRN  HYDROmorphone   Tablet 1 milliGRAM(s) Oral every 3 hours PRN  sodium chloride 0.9% lock flush 10 milliLiter(s) IV Push every 1 hour PRN    VITAL SIGNS: Last 24 Hours  T(C): 36.4 (21 Aug 2022 12:00), Max: 37.1 (21 Aug 2022 00:00)  T(F): 97.6 (21 Aug 2022 12:00), Max: 98.7 (21 Aug 2022 00:00)  HR: 105 (21 Aug 2022 12:00) (82 - 107)  BP: --  BP(mean): --  RR: 17 (21 Aug 2022 12:00) (0 - 20)  SpO2: 99% (21 Aug 2022 12:00) (98% - 100%)    LABS:                        8.0    9.90  )-----------( 504      ( 21 Aug 2022 01:40 )             25.9     08-21    141  |  108<H>  |  5<L>  ----------------------------<  110<H>  3.6   |  23  |  0.32<L>    Ca    7.2<L>      21 Aug 2022 01:40  Phos  3.1     08-21  Mg     1.70     08-21    TPro  4.4<L>  /  Alb  1.5<L>  /  TBili  0.2  /  DBili  x   /  AST  14  /  ALT  8   /  AlkPhos  182<H>  08-21    RADIOLOGY:    CCU COURSE:   52F immobile 2/2 MS, poorly controlled T2DM, asthma, hypothyroidism, known chronic wounds sacrum (stage 4), vulvar/Rt thigh, and right calf. Recent April 2022 hospitalization for urosepsis. On cefepime/flagyl for chronic osteo 2/2 unstageable sacral decubitus ulcer, dakins BID packing. Presented from [place] with concerns for UTI and anemia (6.8 from baseline 8.0) and new malodorous wound on L hip to L flank area. Surgery was consulted for potential necrotizing soft tissue infection. Surgery performed Irrigation and excisional debridement of her necrotizing fasciitis. Following the surgery, the patient was in septic shock and had an ejection fraction of 10% and the patient was transferred to the CCU for management. Pt was started on stress dose steroid taper. In the CCU, the patient was started on pressors, RIJ swan was floated (CVP 9, CO/CI 6.5/3.1, MVO2 59.2, [id number]). ID recommended switching abx to unasyn and vancomycin. The pt was weaned off sedatives and put on CPAP/PSV during the day and back on AC overnight to allow her lungs to rest. Pt had dressing changes for her sacral ulcer by wound care 3x/week. Repeat TTE showed severe LV segmental dysfunction, MR, and an EF of 5-10%. Pt's mental status improved off sedation and was extubated and put on face tent. Pressors were discontinued along with vancomycin. Pt's Hb dropped down to 7.3 and was given two half units of pRBCs. Pt's mental status recovered to baseline, Hemoglobin stabilized, and pt started on Lopressor 12.5 po BID and Lasix 40 po qd.     ASSESSMENT & PLAN:   52F immobile 2/2 h/o MS, DM2, asthma, hypothyroidism, and known sacral wounds presents from [place] with urosepsis and anemia with new L flank wound with concern for necrotizing soft tissue infection. Was s/p debridement of wound with worsening sepsis, acidosis, and severe global LV dysfunction of unknown etiology, transferred to CCU for [place] and inotrope support. Pt is s/p swan removal and off all sedation, weaning off pressors. Repeat echo showed EF of 5-10% and signs suggestive of possible stress induced cardiomyopathy. Pt's mental status has returned to baseline off sedation. Pt started on lopressor 12.5 po bid and lasix 40 po qd for HFrEF. C/w Unasyn until 8/25 per ID. Patient is stable for transfer to surgical tele.     NEURO:  - extubated 8/18, off sedation  - A&Ox3    RESP;  - extubated  - satting well on 2LNC    CV:  # severe LV dysfunction  - EF 5 - 10%  - concern for cardiomyopathy of unknown etiology but may be R/T sepsis and critical illness  - s/p Dobutamine, s/p swan removal  - vasopressin discontinued 8/13, currently off levophed, continue to monitor  - repeat ECHO to reassess LV function showed EF of 5-10%   - 8/21 started on lopressor 12.5 PO BID  - 8/21 started on lasix 40 PO qd    GI:  - moist and bite sized diet    /renal:  # metabolic acidosis  - appears resolved  - off bicarb drip at present  - follow labs  - f/u any further renal recs  - Scr stable    ENDO:  # DM2  - A1c 5.5  - FS elevated but now better controlled  - was started on steroids post op, ?stress dose, was tapered, d/c'ed on 8/15 given concern of sepsis  - continue FS/ IHSS as indicated    HEME:  - Hb stable (8.5 -> 8.0), continue to monitor (Hgb dayna of 5.9)  - iron studies suggestive of anemia of chronic disease  - stool guaic positive  - retic count elevated    ID:  # s/p L flank wound debridement  - dressings intact and wound vac in place  - WBC 28.8 and no fevers overnight, tylenol PRN if pt spikes fever  - now following rectal or axillary readings  - ID consult noted and appreciated  - no plan for further debridement, wound care to change vac M/W/F prn per surgery recs  - blood cx from 8/11 grew VRE, no need to change abx management at this time per ID recs  - Unasyn 3 grams IV Q6h until 8/25 per ID  - Vanc d/c on 8/17      DVT PPx:  - Heparin SQ    Dispo:  down grade to surgical floor    For Follow-Up:  [] c/w Unasyn 3g IV q6h until 8/25 per ID  [] c/w Lopressor 12.5 PO bid and lasix 40 po qd  [] change to metoprolol succinate before d/c  [] d/c Unasyn after 8/25 CCU Transfer Note    Transfer from: CCU  Transfer to:  ( X ) Medicine    (  ) Telemetry    (  ) RCU    (  ) Palliative    (  ) Stroke Unit    ( ) Surgical   Accepting physician:    MEDICATIONS:  STANDING MEDICATIONS  ampicillin/sulbactam  IVPB      ampicillin/sulbactam  IVPB 3 Gram(s) IV Intermittent every 6 hours  ascorbic acid 500 milliGRAM(s) Oral daily  chlorhexidine 2% Cloths 1 Application(s) Topical daily  cholecalciferol 2000 Unit(s) Oral daily  collagenase Ointment 1 Application(s) Topical daily  furosemide    Tablet 40 milliGRAM(s) Oral daily  heparin   Injectable 5000 Unit(s) SubCutaneous every 8 hours  insulin lispro (ADMELOG) corrective regimen sliding scale   SubCutaneous every 6 hours  insulin lispro Injectable (ADMELOG) 5 Unit(s) SubCutaneous every 6 hours  lactated ringers. 1000 milliLiter(s) IV Continuous <Continuous>  metoprolol tartrate 12.5 milliGRAM(s) Oral two times a day  multivitamin/minerals/iron Oral Solution (CENTRUM) 15 milliLiter(s) Oral daily  nystatin Powder 1 Application(s) Topical two times a day  pantoprazole    Tablet 40 milliGRAM(s) Oral before breakfast    PRN MEDICATIONS  acetaminophen     Tablet .. 650 milliGRAM(s) Oral every 6 hours PRN  ALBUTerol    90 MICROgram(s) HFA Inhaler 2 Puff(s) Inhalation every 6 hours PRN  HYDROmorphone   Tablet 2 milliGRAM(s) Oral every 3 hours PRN  HYDROmorphone   Tablet 1 milliGRAM(s) Oral every 3 hours PRN  sodium chloride 0.9% lock flush 10 milliLiter(s) IV Push every 1 hour PRN    VITAL SIGNS: Last 24 Hours  T(C): 36.4 (21 Aug 2022 12:00), Max: 37.1 (21 Aug 2022 00:00)  T(F): 97.6 (21 Aug 2022 12:00), Max: 98.7 (21 Aug 2022 00:00)  HR: 105 (21 Aug 2022 12:00) (82 - 107)  BP: --  BP(mean): --  RR: 17 (21 Aug 2022 12:00) (0 - 20)  SpO2: 99% (21 Aug 2022 12:00) (98% - 100%)    LABS:                        8.0    9.90  )-----------( 504      ( 21 Aug 2022 01:40 )             25.9     08-21    141  |  108<H>  |  5<L>  ----------------------------<  110<H>  3.6   |  23  |  0.32<L>    Ca    7.2<L>      21 Aug 2022 01:40  Phos  3.1     08-21  Mg     1.70     08-21    TPro  4.4<L>  /  Alb  1.5<L>  /  TBili  0.2  /  DBili  x   /  AST  14  /  ALT  8   /  AlkPhos  182<H>  08-21    RADIOLOGY:    CCU COURSE:   52F immobile 2/2 MS, poorly controlled T2DM, asthma, hypothyroidism, known chronic wounds sacrum (stage 4), vulvar/Rt thigh, and right calf. Recent April 2022 hospitalization for urosepsis. On cefepime/flagyl for chronic osteo 2/2 unstageable sacral decubitus ulcer, dakins BID packing. Presented from [place] with concerns for UTI and anemia (6.8 from baseline 8.0) and new malodorous wound on L hip to L flank area. Surgery was consulted for potential necrotizing soft tissue infection. Surgery performed Irrigation and excisional debridement of her necrotizing fasciitis. Following the surgery, the patient was in septic shock and had an ejection fraction of 10% and the patient was transferred to the CCU for management. Pt was started on stress dose steroid taper. In the CCU, the patient was started on pressors, RIJ swan was floated (CVP 9, CO/CI 6.5/3.1, MVO2 59.2, [id number]). ID recommended switching abx to unasyn and vancomycin. The pt was weaned off sedatives and put on CPAP/PSV during the day and back on AC overnight to allow her lungs to rest. Pt had dressing changes for her sacral ulcer by wound care 3x/week. Repeat TTE showed severe LV segmental dysfunction, MR, and an EF of 5-10%. Pt's mental status improved off sedation and was extubated and put on face tent. Pressors were discontinued along with vancomycin. Pt's Hb dropped down to 7.3 and was given two half units of pRBCs. Pt's mental status recovered to baseline, Hemoglobin stabilized, and pt started on Lopressor 12.5 po BID and Lasix 40 po qd.     ASSESSMENT & PLAN:   52F immobile 2/2 h/o MS, DM2, asthma, hypothyroidism, and known sacral wounds presents from [place] with urosepsis and anemia with new L flank wound with concern for necrotizing soft tissue infection. Was s/p debridement of wound with worsening sepsis, acidosis, and severe global LV dysfunction of unknown etiology, transferred to CCU for [place] and inotrope support. Pt is s/p swan removal and off all sedation, weaning off pressors. Repeat echo showed EF of 5-10% and signs suggestive of possible stress induced cardiomyopathy. Pt's mental status has returned to baseline off sedation. Pt started on lopressor 12.5 po bid and lasix 40 po qd for HFrEF. C/w Unasyn until 8/25 per ID. Patient is stable for transfer to the medicine floors.    NEURO:  - extubated 8/18, off sedation  - A&Ox3    RESP;  - extubated  - satting well on 2LNC    CV:  # severe LV dysfunction  - EF 5 - 10%  - concern for cardiomyopathy of unknown etiology but may be R/T sepsis and critical illness  - s/p Dobutamine, s/p swan removal  - vasopressin discontinued 8/13, currently off levophed, continue to monitor  - repeat ECHO to reassess LV function showed EF of 5-10%   - 8/21 started on lopressor 12.5 PO BID  - 8/21 started on lasix 40 PO qd    GI:  - moist and bite sized diet    /renal:  # metabolic acidosis  - appears resolved  - off bicarb drip at present  - follow labs  - f/u any further renal recs  - Scr stable    ENDO:  # DM2  - A1c 5.5  - FS elevated but now better controlled  - was started on steroids post op, ?stress dose, was tapered, d/c'ed on 8/15 given concern of sepsis  - continue FS/ IHSS as indicated    HEME:  - Hb stable (8.5 -> 8.0), continue to monitor (Hgb dayna of 5.9)  - iron studies suggestive of anemia of chronic disease  - stool guaic positive  - retic count elevated    ID:  # s/p L flank wound debridement  - dressings intact and wound vac in place  - WBC 28.8 and no fevers overnight, tylenol PRN if pt spikes fever  - now following rectal or axillary readings  - ID consult noted and appreciated  - no plan for further debridement, wound care to change vac M/W/F prn per surgery recs  - blood cx from 8/11 grew VRE, no need to change abx management at this time per ID recs  - Unasyn 3 grams IV Q6h until 8/25 per ID  - Vanc d/c on 8/17      DVT PPx:  - Heparin SQ    Dispo:  down grade to medicine floors.    For Follow-Up:  [] c/w Unasyn 3g IV q6h until 8/25 per ID  [] c/w Lopressor 12.5 PO bid and lasix 40 po qd  [] change to metoprolol succinate before d/c  [] d/c Unasyn after 8/25

## 2022-08-22 LAB
ALBUMIN SERPL ELPH-MCNC: 1.5 G/DL — LOW (ref 3.3–5)
ALP SERPL-CCNC: 206 U/L — HIGH (ref 40–120)
ALT FLD-CCNC: 5 U/L — SIGNIFICANT CHANGE UP (ref 4–33)
ANION GAP SERPL CALC-SCNC: 10 MMOL/L — SIGNIFICANT CHANGE UP (ref 7–14)
AST SERPL-CCNC: 17 U/L — SIGNIFICANT CHANGE UP (ref 4–32)
BILIRUB SERPL-MCNC: <0.2 MG/DL — SIGNIFICANT CHANGE UP (ref 0.2–1.2)
BUN SERPL-MCNC: 5 MG/DL — LOW (ref 7–23)
CALCIUM SERPL-MCNC: 7 MG/DL — LOW (ref 8.4–10.5)
CHLORIDE SERPL-SCNC: 109 MMOL/L — HIGH (ref 98–107)
CO2 SERPL-SCNC: 23 MMOL/L — SIGNIFICANT CHANGE UP (ref 22–31)
CREAT SERPL-MCNC: 0.35 MG/DL — LOW (ref 0.5–1.3)
EGFR: 124 ML/MIN/1.73M2 — SIGNIFICANT CHANGE UP
GLUCOSE BLDC GLUCOMTR-MCNC: 127 MG/DL — HIGH (ref 70–99)
GLUCOSE BLDC GLUCOMTR-MCNC: 129 MG/DL — HIGH (ref 70–99)
GLUCOSE BLDC GLUCOMTR-MCNC: 134 MG/DL — HIGH (ref 70–99)
GLUCOSE BLDC GLUCOMTR-MCNC: 187 MG/DL — HIGH (ref 70–99)
GLUCOSE SERPL-MCNC: 249 MG/DL — HIGH (ref 70–99)
HCT VFR BLD CALC: 26.7 % — LOW (ref 34.5–45)
HGB BLD-MCNC: 8.3 G/DL — LOW (ref 11.5–15.5)
MAGNESIUM SERPL-MCNC: 1.4 MG/DL — LOW (ref 1.6–2.6)
MCHC RBC-ENTMCNC: 27.3 PG — SIGNIFICANT CHANGE UP (ref 27–34)
MCHC RBC-ENTMCNC: 31.1 GM/DL — LOW (ref 32–36)
MCV RBC AUTO: 87.8 FL — SIGNIFICANT CHANGE UP (ref 80–100)
NRBC # BLD: 0 /100 WBCS — SIGNIFICANT CHANGE UP (ref 0–0)
NRBC # FLD: 0 K/UL — SIGNIFICANT CHANGE UP (ref 0–0)
PHOSPHATE SERPL-MCNC: 3 MG/DL — SIGNIFICANT CHANGE UP (ref 2.5–4.5)
PLATELET # BLD AUTO: 479 K/UL — HIGH (ref 150–400)
POTASSIUM SERPL-MCNC: 3.4 MMOL/L — LOW (ref 3.5–5.3)
POTASSIUM SERPL-SCNC: 3.4 MMOL/L — LOW (ref 3.5–5.3)
PROT SERPL-MCNC: 4.4 G/DL — LOW (ref 6–8.3)
RBC # BLD: 3.04 M/UL — LOW (ref 3.8–5.2)
RBC # FLD: 20.3 % — HIGH (ref 10.3–14.5)
SODIUM SERPL-SCNC: 142 MMOL/L — SIGNIFICANT CHANGE UP (ref 135–145)
WBC # BLD: 8.36 K/UL — SIGNIFICANT CHANGE UP (ref 3.8–10.5)
WBC # FLD AUTO: 8.36 K/UL — SIGNIFICANT CHANGE UP (ref 3.8–10.5)

## 2022-08-22 PROCEDURE — 99233 SBSQ HOSP IP/OBS HIGH 50: CPT

## 2022-08-22 RX ORDER — POTASSIUM CHLORIDE 20 MEQ
40 PACKET (EA) ORAL EVERY 4 HOURS
Refills: 0 | Status: COMPLETED | OUTPATIENT
Start: 2022-08-22 | End: 2022-08-22

## 2022-08-22 RX ORDER — KETOROLAC TROMETHAMINE 30 MG/ML
30 SYRINGE (ML) INJECTION ONCE
Refills: 0 | Status: DISCONTINUED | OUTPATIENT
Start: 2022-08-22 | End: 2022-08-22

## 2022-08-22 RX ORDER — ACETAMINOPHEN 500 MG
1000 TABLET ORAL ONCE
Refills: 0 | Status: DISCONTINUED | OUTPATIENT
Start: 2022-08-22 | End: 2022-08-22

## 2022-08-22 RX ORDER — MAGNESIUM SULFATE 500 MG/ML
4 VIAL (ML) INJECTION ONCE
Refills: 0 | Status: COMPLETED | OUTPATIENT
Start: 2022-08-22 | End: 2022-08-22

## 2022-08-22 RX ORDER — CADEXOMER IODINE 0.9 %
1 PADS, MEDICATED (EA) TOPICAL DAILY
Refills: 0 | Status: DISCONTINUED | OUTPATIENT
Start: 2022-08-22 | End: 2022-09-18

## 2022-08-22 RX ADMIN — AMPICILLIN SODIUM AND SULBACTAM SODIUM 200 GRAM(S): 250; 125 INJECTION, POWDER, FOR SUSPENSION INTRAMUSCULAR; INTRAVENOUS at 05:35

## 2022-08-22 RX ADMIN — Medication 25 GRAM(S): at 02:47

## 2022-08-22 RX ADMIN — Medication 2: at 07:57

## 2022-08-22 RX ADMIN — Medication 1 APPLICATION(S): at 12:15

## 2022-08-22 RX ADMIN — AMPICILLIN SODIUM AND SULBACTAM SODIUM 200 GRAM(S): 250; 125 INJECTION, POWDER, FOR SUSPENSION INTRAMUSCULAR; INTRAVENOUS at 00:07

## 2022-08-22 RX ADMIN — HEPARIN SODIUM 5000 UNIT(S): 5000 INJECTION INTRAVENOUS; SUBCUTANEOUS at 14:12

## 2022-08-22 RX ADMIN — CHLORHEXIDINE GLUCONATE 1 APPLICATION(S): 213 SOLUTION TOPICAL at 12:19

## 2022-08-22 RX ADMIN — Medication 5 UNIT(S): at 17:59

## 2022-08-22 RX ADMIN — Medication 30 MILLIGRAM(S): at 09:47

## 2022-08-22 RX ADMIN — HEPARIN SODIUM 5000 UNIT(S): 5000 INJECTION INTRAVENOUS; SUBCUTANEOUS at 05:35

## 2022-08-22 RX ADMIN — HEPARIN SODIUM 5000 UNIT(S): 5000 INJECTION INTRAVENOUS; SUBCUTANEOUS at 21:44

## 2022-08-22 RX ADMIN — Medication 30 MILLIGRAM(S): at 10:15

## 2022-08-22 RX ADMIN — Medication 500 MILLIGRAM(S): at 11:57

## 2022-08-22 RX ADMIN — Medication 5 UNIT(S): at 07:58

## 2022-08-22 RX ADMIN — Medication 5 UNIT(S): at 11:59

## 2022-08-22 RX ADMIN — AMPICILLIN SODIUM AND SULBACTAM SODIUM 200 GRAM(S): 250; 125 INJECTION, POWDER, FOR SUSPENSION INTRAMUSCULAR; INTRAVENOUS at 11:57

## 2022-08-22 RX ADMIN — Medication 40 MILLIGRAM(S): at 05:35

## 2022-08-22 RX ADMIN — Medication 1 APPLICATION(S): at 12:18

## 2022-08-22 RX ADMIN — Medication 12.5 MILLIGRAM(S): at 07:59

## 2022-08-22 RX ADMIN — Medication 40 MILLIEQUIVALENT(S): at 05:36

## 2022-08-22 RX ADMIN — Medication 40 MILLIEQUIVALENT(S): at 02:47

## 2022-08-22 RX ADMIN — AMPICILLIN SODIUM AND SULBACTAM SODIUM 200 GRAM(S): 250; 125 INJECTION, POWDER, FOR SUSPENSION INTRAMUSCULAR; INTRAVENOUS at 18:00

## 2022-08-22 RX ADMIN — PANTOPRAZOLE SODIUM 40 MILLIGRAM(S): 20 TABLET, DELAYED RELEASE ORAL at 07:58

## 2022-08-22 RX ADMIN — Medication 2000 UNIT(S): at 11:57

## 2022-08-22 RX ADMIN — NYSTATIN CREAM 1 APPLICATION(S): 100000 CREAM TOPICAL at 05:35

## 2022-08-22 RX ADMIN — Medication 15 MILLILITER(S): at 11:57

## 2022-08-22 RX ADMIN — NYSTATIN CREAM 1 APPLICATION(S): 100000 CREAM TOPICAL at 17:59

## 2022-08-22 RX ADMIN — Medication 12.5 MILLIGRAM(S): at 18:00

## 2022-08-22 NOTE — PROGRESS NOTE ADULT - ASSESSMENT
52 y.o immobile F 2/2 to MS with deep tissue injury of the posterior heel B/L   - Pt was seen and evaluated.   - Afebrile, WBC 8.36  - No clinical indication wound cultures considering both heels have no clinical signs of infection.   - KADEEM: Deep tissue injury of the posterior heel R, dry eschar to the heel L, no tracking, no purulence, no drainage, no erythema, no other active signs of infection   - Iodosorb ordered, wound care dressing daily ordered.  - Continue Z-flows at all times B/L   - Reconsult if heel wound worsening, and or any other possible signs of infection including but not limited to purulence drainage, erythema, malodor.   - Discussed with attending.

## 2022-08-22 NOTE — PROGRESS NOTE ADULT - ASSESSMENT
52F immobile 2/2 h/o MS, DM2, asthma, hypothyroidism, and known sacral wounds presents from [place] with urosepsis and anemia with new L flank wound with concern for necrotizing soft tissue infection. Was s/p debridement of wound with worsening sepsis, acidosis, and severe global LV dysfunction of unknown etiology, transferred to CCU for [place] and inotrope support. Pt is s/p swan removal and off all sedation, weaning off pressors. Repeat echo showed EF of 5-10% and signs suggestive of possible stress induced cardiomyopathy. Pt's mental status has returned to baseline off sedation. Pt started on lopressor 12.5 po bid and lasix 40 po qd for HFrEF. C/w Unasyn until 8/25 per ID. Patient is stable for transfer to surgical tele.     NEURO:  - extubated 8/18, off sedation  - A&Ox3    RESP;  - extubated  - satting well on room air    CV:  # severe LV dysfunction  - EF 5 - 10%  - concern for cardiomyopathy of unknown etiology but may be R/T sepsis and critical illness  - s/p Dobutamine, s/p swan removal  - vasopressin discontinued 8/13, currently off levophed, continue to monitor  - repeat ECHO to reassess LV function showed EF of 5-10%   - 8/21 started on lopressor 12.5 PO BID  - 8/21 started on lasix 40 PO qd    GI:  - moist and bite sized diet    /renal:  # metabolic acidosis  - appears resolved  - off bicarb drip at present  - follow labs  - f/u any further renal recs  - Scr stable    ENDO:  # DM2  - A1c 5.5  - FS elevated but now better controlled  - was started on steroids post op, ?stress dose, was tapered, d/c'ed on 8/15 given concern of sepsis  - continue FS/ IHSS as indicated    HEME:  - Hb stable (8.5 -> 8.0), continue to monitor (Hgb dayna of 5.9)  - iron studies suggestive of anemia of chronic disease  - stool guaic positive  - retic count elevated    ID:  # s/p L flank wound debridement  - dressings intact and wound vac in place  - WBC 28.8 and no fevers overnight, tylenol PRN if pt spikes fever  - now following rectal or axillary readings  - ID consult noted and appreciated  - no plan for further debridement, wound care to change vac M/W/F prn per surgery recs  - blood cx from 8/11 grew VRE, no need to change abx management at this time per ID recs  - Unasyn 3 grams IV Q6h until 8/25 per ID  - Vanc d/c on 8/17      DVT PPx:  - Heparin SQ

## 2022-08-22 NOTE — PROGRESS NOTE ADULT - SUBJECTIVE AND OBJECTIVE BOX
Al Duenas MD PGY1    PATIENT:  [facility]  [id number]    CHIEF COMPLAINT:  Patient is a 52y old  Female who presents with a chief complaint of Soft tissue infection (21 Aug 2022 07:29)    INTERVAL HISTORY/OVERNIGHT EVENTS: No acute events overnight. Pt was cold and was given some heat packs. Pt denies any n/v, f/c, SOB, chest pain, abdominal pain.    MEDICATIONS:  MEDICATIONS  (STANDING):  ampicillin/sulbactam  IVPB      ampicillin/sulbactam  IVPB 3 Gram(s) IV Intermittent every 6 hours  ascorbic acid 500 milliGRAM(s) Oral daily  chlorhexidine 2% Cloths 1 Application(s) Topical daily  cholecalciferol 2000 Unit(s) Oral daily  collagenase Ointment 1 Application(s) Topical daily  furosemide    Tablet 40 milliGRAM(s) Oral daily  heparin   Injectable 5000 Unit(s) SubCutaneous every 8 hours  insulin lispro (ADMELOG) corrective regimen sliding scale   SubCutaneous Before meals and at bedtime  insulin lispro Injectable (ADMELOG) 5 Unit(s) SubCutaneous three times a day before meals  lactated ringers. 1000 milliLiter(s) (10 mL/Hr) IV Continuous <Continuous>  metoprolol tartrate 12.5 milliGRAM(s) Oral two times a day  multivitamin/minerals/iron Oral Solution (CENTRUM) 15 milliLiter(s) Oral daily  nystatin Powder 1 Application(s) Topical two times a day  pantoprazole    Tablet 40 milliGRAM(s) Oral before breakfast    MEDICATIONS  (PRN):  acetaminophen     Tablet .. 650 milliGRAM(s) Oral every 6 hours PRN Mild Pain (1 - 3)  ALBUTerol    90 MICROgram(s) HFA Inhaler 2 Puff(s) Inhalation every 6 hours PRN Shortness of Breath  HYDROmorphone   Tablet 2 milliGRAM(s) Oral every 3 hours PRN Severe Pain (7 - 10)  HYDROmorphone   Tablet 1 milliGRAM(s) Oral every 3 hours PRN Moderate Pain (4 - 6)  sodium chloride 0.9% lock flush 10 milliLiter(s) IV Push every 1 hour PRN Pre/post blood products, medications, blood draw, and to maintain line patency      ALLERGIES:  Allergies    No Known Drug Allergies  Pineapple (Anaphylaxis)    Intolerances        OBJECTIVE:  ICU Vital Signs Last 24 Hrs  T(C): 37.2 (22 Aug 2022 04:00), Max: 37.6 (21 Aug 2022 16:00)  T(F): 98.9 (22 Aug 2022 04:00), Max: 99.7 (21 Aug 2022 16:00)  HR: 88 (22 Aug 2022 06:00) (71 - 116)  BP: 81/43 (21 Aug 2022 21:00) (81/43 - 81/43)  BP(mean): 55 (21 Aug 2022 21:00) (55 - 55)  ABP: 112/51 (22 Aug 2022 06:00) (80/38 - 148/69)  ABP(mean): 73 (22 Aug 2022 06:00) (54 - 101)  RR: 24 (22 Aug 2022 06:00) (10 - 24)  SpO2: 99% (22 Aug 2022 06:00) (96% - 100%)    O2 Parameters below as of 22 Aug 2022 06:00  Patient On (Oxygen Delivery Method): room air            POCT Blood Glucose.: 187 mg/dL (22 Aug 2022 07:37)  POCT Blood Glucose.: 194 mg/dL (21 Aug 2022 21:58)  POCT Blood Glucose.: 122 mg/dL (21 Aug 2022 19:08)    CAPILLARY BLOOD GLUCOSE      POCT Blood Glucose.: 187 mg/dL (22 Aug 2022 07:37)    I&O's Summary    21 Aug 2022 07:01  -  22 Aug 2022 07:00  --------------------------------------------------------  IN: 940 mL / OUT: 3600 mL / NET: -2660 mL      Daily     Daily Weight in k (22 Aug 2022 06:00)    PHYSICAL EXAMINATION:  General: morbidly obese, NAD  HEENT: PERRLA, EOMI, moist mucous membranes  Neurology: A&Ox3,   Respiratory: CTA B/L apically  CV: RRR, S1S2, no murmurs, rubs or gallops  Abdominal: Soft, NT, ND   Extremities: +edema in all 4 extremities, decreased from prior exam.  Tubes: A-line, peripheral IVs    LABS:                          8.3    8.36  )-----------( 479      ( 22 Aug 2022 00:13 )             26.7         142  |  109<H>  |  5<L>  ----------------------------<  249<H>  3.4<L>   |  23  |  0.35<L>    Ca    7.0<L>      22 Aug 2022 00:13  Phos  3.0       Mg     1.40         TPro  4.4<L>  /  Alb  1.5<L>  /  TBili  <0.2  /  DBili  x   /  AST  17  /  ALT  5   /  AlkPhos  206<H>      LIVER FUNCTIONS - ( 22 Aug 2022 00:13 )  Alb: 1.5 g/dL / Pro: 4.4 g/dL / ALK PHOS: 206 U/L / ALT: 5 U/L / AST: 17 U/L / GGT: x                       TELEMETRY:     EKG:     IMAGING:

## 2022-08-22 NOTE — PROGRESS NOTE ADULT - SUBJECTIVE AND OBJECTIVE BOX
Patient is a 52y old  Female who presents with a chief complaint of Soft tissue infection (22 Aug 2022 07:47)       INTERVAL HPI/OVERNIGHT EVENTS:  Patient seen and evaluated at bedside.  Pt is resting comfortable in NAD. Denies N/V/F/C.    Allergies    No Known Drug Allergies  Pineapple (Anaphylaxis)    Intolerances        Vital Signs Last 24 Hrs  T(C): 36.7 (22 Aug 2022 08:00), Max: 37.6 (21 Aug 2022 16:00)  T(F): 98 (22 Aug 2022 08:00), Max: 99.7 (21 Aug 2022 16:00)  HR: 94 (22 Aug 2022 11:00) (71 - 116)  BP: 81/43 (21 Aug 2022 21:00) (81/43 - 81/43)  BP(mean): 55 (21 Aug 2022 21:00) (55 - 55)  RR: 17 (22 Aug 2022 11:00) (10 - 28)  SpO2: 98% (22 Aug 2022 11:00) (96% - 100%)    Parameters below as of 22 Aug 2022 08:00  Patient On (Oxygen Delivery Method): room air        LABS:                        8.3    8.36  )-----------( 479      ( 22 Aug 2022 00:13 )             26.7     08-22    142  |  109<H>  |  5<L>  ----------------------------<  249<H>  3.4<L>   |  23  |  0.35<L>    Ca    7.0<L>      22 Aug 2022 00:13  Phos  3.0     08-22  Mg     1.40     08-22    TPro  4.4<L>  /  Alb  1.5<L>  /  TBili  <0.2  /  DBili  x   /  AST  17  /  ALT  5   /  AlkPhos  206<H>  08-22        CAPILLARY BLOOD GLUCOSE      POCT Blood Glucose.: 129 mg/dL (22 Aug 2022 11:23)  POCT Blood Glucose.: 187 mg/dL (22 Aug 2022 07:37)  POCT Blood Glucose.: 194 mg/dL (21 Aug 2022 21:58)  POCT Blood Glucose.: 122 mg/dL (21 Aug 2022 19:08)      Lower Extremity Physical Exam:  Vascular: DP/PT 2/4, B/L, CFT <3 seconds B/L, Temperature gradient warm to cool B/L.   Neuro: Epicritic sensation diminished to the level of forefoot, B/L.  Musculoskeletal/Ortho: Immobilization 2/2 MS   Skin: Deep tissue injury of the posterior heel, B/L, no signs of infection, no purulence, no drainage, no erythema, no hyperkeratotic surrounding the surface.         RADIOLOGY & ADDITIONAL TESTS:

## 2022-08-22 NOTE — PROGRESS NOTE ADULT - CRITICAL CARE ATTENDING COMMENT
52 year old woman  with multiple medical problems initially admitted to SICU with necrotizing fascitis sp debridement and urosepsis who was transferred to CCU while intubated and hypotensive with severe LV dysfunction.    CPAP12/5FIO2 30%    Repeat TTE:  CONCLUSIONS:  1. Tethered mitral valve leaflets with normal opening.  Mild-moderate mitral regurgitation.  2. Endocardial visualization enhanced with intravenous  injection of echo contrast (Definity). Severe segmental  left ventricular systolic dysfunction. The basal myocardial  segments have the most preserved function; all remaining  segments appear severely hypokinetic.  3. Normal right ventricular size and function.  *** Compared with echocardiogram of 8/12/2022, no  significant changes noted.    Meds:  Unasyn  Vanco    #Neuro- Off sedation  Mental status slowly improving  Monitor off sedation    #Pulm- On CPAP with minimal support  Await ability to protect her airway prior to extubation  Monitor ABG    #CV- HFrEF, swan dc'd  Off levophed; monitor BP    #ID- On Unasyn/Vanco- stop vanc today after PM dose; unasyn until 8/25  Follow recs  Surgical followup    #DVT ppx- SQ heparin
52 year old woman  with multiple medical problems initially admitted to SICU with necrotizing fascitis sp debridement and urosepsis who was transferred to CCU while intubated and hypotensive with severe LV dysfunction.    CPAP12/5FIO2 30%    Repeat TTE:  CONCLUSIONS:  1. Tethered mitral valve leaflets with normal opening.  Mild-moderate mitral regurgitation.  2. Endocardial visualization enhanced with intravenous  injection of echo contrast (Definity). Severe segmental  left ventricular systolic dysfunction. The basal myocardial  segments have the most preserved function; all remaining  segments appear severely hypokinetic.  3. Normal right ventricular size and function.  *** Compared with echocardiogram of 8/12/2022, no  significant changes noted.    Meds:  Unasyn  Vanco  Levophed gtt    #Neuro- Off sedation  Mental status slowly improving  Monitor off sedation    #Pulm- On CPAP with minimal support  Await ability to protect her airway prior to extubation  Monitor ABG    #CV- HFrEF, swan dc'd  Wean levophed    #ID- On Unasyn/Vanco  Follow recs  Surgical followup    #DVT ppx- SQ heparin
52 year old woman  with multiple medical problems initially admitted to SICU with necrotizing fascitis sp debridement and urosepsis who was transferred to CCU while intubated and hypotensive with severe LV dysfunction.    Repeat TTE:  CONCLUSIONS:  1. Tethered mitral valve leaflets with normal opening.  Mild-moderate mitral regurgitation.  2. Endocardial visualization enhanced with intravenous  injection of echo contrast (Definity). Severe segmental  left ventricular systolic dysfunction. The basal myocardial  segments have the most preserved function; all remaining  segments appear severely hypokinetic.  3. Normal right ventricular size and function.  *** Compared with echocardiogram of 8/12/2022, no  significant changes noted.    Meds:  Unasyn  Vanco    #Neuro- Off sedation  Mental status improving  Monitor off sedation    #Pulm- On CPAP with minimal support  Goal to extubate  Monitor ABG    #CV- HFrEF, swan dc'd  Off levophed; monitor BP    #ID- On Unasyn/Vanco- stop vanc today after PM dose; unasyn until 8/25  Follow recs  Surgical followup    #DVT ppx- SQ heparin
52 year old woman  with multiple medical problems initially admitted to SICU with necrotizing fascitis sp debridement and urosepsis who was transferred to CCU while intubated and hypotensive with severe LV dysfunction. Extubated 8/18 and did well overnight.    Repeat TTE:  CONCLUSIONS:  1. Tethered mitral valve leaflets with normal opening.  Mild-moderate mitral regurgitation.  2. Endocardial visualization enhanced with intravenous  injection of echo contrast (Definity). Severe segmental  left ventricular systolic dysfunction. The basal myocardial  segments have the most preserved function; all remaining  segments appear severely hypokinetic.  3. Normal right ventricular size and function.  *** Compared with echocardiogram of 8/12/2022, no  significant changes noted.    Meds:  Unasyn    #Neuro- Off sedation  Mental status improving    #Pulm- Extubated 8/18/22  Monitor ABG if somnolent    #CV- HFrEF, swan dc'd  Off levophed; monitor BP  Goal to initiate GDMT 8/20 with Losartan daily    #ID- On Unasyn until 8/25  Follow recs  Surgical followup    #DVT ppx- SQ heparin
52 year old woman  with multiple medical problems initially admitted to SICU with necrotizing fascitis sp debridement and urosepsis who was transferred to CCU while intubated and hypotensive with severe LV dysfunction. Extubated 8/18 and did well overnight.    Repeat TTE:  CONCLUSIONS:  1. Tethered mitral valve leaflets with normal opening.  Mild-moderate mitral regurgitation.  2. Endocardial visualization enhanced with intravenous  injection of echo contrast (Definity). Severe segmental  left ventricular systolic dysfunction. The basal myocardial  segments have the most preserved function; all remaining  segments appear severely hypokinetic.  3. Normal right ventricular size and function.  *** Compared with echocardiogram of 8/12/2022, no  significant changes noted.    Meds:  Unasyn    #Neuro- Off sedation  Mental status improving    #Pulm- Extubated 8/18/22  Monitor ABG if somnolent    #CV- HFrEF, swan dc'd  Off levophed; monitor BP  Started on Metoprolol and Lasix  Eventual initiation of ARB    #ID- On Unasyn until 8/25  Follow recs  Surgical followup    #DVT ppx- SQ heparin
52 year old woman  with multiple medical problems initially admitted to SICU with necrotizing fascitis sp debridement and urosepsis who was transferred to CCU while intubated and hypotensive with severe LV dysfunction.    CPAP12/5FIO2 30%    Meds:  Unasyn  Vanco  Solucortef  Levophed gtt      #Neuro- Off sedation  No mental status at present  Monitor off sedation    #Pulm- On CPAP with minimal support  Await ability to protect her airway prior to extubation  Monitor ABG    #CV- HFrEF, swan dc'd  Wean levophed  Repeat TTE    #ID- On Unasyn/Vanco  Follow recs  Surgical followup    #DVT ppx- SQ heparin

## 2022-08-22 NOTE — CHART NOTE - NSCHARTNOTEFT_GEN_A_CORE
MAR Accept Note  Transfer to:  Medicine  Accepting Attending Physician: Shruthi  Assigned Room:  846C  Patient seen and examined.   Labs and data reviewed.   No findings precluding transfer of service.       HPI/CCU COURSE:   Please refer to MICU transfer note for full details. Briefly, this is a  52F immobile 2/2 MS, poorly controlled T2DM, asthma, hypothyroidism, known chronic wounds sacrum (stage 4), vulvar/Rt thigh, and right calf. Recent April 2022 hospitalization for urosepsis. On cefepime/flagyl for chronic osteo 2/2 unstageable sacral decubitus ulcer, dakins BID packing. Presented from [place] with concerns for UTI and anemia (6.8 from baseline 8.0) and new malodorous wound on L hip to L flank area. Surgery was consulted for potential necrotizing soft tissue infection. Surgery performed Irrigation and excisional debridement of her necrotizing fasciitis. Following the surgery, the patient was in septic shock and had an ejection fraction of 10% and the patient was transferred to the CCU for management. Pt was started on stress dose steroid taper. In the CCU, the patient was started on pressors, RIJ swan was floated (CVP 9, CO/CI 6.5/3.1, MVO2 59.2, GTP911). ID recommended switching abx to unasyn and vancomycin. The pt was weaned off sedatives and put on CPAP/PSV during the day and back on AC overnight to allow her lungs to rest. Pt had dressing changes for her sacral ulcer by wound care 3x/week. Repeat TTE showed severe LV segmental dysfunction, MR, and an EF of 5-10%. Pt's mental status improved off sedation and was extubated and put on face tent. Pressors were discontinued along with vancomycin. Pt's Hb dropped down to 7.3 and was given two half units of pRBCs. Pt's mental status recovered to baseline, Hemoglobin stabilized, and pt started on Lopressor 12.5 po BID and Lasix 40 po qd.       For Follow-Up:  [] c/w Unasyn 3g IV q6h until 8/25 per ID  [] c/w Lopressor 12.5 PO bid and lasix 40 po qd  [] change to metoprolol succinate before d/c  [] d/c Unasyn after 8/25.    Juana Loya MD  PGY-3, Internal Medicine. MAR Accept Note  Transfer to:  Medicine  Accepting Attending Physician: Shruthi  Assigned Room:  846C  Patient seen and examined.   Labs and data reviewed.   No findings precluding transfer of service.     CCU to give sign-out to Floor ACP      HPI/CCU COURSE:   Please refer to MICU transfer note for full details. Briefly, this is a  52F immobile 2/2 MS, poorly controlled T2DM, asthma, hypothyroidism, known chronic wounds sacrum (stage 4), vulvar/Rt thigh, and right calf. Recent April 2022 hospitalization for urosepsis. On cefepime/flagyl for chronic osteo 2/2 unstageable sacral decubitus ulcer, dakins BID packing. Presented from [place] with concerns for UTI and anemia (6.8 from baseline 8.0) and new malodorous wound on L hip to L flank area. Surgery was consulted for potential necrotizing soft tissue infection. Surgery performed Irrigation and excisional debridement of her necrotizing fasciitis. Following the surgery, the patient was in septic shock and had an ejection fraction of 10% and the patient was transferred to the CCU for management. Pt was started on stress dose steroid taper. In the CCU, the patient was started on pressors, RIJ swan was floated (CVP 9, CO/CI 6.5/3.1, MVO2 59.2, [id number]). ID recommended switching abx to unasyn and vancomycin. The pt was weaned off sedatives and put on CPAP/PSV during the day and back on AC overnight to allow her lungs to rest. Pt had dressing changes for her sacral ulcer by wound care 3x/week. Repeat TTE showed severe LV segmental dysfunction, MR, and an EF of 5-10%. Pt's mental status improved off sedation and was extubated and put on face tent. Pressors were discontinued along with vancomycin. Pt's Hb dropped down to 7.3 and was given two half units of pRBCs. Pt's mental status recovered to baseline, Hemoglobin stabilized, and pt started on Lopressor 12.5 po BID and Lasix 40 po qd.       For Follow-Up:  [] c/w Unasyn 3g IV q6h until 8/25 per ID  [] c/w Lopressor 12.5 PO bid and lasix 40 po qd  [] change to metoprolol succinate before d/c  [] d/c Unasyn after 8/25.    Juana Loya MD  PGY-3, Internal Medicine.

## 2022-08-23 DIAGNOSIS — D64.9 ANEMIA, UNSPECIFIED: ICD-10-CM

## 2022-08-23 DIAGNOSIS — M79.89 OTHER SPECIFIED SOFT TISSUE DISORDERS: ICD-10-CM

## 2022-08-23 DIAGNOSIS — I50.21 ACUTE SYSTOLIC (CONGESTIVE) HEART FAILURE: ICD-10-CM

## 2022-08-23 DIAGNOSIS — Z29.9 ENCOUNTER FOR PROPHYLACTIC MEASURES, UNSPECIFIED: ICD-10-CM

## 2022-08-23 DIAGNOSIS — E11.9 TYPE 2 DIABETES MELLITUS WITHOUT COMPLICATIONS: ICD-10-CM

## 2022-08-23 LAB
ANION GAP SERPL CALC-SCNC: 9 MMOL/L — SIGNIFICANT CHANGE UP (ref 7–14)
BUN SERPL-MCNC: 4 MG/DL — LOW (ref 7–23)
CALCIUM SERPL-MCNC: 7 MG/DL — LOW (ref 8.4–10.5)
CHLORIDE SERPL-SCNC: 108 MMOL/L — HIGH (ref 98–107)
CO2 SERPL-SCNC: 25 MMOL/L — SIGNIFICANT CHANGE UP (ref 22–31)
CREAT SERPL-MCNC: 0.36 MG/DL — LOW (ref 0.5–1.3)
EGFR: 124 ML/MIN/1.73M2 — SIGNIFICANT CHANGE UP
GLUCOSE BLDC GLUCOMTR-MCNC: 116 MG/DL — HIGH (ref 70–99)
GLUCOSE BLDC GLUCOMTR-MCNC: 131 MG/DL — HIGH (ref 70–99)
GLUCOSE BLDC GLUCOMTR-MCNC: 143 MG/DL — HIGH (ref 70–99)
GLUCOSE BLDC GLUCOMTR-MCNC: 168 MG/DL — HIGH (ref 70–99)
GLUCOSE SERPL-MCNC: 121 MG/DL — HIGH (ref 70–99)
HCT VFR BLD CALC: 30 % — LOW (ref 34.5–45)
HGB BLD-MCNC: 9 G/DL — LOW (ref 11.5–15.5)
MAGNESIUM SERPL-MCNC: 1.5 MG/DL — LOW (ref 1.6–2.6)
MCHC RBC-ENTMCNC: 26.9 PG — LOW (ref 27–34)
MCHC RBC-ENTMCNC: 30 GM/DL — LOW (ref 32–36)
MCV RBC AUTO: 89.8 FL — SIGNIFICANT CHANGE UP (ref 80–100)
NRBC # BLD: 0 /100 WBCS — SIGNIFICANT CHANGE UP (ref 0–0)
NRBC # FLD: 0 K/UL — SIGNIFICANT CHANGE UP (ref 0–0)
PHOSPHATE SERPL-MCNC: 3.6 MG/DL — SIGNIFICANT CHANGE UP (ref 2.5–4.5)
PLATELET # BLD AUTO: 497 K/UL — HIGH (ref 150–400)
POTASSIUM SERPL-MCNC: 3.4 MMOL/L — LOW (ref 3.5–5.3)
POTASSIUM SERPL-SCNC: 3.4 MMOL/L — LOW (ref 3.5–5.3)
RBC # BLD: 3.34 M/UL — LOW (ref 3.8–5.2)
RBC # FLD: 20.7 % — HIGH (ref 10.3–14.5)
SARS-COV-2 RNA SPEC QL NAA+PROBE: SIGNIFICANT CHANGE UP
SODIUM SERPL-SCNC: 142 MMOL/L — SIGNIFICANT CHANGE UP (ref 135–145)
WBC # BLD: 9.21 K/UL — SIGNIFICANT CHANGE UP (ref 3.8–10.5)
WBC # FLD AUTO: 9.21 K/UL — SIGNIFICANT CHANGE UP (ref 3.8–10.5)

## 2022-08-23 PROCEDURE — 99232 SBSQ HOSP IP/OBS MODERATE 35: CPT

## 2022-08-23 PROCEDURE — 99233 SBSQ HOSP IP/OBS HIGH 50: CPT

## 2022-08-23 RX ORDER — POTASSIUM CHLORIDE 20 MEQ
40 PACKET (EA) ORAL EVERY 4 HOURS
Refills: 0 | Status: COMPLETED | OUTPATIENT
Start: 2022-08-23 | End: 2022-08-23

## 2022-08-23 RX ORDER — LOSARTAN POTASSIUM 100 MG/1
25 TABLET, FILM COATED ORAL DAILY
Refills: 0 | Status: DISCONTINUED | OUTPATIENT
Start: 2022-08-23 | End: 2022-08-29

## 2022-08-23 RX ORDER — MAGNESIUM SULFATE 500 MG/ML
2 VIAL (ML) INJECTION ONCE
Refills: 0 | Status: COMPLETED | OUTPATIENT
Start: 2022-08-23 | End: 2022-08-23

## 2022-08-23 RX ADMIN — HEPARIN SODIUM 5000 UNIT(S): 5000 INJECTION INTRAVENOUS; SUBCUTANEOUS at 06:34

## 2022-08-23 RX ADMIN — Medication 1 APPLICATION(S): at 11:54

## 2022-08-23 RX ADMIN — Medication 1 TABLET(S): at 11:55

## 2022-08-23 RX ADMIN — AMPICILLIN SODIUM AND SULBACTAM SODIUM 200 GRAM(S): 250; 125 INJECTION, POWDER, FOR SUSPENSION INTRAMUSCULAR; INTRAVENOUS at 17:44

## 2022-08-23 RX ADMIN — Medication 40 MILLIEQUIVALENT(S): at 11:53

## 2022-08-23 RX ADMIN — Medication 40 MILLIGRAM(S): at 06:33

## 2022-08-23 RX ADMIN — Medication 2000 UNIT(S): at 11:54

## 2022-08-23 RX ADMIN — AMPICILLIN SODIUM AND SULBACTAM SODIUM 200 GRAM(S): 250; 125 INJECTION, POWDER, FOR SUSPENSION INTRAMUSCULAR; INTRAVENOUS at 23:59

## 2022-08-23 RX ADMIN — AMPICILLIN SODIUM AND SULBACTAM SODIUM 200 GRAM(S): 250; 125 INJECTION, POWDER, FOR SUSPENSION INTRAMUSCULAR; INTRAVENOUS at 00:49

## 2022-08-23 RX ADMIN — Medication 500 MILLIGRAM(S): at 11:54

## 2022-08-23 RX ADMIN — NYSTATIN CREAM 1 APPLICATION(S): 100000 CREAM TOPICAL at 17:44

## 2022-08-23 RX ADMIN — HEPARIN SODIUM 5000 UNIT(S): 5000 INJECTION INTRAVENOUS; SUBCUTANEOUS at 13:57

## 2022-08-23 RX ADMIN — Medication 25 GRAM(S): at 11:53

## 2022-08-23 RX ADMIN — CHLORHEXIDINE GLUCONATE 1 APPLICATION(S): 213 SOLUTION TOPICAL at 11:54

## 2022-08-23 RX ADMIN — HEPARIN SODIUM 5000 UNIT(S): 5000 INJECTION INTRAVENOUS; SUBCUTANEOUS at 22:23

## 2022-08-23 RX ADMIN — PANTOPRAZOLE SODIUM 40 MILLIGRAM(S): 20 TABLET, DELAYED RELEASE ORAL at 06:33

## 2022-08-23 RX ADMIN — Medication 1 APPLICATION(S): at 11:53

## 2022-08-23 RX ADMIN — AMPICILLIN SODIUM AND SULBACTAM SODIUM 200 GRAM(S): 250; 125 INJECTION, POWDER, FOR SUSPENSION INTRAMUSCULAR; INTRAVENOUS at 11:53

## 2022-08-23 RX ADMIN — Medication 12.5 MILLIGRAM(S): at 17:44

## 2022-08-23 RX ADMIN — Medication 40 MILLIEQUIVALENT(S): at 13:58

## 2022-08-23 RX ADMIN — NYSTATIN CREAM 1 APPLICATION(S): 100000 CREAM TOPICAL at 06:35

## 2022-08-23 RX ADMIN — Medication 2: at 22:22

## 2022-08-23 RX ADMIN — AMPICILLIN SODIUM AND SULBACTAM SODIUM 200 GRAM(S): 250; 125 INJECTION, POWDER, FOR SUSPENSION INTRAMUSCULAR; INTRAVENOUS at 06:35

## 2022-08-23 RX ADMIN — Medication 12.5 MILLIGRAM(S): at 06:34

## 2022-08-23 RX ADMIN — Medication 5 UNIT(S): at 17:43

## 2022-08-23 NOTE — PROGRESS NOTE ADULT - PROBLEM SELECTOR PLAN 2
# severe LV dysfunction; HFrEF 5 - 10%  - concern for cardiomyopathy of unknown etiology but may be R/T sepsis and critical illness  - s/p Dobutamine, s/p swan removal in the CCU   - vasopressin discontinued 8/13, currently off levophed, continue to monitor  - repeat ECHO to reassess LV function showed EF of 5-10%   - 8/21 started on lopressor 12.5 PO BID  - 8/21 started on lasix 40 PO qd # severe LV dysfunction; HFrEF 5 - 10%  - concern for cardiomyopathy of unknown etiology but may be R/T sepsis and critical illness  - s/p Dobutamine, s/p swan removal in the CCU   - vasopressin discontinued 8/13, currently off levophed, continue to monitor  - repeat ECHO to reassess LV function showed EF of 5-10%   -  continue with 8/21 started on lopressor 12.5 PO BID  - 8/21 started on lasix 40 PO qd  8/23 started on losartan 25

## 2022-08-23 NOTE — PROGRESS NOTE ADULT - ASSESSMENT
52F immobile 2/2 h/o MS, DM2, asthma, hypothyroidism, and known sacral wounds presents from [place] with urosepsis and anemia with new L flank wound with concern for necrotizing soft tissue infection. Was s/p debridement of wound with worsening sepsis, acidosis, and severe global LV dysfunction of unknown etiology, transferred to CCU for [place] and inotrope support. Pt is s/p swan removal and off all sedation, weaning off pressors. Repeat echo showed EF of 5-10% and signs suggestive of possible stress induced cardiomyopathy. Pt's mental status has returned to baseline off sedation. Pt started on lopressor 12.5 po bid and lasix 40 po qd for HFrEF. C/w Unasyn until 8/25 per ID. Patient now transferred to medical floor.

## 2022-08-23 NOTE — PROGRESS NOTE ADULT - PROBLEM SELECTOR PLAN 1
# s/p L flank wound debridement  - dressings intact and wound vac in place  - WBC 28.8 and no fevers overnight, tylenol PRN if pt spikes fever  - now following rectal or axillary readings  - ID consult noted and appreciated  - no plan for further debridement, wound care to change vac M/W/F prn per surgery recs  - blood cx from 8/11 grew VRE, no need to change abx management at this time per ID recs  - Unasyn 3 grams IV Q6h until 8/25 per ID  - Vanc d/c on 8/17  - Pain control with dilaudid PRN

## 2022-08-23 NOTE — PROGRESS NOTE ADULT - SUBJECTIVE AND OBJECTIVE BOX
[facility]  Division of Hospital Medicine  Layla Dai MD  Pager: [phone]      Patient is a 52y old  Female who presents with a chief complaint of Soft tissue infection (22 Aug 2022 11:30)      SUBJECTIVE / OVERNIGHT EVENTS: Patient examined at bedside. AOx 2 - 3 - hospital and 20something. No pain. + BM. No SOB   ADDITIONAL REVIEW OF SYSTEMS:    MEDICATIONS  (STANDING):  ampicillin/sulbactam  IVPB      ampicillin/sulbactam  IVPB 3 Gram(s) IV Intermittent every 6 hours  ascorbic acid 500 milliGRAM(s) Oral daily  cadexomer iodine 0.9% Gel 1 Application(s) Topical daily  chlorhexidine 2% Cloths 1 Application(s) Topical daily  cholecalciferol 2000 Unit(s) Oral daily  collagenase Ointment 1 Application(s) Topical daily  furosemide    Tablet 40 milliGRAM(s) Oral daily  heparin   Injectable 5000 Unit(s) SubCutaneous every 8 hours  insulin lispro (ADMELOG) corrective regimen sliding scale   SubCutaneous Before meals and at bedtime  insulin lispro Injectable (ADMELOG) 5 Unit(s) SubCutaneous three times a day before meals  magnesium sulfate  IVPB 2 Gram(s) IV Intermittent once  metoprolol tartrate 12.5 milliGRAM(s) Oral two times a day  multivitamin 1 Tablet(s) Oral daily  nystatin Powder 1 Application(s) Topical two times a day  pantoprazole    Tablet 40 milliGRAM(s) Oral before breakfast  potassium chloride   Powder 40 milliEquivalent(s) Oral every 4 hours    MEDICATIONS  (PRN):  acetaminophen     Tablet .. 650 milliGRAM(s) Oral every 6 hours PRN Mild Pain (1 - 3)  ALBUTerol    90 MICROgram(s) HFA Inhaler 2 Puff(s) Inhalation every 6 hours PRN Shortness of Breath  HYDROmorphone   Tablet 2 milliGRAM(s) Oral every 3 hours PRN Severe Pain (7 - 10)  HYDROmorphone   Tablet 1 milliGRAM(s) Oral every 3 hours PRN Moderate Pain (4 - 6)  sodium chloride 0.9% lock flush 10 milliLiter(s) IV Push every 1 hour PRN Pre/post blood products, medications, blood draw, and to maintain line patency      CAPILLARY BLOOD GLUCOSE      POCT Blood Glucose.: 116 mg/dL (23 Aug 2022 08:27)  POCT Blood Glucose.: 127 mg/dL (22 Aug 2022 21:38)  POCT Blood Glucose.: 134 mg/dL (22 Aug 2022 17:03)    I&O's Summary    22 Aug 2022 07:01  -  23 Aug 2022 07:00  --------------------------------------------------------  IN: 170 mL / OUT: 2000 mL / NET: -1830 mL    23 Aug 2022 07:01  -  23 Aug 2022 11:48  --------------------------------------------------------  IN: 0 mL / OUT: 300 mL / NET: -300 mL        PHYSICAL EXAM:  Vital Signs Last 24 Hrs  T(C): 36.6 (23 Aug 2022 09:41), Max: 36.8 (22 Aug 2022 12:00)  T(F): 97.9 (23 Aug 2022 09:41), Max: 98.2 (22 Aug 2022 12:00)  HR: 83 (23 Aug 2022 09:41) (83 - 110)  BP: 135/60 (23 Aug 2022 09:41) (100/53 - 144/74)  BP(mean): 65 (22 Aug 2022 15:00) (63 - 65)  RR: 18 (23 Aug 2022 09:41) (11 - 22)  SpO2: 93% (23 Aug 2022 09:41) (93% - 99%)    Parameters below as of 23 Aug 2022 06:30  Patient On (Oxygen Delivery Method): room air    General: Middle age woman morbidly obese, NAD  HEENT: PERRLA, EOMI, moist mucous membranes  Neurology: A&Ox2- 3, - hospital and 20something.  Respiratory: CTA B/L apically  CV: RRR, S1S2, no murmurs, rubs or gallops  Abdominal: Soft, NT, ND   Extremities: +edema in all 4 extremities,   Tubes: , peripheral IVs    LABS:                        9.0    9.21  )-----------( 497      ( 23 Aug 2022 09:00 )             30.0     08-23    142  |  108<H>  |  4<L>  ----------------------------<  121<H>  3.4<L>   |  25  |  0.36<L>    Ca    7.0<L>      23 Aug 2022 09:00  Phos  3.6     08-23  Mg     1.50     08-23    TPro  4.4<L>  /  Alb  1.5<L>  /  TBili  <0.2  /  DBili  x   /  AST  17  /  ALT  5   /  AlkPhos  206<H>  08-22                RADIOLOGY & ADDITIONAL TESTS:  Results Reviewed:   Imaging Personally Reviewed:  Electrocardiogram Personally Reviewed:    COORDINATION OF CARE:  Care Discussed with Consultants/Other Providers [Y/N]:  Prior or Outpatient Records Reviewed [Y/N]:

## 2022-08-23 NOTE — PROGRESS NOTE ADULT - SUBJECTIVE AND OBJECTIVE BOX
Follow Up: sacral wound    Interval History/ROS: Afebrile. Downgraded from CCU. Feels well, no pain, chills or diarrhea or cough. She just wants to sleep.     Allergies  No Known Drug Allergies  Pineapple (Anaphylaxis)        ANTIMICROBIALS:  ampicillin/sulbactam  IVPB    ampicillin/sulbactam  IVPB 3 every 6 hours      OTHER MEDS:  acetaminophen     Tablet .. 650 milliGRAM(s) Oral every 6 hours PRN  ALBUTerol    90 MICROgram(s) HFA Inhaler 2 Puff(s) Inhalation every 6 hours PRN  ascorbic acid 500 milliGRAM(s) Oral daily  cadexomer iodine 0.9% Gel 1 Application(s) Topical daily  chlorhexidine 2% Cloths 1 Application(s) Topical daily  cholecalciferol 2000 Unit(s) Oral daily  collagenase Ointment 1 Application(s) Topical daily  furosemide    Tablet 40 milliGRAM(s) Oral daily  heparin   Injectable 5000 Unit(s) SubCutaneous every 8 hours  HYDROmorphone   Tablet 2 milliGRAM(s) Oral every 3 hours PRN  HYDROmorphone   Tablet 1 milliGRAM(s) Oral every 3 hours PRN  insulin lispro (ADMELOG) corrective regimen sliding scale   SubCutaneous Before meals and at bedtime  insulin lispro Injectable (ADMELOG) 5 Unit(s) SubCutaneous three times a day before meals  losartan 25 milliGRAM(s) Oral daily  metoprolol tartrate 12.5 milliGRAM(s) Oral two times a day  multivitamin 1 Tablet(s) Oral daily  nystatin Powder 1 Application(s) Topical two times a day  pantoprazole    Tablet 40 milliGRAM(s) Oral before breakfast  sodium chloride 0.9% lock flush 10 milliLiter(s) IV Push every 1 hour PRN      Vital Signs Last 24 Hrs  T(C): 36.7 (23 Aug 2022 17:14), Max: 36.8 (22 Aug 2022 22:03)  T(F): 98 (23 Aug 2022 17:14), Max: 98.2 (22 Aug 2022 22:03)  HR: 107 (23 Aug 2022 17:14) (83 - 110)  BP: 123/63 (23 Aug 2022 17:14) (111/52 - 144/74)  BP(mean): --  RR: 16 (23 Aug 2022 17:14) (16 - 18)  SpO2: 95% (23 Aug 2022 17:14) (93% - 99%)    Parameters below as of 23 Aug 2022 17:14  Patient On (Oxygen Delivery Method): room air        Physical Exam:  General: non toxic, obese  Cardio: regular rate   Respiratory: nonlabored   abd: nondistended, soft   Skin: no rash                          9.0    9.21  )-----------( 497      ( 23 Aug 2022 09:00 )             30.0       08-23    142  |  108<H>  |  4<L>  ----------------------------<  121<H>  3.4<L>   |  25  |  0.36<L>    Ca    7.0<L>      23 Aug 2022 09:00  Phos  3.6     08-23  Mg     1.50     08-23    TPro  4.4<L>  /  Alb  1.5<L>  /  TBili  <0.2  /  DBili  x   /  AST  17  /  ALT  5   /  AlkPhos  206<H>  08-22          MICROBIOLOGY:      RADIOLOGY:  Images below reviewed personally  Xray Chest 1 View- PORTABLE-Routine (Xray Chest 1 View- PORTABLE-Routine in AM.) (08.18.22 @ 07:18)   Endotracheal tube tip above the vahe. Enteric tube within the stomach.   Right IJ central line within SVC.   Bibasilar opacities unchanged.    CT Abdomen and Pelvis No Cont (08.10.22 @ 23:30)   Large open sacral decubitus wound extending to bone. Foci of air in the   left gluteus musculature and superior to the wound. 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  Absence of the coccyx and distal sacrum, may related to prior resection.   Correlate with prior surgical history.-

## 2022-08-23 NOTE — PROGRESS NOTE ADULT - ASSESSMENT
52F DM, BMI 36, MS bedbound with chronic sacral decubitus.   Here 8/10 with fever, shock, necrotizing sacral wound.   Found severe LV dysfunction requiring CCU stay, possible from sepsis/critical illness.   OR wide surgical debridement 8/11 - Strep anginosus, VRE, Candida.   CoNS on blood 8/10 possible contaminant but gave a week of Vanc anyways.   Improved and downgraded 8/23, wound has looked clean.   Poor long term prognosis given neurological issue with high risk for recurrent infections.     Suggest  -Unasyn 3GM IV q6h through Thursday for a 2-week course post op, Augmentin 875mg BID if discharged before then   -local wound care     Will sign off, call back if needed    Epi Harvey MD   Infectious Disease   Available on TEAMS. After 5PM and on weekends please page fellow on call or call [phone]

## 2022-08-23 NOTE — PROGRESS NOTE ADULT - PROBLEM SELECTOR PLAN 4
- A1c 5.5  - FS elevated but now better controlled  - was started on steroids post op, ?stress dose, was tapered, d/c'ed on 8/15 given concern of sepsis  - continue FS/ IHSS as indicated

## 2022-08-23 NOTE — PROGRESS NOTE ADULT - PROBLEM SELECTOR PLAN 3
- Hb stable (8.5 -> 8.0), continue to monitor (Hgb dayna of 5.9)  - iron studies suggestive of anemia of chronic disease  - stool guaic positive  - retic count elevated

## 2022-08-24 LAB
ALBUMIN SERPL ELPH-MCNC: 1.4 G/DL — LOW (ref 3.3–5)
ALP SERPL-CCNC: 204 U/L — HIGH (ref 40–120)
ALT FLD-CCNC: 11 U/L — SIGNIFICANT CHANGE UP (ref 4–33)
ANION GAP SERPL CALC-SCNC: 12 MMOL/L — SIGNIFICANT CHANGE UP (ref 7–14)
AST SERPL-CCNC: 16 U/L — SIGNIFICANT CHANGE UP (ref 4–32)
BILIRUB SERPL-MCNC: <0.2 MG/DL — SIGNIFICANT CHANGE UP (ref 0.2–1.2)
BUN SERPL-MCNC: 6 MG/DL — LOW (ref 7–23)
CALCIUM SERPL-MCNC: 7.1 MG/DL — LOW (ref 8.4–10.5)
CHLORIDE SERPL-SCNC: 105 MMOL/L — SIGNIFICANT CHANGE UP (ref 98–107)
CO2 SERPL-SCNC: 23 MMOL/L — SIGNIFICANT CHANGE UP (ref 22–31)
CREAT SERPL-MCNC: 0.42 MG/DL — LOW (ref 0.5–1.3)
EGFR: 119 ML/MIN/1.73M2 — SIGNIFICANT CHANGE UP
GLUCOSE BLDC GLUCOMTR-MCNC: 126 MG/DL — HIGH (ref 70–99)
GLUCOSE BLDC GLUCOMTR-MCNC: 130 MG/DL — HIGH (ref 70–99)
GLUCOSE BLDC GLUCOMTR-MCNC: 130 MG/DL — HIGH (ref 70–99)
GLUCOSE BLDC GLUCOMTR-MCNC: 159 MG/DL — HIGH (ref 70–99)
GLUCOSE SERPL-MCNC: 149 MG/DL — HIGH (ref 70–99)
HCT VFR BLD CALC: 31.5 % — LOW (ref 34.5–45)
HGB BLD-MCNC: 9.7 G/DL — LOW (ref 11.5–15.5)
MAGNESIUM SERPL-MCNC: 1.5 MG/DL — LOW (ref 1.6–2.6)
MCHC RBC-ENTMCNC: 27.4 PG — SIGNIFICANT CHANGE UP (ref 27–34)
MCHC RBC-ENTMCNC: 30.8 GM/DL — LOW (ref 32–36)
MCV RBC AUTO: 89 FL — SIGNIFICANT CHANGE UP (ref 80–100)
NRBC # BLD: 0 /100 WBCS — SIGNIFICANT CHANGE UP (ref 0–0)
NRBC # FLD: 0 K/UL — SIGNIFICANT CHANGE UP (ref 0–0)
PHOSPHATE SERPL-MCNC: 3.6 MG/DL — SIGNIFICANT CHANGE UP (ref 2.5–4.5)
PLATELET # BLD AUTO: 507 K/UL — HIGH (ref 150–400)
POTASSIUM SERPL-MCNC: 3.2 MMOL/L — LOW (ref 3.5–5.3)
POTASSIUM SERPL-SCNC: 3.2 MMOL/L — LOW (ref 3.5–5.3)
PROT SERPL-MCNC: 5.1 G/DL — LOW (ref 6–8.3)
RBC # BLD: 3.54 M/UL — LOW (ref 3.8–5.2)
RBC # FLD: 20.7 % — HIGH (ref 10.3–14.5)
SODIUM SERPL-SCNC: 140 MMOL/L — SIGNIFICANT CHANGE UP (ref 135–145)
WBC # BLD: 7.34 K/UL — SIGNIFICANT CHANGE UP (ref 3.8–10.5)
WBC # FLD AUTO: 7.34 K/UL — SIGNIFICANT CHANGE UP (ref 3.8–10.5)

## 2022-08-24 PROCEDURE — 99233 SBSQ HOSP IP/OBS HIGH 50: CPT

## 2022-08-24 RX ORDER — POTASSIUM CHLORIDE 20 MEQ
40 PACKET (EA) ORAL EVERY 4 HOURS
Refills: 0 | Status: COMPLETED | OUTPATIENT
Start: 2022-08-24 | End: 2022-08-24

## 2022-08-24 RX ORDER — MAGNESIUM SULFATE 500 MG/ML
2 VIAL (ML) INJECTION ONCE
Refills: 0 | Status: COMPLETED | OUTPATIENT
Start: 2022-08-24 | End: 2022-08-24

## 2022-08-24 RX ORDER — METOPROLOL TARTRATE 50 MG
25 TABLET ORAL
Refills: 0 | Status: DISCONTINUED | OUTPATIENT
Start: 2022-08-24 | End: 2022-08-29

## 2022-08-24 RX ADMIN — HEPARIN SODIUM 5000 UNIT(S): 5000 INJECTION INTRAVENOUS; SUBCUTANEOUS at 05:50

## 2022-08-24 RX ADMIN — Medication 500 MILLIGRAM(S): at 14:23

## 2022-08-24 RX ADMIN — CHLORHEXIDINE GLUCONATE 1 APPLICATION(S): 213 SOLUTION TOPICAL at 14:24

## 2022-08-24 RX ADMIN — Medication 25 MILLIGRAM(S): at 18:34

## 2022-08-24 RX ADMIN — Medication 40 MILLIGRAM(S): at 05:50

## 2022-08-24 RX ADMIN — AMPICILLIN SODIUM AND SULBACTAM SODIUM 200 GRAM(S): 250; 125 INJECTION, POWDER, FOR SUSPENSION INTRAMUSCULAR; INTRAVENOUS at 05:45

## 2022-08-24 RX ADMIN — LOSARTAN POTASSIUM 25 MILLIGRAM(S): 100 TABLET, FILM COATED ORAL at 05:50

## 2022-08-24 RX ADMIN — PANTOPRAZOLE SODIUM 40 MILLIGRAM(S): 20 TABLET, DELAYED RELEASE ORAL at 07:11

## 2022-08-24 RX ADMIN — HYDROMORPHONE HYDROCHLORIDE 2 MILLIGRAM(S): 2 INJECTION INTRAMUSCULAR; INTRAVENOUS; SUBCUTANEOUS at 10:28

## 2022-08-24 RX ADMIN — HEPARIN SODIUM 5000 UNIT(S): 5000 INJECTION INTRAVENOUS; SUBCUTANEOUS at 14:24

## 2022-08-24 RX ADMIN — NYSTATIN CREAM 1 APPLICATION(S): 100000 CREAM TOPICAL at 05:45

## 2022-08-24 RX ADMIN — Medication 1 APPLICATION(S): at 14:35

## 2022-08-24 RX ADMIN — Medication 40 MILLIEQUIVALENT(S): at 14:24

## 2022-08-24 RX ADMIN — HEPARIN SODIUM 5000 UNIT(S): 5000 INJECTION INTRAVENOUS; SUBCUTANEOUS at 21:15

## 2022-08-24 RX ADMIN — Medication 2000 UNIT(S): at 14:23

## 2022-08-24 RX ADMIN — NYSTATIN CREAM 1 APPLICATION(S): 100000 CREAM TOPICAL at 18:34

## 2022-08-24 RX ADMIN — Medication 2: at 09:20

## 2022-08-24 RX ADMIN — Medication 25 GRAM(S): at 10:59

## 2022-08-24 RX ADMIN — AMPICILLIN SODIUM AND SULBACTAM SODIUM 200 GRAM(S): 250; 125 INJECTION, POWDER, FOR SUSPENSION INTRAMUSCULAR; INTRAVENOUS at 21:14

## 2022-08-24 RX ADMIN — Medication 1 TABLET(S): at 14:23

## 2022-08-24 RX ADMIN — Medication 12.5 MILLIGRAM(S): at 05:51

## 2022-08-24 RX ADMIN — HYDROMORPHONE HYDROCHLORIDE 2 MILLIGRAM(S): 2 INJECTION INTRAMUSCULAR; INTRAVENOUS; SUBCUTANEOUS at 11:08

## 2022-08-24 RX ADMIN — Medication 40 MILLIEQUIVALENT(S): at 10:29

## 2022-08-24 RX ADMIN — AMPICILLIN SODIUM AND SULBACTAM SODIUM 200 GRAM(S): 250; 125 INJECTION, POWDER, FOR SUSPENSION INTRAMUSCULAR; INTRAVENOUS at 14:22

## 2022-08-24 NOTE — PHYSICAL THERAPY INITIAL EVALUATION ADULT - PRECAUTIONS/LIMITATIONS, REHAB EVAL
Just let her know I am working on trying to find one that is on their formulary, tried 3 now, otherwise it can cost them a fortune, as soon as I do will send it in fall precautions/obesity precautions

## 2022-08-24 NOTE — PHYSICAL THERAPY INITIAL EVALUATION ADULT - ADDITIONAL COMMENTS
Pt states she is bedbound at baseline and dependent in functional mobility but was able to transfer bed to/from wheelchair independently prior to admission at rehab. Prior to rehab, pt lived alone in an apartment with her brother in the apartment below, and had home health services for 20 hours a week.    Following evaluation, pt was left semireclined in bed in no distress, all lines in tact, call bell in reach. RN aware.

## 2022-08-24 NOTE — PROGRESS NOTE ADULT - SUBJECTIVE AND OBJECTIVE BOX
[facility]  Division of Hospital Medicine  Layla Dai MD  Pager: [phone]      Patient is a 52y old  Female who presents with a chief complaint of Soft tissue infection (23 Aug 2022 17:53)      SUBJECTIVE / OVERNIGHT EVENTS: patient examined at bedside. She is feeling okay but reports she is tired. HR noted to be higher. Patient asking when she would be able to go home. Discussed   ADDITIONAL REVIEW OF SYSTEMS:    MEDICATIONS  (STANDING):  ampicillin/sulbactam  IVPB      ampicillin/sulbactam  IVPB 3 Gram(s) IV Intermittent every 6 hours  ascorbic acid 500 milliGRAM(s) Oral daily  cadexomer iodine 0.9% Gel 1 Application(s) Topical daily  chlorhexidine 2% Cloths 1 Application(s) Topical daily  cholecalciferol 2000 Unit(s) Oral daily  collagenase Ointment 1 Application(s) Topical daily  furosemide    Tablet 40 milliGRAM(s) Oral daily  heparin   Injectable 5000 Unit(s) SubCutaneous every 8 hours  insulin lispro (ADMELOG) corrective regimen sliding scale   SubCutaneous Before meals and at bedtime  insulin lispro Injectable (ADMELOG) 5 Unit(s) SubCutaneous three times a day before meals  losartan 25 milliGRAM(s) Oral daily  metoprolol tartrate 12.5 milliGRAM(s) Oral two times a day  multivitamin 1 Tablet(s) Oral daily  nystatin Powder 1 Application(s) Topical two times a day  pantoprazole    Tablet 40 milliGRAM(s) Oral before breakfast  potassium chloride   Powder 40 milliEquivalent(s) Oral every 4 hours    MEDICATIONS  (PRN):  acetaminophen     Tablet .. 650 milliGRAM(s) Oral every 6 hours PRN Mild Pain (1 - 3)  ALBUTerol    90 MICROgram(s) HFA Inhaler 2 Puff(s) Inhalation every 6 hours PRN Shortness of Breath  HYDROmorphone   Tablet 2 milliGRAM(s) Oral every 3 hours PRN Severe Pain (7 - 10)  HYDROmorphone   Tablet 1 milliGRAM(s) Oral every 3 hours PRN Moderate Pain (4 - 6)  sodium chloride 0.9% lock flush 10 milliLiter(s) IV Push every 1 hour PRN Pre/post blood products, medications, blood draw, and to maintain line patency      CAPILLARY BLOOD GLUCOSE      POCT Blood Glucose.: 159 mg/dL (24 Aug 2022 08:22)  POCT Blood Glucose.: 168 mg/dL (23 Aug 2022 22:14)  POCT Blood Glucose.: 131 mg/dL (23 Aug 2022 17:27)    I&O's Summary    23 Aug 2022 07:01  -  24 Aug 2022 07:00  --------------------------------------------------------  IN: 100 mL / OUT: 2350 mL / NET: -2250 mL    24 Aug 2022 07:01  -  24 Aug 2022 12:21  --------------------------------------------------------  IN: 0 mL / OUT: 225 mL / NET: -225 mL        PHYSICAL EXAM:  Vital Signs Last 24 Hrs  T(C): 36.8 (24 Aug 2022 09:26), Max: 37.4 (24 Aug 2022 02:01)  T(F): 98.3 (24 Aug 2022 09:26), Max: 99.4 (24 Aug 2022 02:01)  HR: 92 (24 Aug 2022 09:26) (86 - 107)  BP: 108/65 (24 Aug 2022 09:26) (108/65 - 135/65)  BP(mean): --  RR: 18 (24 Aug 2022 09:26) (16 - 18)  SpO2: 98% (24 Aug 2022 09:26) (95% - 100%)    Parameters below as of 24 Aug 2022 06:00  Patient On (Oxygen Delivery Method): room air      General: Middle age woman morbidly obese, NAD  HEENT: PERRLA, EOMI, moist mucous membranes  Neurology: A&Ox2- 3, - hospital and 20something.  Respiratory: CTA B/L apically  CV: RRR, S1S2, no murmurs, rubs or gallops  Abdominal: Soft, NT, ND   Extremities: +edema in all 4 extremities, (improved)   Tubes: , peripheral IVs    LABS:                        9.7    7.34  )-----------( 507      ( 24 Aug 2022 07:30 )             31.5     08-24    140  |  105  |  6<L>  ----------------------------<  149<H>  3.2<L>   |  23  |  0.42<L>    Ca    7.1<L>      24 Aug 2022 07:30  Phos  3.6     08-24  Mg     1.50     08-24    TPro  5.1<L>  /  Alb  1.4<L>  /  TBili  <0.2  /  DBili  x   /  AST  16  /  ALT  11  /  AlkPhos  204<H>  08-24                RADIOLOGY & ADDITIONAL TESTS:  Results Reviewed:   Imaging Personally Reviewed:  Electrocardiogram Personally Reviewed:    COORDINATION OF CARE:  Care Discussed with Consultants/Other Providers [Y/N]:  Prior or Outpatient Records Reviewed [Y/N]:   [facility]  Division of Hospital Medicine  Layla Dai MD  Pager: [phone]      Patient is a 52y old  Female who presents with a chief complaint of Soft tissue infection (23 Aug 2022 17:53)      SUBJECTIVE / OVERNIGHT EVENTS: patient examined at bedside. She is feeling okay but reports she is tired. HR noted to be higher. Patient asking when she would be able to go home. Discussed that patient needs PT evaluation and has a wound vac.   ADDITIONAL REVIEW OF SYSTEMS:    MEDICATIONS  (STANDING):  ampicillin/sulbactam  IVPB      ampicillin/sulbactam  IVPB 3 Gram(s) IV Intermittent every 6 hours  ascorbic acid 500 milliGRAM(s) Oral daily  cadexomer iodine 0.9% Gel 1 Application(s) Topical daily  chlorhexidine 2% Cloths 1 Application(s) Topical daily  cholecalciferol 2000 Unit(s) Oral daily  collagenase Ointment 1 Application(s) Topical daily  furosemide    Tablet 40 milliGRAM(s) Oral daily  heparin   Injectable 5000 Unit(s) SubCutaneous every 8 hours  insulin lispro (ADMELOG) corrective regimen sliding scale   SubCutaneous Before meals and at bedtime  insulin lispro Injectable (ADMELOG) 5 Unit(s) SubCutaneous three times a day before meals  losartan 25 milliGRAM(s) Oral daily  metoprolol tartrate 12.5 milliGRAM(s) Oral two times a day  multivitamin 1 Tablet(s) Oral daily  nystatin Powder 1 Application(s) Topical two times a day  pantoprazole    Tablet 40 milliGRAM(s) Oral before breakfast  potassium chloride   Powder 40 milliEquivalent(s) Oral every 4 hours    MEDICATIONS  (PRN):  acetaminophen     Tablet .. 650 milliGRAM(s) Oral every 6 hours PRN Mild Pain (1 - 3)  ALBUTerol    90 MICROgram(s) HFA Inhaler 2 Puff(s) Inhalation every 6 hours PRN Shortness of Breath  HYDROmorphone   Tablet 2 milliGRAM(s) Oral every 3 hours PRN Severe Pain (7 - 10)  HYDROmorphone   Tablet 1 milliGRAM(s) Oral every 3 hours PRN Moderate Pain (4 - 6)  sodium chloride 0.9% lock flush 10 milliLiter(s) IV Push every 1 hour PRN Pre/post blood products, medications, blood draw, and to maintain line patency      CAPILLARY BLOOD GLUCOSE      POCT Blood Glucose.: 159 mg/dL (24 Aug 2022 08:22)  POCT Blood Glucose.: 168 mg/dL (23 Aug 2022 22:14)  POCT Blood Glucose.: 131 mg/dL (23 Aug 2022 17:27)    I&O's Summary    23 Aug 2022 07:01  -  24 Aug 2022 07:00  --------------------------------------------------------  IN: 100 mL / OUT: 2350 mL / NET: -2250 mL    24 Aug 2022 07:01  -  24 Aug 2022 12:21  --------------------------------------------------------  IN: 0 mL / OUT: 225 mL / NET: -225 mL        PHYSICAL EXAM:  Vital Signs Last 24 Hrs  T(C): 36.8 (24 Aug 2022 09:26), Max: 37.4 (24 Aug 2022 02:01)  T(F): 98.3 (24 Aug 2022 09:26), Max: 99.4 (24 Aug 2022 02:01)  HR: 92 (24 Aug 2022 09:26) (86 - 107)  BP: 108/65 (24 Aug 2022 09:26) (108/65 - 135/65)  BP(mean): --  RR: 18 (24 Aug 2022 09:26) (16 - 18)  SpO2: 98% (24 Aug 2022 09:26) (95% - 100%)    Parameters below as of 24 Aug 2022 06:00  Patient On (Oxygen Delivery Method): room air      General: Middle age woman morbidly obese, NAD  HEENT: PERRLA, EOMI, moist mucous membranes  Neurology: A&Ox2- 3, - hospital and 20something.  Respiratory: CTA B/L apically  CV: RRR, S1S2, no murmurs, rubs or gallops  Abdominal: Soft, NT, ND   Extremities: +edema in all 4 extremities, (improved)   Tubes: , peripheral IVs    LABS:                        9.7    7.34  )-----------( 507      ( 24 Aug 2022 07:30 )             31.5     08-24    140  |  105  |  6<L>  ----------------------------<  149<H>  3.2<L>   |  23  |  0.42<L>    Ca    7.1<L>      24 Aug 2022 07:30  Phos  3.6     08-24  Mg     1.50     08-24    TPro  5.1<L>  /  Alb  1.4<L>  /  TBili  <0.2  /  DBili  x   /  AST  16  /  ALT  11  /  AlkPhos  204<H>  08-24                RADIOLOGY & ADDITIONAL TESTS:  Results Reviewed:   Imaging Personally Reviewed:  Electrocardiogram Personally Reviewed:    COORDINATION OF CARE:  Care Discussed with Consultants/Other Providers [Y/N]:  Prior or Outpatient Records Reviewed [Y/N]:

## 2022-08-24 NOTE — PROGRESS NOTE ADULT - ASSESSMENT
52F immobile 2/2 h/o MS, DM2, asthma, hypothyroidism, and known sacral wounds presents from [place] with urosepsis and anemia with new L flank wound with concern for necrotizing soft tissue infection. Was s/p debridement of wound with worsening sepsis, acidosis, and severe global LV dysfunction of unknown etiology, transferred to CCU for [place] and inotrope support. Pt is s/p swan removal and off all sedation, weaning off pressors. Repeat echo showed EF of 5-10% and signs suggestive of possible stress induced cardiomyopathy. Pt's mental status has returned to baseline off sedation. Pt started on lopressor 12.5 po bid and lasix 40 po qd for HFrEF. C/w Unasyn until 8/25 per ID. Patient now transferred to medical floor.

## 2022-08-24 NOTE — PHYSICAL THERAPY INITIAL EVALUATION ADULT - PERTINENT HX OF CURRENT PROBLEM, REHAB EVAL
52 year old female presents from [place] with urosepsis and anemia with new left flank wound with concern for necrotizing soft tissue infection. S/p debridement 8/11/22 of wound with worsening sepsis, acidosis, and severe global LV dysfunction of unknown etiology. Pt is s/p swan removal and off all sedation, weaning off pressors.

## 2022-08-24 NOTE — PROGRESS NOTE ADULT - PROBLEM SELECTOR PLAN 2
# severe LV dysfunction; HFrEF 5 - 10%  - concern for cardiomyopathy of unknown etiology but may be R/T sepsis and critical illness  - s/p Dobutamine, s/p swan removal in the CCU   - vasopressin discontinued 8/13, currently off levophed, continue to monitor  - repeat ECHO to reassess LV function showed EF of 5-10%   -  continue with 8/21 started on lopressor 12.5 PO BID  - 8/21 started on lasix 40 PO qd  8/23 started on losartan 25 # severe LV dysfunction; HFrEF 5 - 10%  - concern for cardiomyopathy of unknown etiology but may be R/T sepsis and critical illness  - s/p Dobutamine, s/p swan removal in the CCU   - vasopressin discontinued 8/13, currently off levophed, continue to monitor  - repeat ECHO to reassess LV function showed EF of 5-10%   -  continue with 8/21 started on lopressor 12.5 PO BID - will increase to lopressor 25mg BID; for HR goal  on discharge should transition to succinate   - 8/21 started on lasix 40 PO qd  8/23 started on losartan 25

## 2022-08-25 LAB
ALBUMIN SERPL ELPH-MCNC: 1.5 G/DL — LOW (ref 3.3–5)
ALP SERPL-CCNC: 189 U/L — HIGH (ref 40–120)
ALT FLD-CCNC: 14 U/L — SIGNIFICANT CHANGE UP (ref 4–33)
ANION GAP SERPL CALC-SCNC: 12 MMOL/L — SIGNIFICANT CHANGE UP (ref 7–14)
AST SERPL-CCNC: 15 U/L — SIGNIFICANT CHANGE UP (ref 4–32)
BILIRUB DIRECT SERPL-MCNC: <0.2 MG/DL — SIGNIFICANT CHANGE UP (ref 0–0.3)
BILIRUB INDIRECT FLD-MCNC: SIGNIFICANT CHANGE UP MG/DL (ref 0–1)
BILIRUB SERPL-MCNC: <0.2 MG/DL — SIGNIFICANT CHANGE UP (ref 0.2–1.2)
BUN SERPL-MCNC: 6 MG/DL — LOW (ref 7–23)
CALCIUM SERPL-MCNC: 7.2 MG/DL — LOW (ref 8.4–10.5)
CHLORIDE SERPL-SCNC: 104 MMOL/L — SIGNIFICANT CHANGE UP (ref 98–107)
CO2 SERPL-SCNC: 24 MMOL/L — SIGNIFICANT CHANGE UP (ref 22–31)
CREAT SERPL-MCNC: 0.36 MG/DL — LOW (ref 0.5–1.3)
EGFR: 124 ML/MIN/1.73M2 — SIGNIFICANT CHANGE UP
GLUCOSE BLDC GLUCOMTR-MCNC: 138 MG/DL — HIGH (ref 70–99)
GLUCOSE BLDC GLUCOMTR-MCNC: 161 MG/DL — HIGH (ref 70–99)
GLUCOSE BLDC GLUCOMTR-MCNC: 191 MG/DL — HIGH (ref 70–99)
GLUCOSE BLDC GLUCOMTR-MCNC: 201 MG/DL — HIGH (ref 70–99)
GLUCOSE SERPL-MCNC: 137 MG/DL — HIGH (ref 70–99)
HCT VFR BLD CALC: 28.5 % — LOW (ref 34.5–45)
HGB BLD-MCNC: 8.7 G/DL — LOW (ref 11.5–15.5)
MAGNESIUM SERPL-MCNC: 1.5 MG/DL — LOW (ref 1.6–2.6)
MCHC RBC-ENTMCNC: 26.9 PG — LOW (ref 27–34)
MCHC RBC-ENTMCNC: 30.5 GM/DL — LOW (ref 32–36)
MCV RBC AUTO: 88 FL — SIGNIFICANT CHANGE UP (ref 80–100)
NRBC # BLD: 0 /100 WBCS — SIGNIFICANT CHANGE UP (ref 0–0)
NRBC # FLD: 0 K/UL — SIGNIFICANT CHANGE UP (ref 0–0)
PHOSPHATE SERPL-MCNC: 3.6 MG/DL — SIGNIFICANT CHANGE UP (ref 2.5–4.5)
PLATELET # BLD AUTO: 351 K/UL — SIGNIFICANT CHANGE UP (ref 150–400)
POTASSIUM SERPL-MCNC: 2.9 MMOL/L — CRITICAL LOW (ref 3.5–5.3)
POTASSIUM SERPL-SCNC: 2.9 MMOL/L — CRITICAL LOW (ref 3.5–5.3)
PROT SERPL-MCNC: 5.1 G/DL — LOW (ref 6–8.3)
RBC # BLD: 3.24 M/UL — LOW (ref 3.8–5.2)
RBC # FLD: 21.1 % — HIGH (ref 10.3–14.5)
SODIUM SERPL-SCNC: 140 MMOL/L — SIGNIFICANT CHANGE UP (ref 135–145)
WBC # BLD: 7.35 K/UL — SIGNIFICANT CHANGE UP (ref 3.8–10.5)
WBC # FLD AUTO: 7.35 K/UL — SIGNIFICANT CHANGE UP (ref 3.8–10.5)

## 2022-08-25 PROCEDURE — 99233 SBSQ HOSP IP/OBS HIGH 50: CPT

## 2022-08-25 RX ORDER — MAGNESIUM OXIDE 400 MG ORAL TABLET 241.3 MG
400 TABLET ORAL
Refills: 0 | Status: COMPLETED | OUTPATIENT
Start: 2022-08-25 | End: 2022-08-27

## 2022-08-25 RX ORDER — POTASSIUM CHLORIDE 20 MEQ
10 PACKET (EA) ORAL
Refills: 0 | Status: COMPLETED | OUTPATIENT
Start: 2022-08-25 | End: 2022-08-25

## 2022-08-25 RX ORDER — HYDROMORPHONE HYDROCHLORIDE 2 MG/ML
1 INJECTION INTRAMUSCULAR; INTRAVENOUS; SUBCUTANEOUS
Refills: 0 | Status: DISCONTINUED | OUTPATIENT
Start: 2022-08-25 | End: 2022-09-01

## 2022-08-25 RX ORDER — MAGNESIUM SULFATE 500 MG/ML
2 VIAL (ML) INJECTION ONCE
Refills: 0 | Status: COMPLETED | OUTPATIENT
Start: 2022-08-25 | End: 2022-08-25

## 2022-08-25 RX ORDER — POTASSIUM CHLORIDE 20 MEQ
40 PACKET (EA) ORAL EVERY 6 HOURS
Refills: 0 | Status: COMPLETED | OUTPATIENT
Start: 2022-08-25 | End: 2022-08-25

## 2022-08-25 RX ORDER — MAGNESIUM OXIDE 400 MG ORAL TABLET 241.3 MG
400 TABLET ORAL
Refills: 0 | Status: DISCONTINUED | OUTPATIENT
Start: 2022-08-25 | End: 2022-08-25

## 2022-08-25 RX ORDER — HYDROMORPHONE HYDROCHLORIDE 2 MG/ML
2 INJECTION INTRAMUSCULAR; INTRAVENOUS; SUBCUTANEOUS
Refills: 0 | Status: DISCONTINUED | OUTPATIENT
Start: 2022-08-25 | End: 2022-09-01

## 2022-08-25 RX ADMIN — PANTOPRAZOLE SODIUM 40 MILLIGRAM(S): 20 TABLET, DELAYED RELEASE ORAL at 06:38

## 2022-08-25 RX ADMIN — Medication 2: at 17:42

## 2022-08-25 RX ADMIN — Medication 5 UNIT(S): at 17:42

## 2022-08-25 RX ADMIN — Medication 40 MILLIGRAM(S): at 06:38

## 2022-08-25 RX ADMIN — Medication 25 MILLIGRAM(S): at 06:38

## 2022-08-25 RX ADMIN — Medication 5 UNIT(S): at 11:59

## 2022-08-25 RX ADMIN — NYSTATIN CREAM 1 APPLICATION(S): 100000 CREAM TOPICAL at 06:37

## 2022-08-25 RX ADMIN — Medication 40 MILLIEQUIVALENT(S): at 12:03

## 2022-08-25 RX ADMIN — Medication 100 MILLIEQUIVALENT(S): at 09:35

## 2022-08-25 RX ADMIN — AMPICILLIN SODIUM AND SULBACTAM SODIUM 200 GRAM(S): 250; 125 INJECTION, POWDER, FOR SUSPENSION INTRAMUSCULAR; INTRAVENOUS at 09:12

## 2022-08-25 RX ADMIN — Medication 25 GRAM(S): at 17:45

## 2022-08-25 RX ADMIN — HEPARIN SODIUM 5000 UNIT(S): 5000 INJECTION INTRAVENOUS; SUBCUTANEOUS at 13:38

## 2022-08-25 RX ADMIN — NYSTATIN CREAM 1 APPLICATION(S): 100000 CREAM TOPICAL at 17:43

## 2022-08-25 RX ADMIN — Medication 100 MILLIEQUIVALENT(S): at 12:30

## 2022-08-25 RX ADMIN — HEPARIN SODIUM 5000 UNIT(S): 5000 INJECTION INTRAVENOUS; SUBCUTANEOUS at 21:29

## 2022-08-25 RX ADMIN — Medication 100 MILLIEQUIVALENT(S): at 11:54

## 2022-08-25 RX ADMIN — AMPICILLIN SODIUM AND SULBACTAM SODIUM 200 GRAM(S): 250; 125 INJECTION, POWDER, FOR SUSPENSION INTRAMUSCULAR; INTRAVENOUS at 15:00

## 2022-08-25 RX ADMIN — HEPARIN SODIUM 5000 UNIT(S): 5000 INJECTION INTRAVENOUS; SUBCUTANEOUS at 06:39

## 2022-08-25 RX ADMIN — Medication 40 MILLIEQUIVALENT(S): at 17:43

## 2022-08-25 RX ADMIN — Medication 2000 UNIT(S): at 11:53

## 2022-08-25 RX ADMIN — Medication 2: at 12:00

## 2022-08-25 RX ADMIN — AMPICILLIN SODIUM AND SULBACTAM SODIUM 200 GRAM(S): 250; 125 INJECTION, POWDER, FOR SUSPENSION INTRAMUSCULAR; INTRAVENOUS at 21:30

## 2022-08-25 RX ADMIN — Medication 1 TABLET(S): at 11:53

## 2022-08-25 RX ADMIN — Medication 1 APPLICATION(S): at 11:52

## 2022-08-25 RX ADMIN — AMPICILLIN SODIUM AND SULBACTAM SODIUM 200 GRAM(S): 250; 125 INJECTION, POWDER, FOR SUSPENSION INTRAMUSCULAR; INTRAVENOUS at 04:23

## 2022-08-25 RX ADMIN — CHLORHEXIDINE GLUCONATE 1 APPLICATION(S): 213 SOLUTION TOPICAL at 11:52

## 2022-08-25 RX ADMIN — LOSARTAN POTASSIUM 25 MILLIGRAM(S): 100 TABLET, FILM COATED ORAL at 06:38

## 2022-08-25 RX ADMIN — Medication 25 MILLIGRAM(S): at 17:43

## 2022-08-25 RX ADMIN — MAGNESIUM OXIDE 400 MG ORAL TABLET 400 MILLIGRAM(S): 241.3 TABLET ORAL at 12:02

## 2022-08-25 RX ADMIN — MAGNESIUM OXIDE 400 MG ORAL TABLET 400 MILLIGRAM(S): 241.3 TABLET ORAL at 17:43

## 2022-08-25 RX ADMIN — Medication 4: at 21:28

## 2022-08-25 RX ADMIN — Medication 500 MILLIGRAM(S): at 11:52

## 2022-08-25 NOTE — PROGRESS NOTE ADULT - SUBJECTIVE AND OBJECTIVE BOX
Patient is a 52y old  Female who presents with a chief complaint of Soft tissue infection (19 Aug 2022 11:45)      HPI:  HPI:  52F immobile 2/2 MS, poorly controlled T2DM, asthma, hypothyroidism, known chronic wounds sacrum (stage 4), vulvar/Rt thigh, and right calf. Recent 2022 hospitalization for urosepsis. On cefepime/flagyl for chronic osteo 2/2 unstageable sacral decubitus ulcer, dakins BID packing. Presented from [place] with concerns for UTI and anemia (6.8 from baseline 8.0) and new malodorous wound on L hip to L flank area. Surgery consulted for potential necrotizing soft tissue infection.            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 1000 milliGRAM(s) IV Intermittent once  cefepime   IVPB 2000 milliGRAM(s) IV Intermittent once  clindamycin IVPB 900 milliGRAM(s) IV Intermittent once  sodium chloride 0.9% Bolus 1000 milliLiter(s) IV Bolus once  vancomycin  IVPB. 1000 milliGRAM(s) IV Intermittent once    MEDICATIONS  (PRN):      Allergies    No Known Drug Allergies  Pineapple (Anaphylaxis)    Intolerances        SOCIAL HISTORY:    FAMILY HISTORY:          Physical Exam:  General: NAD, resting comfortably, warm to touch   HEENT: NC/AT, EOMI  Pulmonary: normal resp effort, patent airway  Abdominal: obese  Extremities: poor muscle tone  Skin: sacral wound stage 4, r thigh wound tracking to labia, L thigh wound, L hip/flank malodorous dark green wound with surrounding erythema and underlying crepitus tracking up the L flank  Neuro: A/O x 3-4, CNs II-XII grossly intact    Vital Signs Last 24 Hrs  T(C): 39.8 (10 Aug 2022 21:06), Max: 39.8 (10 Aug 2022 21:06)  T(F): 103.7 (10 Aug 2022 21:06), Max: 103.7 (10 Aug 2022 21:06)  HR: 127 (10 Aug 2022 20:10) (124 - 127)  BP: 139/72 (10 Aug 2022 20:10) (123/56 - 139/72)  BP(mean): --  RR: 20 (10 Aug 2022 20:10) (18 - 20)  SpO2: 100% (10 Aug 2022 20:10) (100% - 100%)    Parameters below as of 10 Aug 2022 20:10  Patient On (Oxygen Delivery Method): room air        I&O's Summary          LABS:                        5.9    31.78 )-----------( 920      ( 10 Aug 2022 22:30 )             22.5         Urinalysis Basic - ( 10 Aug 2022 22:30 )    Color: Yellow / Appearance: Turbid / S.017 / pH: x  Gluc: x / Ketone: Negative  / Bili: Negative / Urobili: <2 mg/dL   Blood: x / Protein: 100 mg/dL / Nitrite: Negative   Leuk Esterase: Large / RBC: 25-50 /HPF / WBC >50 /HPF   Sq Epi: x / Non Sq Epi: x / Bacteria: Many                CAPILLARY BLOOD GLUCOSE            Cultures:      RADIOLOGY & ADDITIONAL STUDIES:               (10 Aug 2022 22:48)      PAST MEDICAL & SURGICAL HISTORY:  DM (diabetes mellitus)      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      MS (multiple sclerosis)      No significant past surgical history          MEDICATIONS  (STANDING):  ampicillin/sulbactam  IVPB      ampicillin/sulbactam  IVPB 3 Gram(s) IV Intermittent every 6 hours  ascorbic acid 500 milliGRAM(s) Oral daily  chlorhexidine 2% Cloths 1 Application(s) Topical daily  cholecalciferol 2000 Unit(s) Oral daily  collagenase Ointment 1 Application(s) Topical daily  heparin   Injectable 5000 Unit(s) SubCutaneous every 8 hours  insulin lispro (ADMELOG) corrective regimen sliding scale   SubCutaneous every 6 hours  insulin lispro Injectable (ADMELOG) 5 Unit(s) SubCutaneous every 6 hours  lactated ringers. 1000 milliLiter(s) (10 mL/Hr) IV Continuous <Continuous>  levothyroxine Injectable 56 MICROGram(s) IV Push <User Schedule>  multivitamin/minerals/iron Oral Solution (CENTRUM) 15 milliLiter(s) Oral daily  nystatin Powder 1 Application(s) Topical two times a day  pantoprazole  Injectable 40 milliGRAM(s) IV Push every 12 hours    MEDICATIONS  (PRN):  acetaminophen    Suspension .. 650 milliGRAM(s) Oral every 6 hours PRN Temp greater or equal to 38.5C (101.3F)  ALBUTerol    90 MICROgram(s) HFA Inhaler 2 Puff(s) Inhalation every 6 hours PRN Shortness of Breath  HYDROmorphone  Injectable 1 milliGRAM(s) IV Push every 3 hours PRN Severe Pain (7 - 10)  HYDROmorphone  Injectable 0.5 milliGRAM(s) IV Push every 3 hours PRN Moderate Pain (4 - 6)  sodium chloride 0.9% lock flush 10 milliLiter(s) IV Push every 1 hour PRN Pre/post blood products, medications, blood draw, and to maintain line patency      Allergies    No Known Drug Allergies  Pineapple (Anaphylaxis)    Intolerances        VITALS:    Vital Signs Last 24 Hrs  T(C): 36.6 (19 Aug 2022 20:00), Max: 37.4 (19 Aug 2022 04:00)  T(F): 97.8 (19 Aug 2022 20:00), Max: 99.3 (19 Aug 2022 04:00)  HR: 103 (19 Aug 2022 22:00) (99 - 112)  BP: --  BP(mean): --  RR: 13 (19 Aug 2022 22:00) (11 - 27)  SpO2: 98% (19 Aug 2022 22:00) (96% - 100%)    Parameters below as of 19 Aug 2022 22:00  Patient On (Oxygen Delivery Method): nasal cannula  O2 Flow (L/min): 2      LABS:                          8.8    15.86 )-----------( 506      ( 19 Aug 2022 17:00 )             28.2       08-19    141  |  108<H>  |  8   ----------------------------<  84  3.9   |  21<L>  |  0.31<L>    Ca    7.3<L>      19 Aug 2022 03:00  Phos  3.3     -19  Mg     1.60         TPro  4.6<L>  /  Alb  1.6<L>  /  TBili  0.2  /  DBili  x   /  AST  13  /  ALT  7   /  AlkPhos  213<H>        CAPILLARY BLOOD GLUCOSE      POCT Blood Glucose.: 138 mg/dL (19 Aug 2022 23:29)  POCT Blood Glucose.: 120 mg/dL (19 Aug 2022 17:14)  POCT Blood Glucose.: 91 mg/dL (19 Aug 2022 12:24)  POCT Blood Glucose.: 80 mg/dL (19 Aug 2022 05:43)          LOWER EXTREMITY PHYSICAL EXAM:    Vascular: DP/PT 2/4, B/L, CFT <3 seconds B/L, Temperature gradient warm to cool B/L.   Neuro: Epicritic sensation deminshed to the level of forefoot, B/L.  Musculoskeletal/Ortho: Immobilization 2/2 MS   Skin: Deep tissue injury of the posterior heel, B/L, no signs of infection, no purulence, no drainage, no erythema, no hyperkeratotic surrounding the surface.       RADIOLOGY & ADDITIONAL STUDIES:

## 2022-08-25 NOTE — PROGRESS NOTE ADULT - PROBLEM SELECTOR PLAN 2
# severe LV dysfunction; HFrEF 5 - 10%  - concern for cardiomyopathy of unknown etiology but may be R/T sepsis and critical illness  - s/p Dobutamine, s/p swan removal in the CCU   - vasopressin discontinued 8/13, currently off levophed, continue to monitor  - repeat ECHO to reassess LV function showed EF of 5-10%   -  continue with 8/21 started on lopressor 12.5 PO BID - will increase to lopressor 25mg BID; for HR goal  on discharge should transition to succinate   - 8/21 started on Lasix 40 PO qd  8/23 started on losartan 25 # severe LV dysfunction; HFrEF 5 - 10%  - concern for cardiomyopathy of unknown etiology but may be R/T sepsis and critical illness  - s/p Dobutamine, s/p swan removal in the CCU   - vasopressin discontinued 8/13, currently off levophed, continue to monitor  - repeat ECHO to reassess LV function showed EF of 5-10%   -  continue with 8/21 started on lopressor 12.5 PO BID - will increase to lopressor 25mg BID; for HR goal  on discharge should transition to succinate   - 8/21 started on Lasix 40 PO qd; replete K as necessary, patient may require daily K supplementation on discharge   8/23 started on losartan 25

## 2022-08-25 NOTE — PROGRESS NOTE ADULT - SUBJECTIVE AND OBJECTIVE BOX
[facility]  Division of Hospital Medicine  Layla Dai MD  Pager: [phone]      Patient is a 52y old  Female who presents with a chief complaint of Soft tissue infection (24 Aug 2022 12:21)      SUBJECTIVE / OVERNIGHT EVENTS: Today is patient's last day of abx. discussed PT recommendation for [facility]. pt agreeable. No medical concerns. no CP, sob   ADDITIONAL REVIEW OF SYSTEMS:    MEDICATIONS  (STANDING):  ampicillin/sulbactam  IVPB      ampicillin/sulbactam  IVPB 3 Gram(s) IV Intermittent every 6 hours  ascorbic acid 500 milliGRAM(s) Oral daily  cadexomer iodine 0.9% Gel 1 Application(s) Topical daily  chlorhexidine 2% Cloths 1 Application(s) Topical daily  cholecalciferol 2000 Unit(s) Oral daily  collagenase Ointment 1 Application(s) Topical daily  furosemide    Tablet 40 milliGRAM(s) Oral daily  heparin   Injectable 5000 Unit(s) SubCutaneous every 8 hours  insulin lispro (ADMELOG) corrective regimen sliding scale   SubCutaneous Before meals and at bedtime  insulin lispro Injectable (ADMELOG) 5 Unit(s) SubCutaneous three times a day before meals  losartan 25 milliGRAM(s) Oral daily  magnesium oxide 400 milliGRAM(s) Oral three times a day with meals  magnesium sulfate  IVPB 2 Gram(s) IV Intermittent once  metoprolol tartrate 25 milliGRAM(s) Oral two times a day  multivitamin 1 Tablet(s) Oral daily  nystatin Powder 1 Application(s) Topical two times a day  pantoprazole    Tablet 40 milliGRAM(s) Oral before breakfast  potassium chloride   Powder 40 milliEquivalent(s) Oral every 6 hours  potassium chloride  10 mEq/100 mL IVPB 10 milliEquivalent(s) IV Intermittent every 1 hour    MEDICATIONS  (PRN):  acetaminophen     Tablet .. 650 milliGRAM(s) Oral every 6 hours PRN Mild Pain (1 - 3)  ALBUTerol    90 MICROgram(s) HFA Inhaler 2 Puff(s) Inhalation every 6 hours PRN Shortness of Breath  HYDROmorphone   Tablet 2 milliGRAM(s) Oral every 3 hours PRN Severe Pain (7 - 10)  HYDROmorphone   Tablet 1 milliGRAM(s) Oral every 3 hours PRN Moderate Pain (4 - 6)  sodium chloride 0.9% lock flush 10 milliLiter(s) IV Push every 1 hour PRN Pre/post blood products, medications, blood draw, and to maintain line patency      CAPILLARY BLOOD GLUCOSE      POCT Blood Glucose.: 138 mg/dL (25 Aug 2022 08:27)  POCT Blood Glucose.: 130 mg/dL (24 Aug 2022 21:52)  POCT Blood Glucose.: 126 mg/dL (24 Aug 2022 17:34)  POCT Blood Glucose.: 130 mg/dL (24 Aug 2022 12:24)    I&O's Summary    24 Aug 2022 07:01  -  25 Aug 2022 07:00  --------------------------------------------------------  IN: 235 mL / OUT: 1875 mL / NET: -1640 mL    25 Aug 2022 07:01  -  25 Aug 2022 11:38  --------------------------------------------------------  IN: 0 mL / OUT: 800 mL / NET: -800 mL        PHYSICAL EXAM:  Vital Signs Last 24 Hrs  T(C): 36.6 (25 Aug 2022 09:51), Max: 37.1 (24 Aug 2022 21:31)  T(F): 97.9 (25 Aug 2022 09:51), Max: 98.8 (24 Aug 2022 21:31)  HR: 73 (25 Aug 2022 09:51) (73 - 108)  BP: 120/57 (25 Aug 2022 09:51) (103/57 - 120/57)  BP(mean): --  RR: 18 (25 Aug 2022 09:51) (16 - 18)  SpO2: 98% (25 Aug 2022 09:51) (97% - 99%)    Parameters below as of 25 Aug 2022 06:38  Patient On (Oxygen Delivery Method): room air      General: Middle age woman morbidly obese, NAD  HEENT: PERRLA, EOMI, moist mucous membranes  Neurology: A&Ox2- 3, - hospital and 20something.  Respiratory: CTA B/L apically  CV: RRR, S1S2, no murmurs, rubs or gallops  Abdominal: Soft, NT, ND   Extremities: +edema in all 4 extremities, (improved)   Tubes: , peripheral IVs  LABS:                        8.7    7.35  )-----------( 351      ( 25 Aug 2022 07:57 )             28.5     08-25    140  |  104  |  6<L>  ----------------------------<  137<H>  2.9<LL>   |  24  |  0.36<L>    Ca    7.2<L>      25 Aug 2022 07:57  Phos  3.6     08-25  Mg     1.50     08-25    TPro  5.1<L>  /  Alb  1.4<L>  /  TBili  <0.2  /  DBili  x   /  AST  16  /  ALT  11  /  AlkPhos  204<H>  08-24                RADIOLOGY & ADDITIONAL TESTS:  Results Reviewed:   Imaging Personally Reviewed:  Electrocardiogram Personally Reviewed:    COORDINATION OF CARE:  Care Discussed with Consultants/Other Providers [Y/N]:  Prior or Outpatient Records Reviewed [Y/N]:

## 2022-08-25 NOTE — PROGRESS NOTE ADULT - ASSESSMENT
52 y.o immobile F 2/2 to MS with deet tissue injury of the posterior heel B/L   - Pt was seen and evaluated.   - Afebrile, WBC 15.86  - No clinical indication wound cultures considering both heels have no clinical signs of infection.   - KADEEM: Deep tissue injury of the posterior heel, B/L, no signs of infection, no purulence, no drainage, no erythema, no hyperkeratotic surrounding the surface.   - Continue Z-flows at all times, B/L   - Pod Plan: Local wound care. No surgical intervention at this time.

## 2022-08-25 NOTE — PROGRESS NOTE ADULT - ASSESSMENT
52F immobile 2/2 h/o MS, DM2, asthma, hypothyroidism, and known sacral wounds presents from [place] with urosepsis and anemia with new L flank wound with concern for necrotizing soft tissue infection. Was s/p debridement of wound with worsening sepsis, acidosis, and severe global LV dysfunction of unknown etiology, transferred to CCU for [place] and inotrope support. Pt is s/p swan removal and off all sedation, weaning off pressors. Repeat echo showed EF of 5-10% and signs suggestive of possible stress induced cardiomyopathy. Pt's mental status has returned to baseline off sedation. Pt started on lopressor 12.5 po bid and lasix 40 po qd for HFrEF. C/w Unasyn until 8/25 per ID. Patient now transferred to medical floor.

## 2022-08-26 DIAGNOSIS — E87.8 OTHER DISORDERS OF ELECTROLYTE AND FLUID BALANCE, NOT ELSEWHERE CLASSIFIED: ICD-10-CM

## 2022-08-26 LAB
ALBUMIN SERPL ELPH-MCNC: 1.6 G/DL — LOW (ref 3.3–5)
ALP SERPL-CCNC: 188 U/L — HIGH (ref 40–120)
ALT FLD-CCNC: 17 U/L — SIGNIFICANT CHANGE UP (ref 4–33)
ANION GAP SERPL CALC-SCNC: 10 MMOL/L — SIGNIFICANT CHANGE UP (ref 7–14)
ANION GAP SERPL CALC-SCNC: 12 MMOL/L — SIGNIFICANT CHANGE UP (ref 7–14)
AST SERPL-CCNC: 16 U/L — SIGNIFICANT CHANGE UP (ref 4–32)
BILIRUB DIRECT SERPL-MCNC: <0.2 MG/DL — SIGNIFICANT CHANGE UP (ref 0–0.3)
BILIRUB INDIRECT FLD-MCNC: SIGNIFICANT CHANGE UP MG/DL (ref 0–1)
BILIRUB SERPL-MCNC: <0.2 MG/DL — SIGNIFICANT CHANGE UP (ref 0.2–1.2)
BUN SERPL-MCNC: 6 MG/DL — LOW (ref 7–23)
BUN SERPL-MCNC: 6 MG/DL — LOW (ref 7–23)
CALCIUM SERPL-MCNC: 7.1 MG/DL — LOW (ref 8.4–10.5)
CALCIUM SERPL-MCNC: 7.4 MG/DL — LOW (ref 8.4–10.5)
CHLORIDE SERPL-SCNC: 104 MMOL/L — SIGNIFICANT CHANGE UP (ref 98–107)
CHLORIDE SERPL-SCNC: 106 MMOL/L — SIGNIFICANT CHANGE UP (ref 98–107)
CO2 SERPL-SCNC: 24 MMOL/L — SIGNIFICANT CHANGE UP (ref 22–31)
CO2 SERPL-SCNC: 25 MMOL/L — SIGNIFICANT CHANGE UP (ref 22–31)
CREAT SERPL-MCNC: 0.37 MG/DL — LOW (ref 0.5–1.3)
CREAT SERPL-MCNC: 0.38 MG/DL — LOW (ref 0.5–1.3)
EGFR: 122 ML/MIN/1.73M2 — SIGNIFICANT CHANGE UP
EGFR: 123 ML/MIN/1.73M2 — SIGNIFICANT CHANGE UP
GLUCOSE BLDC GLUCOMTR-MCNC: 116 MG/DL — HIGH (ref 70–99)
GLUCOSE BLDC GLUCOMTR-MCNC: 133 MG/DL — HIGH (ref 70–99)
GLUCOSE BLDC GLUCOMTR-MCNC: 179 MG/DL — HIGH (ref 70–99)
GLUCOSE BLDC GLUCOMTR-MCNC: 97 MG/DL — SIGNIFICANT CHANGE UP (ref 70–99)
GLUCOSE SERPL-MCNC: 132 MG/DL — HIGH (ref 70–99)
GLUCOSE SERPL-MCNC: 134 MG/DL — HIGH (ref 70–99)
HCT VFR BLD CALC: 33 % — LOW (ref 34.5–45)
HGB BLD-MCNC: 10.1 G/DL — LOW (ref 11.5–15.5)
MAGNESIUM SERPL-MCNC: 1.3 MG/DL — LOW (ref 1.6–2.6)
MAGNESIUM SERPL-MCNC: 1.6 MG/DL — SIGNIFICANT CHANGE UP (ref 1.6–2.6)
MCHC RBC-ENTMCNC: 27 PG — SIGNIFICANT CHANGE UP (ref 27–34)
MCHC RBC-ENTMCNC: 30.6 GM/DL — LOW (ref 32–36)
MCV RBC AUTO: 88.2 FL — SIGNIFICANT CHANGE UP (ref 80–100)
NRBC # BLD: 0 /100 WBCS — SIGNIFICANT CHANGE UP (ref 0–0)
NRBC # FLD: 0 K/UL — SIGNIFICANT CHANGE UP (ref 0–0)
PHOSPHATE SERPL-MCNC: 3.1 MG/DL — SIGNIFICANT CHANGE UP (ref 2.5–4.5)
PHOSPHATE SERPL-MCNC: 3.7 MG/DL — SIGNIFICANT CHANGE UP (ref 2.5–4.5)
PLATELET # BLD AUTO: 399 K/UL — SIGNIFICANT CHANGE UP (ref 150–400)
POTASSIUM SERPL-MCNC: 3 MMOL/L — LOW (ref 3.5–5.3)
POTASSIUM SERPL-MCNC: 3.4 MMOL/L — LOW (ref 3.5–5.3)
POTASSIUM SERPL-SCNC: 3 MMOL/L — LOW (ref 3.5–5.3)
POTASSIUM SERPL-SCNC: 3.4 MMOL/L — LOW (ref 3.5–5.3)
PROT SERPL-MCNC: 5.2 G/DL — LOW (ref 6–8.3)
RBC # BLD: 3.74 M/UL — LOW (ref 3.8–5.2)
RBC # FLD: 20.8 % — HIGH (ref 10.3–14.5)
SODIUM SERPL-SCNC: 139 MMOL/L — SIGNIFICANT CHANGE UP (ref 135–145)
SODIUM SERPL-SCNC: 142 MMOL/L — SIGNIFICANT CHANGE UP (ref 135–145)
WBC # BLD: 8.07 K/UL — SIGNIFICANT CHANGE UP (ref 3.8–10.5)
WBC # FLD AUTO: 8.07 K/UL — SIGNIFICANT CHANGE UP (ref 3.8–10.5)

## 2022-08-26 PROCEDURE — 99233 SBSQ HOSP IP/OBS HIGH 50: CPT

## 2022-08-26 RX ORDER — POTASSIUM CHLORIDE 20 MEQ
40 PACKET (EA) ORAL ONCE
Refills: 0 | Status: COMPLETED | OUTPATIENT
Start: 2022-08-26 | End: 2022-08-26

## 2022-08-26 RX ORDER — MAGNESIUM SULFATE 500 MG/ML
2 VIAL (ML) INJECTION ONCE
Refills: 0 | Status: COMPLETED | OUTPATIENT
Start: 2022-08-26 | End: 2022-08-26

## 2022-08-26 RX ORDER — MAGNESIUM SULFATE 500 MG/ML
2 VIAL (ML) INJECTION ONCE
Refills: 0 | Status: DISCONTINUED | OUTPATIENT
Start: 2022-08-26 | End: 2022-08-26

## 2022-08-26 RX ORDER — POTASSIUM CHLORIDE 20 MEQ
40 PACKET (EA) ORAL ONCE
Refills: 0 | Status: DISCONTINUED | OUTPATIENT
Start: 2022-08-26 | End: 2022-08-26

## 2022-08-26 RX ORDER — CALCIUM GLUCONATE 100 MG/ML
1 VIAL (ML) INTRAVENOUS ONCE
Refills: 0 | Status: COMPLETED | OUTPATIENT
Start: 2022-08-26 | End: 2022-08-26

## 2022-08-26 RX ORDER — POTASSIUM CHLORIDE 20 MEQ
20 PACKET (EA) ORAL DAILY
Refills: 0 | Status: DISCONTINUED | OUTPATIENT
Start: 2022-08-26 | End: 2022-09-18

## 2022-08-26 RX ADMIN — NYSTATIN CREAM 1 APPLICATION(S): 100000 CREAM TOPICAL at 17:46

## 2022-08-26 RX ADMIN — Medication 40 MILLIEQUIVALENT(S): at 01:27

## 2022-08-26 RX ADMIN — Medication 20 MILLIEQUIVALENT(S): at 11:54

## 2022-08-26 RX ADMIN — Medication 1 APPLICATION(S): at 12:33

## 2022-08-26 RX ADMIN — Medication 40 MILLIGRAM(S): at 05:11

## 2022-08-26 RX ADMIN — Medication 1 TABLET(S): at 11:54

## 2022-08-26 RX ADMIN — PANTOPRAZOLE SODIUM 40 MILLIGRAM(S): 20 TABLET, DELAYED RELEASE ORAL at 05:11

## 2022-08-26 RX ADMIN — Medication 2000 UNIT(S): at 12:32

## 2022-08-26 RX ADMIN — Medication 500 MILLIGRAM(S): at 11:56

## 2022-08-26 RX ADMIN — NYSTATIN CREAM 1 APPLICATION(S): 100000 CREAM TOPICAL at 05:11

## 2022-08-26 RX ADMIN — Medication 40 MILLIEQUIVALENT(S): at 17:46

## 2022-08-26 RX ADMIN — MAGNESIUM OXIDE 400 MG ORAL TABLET 400 MILLIGRAM(S): 241.3 TABLET ORAL at 17:46

## 2022-08-26 RX ADMIN — HEPARIN SODIUM 5000 UNIT(S): 5000 INJECTION INTRAVENOUS; SUBCUTANEOUS at 05:10

## 2022-08-26 RX ADMIN — HEPARIN SODIUM 5000 UNIT(S): 5000 INJECTION INTRAVENOUS; SUBCUTANEOUS at 21:51

## 2022-08-26 RX ADMIN — CHLORHEXIDINE GLUCONATE 1 APPLICATION(S): 213 SOLUTION TOPICAL at 11:55

## 2022-08-26 RX ADMIN — MAGNESIUM OXIDE 400 MG ORAL TABLET 400 MILLIGRAM(S): 241.3 TABLET ORAL at 11:54

## 2022-08-26 RX ADMIN — Medication 25 MILLIGRAM(S): at 17:46

## 2022-08-26 RX ADMIN — HYDROMORPHONE HYDROCHLORIDE 1 MILLIGRAM(S): 2 INJECTION INTRAMUSCULAR; INTRAVENOUS; SUBCUTANEOUS at 10:56

## 2022-08-26 RX ADMIN — Medication 2: at 21:52

## 2022-08-26 RX ADMIN — Medication 25 GRAM(S): at 01:27

## 2022-08-26 RX ADMIN — Medication 100 GRAM(S): at 01:28

## 2022-08-26 RX ADMIN — Medication 25 GRAM(S): at 17:45

## 2022-08-26 RX ADMIN — LOSARTAN POTASSIUM 25 MILLIGRAM(S): 100 TABLET, FILM COATED ORAL at 05:11

## 2022-08-26 RX ADMIN — HEPARIN SODIUM 5000 UNIT(S): 5000 INJECTION INTRAVENOUS; SUBCUTANEOUS at 13:01

## 2022-08-26 RX ADMIN — Medication 25 MILLIGRAM(S): at 05:11

## 2022-08-26 NOTE — PROGRESS NOTE ADULT - PROBLEM SELECTOR PLAN 2
# s/p L flank wound debridement  - dressings intact and wound vac in place  - now following rectal or axillary readings  - ID consult noted and appreciated  - no plan for further debridement, wound care to change vac M/W/F prn per surgery recs  - blood cx from 8/11 grew VRE, no need to change abx management at this time per ID recs  - s/p Unasyn 3 grams IV Q6h finished 8/25 per ID  - Vanc d/c on 8/17  - Pain control with dilaudid PRN

## 2022-08-26 NOTE — PROGRESS NOTE ADULT - PROBLEM SELECTOR PLAN 4
- Hb stable (8.5 -> 8.0), continue to monitor (Hgb dayna of 5.9)  - iron studies suggestive of anemia of chronic disease  - stool guaic positive  - retic count elevated  - monitor CBC

## 2022-08-26 NOTE — PROGRESS NOTE ADULT - ASSESSMENT
52F immobile 2/2 h/o MS, DM2, asthma, hypothyroidism, and known sacral wounds presents from [place] with urosepsis and anemia with new L flank wound with concern for necrotizing soft tissue infection. Was s/p debridement of wound with worsening sepsis, acidosis, and severe global LV dysfunction of unknown etiology, transferred to CCU for [place] and inotrope support. Pt is s/p swan removal and off all sedation, weaning off pressors. Repeat echo showed EF of 5-10% and signs suggestive of possible stress induced cardiomyopathy. Pt's mental status has returned to baseline off sedation. Pt started on lopressor 12.5 po bid and lasix 40 po qd for HFrEF. C/w Unasyn until 8/25 per ID. Patient now transferred to medical floor.

## 2022-08-26 NOTE — PROGRESS NOTE ADULT - SUBJECTIVE AND OBJECTIVE BOX
[facility] Division of Hospital Medicine  Charlotte Quispe MD  Pager (M-F, 8A-5P): [phone]  Other Times:  [phone]    Patient is a 52y old  Female who presents with a chief complaint of Soft tissue infection (25 Aug 2022 14:58)      SUBJECTIVE / OVERNIGHT EVENTS: comfortable in bed sleeping but arousable to voice   ADDITIONAL REVIEW OF SYSTEMS: denies N/V/SOB/CP    MEDICATIONS  (STANDING):  ascorbic acid 500 milliGRAM(s) Oral daily  cadexomer iodine 0.9% Gel 1 Application(s) Topical daily  chlorhexidine 2% Cloths 1 Application(s) Topical daily  cholecalciferol 2000 Unit(s) Oral daily  collagenase Ointment 1 Application(s) Topical daily  furosemide    Tablet 40 milliGRAM(s) Oral daily  heparin   Injectable 5000 Unit(s) SubCutaneous every 8 hours  insulin lispro (ADMELOG) corrective regimen sliding scale   SubCutaneous Before meals and at bedtime  insulin lispro Injectable (ADMELOG) 5 Unit(s) SubCutaneous three times a day before meals  losartan 25 milliGRAM(s) Oral daily  magnesium oxide 400 milliGRAM(s) Oral three times a day with meals  magnesium sulfate  IVPB 2 Gram(s) IV Intermittent once  metoprolol tartrate 25 milliGRAM(s) Oral two times a day  multivitamin 1 Tablet(s) Oral daily  nystatin Powder 1 Application(s) Topical two times a day  pantoprazole    Tablet 40 milliGRAM(s) Oral before breakfast  potassium chloride    Tablet ER 40 milliEquivalent(s) Oral once  potassium chloride    Tablet ER 20 milliEquivalent(s) Oral daily    MEDICATIONS  (PRN):  acetaminophen     Tablet .. 650 milliGRAM(s) Oral every 6 hours PRN Mild Pain (1 - 3)  ALBUTerol    90 MICROgram(s) HFA Inhaler 2 Puff(s) Inhalation every 6 hours PRN Shortness of Breath  HYDROmorphone   Tablet 2 milliGRAM(s) Oral every 3 hours PRN Severe Pain (7 - 10)  HYDROmorphone   Tablet 1 milliGRAM(s) Oral every 3 hours PRN Moderate Pain (4 - 6)  sodium chloride 0.9% lock flush 10 milliLiter(s) IV Push every 1 hour PRN Pre/post blood products, medications, blood draw, and to maintain line patency      CAPILLARY BLOOD GLUCOSE      POCT Blood Glucose.: 97 mg/dL (26 Aug 2022 08:12)  POCT Blood Glucose.: 201 mg/dL (25 Aug 2022 21:09)  POCT Blood Glucose.: 191 mg/dL (25 Aug 2022 17:30)  POCT Blood Glucose.: 161 mg/dL (25 Aug 2022 11:44)    I&O's Summary    25 Aug 2022 07:01  -  26 Aug 2022 07:00  --------------------------------------------------------  IN: 1410 mL / OUT: 2600 mL / NET: -1190 mL    26 Aug 2022 07:01  -  26 Aug 2022 10:52  --------------------------------------------------------  IN: 0 mL / OUT: 250 mL / NET: -250 mL        PHYSICAL EXAM:  Vital Signs Last 24 Hrs  T(C): 36.3 (26 Aug 2022 10:00), Max: 37 (25 Aug 2022 20:31)  T(F): 97.4 (26 Aug 2022 10:00), Max: 98.6 (25 Aug 2022 20:31)  HR: 96 (26 Aug 2022 10:00) (86 - 99)  BP: 97/70 (26 Aug 2022 10:00) (97/51 - 106/52)  BP(mean): --  RR: 18 (26 Aug 2022 10:00) (18 - 21)  SpO2: 100% (26 Aug 2022 10:00) (97% - 100%)    Parameters below as of 26 Aug 2022 10:00  Patient On (Oxygen Delivery Method): room air    General: Middle age woman morbidly obese, NAD  HEENT: PERRLA, EOMI, moist mucous membranes  Respiratory: CTA B/L apically  CV: RRR, S1S2, no murmurs, rubs or gallops  Abdominal: Soft, NT, ND   Extremities: +edema in all 4 extremities, (improved)   Tubes: , peripheral IVs  Neurology: A&Ox 3, self/ and hospital     LABS:                        8.7    7.35  )-----------( 351      ( 25 Aug 2022 07:57 )             28.5         139  |  104  |  6<L>  ----------------------------<  132<H>  3.0<L>   |  25  |  0.38<L>    Ca    7.1<L>      26 Aug 2022 00:40  Phos  3.1       Mg     1.30         TPro  5.1<L>  /  Alb  1.5<L>  /  TBili  <0.2  /  DBili  <0.2  /  AST  15  /  ALT  14  /  AlkPhos  189<H>                  RADIOLOGY & ADDITIONAL TESTS:  Results Reviewed:   Imaging Personally Reviewed:  Electrocardiogram Personally Reviewed:    COORDINATION OF CARE:  Care Discussed with Consultants/Other Providers [Y/N]:  Prior or Outpatient Records Reviewed [Y/N]:   [facility]  Charlotte uQispe MD  Pager (M-F, 8A-5P): [phone]  Other Times:  [phone]    Patient is a 52y old  Female who presents with a chief complaint of Soft tissue infection (25 Aug 2022 14:58)      SUBJECTIVE / OVERNIGHT EVENTS: comfortable in bed sleeping but arousable to voice   ADDITIONAL REVIEW OF SYSTEMS: denies N/V/SOB/CP    MEDICATIONS  (STANDING):  ascorbic acid 500 milliGRAM(s) Oral daily  cadexomer iodine 0.9% Gel 1 Application(s) Topical daily  chlorhexidine 2% Cloths 1 Application(s) Topical daily  cholecalciferol 2000 Unit(s) Oral daily  collagenase Ointment 1 Application(s) Topical daily  furosemide    Tablet 40 milliGRAM(s) Oral daily  heparin   Injectable 5000 Unit(s) SubCutaneous every 8 hours  insulin lispro (ADMELOG) corrective regimen sliding scale   SubCutaneous Before meals and at bedtime  insulin lispro Injectable (ADMELOG) 5 Unit(s) SubCutaneous three times a day before meals  losartan 25 milliGRAM(s) Oral daily  magnesium oxide 400 milliGRAM(s) Oral three times a day with meals  magnesium sulfate  IVPB 2 Gram(s) IV Intermittent once  metoprolol tartrate 25 milliGRAM(s) Oral two times a day  multivitamin 1 Tablet(s) Oral daily  nystatin Powder 1 Application(s) Topical two times a day  pantoprazole    Tablet 40 milliGRAM(s) Oral before breakfast  potassium chloride    Tablet ER 40 milliEquivalent(s) Oral once  potassium chloride    Tablet ER 20 milliEquivalent(s) Oral daily    MEDICATIONS  (PRN):  acetaminophen     Tablet .. 650 milliGRAM(s) Oral every 6 hours PRN Mild Pain (1 - 3)  ALBUTerol    90 MICROgram(s) HFA Inhaler 2 Puff(s) Inhalation every 6 hours PRN Shortness of Breath  HYDROmorphone   Tablet 2 milliGRAM(s) Oral every 3 hours PRN Severe Pain (7 - 10)  HYDROmorphone   Tablet 1 milliGRAM(s) Oral every 3 hours PRN Moderate Pain (4 - 6)  sodium chloride 0.9% lock flush 10 milliLiter(s) IV Push every 1 hour PRN Pre/post blood products, medications, blood draw, and to maintain line patency      CAPILLARY BLOOD GLUCOSE      POCT Blood Glucose.: 97 mg/dL (26 Aug 2022 08:12)  POCT Blood Glucose.: 201 mg/dL (25 Aug 2022 21:09)  POCT Blood Glucose.: 191 mg/dL (25 Aug 2022 17:30)  POCT Blood Glucose.: 161 mg/dL (25 Aug 2022 11:44)    I&O's Summary    25 Aug 2022 07:01  -  26 Aug 2022 07:00  --------------------------------------------------------  IN: 1410 mL / OUT: 2600 mL / NET: -1190 mL    26 Aug 2022 07:01  -  26 Aug 2022 10:52  --------------------------------------------------------  IN: 0 mL / OUT: 250 mL / NET: -250 mL        PHYSICAL EXAM:  Vital Signs Last 24 Hrs  T(C): 36.3 (26 Aug 2022 10:00), Max: 37 (25 Aug 2022 20:31)  T(F): 97.4 (26 Aug 2022 10:00), Max: 98.6 (25 Aug 2022 20:31)  HR: 96 (26 Aug 2022 10:00) (86 - 99)  BP: 97/70 (26 Aug 2022 10:00) (97/51 - 106/52)  BP(mean): --  RR: 18 (26 Aug 2022 10:00) (18 - 21)  SpO2: 100% (26 Aug 2022 10:00) (97% - 100%)    Parameters below as of 26 Aug 2022 10:00  Patient On (Oxygen Delivery Method): room air    General: Middle age woman morbidly obese, NAD  HEENT: PERRLA, EOMI, moist mucous membranes  Respiratory: CTA B/L apically  CV: RRR, S1S2, no murmurs, rubs or gallops  Abdominal: Soft, NT, ND   Extremities: +edema in all 4 extremities, (improved)   Tubes: , peripheral IVs + LT hip vac +toussaint  Neurology: A&Ox 3, self/ and hospital     LABS:                        8.7    7.35  )-----------( 351      ( 25 Aug 2022 07:57 )             28.5         139  |  104  |  6<L>  ----------------------------<  132<H>  3.0<L>   |  25  |  0.38<L>    Ca    7.1<L>      26 Aug 2022 00:40  Phos  3.1       Mg     1.30         TPro  5.1<L>  /  Alb  1.5<L>  /  TBili  <0.2  /  DBili  <0.2  /  AST  15  /  ALT  14  /  AlkPhos  189<H>                  RADIOLOGY & ADDITIONAL TESTS:  Results Reviewed:   Imaging Personally Reviewed:  Electrocardiogram Personally Reviewed:    COORDINATION OF CARE:  Care Discussed with Consultants/Other Providers [Y/N]:  Prior or Outpatient Records Reviewed [Y/N]:

## 2022-08-26 NOTE — PROGRESS NOTE ADULT - PROBLEM SELECTOR PLAN 3
# severe LV dysfunction; HFrEF 5 - 10%  - concern for cardiomyopathy of unknown etiology but may be R/T sepsis and critical illness  - s/p Dobutamine, s/p swan removal in the CCU   - vasopressin discontinued 8/13, currently off levophed, continue to monitor  - repeat ECHO to reassess LV function showed EF of 5-10%   - lopressor 25mg BID; for HR goal  on discharge should transition to succinate   - 8/21 started on Lasix 40 PO qd; replete K as necessary, patient may require daily K supplementation on discharge   - 8/23 started on losartan 25 # severe LV dysfunction; HFrEF 5 - 10%  - concern for cardiomyopathy of unknown etiology but may be R/T sepsis and critical illness  - s/p Dobutamine, s/p swan removal in the CCU   - vasopressin discontinued 8/13, currently off levophed, continue to monitor  - repeat ECHO to reassess LV function showed EF of 5-10%   - lopressor 25mg BID; for HR goal  on discharge should transition to succinate   - 8/21 started on Lasix 40 PO qd; replete K as necessary, patient may require daily K supplementation on discharge   - 8/23 started on losartan 25  - TOV tonight

## 2022-08-26 NOTE — PROGRESS NOTE ADULT - PROBLEM SELECTOR PLAN 1
- Mg 1.3 and K 3  - supplemented   - if repeat BMP K continually low after magnesium supplementation will need standing supplement on KCL as on diuresis

## 2022-08-27 LAB
ALBUMIN SERPL ELPH-MCNC: 1.4 G/DL — LOW (ref 3.3–5)
ALP SERPL-CCNC: 173 U/L — HIGH (ref 40–120)
ALT FLD-CCNC: 16 U/L — SIGNIFICANT CHANGE UP (ref 4–33)
ANION GAP SERPL CALC-SCNC: 11 MMOL/L — SIGNIFICANT CHANGE UP (ref 7–14)
AST SERPL-CCNC: 15 U/L — SIGNIFICANT CHANGE UP (ref 4–32)
BILIRUB DIRECT SERPL-MCNC: <0.2 MG/DL — SIGNIFICANT CHANGE UP (ref 0–0.3)
BILIRUB INDIRECT FLD-MCNC: >0 MG/DL — SIGNIFICANT CHANGE UP (ref 0–1)
BILIRUB SERPL-MCNC: 0.2 MG/DL — SIGNIFICANT CHANGE UP (ref 0.2–1.2)
BUN SERPL-MCNC: 6 MG/DL — LOW (ref 7–23)
CALCIUM SERPL-MCNC: 7.3 MG/DL — LOW (ref 8.4–10.5)
CHLORIDE SERPL-SCNC: 102 MMOL/L — SIGNIFICANT CHANGE UP (ref 98–107)
CO2 SERPL-SCNC: 24 MMOL/L — SIGNIFICANT CHANGE UP (ref 22–31)
CREAT SERPL-MCNC: 0.36 MG/DL — LOW (ref 0.5–1.3)
EGFR: 124 ML/MIN/1.73M2 — SIGNIFICANT CHANGE UP
GLUCOSE BLDC GLUCOMTR-MCNC: 118 MG/DL — HIGH (ref 70–99)
GLUCOSE BLDC GLUCOMTR-MCNC: 122 MG/DL — HIGH (ref 70–99)
GLUCOSE BLDC GLUCOMTR-MCNC: 154 MG/DL — HIGH (ref 70–99)
GLUCOSE BLDC GLUCOMTR-MCNC: 193 MG/DL — HIGH (ref 70–99)
GLUCOSE SERPL-MCNC: 120 MG/DL — HIGH (ref 70–99)
HCT VFR BLD CALC: 28.7 % — LOW (ref 34.5–45)
HGB BLD-MCNC: 8.8 G/DL — LOW (ref 11.5–15.5)
MAGNESIUM SERPL-MCNC: 1.6 MG/DL — SIGNIFICANT CHANGE UP (ref 1.6–2.6)
MCHC RBC-ENTMCNC: 27.1 PG — SIGNIFICANT CHANGE UP (ref 27–34)
MCHC RBC-ENTMCNC: 30.7 GM/DL — LOW (ref 32–36)
MCV RBC AUTO: 88.3 FL — SIGNIFICANT CHANGE UP (ref 80–100)
NRBC # BLD: 0 /100 WBCS — SIGNIFICANT CHANGE UP (ref 0–0)
NRBC # FLD: 0 K/UL — SIGNIFICANT CHANGE UP (ref 0–0)
PHOSPHATE SERPL-MCNC: 3.9 MG/DL — SIGNIFICANT CHANGE UP (ref 2.5–4.5)
PLATELET # BLD AUTO: 363 K/UL — SIGNIFICANT CHANGE UP (ref 150–400)
POTASSIUM SERPL-MCNC: 3.3 MMOL/L — LOW (ref 3.5–5.3)
POTASSIUM SERPL-SCNC: 3.3 MMOL/L — LOW (ref 3.5–5.3)
PROT SERPL-MCNC: 5 G/DL — LOW (ref 6–8.3)
RBC # BLD: 3.25 M/UL — LOW (ref 3.8–5.2)
RBC # FLD: 20.4 % — HIGH (ref 10.3–14.5)
SODIUM SERPL-SCNC: 137 MMOL/L — SIGNIFICANT CHANGE UP (ref 135–145)
WBC # BLD: 8.04 K/UL — SIGNIFICANT CHANGE UP (ref 3.8–10.5)
WBC # FLD AUTO: 8.04 K/UL — SIGNIFICANT CHANGE UP (ref 3.8–10.5)

## 2022-08-27 PROCEDURE — 99232 SBSQ HOSP IP/OBS MODERATE 35: CPT

## 2022-08-27 RX ORDER — MAGNESIUM SULFATE 500 MG/ML
1 VIAL (ML) INJECTION ONCE
Refills: 0 | Status: COMPLETED | OUTPATIENT
Start: 2022-08-27 | End: 2022-08-27

## 2022-08-27 RX ORDER — POTASSIUM CHLORIDE 20 MEQ
40 PACKET (EA) ORAL ONCE
Refills: 0 | Status: COMPLETED | OUTPATIENT
Start: 2022-08-27 | End: 2022-08-27

## 2022-08-27 RX ADMIN — Medication 2: at 12:25

## 2022-08-27 RX ADMIN — Medication 5 UNIT(S): at 12:26

## 2022-08-27 RX ADMIN — Medication 1 APPLICATION(S): at 11:20

## 2022-08-27 RX ADMIN — HEPARIN SODIUM 5000 UNIT(S): 5000 INJECTION INTRAVENOUS; SUBCUTANEOUS at 13:08

## 2022-08-27 RX ADMIN — LOSARTAN POTASSIUM 25 MILLIGRAM(S): 100 TABLET, FILM COATED ORAL at 06:36

## 2022-08-27 RX ADMIN — Medication 25 MILLIGRAM(S): at 17:21

## 2022-08-27 RX ADMIN — Medication 2000 UNIT(S): at 11:21

## 2022-08-27 RX ADMIN — Medication 40 MILLIEQUIVALENT(S): at 11:22

## 2022-08-27 RX ADMIN — NYSTATIN CREAM 1 APPLICATION(S): 100000 CREAM TOPICAL at 06:28

## 2022-08-27 RX ADMIN — Medication 25 MILLIGRAM(S): at 06:29

## 2022-08-27 RX ADMIN — Medication 2: at 21:18

## 2022-08-27 RX ADMIN — Medication 100 GRAM(S): at 11:20

## 2022-08-27 RX ADMIN — Medication 40 MILLIGRAM(S): at 06:36

## 2022-08-27 RX ADMIN — HEPARIN SODIUM 5000 UNIT(S): 5000 INJECTION INTRAVENOUS; SUBCUTANEOUS at 06:29

## 2022-08-27 RX ADMIN — CHLORHEXIDINE GLUCONATE 1 APPLICATION(S): 213 SOLUTION TOPICAL at 11:21

## 2022-08-27 RX ADMIN — NYSTATIN CREAM 1 APPLICATION(S): 100000 CREAM TOPICAL at 17:21

## 2022-08-27 RX ADMIN — PANTOPRAZOLE SODIUM 40 MILLIGRAM(S): 20 TABLET, DELAYED RELEASE ORAL at 06:29

## 2022-08-27 RX ADMIN — Medication 20 MILLIEQUIVALENT(S): at 11:22

## 2022-08-27 RX ADMIN — Medication 1 APPLICATION(S): at 11:21

## 2022-08-27 RX ADMIN — Medication 500 MILLIGRAM(S): at 11:21

## 2022-08-27 RX ADMIN — HEPARIN SODIUM 5000 UNIT(S): 5000 INJECTION INTRAVENOUS; SUBCUTANEOUS at 21:18

## 2022-08-27 RX ADMIN — Medication 1 TABLET(S): at 11:22

## 2022-08-27 NOTE — PROGRESS NOTE ADULT - PROBLEM SELECTOR PLAN 3
# severe LV dysfunction; HFrEF 5 - 10%  - concern for cardiomyopathy of unknown etiology but may be R/T sepsis and critical illness  - s/p Dobutamine, s/p swan removal in the CCU   - vasopressin discontinued 8/13, currently off levophed, continue to monitor  - repeat ECHO to reassess LV function showed EF of 5-10%   - lopressor 25mg BID; for HR goal  on discharge should transition to succinate   - 8/21 started on Lasix 40 PO qd; will require daily K supplementation on discharge   - 8/23 started on losartan 25  - s/p toussaint removal voiding well has primafit in place

## 2022-08-27 NOTE — PROGRESS NOTE ADULT - SUBJECTIVE AND OBJECTIVE BOX
[facility] Division of Hospital Medicine  Charlotte Quispe MD  Pager (M-F, 8G-5P): [phone]  Other Times:  [phone]    Patient is a 52y old  Female who presents with a chief complaint of Soft tissue infection (26 Aug 2022 10:52)      SUBJECTIVE / OVERNIGHT EVENTS: No acute events ON.   ADDITIONAL REVIEW OF SYSTEMS: denies SOB/N.V     MEDICATIONS  (STANDING):  ascorbic acid 500 milliGRAM(s) Oral daily  cadexomer iodine 0.9% Gel 1 Application(s) Topical daily  chlorhexidine 2% Cloths 1 Application(s) Topical daily  cholecalciferol 2000 Unit(s) Oral daily  collagenase Ointment 1 Application(s) Topical daily  furosemide    Tablet 40 milliGRAM(s) Oral daily  heparin   Injectable 5000 Unit(s) SubCutaneous every 8 hours  insulin lispro (ADMELOG) corrective regimen sliding scale   SubCutaneous Before meals and at bedtime  insulin lispro Injectable (ADMELOG) 5 Unit(s) SubCutaneous three times a day before meals  losartan 25 milliGRAM(s) Oral daily  metoprolol tartrate 25 milliGRAM(s) Oral two times a day  multivitamin 1 Tablet(s) Oral daily  nystatin Powder 1 Application(s) Topical two times a day  pantoprazole    Tablet 40 milliGRAM(s) Oral before breakfast  potassium chloride    Tablet ER 20 milliEquivalent(s) Oral daily    MEDICATIONS  (PRN):  acetaminophen     Tablet .. 650 milliGRAM(s) Oral every 6 hours PRN Mild Pain (1 - 3)  ALBUTerol    90 MICROgram(s) HFA Inhaler 2 Puff(s) Inhalation every 6 hours PRN Shortness of Breath  HYDROmorphone   Tablet 2 milliGRAM(s) Oral every 3 hours PRN Severe Pain (7 - 10)  HYDROmorphone   Tablet 1 milliGRAM(s) Oral every 3 hours PRN Moderate Pain (4 - 6)  sodium chloride 0.9% lock flush 10 milliLiter(s) IV Push every 1 hour PRN Pre/post blood products, medications, blood draw, and to maintain line patency      CAPILLARY BLOOD GLUCOSE      POCT Blood Glucose.: 193 mg/dL (27 Aug 2022 12:14)  POCT Blood Glucose.: 122 mg/dL (27 Aug 2022 08:28)  POCT Blood Glucose.: 179 mg/dL (26 Aug 2022 21:49)  POCT Blood Glucose.: 116 mg/dL (26 Aug 2022 17:14)    I&O's Summary    26 Aug 2022 07:01  -  27 Aug 2022 07:00  --------------------------------------------------------  IN: 100 mL / OUT: 1025 mL / NET: -925 mL    27 Aug 2022 07:01  -  27 Aug 2022 13:37  --------------------------------------------------------  IN: 0 mL / OUT: 250 mL / NET: -250 mL        PHYSICAL EXAM:  Vital Signs Last 24 Hrs  T(C): 36.7 (27 Aug 2022 09:32), Max: 36.7 (26 Aug 2022 21:20)  T(F): 98.1 (27 Aug 2022 09:32), Max: 98.1 (26 Aug 2022 21:20)  HR: 79 (27 Aug 2022 09:32) (76 - 108)  BP: 105/59 (27 Aug 2022 09:32) (95/78 - 118/64)  BP(mean): --  RR: 18 (27 Aug 2022 09:32) (16 - 18)  SpO2: 100% (27 Aug 2022 09:32) (98% - 100%)    Parameters below as of 27 Aug 2022 06:30  Patient On (Oxygen Delivery Method): room air    General: Middle age woman morbidly obese, NAD  HEENT: PERRLA, EOMI, moist mucous membranes  Respiratory: CTA B/L apically  CV: RRR, S1S2, no murmurs, rubs or gallops  Abdominal: Soft, NT, ND   Extremities: +edema in all 4 extremities, (improved)   Tubes: , peripheral IVs + LT hip vac +toussaint  Neurology: A&Ox 3, self/ and hospital         LABS:                        8.8    8.04  )-----------( 363      ( 27 Aug 2022 08:00 )             28.7     08    137  |  102  |  6<L>  ----------------------------<  120<H>  3.3<L>   |  24  |  0.36<L>    Ca    7.3<L>      27 Aug 2022 08:00  Phos  3.9       Mg     1.60         TPro  5.0<L>  /  Alb  1.4<L>  /  TBili  0.2  /  DBili  <0.2  /  AST  15  /  ALT  16  /  AlkPhos  173<H>                  RADIOLOGY & ADDITIONAL TESTS:  Results Reviewed:   Imaging Personally Reviewed:  Electrocardiogram Personally Reviewed:    COORDINATION OF CARE:  Care Discussed with Consultants/Other Providers [Y/N]:  Prior or Outpatient Records Reviewed [Y/N]:

## 2022-08-27 NOTE — PROGRESS NOTE ADULT - ASSESSMENT
52F immobile 2/2 h/o MS, DM2, asthma, hypothyroidism, and known sacral wounds presents from [facility] with urosepsis and anemia with new L flank wound with concern for necrotizing soft tissue infection. Was s/p debridement of wound with worsening sepsis, acidosis, and severe global LV dysfunction of unknown etiology, transferred to CCU for [place] and inotrope support. Pt is s/p swan removal and off all sedation, weaning off pressors. Repeat echo showed EF of 5-10% and signs suggestive of possible stress induced cardiomyopathy. Pt's mental status has returned to baseline off sedation. Pt started on lopressor 12.5 po bid and lasix 40 po qd for HFrEF. C/w Unasyn until 8/25 per ID. Patient now transferred to medical floor.

## 2022-08-28 LAB
ALBUMIN SERPL ELPH-MCNC: 1.7 G/DL — LOW (ref 3.3–5)
ALP SERPL-CCNC: 169 U/L — HIGH (ref 40–120)
ALT FLD-CCNC: 16 U/L — SIGNIFICANT CHANGE UP (ref 4–33)
ANION GAP SERPL CALC-SCNC: 11 MMOL/L — SIGNIFICANT CHANGE UP (ref 7–14)
AST SERPL-CCNC: 16 U/L — SIGNIFICANT CHANGE UP (ref 4–32)
BILIRUB DIRECT SERPL-MCNC: <0.2 MG/DL — SIGNIFICANT CHANGE UP (ref 0–0.3)
BILIRUB INDIRECT FLD-MCNC: >0 MG/DL — SIGNIFICANT CHANGE UP (ref 0–1)
BILIRUB SERPL-MCNC: 0.2 MG/DL — SIGNIFICANT CHANGE UP (ref 0.2–1.2)
BUN SERPL-MCNC: 5 MG/DL — LOW (ref 7–23)
CALCIUM SERPL-MCNC: 7.6 MG/DL — LOW (ref 8.4–10.5)
CHLORIDE SERPL-SCNC: 104 MMOL/L — SIGNIFICANT CHANGE UP (ref 98–107)
CO2 SERPL-SCNC: 25 MMOL/L — SIGNIFICANT CHANGE UP (ref 22–31)
CREAT SERPL-MCNC: 0.37 MG/DL — LOW (ref 0.5–1.3)
EGFR: 123 ML/MIN/1.73M2 — SIGNIFICANT CHANGE UP
GLUCOSE BLDC GLUCOMTR-MCNC: 116 MG/DL — HIGH (ref 70–99)
GLUCOSE BLDC GLUCOMTR-MCNC: 119 MG/DL — HIGH (ref 70–99)
GLUCOSE BLDC GLUCOMTR-MCNC: 131 MG/DL — HIGH (ref 70–99)
GLUCOSE BLDC GLUCOMTR-MCNC: 167 MG/DL — HIGH (ref 70–99)
GLUCOSE SERPL-MCNC: 105 MG/DL — HIGH (ref 70–99)
HCT VFR BLD CALC: 28.4 % — LOW (ref 34.5–45)
HGB BLD-MCNC: 8.6 G/DL — LOW (ref 11.5–15.5)
MAGNESIUM SERPL-MCNC: 1.7 MG/DL — SIGNIFICANT CHANGE UP (ref 1.6–2.6)
MCHC RBC-ENTMCNC: 27.1 PG — SIGNIFICANT CHANGE UP (ref 27–34)
MCHC RBC-ENTMCNC: 30.3 GM/DL — LOW (ref 32–36)
MCV RBC AUTO: 89.6 FL — SIGNIFICANT CHANGE UP (ref 80–100)
NRBC # BLD: 0 /100 WBCS — SIGNIFICANT CHANGE UP (ref 0–0)
NRBC # FLD: 0 K/UL — SIGNIFICANT CHANGE UP (ref 0–0)
PHOSPHATE SERPL-MCNC: 3.9 MG/DL — SIGNIFICANT CHANGE UP (ref 2.5–4.5)
PLATELET # BLD AUTO: 365 K/UL — SIGNIFICANT CHANGE UP (ref 150–400)
POTASSIUM SERPL-MCNC: 3.6 MMOL/L — SIGNIFICANT CHANGE UP (ref 3.5–5.3)
POTASSIUM SERPL-SCNC: 3.6 MMOL/L — SIGNIFICANT CHANGE UP (ref 3.5–5.3)
PROT SERPL-MCNC: 5.1 G/DL — LOW (ref 6–8.3)
RBC # BLD: 3.17 M/UL — LOW (ref 3.8–5.2)
RBC # FLD: 20.7 % — HIGH (ref 10.3–14.5)
SODIUM SERPL-SCNC: 140 MMOL/L — SIGNIFICANT CHANGE UP (ref 135–145)
WBC # BLD: 7.39 K/UL — SIGNIFICANT CHANGE UP (ref 3.8–10.5)
WBC # FLD AUTO: 7.39 K/UL — SIGNIFICANT CHANGE UP (ref 3.8–10.5)

## 2022-08-28 PROCEDURE — 99232 SBSQ HOSP IP/OBS MODERATE 35: CPT

## 2022-08-28 RX ORDER — MAGNESIUM SULFATE 500 MG/ML
2 VIAL (ML) INJECTION ONCE
Refills: 0 | Status: COMPLETED | OUTPATIENT
Start: 2022-08-28 | End: 2022-08-28

## 2022-08-28 RX ADMIN — Medication 2000 UNIT(S): at 12:34

## 2022-08-28 RX ADMIN — Medication 1 TABLET(S): at 12:34

## 2022-08-28 RX ADMIN — Medication 5 UNIT(S): at 17:47

## 2022-08-28 RX ADMIN — Medication 1 APPLICATION(S): at 12:39

## 2022-08-28 RX ADMIN — Medication 25 GRAM(S): at 10:53

## 2022-08-28 RX ADMIN — NYSTATIN CREAM 1 APPLICATION(S): 100000 CREAM TOPICAL at 17:46

## 2022-08-28 RX ADMIN — HEPARIN SODIUM 5000 UNIT(S): 5000 INJECTION INTRAVENOUS; SUBCUTANEOUS at 05:45

## 2022-08-28 RX ADMIN — Medication 5 UNIT(S): at 12:32

## 2022-08-28 RX ADMIN — Medication 1 APPLICATION(S): at 12:37

## 2022-08-28 RX ADMIN — HEPARIN SODIUM 5000 UNIT(S): 5000 INJECTION INTRAVENOUS; SUBCUTANEOUS at 14:26

## 2022-08-28 RX ADMIN — Medication 5 UNIT(S): at 09:36

## 2022-08-28 RX ADMIN — Medication 20 MILLIEQUIVALENT(S): at 12:33

## 2022-08-28 RX ADMIN — Medication 2: at 17:46

## 2022-08-28 RX ADMIN — HEPARIN SODIUM 5000 UNIT(S): 5000 INJECTION INTRAVENOUS; SUBCUTANEOUS at 21:46

## 2022-08-28 RX ADMIN — NYSTATIN CREAM 1 APPLICATION(S): 100000 CREAM TOPICAL at 05:46

## 2022-08-28 RX ADMIN — Medication 500 MILLIGRAM(S): at 12:34

## 2022-08-28 RX ADMIN — CHLORHEXIDINE GLUCONATE 1 APPLICATION(S): 213 SOLUTION TOPICAL at 12:40

## 2022-08-28 RX ADMIN — Medication 25 MILLIGRAM(S): at 17:47

## 2022-08-28 NOTE — PROGRESS NOTE ADULT - SUBJECTIVE AND OBJECTIVE BOX
[facility] Division of Hospital Medicine  Charlotte Quispe MD  Pager (M-F, 8A-5P): [phone]  Other Times:  [phone]    Patient is a 52y old  Female who presents with a chief complaint of Soft tissue infection (27 Aug 2022 13:37)      SUBJECTIVE / OVERNIGHT EVENTS: no acute events ON.   ADDITIONAL REVIEW OF SYSTEMS: denies any complaints     MEDICATIONS  (STANDING):  ascorbic acid 500 milliGRAM(s) Oral daily  cadexomer iodine 0.9% Gel 1 Application(s) Topical daily  chlorhexidine 2% Cloths 1 Application(s) Topical daily  cholecalciferol 2000 Unit(s) Oral daily  collagenase Ointment 1 Application(s) Topical daily  furosemide    Tablet 40 milliGRAM(s) Oral daily  heparin   Injectable 5000 Unit(s) SubCutaneous every 8 hours  insulin lispro (ADMELOG) corrective regimen sliding scale   SubCutaneous Before meals and at bedtime  insulin lispro Injectable (ADMELOG) 5 Unit(s) SubCutaneous three times a day before meals  losartan 25 milliGRAM(s) Oral daily  metoprolol tartrate 25 milliGRAM(s) Oral two times a day  multivitamin 1 Tablet(s) Oral daily  nystatin Powder 1 Application(s) Topical two times a day  pantoprazole    Tablet 40 milliGRAM(s) Oral before breakfast  potassium chloride    Tablet ER 20 milliEquivalent(s) Oral daily    MEDICATIONS  (PRN):  acetaminophen     Tablet .. 650 milliGRAM(s) Oral every 6 hours PRN Mild Pain (1 - 3)  ALBUTerol    90 MICROgram(s) HFA Inhaler 2 Puff(s) Inhalation every 6 hours PRN Shortness of Breath  HYDROmorphone   Tablet 2 milliGRAM(s) Oral every 3 hours PRN Severe Pain (7 - 10)  HYDROmorphone   Tablet 1 milliGRAM(s) Oral every 3 hours PRN Moderate Pain (4 - 6)  sodium chloride 0.9% lock flush 10 milliLiter(s) IV Push every 1 hour PRN Pre/post blood products, medications, blood draw, and to maintain line patency      CAPILLARY BLOOD GLUCOSE      POCT Blood Glucose.: 119 mg/dL (28 Aug 2022 08:21)  POCT Blood Glucose.: 154 mg/dL (27 Aug 2022 21:07)  POCT Blood Glucose.: 118 mg/dL (27 Aug 2022 17:14)  POCT Blood Glucose.: 193 mg/dL (27 Aug 2022 12:14)    I&O's Summary    27 Aug 2022 07:01  -  28 Aug 2022 07:00  --------------------------------------------------------  IN: 0 mL / OUT: 1600 mL / NET: -1600 mL        PHYSICAL EXAM:  Vital Signs Last 24 Hrs  T(C): 36.6 (28 Aug 2022 09:22), Max: 37.2 (27 Aug 2022 17:44)  T(F): 97.9 (28 Aug 2022 09:22), Max: 98.9 (27 Aug 2022 17:44)  HR: 104 (28 Aug 2022 09:22) (67 - 108)  BP: 119/61 (28 Aug 2022 09:22) (100/53 - 119/61)  BP(mean): --  RR: 18 (28 Aug 2022 09:22) (16 - 18)  SpO2: 98% (28 Aug 2022 09:22) (98% - 99%)    Parameters below as of 28 Aug 2022 06:00  Patient On (Oxygen Delivery Method): room air      General: Middle age woman morbidly obese, NAD  HEENT: PERRLA, EOMI, moist mucous membranes  Respiratory: CTA B/L apically  CV: RRR, S1S2, no murmurs, rubs or gallops  Abdominal: Soft, NT, ND   Extremities: +edema in all 4 extremities, (improved)   Tubes: , peripheral IVs + LT hip vac   Neurology: answers questions appropriately    LABS:                        8.6    7.39  )-----------( 365      ( 28 Aug 2022 07:27 )             28.4     08-28    140  |  104  |  5<L>  ----------------------------<  105<H>  3.6   |  25  |  0.37<L>    Ca    7.6<L>      28 Aug 2022 07:27  Phos  3.9     08-28  Mg     1.70     08-28    TPro  5.1<L>  /  Alb  1.7<L>  /  TBili  0.2  /  DBili  <0.2  /  AST  16  /  ALT  16  /  AlkPhos  169<H>  08-28                RADIOLOGY & ADDITIONAL TESTS:  Results Reviewed:   Imaging Personally Reviewed:  Electrocardiogram Personally Reviewed:    COORDINATION OF CARE:  Care Discussed with Consultants/Other Providers [Y/N]:  Prior or Outpatient Records Reviewed [Y/N]:

## 2022-08-28 NOTE — PROGRESS NOTE ADULT - PROBLEM SELECTOR PLAN 1
- Mg 1.7 and K 3.6 in setting of diuresis   - supplemented mag 2 g   - c/w kcl 20 meq daily  - monitor BMP

## 2022-08-28 NOTE — PROGRESS NOTE ADULT - ASSESSMENT
52F immobile 2/2 h/o MS, DM2, asthma, hypothyroidism, and known sacral wounds presents from [place] with urosepsis and anemia with new L flank wound with concern for necrotizing soft tissue infection. Was s/p debridement of wound with worsening sepsis, acidosis, and severe global LV dysfunction of unknown etiology, transferred to CCU for [place] and inotrope support. Pt is s/p swan removal and off all sedation, weaning off pressors. Repeat echo showed EF of 5-10% and signs suggestive of possible stress induced cardiomyopathy. Pt's mental status has returned to baseline off sedation. Pt started on lopressor 12.5 po bid and lasix 40 po qd for HFrEF. C/w Unasyn until 8/25 per ID. Patient now transferred to medical floor.

## 2022-08-29 LAB
ALBUMIN SERPL ELPH-MCNC: 1.3 G/DL — LOW (ref 3.3–5)
ALP SERPL-CCNC: 169 U/L — HIGH (ref 40–120)
ALT FLD-CCNC: 19 U/L — SIGNIFICANT CHANGE UP (ref 4–33)
ANION GAP SERPL CALC-SCNC: 11 MMOL/L — SIGNIFICANT CHANGE UP (ref 7–14)
AST SERPL-CCNC: 15 U/L — SIGNIFICANT CHANGE UP (ref 4–32)
BILIRUB DIRECT SERPL-MCNC: <0.2 MG/DL — SIGNIFICANT CHANGE UP (ref 0–0.3)
BILIRUB INDIRECT FLD-MCNC: >0 MG/DL — SIGNIFICANT CHANGE UP (ref 0–1)
BILIRUB SERPL-MCNC: 0.2 MG/DL — SIGNIFICANT CHANGE UP (ref 0.2–1.2)
BUN SERPL-MCNC: 5 MG/DL — LOW (ref 7–23)
CALCIUM SERPL-MCNC: 8 MG/DL — LOW (ref 8.4–10.5)
CHLORIDE SERPL-SCNC: 106 MMOL/L — SIGNIFICANT CHANGE UP (ref 98–107)
CO2 SERPL-SCNC: 23 MMOL/L — SIGNIFICANT CHANGE UP (ref 22–31)
CREAT SERPL-MCNC: 0.39 MG/DL — LOW (ref 0.5–1.3)
EGFR: 121 ML/MIN/1.73M2 — SIGNIFICANT CHANGE UP
GLUCOSE BLDC GLUCOMTR-MCNC: 129 MG/DL — HIGH (ref 70–99)
GLUCOSE BLDC GLUCOMTR-MCNC: 174 MG/DL — HIGH (ref 70–99)
GLUCOSE BLDC GLUCOMTR-MCNC: 180 MG/DL — HIGH (ref 70–99)
GLUCOSE BLDC GLUCOMTR-MCNC: 192 MG/DL — HIGH (ref 70–99)
GLUCOSE SERPL-MCNC: 124 MG/DL — HIGH (ref 70–99)
HCT VFR BLD CALC: 34.8 % — SIGNIFICANT CHANGE UP (ref 34.5–45)
HGB BLD-MCNC: 10.4 G/DL — LOW (ref 11.5–15.5)
MAGNESIUM SERPL-MCNC: 1.6 MG/DL — SIGNIFICANT CHANGE UP (ref 1.6–2.6)
MCHC RBC-ENTMCNC: 27.2 PG — SIGNIFICANT CHANGE UP (ref 27–34)
MCHC RBC-ENTMCNC: 29.9 GM/DL — LOW (ref 32–36)
MCV RBC AUTO: 91.1 FL — SIGNIFICANT CHANGE UP (ref 80–100)
NRBC # BLD: 0 /100 WBCS — SIGNIFICANT CHANGE UP (ref 0–0)
NRBC # FLD: 0 K/UL — SIGNIFICANT CHANGE UP (ref 0–0)
PHOSPHATE SERPL-MCNC: 4 MG/DL — SIGNIFICANT CHANGE UP (ref 2.5–4.5)
PLATELET # BLD AUTO: 291 K/UL — SIGNIFICANT CHANGE UP (ref 150–400)
POTASSIUM SERPL-MCNC: 3.8 MMOL/L — SIGNIFICANT CHANGE UP (ref 3.5–5.3)
POTASSIUM SERPL-SCNC: 3.8 MMOL/L — SIGNIFICANT CHANGE UP (ref 3.5–5.3)
PROT SERPL-MCNC: 5.2 G/DL — LOW (ref 6–8.3)
RBC # BLD: 3.82 M/UL — SIGNIFICANT CHANGE UP (ref 3.8–5.2)
RBC # FLD: 20.5 % — HIGH (ref 10.3–14.5)
SODIUM SERPL-SCNC: 140 MMOL/L — SIGNIFICANT CHANGE UP (ref 135–145)
WBC # BLD: 6.38 K/UL — SIGNIFICANT CHANGE UP (ref 3.8–10.5)
WBC # FLD AUTO: 6.38 K/UL — SIGNIFICANT CHANGE UP (ref 3.8–10.5)

## 2022-08-29 PROCEDURE — 99233 SBSQ HOSP IP/OBS HIGH 50: CPT

## 2022-08-29 PROCEDURE — 99223 1ST HOSP IP/OBS HIGH 75: CPT

## 2022-08-29 RX ORDER — METOPROLOL TARTRATE 50 MG
25 TABLET ORAL
Refills: 0 | Status: DISCONTINUED | OUTPATIENT
Start: 2022-08-29 | End: 2022-08-29

## 2022-08-29 RX ORDER — METOPROLOL TARTRATE 50 MG
25 TABLET ORAL
Refills: 0 | Status: DISCONTINUED | OUTPATIENT
Start: 2022-08-29 | End: 2022-08-30

## 2022-08-29 RX ORDER — METOPROLOL TARTRATE 50 MG
50 TABLET ORAL
Refills: 0 | Status: DISCONTINUED | OUTPATIENT
Start: 2022-08-29 | End: 2022-08-29

## 2022-08-29 RX ORDER — SODIUM CHLORIDE 9 MG/ML
250 INJECTION INTRAMUSCULAR; INTRAVENOUS; SUBCUTANEOUS ONCE
Refills: 0 | Status: COMPLETED | OUTPATIENT
Start: 2022-08-29 | End: 2022-08-29

## 2022-08-29 RX ADMIN — Medication 40 MILLIGRAM(S): at 06:40

## 2022-08-29 RX ADMIN — HEPARIN SODIUM 5000 UNIT(S): 5000 INJECTION INTRAVENOUS; SUBCUTANEOUS at 06:40

## 2022-08-29 RX ADMIN — SODIUM CHLORIDE 500 MILLILITER(S): 9 INJECTION INTRAMUSCULAR; INTRAVENOUS; SUBCUTANEOUS at 21:14

## 2022-08-29 RX ADMIN — Medication 2: at 22:17

## 2022-08-29 RX ADMIN — Medication 1 APPLICATION(S): at 11:49

## 2022-08-29 RX ADMIN — Medication 25 MILLIGRAM(S): at 17:34

## 2022-08-29 RX ADMIN — Medication 500 MILLIGRAM(S): at 11:49

## 2022-08-29 RX ADMIN — HEPARIN SODIUM 5000 UNIT(S): 5000 INJECTION INTRAVENOUS; SUBCUTANEOUS at 22:16

## 2022-08-29 RX ADMIN — NYSTATIN CREAM 1 APPLICATION(S): 100000 CREAM TOPICAL at 06:40

## 2022-08-29 RX ADMIN — Medication 2: at 17:33

## 2022-08-29 RX ADMIN — PANTOPRAZOLE SODIUM 40 MILLIGRAM(S): 20 TABLET, DELAYED RELEASE ORAL at 06:39

## 2022-08-29 RX ADMIN — Medication 5 UNIT(S): at 12:16

## 2022-08-29 RX ADMIN — Medication 25 MILLIGRAM(S): at 06:39

## 2022-08-29 RX ADMIN — LOSARTAN POTASSIUM 25 MILLIGRAM(S): 100 TABLET, FILM COATED ORAL at 06:39

## 2022-08-29 RX ADMIN — NYSTATIN CREAM 1 APPLICATION(S): 100000 CREAM TOPICAL at 17:34

## 2022-08-29 RX ADMIN — HEPARIN SODIUM 5000 UNIT(S): 5000 INJECTION INTRAVENOUS; SUBCUTANEOUS at 13:17

## 2022-08-29 RX ADMIN — Medication 2: at 12:15

## 2022-08-29 RX ADMIN — Medication 1 TABLET(S): at 11:49

## 2022-08-29 RX ADMIN — Medication 20 MILLIEQUIVALENT(S): at 11:50

## 2022-08-29 RX ADMIN — Medication 5 UNIT(S): at 17:33

## 2022-08-29 RX ADMIN — Medication 1 APPLICATION(S): at 11:50

## 2022-08-29 RX ADMIN — CHLORHEXIDINE GLUCONATE 1 APPLICATION(S): 213 SOLUTION TOPICAL at 12:15

## 2022-08-29 RX ADMIN — Medication 2000 UNIT(S): at 11:49

## 2022-08-29 NOTE — PROGRESS NOTE ADULT - PROBLEM SELECTOR PLAN 5
- A1c 5.5  - FS elevated but now better controlled  - was started on steroids post op, ?stress dose, was tapered, d/c'ed on 8/15 given concern of sepsis  - continue FS/ IHSS as indicated      # BMI of 36.3 --> Obesity  # Functional quadriplegia from underlying MS

## 2022-08-29 NOTE — PROGRESS NOTE ADULT - ASSESSMENT
52F immobile 2/2 h/o MS, DM2, asthma, hypothyroidism, and known sacral wounds presents from [place] with urosepsis and anemia with new L flank wound with concern for necrotizing soft tissue infection. Was s/p debridement of wound with worsening sepsis, acidosis, and severe global LV dysfunction of unknown etiology, transferred to CCU for [place] and inotrope support. Pt is s/p swan removal and off all sedation, weaning off pressors. Repeat echo showed EF of 5-10% and signs suggestive of possible stress induced cardiomyopathy. Pt's mental status has returned to baseline off sedation. Pt started on lopressor 12.5 po bid and lasix 40 po qd for HFrEF. C/w Unasyn until 8/25 per ID. Patient now transferred to medical floor.

## 2022-08-29 NOTE — PROGRESS NOTE ADULT - PROBLEM SELECTOR PLAN 3
# severe LV dysfunction; HFrEF 5 - 10%  - concern for cardiomyopathy of unknown etiology but may be R/T sepsis and critical illness  - s/p Dobutamine, s/p swan removal in the CCU   - vasopressin discontinued 8/13, currently off levophed, continue to monitor  - repeat ECHO to reassess LV function showed EF of 5-10%   - lopressor 25mg BID, being increased to 50mg BID today; for HR goal, on discharge should transition to succinate   - 8/21 started on Lasix 40 PO qd; will require daily K supplementation on discharge   - 8/23 started on losartan 25, increased dose as above  - s/p toussaint removal voiding well has primafit in place

## 2022-08-29 NOTE — PROGRESS NOTE ADULT - ASSESSMENT
Assessment/Plan: 52F immobile 2/2 MS, poorly controlled T2DM, asthma, hypothyroidism, known chronic wounds sacrum (stage 4), vulvar/Rt thigh, and right calf. Recent April 2022 hospitalization for urosepsis. Presented from [facility] with concerns for UTI and anemia (6.8 from baseline 8.0). Found to have new malodorous wound on L flank area, concerning for necrotizing soft tissue infection. Febrile to 103.7. S/p excisional debridement in OR of wound L flank to midback/sacrum by general surgery. Now with NPWT/VAC therapy, currently managed by general surgery and changed by [facility] nursing team. Currently in surgical floors, Afebrile, WBC wnl, off IV antibiotics. Referral to [facility].     Wound surgery follow up for multiple pressure injuries.  Overall wounds stable, no major changes.     Right axilla full thickness injury of unknown etiology  -Measurements improved since last seen   -area within intertriginous fold exposed to moisture.  -wound base clean, no purulent drainage, no odor. No s/s of acute skin infection/cellulitis, no suspected candidiasis within this area.  -Topical recommendations: Clean with NS, pat dry. Cover with silicone foam with border. Change daily.    Right forearm category 3 skin tear  -Stable   -No over cellulitis   -Topical Recommendations: Clean with NS. Pat dry. Cover with silicone foam with borders. Change every other day.     Right anterior to lateral abdomen linear deep tissue pressure injury, now healed.   - now exposing 100% linear area of hypopigmentation no tissue type changes.   - Topical recommendations: liquid barrier film, allow to dry. Apply daily.   - Monitor for changes in tissue type.    Right trochanter stage 4 pressure injury  -No bone exposed  -Measurements improved, tissue type stable    -Chronic stage 4 pressure injury. Narrow ulceration; visible base clean. Unable to palpate bone. (+) serosanguinous drainage. No purulence, no odor.   -Periwound skin without induration, no increased warmth, no edema, no erythema, no crepitus. No overt s/s of acute skin infection/cellulitis.  -Topical recommendations: pack with Aquacel hydrofiber, leaving 2" out at end, cover with silicone foam with border. Change daily.    Right and left buttock evolving DTPI  -New since last seen   -No tissue type changes palpated underneath purple maroon discoloration or extending to periwound skin   -No over infection or cellulitis   -Topical Recommendations: Clean with NS. Pat dry. Place Silicone foam with borders. Change daily or PRN if soiled/compromised.     Right knee superficial abrasion  -improving  -clean dermis exposed. No associated cellulitis.  -Topical recommendations: silicone foam with border, change daily.    Left ischium mixed unstagable pressure injury and moisture associated dermatitis  -affected area within intertriginous fold, irregular borders.  -Stable   -Wound base with fixed moistening slough beginning to lift; no induration, no fluctuance, no crepitus.  -Periwound skin without induration, no increased warmth, no edema, no erythema, no crepitus. No overt s/s of acute skin infection/cellulitis.  -No surgical debridement indicated. Will continue enzymatic debridement with santyl (collagenase).  -Topical recommendations- Apply Collagenase nahun thickness to wound base, cover with silicone with border, change daily.    Left lateral lower leg: proximal stage 4 pressure injury, distal unstagable pressure injury, Left lateral malleolus (unable to visualized, patient with intact dressing to bilateral feet)  -proximal wound base clean, agranular with viable tendon exposed 9minimal). (+) serous drainage. No purulent drainage no odor.   -Wound improving in size and tissue type   -Consider X rays to r/o osteomyelitis of left leg (low suspicious of acute osteomyelitis),  -Distal lateral lower leg- with minimal moist adherent slough, remaining agranular and surrounding purple maroon discoloration. Scant serous drainage, no odor.  -less necrotic tissue observed.   -Periwound skin of all without induration, no increased warmth, no edema, no erythema, no crepitus. No overt s/s of acute skin infection/cellulitis.  -Topical recommendations: proximal: pack with Aquacel hydrofiber, leaving 2" out at end, cover with silicone foam with border. Change daily.  Lateral distal leg- collagenase, foam with border. Change daily.    Bilateral heels unstageable pressure injuries  -seen by podiatry on 8/22  -Currently recommending management with Iodosorb as per podiatry team   -Recommend podiatry team to evaluate left lateral malleolus full thickness pressure injury     Moisture associated dermatitis   -Bilateral axilla, submammary, abdominal pannus without suspected candidiasis. Recommend; cleanse with luke-warm soap and water, dry well. Apply Interdry textile sheeting leaving 2" out at end to wick, remove to wash & dry affected area, then replace. Individual sheeting may be used for up to 5 days unless soiled. Inspect skin daily.   -Bilateral groin and medial thighs with improving suspected candidiasis. Agree with Nystatin powder twice a day. Cleanse with luke-warm water soap and water, dry well. Apply nystatin powder twice a day.    Left flank to sacrum s/p excision debridement in OR for necrotizing fascitis with large wound  -NPWT/VAC therapy currently managed by general surgery/WOC team  -Consider use of Envella bed to minimize pressure to affected area.    DM type 2, hyperglycemia  -mgmt per primary team  -HgbA1C 8/12/22 --> 5.5  -consider endocrine consult; per H&P history of being a poorly controlled DM type 2, now s/p debridement of necrotizing fascitis, multiple full thickness pressure injuries.  -When medically appropriate consider Diabetes education    Additional recommendations:  -Consider Envella bed. Currently on low airloss support surface.  -Continue turning and positioning w/ offloading assistive devices as per protocol  -Continue w/ Pericare as per protocol.   -Continue use of complete cair boots  -Nutrition recommendations appreciated when medically appropriate for patient with multiple full thickness pressure injuries,  now s/p debridement of necrotizing fascitis with NPWT/VAC in place.    Remainder of care per primary team.  Will continue to follow periodically throughout hospitalization. Please reconsult earlier if needed.    Upon discharge f/u as outpatient at [facility] [address] [place], [place] [phone]  All findings and plan discussed with primary team.    Thank you.  Dana Ashby, KAR-BC, CWOCN (pager #[phone])    If after 4PM or before 7:30AM on Mon-Friday or weekend/holiday please contact general surgery for urgent matters.   Team A- [id number]   Team B- [id number]  For non-urgent matters e-mail [email or web address]    We spent 55 minutes face to face with this patient of which more than 50% of the time was spent counseling & coordinating care of this pt       Assessment/Plan: 52F immobile 2/2 MS, poorly controlled T2DM, asthma, hypothyroidism, known chronic wounds sacrum (stage 4), vulvar/Rt thigh, and right calf. Recent April 2022 hospitalization for urosepsis. Presented from [facility] with concerns for UTI and anemia (6.8 from baseline 8.0). Found to have new malodorous wound on L flank area, concerning for necrotizing soft tissue infection. Febrile to 103.7. S/p excisional debridement in OR of wound L flank to midback/sacrum by general surgery. Now with NPWT/VAC therapy, currently managed by general surgery and changed by [facility] nursing team. Currently in surgical floors, Afebrile, WBC wnl, off IV antibiotics. Referral to [facility].     Wound surgery follow up for multiple pressure injuries.  Overall wounds stable, no major changes.     Right axilla full thickness injury of unknown etiology  -Measurements improved since last seen   -area within intertriginous fold exposed to moisture.  -wound base clean, no purulent drainage, no odor. No s/s of acute skin infection/cellulitis, no suspected candidiasis within this area.  -Topical recommendations: Clean with NS, pat dry. Cover with silicone foam with border. Change daily.    Right forearm category 3 skin tear  -Stable   -No over cellulitis   -Topical Recommendations: Clean with NS. Pat dry. Cover with silicone foam with borders. Change every other day.     Right anterior to lateral abdomen linear deep tissue pressure injury, now healed.   - now exposing 100% linear area of hypopigmentation no tissue type changes.   - Topical recommendations: liquid barrier film, allow to dry. Apply daily.   - Monitor for changes in tissue type.    Right trochanter stage 4 pressure injury  -No bone exposed  -Measurements improved, tissue type stable    -Chronic stage 4 pressure injury. Narrow ulceration; visible base clean. Unable to palpate bone. (+) serosanguinous drainage. No purulence, no odor.   -Periwound skin without induration, no increased warmth, no edema, no erythema, no crepitus. No overt s/s of acute skin infection/cellulitis.  -Topical recommendations: pack with Aquacel hydrofiber, leaving 2" out at end, cover with silicone foam with border. Change daily.    Right and left buttock evolving DTPI  -New since last seen   -No tissue type changes palpated underneath purple maroon discoloration or extending to periwound skin   -No over infection or cellulitis   -Topical Recommendations: Clean with NS. Pat dry. Place Silicone foam with borders. Change daily or PRN if soiled/compromised.     Right knee superficial abrasion  -improving  -clean dermis exposed. No associated cellulitis.  -Topical recommendations: silicone foam with border, change daily.    Left ischium mixed unstagable pressure injury and moisture associated dermatitis  -affected area within intertriginous fold, irregular borders.  -Stable   -Wound base with fixed moistening slough beginning to lift; no induration, no fluctuance, no crepitus.  -Periwound skin without induration, no increased warmth, no edema, no erythema, no crepitus. No overt s/s of acute skin infection/cellulitis.  -No surgical debridement indicated. Will continue enzymatic debridement with santyl (collagenase).  -Topical recommendations- Apply Collagenase nahun thickness to wound base, cover with silicone with border, change daily.    Left lateral lower leg: proximal stage 4 pressure injury, distal unstagable pressure injury, Left lateral malleolus (unable to visualized, patient with intact dressing to bilateral feet)  -proximal wound base clean, agranular with viable tendon exposed 9minimal). (+) serous drainage. No purulent drainage no odor.   -Wound improving in size and tissue type   -Consider X rays to r/o osteomyelitis of left leg (low suspicious of acute osteomyelitis),  -Distal lateral lower leg- with minimal moist adherent slough, remaining agranular and surrounding purple maroon discoloration. Scant serous drainage, no odor.  -less necrotic tissue observed.   -Periwound skin of all without induration, no increased warmth, no edema, no erythema, no crepitus. No overt s/s of acute skin infection/cellulitis.  -Topical recommendations: proximal: pack with Aquacel hydrofiber, leaving 2" out at end, cover with silicone foam with border. Change daily.  Lateral distal leg- collagenase, foam with border. Change daily.    Bilateral heels unstageable pressure injuries  -seen by podiatry on 8/22  -Currently recommending management with Iodosorb as per podiatry team   -Recommend podiatry team to evaluate left lateral malleolus full thickness pressure injury     Moisture associated dermatitis   -Bilateral axilla, submammary, abdominal pannus without suspected candidiasis. Recommend; cleanse with luke-warm soap and water, dry well. Apply Interdry textile sheeting leaving 2" out at end to wick, remove to wash & dry affected area, then replace. Individual sheeting may be used for up to 5 days unless soiled. Inspect skin daily.   -Bilateral groin and medial thighs with improving suspected candidiasis. Agree with Nystatin powder twice a day. Cleanse with luke-warm water soap and water, dry well. Apply nystatin powder twice a day.    Left flank to sacrum s/p excision debridement in OR for necrotizing fascitis with large wound  -NPWT/VAC therapy currently managed by general surgery/WOC team  -Consider use of Envella bed to minimize pressure to affected area.    DM type 2, hyperglycemia  -mgmt per primary team  -HgbA1C 8/12/22 --> 5.5  -consider endocrine consult; per H&P history of being a poorly controlled DM type 2, now s/p debridement of necrotizing fascitis, multiple full thickness pressure injuries.  -When medically appropriate consider Diabetes education    Additional recommendations:  -Consider Envella bed. Currently on low airloss support surface.  -Continue turning and positioning w/ offloading assistive devices as per protocol  -Continue w/ Pericare as per protocol.   -Continue use of complete cair boots  -Nutrition recommendations appreciated when medically appropriate for patient with multiple full thickness pressure injuries,  now s/p debridement of necrotizing fascitis with NPWT/VAC in place.    Remainder of care per primary team.  Will continue to follow periodically throughout hospitalization. Please reconsult earlier if needed.    Upon discharge f/u as outpatient at [facility] [address] [place] [address] [phone]  All findings and plan discussed with primary team.    Thank you.  Dana Ashby, KAR-BC, CWOCN (pager #[phone])    If after 4PM or before 7:30AM on Mon-Friday or weekend/holiday please contact general surgery for urgent matters.   Team A- [id number]   Team B- [id number]  For non-urgent matters e-mail [email or web address]

## 2022-08-29 NOTE — CHART NOTE - NSCHARTNOTEFT_GEN_A_CORE
Called by RN pt noted to have low Blood pressure 75/53- repeated.  Patient seen and assessed at bedside, appears comfortable, sleeping, NAD noted, offers no complaints.  NS 250cc IVF gentle bolus given over 30 minutes without much improvement. BP repeated by provider at bedside 86/38.  Lopressor parameters changed, losartan dcd, consider midodrine, HIC Dr Marcelino notified of above, will c/t monitor.   Vital Signs Last 24 Hrs  T(F): 98.6 (29 Aug 2022 23:30), Max: 98.6 (29 Aug 2022 23:30)  HR: 81 (29 Aug 2022 23:30) (80 - 104)  BP: 79/49 (29 Aug 2022 23:30) (75/53 - 136/63)  RR: 18 (29 Aug 2022 23:30) (17 - 18)  SpO2: 100% (29 Aug 2022 23:30) (98% - 100%) Called by RN pt noted to have low Blood pressure 75/53- repeated.  Patient seen and assessed at bedside, appears comfortable, sleeping, NAD noted, offers no complaints.  NS 250cc IVF gentle bolus given over 30 minutes without much improvement. BP repeated by provider at bedside 86/38.  Lopressor parameters changed, losartan dcd, consider midodrine, HIC Dr Marcelino notified of above, will c/t monitor.   Vital Signs Last 24 Hrs  T(F): 98.6 (29 Aug 2022 23:30), Max: 98.6 (29 Aug 2022 23:30)  HR: 81 (29 Aug 2022 23:30) (80 - 104)  BP: 79/49 (29 Aug 2022 23:30) (75/53 - 136/63)  RR: 18 (29 Aug 2022 23:30) (17 - 18)  SpO2: 100% (29 Aug 2022 23:30) (98% - 100%)    addendum:   8/30  0124  pt still remains hypotensive, but asymptomatic, CCU NP notified - > rpt BP  repeated 87/46 with map - 60, pt with adequate urine output, will repeat labs, mag, VBG  w/ lactate, and c/t monitor.

## 2022-08-29 NOTE — PROGRESS NOTE ADULT - SUBJECTIVE AND OBJECTIVE BOX
Patient is a 52y old  Female who presents with a chief complaint of Soft tissue infection (29 Aug 2022 12:55)      INTERVAL HPI/OVERNIGHT EVENTS:  Seen by me this afternoon, feeling better, eager to going to rehab for further improvement, tolerating PO    Review of Systems: 12 point review of systems otherwise negative    MEDICATIONS  (STANDING):  ascorbic acid 500 milliGRAM(s) Oral daily  cadexomer iodine 0.9% Gel 1 Application(s) Topical daily  chlorhexidine 2% Cloths 1 Application(s) Topical daily  cholecalciferol 2000 Unit(s) Oral daily  collagenase Ointment 1 Application(s) Topical daily  furosemide    Tablet 40 milliGRAM(s) Oral daily  heparin   Injectable 5000 Unit(s) SubCutaneous every 8 hours  insulin lispro (ADMELOG) corrective regimen sliding scale   SubCutaneous Before meals and at bedtime  insulin lispro Injectable (ADMELOG) 5 Unit(s) SubCutaneous three times a day before meals  losartan 25 milliGRAM(s) Oral daily  metoprolol tartrate 25 milliGRAM(s) Oral two times a day  multivitamin 1 Tablet(s) Oral daily  nystatin Powder 1 Application(s) Topical two times a day  pantoprazole    Tablet 40 milliGRAM(s) Oral before breakfast  potassium chloride    Tablet ER 20 milliEquivalent(s) Oral daily    MEDICATIONS  (PRN):  acetaminophen     Tablet .. 650 milliGRAM(s) Oral every 6 hours PRN Mild Pain (1 - 3)  ALBUTerol    90 MICROgram(s) HFA Inhaler 2 Puff(s) Inhalation every 6 hours PRN Shortness of Breath  HYDROmorphone   Tablet 2 milliGRAM(s) Oral every 3 hours PRN Severe Pain (7 - 10)  HYDROmorphone   Tablet 1 milliGRAM(s) Oral every 3 hours PRN Moderate Pain (4 - 6)  sodium chloride 0.9% lock flush 10 milliLiter(s) IV Push every 1 hour PRN Pre/post blood products, medications, blood draw, and to maintain line patency      Allergies    No Known Drug Allergies  Pineapple (Anaphylaxis)    Intolerances          Vital Signs Last 24 Hrs  T(C): 36.6 (29 Aug 2022 17:24), Max: 36.8 (29 Aug 2022 13:36)  T(F): 97.8 (29 Aug 2022 17:24), Max: 98.3 (29 Aug 2022 13:36)  HR: 104 (29 Aug 2022 17:24) (80 - 104)  BP: 95/52 (29 Aug 2022 17:24) (90/72 - 136/63)  BP(mean): --  RR: 18 (29 Aug 2022 17:24) (16 - 18)  SpO2: 98% (29 Aug 2022 17:24) (98% - 100%)    Parameters below as of 29 Aug 2022 17:24  Patient On (Oxygen Delivery Method): room air      CAPILLARY BLOOD GLUCOSE      POCT Blood Glucose.: 192 mg/dL (29 Aug 2022 17:20)  POCT Blood Glucose.: 180 mg/dL (29 Aug 2022 11:56)  POCT Blood Glucose.: 129 mg/dL (29 Aug 2022 08:11)  POCT Blood Glucose.: 116 mg/dL (28 Aug 2022 20:46)      08-28 @ 07:01  -  08-29 @ 07:00  --------------------------------------------------------  IN: 0 mL / OUT: 800 mL / NET: -800 mL    08-29 @ 07:01 - 08-29 @ 19:01  --------------------------------------------------------  IN: 0 mL / OUT: 400 mL / NET: -400 mL        Physical Exam:    Daily     Daily   General:  Well appearing, NAD, obese  HEENT:  Nonicteric, PERRLA  CV:  RRR, no murmur, no JVD  Lungs:  CTA B/L, no wheezes, rales, rhonchi  Abdomen:  Soft, non-tender, no distended, positive BS  +Wound Vac in place (sacrum)  Extremities:  2+ pulses, no c/c, no edema  Skin:  Warm and dry, no rashes  :  No toussaint  Neuro:  AAOx3, non-focal, CN II-XII grossly intact  No Restraints    LABS:                        10.4   6.38  )-----------( 291      ( 29 Aug 2022 07:35 )             34.8     08-29    140  |  106  |  5<L>  ----------------------------<  124<H>  3.8   |  23  |  0.39<L>    Ca    8.0<L>      29 Aug 2022 07:35  Phos  4.0     08-29  Mg     1.60     08-29    TPro  5.2<L>  /  Alb  1.3<L>  /  TBili  0.2  /  DBili  <0.2  /  AST  15  /  ALT  19  /  AlkPhos  169<H>  08-29            RADIOLOGY & ADDITIONAL TESTS:  Reviewed by me

## 2022-08-29 NOTE — PROGRESS NOTE ADULT - PROBLEM SELECTOR PLAN 6
moist and bite sized diet  dispo: PT recommends [facility]- await placement to [facility] for wound care  D/w SW/CM, apprec assistance      D/w ACP

## 2022-08-29 NOTE — PROGRESS NOTE ADULT - SUBJECTIVE AND OBJECTIVE BOX
[facility]-- WOUND TEAM -- FOLLOW UP NOTE  --------------------------------------------------------------------------------    subjective: Patient seen and examined at bedside. Reports feeling ok. Denies associated wound pain. Patient reports poor appetite.      Interval HPI/24 hour events: Chart reviewed including labs and relevant images  Afebrile    improved     Diet: Soft and Bite Size   Consistent Carb     ROS: General/ SKIN/ see HPI and PMH   all other systems negative    ALLERGIES & MEDICATIONS  --------------------------------------------------------------------------------  Allergies    No Known Drug Allergies  Pineapple (Anaphylaxis)    Intolerances    STANDING INPATIENT MEDICATIONS    ascorbic acid 500 milliGRAM(s) Oral daily  cadexomer iodine 0.9% Gel 1 Application(s) Topical daily  chlorhexidine 2% Cloths 1 Application(s) Topical daily  cholecalciferol 2000 Unit(s) Oral daily  collagenase Ointment 1 Application(s) Topical daily  furosemide    Tablet 40 milliGRAM(s) Oral daily  heparin   Injectable 5000 Unit(s) SubCutaneous every 8 hours  insulin lispro (ADMELOG) corrective regimen sliding scale   SubCutaneous Before meals and at bedtime  insulin lispro Injectable (ADMELOG) 5 Unit(s) SubCutaneous three times a day before meals  losartan 25 milliGRAM(s) Oral daily  metoprolol tartrate 25 milliGRAM(s) Oral two times a day  multivitamin 1 Tablet(s) Oral daily  nystatin Powder 1 Application(s) Topical two times a day  pantoprazole    Tablet 40 milliGRAM(s) Oral before breakfast  potassium chloride    Tablet ER 20 milliEquivalent(s) Oral daily    PRN INPATIENT MEDICATION  acetaminophen     Tablet .. 650 milliGRAM(s) Oral every 6 hours PRN  ALBUTerol    90 MICROgram(s) HFA Inhaler 2 Puff(s) Inhalation every 6 hours PRN  HYDROmorphone   Tablet 2 milliGRAM(s) Oral every 3 hours PRN  HYDROmorphone   Tablet 1 milliGRAM(s) Oral every 3 hours PRN  sodium chloride 0.9% lock flush 10 milliLiter(s) IV Push every 1 hour PRN    Vital signs:  T(C): 36.8 (08-29-22 @ 13:36), Max: 36.8 (08-28-22 @ 18:00)  HR: 90 (08-29-22 @ 13:36) (80 - 110)  BP: 104/59 (08-29-22 @ 13:36) (90/72 - 138/77)  RR: 18 (08-29-22 @ 13:36) (16 - 18)  SpO2: 98% (08-29-22 @ 13:36) (98% - 100%)  Wt(kg): --    Constitutional: A&O x 2, alert. Denies pain/discomfort during dressing change. Requesting to participate in turning in bed.    (+) low airloss support surface, (+) fluidized positioning devices, (+) complete cair boots  HEENT:  NC/AT, nonicteric, nares clear, mucosa moist  Cardiovascular: tachycardic  Respiratory: Room air, nonlabored, equal chest expansion  Gastrointestinal: large abdominal pannus, soft NT/ND, incontinent stool  : (+) female urinary  in place   Musculoskeletal: limited a ROM, b/u upper and lower extremities.  Back: NPWT/VAC therapy in place sacrum to left flank. Changed by WOC team, dressing collapsed. Seal compromised, wound care nursing at bedside preparing to change vac after skin re-evaluation.   Vascular: B/L LE hyperpigmentation with dry-flaky skin to dorsal toes, trace edema bilaterally, intact dressing in place. Complete cair boots in place.   Right knee abrasion- 0.1rvf1dxz6,1cm (prev 0.5cmx0.5cmx0.1cm), pink minimally moist dermis. No drainage. No associated cellulitis. Silicone foam with border changed.  Left lateral malleolus unstageable pressure injury- intact dressings in place, did not assess, deferred to Podiatry.   Left lateral lower leg- Proximal - stage 4 pressure injury- 2.8cmx1.5cmx1.2cm, prev 3.2cmx1.5cmx0.8cm, wound base with viable tendon exposed, agranular base. Small-moderate serosanguinous drainage, no odor. Periwound skin with hypopigmentation and soft tissue contraction.   Lateral-distal lower leg unstageable pressure injury surrounded  by deep purple maroon discoloration- Entire area measuring [id number]  Open area measuring 1cmx0.5cmx0.3cm, prev 1cmx0.5cmx0.3cm- Minimal tan-moist firmly attached slough remaining flat agranular tissue, no bone exposed. No associated cellulitis. Aquacel hydrofiber, silicone foam with border applied to proximal wound, collagenase nahun thickness and silicone foam with border applied to distal.  Skin:  as noted above.  Right forearm category 3 skin tear (new)- 1.5cm1.5cmx0.1cm. Tissue type exposing dermis. Periwound skin intact. NO increased warmth, no erythema, no induration. Silicone foam dressing applied.   Right axilla- wound of unknown etiology within intertriginous fold- 0.9cmx0.5cmx0.5cm, prev [id number] wound base flat, pink agranular moist base. Small-moderate serous drainage, no odor. Periwound skin with hypopigmentation. No induration, no fluctuance. Periwound skin without edema, erythema, increased warmth, no induration. No overt s/s of acute skin infection/cellulitis. Silicone foam with border applied.  Intertriginous folds with increased moisture with chronic hyperpigmentation, no candidiasis noted to bilateral axilla, submammary, abdominal pannus.  Moisture associated dermatitis with suspected candidiasis to bilateral groin and medial thighs.  Right anterior to lateral abdomen- prev linear deep tissue pressure injury- Now with 100% re-epithelization, linear hypopigmentation. Liquid barrier film applied.   Right anterior thigh hyperpigmentation.   Right trochanter- chronic stage 4 pressure injury- 1cmx1.9nwg1zw, (prev [id number]) unable to probe to bone. Due to wound dimensions unable to visualize wound base in its entirety, visible base pink-moist granular. Small-moderate serous drainage, no odor. Periwound skin with hypopigmentation, soft tissue contraction. No induration, no fluctuance, no crepitus, no erythema, no edema, no increased warmth. No overt s/s of acute skin infection/cellulitis. Packed with Aquacel ribbon, covered with silicone foam with border.  Right buttock evolving DTPI complicated by Incontinence/moisture associated dermatitis(new)- 6.5cmx2.5cmx0.1cm)- Tissue type presenting as 95% purple maroon discoloration and 5% skin slippage exposing pale pink dermis at 7 o'clock. Scant serous drainage. No odor. Periwound skin intact. No over cellulitis. Silicone foam dressing applied.   Left buttock evolving DTPI complicated by Incontinence/moisture associated dermatitis(new)- 5cmx2.5cmx0.2cm. Mixed tissue type 50% purple maroon discoloration and 50% purple maroon discoloration. Small serous drainage. NO odor. Periwound skin intact. No over cellulitis. Silicone foam dressing applied.    Right lateral thigh and right ischium hypopigmentation noted with soft tissue contraction.  Left ischium within intertriginous fold- mixed etiology unstageable pressure injury and moisture associated dermatitis- 2.4lai8ton7.2cm, prev 4cmx1.5cmx0.2cm - tissue, irregular border 80% moistening adherent slough, beginning to lift, 20% pink agranular. Small serous drainage. No induration, no fluctuance. Periwound skin without edema, erythema, increased warmth, no induration. No overt s/s of acute skin infection/cellulitis. Collagenase, Silicone foam with border applied.    08-28-22 @ 07:01  -  08-29-22 @ 07:00  --------------------------------------------------------  IN: 0 mL / OUT: 800 mL / NET: -800 mL    08-29-22 @ 07:01  -  08-29-22 @ 14:57  --------------------------------------------------------  IN: 0 mL / OUT: 400 mL / NET: -400 mL    LABS/ CULTURES/ RADIOLOGY:              10.4   6.38  >-----------<  291      [08-29-22 @ 07:35]              34.8     140  |  106  |  5   ----------------------------<  124      [08-29-22 @ 07:35]  3.8   |  23  |  0.39        Ca     8.0     [08-29-22 @ 07:35]      Mg     1.60     [08-29-22 @ 07:35]      Phos  4.0     [08-29-22 @ 07:35]    TPro  5.2  /  Alb  1.3  /  TBili  0.2  /  DBili  <0.2  /  AST  15  /  ALT  19  /  AlkPhos  169  [08-29-22 @ 07:35]    CAPILLARY BLOOD GLUCOSE    POCT Blood Glucose.: 180 mg/dL (29 Aug 2022 11:56)  POCT Blood Glucose.: 129 mg/dL (29 Aug 2022 08:11)  POCT Blood Glucose.: 116 mg/dL (28 Aug 2022 20:46)  POCT Blood Glucose.: 167 mg/dL (28 Aug 2022 17:15)    A1C with Estimated Average Glucose Result: 5.5 % (08-12-22 @ 00:50)  A1C with Estimated Average Glucose Result: 14.2 % (03-27-22 @ 19:13)

## 2022-08-30 LAB
ANION GAP SERPL CALC-SCNC: 9 MMOL/L — SIGNIFICANT CHANGE UP (ref 7–14)
BLOOD GAS VENOUS COMPREHENSIVE RESULT: SIGNIFICANT CHANGE UP
BUN SERPL-MCNC: 7 MG/DL — SIGNIFICANT CHANGE UP (ref 7–23)
CALCIUM SERPL-MCNC: 7.5 MG/DL — LOW (ref 8.4–10.5)
CHLORIDE SERPL-SCNC: 103 MMOL/L — SIGNIFICANT CHANGE UP (ref 98–107)
CO2 SERPL-SCNC: 24 MMOL/L — SIGNIFICANT CHANGE UP (ref 22–31)
CREAT SERPL-MCNC: 0.47 MG/DL — LOW (ref 0.5–1.3)
EGFR: 116 ML/MIN/1.73M2 — SIGNIFICANT CHANGE UP
GLUCOSE BLDC GLUCOMTR-MCNC: 108 MG/DL — HIGH (ref 70–99)
GLUCOSE BLDC GLUCOMTR-MCNC: 116 MG/DL — HIGH (ref 70–99)
GLUCOSE BLDC GLUCOMTR-MCNC: 122 MG/DL — HIGH (ref 70–99)
GLUCOSE BLDC GLUCOMTR-MCNC: 124 MG/DL — HIGH (ref 70–99)
GLUCOSE SERPL-MCNC: 151 MG/DL — HIGH (ref 70–99)
HCT VFR BLD CALC: 31.3 % — LOW (ref 34.5–45)
HGB BLD-MCNC: 9.3 G/DL — LOW (ref 11.5–15.5)
MAGNESIUM SERPL-MCNC: 1.5 MG/DL — LOW (ref 1.6–2.6)
MCHC RBC-ENTMCNC: 27 PG — SIGNIFICANT CHANGE UP (ref 27–34)
MCHC RBC-ENTMCNC: 29.7 GM/DL — LOW (ref 32–36)
MCV RBC AUTO: 90.7 FL — SIGNIFICANT CHANGE UP (ref 80–100)
NRBC # BLD: 0 /100 WBCS — SIGNIFICANT CHANGE UP (ref 0–0)
NRBC # FLD: 0 K/UL — SIGNIFICANT CHANGE UP (ref 0–0)
PHOSPHATE SERPL-MCNC: 4 MG/DL — SIGNIFICANT CHANGE UP (ref 2.5–4.5)
PLATELET # BLD AUTO: 398 K/UL — SIGNIFICANT CHANGE UP (ref 150–400)
POTASSIUM SERPL-MCNC: 3.6 MMOL/L — SIGNIFICANT CHANGE UP (ref 3.5–5.3)
POTASSIUM SERPL-SCNC: 3.6 MMOL/L — SIGNIFICANT CHANGE UP (ref 3.5–5.3)
RBC # BLD: 3.45 M/UL — LOW (ref 3.8–5.2)
RBC # FLD: 20.2 % — HIGH (ref 10.3–14.5)
SODIUM SERPL-SCNC: 136 MMOL/L — SIGNIFICANT CHANGE UP (ref 135–145)
WBC # BLD: 7.96 K/UL — SIGNIFICANT CHANGE UP (ref 3.8–10.5)
WBC # FLD AUTO: 7.96 K/UL — SIGNIFICANT CHANGE UP (ref 3.8–10.5)

## 2022-08-30 PROCEDURE — 99233 SBSQ HOSP IP/OBS HIGH 50: CPT

## 2022-08-30 RX ORDER — METOPROLOL TARTRATE 50 MG
25 TABLET ORAL DAILY
Refills: 0 | Status: DISCONTINUED | OUTPATIENT
Start: 2022-08-30 | End: 2022-09-18

## 2022-08-30 RX ORDER — MAGNESIUM SULFATE 500 MG/ML
2 VIAL (ML) INJECTION ONCE
Refills: 0 | Status: DISCONTINUED | OUTPATIENT
Start: 2022-08-30 | End: 2022-08-30

## 2022-08-30 RX ORDER — FUROSEMIDE 40 MG
20 TABLET ORAL DAILY
Refills: 0 | Status: DISCONTINUED | OUTPATIENT
Start: 2022-08-30 | End: 2022-09-18

## 2022-08-30 RX ORDER — MAGNESIUM SULFATE 500 MG/ML
2 VIAL (ML) INJECTION ONCE
Refills: 0 | Status: COMPLETED | OUTPATIENT
Start: 2022-08-30 | End: 2022-08-30

## 2022-08-30 RX ADMIN — Medication 20 MILLIEQUIVALENT(S): at 13:41

## 2022-08-30 RX ADMIN — HEPARIN SODIUM 5000 UNIT(S): 5000 INJECTION INTRAVENOUS; SUBCUTANEOUS at 06:17

## 2022-08-30 RX ADMIN — Medication 1 APPLICATION(S): at 13:45

## 2022-08-30 RX ADMIN — Medication 2000 UNIT(S): at 12:22

## 2022-08-30 RX ADMIN — PANTOPRAZOLE SODIUM 40 MILLIGRAM(S): 20 TABLET, DELAYED RELEASE ORAL at 06:18

## 2022-08-30 RX ADMIN — HEPARIN SODIUM 5000 UNIT(S): 5000 INJECTION INTRAVENOUS; SUBCUTANEOUS at 21:25

## 2022-08-30 RX ADMIN — Medication 5 UNIT(S): at 12:23

## 2022-08-30 RX ADMIN — CHLORHEXIDINE GLUCONATE 1 APPLICATION(S): 213 SOLUTION TOPICAL at 12:30

## 2022-08-30 RX ADMIN — HEPARIN SODIUM 5000 UNIT(S): 5000 INJECTION INTRAVENOUS; SUBCUTANEOUS at 13:58

## 2022-08-30 RX ADMIN — Medication 25 GRAM(S): at 18:44

## 2022-08-30 RX ADMIN — Medication 500 MILLIGRAM(S): at 12:22

## 2022-08-30 RX ADMIN — Medication 5 UNIT(S): at 18:17

## 2022-08-30 RX ADMIN — Medication 1 TABLET(S): at 12:23

## 2022-08-30 RX ADMIN — NYSTATIN CREAM 1 APPLICATION(S): 100000 CREAM TOPICAL at 18:44

## 2022-08-30 RX ADMIN — NYSTATIN CREAM 1 APPLICATION(S): 100000 CREAM TOPICAL at 06:17

## 2022-08-30 RX ADMIN — Medication 40 MILLIGRAM(S): at 06:18

## 2022-08-30 NOTE — PROGRESS NOTE ADULT - SUBJECTIVE AND OBJECTIVE BOX
Matty Gillette NP  CCU Service  Cardiology   Spect: 96146 ([facility])     PATIENT: BOY CERON, MRN: [id number]    CHIEF COMPLAINT: Patient is a 52y old  Female who presents with a chief complaint of Soft tissue infection (29 Aug 2022 19:00)      INTERVAL HISTORY/OVERNIGHT EVENTS: Hypotensive during the night. Denies abdominal pain, chest pain or SOB, cough. Oriented to person, place, and time. Breathing comfortably on room air.    REVIEW OF SYSTEMS:    Constitutional:     [ ] negative [ ] fevers [ ] chills [ ] weight loss [ ] weight gain  HEENT:                  [ ] negative [ ] dry eyes [ ] eye irritation [ ] postnasal drip [ ] nasal congestion  CV:                         [ ] negative  [ ] chest pain [ ] orthopnea [ ] palpitations [ ] murmur  Resp:                     [ ] negative [ ] cough [ ] shortness of breath [ ] dyspnea [ ] wheezing [ ] sputum [ ] hemoptysis  GI:                          [ ] negative [ ] nausea [ ] vomiting [ ] diarrhea [ ] constipation [ ] abd pain [ ] dysphagia   :                        [ ] negative [ ] dysuria [ ] nocturia [ ] hematuria [ ] increased urinary frequency  Musculoskeletal: [ ] negative [ ] back pain [ ] myalgias [ ] arthralgias [ ] fracture  Skin:                       [ ] negative [ ] rash [ ] itch  Neurological:        [ ] negative [ ] headache [ ] dizziness [ ] syncope [ ] weakness [ ] numbness  Psychiatric:           [ ] negative [ ] anxiety [ ] depression  Endocrine:            [ ] negative [x ] diabetes [ ] thyroid problem  Heme/Lymph:      [ ] negative [ ] anemia [ ] bleeding problem  Allergic/Immune: [ ] negative [ ] itchy eyes [ ] nasal discharge [ ] hives [ ] angioedema    [x ] All other systems negative  [ ] Unable to assess ROS because ________.    MEDICATIONS:  MEDICATIONS  (STANDING):  ascorbic acid 500 milliGRAM(s) Oral daily  cadexomer iodine 0.9% Gel 1 Application(s) Topical daily  chlorhexidine 2% Cloths 1 Application(s) Topical daily  cholecalciferol 2000 Unit(s) Oral daily  collagenase Ointment 1 Application(s) Topical daily  furosemide    Tablet 40 milliGRAM(s) Oral daily  heparin   Injectable 5000 Unit(s) SubCutaneous every 8 hours  insulin lispro (ADMELOG) corrective regimen sliding scale   SubCutaneous Before meals and at bedtime  insulin lispro Injectable (ADMELOG) 5 Unit(s) SubCutaneous three times a day before meals  magnesium sulfate  IVPB 2 Gram(s) IV Intermittent once  metoprolol tartrate 25 milliGRAM(s) Oral two times a day  multivitamin 1 Tablet(s) Oral daily  nystatin Powder 1 Application(s) Topical two times a day  pantoprazole    Tablet 40 milliGRAM(s) Oral before breakfast  potassium chloride    Tablet ER 20 milliEquivalent(s) Oral daily    MEDICATIONS  (PRN):  acetaminophen     Tablet .. 650 milliGRAM(s) Oral every 6 hours PRN Mild Pain (1 - 3)  ALBUTerol    90 MICROgram(s) HFA Inhaler 2 Puff(s) Inhalation every 6 hours PRN Shortness of Breath  HYDROmorphone   Tablet 2 milliGRAM(s) Oral every 3 hours PRN Severe Pain (7 - 10)  HYDROmorphone   Tablet 1 milliGRAM(s) Oral every 3 hours PRN Moderate Pain (4 - 6)  sodium chloride 0.9% lock flush 10 milliLiter(s) IV Push every 1 hour PRN Pre/post blood products, medications, blood draw, and to maintain line patency      ALLERGIES: Allergies    No Known Drug Allergies  Pineapple (Anaphylaxis)    Intolerances        OBJECTIVE:  ICU Vital Signs Last 24 Hrs  T(C): 36.6 (30 Aug 2022 06:15), Max: 37 (29 Aug 2022 23:30)  T(F): 97.8 (30 Aug 2022 06:15), Max: 98.6 (29 Aug 2022 23:30)  HR: 86 (30 Aug 2022 06:15) (81 - 104)  BP: 103/54 (30 Aug 2022 06:15) (75/53 - 106/59)  RR: 18 (30 Aug 2022 06:15) (18 - 18)  SpO2: 97% (30 Aug 2022 06:15) (97% - 100%)    O2 Parameters below as of 30 Aug 2022 06:15  Patient On (Oxygen Delivery Method): room air      CAPILLARY BLOOD GLUCOSE      POCT Blood Glucose.: 174 mg/dL (29 Aug 2022 21:48)  POCT Blood Glucose.: 192 mg/dL (29 Aug 2022 17:20)  POCT Blood Glucose.: 180 mg/dL (29 Aug 2022 11:56)    CAPILLARY BLOOD GLUCOSE      POCT Blood Glucose.: 174 mg/dL (29 Aug 2022 21:48)    I&O's Summary    29 Aug 2022 07:01  -  30 Aug 2022 07:00  --------------------------------------------------------  IN: 0 mL / OUT: 1150 mL / NET: -1150 mL       Daily     PHYSICAL EXAMINATION:  General: Comfortable, no acute distress, cooperative with exam.  HEENT: Moist mucous membranes.  Respiratory: CTAB, normal respiratory effort, no coughing, wheezes, crackles, or rales.  CV: RRR, S1S2, no murmurs, rubs or gallops. No JVD. Distal pulses intact.  Abdominal: Soft, nontender, nondistended, no rebound or guarding, normal bowel sounds.  Neurology: AOx3, no focal neuro defects, ARGUETA x 4.  Extremities: No pitting edema, + Peripheral pulses.  Incisions:   Tubes:    LABS:                          9.3    7.96  )-----------( 398      ( 30 Aug 2022 03:15 )             31.3     08-30    136  |  103  |  7   ----------------------------<  151<H>  3.6   |  24  |  0.47<L>    Ca    7.5<L>      30 Aug 2022 03:15  Phos  4.0     08-30  Mg     1.50     08-30    TPro  5.2<L>  /  Alb  1.3<L>  /  TBili  0.2  /  DBili  <0.2  /  AST  15  /  ALT  19  /  AlkPhos  169<H>  08-29    LIVER FUNCTIONS - ( 29 Aug 2022 07:35 )  Alb: 1.3 g/dL / Pro: 5.2 g/dL / ALK PHOS: 169 U/L / ALT: 19 U/L / AST: 15 U/L / GGT: x             TELEMETRY: Not on tele     EKG:     IMAGING:     TTE with Doppler (w/Cont) (08.15.22 @ 12:55)  DIMENSIONS:  Dimensions:     Normal Values:  LA:     4.1 cm    2.0 - 4.0 cm  Ao:     2.6 cm    2.0 - 3.8 cm  SEPTUM: 1.4 cm    0.6 - 1.2 cm  PWT:    0.9 cm   0.6 - 1.1 cm  LVIDd:  4.2 cm    3.0 - 5.6 cm  LVIDs:  4.0 cm    1.8 - 4.0 cm  Derived Variables:  LVMI: 82 g/m2  RWT: 0.42  Fractional short: 5 %  Ejection Fraction (Visual Estimate): 5-10 %  ------------------------------------------------------------------------  OBSERVATIONS:  Mitral Valve: Tethered mitral valve leaflets with normal  opening. Mild-moderate mitral regurgitation.  Aortic Root: Normal aortic root.  Aortic Valve: Aortic valve not well visualized; probably  normal. Minimal aortic regurgitation.  Left Atrium: Mildly dilated left atrium.  LA volume index =  37 cc/m2.  Left Ventricle: Endocardial visualization enhanced with  intravenous injection of echo contrast (Definity). Severe  segmental left ventricular systolic dysfunction. The basal  myocardial segments have the most preserved function; all  remaining segments appear severely hypokinetic. Normal left  ventricular internal dimensions and wall thicknesses.  Right Heart: Normal right atrium. Normal right ventricular  size and function. Normal tricuspid valve. Normal pulmonic  valve.  Pericardium/PleuraNormal pericardium with no pericardial  effusion.  ------------------------------------------------------------------------  CONCLUSIONS:  1. Tethered mitral valve leaflets with normal opening.  Mild-moderate mitral regurgitation.  2. Endocardial visualization enhanced with intravenous  injection of echo contrast (Definity). Severe segmental  left ventricular systolic dysfunction. The basal myocardial  segments have the most preserved function; all remaining  segments appear severely hypokinetic.  3. Normal right ventricular size and function.  *** Compared with echocardiogram of 8/12/2022, no  significant changes noted.

## 2022-08-30 NOTE — PROGRESS NOTE ADULT - SUBJECTIVE AND OBJECTIVE BOX
Patient is a 52y old  Female who presents with a chief complaint of Soft tissue infection (30 Aug 2022 08:20)      INTERVAL HPI/OVERNIGHT EVENTS:  Seen by me this afternoon, hypotensive overnight but asymptomatic. Tolerating PO, no complaints,    Review of Systems: 12 point review of systems otherwise negative    MEDICATIONS  (STANDING):  ascorbic acid 500 milliGRAM(s) Oral daily  cadexomer iodine 0.9% Gel 1 Application(s) Topical daily  chlorhexidine 2% Cloths 1 Application(s) Topical daily  cholecalciferol 2000 Unit(s) Oral daily  collagenase Ointment 1 Application(s) Topical daily  furosemide    Tablet 20 milliGRAM(s) Oral daily  heparin   Injectable 5000 Unit(s) SubCutaneous every 8 hours  insulin lispro (ADMELOG) corrective regimen sliding scale   SubCutaneous Before meals and at bedtime  insulin lispro Injectable (ADMELOG) 5 Unit(s) SubCutaneous three times a day before meals  metoprolol succinate ER 25 milliGRAM(s) Oral daily  multivitamin 1 Tablet(s) Oral daily  nystatin Powder 1 Application(s) Topical two times a day  pantoprazole    Tablet 40 milliGRAM(s) Oral before breakfast  potassium chloride    Tablet ER 20 milliEquivalent(s) Oral daily    MEDICATIONS  (PRN):  acetaminophen     Tablet .. 650 milliGRAM(s) Oral every 6 hours PRN Mild Pain (1 - 3)  ALBUTerol    90 MICROgram(s) HFA Inhaler 2 Puff(s) Inhalation every 6 hours PRN Shortness of Breath  HYDROmorphone   Tablet 2 milliGRAM(s) Oral every 3 hours PRN Severe Pain (7 - 10)  HYDROmorphone   Tablet 1 milliGRAM(s) Oral every 3 hours PRN Moderate Pain (4 - 6)  sodium chloride 0.9% lock flush 10 milliLiter(s) IV Push every 1 hour PRN Pre/post blood products, medications, blood draw, and to maintain line patency      Allergies    No Known Drug Allergies  Pineapple (Anaphylaxis)    Intolerances          Vital Signs Last 24 Hrs  T(C): 36.7 (30 Aug 2022 18:08), Max: 37 (29 Aug 2022 23:30)  T(F): 98 (30 Aug 2022 18:08), Max: 98.6 (29 Aug 2022 23:30)  HR: 111 (30 Aug 2022 18:08) (81 - 111)  BP: 95/67 (30 Aug 2022 18:08) (75/53 - 116/87)  BP(mean): --  RR: 18 (30 Aug 2022 18:08) (18 - 18)  SpO2: 100% (30 Aug 2022 18:08) (97% - 100%)    Parameters below as of 30 Aug 2022 18:08  Patient On (Oxygen Delivery Method): room air      CAPILLARY BLOOD GLUCOSE      POCT Blood Glucose.: 122 mg/dL (30 Aug 2022 17:27)  POCT Blood Glucose.: 108 mg/dL (30 Aug 2022 12:18)  POCT Blood Glucose.: 124 mg/dL (30 Aug 2022 08:18)  POCT Blood Glucose.: 174 mg/dL (29 Aug 2022 21:48)      08-29 @ 07:01 - 08-30 @ 07:00  --------------------------------------------------------  IN: 0 mL / OUT: 1150 mL / NET: -1150 mL    08-30 @ 07:01 - 08-30 @ 20:54  --------------------------------------------------------  IN: 240 mL / OUT: 300 mL / NET: -60 mL        Physical Exam:    Daily     Daily   General:  Well appearing, NAD, obese  HEENT:  Nonicteric, PERRLA  CV:  RRR, no murmur, no JVD  Lungs:  CTA B/L, no wheezes, rales, rhonchi  Abdomen:  Soft, non-tender, no distended, positive BS, +Wound Vac in place (sacrum)  Extremities:  2+ pulses, no c/c, no edema  Skin:  Warm and dry, no rashes  :  No toussaint  Neuro:  AAOx3, non-focal, CN II-XII grossly intact  No Restraints    LABS:                        9.3    7.96  )-----------( 398      ( 30 Aug 2022 03:15 )             31.3     08-30    136  |  103  |  7   ----------------------------<  151<H>  3.6   |  24  |  0.47<L>    Ca    7.5<L>      30 Aug 2022 03:15  Phos  4.0     08-30  Mg     1.50     08-30    TPro  5.2<L>  /  Alb  1.3<L>  /  TBili  0.2  /  DBili  <0.2  /  AST  15  /  ALT  19  /  AlkPhos  169<H>  08-29            RADIOLOGY & ADDITIONAL TESTS:  Reviewed by me

## 2022-08-30 NOTE — PROGRESS NOTE ADULT - PROBLEM SELECTOR PLAN 3
# severe LV dysfunction; HFrEF 5 - 10%  - concern for cardiomyopathy of unknown etiology but may be R/T sepsis and critical illness  - s/p Dobutamine, s/p swan removal in the CCU   - vasopressin discontinued 8/13, currently off levophed, continue to monitor  - repeat ECHO to reassess LV function showed EF of 5-10%   - Given overnight hypotension losartan and lopressor were discontinued  - Resumed metoprolol-XL 25QD  - Lasix dose decreased to 20mg QD per Cards recs, requires daily K supplementation on discharge  - If BP allows will start low dose lisinopril 2.5mg QD prior to discharge, o/w as outpatient  - s/p toussaint removal voiding well has primafit in place

## 2022-08-30 NOTE — PROGRESS NOTE ADULT - ASSESSMENT
52F immobile 2/2 h/o MS, DM2, asthma, hypothyroidism, and known sacral wounds presents from [place] with urosepsis and anemia with new L flank wound with concern for necrotizing soft tissue infection. Was s/p debridement of wound with worsening sepsis, acidosis, and severe global LV dysfunction of unknown etiology, transferred to CCU for [place] and inotrope support. Pt is s/p swan removal and off all sedation, weaning off pressors. Repeat echo showed EF of 5-10% and signs suggestive of possible stress induced cardiomyopathy. Pt's mental status has returned to baseline off sedation. Pt started on lopressor 12.5 po bid and lasix 40 po qd for HFrEF. C/w Unasyn until 8/25 per ID. Patient now transferred to medical floor.

## 2022-08-30 NOTE — PROGRESS NOTE ADULT - ASSESSMENT
52F immobile 2/2 h/o MS, DM2, asthma, hypothyroidism, and known sacral wounds presents from [place] with urosepsis and anemia with new L flank wound with concern for necrotizing soft tissue infection. Was s/p debridement of wound with worsening sepsis, acidosis, and severe global LV dysfunction of unknown etiology, transferred to CCU for [place] and inotrope support. Pt is s/p swan removal and off all sedation, weaning off pressors. Repeat echo showed EF of 5-10% and signs suggestive of possible stress induced cardiomyopathy. Pt's mental status has returned to baseline off sedation. Pt started on lopressor 12.5 po bid and lasix 40 po qd for HFrEF. C/w Unasyn until 8/25 per ID. Patient is stable for transfer to surgical tele.    Acute systolic heart failure   - No HF symptoms, not volume overloaded on exam   - TTE: Severe segmental left ventricular systolic dysfunction. The basal myocardial segments have the most preserved function; all remaining segments appear severely hypokinetic.   - cardiomyopathy of unknown etiology but may be R/T sepsis and critical illness  - s/p Dobutamine and IV pressor, now off   - Episodes of hypotension during the night  - lopressor to Toprol xl 25mg PO daily (Hold for SBP < 95, HR < 55)  - Reduced Lasix 20mg PO daily  - Will need ACE/ARB if b/ps can tolerate meds   - ischemic eval as out pt

## 2022-08-30 NOTE — CHART NOTE - NSCHARTNOTEFT_GEN_A_CORE
52F immobile 2/2 h/o MS, DM2, asthma, hypothyroidism, and known sacral wounds presents from [place] with urosepsis and anemia with new L flank wound with concern for necrotizing soft tissue infection. Was s/p debridement of wound with worsening sepsis, acidosis, and severe global LV dysfunction of unknown etiology, transferred to CCU 8/12/22 for [place] and inotrope support. Pt is s/p swan removal and off all sedation, weaning off pressors. Repeat echo showed EF of 5-10% and signs suggestive of possible stress induced cardiomyopathy. Pt's mental status has returned to baseline off sedation. Pt started on lopressor 12.5 po bid and lasix 40 po qd for HFrEF. C/w Unasyn until 8/25 per ID. Patient is stable for transfer to surgical tele on 8/21/22.  Called to bedside by medicine ACP for BPs 75/53 and 79/49.  BP obtained by me 89/43 with MAP 60.  Patient appropriately warm to touch.  Temp 98.2F, WBC 6.38, no anemia.  Patient mentating at baseline and appears euvolemic.    Cardiomyopathy of unknown etiology but may be related from sepsis 2nd to wound source and longterm critical illness related to MS  Severe LV dysfunction EF 5 - 10% based on TTE on 8/15/2022  Repeat BP 89/43 with MAP 60 acceptable with severe reduced EF  Hypomagnesiumemia with level obtained 8/29/22 1.6 not repleted  Please replete electrolytes to maintain K >4 and Magnesium > 2  Please obtain Comprehensive Venous Blood Gas to include lactate this am to evaluate organ perfusion  Please repeat ECHO to reassess LV function  Please stagger BP medications to prevent cumulative effect resulting in hypotension  Continue lopressor 25mg PO BID hold for HR <60 and SBP <95mmHg to be given at 0600 and 1800  Continue lasix 40mg PO daily hold for SBP <95mmHg to be given at 1000  Continue Losartan 25mg daily hold for SBP <95mmHg to be given at 1200    Steven Ko NP  #[id number] 52F immobile 2/2 h/o MS, DM2, asthma, hypothyroidism, and known sacral wounds presents from [place] with urosepsis and anemia with new L flank wound with concern for necrotizing soft tissue infection. Was s/p debridement of wound with worsening sepsis, acidosis, and severe global LV dysfunction of unknown etiology, transferred to CCU 8/12/22 for [place] and inotrope support. Pt is s/p swan removal and off all sedation, weaning off pressors. Repeat echo showed EF of 5-10% and signs suggestive of possible stress induced cardiomyopathy. Pt's mental status has returned to baseline off sedation. Pt started on lopressor 12.5 po bid and lasix 40 po qd for HFrEF. C/w Unasyn until 8/25 per ID. Patient is stable for transfer to surgical tele on 8/21/22.  Called to bedside by medicine ACP for BPs 75/53 and 79/49.  BP obtained by me 89/43 with MAP 60.  Patient appropriately warm to touch.  Temp 98.2F, WBC 6.38, no anemia.  UO 600mL recorded with RN stating patient incontinent of urine as well. Patient mentating at baseline and appears euvolemic.    Cardiomyopathy of unknown etiology but may be related from sepsis 2nd to wound source and longterm critical illness related to MS  Severe LV dysfunction EF 5 - 10% based on TTE on 8/15/2022  Repeat BP 89/43 with MAP 60 acceptable with severe reduced EF  Hypomagnesiumemia with level obtained 8/29/22 1.6 not repleted  Please replete electrolytes to maintain K >4 and Magnesium > 2  Please obtain Comprehensive Venous Blood Gas to include lactate this am to evaluate organ perfusion  Please repeat ECHO to reassess LV function  Please stagger BP medications to prevent cumulative effect resulting in hypotension  Continue lopressor 25mg PO BID hold for HR <60 and SBP <95mmHg to be given at 0600 and 1800  Continue lasix 40mg PO daily hold for SBP <95mmHg to be given at 1000  Continue Losartan 25mg daily hold for SBP <95mmHg to be given at 1200    Steven Ko NP  #[id number]

## 2022-08-30 NOTE — CHART NOTE - NSCHARTNOTEFT_GEN_A_CORE
Source: Patient [x ]    Family [ ]     other [x ] chart review    Patient seen for length of stay nutrition f/u. 52 year old female immobile 2/2 h/o MS, DM2, asthma, hypothyroidism, and known sacral wounds presents from [place] with urosepsis and anemia with new L flank wound with concern for necrotizing soft tissue infection, s/p debridement of wound with worsening sepsis, acidosis, and severe global LV dysfunction of unknown etiology, transferred to CCU for [place] and inotrope support. Pt is s/p swan removal and off all sedation, weaning off pressors, now transferred to medical floor, s/p swallow evaluation (8/19) with recommendation for soft and bite-sized/thin liquids per chart.    Patient previously on Glucerna 1.5 tube feeds per chart. Patient reports appetite is good, however noted with 1 intake 0-25% and 26-50% per RN flow sheet. Unable to determine overall PO intake at this time. Patient is amenable to nutritional supplement. Food preferences reviewed. No GI distress reported. Noted wit multiple pressure injuries and no edema per RN flow sheet.    Diet : soft and bite-sized/thin liquids, consistent carbohydrate (evening snack)    Current Weight: 108 kg (8/22)  104.4 kg (8/18)  109 kg (8/16)  110.5 kg (8/15)  109.4 kg (8/14)  103.6 kg (8/13)  99.2 kg (8/12)  98.8 kg (8/11)  Weight Change: Weight changes likely due to previous fluid shifts, will continue to monitor    Pertinent Medications: MEDICATIONS  (STANDING):  ascorbic acid 500 milliGRAM(s) Oral daily  cadexomer iodine 0.9% Gel 1 Application(s) Topical daily  chlorhexidine 2% Cloths 1 Application(s) Topical daily  cholecalciferol 2000 Unit(s) Oral daily  collagenase Ointment 1 Application(s) Topical daily  furosemide    Tablet 20 milliGRAM(s) Oral daily  heparin   Injectable 5000 Unit(s) SubCutaneous every 8 hours  insulin lispro (ADMELOG) corrective regimen sliding scale   SubCutaneous Before meals and at bedtime  insulin lispro Injectable (ADMELOG) 5 Unit(s) SubCutaneous three times a day before meals  magnesium sulfate  IVPB 2 Gram(s) IV Intermittent once  metoprolol succinate ER 25 milliGRAM(s) Oral daily  multivitamin 1 Tablet(s) Oral daily  nystatin Powder 1 Application(s) Topical two times a day  pantoprazole    Tablet 40 milliGRAM(s) Oral before breakfast  potassium chloride    Tablet ER 20 milliEquivalent(s) Oral daily    MEDICATIONS  (PRN):  acetaminophen     Tablet .. 650 milliGRAM(s) Oral every 6 hours PRN Mild Pain (1 - 3)  ALBUTerol    90 MICROgram(s) HFA Inhaler 2 Puff(s) Inhalation every 6 hours PRN Shortness of Breath  HYDROmorphone   Tablet 2 milliGRAM(s) Oral every 3 hours PRN Severe Pain (7 - 10)  HYDROmorphone   Tablet 1 milliGRAM(s) Oral every 3 hours PRN Moderate Pain (4 - 6)  sodium chloride 0.9% lock flush 10 milliLiter(s) IV Push every 1 hour PRN Pre/post blood products, medications, blood draw, and to maintain line patency    Pertinent Labs:  08-30 Na136 mmol/L Glu 151 mg/dL<H> K+ 3.6 mmol/L Cr  0.47 mg/dL<L> BUN 7 mg/dL 08-30 Phos 4.0 mg/dL 08-29 Alb 1.3 g/dL<L>  CAPILLARY BLOOD GLUCOSE  POCT Blood Glucose.: 108 mg/dL (30 Aug 2022 12:18)  POCT Blood Glucose.: 124 mg/dL (30 Aug 2022 08:18)  POCT Blood Glucose.: 174 mg/dL (29 Aug 2022 21:48)  POCT Blood Glucose.: 192 mg/dL (29 Aug 2022 17:20)    Estimated Needs: [x ] recalculated: based on IBW 56.7 kg  Calories - 1,701-1,985 kcal (30-35 kcal/kg)  Protein - 79.4-90.7 g pro (1.4-1.6 g/kg)    Previous Nutrition Diagnosis: Increased nutrient needs    Nutrition Diagnosis is [x ] ongoing  [ ] resolved [ ] not applicable     Recommend  - continue diet as ordered  - consider Glucerna TID (660 kcal, 30 g pro)  - continue with multivitamin and vitamin C supplementation  - obtain current body weight to assess trend given resolving edema  - document PO intake to monitor trend    Monitoring and Evaluation:   [x ] PO intake [x ] Tolerance to diet prescription [ x] weights [x ] follow up per protocol

## 2022-08-30 NOTE — PROGRESS NOTE ADULT - PROBLEM SELECTOR PLAN 2
# s/p L flank wound debridement  - dressings intact and wound vac in place-to be continued on discharge-  - now following rectal or axillary readings  - ID consult noted and appreciated  - no plan for further debridement, wound care to change vac M/W/F prn per surgery recs  - blood cx from 8/11 grew VRE, no need to change abx management at this time per ID recs  - s/p Unasyn 3 grams IV Q6h finished 8/25 per ID  - Vanc d/c on 8/17  - Pain control with dilaudid PRN

## 2022-08-31 LAB
ANION GAP SERPL CALC-SCNC: 11 MMOL/L — SIGNIFICANT CHANGE UP (ref 7–14)
BUN SERPL-MCNC: 8 MG/DL — SIGNIFICANT CHANGE UP (ref 7–23)
CALCIUM SERPL-MCNC: 7.7 MG/DL — LOW (ref 8.4–10.5)
CHLORIDE SERPL-SCNC: 106 MMOL/L — SIGNIFICANT CHANGE UP (ref 98–107)
CO2 SERPL-SCNC: 22 MMOL/L — SIGNIFICANT CHANGE UP (ref 22–31)
CREAT SERPL-MCNC: 0.53 MG/DL — SIGNIFICANT CHANGE UP (ref 0.5–1.3)
EGFR: 113 ML/MIN/1.73M2 — SIGNIFICANT CHANGE UP
GLUCOSE BLDC GLUCOMTR-MCNC: 136 MG/DL — HIGH (ref 70–99)
GLUCOSE BLDC GLUCOMTR-MCNC: 144 MG/DL — HIGH (ref 70–99)
GLUCOSE BLDC GLUCOMTR-MCNC: 161 MG/DL — HIGH (ref 70–99)
GLUCOSE BLDC GLUCOMTR-MCNC: 199 MG/DL — HIGH (ref 70–99)
GLUCOSE SERPL-MCNC: 130 MG/DL — HIGH (ref 70–99)
HCT VFR BLD CALC: 29.1 % — LOW (ref 34.5–45)
HGB BLD-MCNC: 8.7 G/DL — LOW (ref 11.5–15.5)
MAGNESIUM SERPL-MCNC: 1.7 MG/DL — SIGNIFICANT CHANGE UP (ref 1.6–2.6)
MCHC RBC-ENTMCNC: 27.1 PG — SIGNIFICANT CHANGE UP (ref 27–34)
MCHC RBC-ENTMCNC: 29.9 GM/DL — LOW (ref 32–36)
MCV RBC AUTO: 90.7 FL — SIGNIFICANT CHANGE UP (ref 80–100)
NRBC # BLD: 0 /100 WBCS — SIGNIFICANT CHANGE UP (ref 0–0)
NRBC # FLD: 0 K/UL — SIGNIFICANT CHANGE UP (ref 0–0)
PHOSPHATE SERPL-MCNC: 4.1 MG/DL — SIGNIFICANT CHANGE UP (ref 2.5–4.5)
PLATELET # BLD AUTO: 423 K/UL — HIGH (ref 150–400)
POTASSIUM SERPL-MCNC: 3.9 MMOL/L — SIGNIFICANT CHANGE UP (ref 3.5–5.3)
POTASSIUM SERPL-SCNC: 3.9 MMOL/L — SIGNIFICANT CHANGE UP (ref 3.5–5.3)
RBC # BLD: 3.21 M/UL — LOW (ref 3.8–5.2)
RBC # FLD: 20.2 % — HIGH (ref 10.3–14.5)
SODIUM SERPL-SCNC: 139 MMOL/L — SIGNIFICANT CHANGE UP (ref 135–145)
WBC # BLD: 6.83 K/UL — SIGNIFICANT CHANGE UP (ref 3.8–10.5)
WBC # FLD AUTO: 6.83 K/UL — SIGNIFICANT CHANGE UP (ref 3.8–10.5)

## 2022-08-31 PROCEDURE — 99233 SBSQ HOSP IP/OBS HIGH 50: CPT

## 2022-08-31 RX ADMIN — NYSTATIN CREAM 1 APPLICATION(S): 100000 CREAM TOPICAL at 17:41

## 2022-08-31 RX ADMIN — HEPARIN SODIUM 5000 UNIT(S): 5000 INJECTION INTRAVENOUS; SUBCUTANEOUS at 05:32

## 2022-08-31 RX ADMIN — Medication 2: at 17:41

## 2022-08-31 RX ADMIN — Medication 2: at 22:04

## 2022-08-31 RX ADMIN — Medication 500 MILLIGRAM(S): at 11:55

## 2022-08-31 RX ADMIN — HYDROMORPHONE HYDROCHLORIDE 1 MILLIGRAM(S): 2 INJECTION INTRAMUSCULAR; INTRAVENOUS; SUBCUTANEOUS at 12:07

## 2022-08-31 RX ADMIN — Medication 20 MILLIGRAM(S): at 05:32

## 2022-08-31 RX ADMIN — PANTOPRAZOLE SODIUM 40 MILLIGRAM(S): 20 TABLET, DELAYED RELEASE ORAL at 05:32

## 2022-08-31 RX ADMIN — Medication 5 UNIT(S): at 17:41

## 2022-08-31 RX ADMIN — CHLORHEXIDINE GLUCONATE 1 APPLICATION(S): 213 SOLUTION TOPICAL at 12:30

## 2022-08-31 RX ADMIN — Medication 5 UNIT(S): at 08:41

## 2022-08-31 RX ADMIN — HEPARIN SODIUM 5000 UNIT(S): 5000 INJECTION INTRAVENOUS; SUBCUTANEOUS at 17:40

## 2022-08-31 RX ADMIN — NYSTATIN CREAM 1 APPLICATION(S): 100000 CREAM TOPICAL at 05:32

## 2022-08-31 RX ADMIN — Medication 1 APPLICATION(S): at 12:30

## 2022-08-31 RX ADMIN — Medication 1 TABLET(S): at 11:55

## 2022-08-31 RX ADMIN — Medication 25 MILLIGRAM(S): at 10:42

## 2022-08-31 RX ADMIN — Medication 2000 UNIT(S): at 11:55

## 2022-08-31 RX ADMIN — HEPARIN SODIUM 5000 UNIT(S): 5000 INJECTION INTRAVENOUS; SUBCUTANEOUS at 22:05

## 2022-08-31 NOTE — PROGRESS NOTE ADULT - PROBLEM SELECTOR PLAN 6
moist and bite sized diet  dispo: PT recommends [facility]- [facility]  D/w SW/CM, apprec assistance      D/w ACP

## 2022-08-31 NOTE — PROGRESS NOTE ADULT - ASSESSMENT
52F immobile 2/2 h/o MS, DM2, asthma, hypothyroidism, and known sacral wounds presents from [place] with urosepsis and anemia with new L flank wound with concern for necrotizing soft tissue infection. Was s/p debridement of wound with worsening sepsis, acidosis, and severe global LV dysfunction of unknown etiology, transferred to CCU for [place] and inotrope support. Pt is s/p swan removal and off all sedation, weaning off pressors. Repeat echo showed EF of 5-10% and signs suggestive of possible stress induced cardiomyopathy. Pt's mental status has returned to baseline off sedation. Pt started on lopressor 12.5 po bid and lasix 40 po qd for HFrEF. C/w Unasyn until 8/25 per ID. Patient now transferred to medical floor.

## 2022-08-31 NOTE — PROGRESS NOTE ADULT - SUBJECTIVE AND OBJECTIVE BOX
Patient is a 52y old  Female who presents with a chief complaint of Soft tissue infection (31 Aug 2022 08:00)      INTERVAL HPI/OVERNIGHT EVENTS:  Seen by me this afternoon, doing well. Wound care with patient at time of visit. No complaints. Hypotension improved.    Review of Systems: 12 point review of systems otherwise negative    MEDICATIONS  (STANDING):  ascorbic acid 500 milliGRAM(s) Oral daily  cadexomer iodine 0.9% Gel 1 Application(s) Topical daily  chlorhexidine 2% Cloths 1 Application(s) Topical daily  cholecalciferol 2000 Unit(s) Oral daily  collagenase Ointment 1 Application(s) Topical daily  furosemide    Tablet 20 milliGRAM(s) Oral daily  heparin   Injectable 5000 Unit(s) SubCutaneous every 8 hours  insulin lispro (ADMELOG) corrective regimen sliding scale   SubCutaneous Before meals and at bedtime  insulin lispro Injectable (ADMELOG) 5 Unit(s) SubCutaneous three times a day before meals  metoprolol succinate ER 25 milliGRAM(s) Oral daily  multivitamin 1 Tablet(s) Oral daily  nystatin Powder 1 Application(s) Topical two times a day  pantoprazole    Tablet 40 milliGRAM(s) Oral before breakfast  potassium chloride    Tablet ER 20 milliEquivalent(s) Oral daily    MEDICATIONS  (PRN):  acetaminophen     Tablet .. 650 milliGRAM(s) Oral every 6 hours PRN Mild Pain (1 - 3)  ALBUTerol    90 MICROgram(s) HFA Inhaler 2 Puff(s) Inhalation every 6 hours PRN Shortness of Breath  HYDROmorphone   Tablet 2 milliGRAM(s) Oral every 3 hours PRN Severe Pain (7 - 10)  HYDROmorphone   Tablet 1 milliGRAM(s) Oral every 3 hours PRN Moderate Pain (4 - 6)  sodium chloride 0.9% lock flush 10 milliLiter(s) IV Push every 1 hour PRN Pre/post blood products, medications, blood draw, and to maintain line patency      Allergies    No Known Drug Allergies  Pineapple (Anaphylaxis)    Intolerances          Vital Signs Last 24 Hrs  T(C): 37.1 (31 Aug 2022 09:15), Max: 37.2 (31 Aug 2022 05:14)  T(F): 98.7 (31 Aug 2022 09:15), Max: 98.9 (31 Aug 2022 05:14)  HR: 113 (31 Aug 2022 09:15) (92 - 115)  BP: 104/52 (31 Aug 2022 09:15) (95/67 - 131/69)  BP(mean): --  RR: 18 (31 Aug 2022 09:15) (17 - 18)  SpO2: 99% (31 Aug 2022 09:15) (98% - 100%)    Parameters below as of 31 Aug 2022 05:14  Patient On (Oxygen Delivery Method): room air      CAPILLARY BLOOD GLUCOSE      POCT Blood Glucose.: 144 mg/dL (31 Aug 2022 11:50)  POCT Blood Glucose.: 136 mg/dL (31 Aug 2022 08:29)  POCT Blood Glucose.: 116 mg/dL (30 Aug 2022 21:42)  POCT Blood Glucose.: 122 mg/dL (30 Aug 2022 17:27)      08-30 @ 07:01  -  08-31 @ 07:00  --------------------------------------------------------  IN: 360 mL / OUT: 910 mL / NET: -550 mL        Physical Exam:    Daily     Daily   General:  Well appearing, NAD, obese  HEENT:  Nonicteric, PERRLA  CV:  RRR, no murmur, no JVD  Lungs:  CTA B/L, no wheezes, rales, rhonchi  Abdomen:  Soft, non-tender, no distended, positive BS, wound vac in place (sacral area)  Extremities:  2+ pulses, no c/c, no edema  Skin:  Warm and dry, no rashes  :  No toussaint  Neuro:  AAOx3, non-focal, CN II-XII grossly intact  No Restraints    LABS:                        8.7    6.83  )-----------( 423      ( 31 Aug 2022 05:30 )             29.1     08-31    139  |  106  |  8   ----------------------------<  130<H>  3.9   |  22  |  0.53    Ca    7.7<L>      31 Aug 2022 05:30  Phos  4.1     08-31  Mg     1.70     08-31              RADIOLOGY & ADDITIONAL TESTS:  Reviewed by me

## 2022-08-31 NOTE — PROGRESS NOTE ADULT - SUBJECTIVE AND OBJECTIVE BOX
Subjective/Objective: Patient denies sob, dizziness . Bedridden .started Toprol XL 25mg-> yet to recived. HR , SBP 90-130s      MEDICATIONS    furosemide    Tablet 20 milliGRAM(s) Oral daily  heparin   Injectable 5000 Unit(s) SubCutaneous every 8 hours  metoprolol succinate ER 25 milliGRAM(s) Oral daily  ALBUTerol    90 MICROgram(s) HFA Inhaler 2 Puff(s) Inhalation every 6 hours PRN  acetaminophen     Tablet .. 650 milliGRAM(s) Oral every 6 hours PRN  HYDROmorphone   Tablet 2 milliGRAM(s) Oral every 3 hours PRN  HYDROmorphone   Tablet 1 milliGRAM(s) Oral every 3 hours PRN  pantoprazole    Tablet 40 milliGRAM(s) Oral before breakfast  insulin lispro (ADMELOG) corrective regimen sliding scale   SubCutaneous Before meals and at bedtime  insulin lispro Injectable (ADMELOG) 5 Unit(s) SubCutaneous three times a day before meals  ascorbic acid 500 milliGRAM(s) Oral daily  cadexomer iodine 0.9% Gel 1 Application(s) Topical daily  chlorhexidine 2% Cloths 1 Application(s) Topical daily  cholecalciferol 2000 Unit(s) Oral daily  collagenase Ointment 1 Application(s) Topical daily  multivitamin 1 Tablet(s) Oral daily  nystatin Powder 1 Application(s) Topical two times a day  potassium chloride    Tablet ER 20 milliEquivalent(s) Oral daily  sodium chloride 0.9% lock flush 10 milliLiter(s) IV Push every 1 hour PRN          REVIEW OF SYSTEMS    General: no fatigue/malaise,  Ophthalmologic: no blurred vision, no loss of vision. 	  ENT: no sore throat, rhinorrhea, sinus congestion.  Cardiovascular:no chest pain ,no palpitation,no dizziness,no diaphoresis  Respiratory: no SOB, cough or wheeze.  Gastrointestinal:  no N/V/D,   Genitourinary: no dysuria/hesitancy   Neurological: no changes in vision or hearing, no lightheadedness/dizziness, no syncope/near syncope	  Psychiatric: no unusual stress/anxiety      	    ICU Vital Signs Last 24 Hrs  T(C): 37.2 (31 Aug 2022 05:14), Max: 37.2 (31 Aug 2022 05:14)  T(F): 98.9 (31 Aug 2022 05:14), Max: 98.9 (31 Aug 2022 05:14)  HR: 107 (31 Aug 2022 05:14) (82 - 115)  BP: 110/49 (31 Aug 2022 05:14) (95/60 - 131/69)  RR: 18 (31 Aug 2022 05:14) (17 - 18)  SpO2: 100% (31 Aug 2022 05:14) (97% - 100%)    O2 Parameters below as of 31 Aug 2022 05:14  Patient On (Oxygen Delivery Method): room air            PHYSICAL EXAMINATION  Appearance: NAD, no distress  HEENT: Moist Mucous Membranes, Anicteric, PERRL, EOMI  Cardiovascular: Regular rate and rhythm, Normal S1 S2, No JVD, No murmurs  Respiratory: anterior Lungs clear to auscultation . No rales, No rhonchi, No wheezing. No tenderness to palpation  Gastrointestinal:  Soft, Non-tender, + BS  Neurologic: Non-focal, A&Ox3  Skin: Warm and dry, No rashes, No ecchymosis, No cyanosis  Musculoskeletal: No clubbing, No cyanosis, No edema  Vascular: Peripheral pulses palpable 2+ bilaterally  Psychiatry: Mood & affect appropriate      	    		      I&O's Summary    30 Aug 2022 07:01  -  31 Aug 2022 07:00  --------------------------------------------------------  IN: 360 mL / OUT: 910 mL / NET: -550 mL    	 	  LABORATORY VALUES	 	                          8.7    6.83  )-----------( 423      ( 31 Aug 2022 05:30 )             29.1       08-31    139  |  106  |  8   ----------------------------<  130<H>  3.9   |  22  |  0.53  08-30    136  |  103  |  7   ----------------------------<  151<H>  3.6   |  24  |  0.47<L>    Ca    7.7<L>      31 Aug 2022 05:30  Ca    7.5<L>      30 Aug 2022 03:15  Phos  4.1     08-31  Phos  4.0     08-30  Mg     1.70     08-31  Mg     1.50     08-30            CARDIAC MARKERS:            Blood Gas Venous - Lactate: 1.2 mmol/L (08-30 @ 03:15)        Thyroid Stimulating Hormone, Serum: 3.86 uIU/mL (03-28 @ 12:53)        CAPILLARY BLOOD GLUCOSE      POCT Blood Glucose.: 116 mg/dL (30 Aug 2022 21:42)            LABS:                          8.7    6.83  )-----------( 423      ( 31 Aug 2022 05:30 )             29.1       31 Aug 2022 05:30    139    |  106    |  8      ----------------------------<  130<H>  3.9     |  22     |  0.53   30 Aug 2022 03:15    136    |  103    |  7      ----------------------------<  151<H>  3.6     |  24     |  0.47<L>    Ca    7.7<L>      31 Aug 2022 05:30  Ca    7.5<L>      30 Aug 2022 03:15  Phos  4.1       31 Aug 2022 05:30  Phos  4.0       30 Aug 2022 03:15  Mg     1.70      31 Aug 2022 05:30  Mg     1.50<L>     30 Aug 2022 03:15                        EKG:  Diagnostic testing:  cath:  echo:      Assessment and Plan:         Subjective/Objective: Patient denies sob, dizziness . Bedridden .started Toprol XL 25mg-> yet to recived. HR , SBP 90-130s      MEDICATIONS    furosemide    Tablet 20 milliGRAM(s) Oral daily  heparin   Injectable 5000 Unit(s) SubCutaneous every 8 hours  metoprolol succinate ER 25 milliGRAM(s) Oral daily  ALBUTerol    90 MICROgram(s) HFA Inhaler 2 Puff(s) Inhalation every 6 hours PRN  acetaminophen     Tablet .. 650 milliGRAM(s) Oral every 6 hours PRN  HYDROmorphone   Tablet 2 milliGRAM(s) Oral every 3 hours PRN  HYDROmorphone   Tablet 1 milliGRAM(s) Oral every 3 hours PRN  pantoprazole    Tablet 40 milliGRAM(s) Oral before breakfast  insulin lispro (ADMELOG) corrective regimen sliding scale   SubCutaneous Before meals and at bedtime  insulin lispro Injectable (ADMELOG) 5 Unit(s) SubCutaneous three times a day before meals  ascorbic acid 500 milliGRAM(s) Oral daily  cadexomer iodine 0.9% Gel 1 Application(s) Topical daily  chlorhexidine 2% Cloths 1 Application(s) Topical daily  cholecalciferol 2000 Unit(s) Oral daily  collagenase Ointment 1 Application(s) Topical daily  multivitamin 1 Tablet(s) Oral daily  nystatin Powder 1 Application(s) Topical two times a day  potassium chloride    Tablet ER 20 milliEquivalent(s) Oral daily  sodium chloride 0.9% lock flush 10 milliLiter(s) IV Push every 1 hour PRN          REVIEW OF SYSTEMS    General: no fatigue/malaise,  Ophthalmologic: no blurred vision, no loss of vision. 	  ENT: no sore throat, rhinorrhea, sinus congestion.  Cardiovascular:no chest pain ,no palpitation,no dizziness,no diaphoresis  Respiratory: no SOB, cough or wheeze.  Gastrointestinal:  no N/V/D,   Genitourinary: no dysuria/hesitancy   Neurological: no changes in vision or hearing, no lightheadedness/dizziness, no syncope/near syncope	  Psychiatric: no unusual stress/anxiety      	    ICU Vital Signs Last 24 Hrs  T(C): 37.2 (31 Aug 2022 05:14), Max: 37.2 (31 Aug 2022 05:14)  T(F): 98.9 (31 Aug 2022 05:14), Max: 98.9 (31 Aug 2022 05:14)  HR: 107 (31 Aug 2022 05:14) (82 - 115)  BP: 110/49 (31 Aug 2022 05:14) (95/60 - 131/69)  RR: 18 (31 Aug 2022 05:14) (17 - 18)  SpO2: 100% (31 Aug 2022 05:14) (97% - 100%)    O2 Parameters below as of 31 Aug 2022 05:14  Patient On (Oxygen Delivery Method): room air            PHYSICAL EXAMINATION  Appearance: NAD, no distress  HEENT: Moist Mucous Membranes, Anicteric, PERRL, EOMI  Cardiovascular: Regular rate and rhythm, Normal S1 S2, No JVD, No murmurs  Respiratory: anterior Lungs clear to auscultation . No rales, No rhonchi, No wheezing. No tenderness to palpation  Gastrointestinal:  Soft, Non-tender, + BS  Neurologic: Non-focal, A&Ox3  Skin: Warm and dry, No rashes, No ecchymosis, No cyanosis  Musculoskeletal: No clubbing, No cyanosis, No edema  Vascular: Peripheral pulses palpable 2+ bilaterally  Psychiatry: Mood & affect appropriate      	    		      I&O's Summary    30 Aug 2022 07:01  -  31 Aug 2022 07:00  --------------------------------------------------------  IN: 360 mL / OUT: 910 mL / NET: -550 mL    	 	  LABORATORY VALUES	 	                          8.7    6.83  )-----------( 423      ( 31 Aug 2022 05:30 )             29.1       08-31    139  |  106  |  8   ----------------------------<  130<H>  3.9   |  22  |  0.53  08-30    136  |  103  |  7   ----------------------------<  151<H>  3.6   |  24  |  0.47<L>    Ca    7.7<L>      31 Aug 2022 05:30  Ca    7.5<L>      30 Aug 2022 03:15  Phos  4.1     08-31  Phos  4.0     08-30  Mg     1.70     08-31  Mg     1.50     08-30      Blood Gas Venous - Lactate: 1.2 mmol/L (08-30 @ 03:15)    Thyroid Stimulating Hormone, Serum: 3.86 uIU/mL (03-28 @ 12:53)      CAPILLARY BLOOD GLUCOSE      POCT Blood Glucose.: 116 mg/dL (30 Aug 2022 21:42)      Diagnostic testing:  cath:  echo:      Assessment and Plan:52F immobile 2/2 h/o MS, DM2, asthma, hypothyroidism, and known sacral wounds presents from [facility] with urosepsis and anemia with new L flank wound with concern for necrotizing soft tissue infection s/p debridement of wound with worsening sepsis, acidosis, and severe global LV dysfunction of unknown etiology, transferred to CCU for [place] and inotrope support. Repeat echo showed EF of 5-10% and signs suggestive of possible stress induced cardiomyopathy.  Patient is stable for transfer to surgical tele.    Acute systolic heart failure   - No HF symptoms, not volume overloaded on exam   - TTE: Severe segmental left ventricular systolic dysfunction. The basal myocardial segments have the most preserved function; all remaining segments appear severely hypokinetic.   - cardiomyopathy of unknown etiology but may be R/T sepsis and critical illness  - s/p Dobutamine and IV pressor, now off   - Episodes of hypotension during the night  - continue Toprol xl 25mg PO daily (Hold for SBP < 95, HR < 55)- titrate to BP  - continue low dose Lasix 20mg PO daily  - Will need ACE/ARB if b/ps can tolerate meds   - ischemic eval as out pt         Subjective/Objective: Patient denies sob, dizziness . Bedridden .started Toprol XL 25mg-> yet to recived. HR , SBP 90-130s      MEDICATIONS    furosemide    Tablet 20 milliGRAM(s) Oral daily  heparin   Injectable 5000 Unit(s) SubCutaneous every 8 hours  metoprolol succinate ER 25 milliGRAM(s) Oral daily  ALBUTerol    90 MICROgram(s) HFA Inhaler 2 Puff(s) Inhalation every 6 hours PRN  acetaminophen     Tablet .. 650 milliGRAM(s) Oral every 6 hours PRN  HYDROmorphone   Tablet 2 milliGRAM(s) Oral every 3 hours PRN  HYDROmorphone   Tablet 1 milliGRAM(s) Oral every 3 hours PRN  pantoprazole    Tablet 40 milliGRAM(s) Oral before breakfast  insulin lispro (ADMELOG) corrective regimen sliding scale   SubCutaneous Before meals and at bedtime  insulin lispro Injectable (ADMELOG) 5 Unit(s) SubCutaneous three times a day before meals  ascorbic acid 500 milliGRAM(s) Oral daily  cadexomer iodine 0.9% Gel 1 Application(s) Topical daily  chlorhexidine 2% Cloths 1 Application(s) Topical daily  cholecalciferol 2000 Unit(s) Oral daily  collagenase Ointment 1 Application(s) Topical daily  multivitamin 1 Tablet(s) Oral daily  nystatin Powder 1 Application(s) Topical two times a day  potassium chloride    Tablet ER 20 milliEquivalent(s) Oral daily  sodium chloride 0.9% lock flush 10 milliLiter(s) IV Push every 1 hour PRN          REVIEW OF SYSTEMS    General: no fatigue/malaise,  Ophthalmologic: no blurred vision, no loss of vision. 	  ENT: no sore throat, rhinorrhea, sinus congestion.  Cardiovascular:no chest pain ,no palpitation,no dizziness,no diaphoresis  Respiratory: no SOB, cough or wheeze.  Gastrointestinal:  no N/V/D,   Genitourinary: no dysuria/hesitancy   Neurological: no changes in vision or hearing, no lightheadedness/dizziness, no syncope/near syncope	  Psychiatric: no unusual stress/anxiety      	    ICU Vital Signs Last 24 Hrs  T(C): 37.2 (31 Aug 2022 05:14), Max: 37.2 (31 Aug 2022 05:14)  T(F): 98.9 (31 Aug 2022 05:14), Max: 98.9 (31 Aug 2022 05:14)  HR: 107 (31 Aug 2022 05:14) (82 - 115)  BP: 110/49 (31 Aug 2022 05:14) (95/60 - 131/69)  RR: 18 (31 Aug 2022 05:14) (17 - 18)  SpO2: 100% (31 Aug 2022 05:14) (97% - 100%)    O2 Parameters below as of 31 Aug 2022 05:14  Patient On (Oxygen Delivery Method): room air            PHYSICAL EXAMINATION  Appearance: NAD, no distress  HEENT: Moist Mucous Membranes, Anicteric, PERRL, EOMI  Cardiovascular: Regular rate and rhythm, Normal S1 S2, No JVD, No murmurs  Respiratory: anterior Lungs clear to auscultation . No rales, No rhonchi, No wheezing. No tenderness to palpation  Gastrointestinal:  Soft, Non-tender, + BS  Neurologic: Non-focal, A&Ox3  Skin: Warm and dry, No rashes, No ecchymosis, No cyanosis  Musculoskeletal: No clubbing, No cyanosis, No edema  Vascular: Peripheral pulses palpable 2+ bilaterally  Psychiatry: Mood & affect appropriate      	    		      I&O's Summary    30 Aug 2022 07:01  -  31 Aug 2022 07:00  --------------------------------------------------------  IN: 360 mL / OUT: 910 mL / NET: -550 mL    	 	  LABORATORY VALUES	 	                          8.7    6.83  )-----------( 423      ( 31 Aug 2022 05:30 )             29.1       08-31    139  |  106  |  8   ----------------------------<  130<H>  3.9   |  22  |  0.53  08-30    136  |  103  |  7   ----------------------------<  151<H>  3.6   |  24  |  0.47<L>    Ca    7.7<L>      31 Aug 2022 05:30  Ca    7.5<L>      30 Aug 2022 03:15  Phos  4.1     08-31  Phos  4.0     08-30  Mg     1.70     08-31  Mg     1.50     08-30      Blood Gas Venous - Lactate: 1.2 mmol/L (08-30 @ 03:15)    Thyroid Stimulating Hormone, Serum: 3.86 uIU/mL (03-28 @ 12:53)      CAPILLARY BLOOD GLUCOSE      POCT Blood Glucose.: 116 mg/dL (30 Aug 2022 21:42)      Diagnostic testing:  cath:  echo:  < from: TTE with Doppler (w/Cont) (08.15.22 @ 12:55) >  DIMENSIONS:  Dimensions:     Normal Values:  LA:     4.1 cm    2.0 - 4.0 cm  Ao:     2.6 cm    2.0 - 3.8 cm  SEPTUM: 1.4 cm    0.6 - 1.2 cm  PWT:    0.9 cm   0.6 - 1.1 cm  LVIDd:  4.2 cm    3.0 - 5.6 cm  LVIDs:  4.0 cm    1.8 - 4.0 cm  Derived Variables:  LVMI: 82 g/m2  RWT: 0.42  Fractional short: 5 %  Ejection Fraction (Visual Estimate): 5-10 %  ------------------------------------------------------------------------  OBSERVATIONS:  Mitral Valve: Tethered mitral valve leaflets with normalopening. Mild-moderate mitral regurgitation.  Aortic Root: Normal aortic root.  Aortic Valve: Aortic valve not well visualized; probably  normal. Minimal aortic regurgitation.  Left Atrium: Mildly dilated left atrium.  LA volume index =  37 cc/m2.  Left Ventricle: Endocardial visualization enhanced with  intravenous injection of echo contrast (Definity). Severe  segmental left ventricular systolic dysfunction. The basal  myocardial segments have the most preserved function; all  remaining segments appear severely hypokinetic. Normal left  ventricular internal dimensions and wall thicknesses.  Right Heart: Normal right atrium. Normal right ventricular  size and function. Normal tricuspid valve. Normal pulmonic  valve.  Pericardium/PleuraNormal pericardium with no pericardial  effusion.    < end of copied text >      Assessment and Plan:52F immobile 2/2 h/o MS, DM2, asthma, hypothyroidism, and known sacral wounds presents from [place] with urosepsis and anemia with new L flank wound with concern for necrotizing soft tissue infection s/p debridement of wound with worsening sepsis, acidosis, and severe global LV dysfunction of unknown etiology, transferred to CCU for [place] and inotrope support. Repeat echo showed EF of 5-10% and signs suggestive of possible stress induced cardiomyopathy.  Patient is stable for transfer to surgical tele.    Acute systolic heart failure   - No HF symptoms, not volume overloaded on exam   - TTE: Severe segmental left ventricular systolic dysfunction. The basal myocardial segments have the most preserved function; all remaining segments appear severely hypokinetic.   - cardiomyopathy of unknown etiology but may be R/T sepsis and critical illness  - s/p Dobutamine and IV pressor, now off   - Episodes of hypotension during the night  - continue Toprol xl 25mg PO daily (Hold for SBP < 95, HR < 55)- titrate to BP  - continue low dose Lasix 20mg PO daily  - Will need ACE/ARB if b/ps can tolerate meds   - ischemic eval as out pt         Subjective/Objective: Patient denies sob, dizziness. Bedridden. Started Toprol XL 25mg-> yet to recived. HR , SBP 90-130s      MEDICATIONS    furosemide    Tablet 20 milliGRAM(s) Oral daily  heparin   Injectable 5000 Unit(s) SubCutaneous every 8 hours  metoprolol succinate ER 25 milliGRAM(s) Oral daily  ALBUTerol    90 MICROgram(s) HFA Inhaler 2 Puff(s) Inhalation every 6 hours PRN  acetaminophen     Tablet .. 650 milliGRAM(s) Oral every 6 hours PRN  HYDROmorphone   Tablet 2 milliGRAM(s) Oral every 3 hours PRN  HYDROmorphone   Tablet 1 milliGRAM(s) Oral every 3 hours PRN  pantoprazole    Tablet 40 milliGRAM(s) Oral before breakfast  insulin lispro (ADMELOG) corrective regimen sliding scale   SubCutaneous Before meals and at bedtime  insulin lispro Injectable (ADMELOG) 5 Unit(s) SubCutaneous three times a day before meals  ascorbic acid 500 milliGRAM(s) Oral daily  cadexomer iodine 0.9% Gel 1 Application(s) Topical daily  chlorhexidine 2% Cloths 1 Application(s) Topical daily  cholecalciferol 2000 Unit(s) Oral daily  collagenase Ointment 1 Application(s) Topical daily  multivitamin 1 Tablet(s) Oral daily  nystatin Powder 1 Application(s) Topical two times a day  potassium chloride    Tablet ER 20 milliEquivalent(s) Oral daily  sodium chloride 0.9% lock flush 10 milliLiter(s) IV Push every 1 hour PRN          REVIEW OF SYSTEMS    General: no fatigue/malaise,  Ophthalmologic: no blurred vision, no loss of vision. 	  ENT: no sore throat, rhinorrhea, sinus congestion.  Cardiovascular:no chest pain ,no palpitation,no dizziness,no diaphoresis  Respiratory: no SOB, cough or wheeze.  Gastrointestinal:  no N/V/D,   Genitourinary: no dysuria/hesitancy   Neurological: no changes in vision or hearing, no lightheadedness/dizziness, no syncope/near syncope	  Psychiatric: no unusual stress/anxiety      	    ICU Vital Signs Last 24 Hrs  T(C): 37.2 (31 Aug 2022 05:14), Max: 37.2 (31 Aug 2022 05:14)  T(F): 98.9 (31 Aug 2022 05:14), Max: 98.9 (31 Aug 2022 05:14)  HR: 107 (31 Aug 2022 05:14) (82 - 115)  BP: 110/49 (31 Aug 2022 05:14) (95/60 - 131/69)  RR: 18 (31 Aug 2022 05:14) (17 - 18)  SpO2: 100% (31 Aug 2022 05:14) (97% - 100%)    O2 Parameters below as of 31 Aug 2022 05:14  Patient On (Oxygen Delivery Method): room air            PHYSICAL EXAMINATION  Appearance: NAD, no distress  HEENT: Moist Mucous Membranes, Anicteric, PERRL, EOMI  Cardiovascular: Regular rate and rhythm, Normal S1 S2, No JVD, No murmurs  Respiratory: anterior Lungs clear to auscultation . No rales, No rhonchi, No wheezing. No tenderness to palpation  Gastrointestinal:  Soft, Non-tender, + BS  Neurologic: Non-focal, A&Ox3  Skin: Warm and dry, No rashes, No ecchymosis, No cyanosis  Musculoskeletal: No clubbing, No cyanosis, No edema  Vascular: Peripheral pulses palpable 2+ bilaterally  Psychiatry: Mood & affect appropriate      	    		      I&O's Summary    30 Aug 2022 07:01  -  31 Aug 2022 07:00  --------------------------------------------------------  IN: 360 mL / OUT: 910 mL / NET: -550 mL    	 	  LABORATORY VALUES	 	                          8.7    6.83  )-----------( 423      ( 31 Aug 2022 05:30 )             29.1       08-31    139  |  106  |  8   ----------------------------<  130<H>  3.9   |  22  |  0.53  08-30    136  |  103  |  7   ----------------------------<  151<H>  3.6   |  24  |  0.47<L>    Ca    7.7<L>      31 Aug 2022 05:30  Ca    7.5<L>      30 Aug 2022 03:15  Phos  4.1     08-31  Phos  4.0     08-30  Mg     1.70     08-31  Mg     1.50     08-30      Blood Gas Venous - Lactate: 1.2 mmol/L (08-30 @ 03:15)    Thyroid Stimulating Hormone, Serum: 3.86 uIU/mL (03-28 @ 12:53)      CAPILLARY BLOOD GLUCOSE      POCT Blood Glucose.: 116 mg/dL (30 Aug 2022 21:42)      Diagnostic testing:  cath:  echo:  < from: TTE with Doppler (w/Cont) (08.15.22 @ 12:55) >  DIMENSIONS:  Dimensions:     Normal Values:  LA:     4.1 cm    2.0 - 4.0 cm  Ao:     2.6 cm    2.0 - 3.8 cm  SEPTUM: 1.4 cm    0.6 - 1.2 cm  PWT:    0.9 cm   0.6 - 1.1 cm  LVIDd:  4.2 cm    3.0 - 5.6 cm  LVIDs:  4.0 cm    1.8 - 4.0 cm  Derived Variables:  LVMI: 82 g/m2  RWT: 0.42  Fractional short: 5 %  Ejection Fraction (Visual Estimate): 5-10 %  ------------------------------------------------------------------------  OBSERVATIONS:  Mitral Valve: Tethered mitral valve leaflets with normalopening. Mild-moderate mitral regurgitation.  Aortic Root: Normal aortic root.  Aortic Valve: Aortic valve not well visualized; probably  normal. Minimal aortic regurgitation.  Left Atrium: Mildly dilated left atrium.  LA volume index =  37 cc/m2.  Left Ventricle: Endocardial visualization enhanced with  intravenous injection of echo contrast (Definity). Severe  segmental left ventricular systolic dysfunction. The basal  myocardial segments have the most preserved function; all  remaining segments appear severely hypokinetic. Normal left  ventricular internal dimensions and wall thicknesses.  Right Heart: Normal right atrium. Normal right ventricular  size and function. Normal tricuspid valve. Normal pulmonic  valve.  Pericardium/PleuraNormal pericardium with no pericardial  effusion.    < end of copied text >      Assessment and Plan:52F immobile 2/2 h/o MS, DM2, asthma, hypothyroidism, and known sacral wounds presents from [place] with urosepsis and anemia with new L flank wound with concern for necrotizing soft tissue infection s/p debridement of wound with worsening sepsis, acidosis, and severe global LV dysfunction of unknown etiology, transferred to CCU for [place] and inotrope support. Repeat echo showed EF of 5-10% and signs suggestive of possible stress induced cardiomyopathy.  Patient is stable for transfer to surgical tele.    Acute systolic heart failure   - No HF symptoms, not volume overloaded on exam   - TTE: Severe segmental left ventricular systolic dysfunction. The basal myocardial segments have the most preserved function; all remaining segments appear severely hypokinetic.   - cardiomyopathy of unknown etiology but may be R/T sepsis and critical illness  - s/p Dobutamine and IV pressor, now off   - Episodes of hypotension during the night  - continue Toprol xl 25mg PO daily (Hold for SBP < 95, HR < 55)- titrate to BP  - continue low dose Lasix 20mg PO daily  - Will need ACE/ARB if b/ps can tolerate meds   - ischemic eval as out pt

## 2022-08-31 NOTE — PROGRESS NOTE ADULT - PROBLEM SELECTOR PLAN 3
# severe LV dysfunction; HFrEF 5 - 10%  - concern for cardiomyopathy of unknown etiology but may be R/T sepsis and critical illness  - s/p Dobutamine, s/p swan removal in the CCU   - vasopressin discontinued 8/13, currently off levophed, continue to monitor  - repeat ECHO to reassess LV function showed EF of 5-10%  - Recent hypotensive episodes but pt was asymptomatic, losartan held and metoprolol/lasix dose were reduced  - C/w metoprolol-XL 25QD  - C/w lasix 20mg QD per Cards recs, requires daily K supplementation on discharge  - If BP allows will start low dose lisinopril 2.5mg QD prior to discharge, o/w as outpatient  - s/p toussaint removal voiding well has primafit in place

## 2022-09-01 LAB
ANION GAP SERPL CALC-SCNC: 10 MMOL/L — SIGNIFICANT CHANGE UP (ref 7–14)
BUN SERPL-MCNC: 8 MG/DL — SIGNIFICANT CHANGE UP (ref 7–23)
CALCIUM SERPL-MCNC: 7.6 MG/DL — LOW (ref 8.4–10.5)
CHLORIDE SERPL-SCNC: 102 MMOL/L — SIGNIFICANT CHANGE UP (ref 98–107)
CO2 SERPL-SCNC: 24 MMOL/L — SIGNIFICANT CHANGE UP (ref 22–31)
CREAT SERPL-MCNC: 0.51 MG/DL — SIGNIFICANT CHANGE UP (ref 0.5–1.3)
EGFR: 114 ML/MIN/1.73M2 — SIGNIFICANT CHANGE UP
GLUCOSE BLDC GLUCOMTR-MCNC: 150 MG/DL — HIGH (ref 70–99)
GLUCOSE BLDC GLUCOMTR-MCNC: 156 MG/DL — HIGH (ref 70–99)
GLUCOSE BLDC GLUCOMTR-MCNC: 168 MG/DL — HIGH (ref 70–99)
GLUCOSE BLDC GLUCOMTR-MCNC: 206 MG/DL — HIGH (ref 70–99)
GLUCOSE SERPL-MCNC: 126 MG/DL — HIGH (ref 70–99)
HCT VFR BLD CALC: 30.9 % — LOW (ref 34.5–45)
HGB BLD-MCNC: 9.4 G/DL — LOW (ref 11.5–15.5)
MAGNESIUM SERPL-MCNC: 1.5 MG/DL — LOW (ref 1.6–2.6)
MCHC RBC-ENTMCNC: 27.3 PG — SIGNIFICANT CHANGE UP (ref 27–34)
MCHC RBC-ENTMCNC: 30.4 GM/DL — LOW (ref 32–36)
MCV RBC AUTO: 89.8 FL — SIGNIFICANT CHANGE UP (ref 80–100)
NRBC # BLD: 0 /100 WBCS — SIGNIFICANT CHANGE UP (ref 0–0)
NRBC # FLD: 0 K/UL — SIGNIFICANT CHANGE UP (ref 0–0)
PHOSPHATE SERPL-MCNC: 4.1 MG/DL — SIGNIFICANT CHANGE UP (ref 2.5–4.5)
PLATELET # BLD AUTO: 444 K/UL — HIGH (ref 150–400)
POTASSIUM SERPL-MCNC: 3.6 MMOL/L — SIGNIFICANT CHANGE UP (ref 3.5–5.3)
POTASSIUM SERPL-SCNC: 3.6 MMOL/L — SIGNIFICANT CHANGE UP (ref 3.5–5.3)
RBC # BLD: 3.44 M/UL — LOW (ref 3.8–5.2)
RBC # FLD: 20 % — HIGH (ref 10.3–14.5)
SARS-COV-2 RNA SPEC QL NAA+PROBE: SIGNIFICANT CHANGE UP
SODIUM SERPL-SCNC: 136 MMOL/L — SIGNIFICANT CHANGE UP (ref 135–145)
WBC # BLD: 6.06 K/UL — SIGNIFICANT CHANGE UP (ref 3.8–10.5)
WBC # FLD AUTO: 6.06 K/UL — SIGNIFICANT CHANGE UP (ref 3.8–10.5)

## 2022-09-01 PROCEDURE — 99233 SBSQ HOSP IP/OBS HIGH 50: CPT

## 2022-09-01 RX ORDER — MAGNESIUM SULFATE 500 MG/ML
2 VIAL (ML) INJECTION ONCE
Refills: 0 | Status: COMPLETED | OUTPATIENT
Start: 2022-09-01 | End: 2022-09-01

## 2022-09-01 RX ORDER — HYDROMORPHONE HYDROCHLORIDE 2 MG/ML
2 INJECTION INTRAMUSCULAR; INTRAVENOUS; SUBCUTANEOUS
Refills: 0 | Status: DISCONTINUED | OUTPATIENT
Start: 2022-09-01 | End: 2022-09-07

## 2022-09-01 RX ORDER — HYDROMORPHONE HYDROCHLORIDE 2 MG/ML
1 INJECTION INTRAMUSCULAR; INTRAVENOUS; SUBCUTANEOUS
Refills: 0 | Status: DISCONTINUED | OUTPATIENT
Start: 2022-09-01 | End: 2022-09-07

## 2022-09-01 RX ORDER — ACETAMINOPHEN 500 MG
650 TABLET ORAL ONCE
Refills: 0 | Status: COMPLETED | OUTPATIENT
Start: 2022-09-01 | End: 2022-09-01

## 2022-09-01 RX ADMIN — CHLORHEXIDINE GLUCONATE 1 APPLICATION(S): 213 SOLUTION TOPICAL at 11:20

## 2022-09-01 RX ADMIN — Medication 650 MILLIGRAM(S): at 23:30

## 2022-09-01 RX ADMIN — HEPARIN SODIUM 5000 UNIT(S): 5000 INJECTION INTRAVENOUS; SUBCUTANEOUS at 06:09

## 2022-09-01 RX ADMIN — Medication 500 MILLIGRAM(S): at 11:20

## 2022-09-01 RX ADMIN — Medication 2: at 12:37

## 2022-09-01 RX ADMIN — Medication 20 MILLIGRAM(S): at 06:08

## 2022-09-01 RX ADMIN — Medication 5 UNIT(S): at 12:38

## 2022-09-01 RX ADMIN — Medication 20 MILLIEQUIVALENT(S): at 11:21

## 2022-09-01 RX ADMIN — Medication 1 TABLET(S): at 11:21

## 2022-09-01 RX ADMIN — Medication 4: at 18:27

## 2022-09-01 RX ADMIN — Medication 25 MILLIGRAM(S): at 06:08

## 2022-09-01 RX ADMIN — Medication 650 MILLIGRAM(S): at 22:59

## 2022-09-01 RX ADMIN — PANTOPRAZOLE SODIUM 40 MILLIGRAM(S): 20 TABLET, DELAYED RELEASE ORAL at 06:08

## 2022-09-01 RX ADMIN — Medication 25 GRAM(S): at 08:23

## 2022-09-01 RX ADMIN — HEPARIN SODIUM 5000 UNIT(S): 5000 INJECTION INTRAVENOUS; SUBCUTANEOUS at 22:10

## 2022-09-01 RX ADMIN — NYSTATIN CREAM 1 APPLICATION(S): 100000 CREAM TOPICAL at 18:26

## 2022-09-01 RX ADMIN — HEPARIN SODIUM 5000 UNIT(S): 5000 INJECTION INTRAVENOUS; SUBCUTANEOUS at 13:00

## 2022-09-01 RX ADMIN — Medication 1 APPLICATION(S): at 11:22

## 2022-09-01 RX ADMIN — NYSTATIN CREAM 1 APPLICATION(S): 100000 CREAM TOPICAL at 06:08

## 2022-09-01 RX ADMIN — Medication 5 UNIT(S): at 08:39

## 2022-09-01 RX ADMIN — Medication 5 UNIT(S): at 18:28

## 2022-09-01 RX ADMIN — Medication 2: at 08:38

## 2022-09-01 RX ADMIN — Medication 1 APPLICATION(S): at 11:25

## 2022-09-01 RX ADMIN — Medication 2000 UNIT(S): at 11:22

## 2022-09-01 NOTE — PROGRESS NOTE ADULT - PROBLEM SELECTOR PLAN 5
- A1c 5.5  - FS elevated but now better controlled  - was started on steroids post op, ?stress dose, was tapered, d/c'ed on 8/15 given concern of sepsis  - continue FS/ IHSS as indicated      # BMI of 36.3 --> Obesity  # Functional quadriplegia from underlying MS - A1c 5.5  - FS elevated but now better controlled  - was started on steroids post op, ?stress dose, was tapered, d/c'ed on 8/15 given concern of sepsis  - continue lispro 5U TID      # BMI of 36.3 --> Obesity  # Functional quadriplegia from underlying MS

## 2022-09-01 NOTE — PROGRESS NOTE ADULT - SUBJECTIVE AND OBJECTIVE BOX
Patient is a 52y old  Female who presents with a chief complaint of Soft tissue infection (01 Sep 2022 11:07)      INTERVAL HPI/OVERNIGHT EVENTS:  Seen by me this afternoon, doing well, no complaints.    Review of Systems: 12 point review of systems otherwise negative    MEDICATIONS  (STANDING):  ascorbic acid 500 milliGRAM(s) Oral daily  cadexomer iodine 0.9% Gel 1 Application(s) Topical daily  chlorhexidine 2% Cloths 1 Application(s) Topical daily  cholecalciferol 2000 Unit(s) Oral daily  collagenase Ointment 1 Application(s) Topical daily  furosemide    Tablet 20 milliGRAM(s) Oral daily  heparin   Injectable 5000 Unit(s) SubCutaneous every 8 hours  insulin lispro (ADMELOG) corrective regimen sliding scale   SubCutaneous Before meals and at bedtime  insulin lispro Injectable (ADMELOG) 5 Unit(s) SubCutaneous three times a day before meals  metoprolol succinate ER 25 milliGRAM(s) Oral daily  multivitamin 1 Tablet(s) Oral daily  nystatin Powder 1 Application(s) Topical two times a day  pantoprazole    Tablet 40 milliGRAM(s) Oral before breakfast  potassium chloride    Tablet ER 20 milliEquivalent(s) Oral daily    MEDICATIONS  (PRN):  acetaminophen     Tablet .. 650 milliGRAM(s) Oral every 6 hours PRN Mild Pain (1 - 3)  ALBUTerol    90 MICROgram(s) HFA Inhaler 2 Puff(s) Inhalation every 6 hours PRN Shortness of Breath  HYDROmorphone   Tablet 2 milliGRAM(s) Oral every 3 hours PRN Severe Pain (7 - 10)  HYDROmorphone   Tablet 1 milliGRAM(s) Oral every 3 hours PRN Moderate Pain (4 - 6)  sodium chloride 0.9% lock flush 10 milliLiter(s) IV Push every 1 hour PRN Pre/post blood products, medications, blood draw, and to maintain line patency      Allergies    No Known Drug Allergies  Pineapple (Anaphylaxis)    Intolerances          Vital Signs Last 24 Hrs  T(C): 37.4 (01 Sep 2022 18:30), Max: 37.8 (01 Sep 2022 17:44)  T(F): 99.4 (01 Sep 2022 18:30), Max: 100 (01 Sep 2022 17:44)  HR: 100 (01 Sep 2022 17:44) (98 - 118)  BP: 100/52 (01 Sep 2022 17:44) (94/48 - 119/-)  BP(mean): --  RR: 16 (01 Sep 2022 17:44) (16 - 18)  SpO2: 96% (01 Sep 2022 17:44) (96% - 100%)    Parameters below as of 01 Sep 2022 17:44  Patient On (Oxygen Delivery Method): room air      CAPILLARY BLOOD GLUCOSE      POCT Blood Glucose.: 206 mg/dL (01 Sep 2022 18:25)  POCT Blood Glucose.: 156 mg/dL (01 Sep 2022 12:18)  POCT Blood Glucose.: 168 mg/dL (01 Sep 2022 08:25)  POCT Blood Glucose.: 161 mg/dL (31 Aug 2022 21:43)      08-31 @ 07:01 - 09-01 @ 07:00  --------------------------------------------------------  IN: 720 mL / OUT: 1350 mL / NET: -630 mL    09-01 @ 07:01 - 09-01 @ 20:30  --------------------------------------------------------  IN: 550 mL / OUT: 1100 mL / NET: -550 mL        Physical Exam:    Daily     Daily   General:  Well appearing, NAD, obese  HEENT:  Nonicteric, PERRLA  CV:  RRR, no murmur, no JVD  Lungs:  CTA B/L, no wheezes, rales, rhonchi  Abdomen:  Soft, non-tender, no distended, positive BS, wound vac in place (Sacrum)  Extremities:  2+ pulses, no c/c, no edema  Skin:  Warm and dry, no rashes  :  No toussaint  Neuro:  AAOx3, non-focal, CN II-XII grossly intact  No Restraints    LABS:                        9.4    6.06  )-----------( 444      ( 01 Sep 2022 05:31 )             30.9     09-01    136  |  102  |  8   ----------------------------<  126<H>  3.6   |  24  |  0.51    Ca    7.6<L>      01 Sep 2022 05:31  Phos  4.1     09-01  Mg     1.50     09-01              RADIOLOGY & ADDITIONAL TESTS:  Reviewed by me

## 2022-09-01 NOTE — PROGRESS NOTE ADULT - PROBLEM SELECTOR PLAN 6
moist and bite sized diet  dispo: PT recommends NABEEL- insurance authorization obtained  No bed available at [facility] (wound care) until next week  D/w SW/CM, apprec assistance      D/w ACP

## 2022-09-01 NOTE — PROGRESS NOTE ADULT - SUBJECTIVE AND OBJECTIVE BOX
Patient is a 52y old  Female who presents with a chief complaint of Soft tissue infection (19 Aug 2022 11:45)      HPI:  HPI:  52F immobile 2/2 MS, poorly controlled T2DM, asthma, hypothyroidism, known chronic wounds sacrum (stage 4), vulvar/Rt thigh, and right calf. Recent 2022 hospitalization for urosepsis. On cefepime/flagyl for chronic osteo 2/2 unstageable sacral decubitus ulcer, dakins BID packing. Presented from [place] with concerns for UTI and anemia (6.8 from baseline 8.0) and new malodorous wound on L hip to L flank area. Surgery consulted for potential necrotizing soft tissue infection.            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 1000 milliGRAM(s) IV Intermittent once  cefepime   IVPB 2000 milliGRAM(s) IV Intermittent once  clindamycin IVPB 900 milliGRAM(s) IV Intermittent once  sodium chloride 0.9% Bolus 1000 milliLiter(s) IV Bolus once  vancomycin  IVPB. 1000 milliGRAM(s) IV Intermittent once    MEDICATIONS  (PRN):      Allergies    No Known Drug Allergies  Pineapple (Anaphylaxis)    Intolerances        SOCIAL HISTORY:    FAMILY HISTORY:          Physical Exam:  General: NAD, resting comfortably, warm to touch   HEENT: NC/AT, EOMI  Pulmonary: normal resp effort, patent airway  Abdominal: obese  Extremities: poor muscle tone  Skin: sacral wound stage 4, r thigh wound tracking to labia, L thigh wound, L hip/flank malodorous dark green wound with surrounding erythema and underlying crepitus tracking up the L flank  Neuro: A/O x 3-4, CNs II-XII grossly intact    Vital Signs Last 24 Hrs  T(C): 39.8 (10 Aug 2022 21:06), Max: 39.8 (10 Aug 2022 21:06)  T(F): 103.7 (10 Aug 2022 21:06), Max: 103.7 (10 Aug 2022 21:06)  HR: 127 (10 Aug 2022 20:10) (124 - 127)  BP: 139/72 (10 Aug 2022 20:10) (123/56 - 139/72)  BP(mean): --  RR: 20 (10 Aug 2022 20:10) (18 - 20)  SpO2: 100% (10 Aug 2022 20:10) (100% - 100%)    Parameters below as of 10 Aug 2022 20:10  Patient On (Oxygen Delivery Method): room air        I&O's Summary          LABS:                        5.9    31.78 )-----------( 920      ( 10 Aug 2022 22:30 )             22.5         Urinalysis Basic - ( 10 Aug 2022 22:30 )    Color: Yellow / Appearance: Turbid / S.017 / pH: x  Gluc: x / Ketone: Negative  / Bili: Negative / Urobili: <2 mg/dL   Blood: x / Protein: 100 mg/dL / Nitrite: Negative   Leuk Esterase: Large / RBC: 25-50 /HPF / WBC >50 /HPF   Sq Epi: x / Non Sq Epi: x / Bacteria: Many                CAPILLARY BLOOD GLUCOSE            Cultures:      RADIOLOGY & ADDITIONAL STUDIES:               (10 Aug 2022 22:48)      PAST MEDICAL & SURGICAL HISTORY:  DM (diabetes mellitus)      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      MS (multiple sclerosis)      No significant past surgical history          MEDICATIONS  (STANDING):  ampicillin/sulbactam  IVPB      ampicillin/sulbactam  IVPB 3 Gram(s) IV Intermittent every 6 hours  ascorbic acid 500 milliGRAM(s) Oral daily  chlorhexidine 2% Cloths 1 Application(s) Topical daily  cholecalciferol 2000 Unit(s) Oral daily  collagenase Ointment 1 Application(s) Topical daily  heparin   Injectable 5000 Unit(s) SubCutaneous every 8 hours  insulin lispro (ADMELOG) corrective regimen sliding scale   SubCutaneous every 6 hours  insulin lispro Injectable (ADMELOG) 5 Unit(s) SubCutaneous every 6 hours  lactated ringers. 1000 milliLiter(s) (10 mL/Hr) IV Continuous <Continuous>  levothyroxine Injectable 56 MICROGram(s) IV Push <User Schedule>  multivitamin/minerals/iron Oral Solution (CENTRUM) 15 milliLiter(s) Oral daily  nystatin Powder 1 Application(s) Topical two times a day  pantoprazole  Injectable 40 milliGRAM(s) IV Push every 12 hours    MEDICATIONS  (PRN):  acetaminophen    Suspension .. 650 milliGRAM(s) Oral every 6 hours PRN Temp greater or equal to 38.5C (101.3F)  ALBUTerol    90 MICROgram(s) HFA Inhaler 2 Puff(s) Inhalation every 6 hours PRN Shortness of Breath  HYDROmorphone  Injectable 1 milliGRAM(s) IV Push every 3 hours PRN Severe Pain (7 - 10)  HYDROmorphone  Injectable 0.5 milliGRAM(s) IV Push every 3 hours PRN Moderate Pain (4 - 6)  sodium chloride 0.9% lock flush 10 milliLiter(s) IV Push every 1 hour PRN Pre/post blood products, medications, blood draw, and to maintain line patency      Allergies    No Known Drug Allergies  Pineapple (Anaphylaxis)    Intolerances        VITALS:    Vital Signs Last 24 Hrs  T(C): 36.6 (19 Aug 2022 20:00), Max: 37.4 (19 Aug 2022 04:00)  T(F): 97.8 (19 Aug 2022 20:00), Max: 99.3 (19 Aug 2022 04:00)  HR: 103 (19 Aug 2022 22:00) (99 - 112)  BP: --  BP(mean): --  RR: 13 (19 Aug 2022 22:00) (11 - 27)  SpO2: 98% (19 Aug 2022 22:00) (96% - 100%)    Parameters below as of 19 Aug 2022 22:00  Patient On (Oxygen Delivery Method): nasal cannula  O2 Flow (L/min): 2      LABS:                          8.8    15.86 )-----------( 506      ( 19 Aug 2022 17:00 )             28.2       08-19    141  |  108<H>  |  8   ----------------------------<  84  3.9   |  21<L>  |  0.31<L>    Ca    7.3<L>      19 Aug 2022 03:00  Phos  3.3     -19  Mg     1.60         TPro  4.6<L>  /  Alb  1.6<L>  /  TBili  0.2  /  DBili  x   /  AST  13  /  ALT  7   /  AlkPhos  213<H>        CAPILLARY BLOOD GLUCOSE      POCT Blood Glucose.: 138 mg/dL (19 Aug 2022 23:29)  POCT Blood Glucose.: 120 mg/dL (19 Aug 2022 17:14)  POCT Blood Glucose.: 91 mg/dL (19 Aug 2022 12:24)  POCT Blood Glucose.: 80 mg/dL (19 Aug 2022 05:43)          LOWER EXTREMITY PHYSICAL EXAM:    Vascular: DP/PT 2/4, B/L, CFT <3 seconds B/L, Temperature gradient warm to cool B/L.   Neuro: Epicritic sensation deminshed to the level of forefoot, B/L.  Musculoskeletal/Ortho: Immobilization 2/2 MS   Skin: Deep tissue injury of the posterior heel, B/L, no signs of infection, no purulence, no drainage, no erythema, no hyperkeratotic surrounding the surface.       RADIOLOGY & ADDITIONAL STUDIES:

## 2022-09-01 NOTE — PROGRESS NOTE ADULT - ASSESSMENT
52F immobile 2/2 h/o MS, DM2, asthma, hypothyroidism, and known sacral wounds presents from [place] with urosepsis and anemia with new L flank wound with concern for necrotizing soft tissue infection. Was s/p debridement of wound with worsening sepsis, acidosis, and severe global LV dysfunction of unknown etiology, transferred to CCU for [place] and inotrope support. Pt is s/p swan removal and off all sedation, weaning off pressors. Repeat echo showed EF of 5-10% and signs suggestive of possible stress induced cardiomyopathy. Pt's mental status has returned to baseline off sedation. Pt started on lopressor 12.5 po bid and lasix 40 po qd for HFrEF. C/w Unasyn until 8/25 per ID. Patient now transferred to medical floor.

## 2022-09-02 LAB
ANION GAP SERPL CALC-SCNC: 13 MMOL/L — SIGNIFICANT CHANGE UP (ref 7–14)
BUN SERPL-MCNC: 8 MG/DL — SIGNIFICANT CHANGE UP (ref 7–23)
CALCIUM SERPL-MCNC: 8.1 MG/DL — LOW (ref 8.4–10.5)
CHLORIDE SERPL-SCNC: 99 MMOL/L — SIGNIFICANT CHANGE UP (ref 98–107)
CO2 SERPL-SCNC: 22 MMOL/L — SIGNIFICANT CHANGE UP (ref 22–31)
CREAT SERPL-MCNC: 0.52 MG/DL — SIGNIFICANT CHANGE UP (ref 0.5–1.3)
EGFR: 113 ML/MIN/1.73M2 — SIGNIFICANT CHANGE UP
GLUCOSE BLDC GLUCOMTR-MCNC: 144 MG/DL — HIGH (ref 70–99)
GLUCOSE BLDC GLUCOMTR-MCNC: 156 MG/DL — HIGH (ref 70–99)
GLUCOSE BLDC GLUCOMTR-MCNC: 163 MG/DL — HIGH (ref 70–99)
GLUCOSE BLDC GLUCOMTR-MCNC: 189 MG/DL — HIGH (ref 70–99)
GLUCOSE SERPL-MCNC: 203 MG/DL — HIGH (ref 70–99)
HCT VFR BLD CALC: 34.4 % — LOW (ref 34.5–45)
HGB BLD-MCNC: 10.2 G/DL — LOW (ref 11.5–15.5)
MAGNESIUM SERPL-MCNC: 1.6 MG/DL — SIGNIFICANT CHANGE UP (ref 1.6–2.6)
MCHC RBC-ENTMCNC: 26.8 PG — LOW (ref 27–34)
MCHC RBC-ENTMCNC: 29.7 GM/DL — LOW (ref 32–36)
MCV RBC AUTO: 90.3 FL — SIGNIFICANT CHANGE UP (ref 80–100)
NRBC # BLD: 0 /100 WBCS — SIGNIFICANT CHANGE UP (ref 0–0)
NRBC # FLD: 0 K/UL — SIGNIFICANT CHANGE UP (ref 0–0)
PHOSPHATE SERPL-MCNC: 3.9 MG/DL — SIGNIFICANT CHANGE UP (ref 2.5–4.5)
PLATELET # BLD AUTO: 522 K/UL — HIGH (ref 150–400)
POTASSIUM SERPL-MCNC: 3.6 MMOL/L — SIGNIFICANT CHANGE UP (ref 3.5–5.3)
POTASSIUM SERPL-SCNC: 3.6 MMOL/L — SIGNIFICANT CHANGE UP (ref 3.5–5.3)
RBC # BLD: 3.81 M/UL — SIGNIFICANT CHANGE UP (ref 3.8–5.2)
RBC # FLD: 19.7 % — HIGH (ref 10.3–14.5)
SODIUM SERPL-SCNC: 134 MMOL/L — LOW (ref 135–145)
WBC # BLD: 7.76 K/UL — SIGNIFICANT CHANGE UP (ref 3.8–10.5)
WBC # FLD AUTO: 7.76 K/UL — SIGNIFICANT CHANGE UP (ref 3.8–10.5)

## 2022-09-02 PROCEDURE — 99233 SBSQ HOSP IP/OBS HIGH 50: CPT

## 2022-09-02 RX ORDER — MAGNESIUM SULFATE 500 MG/ML
2 VIAL (ML) INJECTION ONCE
Refills: 0 | Status: COMPLETED | OUTPATIENT
Start: 2022-09-02 | End: 2022-09-02

## 2022-09-02 RX ORDER — POTASSIUM CHLORIDE 20 MEQ
20 PACKET (EA) ORAL ONCE
Refills: 0 | Status: COMPLETED | OUTPATIENT
Start: 2022-09-02 | End: 2022-09-02

## 2022-09-02 RX ADMIN — Medication 2: at 12:34

## 2022-09-02 RX ADMIN — NYSTATIN CREAM 1 APPLICATION(S): 100000 CREAM TOPICAL at 05:36

## 2022-09-02 RX ADMIN — Medication 20 MILLIEQUIVALENT(S): at 12:35

## 2022-09-02 RX ADMIN — Medication 25 GRAM(S): at 13:29

## 2022-09-02 RX ADMIN — Medication 2: at 22:51

## 2022-09-02 RX ADMIN — Medication 500 MILLIGRAM(S): at 12:35

## 2022-09-02 RX ADMIN — Medication 2: at 08:34

## 2022-09-02 RX ADMIN — Medication 20 MILLIGRAM(S): at 05:36

## 2022-09-02 RX ADMIN — Medication 5 UNIT(S): at 12:34

## 2022-09-02 RX ADMIN — Medication 5 UNIT(S): at 17:32

## 2022-09-02 RX ADMIN — Medication 5 UNIT(S): at 08:35

## 2022-09-02 RX ADMIN — Medication 1 APPLICATION(S): at 12:36

## 2022-09-02 RX ADMIN — HEPARIN SODIUM 5000 UNIT(S): 5000 INJECTION INTRAVENOUS; SUBCUTANEOUS at 13:29

## 2022-09-02 RX ADMIN — Medication 25 MILLIGRAM(S): at 05:36

## 2022-09-02 RX ADMIN — HEPARIN SODIUM 5000 UNIT(S): 5000 INJECTION INTRAVENOUS; SUBCUTANEOUS at 05:35

## 2022-09-02 RX ADMIN — NYSTATIN CREAM 1 APPLICATION(S): 100000 CREAM TOPICAL at 17:32

## 2022-09-02 RX ADMIN — CHLORHEXIDINE GLUCONATE 1 APPLICATION(S): 213 SOLUTION TOPICAL at 12:37

## 2022-09-02 RX ADMIN — Medication 1 TABLET(S): at 12:35

## 2022-09-02 RX ADMIN — Medication 2000 UNIT(S): at 12:35

## 2022-09-02 RX ADMIN — HEPARIN SODIUM 5000 UNIT(S): 5000 INJECTION INTRAVENOUS; SUBCUTANEOUS at 22:51

## 2022-09-02 RX ADMIN — PANTOPRAZOLE SODIUM 40 MILLIGRAM(S): 20 TABLET, DELAYED RELEASE ORAL at 05:35

## 2022-09-02 NOTE — PROGRESS NOTE ADULT - SUBJECTIVE AND OBJECTIVE BOX
Patient is a 52y old  Female who presents with a chief complaint of Soft tissue infection (02 Sep 2022 16:02)      INTERVAL HPI/OVERNIGHT EVENTS:  Seen by me this afternoon, doing well, no complaints. Low grade temp yesterday-pt feels that she might be going thru menopause.    Review of Systems: 12 point review of systems otherwise negative    MEDICATIONS  (STANDING):  ascorbic acid 500 milliGRAM(s) Oral daily  cadexomer iodine 0.9% Gel 1 Application(s) Topical daily  chlorhexidine 2% Cloths 1 Application(s) Topical daily  cholecalciferol 2000 Unit(s) Oral daily  collagenase Ointment 1 Application(s) Topical daily  furosemide    Tablet 20 milliGRAM(s) Oral daily  heparin   Injectable 5000 Unit(s) SubCutaneous every 8 hours  insulin lispro (ADMELOG) corrective regimen sliding scale   SubCutaneous Before meals and at bedtime  insulin lispro Injectable (ADMELOG) 5 Unit(s) SubCutaneous three times a day before meals  metoprolol succinate ER 25 milliGRAM(s) Oral daily  multivitamin 1 Tablet(s) Oral daily  nystatin Powder 1 Application(s) Topical two times a day  pantoprazole    Tablet 40 milliGRAM(s) Oral before breakfast  potassium chloride    Tablet ER 20 milliEquivalent(s) Oral daily    MEDICATIONS  (PRN):  acetaminophen     Tablet .. 650 milliGRAM(s) Oral every 6 hours PRN Mild Pain (1 - 3)  ALBUTerol    90 MICROgram(s) HFA Inhaler 2 Puff(s) Inhalation every 6 hours PRN Shortness of Breath  HYDROmorphone   Tablet 2 milliGRAM(s) Oral every 3 hours PRN Severe Pain (7 - 10)  HYDROmorphone   Tablet 1 milliGRAM(s) Oral every 3 hours PRN Moderate Pain (4 - 6)  sodium chloride 0.9% lock flush 10 milliLiter(s) IV Push every 1 hour PRN Pre/post blood products, medications, blood draw, and to maintain line patency      Allergies    No Known Drug Allergies  Pineapple (Anaphylaxis)    Intolerances          Vital Signs Last 24 Hrs  T(C): 37.3 (02 Sep 2022 19:12), Max: 37.9 (01 Sep 2022 22:18)  T(F): 99.1 (02 Sep 2022 19:12), Max: 100.3 (01 Sep 2022 22:18)  HR: 103 (02 Sep 2022 19:12) (59 - 117)  BP: 106/68 (02 Sep 2022 19:12) (90/50 - 135/74)  BP(mean): --  RR: 17 (02 Sep 2022 19:12) (16 - 18)  SpO2: 98% (02 Sep 2022 19:12) (96% - 100%)    Parameters below as of 02 Sep 2022 19:12  Patient On (Oxygen Delivery Method): room air      CAPILLARY BLOOD GLUCOSE      POCT Blood Glucose.: 144 mg/dL (02 Sep 2022 17:18)  POCT Blood Glucose.: 163 mg/dL (02 Sep 2022 12:03)  POCT Blood Glucose.: 189 mg/dL (02 Sep 2022 08:21)  POCT Blood Glucose.: 150 mg/dL (01 Sep 2022 21:39)      09-01 @ 07:01  -  09-02 @ 07:00  --------------------------------------------------------  IN: 910 mL / OUT: 1450 mL / NET: -540 mL    09-02 @ 07:01 - 09-02 @ 20:38  --------------------------------------------------------  IN: 850 mL / OUT: 1210 mL / NET: -360 mL        Physical Exam:    Daily     Daily   General:  Well appearing, NAD, obese  HEENT:  Nonicteric, PERRLA  CV:  RRR, no murmur, no JVD  Lungs:  CTA B/L, no wheezes, rales, rhonchi  Abdomen:  Soft, non-tender, no distended, positive BS, +wound vac in place (sacrum)  Extremities:  2+ pulses, no c/c, no edema  Skin:  Warm and dry, no rashes  :  No toussaint  Neuro:  AAOx3, non-focal, CN II-XII grossly intact  No Restraints    LABS:                        10.2   7.76  )-----------( 522      ( 02 Sep 2022 07:33 )             34.4     09-02    134<L>  |  99  |  8   ----------------------------<  203<H>  3.6   |  22  |  0.52    Ca    8.1<L>      02 Sep 2022 07:33  Phos  3.9     09-02  Mg     1.60     09-02              RADIOLOGY & ADDITIONAL TESTS:  Reviewed by me

## 2022-09-02 NOTE — PROGRESS NOTE ADULT - ASSESSMENT
52F immobile 2/2 h/o MS, DM2, asthma, hypothyroidism, and known sacral wounds presents from [place] with urosepsis and anemia with new L flank wound with concern for necrotizing soft tissue infection s/p debridement of wound with worsening sepsis, acidosis, and severe global LV dysfunction of unknown etiology, transferred to CCU for [place] and inotrope support. Repeat echo showed EF of 5-10% and signs suggestive of possible stress induced cardiomyopathy. Patient has gradually improved. Was hypotensive a few days ago but now appears improved with adjustment of med doses.     Acute systolic heart failure   - appears euvolemic on exam  - CM of unknown etiology- ischemic vs nonischemic- further workup recommended as outpatient  - continue Toprol XL as tolerated and Lasix QD  - strict I & O please  - ideally addition of ACE/ARB if BP can tolerate however this may need to be assessed as outpatient  - No HF symptoms, not volume overloaded on exam     DISPO:  - D/C planning per primary team  - patient is awaiting rehab placement with wound care

## 2022-09-02 NOTE — PROGRESS NOTE ADULT - PROBLEM SELECTOR PLAN 5
- A1c 5.5  - FS elevated but now better controlled  - was started on steroids post op, ?stress dose, was tapered, d/c'ed on 8/15 given concern of sepsis  - continue lispro 5U TID      # BMI of 36.3 --> Obesity  # Functional quadriplegia from underlying MS

## 2022-09-02 NOTE — PROGRESS NOTE ADULT - ASSESSMENT
52F immobile 2/2 h/o MS, DM2, asthma, hypothyroidism, and known sacral wounds presents from [place] with urosepsis and anemia with new L flank wound with concern for necrotizing soft tissue infection. Was s/p debridement of wound with worsening sepsis, acidosis, and severe global LV dysfunction of unknown etiology, transferred to CCU for [place] and inotrope support. Pt is s/p swan removal and off all sedation, weaning off pressors. Repeat echo showed EF of 5-10% and signs suggestive of possible stress induced cardiomyopathy. Pt's mental status has returned to baseline off sedation. Pt started on lopressor 12.5 po bid and lasix 40 po qd for HFrEF. C/w Unasyn until 8/25 per ID. Patient now transferred to medical floor.

## 2022-09-02 NOTE — PROGRESS NOTE ADULT - PROBLEM SELECTOR PLAN 6
moist and bite sized diet  dispo: PT recommends NABEEL- insurance authorization obtained  No bed available at [facility] (wound care) until next week, likely on Tuesday 9/6  D/w SW/YANIQUE, apprec assistance      D/w ACP

## 2022-09-02 NOTE — PROGRESS NOTE ADULT - SUBJECTIVE AND OBJECTIVE BOX
Patient is a 52y old  Female who presents with a chief complaint of Soft tissue infection (19 Aug 2022 11:45)      HPI:  HPI:  52F immobile 2/2 MS, poorly controlled T2DM, asthma, hypothyroidism, known chronic wounds sacrum (stage 4), vulvar/Rt thigh, and right calf. Recent 2022 hospitalization for urosepsis. On cefepime/flagyl for chronic osteo 2/2 unstageable sacral decubitus ulcer, dakins BID packing. Presented from [place] with concerns for UTI and anemia (6.8 from baseline 8.0) and new malodorous wound on L hip to L flank area. Surgery consulted for potential necrotizing soft tissue infection.            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 1000 milliGRAM(s) IV Intermittent once  cefepime   IVPB 2000 milliGRAM(s) IV Intermittent once  clindamycin IVPB 900 milliGRAM(s) IV Intermittent once  sodium chloride 0.9% Bolus 1000 milliLiter(s) IV Bolus once  vancomycin  IVPB. 1000 milliGRAM(s) IV Intermittent once    MEDICATIONS  (PRN):      Allergies    No Known Drug Allergies  Pineapple (Anaphylaxis)    Intolerances        SOCIAL HISTORY:    FAMILY HISTORY:          Physical Exam:  General: NAD, resting comfortably, warm to touch   HEENT: NC/AT, EOMI  Pulmonary: normal resp effort, patent airway  Abdominal: obese  Extremities: poor muscle tone  Skin: sacral wound stage 4, r thigh wound tracking to labia, L thigh wound, L hip/flank malodorous dark green wound with surrounding erythema and underlying crepitus tracking up the L flank  Neuro: A/O x 3-4, CNs II-XII grossly intact    Vital Signs Last 24 Hrs  T(C): 39.8 (10 Aug 2022 21:06), Max: 39.8 (10 Aug 2022 21:06)  T(F): 103.7 (10 Aug 2022 21:06), Max: 103.7 (10 Aug 2022 21:06)  HR: 127 (10 Aug 2022 20:10) (124 - 127)  BP: 139/72 (10 Aug 2022 20:10) (123/56 - 139/72)  BP(mean): --  RR: 20 (10 Aug 2022 20:10) (18 - 20)  SpO2: 100% (10 Aug 2022 20:10) (100% - 100%)    Parameters below as of 10 Aug 2022 20:10  Patient On (Oxygen Delivery Method): room air        I&O's Summary          LABS:                        5.9    31.78 )-----------( 920      ( 10 Aug 2022 22:30 )             22.5         Urinalysis Basic - ( 10 Aug 2022 22:30 )    Color: Yellow / Appearance: Turbid / S.017 / pH: x  Gluc: x / Ketone: Negative  / Bili: Negative / Urobili: <2 mg/dL   Blood: x / Protein: 100 mg/dL / Nitrite: Negative   Leuk Esterase: Large / RBC: 25-50 /HPF / WBC >50 /HPF   Sq Epi: x / Non Sq Epi: x / Bacteria: Many                CAPILLARY BLOOD GLUCOSE            Cultures:      RADIOLOGY & ADDITIONAL STUDIES:               (10 Aug 2022 22:48)      PAST MEDICAL & SURGICAL HISTORY:  DM (diabetes mellitus)      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      MS (multiple sclerosis)      No significant past surgical history          MEDICATIONS  (STANDING):  ampicillin/sulbactam  IVPB      ampicillin/sulbactam  IVPB 3 Gram(s) IV Intermittent every 6 hours  ascorbic acid 500 milliGRAM(s) Oral daily  chlorhexidine 2% Cloths 1 Application(s) Topical daily  cholecalciferol 2000 Unit(s) Oral daily  collagenase Ointment 1 Application(s) Topical daily  heparin   Injectable 5000 Unit(s) SubCutaneous every 8 hours  insulin lispro (ADMELOG) corrective regimen sliding scale   SubCutaneous every 6 hours  insulin lispro Injectable (ADMELOG) 5 Unit(s) SubCutaneous every 6 hours  lactated ringers. 1000 milliLiter(s) (10 mL/Hr) IV Continuous <Continuous>  levothyroxine Injectable 56 MICROGram(s) IV Push <User Schedule>  multivitamin/minerals/iron Oral Solution (CENTRUM) 15 milliLiter(s) Oral daily  nystatin Powder 1 Application(s) Topical two times a day  pantoprazole  Injectable 40 milliGRAM(s) IV Push every 12 hours    MEDICATIONS  (PRN):  acetaminophen    Suspension .. 650 milliGRAM(s) Oral every 6 hours PRN Temp greater or equal to 38.5C (101.3F)  ALBUTerol    90 MICROgram(s) HFA Inhaler 2 Puff(s) Inhalation every 6 hours PRN Shortness of Breath  HYDROmorphone  Injectable 1 milliGRAM(s) IV Push every 3 hours PRN Severe Pain (7 - 10)  HYDROmorphone  Injectable 0.5 milliGRAM(s) IV Push every 3 hours PRN Moderate Pain (4 - 6)  sodium chloride 0.9% lock flush 10 milliLiter(s) IV Push every 1 hour PRN Pre/post blood products, medications, blood draw, and to maintain line patency      Allergies    No Known Drug Allergies  Pineapple (Anaphylaxis)    Intolerances        VITALS:    Vital Signs Last 24 Hrs  T(C): 36.6 (19 Aug 2022 20:00), Max: 37.4 (19 Aug 2022 04:00)  T(F): 97.8 (19 Aug 2022 20:00), Max: 99.3 (19 Aug 2022 04:00)  HR: 103 (19 Aug 2022 22:00) (99 - 112)  BP: --  BP(mean): --  RR: 13 (19 Aug 2022 22:00) (11 - 27)  SpO2: 98% (19 Aug 2022 22:00) (96% - 100%)    Parameters below as of 19 Aug 2022 22:00  Patient On (Oxygen Delivery Method): nasal cannula  O2 Flow (L/min): 2      LABS:                          8.8    15.86 )-----------( 506      ( 19 Aug 2022 17:00 )             28.2       08-19    141  |  108<H>  |  8   ----------------------------<  84  3.9   |  21<L>  |  0.31<L>    Ca    7.3<L>      19 Aug 2022 03:00  Phos  3.3     -19  Mg     1.60         TPro  4.6<L>  /  Alb  1.6<L>  /  TBili  0.2  /  DBili  x   /  AST  13  /  ALT  7   /  AlkPhos  213<H>        CAPILLARY BLOOD GLUCOSE      POCT Blood Glucose.: 138 mg/dL (19 Aug 2022 23:29)  POCT Blood Glucose.: 120 mg/dL (19 Aug 2022 17:14)  POCT Blood Glucose.: 91 mg/dL (19 Aug 2022 12:24)  POCT Blood Glucose.: 80 mg/dL (19 Aug 2022 05:43)          LOWER EXTREMITY PHYSICAL EXAM:    Vascular: DP/PT 2/4, B/L, CFT <3 seconds B/L, Temperature gradient warm to cool B/L.   Neuro: Epicritic sensation deminshed to the level of forefoot, B/L.  Musculoskeletal/Ortho: Immobilization 2/2 MS   Skin: Deep tissue injury of the posterior heel, B/L, no signs of infection, no purulence, no drainage, no erythema, no hyperkeratotic surrounding the surface.       RADIOLOGY & ADDITIONAL STUDIES:

## 2022-09-02 NOTE — PROGRESS NOTE ADULT - SUBJECTIVE AND OBJECTIVE BOX
Subjective/Objective: Resting in bed, feels well, offers no complaints but is concerned about which rehab she will be going to.    MEDICATIONS  (STANDING):  ascorbic acid 500 milliGRAM(s) Oral daily  cadexomer iodine 0.9% Gel 1 Application(s) Topical daily  chlorhexidine 2% Cloths 1 Application(s) Topical daily  cholecalciferol 2000 Unit(s) Oral daily  collagenase Ointment 1 Application(s) Topical daily  furosemide    Tablet 20 milliGRAM(s) Oral daily  heparin   Injectable 5000 Unit(s) SubCutaneous every 8 hours  insulin lispro (ADMELOG) corrective regimen sliding scale   SubCutaneous Before meals and at bedtime  insulin lispro Injectable (ADMELOG) 5 Unit(s) SubCutaneous three times a day before meals  magnesium sulfate  IVPB 2 Gram(s) IV Intermittent once  metoprolol succinate ER 25 milliGRAM(s) Oral daily  multivitamin 1 Tablet(s) Oral daily  nystatin Powder 1 Application(s) Topical two times a day  pantoprazole    Tablet 40 milliGRAM(s) Oral before breakfast  potassium chloride    Tablet ER 20 milliEquivalent(s) Oral daily  potassium chloride    Tablet ER 20 milliEquivalent(s) Oral once    MEDICATIONS  (PRN):  acetaminophen     Tablet .. 650 milliGRAM(s) Oral every 6 hours PRN Mild Pain (1 - 3)  ALBUTerol    90 MICROgram(s) HFA Inhaler 2 Puff(s) Inhalation every 6 hours PRN Shortness of Breath  HYDROmorphone   Tablet 2 milliGRAM(s) Oral every 3 hours PRN Severe Pain (7 - 10)  HYDROmorphone   Tablet 1 milliGRAM(s) Oral every 3 hours PRN Moderate Pain (4 - 6)  sodium chloride 0.9% lock flush 10 milliLiter(s) IV Push every 1 hour PRN Pre/post blood products, medications, blood draw, and to maintain line patency          Vital Signs Last 24 Hrs  T(C): 36.3 (02 Sep 2022 05:34), Max: 37.9 (01 Sep 2022 22:18)  T(F): 97.3 (02 Sep 2022 05:34), Max: 100.3 (01 Sep 2022 22:18)  HR: 87 (02 Sep 2022 05:34) (87 - 117)  BP: 107/65 (02 Sep 2022 05:34) (94/48 - 135/74)  BP(mean): --  RR: 17 (02 Sep 2022 05:34) (16 - 18)  SpO2: 100% (02 Sep 2022 05:34) (96% - 100%)    Parameters below as of 02 Sep 2022 05:34  Patient On (Oxygen Delivery Method): room air      I&O's Detail    01 Sep 2022 07:01  -  02 Sep 2022 07:00  --------------------------------------------------------  IN:    Oral Fluid: 910 mL  Total IN: 910 mL    OUT:    VAC (Vacuum Assisted Closure) System (mL): 0 mL    Voided (mL): 1450 mL  Total OUT: 1450 mL    Total NET: -540 mL    PHYSICAL EXAM  GEN: NAD, skin W & D  RESP: CTA ant  CV: nl S1S2  GI: soft, NT/ND, BS +  EXT: no C/C/E  NEURO: awake, alert      EKG/ TELEM: not on telemetry    LABS:                          10.2   7.76  )-----------( 522      ( 02 Sep 2022 07:33 )             34.4       02 Sep 2022 07:33    134<L>  |  99     |  8      ----------------------------<  203<H>  3.6     |  22     |  0.52     01 Sep 2022 05:31    136    |  102    |  8      ----------------------------<  126<H>  3.6     |  24     |  0.51     Ca    8.1<L>      02 Sep 2022 07:33  Ca    7.6<L>      01 Sep 2022 05:31  Phos  3.9       02 Sep 2022 07:33  Phos  4.1       01 Sep 2022 05:31  Mg     1.60      02 Sep 2022 07:33  Mg     1.50<L>     01 Sep 2022 05:31

## 2022-09-03 LAB
ANION GAP SERPL CALC-SCNC: 11 MMOL/L — SIGNIFICANT CHANGE UP (ref 7–14)
BASOPHILS # BLD AUTO: 0.06 K/UL — SIGNIFICANT CHANGE UP (ref 0–0.2)
BASOPHILS NFR BLD AUTO: 0.6 % — SIGNIFICANT CHANGE UP (ref 0–2)
BUN SERPL-MCNC: 11 MG/DL — SIGNIFICANT CHANGE UP (ref 7–23)
CALCIUM SERPL-MCNC: 7.9 MG/DL — LOW (ref 8.4–10.5)
CHLORIDE SERPL-SCNC: 99 MMOL/L — SIGNIFICANT CHANGE UP (ref 98–107)
CO2 SERPL-SCNC: 24 MMOL/L — SIGNIFICANT CHANGE UP (ref 22–31)
CREAT SERPL-MCNC: 0.61 MG/DL — SIGNIFICANT CHANGE UP (ref 0.5–1.3)
EGFR: 109 ML/MIN/1.73M2 — SIGNIFICANT CHANGE UP
EOSINOPHIL # BLD AUTO: 0.34 K/UL — SIGNIFICANT CHANGE UP (ref 0–0.5)
EOSINOPHIL NFR BLD AUTO: 3.5 % — SIGNIFICANT CHANGE UP (ref 0–6)
GLUCOSE BLDC GLUCOMTR-MCNC: 135 MG/DL — HIGH (ref 70–99)
GLUCOSE BLDC GLUCOMTR-MCNC: 179 MG/DL — HIGH (ref 70–99)
GLUCOSE BLDC GLUCOMTR-MCNC: 224 MG/DL — HIGH (ref 70–99)
GLUCOSE BLDC GLUCOMTR-MCNC: 226 MG/DL — HIGH (ref 70–99)
GLUCOSE BLDC GLUCOMTR-MCNC: 279 MG/DL — HIGH (ref 70–99)
GLUCOSE SERPL-MCNC: 139 MG/DL — HIGH (ref 70–99)
HCT VFR BLD CALC: 32.6 % — LOW (ref 34.5–45)
HGB BLD-MCNC: 10.1 G/DL — LOW (ref 11.5–15.5)
IANC: 5.6 K/UL — SIGNIFICANT CHANGE UP (ref 1.8–7.4)
IMM GRANULOCYTES NFR BLD AUTO: 0.7 % — SIGNIFICANT CHANGE UP (ref 0–1.5)
LYMPHOCYTES # BLD AUTO: 2.61 K/UL — SIGNIFICANT CHANGE UP (ref 1–3.3)
LYMPHOCYTES # BLD AUTO: 27 % — SIGNIFICANT CHANGE UP (ref 13–44)
MAGNESIUM SERPL-MCNC: 1.8 MG/DL — SIGNIFICANT CHANGE UP (ref 1.6–2.6)
MCHC RBC-ENTMCNC: 27.5 PG — SIGNIFICANT CHANGE UP (ref 27–34)
MCHC RBC-ENTMCNC: 31 GM/DL — LOW (ref 32–36)
MCV RBC AUTO: 88.8 FL — SIGNIFICANT CHANGE UP (ref 80–100)
MONOCYTES # BLD AUTO: 0.98 K/UL — HIGH (ref 0–0.9)
MONOCYTES NFR BLD AUTO: 10.1 % — SIGNIFICANT CHANGE UP (ref 2–14)
NEUTROPHILS # BLD AUTO: 5.6 K/UL — SIGNIFICANT CHANGE UP (ref 1.8–7.4)
NEUTROPHILS NFR BLD AUTO: 58.1 % — SIGNIFICANT CHANGE UP (ref 43–77)
NRBC # BLD: 0 /100 WBCS — SIGNIFICANT CHANGE UP (ref 0–0)
NRBC # FLD: 0 K/UL — SIGNIFICANT CHANGE UP (ref 0–0)
PHOSPHATE SERPL-MCNC: 3.6 MG/DL — SIGNIFICANT CHANGE UP (ref 2.5–4.5)
PLATELET # BLD AUTO: 542 K/UL — HIGH (ref 150–400)
POTASSIUM SERPL-MCNC: 4 MMOL/L — SIGNIFICANT CHANGE UP (ref 3.5–5.3)
POTASSIUM SERPL-SCNC: 4 MMOL/L — SIGNIFICANT CHANGE UP (ref 3.5–5.3)
RBC # BLD: 3.67 M/UL — LOW (ref 3.8–5.2)
RBC # FLD: 19.8 % — HIGH (ref 10.3–14.5)
SODIUM SERPL-SCNC: 134 MMOL/L — LOW (ref 135–145)
WBC # BLD: 9.66 K/UL — SIGNIFICANT CHANGE UP (ref 3.8–10.5)
WBC # FLD AUTO: 9.66 K/UL — SIGNIFICANT CHANGE UP (ref 3.8–10.5)

## 2022-09-03 PROCEDURE — 99233 SBSQ HOSP IP/OBS HIGH 50: CPT

## 2022-09-03 RX ORDER — INSULIN GLARGINE 100 [IU]/ML
12 INJECTION, SOLUTION SUBCUTANEOUS AT BEDTIME
Refills: 0 | Status: DISCONTINUED | OUTPATIENT
Start: 2022-09-03 | End: 2022-09-04

## 2022-09-03 RX ADMIN — CHLORHEXIDINE GLUCONATE 1 APPLICATION(S): 213 SOLUTION TOPICAL at 12:35

## 2022-09-03 RX ADMIN — NYSTATIN CREAM 1 APPLICATION(S): 100000 CREAM TOPICAL at 06:48

## 2022-09-03 RX ADMIN — Medication 6: at 12:00

## 2022-09-03 RX ADMIN — Medication 1 APPLICATION(S): at 17:50

## 2022-09-03 RX ADMIN — Medication 1 APPLICATION(S): at 12:36

## 2022-09-03 RX ADMIN — NYSTATIN CREAM 1 APPLICATION(S): 100000 CREAM TOPICAL at 17:58

## 2022-09-03 RX ADMIN — Medication 4: at 22:50

## 2022-09-03 RX ADMIN — HEPARIN SODIUM 5000 UNIT(S): 5000 INJECTION INTRAVENOUS; SUBCUTANEOUS at 06:48

## 2022-09-03 RX ADMIN — Medication 20 MILLIGRAM(S): at 06:49

## 2022-09-03 RX ADMIN — Medication 2000 UNIT(S): at 12:37

## 2022-09-03 RX ADMIN — INSULIN GLARGINE 12 UNIT(S): 100 INJECTION, SOLUTION SUBCUTANEOUS at 22:50

## 2022-09-03 RX ADMIN — Medication 6: at 17:53

## 2022-09-03 RX ADMIN — Medication 1 TABLET(S): at 12:36

## 2022-09-03 RX ADMIN — HEPARIN SODIUM 5000 UNIT(S): 5000 INJECTION INTRAVENOUS; SUBCUTANEOUS at 22:50

## 2022-09-03 RX ADMIN — Medication 500 MILLIGRAM(S): at 12:35

## 2022-09-03 RX ADMIN — Medication 25 MILLIGRAM(S): at 06:49

## 2022-09-03 RX ADMIN — PANTOPRAZOLE SODIUM 40 MILLIGRAM(S): 20 TABLET, DELAYED RELEASE ORAL at 06:49

## 2022-09-03 RX ADMIN — Medication 5 UNIT(S): at 12:35

## 2022-09-03 RX ADMIN — Medication 20 MILLIEQUIVALENT(S): at 12:37

## 2022-09-03 RX ADMIN — Medication 5 UNIT(S): at 09:54

## 2022-09-03 RX ADMIN — HEPARIN SODIUM 5000 UNIT(S): 5000 INJECTION INTRAVENOUS; SUBCUTANEOUS at 17:50

## 2022-09-03 NOTE — PROGRESS NOTE ADULT - PROBLEM SELECTOR PLAN 6
moist and bite sized diet  dispo: PT recommends NABEEL- insurance authorization obtained  No bed available at [facility] (wound care) until next week, likely on Tuesday 9/6  D/w SW/YANIQUE, apprec assistance      D/w ACP

## 2022-09-03 NOTE — PROGRESS NOTE ADULT - SUBJECTIVE AND OBJECTIVE BOX
Patient is a 52y old  Female who presents with a chief complaint of Soft tissue infection (02 Sep 2022 20:38)      INTERVAL HPI/OVERNIGHT EVENTS:  Seen by me this afternoon, doing well, no complaints.    Review of Systems: 12 point review of systems otherwise negative    MEDICATIONS  (STANDING):  ascorbic acid 500 milliGRAM(s) Oral daily  cadexomer iodine 0.9% Gel 1 Application(s) Topical daily  chlorhexidine 2% Cloths 1 Application(s) Topical daily  cholecalciferol 2000 Unit(s) Oral daily  collagenase Ointment 1 Application(s) Topical daily  furosemide    Tablet 20 milliGRAM(s) Oral daily  heparin   Injectable 5000 Unit(s) SubCutaneous every 8 hours  insulin glargine Injectable (LANTUS) 12 Unit(s) SubCutaneous at bedtime  insulin lispro (ADMELOG) corrective regimen sliding scale   SubCutaneous Before meals and at bedtime  metoprolol succinate ER 25 milliGRAM(s) Oral daily  multivitamin 1 Tablet(s) Oral daily  nystatin Powder 1 Application(s) Topical two times a day  pantoprazole    Tablet 40 milliGRAM(s) Oral before breakfast  potassium chloride    Tablet ER 20 milliEquivalent(s) Oral daily    MEDICATIONS  (PRN):  acetaminophen     Tablet .. 650 milliGRAM(s) Oral every 6 hours PRN Mild Pain (1 - 3)  ALBUTerol    90 MICROgram(s) HFA Inhaler 2 Puff(s) Inhalation every 6 hours PRN Shortness of Breath  HYDROmorphone   Tablet 2 milliGRAM(s) Oral every 3 hours PRN Severe Pain (7 - 10)  HYDROmorphone   Tablet 1 milliGRAM(s) Oral every 3 hours PRN Moderate Pain (4 - 6)  sodium chloride 0.9% lock flush 10 milliLiter(s) IV Push every 1 hour PRN Pre/post blood products, medications, blood draw, and to maintain line patency      Allergies    No Known Drug Allergies  Pineapple (Anaphylaxis)    Intolerances          Vital Signs Last 24 Hrs  T(C): 36.6 (03 Sep 2022 10:22), Max: 37.5 (02 Sep 2022 17:37)  T(F): 97.8 (03 Sep 2022 10:22), Max: 99.5 (02 Sep 2022 17:37)  HR: 98 (03 Sep 2022 10:22) (86 - 104)  BP: 92/61 (03 Sep 2022 10:22) (90/50 - 106/68)  BP(mean): --  RR: 18 (03 Sep 2022 10:22) (16 - 18)  SpO2: 100% (03 Sep 2022 10:22) (95% - 100%)    Parameters below as of 03 Sep 2022 10:22  Patient On (Oxygen Delivery Method): room air      CAPILLARY BLOOD GLUCOSE      POCT Blood Glucose.: 226 mg/dL (03 Sep 2022 14:44)  POCT Blood Glucose.: 179 mg/dL (03 Sep 2022 12:02)  POCT Blood Glucose.: 135 mg/dL (03 Sep 2022 08:51)  POCT Blood Glucose.: 156 mg/dL (02 Sep 2022 22:06)  POCT Blood Glucose.: 144 mg/dL (02 Sep 2022 17:18)      09-02 @ 07:01  -  09-03 @ 07:00  --------------------------------------------------------  IN: 1050 mL / OUT: 1720 mL / NET: -670 mL    09-03 @ 07:01 - 09-03 @ 16:24  --------------------------------------------------------  IN: 240 mL / OUT: 650 mL / NET: -410 mL        Physical Exam:    Daily     Daily   General:  Well appearing, NAD, obese  HEENT:  Nonicteric, PERRLA  CV:  RRR, no murmur, no JVD  Lungs:  CTA B/L, no wheezes, rales, rhonchi  Abdomen:  Soft, non-tender, no distended, positive BS, no hepatosplenomegaly, wound vac in place (sacrum)  Extremities:  2+ pulses, no c/c, no edema  Skin:  Warm and dry, no rashes  :  No toussaint  Neuro:  AAOx3, non-focal, CN II-XII grossly intact  No Restraints    LABS:                        10.1   9.66  )-----------( 542      ( 03 Sep 2022 07:20 )             32.6     09-03    134<L>  |  99  |  11  ----------------------------<  139<H>  4.0   |  24  |  0.61    Ca    7.9<L>      03 Sep 2022 07:20  Phos  3.6     09-03  Mg     1.80     09-03              RADIOLOGY & ADDITIONAL TESTS:  Reviewed by me

## 2022-09-03 NOTE — PROGRESS NOTE ADULT - ASSESSMENT
52F immobile 2/2 h/o MS, DM2, asthma, hypothyroidism, and known sacral wounds presents from [place] with urosepsis and anemia with new L flank wound with concern for necrotizing soft tissue infection. Was s/p debridement of wound with worsening sepsis, acidosis, and severe global LV dysfunction of unknown etiology, transferred to CCU for [place] and inotrope support. Pt is s/p swan removal and off all sedation, weaning off pressors. Repeat echo showed EF of 5-10% and signs suggestive of possible stress induced cardiomyopathy. Pt's mental status has returned to baseline off sedation. Pt started on lopressor 12.5 po bid and lasix 40 po qd for HFrEF. C/w Unasyn until 8/25 per ID. Patient now transferred to medical floor.

## 2022-09-03 NOTE — PROGRESS NOTE ADULT - PROBLEM SELECTOR PLAN 5
- A1c 5.5  - FS elevated but now better controlled  - was started on steroids post op, ?stress dose, tapered, d/c'ed on 8/15 given concern of sepsis  - continue lispro 5U TID-will d/c and start lantus 12U qhs  - Monitor FS's closely      # BMI of 36.3 --> Obesity  # Functional quadriplegia from underlying MS

## 2022-09-04 LAB
ANION GAP SERPL CALC-SCNC: 11 MMOL/L — SIGNIFICANT CHANGE UP (ref 7–14)
BASOPHILS # BLD AUTO: 0.05 K/UL — SIGNIFICANT CHANGE UP (ref 0–0.2)
BASOPHILS NFR BLD AUTO: 0.5 % — SIGNIFICANT CHANGE UP (ref 0–2)
BUN SERPL-MCNC: 12 MG/DL — SIGNIFICANT CHANGE UP (ref 7–23)
CALCIUM SERPL-MCNC: 7.7 MG/DL — LOW (ref 8.4–10.5)
CHLORIDE SERPL-SCNC: 104 MMOL/L — SIGNIFICANT CHANGE UP (ref 98–107)
CO2 SERPL-SCNC: 22 MMOL/L — SIGNIFICANT CHANGE UP (ref 22–31)
CREAT SERPL-MCNC: 0.56 MG/DL — SIGNIFICANT CHANGE UP (ref 0.5–1.3)
EGFR: 111 ML/MIN/1.73M2 — SIGNIFICANT CHANGE UP
EOSINOPHIL # BLD AUTO: 0.17 K/UL — SIGNIFICANT CHANGE UP (ref 0–0.5)
EOSINOPHIL NFR BLD AUTO: 1.8 % — SIGNIFICANT CHANGE UP (ref 0–6)
GLUCOSE BLDC GLUCOMTR-MCNC: 146 MG/DL — HIGH (ref 70–99)
GLUCOSE BLDC GLUCOMTR-MCNC: 248 MG/DL — HIGH (ref 70–99)
GLUCOSE BLDC GLUCOMTR-MCNC: 249 MG/DL — HIGH (ref 70–99)
GLUCOSE BLDC GLUCOMTR-MCNC: 269 MG/DL — HIGH (ref 70–99)
GLUCOSE BLDC GLUCOMTR-MCNC: 334 MG/DL — HIGH (ref 70–99)
GLUCOSE SERPL-MCNC: 151 MG/DL — HIGH (ref 70–99)
HCT VFR BLD CALC: 30.7 % — LOW (ref 34.5–45)
HGB BLD-MCNC: 9.4 G/DL — LOW (ref 11.5–15.5)
IANC: 5.97 K/UL — SIGNIFICANT CHANGE UP (ref 1.8–7.4)
IMM GRANULOCYTES NFR BLD AUTO: 0.8 % — SIGNIFICANT CHANGE UP (ref 0–1.5)
LYMPHOCYTES # BLD AUTO: 2.3 K/UL — SIGNIFICANT CHANGE UP (ref 1–3.3)
LYMPHOCYTES # BLD AUTO: 24.2 % — SIGNIFICANT CHANGE UP (ref 13–44)
MAGNESIUM SERPL-MCNC: 1.5 MG/DL — LOW (ref 1.6–2.6)
MCHC RBC-ENTMCNC: 27.3 PG — SIGNIFICANT CHANGE UP (ref 27–34)
MCHC RBC-ENTMCNC: 30.6 GM/DL — LOW (ref 32–36)
MCV RBC AUTO: 89.2 FL — SIGNIFICANT CHANGE UP (ref 80–100)
MONOCYTES # BLD AUTO: 0.94 K/UL — HIGH (ref 0–0.9)
MONOCYTES NFR BLD AUTO: 9.9 % — SIGNIFICANT CHANGE UP (ref 2–14)
NEUTROPHILS # BLD AUTO: 5.97 K/UL — SIGNIFICANT CHANGE UP (ref 1.8–7.4)
NEUTROPHILS NFR BLD AUTO: 62.8 % — SIGNIFICANT CHANGE UP (ref 43–77)
NRBC # BLD: 0 /100 WBCS — SIGNIFICANT CHANGE UP (ref 0–0)
NRBC # FLD: 0 K/UL — SIGNIFICANT CHANGE UP (ref 0–0)
PHOSPHATE SERPL-MCNC: 3.5 MG/DL — SIGNIFICANT CHANGE UP (ref 2.5–4.5)
PLATELET # BLD AUTO: 502 K/UL — HIGH (ref 150–400)
POTASSIUM SERPL-MCNC: 3.8 MMOL/L — SIGNIFICANT CHANGE UP (ref 3.5–5.3)
POTASSIUM SERPL-SCNC: 3.8 MMOL/L — SIGNIFICANT CHANGE UP (ref 3.5–5.3)
RBC # BLD: 3.44 M/UL — LOW (ref 3.8–5.2)
RBC # FLD: 19.4 % — HIGH (ref 10.3–14.5)
SODIUM SERPL-SCNC: 137 MMOL/L — SIGNIFICANT CHANGE UP (ref 135–145)
WBC # BLD: 9.51 K/UL — SIGNIFICANT CHANGE UP (ref 3.8–10.5)
WBC # FLD AUTO: 9.51 K/UL — SIGNIFICANT CHANGE UP (ref 3.8–10.5)

## 2022-09-04 PROCEDURE — 99233 SBSQ HOSP IP/OBS HIGH 50: CPT

## 2022-09-04 RX ORDER — INSULIN GLARGINE 100 [IU]/ML
18 INJECTION, SOLUTION SUBCUTANEOUS AT BEDTIME
Refills: 0 | Status: DISCONTINUED | OUTPATIENT
Start: 2022-09-04 | End: 2022-09-05

## 2022-09-04 RX ADMIN — HEPARIN SODIUM 5000 UNIT(S): 5000 INJECTION INTRAVENOUS; SUBCUTANEOUS at 14:53

## 2022-09-04 RX ADMIN — HEPARIN SODIUM 5000 UNIT(S): 5000 INJECTION INTRAVENOUS; SUBCUTANEOUS at 21:42

## 2022-09-04 RX ADMIN — Medication 20 MILLIEQUIVALENT(S): at 12:28

## 2022-09-04 RX ADMIN — Medication 1 APPLICATION(S): at 12:30

## 2022-09-04 RX ADMIN — Medication 4: at 21:47

## 2022-09-04 RX ADMIN — Medication 1 TABLET(S): at 12:28

## 2022-09-04 RX ADMIN — HEPARIN SODIUM 5000 UNIT(S): 5000 INJECTION INTRAVENOUS; SUBCUTANEOUS at 06:16

## 2022-09-04 RX ADMIN — Medication 4: at 12:31

## 2022-09-04 RX ADMIN — Medication 8: at 19:45

## 2022-09-04 RX ADMIN — CHLORHEXIDINE GLUCONATE 1 APPLICATION(S): 213 SOLUTION TOPICAL at 12:34

## 2022-09-04 RX ADMIN — NYSTATIN CREAM 1 APPLICATION(S): 100000 CREAM TOPICAL at 06:16

## 2022-09-04 RX ADMIN — PANTOPRAZOLE SODIUM 40 MILLIGRAM(S): 20 TABLET, DELAYED RELEASE ORAL at 06:16

## 2022-09-04 RX ADMIN — NYSTATIN CREAM 1 APPLICATION(S): 100000 CREAM TOPICAL at 19:46

## 2022-09-04 RX ADMIN — Medication 500 MILLIGRAM(S): at 12:28

## 2022-09-04 RX ADMIN — Medication 2000 UNIT(S): at 12:28

## 2022-09-04 RX ADMIN — INSULIN GLARGINE 18 UNIT(S): 100 INJECTION, SOLUTION SUBCUTANEOUS at 22:23

## 2022-09-04 NOTE — PROGRESS NOTE ADULT - PROBLEM SELECTOR PLAN 6
moist and bite sized diet  dispo: PT recommends NABEEL- insurance authorization obtained  No bed available at [facility] (wound care) until next week, likely on Tuesday 9/6  D/w NBA/YANIQUE, apprec assistance      D/w ACP  D/w brother at bedside

## 2022-09-04 NOTE — PROGRESS NOTE ADULT - ASSESSMENT
52F immobile 2/2 h/o MS, DM2, asthma, hypothyroidism, and known sacral wounds presents from [place] with urosepsis and anemia with new L flank wound with concern for necrotizing soft tissue infection. Was s/p debridement of wound with worsening sepsis, acidosis, and severe global LV dysfunction of unknown etiology, transferred to CCU for [place] and inotrope support. Pt is s/p swan removal and off all sedation, weaning off pressors. Repeat echo showed EF of 5-10% and signs suggestive of possible stress induced cardiomyopathy. Pt's mental status has returned to baseline off sedation. Pt started on lopressor 12.5 po bid and lasix 40 po qd for HFrEF. C/w Unasyn until 8/25 per ID. Patient now transferred to medical floor.

## 2022-09-04 NOTE — PROGRESS NOTE ADULT - PROBLEM SELECTOR PLAN 5
- A1c 5.5  - FS elevated  - was started on steroids post op, ?stress dose, tapered, d/c'ed on 8/15 given concern of sepsis  - lispro 5U TID has been discontinued, ordered for lantus-dose increased to 18U qhs  - If FS' remain elevated will resume lispro as well  - Monitor FS's closely      # BMI of 36.3 --> Obesity  # Functional quadriplegia from underlying MS

## 2022-09-04 NOTE — PROGRESS NOTE ADULT - SUBJECTIVE AND OBJECTIVE BOX
Patient is a 52y old  Female who presents with a chief complaint of Soft tissue infection (03 Sep 2022 16:24)      INTERVAL HPI/OVERNIGHT EVENTS:  Seen by me this afternoon, brother at bedside. Comfortable, no complaints. Tolerating PO.    Review of Systems: 12 point review of systems otherwise negative    MEDICATIONS  (STANDING):  ascorbic acid 500 milliGRAM(s) Oral daily  cadexomer iodine 0.9% Gel 1 Application(s) Topical daily  chlorhexidine 2% Cloths 1 Application(s) Topical daily  cholecalciferol 2000 Unit(s) Oral daily  collagenase Ointment 1 Application(s) Topical daily  furosemide    Tablet 20 milliGRAM(s) Oral daily  heparin   Injectable 5000 Unit(s) SubCutaneous every 8 hours  insulin glargine Injectable (LANTUS) 18 Unit(s) SubCutaneous at bedtime  insulin lispro (ADMELOG) corrective regimen sliding scale   SubCutaneous Before meals and at bedtime  metoprolol succinate ER 25 milliGRAM(s) Oral daily  multivitamin 1 Tablet(s) Oral daily  nystatin Powder 1 Application(s) Topical two times a day  pantoprazole    Tablet 40 milliGRAM(s) Oral before breakfast  potassium chloride    Tablet ER 20 milliEquivalent(s) Oral daily    MEDICATIONS  (PRN):  acetaminophen     Tablet .. 650 milliGRAM(s) Oral every 6 hours PRN Mild Pain (1 - 3)  ALBUTerol    90 MICROgram(s) HFA Inhaler 2 Puff(s) Inhalation every 6 hours PRN Shortness of Breath  HYDROmorphone   Tablet 2 milliGRAM(s) Oral every 3 hours PRN Severe Pain (7 - 10)  HYDROmorphone   Tablet 1 milliGRAM(s) Oral every 3 hours PRN Moderate Pain (4 - 6)  sodium chloride 0.9% lock flush 10 milliLiter(s) IV Push every 1 hour PRN Pre/post blood products, medications, blood draw, and to maintain line patency      Allergies    No Known Drug Allergies  Pineapple (Anaphylaxis)    Intolerances          Vital Signs Last 24 Hrs  T(C): 36.4 (04 Sep 2022 20:55), Max: 37.6 (04 Sep 2022 02:40)  T(F): 97.5 (04 Sep 2022 20:55), Max: 99.7 (04 Sep 2022 02:40)  HR: 95 (04 Sep 2022 14:00) (95 - 99)  BP: 103/49 (04 Sep 2022 20:55) (90/55 - 106/57)  BP(mean): --  RR: 17 (04 Sep 2022 20:55) (16 - 18)  SpO2: 95% (04 Sep 2022 20:55) (95% - 100%)    Parameters below as of 04 Sep 2022 20:55  Patient On (Oxygen Delivery Method): room air      CAPILLARY BLOOD GLUCOSE      POCT Blood Glucose.: 334 mg/dL (04 Sep 2022 19:43)  POCT Blood Glucose.: 269 mg/dL (04 Sep 2022 17:35)  POCT Blood Glucose.: 248 mg/dL (04 Sep 2022 12:22)  POCT Blood Glucose.: 146 mg/dL (04 Sep 2022 08:31)  POCT Blood Glucose.: 224 mg/dL (03 Sep 2022 22:03)      09-03 @ 07:01 - 09-04 @ 07:00  --------------------------------------------------------  IN: 930 mL / OUT: 1460 mL / NET: -530 mL    09-04 @ 07:01 - 09-04 @ 21:29  --------------------------------------------------------  IN: 720 mL / OUT: 450 mL / NET: 270 mL        Physical Exam:    Daily     Daily   General:  Well appearing, NAD, obese  HEENT:  Nonicteric, PERRLA  CV:  RRR, no murmur, no JVD  Lungs:  CTA B/L, no wheezes, rales, rhonchi  Abdomen:  Soft, non-tender, no distended, positive BS, no hepatosplenomegaly, wound vac in place (sacrum)  Extremities:  2+ pulses, no c/c, no edema  Skin:  Warm and dry, no rashes  :  No toussaint  Neuro:  AAOx3, non-focal, CN II-XII grossly intact  No Restraints    LABS:                        9.4    9.51  )-----------( 502      ( 04 Sep 2022 07:07 )             30.7     09-04    137  |  104  |  12  ----------------------------<  151<H>  3.8   |  22  |  0.56    Ca    7.7<L>      04 Sep 2022 07:07  Phos  3.5     09-04  Mg     1.50     09-04              RADIOLOGY & ADDITIONAL TESTS:  Reviewed by me

## 2022-09-05 DIAGNOSIS — A41.9 SEPSIS, UNSPECIFIED ORGANISM: ICD-10-CM

## 2022-09-05 LAB
-  K. PNEUMONIAE GROUP: SIGNIFICANT CHANGE UP
ANION GAP SERPL CALC-SCNC: 10 MMOL/L — SIGNIFICANT CHANGE UP (ref 7–14)
APPEARANCE UR: ABNORMAL
B PERT DNA SPEC QL NAA+PROBE: SIGNIFICANT CHANGE UP
B PERT+PARAPERT DNA PNL SPEC NAA+PROBE: SIGNIFICANT CHANGE UP
BACTERIA # UR AUTO: ABNORMAL
BASE EXCESS BLDV CALC-SCNC: 1.2 MMOL/L — SIGNIFICANT CHANGE UP (ref -2–3)
BASOPHILS # BLD AUTO: 0.06 K/UL — SIGNIFICANT CHANGE UP (ref 0–0.2)
BASOPHILS NFR BLD AUTO: 0.5 % — SIGNIFICANT CHANGE UP (ref 0–2)
BILIRUB UR-MCNC: NEGATIVE — SIGNIFICANT CHANGE UP
BLOOD GAS VENOUS COMPREHENSIVE RESULT: SIGNIFICANT CHANGE UP
BORDETELLA PARAPERTUSSIS (RAPRVP): SIGNIFICANT CHANGE UP
BUN SERPL-MCNC: 12 MG/DL — SIGNIFICANT CHANGE UP (ref 7–23)
C PNEUM DNA SPEC QL NAA+PROBE: SIGNIFICANT CHANGE UP
CALCIUM SERPL-MCNC: 7.8 MG/DL — LOW (ref 8.4–10.5)
CHLORIDE BLDV-SCNC: 104 MMOL/L — SIGNIFICANT CHANGE UP (ref 96–108)
CHLORIDE SERPL-SCNC: 100 MMOL/L — SIGNIFICANT CHANGE UP (ref 98–107)
CO2 BLDV-SCNC: 26 MMOL/L — SIGNIFICANT CHANGE UP (ref 22–26)
CO2 SERPL-SCNC: 23 MMOL/L — SIGNIFICANT CHANGE UP (ref 22–31)
COLOR SPEC: YELLOW — SIGNIFICANT CHANGE UP
CREAT SERPL-MCNC: 0.56 MG/DL — SIGNIFICANT CHANGE UP (ref 0.5–1.3)
DIFF PNL FLD: ABNORMAL
EGFR: 111 ML/MIN/1.73M2 — SIGNIFICANT CHANGE UP
EOSINOPHIL # BLD AUTO: 0.08 K/UL — SIGNIFICANT CHANGE UP (ref 0–0.5)
EOSINOPHIL NFR BLD AUTO: 0.7 % — SIGNIFICANT CHANGE UP (ref 0–6)
FLUAV SUBTYP SPEC NAA+PROBE: SIGNIFICANT CHANGE UP
FLUBV RNA SPEC QL NAA+PROBE: SIGNIFICANT CHANGE UP
GAS PNL BLDV: 134 MMOL/L — LOW (ref 136–145)
GLUCOSE BLDC GLUCOMTR-MCNC: 248 MG/DL — HIGH (ref 70–99)
GLUCOSE BLDC GLUCOMTR-MCNC: 256 MG/DL — HIGH (ref 70–99)
GLUCOSE BLDC GLUCOMTR-MCNC: 260 MG/DL — HIGH (ref 70–99)
GLUCOSE BLDC GLUCOMTR-MCNC: 268 MG/DL — HIGH (ref 70–99)
GLUCOSE BLDC GLUCOMTR-MCNC: 277 MG/DL — HIGH (ref 70–99)
GLUCOSE BLDV-MCNC: 205 MG/DL — HIGH (ref 70–99)
GLUCOSE SERPL-MCNC: 207 MG/DL — HIGH (ref 70–99)
GLUCOSE UR QL: NEGATIVE — SIGNIFICANT CHANGE UP
GRAM STN FLD: SIGNIFICANT CHANGE UP
HADV DNA SPEC QL NAA+PROBE: SIGNIFICANT CHANGE UP
HCO3 BLDV-SCNC: 25 MMOL/L — SIGNIFICANT CHANGE UP (ref 22–29)
HCOV 229E RNA SPEC QL NAA+PROBE: SIGNIFICANT CHANGE UP
HCOV HKU1 RNA SPEC QL NAA+PROBE: SIGNIFICANT CHANGE UP
HCOV NL63 RNA SPEC QL NAA+PROBE: SIGNIFICANT CHANGE UP
HCOV OC43 RNA SPEC QL NAA+PROBE: SIGNIFICANT CHANGE UP
HCT VFR BLD CALC: 29.9 % — LOW (ref 34.5–45)
HCT VFR BLDA CALC: 29 % — LOW (ref 34.5–46.5)
HGB BLD CALC-MCNC: 9.8 G/DL — LOW (ref 11.5–15.5)
HGB BLD-MCNC: 9.3 G/DL — LOW (ref 11.5–15.5)
HMPV RNA SPEC QL NAA+PROBE: SIGNIFICANT CHANGE UP
HPIV1 RNA SPEC QL NAA+PROBE: SIGNIFICANT CHANGE UP
HPIV2 RNA SPEC QL NAA+PROBE: SIGNIFICANT CHANGE UP
HPIV3 RNA SPEC QL NAA+PROBE: SIGNIFICANT CHANGE UP
HPIV4 RNA SPEC QL NAA+PROBE: SIGNIFICANT CHANGE UP
IANC: 7.93 K/UL — HIGH (ref 1.8–7.4)
IMM GRANULOCYTES NFR BLD AUTO: 1 % — SIGNIFICANT CHANGE UP (ref 0–1.5)
KETONES UR-MCNC: NEGATIVE — SIGNIFICANT CHANGE UP
LACTATE BLDV-MCNC: 2.1 MMOL/L — HIGH (ref 0.5–2)
LEUKOCYTE ESTERASE UR-ACNC: ABNORMAL
LYMPHOCYTES # BLD AUTO: 2.59 K/UL — SIGNIFICANT CHANGE UP (ref 1–3.3)
LYMPHOCYTES # BLD AUTO: 21.5 % — SIGNIFICANT CHANGE UP (ref 13–44)
M PNEUMO DNA SPEC QL NAA+PROBE: SIGNIFICANT CHANGE UP
MAGNESIUM SERPL-MCNC: 1.4 MG/DL — LOW (ref 1.6–2.6)
MCHC RBC-ENTMCNC: 27.4 PG — SIGNIFICANT CHANGE UP (ref 27–34)
MCHC RBC-ENTMCNC: 31.1 GM/DL — LOW (ref 32–36)
MCV RBC AUTO: 88.2 FL — SIGNIFICANT CHANGE UP (ref 80–100)
METHOD TYPE: SIGNIFICANT CHANGE UP
MONOCYTES # BLD AUTO: 1.26 K/UL — HIGH (ref 0–0.9)
MONOCYTES NFR BLD AUTO: 10.5 % — SIGNIFICANT CHANGE UP (ref 2–14)
NEUTROPHILS # BLD AUTO: 7.93 K/UL — HIGH (ref 1.8–7.4)
NEUTROPHILS NFR BLD AUTO: 65.8 % — SIGNIFICANT CHANGE UP (ref 43–77)
NITRITE UR-MCNC: POSITIVE
NRBC # BLD: 0 /100 WBCS — SIGNIFICANT CHANGE UP (ref 0–0)
NRBC # FLD: 0 K/UL — SIGNIFICANT CHANGE UP (ref 0–0)
PCO2 BLDV: 35 MMHG — LOW (ref 39–42)
PH BLDV: 7.46 — HIGH (ref 7.32–7.43)
PH UR: 8.5 — HIGH (ref 5–8)
PHOSPHATE SERPL-MCNC: 3 MG/DL — SIGNIFICANT CHANGE UP (ref 2.5–4.5)
PLATELET # BLD AUTO: 496 K/UL — HIGH (ref 150–400)
PO2 BLDV: 51 MMHG — SIGNIFICANT CHANGE UP
POTASSIUM BLDV-SCNC: 3.9 MMOL/L — SIGNIFICANT CHANGE UP (ref 3.5–5.1)
POTASSIUM SERPL-MCNC: 3.7 MMOL/L — SIGNIFICANT CHANGE UP (ref 3.5–5.3)
POTASSIUM SERPL-SCNC: 3.7 MMOL/L — SIGNIFICANT CHANGE UP (ref 3.5–5.3)
PROT UR-MCNC: ABNORMAL
RAPID RVP RESULT: SIGNIFICANT CHANGE UP
RBC # BLD: 3.39 M/UL — LOW (ref 3.8–5.2)
RBC # FLD: 19.2 % — HIGH (ref 10.3–14.5)
RBC CASTS # UR COMP ASSIST: SIGNIFICANT CHANGE UP /HPF (ref 0–4)
RSV RNA SPEC QL NAA+PROBE: SIGNIFICANT CHANGE UP
RV+EV RNA SPEC QL NAA+PROBE: SIGNIFICANT CHANGE UP
SAO2 % BLDV: 82.3 % — SIGNIFICANT CHANGE UP
SARS-COV-2 RNA SPEC QL NAA+PROBE: SIGNIFICANT CHANGE UP
SODIUM SERPL-SCNC: 133 MMOL/L — LOW (ref 135–145)
SP GR SPEC: 1.01 — SIGNIFICANT CHANGE UP (ref 1.01–1.05)
SPECIMEN SOURCE: SIGNIFICANT CHANGE UP
UROBILINOGEN FLD QL: SIGNIFICANT CHANGE UP
WBC # BLD: 12.04 K/UL — HIGH (ref 3.8–10.5)
WBC # FLD AUTO: 12.04 K/UL — HIGH (ref 3.8–10.5)
WBC UR QL: >50 /HPF — SIGNIFICANT CHANGE UP (ref 0–5)

## 2022-09-05 PROCEDURE — 71045 X-RAY EXAM CHEST 1 VIEW: CPT | Mod: 26

## 2022-09-05 PROCEDURE — 99233 SBSQ HOSP IP/OBS HIGH 50: CPT

## 2022-09-05 RX ORDER — ACETAMINOPHEN 500 MG
1000 TABLET ORAL ONCE
Refills: 0 | Status: COMPLETED | OUTPATIENT
Start: 2022-09-05 | End: 2022-09-05

## 2022-09-05 RX ORDER — INSULIN LISPRO 100/ML
4 VIAL (ML) SUBCUTANEOUS
Refills: 0 | Status: DISCONTINUED | OUTPATIENT
Start: 2022-09-05 | End: 2022-09-05

## 2022-09-05 RX ORDER — PIPERACILLIN AND TAZOBACTAM 4; .5 G/20ML; G/20ML
3.38 INJECTION, POWDER, LYOPHILIZED, FOR SOLUTION INTRAVENOUS ONCE
Refills: 0 | Status: DISCONTINUED | OUTPATIENT
Start: 2022-09-05 | End: 2022-09-05

## 2022-09-05 RX ORDER — PIPERACILLIN AND TAZOBACTAM 4; .5 G/20ML; G/20ML
3.38 INJECTION, POWDER, LYOPHILIZED, FOR SOLUTION INTRAVENOUS ONCE
Refills: 0 | Status: COMPLETED | OUTPATIENT
Start: 2022-09-05 | End: 2022-09-05

## 2022-09-05 RX ORDER — PIPERACILLIN AND TAZOBACTAM 4; .5 G/20ML; G/20ML
3.38 INJECTION, POWDER, LYOPHILIZED, FOR SOLUTION INTRAVENOUS EVERY 8 HOURS
Refills: 0 | Status: DISCONTINUED | OUTPATIENT
Start: 2022-09-05 | End: 2022-09-08

## 2022-09-05 RX ORDER — VANCOMYCIN HCL 1 G
1500 VIAL (EA) INTRAVENOUS ONCE
Refills: 0 | Status: DISCONTINUED | OUTPATIENT
Start: 2022-09-05 | End: 2022-09-05

## 2022-09-05 RX ORDER — INSULIN GLARGINE 100 [IU]/ML
12 INJECTION, SOLUTION SUBCUTANEOUS AT BEDTIME
Refills: 0 | Status: DISCONTINUED | OUTPATIENT
Start: 2022-09-05 | End: 2022-09-05

## 2022-09-05 RX ORDER — INSULIN GLARGINE 100 [IU]/ML
16 INJECTION, SOLUTION SUBCUTANEOUS AT BEDTIME
Refills: 0 | Status: DISCONTINUED | OUTPATIENT
Start: 2022-09-05 | End: 2022-09-18

## 2022-09-05 RX ORDER — VANCOMYCIN HCL 1 G
1500 VIAL (EA) INTRAVENOUS ONCE
Refills: 0 | Status: COMPLETED | OUTPATIENT
Start: 2022-09-05 | End: 2022-09-05

## 2022-09-05 RX ORDER — MAGNESIUM SULFATE 500 MG/ML
2 VIAL (ML) INJECTION ONCE
Refills: 0 | Status: COMPLETED | OUTPATIENT
Start: 2022-09-05 | End: 2022-09-05

## 2022-09-05 RX ORDER — INSULIN LISPRO 100/ML
6 VIAL (ML) SUBCUTANEOUS
Refills: 0 | Status: DISCONTINUED | OUTPATIENT
Start: 2022-09-05 | End: 2022-09-10

## 2022-09-05 RX ADMIN — PANTOPRAZOLE SODIUM 40 MILLIGRAM(S): 20 TABLET, DELAYED RELEASE ORAL at 05:46

## 2022-09-05 RX ADMIN — Medication 6: at 08:43

## 2022-09-05 RX ADMIN — PIPERACILLIN AND TAZOBACTAM 25 GRAM(S): 4; .5 INJECTION, POWDER, LYOPHILIZED, FOR SOLUTION INTRAVENOUS at 22:16

## 2022-09-05 RX ADMIN — HEPARIN SODIUM 5000 UNIT(S): 5000 INJECTION INTRAVENOUS; SUBCUTANEOUS at 05:46

## 2022-09-05 RX ADMIN — Medication 1 APPLICATION(S): at 12:59

## 2022-09-05 RX ADMIN — NYSTATIN CREAM 1 APPLICATION(S): 100000 CREAM TOPICAL at 05:45

## 2022-09-05 RX ADMIN — INSULIN GLARGINE 16 UNIT(S): 100 INJECTION, SOLUTION SUBCUTANEOUS at 22:17

## 2022-09-05 RX ADMIN — Medication 4: at 22:18

## 2022-09-05 RX ADMIN — Medication 20 MILLIGRAM(S): at 05:45

## 2022-09-05 RX ADMIN — Medication 1 TABLET(S): at 12:46

## 2022-09-05 RX ADMIN — Medication 6: at 18:21

## 2022-09-05 RX ADMIN — Medication 25 GRAM(S): at 07:29

## 2022-09-05 RX ADMIN — Medication 400 MILLIGRAM(S): at 04:02

## 2022-09-05 RX ADMIN — HEPARIN SODIUM 5000 UNIT(S): 5000 INJECTION INTRAVENOUS; SUBCUTANEOUS at 22:16

## 2022-09-05 RX ADMIN — Medication 500 MILLIGRAM(S): at 12:46

## 2022-09-05 RX ADMIN — NYSTATIN CREAM 1 APPLICATION(S): 100000 CREAM TOPICAL at 18:21

## 2022-09-05 RX ADMIN — Medication 4 UNIT(S): at 18:20

## 2022-09-05 RX ADMIN — Medication 6: at 12:47

## 2022-09-05 RX ADMIN — Medication 25 MILLIGRAM(S): at 05:45

## 2022-09-05 RX ADMIN — PIPERACILLIN AND TAZOBACTAM 25 GRAM(S): 4; .5 INJECTION, POWDER, LYOPHILIZED, FOR SOLUTION INTRAVENOUS at 16:14

## 2022-09-05 RX ADMIN — CHLORHEXIDINE GLUCONATE 1 APPLICATION(S): 213 SOLUTION TOPICAL at 12:34

## 2022-09-05 RX ADMIN — HEPARIN SODIUM 5000 UNIT(S): 5000 INJECTION INTRAVENOUS; SUBCUTANEOUS at 16:15

## 2022-09-05 RX ADMIN — PIPERACILLIN AND TAZOBACTAM 200 GRAM(S): 4; .5 INJECTION, POWDER, LYOPHILIZED, FOR SOLUTION INTRAVENOUS at 06:06

## 2022-09-05 RX ADMIN — Medication 300 MILLIGRAM(S): at 04:21

## 2022-09-05 RX ADMIN — Medication 1000 MILLIGRAM(S): at 04:17

## 2022-09-05 RX ADMIN — Medication 20 MILLIEQUIVALENT(S): at 12:46

## 2022-09-05 RX ADMIN — Medication 2000 UNIT(S): at 12:46

## 2022-09-05 NOTE — PROGRESS NOTE ADULT - PROBLEM SELECTOR PLAN 1
New sepsis, febrile/tachycardic with gram negative bacteremia that seems to be secondary to UTI  Lactate just mildly elevated at 2.1  - C/w IV zosyn for now  - F/up UCx  - F/up identification of GNR  - F/up surveillance BCx  - Trend leukocytosis  - Bladder scans as pt has incontinence and likely neurogenic bladder from MS

## 2022-09-05 NOTE — CHART NOTE - NSCHARTNOTEFT_GEN_A_CORE
RN reported fever of 100.6 orally and tachycardic to 122 bpm on routine vitals check     Chart reviewed, pt assessed at bedside, AOx4, NAD, resting comfortably, offers no complaints. Pt denies fevers, chills, chest pain, palpitations, SOB, cough, wheeze, headache, neck stiffness, abdominal pain, nausea, vomiting, D/C, dysuria. RN was instructed to recheck temp rectally by provider, found to be 102.2 rectally.     Temp --> 102.2 (rectally)  HR--> 122 bpm   blood pressure --> 128/72  O2 sat --> 99% RA     Physical Exam  Gen: NAD, AOx4  Heart: S1 S2 no MRC RRR tachy to 122  Lungs: Rhonchi heard in SANDRO on auscultation. No wheezing or crackles.   Abdomen: soft, nontender, nondistended, normal active bowel sounds  Extremities: warm to palpation, wound vac in place on LLE, DP pulses palpable BL, capillary refill <2 seconds       PLAN:  -1000 mg Acetaminophen IV x1 STAT  -STAT cbc w/ diff, BMP w/ Mg and Phos, Blood cultures x2, UA, UCx, Comprehensive VBG w/ lactate, RVP w/ COVID  -Urgent CXr  -STAT 1500 mg Vancomycin IV x1 and 3.375 G Zosyn IV x1   -Will continue to monitor     Gabriele Ryder PA-C  Department of Internal Medicine  In-House beeper number [id number]

## 2022-09-05 NOTE — PROGRESS NOTE ADULT - PROBLEM SELECTOR PLAN 2
Hypomagnesemia and hypokalemia being corrected as needed  - C/w daily KCL 20MEQ QD (needs to be continued on discharge)

## 2022-09-05 NOTE — PROGRESS NOTE ADULT - ASSESSMENT
52F immobile 2/2 h/o MS, DM2, asthma, hypothyroidism, and known sacral wounds presents from [place] with urosepsis and anemia with new L flank wound with concern for necrotizing soft tissue infection. Was s/p debridement of wound with worsening sepsis, acidosis, and severe global LV dysfunction of unknown etiology, transferred to CCU for [place] and inotrope support. Pt is s/p swan removal and off all sedation, weaning off pressors. Repeat echo showed EF of 5-10% and signs suggestive of possible stress induced cardiomyopathy. Pt's mental status has returned to baseline off sedation. Pt started on lopressor 12.5 po bid and lasix 40 po qd for HFrEF. C/w Unasyn until 8/25 per ID. Patient now transferred to medical floor.

## 2022-09-05 NOTE — PROGRESS NOTE ADULT - SUBJECTIVE AND OBJECTIVE BOX
Patient is a 52y old  Female who presents with a chief complaint of Soft tissue infection (04 Sep 2022 21:29)      INTERVAL HPI/OVERNIGHT EVENTS:  Seen by me this morning, doing well, no complaints. Febrile overnight up to 102.2F, fever work up sent, given vanco/zosyn. No urinary complaints but pt has underlying MS and urinary incontinence.    Review of Systems: 12 point review of systems otherwise negative    MEDICATIONS  (STANDING):  ascorbic acid 500 milliGRAM(s) Oral daily  cadexomer iodine 0.9% Gel 1 Application(s) Topical daily  chlorhexidine 2% Cloths 1 Application(s) Topical daily  cholecalciferol 2000 Unit(s) Oral daily  collagenase Ointment 1 Application(s) Topical daily  furosemide    Tablet 20 milliGRAM(s) Oral daily  heparin   Injectable 5000 Unit(s) SubCutaneous every 8 hours  insulin glargine Injectable (LANTUS) 12 Unit(s) SubCutaneous at bedtime  insulin lispro (ADMELOG) corrective regimen sliding scale   SubCutaneous Before meals and at bedtime  insulin lispro Injectable (ADMELOG) 4 Unit(s) SubCutaneous three times a day before meals  metoprolol succinate ER 25 milliGRAM(s) Oral daily  multivitamin 1 Tablet(s) Oral daily  nystatin Powder 1 Application(s) Topical two times a day  pantoprazole    Tablet 40 milliGRAM(s) Oral before breakfast  piperacillin/tazobactam IVPB.. 3.375 Gram(s) IV Intermittent every 8 hours  potassium chloride    Tablet ER 20 milliEquivalent(s) Oral daily    MEDICATIONS  (PRN):  acetaminophen     Tablet .. 650 milliGRAM(s) Oral every 6 hours PRN Mild Pain (1 - 3)  ALBUTerol    90 MICROgram(s) HFA Inhaler 2 Puff(s) Inhalation every 6 hours PRN Shortness of Breath  HYDROmorphone   Tablet 2 milliGRAM(s) Oral every 3 hours PRN Severe Pain (7 - 10)  HYDROmorphone   Tablet 1 milliGRAM(s) Oral every 3 hours PRN Moderate Pain (4 - 6)  sodium chloride 0.9% lock flush 10 milliLiter(s) IV Push every 1 hour PRN Pre/post blood products, medications, blood draw, and to maintain line patency      Allergies    No Known Drug Allergies  Pineapple (Anaphylaxis)    Intolerances          Vital Signs Last 24 Hrs  T(C): 37.1 (05 Sep 2022 17:21), Max: 39 (05 Sep 2022 03:01)  T(F): 98.7 (05 Sep 2022 17:), Max: 102.2 (05 Sep 2022 03:01)  HR: 103 (05 Sep 2022 17:) (91 - 122)  BP: 111/57 (05 Sep 2022 17:) (103/49 - 135/92)  BP(mean): --  RR: 18 (05 Sep 2022 17:) (17 - 18)  SpO2: 96% (05 Sep 2022 17:) (95% - 100%)    Parameters below as of 05 Sep 2022 17:21  Patient On (Oxygen Delivery Method): room air      CAPILLARY BLOOD GLUCOSE      POCT Blood Glucose.: 277 mg/dL (05 Sep 2022 18:19)  POCT Blood Glucose.: 260 mg/dL (05 Sep 2022 16:54)  POCT Blood Glucose.: 268 mg/dL (05 Sep 2022 12:01)  POCT Blood Glucose.: 256 mg/dL (05 Sep 2022 08:29)  POCT Blood Glucose.: 249 mg/dL (04 Sep 2022 21:39)       @ 07:  -   @ 07:00  --------------------------------------------------------  IN: 1080 mL / OUT: 1550 mL / NET: -470 mL     @ 07:  -   @ 19:56  --------------------------------------------------------  IN: 300 mL / OUT: 150 mL / NET: 150 mL        Physical Exam:    Daily     Daily   General:  Well appearing, NAD, obese  HEENT:  Nonicteric, PERRLA  CV:  RRR, no murmur, no JVD  Lungs:  CTA B/L, no wheezes, rales, rhonchi  Abdomen:  Soft, non-tender, no distended, positive BS, no hepatosplenomegaly, wound vac in place (sacrum)  Extremities:  2+ pulses, no c/c, no edema  Skin:  Warm and dry, no rashes  :  No toussaint  Neuro:  AAOx3, non-focal, CN II-XII grossly intact  No Restraints    LABS:                        9.3    12.04 )-----------( 496      ( 05 Sep 2022 03:50 )             29.9     09-05    133<L>  |  100  |  12  ----------------------------<  207<H>  3.7   |  23  |  0.56    Ca    7.8<L>      05 Sep 2022 03:50  Phos  3.0     -  Mg     1.40     -        Urinalysis Basic - ( 05 Sep 2022 07:15 )    Color: Yellow / Appearance: Turbid / S.010 / pH: x  Gluc: x / Ketone: Negative  / Bili: Negative / Urobili: <2 mg/dL   Blood: x / Protein: >600 mg/dL / Nitrite: Positive   Leuk Esterase: Large / RBC: 20-30 /HPF / WBC >50 /HPF   Sq Epi: x / Non Sq Epi: x / Bacteria: Many          RADIOLOGY & ADDITIONAL TESTS:  Reviewed by me

## 2022-09-05 NOTE — PROGRESS NOTE ADULT - PROBLEM SELECTOR PLAN 6
- A1c 5.5  - FS elevated  - was started on steroids post op, ?stress dose, tapered, d/c'ed on 8/15 given concern of sepsis  - C/w lantus-dose increased to 16U qhs  - Adding lispro 6U TID  - FS's elevated likely in the setting of new infection  - Diabetic diet/mISS  - Monitor FS's closely      # BMI of 36.3 --> Obesity  # Functional quadriplegia from underlying MS

## 2022-09-05 NOTE — PROGRESS NOTE ADULT - PROBLEM SELECTOR PLAN 7
Soft and bite sized diet  dispo: PT recommends NABEEL- insurance authorization obtained  No bed available at [facility] (wound care) until this week  SW/CM f/up in AM      D/w ACP  D/w brother at bedside 9/4

## 2022-09-06 DIAGNOSIS — R33.9 RETENTION OF URINE, UNSPECIFIED: ICD-10-CM

## 2022-09-06 LAB
ANION GAP SERPL CALC-SCNC: 13 MMOL/L — SIGNIFICANT CHANGE UP (ref 7–14)
BASOPHILS # BLD AUTO: 0.09 K/UL — SIGNIFICANT CHANGE UP (ref 0–0.2)
BASOPHILS NFR BLD AUTO: 0.7 % — SIGNIFICANT CHANGE UP (ref 0–2)
BUN SERPL-MCNC: 11 MG/DL — SIGNIFICANT CHANGE UP (ref 7–23)
CALCIUM SERPL-MCNC: 8.1 MG/DL — LOW (ref 8.4–10.5)
CHLORIDE SERPL-SCNC: 99 MMOL/L — SIGNIFICANT CHANGE UP (ref 98–107)
CO2 SERPL-SCNC: 22 MMOL/L — SIGNIFICANT CHANGE UP (ref 22–31)
CREAT SERPL-MCNC: 0.61 MG/DL — SIGNIFICANT CHANGE UP (ref 0.5–1.3)
E FAECIUM DNA BLD POS QL NAA+NON-PROBE: SIGNIFICANT CHANGE UP
EGFR: 109 ML/MIN/1.73M2 — SIGNIFICANT CHANGE UP
EOSINOPHIL # BLD AUTO: 0.53 K/UL — HIGH (ref 0–0.5)
EOSINOPHIL NFR BLD AUTO: 4.1 % — SIGNIFICANT CHANGE UP (ref 0–6)
GLUCOSE BLDC GLUCOMTR-MCNC: 101 MG/DL — HIGH (ref 70–99)
GLUCOSE BLDC GLUCOMTR-MCNC: 140 MG/DL — HIGH (ref 70–99)
GLUCOSE BLDC GLUCOMTR-MCNC: 163 MG/DL — HIGH (ref 70–99)
GLUCOSE BLDC GLUCOMTR-MCNC: 97 MG/DL — SIGNIFICANT CHANGE UP (ref 70–99)
GLUCOSE SERPL-MCNC: 106 MG/DL — HIGH (ref 70–99)
GRAM STN FLD: SIGNIFICANT CHANGE UP
HCT VFR BLD CALC: 28.6 % — LOW (ref 34.5–45)
HGB BLD-MCNC: 9.1 G/DL — LOW (ref 11.5–15.5)
IANC: 7.68 K/UL — HIGH (ref 1.8–7.4)
IMM GRANULOCYTES NFR BLD AUTO: 1.9 % — HIGH (ref 0–1.5)
LYMPHOCYTES # BLD AUTO: 24.5 % — SIGNIFICANT CHANGE UP (ref 13–44)
LYMPHOCYTES # BLD AUTO: 3.18 K/UL — SIGNIFICANT CHANGE UP (ref 1–3.3)
MAGNESIUM SERPL-MCNC: 1.7 MG/DL — SIGNIFICANT CHANGE UP (ref 1.6–2.6)
MCHC RBC-ENTMCNC: 28.1 PG — SIGNIFICANT CHANGE UP (ref 27–34)
MCHC RBC-ENTMCNC: 31.8 GM/DL — LOW (ref 32–36)
MCV RBC AUTO: 88.3 FL — SIGNIFICANT CHANGE UP (ref 80–100)
METHOD TYPE: SIGNIFICANT CHANGE UP
MONOCYTES # BLD AUTO: 1.24 K/UL — HIGH (ref 0–0.9)
MONOCYTES NFR BLD AUTO: 9.6 % — SIGNIFICANT CHANGE UP (ref 2–14)
NEUTROPHILS # BLD AUTO: 7.68 K/UL — HIGH (ref 1.8–7.4)
NEUTROPHILS NFR BLD AUTO: 59.2 % — SIGNIFICANT CHANGE UP (ref 43–77)
NRBC # BLD: 0 /100 WBCS — SIGNIFICANT CHANGE UP (ref 0–0)
NRBC # FLD: 0 K/UL — SIGNIFICANT CHANGE UP (ref 0–0)
PHOSPHATE SERPL-MCNC: 3.1 MG/DL — SIGNIFICANT CHANGE UP (ref 2.5–4.5)
PLATELET # BLD AUTO: 465 K/UL — HIGH (ref 150–400)
POTASSIUM SERPL-MCNC: 3.6 MMOL/L — SIGNIFICANT CHANGE UP (ref 3.5–5.3)
POTASSIUM SERPL-SCNC: 3.6 MMOL/L — SIGNIFICANT CHANGE UP (ref 3.5–5.3)
RBC # BLD: 3.24 M/UL — LOW (ref 3.8–5.2)
RBC # FLD: 19 % — HIGH (ref 10.3–14.5)
SODIUM SERPL-SCNC: 134 MMOL/L — LOW (ref 135–145)
SPECIMEN SOURCE: SIGNIFICANT CHANGE UP
WBC # BLD: 12.96 K/UL — HIGH (ref 3.8–10.5)
WBC # FLD AUTO: 12.96 K/UL — HIGH (ref 3.8–10.5)

## 2022-09-06 PROCEDURE — 99233 SBSQ HOSP IP/OBS HIGH 50: CPT

## 2022-09-06 RX ORDER — DAPTOMYCIN 500 MG/10ML
700 INJECTION, POWDER, LYOPHILIZED, FOR SOLUTION INTRAVENOUS EVERY 24 HOURS
Refills: 0 | Status: DISCONTINUED | OUTPATIENT
Start: 2022-09-06 | End: 2022-09-18

## 2022-09-06 RX ORDER — ACETAMINOPHEN 500 MG
1000 TABLET ORAL ONCE
Refills: 0 | Status: COMPLETED | OUTPATIENT
Start: 2022-09-06 | End: 2022-09-06

## 2022-09-06 RX ADMIN — HEPARIN SODIUM 5000 UNIT(S): 5000 INJECTION INTRAVENOUS; SUBCUTANEOUS at 05:37

## 2022-09-06 RX ADMIN — Medication 6 UNIT(S): at 17:46

## 2022-09-06 RX ADMIN — INSULIN GLARGINE 16 UNIT(S): 100 INJECTION, SOLUTION SUBCUTANEOUS at 21:46

## 2022-09-06 RX ADMIN — NYSTATIN CREAM 1 APPLICATION(S): 100000 CREAM TOPICAL at 05:37

## 2022-09-06 RX ADMIN — Medication 20 MILLIGRAM(S): at 05:37

## 2022-09-06 RX ADMIN — Medication 1 APPLICATION(S): at 12:07

## 2022-09-06 RX ADMIN — HEPARIN SODIUM 5000 UNIT(S): 5000 INJECTION INTRAVENOUS; SUBCUTANEOUS at 14:23

## 2022-09-06 RX ADMIN — Medication 6 UNIT(S): at 13:03

## 2022-09-06 RX ADMIN — Medication 400 MILLIGRAM(S): at 02:52

## 2022-09-06 RX ADMIN — Medication 2: at 17:46

## 2022-09-06 RX ADMIN — PIPERACILLIN AND TAZOBACTAM 25 GRAM(S): 4; .5 INJECTION, POWDER, LYOPHILIZED, FOR SOLUTION INTRAVENOUS at 14:23

## 2022-09-06 RX ADMIN — Medication 25 MILLIGRAM(S): at 05:37

## 2022-09-06 RX ADMIN — Medication 6 UNIT(S): at 09:13

## 2022-09-06 RX ADMIN — HEPARIN SODIUM 5000 UNIT(S): 5000 INJECTION INTRAVENOUS; SUBCUTANEOUS at 21:46

## 2022-09-06 RX ADMIN — DAPTOMYCIN 128 MILLIGRAM(S): 500 INJECTION, POWDER, LYOPHILIZED, FOR SOLUTION INTRAVENOUS at 11:29

## 2022-09-06 RX ADMIN — CHLORHEXIDINE GLUCONATE 1 APPLICATION(S): 213 SOLUTION TOPICAL at 12:05

## 2022-09-06 RX ADMIN — Medication 1 TABLET(S): at 12:06

## 2022-09-06 RX ADMIN — Medication 500 MILLIGRAM(S): at 12:05

## 2022-09-06 RX ADMIN — Medication 20 MILLIEQUIVALENT(S): at 12:03

## 2022-09-06 RX ADMIN — PIPERACILLIN AND TAZOBACTAM 25 GRAM(S): 4; .5 INJECTION, POWDER, LYOPHILIZED, FOR SOLUTION INTRAVENOUS at 05:42

## 2022-09-06 RX ADMIN — PANTOPRAZOLE SODIUM 40 MILLIGRAM(S): 20 TABLET, DELAYED RELEASE ORAL at 05:43

## 2022-09-06 RX ADMIN — Medication 2000 UNIT(S): at 12:04

## 2022-09-06 RX ADMIN — Medication 1000 MILLIGRAM(S): at 03:00

## 2022-09-06 RX ADMIN — PIPERACILLIN AND TAZOBACTAM 25 GRAM(S): 4; .5 INJECTION, POWDER, LYOPHILIZED, FOR SOLUTION INTRAVENOUS at 21:45

## 2022-09-06 RX ADMIN — NYSTATIN CREAM 1 APPLICATION(S): 100000 CREAM TOPICAL at 18:55

## 2022-09-06 NOTE — PROGRESS NOTE ADULT - PROBLEM SELECTOR PLAN 2
# s/p L flank wound debridement  - dressings intact and wound vac in place-to be continued on discharge  - no plan for further debridement, wound care to change vac M/W/F prn per surgery recs  - If w/ persistent fevers/leukocytosis, will benefit from re-evaluation for further debridement   - s/p Unasyn 3 grams IV Q6h finished 8/25 per ID  - Resume abx as above  - Pain control with Dilaudid PRN

## 2022-09-06 NOTE — PROGRESS NOTE ADULT - SUBJECTIVE AND OBJECTIVE BOX
Follow Up: Bacteremia    Interval History/ROS: Fever recurrence  with bacteremia. Felt fine, had no chills and didn't feel sick. Surprised this happened and really wants to go home.     Allergies  No Known Drug Allergies  Pineapple (Anaphylaxis)        ANTIMICROBIALS:  DAPTOmycin IVPB 700 every 24 hours  piperacillin/tazobactam IVPB.. 3.375 every 8 hours      OTHER MEDS:  acetaminophen     Tablet .. 650 milliGRAM(s) Oral every 6 hours PRN  ALBUTerol    90 MICROgram(s) HFA Inhaler 2 Puff(s) Inhalation every 6 hours PRN  ascorbic acid 500 milliGRAM(s) Oral daily  cadexomer iodine 0.9% Gel 1 Application(s) Topical daily  chlorhexidine 2% Cloths 1 Application(s) Topical daily  cholecalciferol 2000 Unit(s) Oral daily  collagenase Ointment 1 Application(s) Topical daily  furosemide    Tablet 20 milliGRAM(s) Oral daily  heparin   Injectable 5000 Unit(s) SubCutaneous every 8 hours  HYDROmorphone   Tablet 2 milliGRAM(s) Oral every 3 hours PRN  HYDROmorphone   Tablet 1 milliGRAM(s) Oral every 3 hours PRN  insulin glargine Injectable (LANTUS) 16 Unit(s) SubCutaneous at bedtime  insulin lispro (ADMELOG) corrective regimen sliding scale   SubCutaneous Before meals and at bedtime  insulin lispro Injectable (ADMELOG) 6 Unit(s) SubCutaneous three times a day before meals  metoprolol succinate ER 25 milliGRAM(s) Oral daily  multivitamin 1 Tablet(s) Oral daily  nystatin Powder 1 Application(s) Topical two times a day  pantoprazole    Tablet 40 milliGRAM(s) Oral before breakfast  potassium chloride    Tablet ER 20 milliEquivalent(s) Oral daily  sodium chloride 0.9% lock flush 10 milliLiter(s) IV Push every 1 hour PRN      Vital Signs Last 24 Hrs  T(C): 36.3 (06 Sep 2022 14:00), Max: 38.3 (06 Sep 2022 02:39)  T(F): 97.4 (06 Sep 2022 14:00), Max: 100.9 (06 Sep 2022 02:39)  HR: 85 (06 Sep 2022 14:00) (75 - 108)  BP: 130/55 (06 Sep 2022 14:00) (104/67 - 137/96)  BP(mean): --  RR: 18 (06 Sep 2022 14:00) (18 - 18)  SpO2: 100% (06 Sep 2022 14:00) (99% - 100%)    Parameters below as of 06 Sep 2022 14:00  Patient On (Oxygen Delivery Method): room air        Physical Exam:  General: non toxic, obese. alert, nontoxic   Cardio: regular rate   Respiratory: nonlabored on room air   abd: nondistended, soft   vascular: no phlebitis   Skin: wound vac sacrum. lateral left lower leg wound packed, granular, no lennie pus or cellulitis.                           9.1    12.96 )-----------( 465      ( 06 Sep 2022 07:24 )             28.6           134<L>  |  99  |  11  ----------------------------<  106<H>  3.6   |  22  |  0.61    Ca    8.1<L>      06 Sep 2022 07:24  Phos  3.1       Mg     1.70             Urinalysis Basic - ( 05 Sep 2022 07:15 )    Color: Yellow / Appearance: Turbid / S.010 / pH: x  Gluc: x / Ketone: Negative  / Bili: Negative / Urobili: <2 mg/dL   Blood: x / Protein: >600 mg/dL / Nitrite: Positive   Leuk Esterase: Large / RBC: 20-30 /HPF / WBC >50 /HPF   Sq Epi: x / Non Sq Epi: x / Bacteria: Many        MICROBIOLOGY:  Culture - Urine (collected 22 @ 04:15)  Source: Clean Catch Clean Catch (Midstream)  Preliminary Report (22 @ 15:11):    >100,000 CFU/ml Klebsiella pneumoniae    Culture - Blood (collected 22 @ 03:50)  Source: .Blood Blood-Venous  Gram Stain (22 @ 18:50):    Aerobic and Anaerobic Bottle: Gram Negative Rods  Preliminary Report (22 @ 18:50):    Aerobic and Anaerobic Bottle: Gram Negative Rods    ***Blood Panel PCR results on this specimen are available    approximately 3 hours after the Gram stain result.***    Gram stain, PCR, and/or culture results may not always    correspond due to differencein methodologies.    ************************************************************    This PCR assay was performed by multiplex PCR. This    Assay tests for 66 bacterial and resistance gene targets.    Please refer to the [facility] test directory    at [email or web address] for details.  Organism: Blood Culture PCR (22 @ 20:59)  Organism: Blood Culture PCR (22 @ 20:59)      -  Klebsiella pneumoniae: Detec      Method Type: PCR    Culture - Blood (collected 22 @ 03:30)  Source: .Blood Blood-Peripheral  Gram Stain (22 @ 02:23):    Growth in aerobic and anaerobic bottles: Gram positive cocci in pairs  Preliminary Report (22 @ 02:23):    Growth in aerobic and anaerobic bottles: Gram positive cocci in pairs    ***Blood Panel PCR results on this specimen are available    approximately 3 hours after the Gram stain result.***    Gram stain, PCR, and/or culture results may not always    correspond due to difference in methodologies.    ************************************************************    This PCR assay was performed by multiplex PCR. This    Assay tests for 66 bacterial and resistance gene targets.    Please refer to the [facility] test directory    at [email or web address] for details.  Organism: Blood Culture PCR (22 @ 02:27)  Organism: Blood Culture PCR (22 @ 02:27)      -  Enterococcus faecium,VRE: Detec      Method Type: PCR    Rapid RVP Result: NotDetec ( @ 03:10)    RADIOLOGY:  Images below reviewed personally  Xray Chest 1 View- PORTABLE-Urgent (Xray Chest 1 View- PORTABLE-Urgent .) (22 @ 06:13)   Clear lungs.    CT Abdomen and Pelvis No Cont (08.10.22 @ 23:30)   Large open sacral decubitus wound extending to bone. Foci of air in the   left gluteus musculature and superior to the wound. 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  Absence of the coccyx and distal sacrum, may related to prior resection.   Correlate with prior surgical history.

## 2022-09-06 NOTE — PROGRESS NOTE ADULT - PROBLEM SELECTOR PLAN 1
Pt meets sepsis criteria at this time w/ fevers and leukocytosis. Unclear etiology likely UTI vs soft tissue infection  -S/p course of Unasyn as below for STI  -Started on zosyn 09/05  -Started on daptomycin 09/06 per ID recs  -Monitor CPK on dapto  -Blood Cx w/ VRE E. faecalis and Klebsiella  -Urine Cx w/ gram negative rods  -F/u speciation and sensitivities   -F/u repeat blood cx cultures

## 2022-09-06 NOTE — PROGRESS NOTE ADULT - ASSESSMENT
Bedbound from MS with chronic sacral decubitus.   Here 8/10 with septic shock, necrotizing wound s/p OR I&D 8/11 growing Strep anginosus, VRE, Candida.   Improved and downgraded 8/23.   Sepsis recurrence 9/5 with Klebsiella and VRE bacteremia.   Klebsiella appears to be from urine (toussaint removed 8/26).   VRE could also be from urine or wound (woundvac in place).   Improved since restarting antibiotics.   Poor long term prognosis given neurological issue with high risk for recurrent infections.     Suggest  -CT abdomen pelvis IV contrast for focus   -f/u sensitivities   -repeat blood cultures tomorrow   -check TTE   -wound care follow up given change   -Zosyn for now for Klebsiella   -add Daptomycin 700mg q24h for VRE with weekly CPK     Discussed with medicine     Epi Harvey MD   Infectious Disease   Available on TEAMS. After 5PM and on weekends please page fellow on call or call [phone]

## 2022-09-06 NOTE — PROGRESS NOTE ADULT - SUBJECTIVE AND OBJECTIVE BOX
Jonelle Fischer    Pager [phone]    Patient is a 52y old  Female who presents with a chief complaint of Soft tissue infection (06 Sep 2022 08:57)      SUBJECTIVE / OVERNIGHT EVENTS: No acute overnight events. This morning pt doing well. Denies fevers, chills, nausea, vomiting, chest pain, SOB.    MEDICATIONS  (STANDING):  ascorbic acid 500 milliGRAM(s) Oral daily  cadexomer iodine 0.9% Gel 1 Application(s) Topical daily  chlorhexidine 2% Cloths 1 Application(s) Topical daily  cholecalciferol 2000 Unit(s) Oral daily  collagenase Ointment 1 Application(s) Topical daily  DAPTOmycin IVPB 700 milliGRAM(s) IV Intermittent every 24 hours  furosemide    Tablet 20 milliGRAM(s) Oral daily  heparin   Injectable 5000 Unit(s) SubCutaneous every 8 hours  insulin glargine Injectable (LANTUS) 16 Unit(s) SubCutaneous at bedtime  insulin lispro (ADMELOG) corrective regimen sliding scale   SubCutaneous Before meals and at bedtime  insulin lispro Injectable (ADMELOG) 6 Unit(s) SubCutaneous three times a day before meals  metoprolol succinate ER 25 milliGRAM(s) Oral daily  multivitamin 1 Tablet(s) Oral daily  nystatin Powder 1 Application(s) Topical two times a day  pantoprazole    Tablet 40 milliGRAM(s) Oral before breakfast  piperacillin/tazobactam IVPB.. 3.375 Gram(s) IV Intermittent every 8 hours  potassium chloride    Tablet ER 20 milliEquivalent(s) Oral daily    MEDICATIONS  (PRN):  acetaminophen     Tablet .. 650 milliGRAM(s) Oral every 6 hours PRN Mild Pain (1 - 3)  ALBUTerol    90 MICROgram(s) HFA Inhaler 2 Puff(s) Inhalation every 6 hours PRN Shortness of Breath  HYDROmorphone   Tablet 2 milliGRAM(s) Oral every 3 hours PRN Severe Pain (7 - 10)  HYDROmorphone   Tablet 1 milliGRAM(s) Oral every 3 hours PRN Moderate Pain (4 - 6)  sodium chloride 0.9% lock flush 10 milliLiter(s) IV Push every 1 hour PRN Pre/post blood products, medications, blood draw, and to maintain line patency    Allergies    No Known Drug Allergies  Pineapple (Anaphylaxis)    Intolerances        Vital Signs Last 24 Hrs  T(C): 36.6 (06 Sep 2022 09:27), Max: 38.3 (06 Sep 2022 02:39)  T(F): 97.8 (06 Sep 2022 09:27), Max: 100.9 (06 Sep 2022 02:39)  HR: 75 (06 Sep 2022 09:27) (75 - 108)  BP: 137/96 (06 Sep 2022 06:43) (104/67 - 137/96)  BP(mean): --  RR: 18 (06 Sep 2022 09:27) (18 - 18)  SpO2: 99% (06 Sep 2022 09:27) (96% - 100%)    Parameters below as of 06 Sep 2022 06:43  Patient On (Oxygen Delivery Method): room air      Daily     Daily   CAPILLARY BLOOD GLUCOSE      POCT Blood Glucose.: 101 mg/dL (06 Sep 2022 12:25)  POCT Blood Glucose.: 97 mg/dL (06 Sep 2022 08:43)  POCT Blood Glucose.: 248 mg/dL (05 Sep 2022 21:24)  POCT Blood Glucose.: 277 mg/dL (05 Sep 2022 18:19)  POCT Blood Glucose.: 260 mg/dL (05 Sep 2022 16:54)    I&O's Summary    05 Sep 2022 07:01  -  06 Sep 2022 07:00  --------------------------------------------------------  IN: 660 mL / OUT: 800 mL / NET: -140 mL    06 Sep 2022 07:01  -  06 Sep 2022 14:33  --------------------------------------------------------  IN: 200 mL / OUT: 630 mL / NET: -430 mL        PHYSICAL EXAM:  General:  Well appearing, NAD, obese  HEENT:  Nonicteric, PERRLA  CV:  RRR, no murmur, no JVD  Lungs:  CTA B/L, no wheezes, rales, rhonchi  Abdomen:  Soft, NTND, Bowel sounds normal   Extremities:  2+ pulses  Skin:  Warm and dry  Neuro:  AAOx3, non-focal        LABS:                        9.1    12.96 )-----------( 465      ( 06 Sep 2022 07:24 )             28.6     Hgb Trend: 9.1<--, 9.3<--, 9.4<--, 10.1<--, 10.2<--  09-06    134<L>  |  99  |  11  ----------------------------<  106<H>  3.6   |  22  |  0.61    Ca    8.1<L>      06 Sep 2022 07:24  Phos  3.1     -  Mg     1.70     09      Creatinine Trend: 0.61<--, 0.56<--, 0.56<--, 0.61<--, 0.52<--, 0.51<--          Urinalysis Basic - ( 05 Sep 2022 07:15 )    Color: Yellow / Appearance: Turbid / S.010 / pH: x  Gluc: x / Ketone: Negative  / Bili: Negative / Urobili: <2 mg/dL   Blood: x / Protein: >600 mg/dL / Nitrite: Positive   Leuk Esterase: Large / RBC: 20-30 /HPF / WBC >50 /HPF   Sq Epi: x / Non Sq Epi: x / Bacteria: Many        RADIOLOGY & ADDITIONAL TESTS:    Imaging Personally Reviewed.    Consultant(s) Notes Reviewed.    Care Discussed with Consultants/Other Providers.

## 2022-09-06 NOTE — PROGRESS NOTE ADULT - SUBJECTIVE AND OBJECTIVE BOX
Patient is a 52y old  Female who presents with a chief complaint of Soft tissue infection (19 Aug 2022 11:45)      HPI:  HPI:  52F immobile 2/2 MS, poorly controlled T2DM, asthma, hypothyroidism, known chronic wounds sacrum (stage 4), vulvar/Rt thigh, and right calf. Recent 2022 hospitalization for urosepsis. On cefepime/flagyl for chronic osteo 2/2 unstageable sacral decubitus ulcer, dakins BID packing. Presented from [place] with concerns for UTI and anemia (6.8 from baseline 8.0) and new malodorous wound on L hip to L flank area. Surgery consulted for potential necrotizing soft tissue infection.            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 1000 milliGRAM(s) IV Intermittent once  cefepime   IVPB 2000 milliGRAM(s) IV Intermittent once  clindamycin IVPB 900 milliGRAM(s) IV Intermittent once  sodium chloride 0.9% Bolus 1000 milliLiter(s) IV Bolus once  vancomycin  IVPB. 1000 milliGRAM(s) IV Intermittent once    MEDICATIONS  (PRN):      Allergies    No Known Drug Allergies  Pineapple (Anaphylaxis)    Intolerances        SOCIAL HISTORY:    FAMILY HISTORY:          Physical Exam:  General: NAD, resting comfortably, warm to touch   HEENT: NC/AT, EOMI  Pulmonary: normal resp effort, patent airway  Abdominal: obese  Extremities: poor muscle tone  Skin: sacral wound stage 4, r thigh wound tracking to labia, L thigh wound, L hip/flank malodorous dark green wound with surrounding erythema and underlying crepitus tracking up the L flank  Neuro: A/O x 3-4, CNs II-XII grossly intact    Vital Signs Last 24 Hrs  T(C): 39.8 (10 Aug 2022 21:06), Max: 39.8 (10 Aug 2022 21:06)  T(F): 103.7 (10 Aug 2022 21:06), Max: 103.7 (10 Aug 2022 21:06)  HR: 127 (10 Aug 2022 20:10) (124 - 127)  BP: 139/72 (10 Aug 2022 20:10) (123/56 - 139/72)  BP(mean): --  RR: 20 (10 Aug 2022 20:10) (18 - 20)  SpO2: 100% (10 Aug 2022 20:10) (100% - 100%)    Parameters below as of 10 Aug 2022 20:10  Patient On (Oxygen Delivery Method): room air        I&O's Summary          LABS:                        5.9    31.78 )-----------( 920      ( 10 Aug 2022 22:30 )             22.5         Urinalysis Basic - ( 10 Aug 2022 22:30 )    Color: Yellow / Appearance: Turbid / S.017 / pH: x  Gluc: x / Ketone: Negative  / Bili: Negative / Urobili: <2 mg/dL   Blood: x / Protein: 100 mg/dL / Nitrite: Negative   Leuk Esterase: Large / RBC: 25-50 /HPF / WBC >50 /HPF   Sq Epi: x / Non Sq Epi: x / Bacteria: Many                CAPILLARY BLOOD GLUCOSE            Cultures:      RADIOLOGY & ADDITIONAL STUDIES:               (10 Aug 2022 22:48)      PAST MEDICAL & SURGICAL HISTORY:  DM (diabetes mellitus)      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      MS (multiple sclerosis)      No significant past surgical history          MEDICATIONS  (STANDING):  ampicillin/sulbactam  IVPB      ampicillin/sulbactam  IVPB 3 Gram(s) IV Intermittent every 6 hours  ascorbic acid 500 milliGRAM(s) Oral daily  chlorhexidine 2% Cloths 1 Application(s) Topical daily  cholecalciferol 2000 Unit(s) Oral daily  collagenase Ointment 1 Application(s) Topical daily  heparin   Injectable 5000 Unit(s) SubCutaneous every 8 hours  insulin lispro (ADMELOG) corrective regimen sliding scale   SubCutaneous every 6 hours  insulin lispro Injectable (ADMELOG) 5 Unit(s) SubCutaneous every 6 hours  lactated ringers. 1000 milliLiter(s) (10 mL/Hr) IV Continuous <Continuous>  levothyroxine Injectable 56 MICROGram(s) IV Push <User Schedule>  multivitamin/minerals/iron Oral Solution (CENTRUM) 15 milliLiter(s) Oral daily  nystatin Powder 1 Application(s) Topical two times a day  pantoprazole  Injectable 40 milliGRAM(s) IV Push every 12 hours    MEDICATIONS  (PRN):  acetaminophen    Suspension .. 650 milliGRAM(s) Oral every 6 hours PRN Temp greater or equal to 38.5C (101.3F)  ALBUTerol    90 MICROgram(s) HFA Inhaler 2 Puff(s) Inhalation every 6 hours PRN Shortness of Breath  HYDROmorphone  Injectable 1 milliGRAM(s) IV Push every 3 hours PRN Severe Pain (7 - 10)  HYDROmorphone  Injectable 0.5 milliGRAM(s) IV Push every 3 hours PRN Moderate Pain (4 - 6)  sodium chloride 0.9% lock flush 10 milliLiter(s) IV Push every 1 hour PRN Pre/post blood products, medications, blood draw, and to maintain line patency      Allergies    No Known Drug Allergies  Pineapple (Anaphylaxis)    Intolerances        VITALS:    Vital Signs Last 24 Hrs  T(C): 36.6 (19 Aug 2022 20:00), Max: 37.4 (19 Aug 2022 04:00)  T(F): 97.8 (19 Aug 2022 20:00), Max: 99.3 (19 Aug 2022 04:00)  HR: 103 (19 Aug 2022 22:00) (99 - 112)  BP: --  BP(mean): --  RR: 13 (19 Aug 2022 22:00) (11 - 27)  SpO2: 98% (19 Aug 2022 22:00) (96% - 100%)    Parameters below as of 19 Aug 2022 22:00  Patient On (Oxygen Delivery Method): nasal cannula  O2 Flow (L/min): 2      LABS:                          8.8    15.86 )-----------( 506      ( 19 Aug 2022 17:00 )             28.2       08-19    141  |  108<H>  |  8   ----------------------------<  84  3.9   |  21<L>  |  0.31<L>    Ca    7.3<L>      19 Aug 2022 03:00  Phos  3.3     -19  Mg     1.60         TPro  4.6<L>  /  Alb  1.6<L>  /  TBili  0.2  /  DBili  x   /  AST  13  /  ALT  7   /  AlkPhos  213<H>        CAPILLARY BLOOD GLUCOSE      POCT Blood Glucose.: 138 mg/dL (19 Aug 2022 23:29)  POCT Blood Glucose.: 120 mg/dL (19 Aug 2022 17:14)  POCT Blood Glucose.: 91 mg/dL (19 Aug 2022 12:24)  POCT Blood Glucose.: 80 mg/dL (19 Aug 2022 05:43)          LOWER EXTREMITY PHYSICAL EXAM:    Vascular: DP/PT 2/4, B/L, CFT <3 seconds B/L, Temperature gradient warm to cool B/L.   Neuro: Epicritic sensation deminshed to the level of forefoot, B/L.  Musculoskeletal/Ortho: Immobilization 2/2 MS   Skin: Deep tissue injury of the posterior heel, B/L, no signs of infection, no purulence, no drainage, no erythema, no hyperkeratotic surrounding the surface.       RADIOLOGY & ADDITIONAL STUDIES:

## 2022-09-06 NOTE — PROGRESS NOTE ADULT - PROBLEM SELECTOR PLAN 3
Pt w. incontinence and intermittent urinary retention. Suspect  neurogenic bladder from MS.  -c/w bladder scans  -Not requiring Rose placement at this time   -Will place Rose if clinically indicated by bladder scans

## 2022-09-06 NOTE — PROGRESS NOTE ADULT - PROBLEM SELECTOR PLAN 8
DIET: Soft and bite sized diet  DVT: Heparin SQ   DISPO: Accepted at [place] for wound care. No beds available. Will hold off on dc given new c/f infection.

## 2022-09-06 NOTE — PROGRESS NOTE ADULT - ASSESSMENT
52F immobile 2/2 h/o MS, DM2, asthma, hypothyroidism, and known sacral wounds presents from [place] with urosepsis and anemia with new L flank wound with concern for necrotizing soft tissue infection s/p debridement of wound, hospital course c/b severe global LV dysfunction of unknown etiology- likely stress induced cardiomyopathy, s/p CCU course for Sweet and inotropic support, now s/p swan removal and transferred back to medicine, completed unasyn 8/25, course further complicated by new gram neg bacteremia, restarted on IV abx.

## 2022-09-06 NOTE — PROGRESS NOTE ADULT - PROBLEM SELECTOR PLAN 6
- Hb stable  - iron studies suggestive of anemia of chronic disease  - stool guaic positive  - retic count elevated  - monitor CBC

## 2022-09-06 NOTE — PROGRESS NOTE ADULT - PROBLEM SELECTOR PLAN 5
# severe LV dysfunction; HFrEF 5 - 10%  - Likely 2/2 stress induced cardiomyopathy  - s/p CCU course for swan and inotropic support now s/p swan removal   - C/w metoprolol-XL 25QD  - C/w Lasix 20mg QD per Cards recs  - C/w potassium supplementation on Lasix   - Will plan for low dose lisinopril 2.5mg QD prior to discharge if BP allows. Will hold off for now given soft pressures and new concern for infection   - Outpatient cardiology follow up

## 2022-09-07 LAB
-  AMIKACIN: SIGNIFICANT CHANGE UP
-  AMPICILLIN/SULBACTAM: SIGNIFICANT CHANGE UP
-  AMPICILLIN: SIGNIFICANT CHANGE UP
-  AMPICILLIN: SIGNIFICANT CHANGE UP
-  AZTREONAM: SIGNIFICANT CHANGE UP
-  CEFAZOLIN: SIGNIFICANT CHANGE UP
-  CEFEPIME: SIGNIFICANT CHANGE UP
-  CEFOXITIN: SIGNIFICANT CHANGE UP
-  CEFTRIAXONE: SIGNIFICANT CHANGE UP
-  CIPROFLOXACIN: SIGNIFICANT CHANGE UP
-  DAPTOMYCIN: SIGNIFICANT CHANGE UP
-  ERTAPENEM: SIGNIFICANT CHANGE UP
-  GENTAMICIN SYNERGY: SIGNIFICANT CHANGE UP
-  GENTAMICIN: SIGNIFICANT CHANGE UP
-  IMIPENEM: SIGNIFICANT CHANGE UP
-  LEVOFLOXACIN: SIGNIFICANT CHANGE UP
-  LINEZOLID: SIGNIFICANT CHANGE UP
-  MEROPENEM: SIGNIFICANT CHANGE UP
-  PIPERACILLIN/TAZOBACTAM: SIGNIFICANT CHANGE UP
-  STREPTOMYCIN SYNERGY: SIGNIFICANT CHANGE UP
-  TOBRAMYCIN: SIGNIFICANT CHANGE UP
-  TRIMETHOPRIM/SULFAMETHOXAZOLE: SIGNIFICANT CHANGE UP
-  VANCOMYCIN: SIGNIFICANT CHANGE UP
ANION GAP SERPL CALC-SCNC: 12 MMOL/L — SIGNIFICANT CHANGE UP (ref 7–14)
BASOPHILS # BLD AUTO: 0.07 K/UL — SIGNIFICANT CHANGE UP (ref 0–0.2)
BASOPHILS NFR BLD AUTO: 0.5 % — SIGNIFICANT CHANGE UP (ref 0–2)
BUN SERPL-MCNC: 11 MG/DL — SIGNIFICANT CHANGE UP (ref 7–23)
CALCIUM SERPL-MCNC: 8.1 MG/DL — LOW (ref 8.4–10.5)
CHLORIDE SERPL-SCNC: 102 MMOL/L — SIGNIFICANT CHANGE UP (ref 98–107)
CK SERPL-CCNC: 22 U/L — LOW (ref 25–170)
CO2 SERPL-SCNC: 23 MMOL/L — SIGNIFICANT CHANGE UP (ref 22–31)
CREAT SERPL-MCNC: 0.68 MG/DL — SIGNIFICANT CHANGE UP (ref 0.5–1.3)
CULTURE RESULTS: SIGNIFICANT CHANGE UP
CULTURE RESULTS: SIGNIFICANT CHANGE UP
EGFR: 106 ML/MIN/1.73M2 — SIGNIFICANT CHANGE UP
EOSINOPHIL # BLD AUTO: 0.46 K/UL — SIGNIFICANT CHANGE UP (ref 0–0.5)
EOSINOPHIL NFR BLD AUTO: 3.3 % — SIGNIFICANT CHANGE UP (ref 0–6)
GLUCOSE BLDC GLUCOMTR-MCNC: 130 MG/DL — HIGH (ref 70–99)
GLUCOSE BLDC GLUCOMTR-MCNC: 138 MG/DL — HIGH (ref 70–99)
GLUCOSE BLDC GLUCOMTR-MCNC: 157 MG/DL — HIGH (ref 70–99)
GLUCOSE BLDC GLUCOMTR-MCNC: 87 MG/DL — SIGNIFICANT CHANGE UP (ref 70–99)
GLUCOSE SERPL-MCNC: 90 MG/DL — SIGNIFICANT CHANGE UP (ref 70–99)
HCT VFR BLD CALC: 28.5 % — LOW (ref 34.5–45)
HGB BLD-MCNC: 8.7 G/DL — LOW (ref 11.5–15.5)
IANC: 8.67 K/UL — HIGH (ref 1.8–7.4)
IMM GRANULOCYTES NFR BLD AUTO: 2.8 % — HIGH (ref 0–1.5)
LACTATE SERPL-SCNC: 2.6 MMOL/L — HIGH (ref 0.5–2)
LYMPHOCYTES # BLD AUTO: 24.1 % — SIGNIFICANT CHANGE UP (ref 13–44)
LYMPHOCYTES # BLD AUTO: 3.36 K/UL — HIGH (ref 1–3.3)
MAGNESIUM SERPL-MCNC: 1.5 MG/DL — LOW (ref 1.6–2.6)
MCHC RBC-ENTMCNC: 27.5 PG — SIGNIFICANT CHANGE UP (ref 27–34)
MCHC RBC-ENTMCNC: 30.5 GM/DL — LOW (ref 32–36)
MCV RBC AUTO: 90.2 FL — SIGNIFICANT CHANGE UP (ref 80–100)
METHOD TYPE: SIGNIFICANT CHANGE UP
METHOD TYPE: SIGNIFICANT CHANGE UP
MONOCYTES # BLD AUTO: 0.99 K/UL — HIGH (ref 0–0.9)
MONOCYTES NFR BLD AUTO: 7.1 % — SIGNIFICANT CHANGE UP (ref 2–14)
NEUTROPHILS # BLD AUTO: 8.67 K/UL — HIGH (ref 1.8–7.4)
NEUTROPHILS NFR BLD AUTO: 62.2 % — SIGNIFICANT CHANGE UP (ref 43–77)
NRBC # BLD: 0 /100 WBCS — SIGNIFICANT CHANGE UP (ref 0–0)
NRBC # FLD: 0 K/UL — SIGNIFICANT CHANGE UP (ref 0–0)
ORGANISM # SPEC MICROSCOPIC CNT: SIGNIFICANT CHANGE UP
PHOSPHATE SERPL-MCNC: 3.6 MG/DL — SIGNIFICANT CHANGE UP (ref 2.5–4.5)
PLATELET # BLD AUTO: 586 K/UL — HIGH (ref 150–400)
POTASSIUM SERPL-MCNC: 3.9 MMOL/L — SIGNIFICANT CHANGE UP (ref 3.5–5.3)
POTASSIUM SERPL-SCNC: 3.9 MMOL/L — SIGNIFICANT CHANGE UP (ref 3.5–5.3)
RBC # BLD: 3.16 M/UL — LOW (ref 3.8–5.2)
RBC # FLD: 18.9 % — HIGH (ref 10.3–14.5)
SODIUM SERPL-SCNC: 137 MMOL/L — SIGNIFICANT CHANGE UP (ref 135–145)
SPECIMEN SOURCE: SIGNIFICANT CHANGE UP
SPECIMEN SOURCE: SIGNIFICANT CHANGE UP
WBC # BLD: 13.94 K/UL — HIGH (ref 3.8–10.5)
WBC # FLD AUTO: 13.94 K/UL — HIGH (ref 3.8–10.5)

## 2022-09-07 PROCEDURE — 99233 SBSQ HOSP IP/OBS HIGH 50: CPT

## 2022-09-07 RX ORDER — ACETAMINOPHEN 500 MG
650 TABLET ORAL EVERY 6 HOURS
Refills: 0 | Status: DISCONTINUED | OUTPATIENT
Start: 2022-09-07 | End: 2022-09-18

## 2022-09-07 RX ORDER — MAGNESIUM SULFATE 500 MG/ML
1 VIAL (ML) INJECTION ONCE
Refills: 0 | Status: COMPLETED | OUTPATIENT
Start: 2022-09-07 | End: 2022-09-07

## 2022-09-07 RX ORDER — SODIUM CHLORIDE 9 MG/ML
250 INJECTION INTRAMUSCULAR; INTRAVENOUS; SUBCUTANEOUS ONCE
Refills: 0 | Status: COMPLETED | OUTPATIENT
Start: 2022-09-07 | End: 2022-09-07

## 2022-09-07 RX ORDER — HYDROMORPHONE HYDROCHLORIDE 2 MG/ML
2 INJECTION INTRAMUSCULAR; INTRAVENOUS; SUBCUTANEOUS EVERY 6 HOURS
Refills: 0 | Status: DISCONTINUED | OUTPATIENT
Start: 2022-09-07 | End: 2022-09-11

## 2022-09-07 RX ADMIN — Medication 500 MILLIGRAM(S): at 11:06

## 2022-09-07 RX ADMIN — PIPERACILLIN AND TAZOBACTAM 25 GRAM(S): 4; .5 INJECTION, POWDER, LYOPHILIZED, FOR SOLUTION INTRAVENOUS at 13:45

## 2022-09-07 RX ADMIN — Medication 20 MILLIGRAM(S): at 05:19

## 2022-09-07 RX ADMIN — Medication 1 TABLET(S): at 11:07

## 2022-09-07 RX ADMIN — PANTOPRAZOLE SODIUM 40 MILLIGRAM(S): 20 TABLET, DELAYED RELEASE ORAL at 05:19

## 2022-09-07 RX ADMIN — DAPTOMYCIN 128 MILLIGRAM(S): 500 INJECTION, POWDER, LYOPHILIZED, FOR SOLUTION INTRAVENOUS at 10:05

## 2022-09-07 RX ADMIN — Medication 1 APPLICATION(S): at 11:07

## 2022-09-07 RX ADMIN — Medication 1 APPLICATION(S): at 11:05

## 2022-09-07 RX ADMIN — Medication 25 MILLIGRAM(S): at 05:19

## 2022-09-07 RX ADMIN — Medication 6 UNIT(S): at 17:42

## 2022-09-07 RX ADMIN — Medication 2: at 17:42

## 2022-09-07 RX ADMIN — SODIUM CHLORIDE 500 MILLILITER(S): 9 INJECTION INTRAMUSCULAR; INTRAVENOUS; SUBCUTANEOUS at 10:05

## 2022-09-07 RX ADMIN — Medication 100 GRAM(S): at 12:40

## 2022-09-07 RX ADMIN — Medication 2000 UNIT(S): at 11:07

## 2022-09-07 RX ADMIN — Medication 20 MILLIEQUIVALENT(S): at 11:07

## 2022-09-07 RX ADMIN — Medication 6 UNIT(S): at 12:41

## 2022-09-07 RX ADMIN — NYSTATIN CREAM 1 APPLICATION(S): 100000 CREAM TOPICAL at 17:42

## 2022-09-07 RX ADMIN — NYSTATIN CREAM 1 APPLICATION(S): 100000 CREAM TOPICAL at 05:19

## 2022-09-07 RX ADMIN — PIPERACILLIN AND TAZOBACTAM 25 GRAM(S): 4; .5 INJECTION, POWDER, LYOPHILIZED, FOR SOLUTION INTRAVENOUS at 05:20

## 2022-09-07 RX ADMIN — CHLORHEXIDINE GLUCONATE 1 APPLICATION(S): 213 SOLUTION TOPICAL at 11:08

## 2022-09-07 RX ADMIN — HEPARIN SODIUM 5000 UNIT(S): 5000 INJECTION INTRAVENOUS; SUBCUTANEOUS at 13:44

## 2022-09-07 RX ADMIN — HEPARIN SODIUM 5000 UNIT(S): 5000 INJECTION INTRAVENOUS; SUBCUTANEOUS at 05:19

## 2022-09-07 NOTE — PROGRESS NOTE ADULT - PROBLEM SELECTOR PLAN 3
Pt w. incontinence and intermittent urinary retention. Suspect  neurogenic bladder from MS.  -Rose placed ON 2/2 retention  -Monitor urine output  -Will re-attempt TOV pending hospital course  -Urology follow up outpt

## 2022-09-07 NOTE — PROGRESS NOTE ADULT - PROBLEM SELECTOR PLAN 8
DIET: Soft and bite sized diet  DVT: Heparin SQ   DISPO: Accepted at [place] for wound care. No beds available. Will hold off on dc given new c/f infection.

## 2022-09-07 NOTE — ADVANCED PRACTICE NURSE CONSULT - REASON FOR CONSULT
Patient known to [facility] service line last seen on Monday, 9/5/22 for wound VAC change. While at bedside for wound VAC change, spoke to attending physician MD Jonelle Fischer for concern of area of induration on anterior upper right thigh measuring [id number], with three small moist open areas with hyperpigmentation noted. No erythema, no edema or temperature changes noted.  As per MD Fischer, patient is to have CT scan of pelvis to r/o bacteremia, where the area of induration should be caught on CT scan, if not a more focused study will be done on the area of induration.  Patient known to [facility] service line last seen on Monday, 9/5/22 for wound VAC change. While at bedside for wound VAC change, spoke to attending physician MD Jonelle Fischer for concern of area of induration on anterior upper right thigh measuring [id number], with three small moist open areas with hyperpigmentation noted. No erythema, no edema or temperature changes noted.  As per MD Fischer, patient is to have CT scan of pelvis to r/o bacteremia, where the area of induration should be caught on CT scan, if not a more focused study will be done on the area of induration.     Area of induration traced with surgical marker to assess for any size changes or development of induration.

## 2022-09-07 NOTE — PROGRESS NOTE ADULT - PROBLEM SELECTOR PLAN 1
Pt meets sepsis criteria at this time w/ fevers and leukocytosis. Likely 2/2  UTI vs soft tissue infection. Pt w/ new right upper thigh induration  -Pending CT A/P  -Will likely need drainage +/- debridement pending collection   -S/p course of Unasyn as below for STI  -c/w zosyn 09/05  -c/w daptomycin 09/06   -Monitor CPK on dapto  -Blood Cx w/ VRE E. faecalis and Klebsiella  -Urine Cx w/ klebsiella   -F/u speciation and sensitivities   -Repeat Cx NGTD  -Appreciate ID Recs

## 2022-09-07 NOTE — ADVANCED PRACTICE NURSE CONSULT - RECOMMEDATIONS
Please contact Wound Care Service Line if we can be of further assistance (ext [phone]).  Topical Recommendations    R Anterior Upper Thigh: Cleanse with NS, pat dry. Apply Liquid barrier film to periwound skin (allow to dry). Apply silicone foam with boarder, change daily and PRN if soiled, monitor for tissue type changes per shift.     Please contact Wound Care Service Line if we can be of further assistance (ext [phone]).

## 2022-09-07 NOTE — PROGRESS NOTE ADULT - SUBJECTIVE AND OBJECTIVE BOX
Jonelle Fischer    Pager [phone]    Patient is a 52y old  Female who presents with a chief complaint of Soft tissue infection (06 Sep 2022 17:29)      SUBJECTIVE / OVERNIGHT EVENTS: Refused CT scan 2/2 anxiety. Also retaining, Rose placed ON. Otherwise without complaints. No acute overnight events.         MEDICATIONS  (STANDING):  ascorbic acid 500 milliGRAM(s) Oral daily  cadexomer iodine 0.9% Gel 1 Application(s) Topical daily  chlorhexidine 2% Cloths 1 Application(s) Topical daily  cholecalciferol 2000 Unit(s) Oral daily  collagenase Ointment 1 Application(s) Topical daily  DAPTOmycin IVPB 700 milliGRAM(s) IV Intermittent every 24 hours  furosemide    Tablet 20 milliGRAM(s) Oral daily  heparin   Injectable 5000 Unit(s) SubCutaneous every 8 hours  insulin glargine Injectable (LANTUS) 16 Unit(s) SubCutaneous at bedtime  insulin lispro (ADMELOG) corrective regimen sliding scale   SubCutaneous Before meals and at bedtime  insulin lispro Injectable (ADMELOG) 6 Unit(s) SubCutaneous three times a day before meals  LORazepam   Injectable 0.5 milliGRAM(s) IV Push once  LORazepam   Injectable 0.5 milliGRAM(s) IV Push once  metoprolol succinate ER 25 milliGRAM(s) Oral daily  multivitamin 1 Tablet(s) Oral daily  nystatin Powder 1 Application(s) Topical two times a day  pantoprazole    Tablet 40 milliGRAM(s) Oral before breakfast  piperacillin/tazobactam IVPB.. 3.375 Gram(s) IV Intermittent every 8 hours  potassium chloride    Tablet ER 20 milliEquivalent(s) Oral daily    MEDICATIONS  (PRN):  acetaminophen     Tablet .. 650 milliGRAM(s) Oral every 6 hours PRN Mild Pain (1 - 3), Moderate Pain (4 - 6)  ALBUTerol    90 MICROgram(s) HFA Inhaler 2 Puff(s) Inhalation every 6 hours PRN Shortness of Breath  HYDROmorphone   Tablet 2 milliGRAM(s) Oral every 6 hours PRN Severe Pain (7 - 10)  sodium chloride 0.9% lock flush 10 milliLiter(s) IV Push every 1 hour PRN Pre/post blood products, medications, blood draw, and to maintain line patency    Allergies    No Known Drug Allergies  Pineapple (Anaphylaxis)    Intolerances        Vital Signs Last 24 Hrs  T(C): 36.5 (07 Sep 2022 17:11), Max: 37.4 (07 Sep 2022 02:05)  T(F): 97.7 (07 Sep 2022 17:11), Max: 99.4 (07 Sep 2022 02:05)  HR: 99 (07 Sep 2022 17:11) (93 - 106)  BP: 94/52 (07 Sep 2022 17:11) (90/56 - 131/67)  BP(mean): --  RR: 17 (07 Sep 2022 17:11) (17 - 18)  SpO2: 100% (07 Sep 2022 17:11) (98% - 100%)    Parameters below as of 07 Sep 2022 17:11  Patient On (Oxygen Delivery Method): room air      Daily     Daily   CAPILLARY BLOOD GLUCOSE      POCT Blood Glucose.: 157 mg/dL (07 Sep 2022 17:38)  POCT Blood Glucose.: 138 mg/dL (07 Sep 2022 12:09)  POCT Blood Glucose.: 87 mg/dL (07 Sep 2022 08:14)  POCT Blood Glucose.: 140 mg/dL (06 Sep 2022 21:37)    I&O's Summary    06 Sep 2022 07:01  -  07 Sep 2022 07:00  --------------------------------------------------------  IN: 980 mL / OUT: 1380 mL / NET: -400 mL    07 Sep 2022 07:01  -  07 Sep 2022 18:48  --------------------------------------------------------  IN: 0 mL / OUT: 1400 mL / NET: -1400 mL        PHYSICAL EXAM:  General:  Well appearing, NAD, obese  HEENT:  Nonicteric, PERRLA  CV:  RRR, no murmur, no JVD  Lungs:  CTA B/L, no wheezes, rales, rhonchi  Abdomen:  Soft, NTND, Bowel sounds normal   Extremities:  2+ pulses  Skin:  L flank wound w/ vac in place, w/o erythema , discharge, no TTP. Barrier film in place c/d/i, R upper upper thigh w. mod-large area of induration w/ small open wounds apx 1cm. No erythema /drainage  Neuro:  AAOx3, non-focal  : Rose in place    LABS:                        8.7    13.94 )-----------( 586      ( 07 Sep 2022 07:59 )             28.5     Hgb Trend: 8.7<--, 9.1<--, 9.3<--, 9.4<--, 10.1<--  09-07    137  |  102  |  11  ----------------------------<  90  3.9   |  23  |  0.68    Ca    8.1<L>      07 Sep 2022 07:59  Phos  3.6     09-07  Mg     1.50     09-07      Creatinine Trend: 0.68<--, 0.61<--, 0.56<--, 0.56<--, 0.61<--, 0.52<--      CARDIAC MARKERS ( 07 Sep 2022 07:59 )  x     / x     / 22 U/L / x     / x              RADIOLOGY & ADDITIONAL TESTS:    Imaging Personally Reviewed.    Consultant(s) Notes Reviewed.    Care Discussed with Consultants/Other Providers.

## 2022-09-07 NOTE — PROGRESS NOTE ADULT - ASSESSMENT
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

## 2022-09-07 NOTE — PROGRESS NOTE ADULT - PROBLEM SELECTOR PLAN 2
# s/p L flank wound debridement  - dressings intact and wound vac in place-to be continued on discharge  - no plan for further debridement, wound care to change vac M/W/F prn per surgery recs  - If w/ persistent fevers/leukocytosis, will benefit from re-evaluation for further debridement   - s/p Unasyn 3 grams IV Q6h finished 8/25 per ID  -Work up and mgt as above   - Pain control with Dilaudid PRN

## 2022-09-07 NOTE — CHART NOTE - NSCHARTNOTEFT_GEN_A_CORE
Source: Patient [ X]    Family [ ]     other [X ] electronic chart, RN    Diet : Soft and Bite Sized, Consistent Carbohydrate (evening snack), Glucerna Therapeutic Nutrition 240mls 3x daily (660kcals, 30g protein)     Nutrition Follow-up Note. 52F immobile 2/2 h/o MS, DM2, asthma, hypothyroidism, and known sacral wounds presents from [place] with urosepsis and anemia with new L flank wound with concern for necrotizing soft tissue infection.     Patient is currently on Soft and Bite sized diet per SLP evaluation on 8/19. Patient reports consuming only 25-50% of meals. Prefers to have Vanilla Glucerna Shake. Patient denies any nausea/vomiting/diarrhea/constipation. Last reported bowel movement 2 days. Importance of having good po intake of nutrient and protein dense foods to promote wound healing.        Current Weight: 108 kg (8/22)  104.4 kg (8/18)  109 kg (8/16)  110.5 kg (8/15)  109.4 kg (8/14)  103.6 kg (8/13)  99.2 kg (8/12)  98.8 kg (8/11)  Weight Change: Weight changes likely due to previous fluid shifts and bedscale discrepancy, will continue to monitor.   RN to obtain new weight. No weight since 8/22.        Pertinent Medications: ascorbic acid  cholecalciferol  furosemide    Tablet  insulin glargine Injectable (LANTUS)  insulin lispro (ADMELOG) corrective regimen sliding scale  insulin lispro Injectable (ADMELOG)  LORazepam   Injectable  LORazepam   Injectable  metoprolol succinate ER  multivitamin  pantoprazole    Tablet  potassium chloride    Tablet ER    Pertinent Labs:  09-07 Na137 mmol/L Glu 90 mg/dL K+ 3.9 mmol/L Cr  0.68 mg/dL BUN 11 mg/dL 09-07 Phos 3.6 mg/dL    No edema noted.   Skin: Multiple pressure injuries:     Estimated Needs:   [ X] no change since previous assessment  [ ] recalculated:       Previous Nutrition Diagnosis:     [ ] Inadequate Energy Intake [ ]Inadequate Oral Intake [ ] Excessive Energy Intake     [ ] Underweight [X ] Increased Nutrient Needs [ ] Overweight/Obesity     [ ] Altered GI Function [ ] Unintended Weight Loss [ ] Food & Nutrition Related Knowledge Deficit [ ] Malnutrition      Nutrition Diagnosis is [X ] ongoing  [ ] resolved [ ] not applicable          Additional Recommendations:    1. Continue current diet order, which remains appropriate at this time. e  2. Monitor weights, labs, BM's, skin integrity, p.o. intake.   3. Please document % PO intake in nursing flowsheet.   4. Please Encourage po intake, assist with meals and menu selections, provide alternatives PRN.   5. Honor food and beverage preferences within diet restriction of patient in an effort to maximize level of nutrient intake.

## 2022-09-08 LAB
-  AMIKACIN: SIGNIFICANT CHANGE UP
-  AMOXICILLIN/CLAVULANIC ACID: SIGNIFICANT CHANGE UP
-  AMPICILLIN/SULBACTAM: SIGNIFICANT CHANGE UP
-  AMPICILLIN: SIGNIFICANT CHANGE UP
-  AZTREONAM: SIGNIFICANT CHANGE UP
-  CEFAZOLIN: SIGNIFICANT CHANGE UP
-  CEFEPIME: SIGNIFICANT CHANGE UP
-  CEFOXITIN: SIGNIFICANT CHANGE UP
-  CEFTRIAXONE: SIGNIFICANT CHANGE UP
-  CIPROFLOXACIN: SIGNIFICANT CHANGE UP
-  ERTAPENEM: SIGNIFICANT CHANGE UP
-  GENTAMICIN: SIGNIFICANT CHANGE UP
-  IMIPENEM: SIGNIFICANT CHANGE UP
-  LEVOFLOXACIN: SIGNIFICANT CHANGE UP
-  MEROPENEM: SIGNIFICANT CHANGE UP
-  NITROFURANTOIN: SIGNIFICANT CHANGE UP
-  PIPERACILLIN/TAZOBACTAM: SIGNIFICANT CHANGE UP
-  TIGECYCLINE: SIGNIFICANT CHANGE UP
-  TOBRAMYCIN: SIGNIFICANT CHANGE UP
-  TRIMETHOPRIM/SULFAMETHOXAZOLE: SIGNIFICANT CHANGE UP
ANION GAP SERPL CALC-SCNC: 12 MMOL/L — SIGNIFICANT CHANGE UP (ref 7–14)
BUN SERPL-MCNC: 9 MG/DL — SIGNIFICANT CHANGE UP (ref 7–23)
CALCIUM SERPL-MCNC: 8.2 MG/DL — LOW (ref 8.4–10.5)
CHLORIDE SERPL-SCNC: 101 MMOL/L — SIGNIFICANT CHANGE UP (ref 98–107)
CO2 SERPL-SCNC: 25 MMOL/L — SIGNIFICANT CHANGE UP (ref 22–31)
CREAT SERPL-MCNC: 0.57 MG/DL — SIGNIFICANT CHANGE UP (ref 0.5–1.3)
CULTURE RESULTS: SIGNIFICANT CHANGE UP
EGFR: 111 ML/MIN/1.73M2 — SIGNIFICANT CHANGE UP
GLUCOSE BLDC GLUCOMTR-MCNC: 103 MG/DL — HIGH (ref 70–99)
GLUCOSE BLDC GLUCOMTR-MCNC: 116 MG/DL — HIGH (ref 70–99)
GLUCOSE BLDC GLUCOMTR-MCNC: 136 MG/DL — HIGH (ref 70–99)
GLUCOSE BLDC GLUCOMTR-MCNC: 159 MG/DL — HIGH (ref 70–99)
GLUCOSE BLDC GLUCOMTR-MCNC: 211 MG/DL — HIGH (ref 70–99)
GLUCOSE SERPL-MCNC: 115 MG/DL — HIGH (ref 70–99)
HCT VFR BLD CALC: 30.9 % — LOW (ref 34.5–45)
HGB BLD-MCNC: 9.3 G/DL — LOW (ref 11.5–15.5)
LACTATE SERPL-SCNC: 1.5 MMOL/L — SIGNIFICANT CHANGE UP (ref 0.5–2)
MAGNESIUM SERPL-MCNC: 1.7 MG/DL — SIGNIFICANT CHANGE UP (ref 1.6–2.6)
MCHC RBC-ENTMCNC: 27.1 PG — SIGNIFICANT CHANGE UP (ref 27–34)
MCHC RBC-ENTMCNC: 30.1 GM/DL — LOW (ref 32–36)
MCV RBC AUTO: 90.1 FL — SIGNIFICANT CHANGE UP (ref 80–100)
METHOD TYPE: SIGNIFICANT CHANGE UP
NRBC # BLD: 0 /100 WBCS — SIGNIFICANT CHANGE UP (ref 0–0)
NRBC # FLD: 0 K/UL — SIGNIFICANT CHANGE UP (ref 0–0)
ORGANISM # SPEC MICROSCOPIC CNT: SIGNIFICANT CHANGE UP
ORGANISM # SPEC MICROSCOPIC CNT: SIGNIFICANT CHANGE UP
PLATELET # BLD AUTO: 611 K/UL — HIGH (ref 150–400)
POTASSIUM SERPL-MCNC: 3.7 MMOL/L — SIGNIFICANT CHANGE UP (ref 3.5–5.3)
POTASSIUM SERPL-SCNC: 3.7 MMOL/L — SIGNIFICANT CHANGE UP (ref 3.5–5.3)
RBC # BLD: 3.43 M/UL — LOW (ref 3.8–5.2)
RBC # FLD: 19 % — HIGH (ref 10.3–14.5)
SARS-COV-2 RNA SPEC QL NAA+PROBE: SIGNIFICANT CHANGE UP
SODIUM SERPL-SCNC: 138 MMOL/L — SIGNIFICANT CHANGE UP (ref 135–145)
SPECIMEN SOURCE: SIGNIFICANT CHANGE UP
WBC # BLD: 12.37 K/UL — HIGH (ref 3.8–10.5)
WBC # FLD AUTO: 12.37 K/UL — HIGH (ref 3.8–10.5)

## 2022-09-08 PROCEDURE — 99232 SBSQ HOSP IP/OBS MODERATE 35: CPT

## 2022-09-08 PROCEDURE — 93306 TTE W/DOPPLER COMPLETE: CPT | Mod: 26

## 2022-09-08 PROCEDURE — 99233 SBSQ HOSP IP/OBS HIGH 50: CPT

## 2022-09-08 RX ORDER — CEFTRIAXONE 500 MG/1
1000 INJECTION, POWDER, FOR SOLUTION INTRAMUSCULAR; INTRAVENOUS EVERY 24 HOURS
Refills: 0 | Status: DISCONTINUED | OUTPATIENT
Start: 2022-09-08 | End: 2022-09-09

## 2022-09-08 RX ADMIN — Medication 1 APPLICATION(S): at 11:12

## 2022-09-08 RX ADMIN — Medication 1 APPLICATION(S): at 11:11

## 2022-09-08 RX ADMIN — Medication 25 MILLIGRAM(S): at 06:59

## 2022-09-08 RX ADMIN — HEPARIN SODIUM 5000 UNIT(S): 5000 INJECTION INTRAVENOUS; SUBCUTANEOUS at 22:20

## 2022-09-08 RX ADMIN — Medication 6 UNIT(S): at 12:47

## 2022-09-08 RX ADMIN — NYSTATIN CREAM 1 APPLICATION(S): 100000 CREAM TOPICAL at 18:24

## 2022-09-08 RX ADMIN — NYSTATIN CREAM 1 APPLICATION(S): 100000 CREAM TOPICAL at 07:02

## 2022-09-08 RX ADMIN — HEPARIN SODIUM 5000 UNIT(S): 5000 INJECTION INTRAVENOUS; SUBCUTANEOUS at 16:19

## 2022-09-08 RX ADMIN — Medication 1 TABLET(S): at 11:13

## 2022-09-08 RX ADMIN — PIPERACILLIN AND TAZOBACTAM 25 GRAM(S): 4; .5 INJECTION, POWDER, LYOPHILIZED, FOR SOLUTION INTRAVENOUS at 03:16

## 2022-09-08 RX ADMIN — Medication 20 MILLIGRAM(S): at 07:00

## 2022-09-08 RX ADMIN — CHLORHEXIDINE GLUCONATE 1 APPLICATION(S): 213 SOLUTION TOPICAL at 11:24

## 2022-09-08 RX ADMIN — Medication 2: at 18:23

## 2022-09-08 RX ADMIN — Medication 4: at 22:19

## 2022-09-08 RX ADMIN — HEPARIN SODIUM 5000 UNIT(S): 5000 INJECTION INTRAVENOUS; SUBCUTANEOUS at 07:00

## 2022-09-08 RX ADMIN — Medication 6 UNIT(S): at 07:58

## 2022-09-08 RX ADMIN — INSULIN GLARGINE 16 UNIT(S): 100 INJECTION, SOLUTION SUBCUTANEOUS at 22:18

## 2022-09-08 RX ADMIN — DAPTOMYCIN 128 MILLIGRAM(S): 500 INJECTION, POWDER, LYOPHILIZED, FOR SOLUTION INTRAVENOUS at 09:49

## 2022-09-08 RX ADMIN — Medication 20 MILLIEQUIVALENT(S): at 11:13

## 2022-09-08 RX ADMIN — CEFTRIAXONE 100 MILLIGRAM(S): 500 INJECTION, POWDER, FOR SOLUTION INTRAMUSCULAR; INTRAVENOUS at 16:19

## 2022-09-08 RX ADMIN — Medication 6 UNIT(S): at 18:23

## 2022-09-08 RX ADMIN — Medication 500 MILLIGRAM(S): at 11:13

## 2022-09-08 RX ADMIN — PANTOPRAZOLE SODIUM 40 MILLIGRAM(S): 20 TABLET, DELAYED RELEASE ORAL at 06:59

## 2022-09-08 RX ADMIN — Medication 2000 UNIT(S): at 11:13

## 2022-09-08 NOTE — PROGRESS NOTE ADULT - PROBLEM SELECTOR PLAN 3
Pt w. incontinence and intermittent urinary retention. Suspect  neurogenic bladder from MS.  -Rose placed ON 2/2 retention  -Monitor urine output  -Will re-attempt TOV early next week if pt still inpatient   -Urology follow up outpt

## 2022-09-08 NOTE — PROGRESS NOTE ADULT - SUBJECTIVE AND OBJECTIVE BOX
Follow Up: Bacteremia    Interval History/ROS: Afebrile. No pain. Feels like she's losing her voice. Happy about negative blood cultures.     Allergies  No Known Drug Allergies  Pineapple (Anaphylaxis)        ANTIMICROBIALS:  DAPTOmycin IVPB 700 every 24 hours  piperacillin/tazobactam IVPB.. 3.375 every 8 hours      OTHER MEDS:  acetaminophen     Tablet .. 650 milliGRAM(s) Oral every 6 hours PRN  ALBUTerol    90 MICROgram(s) HFA Inhaler 2 Puff(s) Inhalation every 6 hours PRN  ascorbic acid 500 milliGRAM(s) Oral daily  cadexomer iodine 0.9% Gel 1 Application(s) Topical daily  chlorhexidine 2% Cloths 1 Application(s) Topical daily  cholecalciferol 2000 Unit(s) Oral daily  collagenase Ointment 1 Application(s) Topical daily  furosemide    Tablet 20 milliGRAM(s) Oral daily  heparin   Injectable 5000 Unit(s) SubCutaneous every 8 hours  HYDROmorphone   Tablet 2 milliGRAM(s) Oral every 6 hours PRN  insulin glargine Injectable (LANTUS) 16 Unit(s) SubCutaneous at bedtime  insulin lispro (ADMELOG) corrective regimen sliding scale   SubCutaneous Before meals and at bedtime  insulin lispro Injectable (ADMELOG) 6 Unit(s) SubCutaneous three times a day before meals  LORazepam   Injectable 0.5 milliGRAM(s) IV Push once  LORazepam   Injectable 0.5 milliGRAM(s) IV Push once  metoprolol succinate ER 25 milliGRAM(s) Oral daily  multivitamin 1 Tablet(s) Oral daily  nystatin Powder 1 Application(s) Topical two times a day  pantoprazole    Tablet 40 milliGRAM(s) Oral before breakfast  potassium chloride    Tablet ER 20 milliEquivalent(s) Oral daily  sodium chloride 0.9% lock flush 10 milliLiter(s) IV Push every 1 hour PRN      Vital Signs Last 24 Hrs  T(C): 36.4 (08 Sep 2022 09:31), Max: 36.9 (07 Sep 2022 21:37)  T(F): 97.6 (08 Sep 2022 09:31), Max: 98.4 (07 Sep 2022 21:37)  HR: 94 (08 Sep 2022 09:31) (93 - 99)  BP: 96/56 (08 Sep 2022 09:31) (94/52 - 105/64)  BP(mean): --  RR: 18 (08 Sep 2022 09:31) (16 - 18)  SpO2: 100% (08 Sep 2022 09:31) (98% - 100%)    Parameters below as of 08 Sep 2022 05:25  Patient On (Oxygen Delivery Method): room air        Physical Exam:  General: non toxic, alert   Cardio: regular rate   Respiratory: nonlabored on room air   abd: nondistended  vascular: no phlebitis   Skin: sacral woundvac                           9.3    12.37 )-----------( 611      ( 08 Sep 2022 07:55 )             30.9       09-08    138  |  101  |  9   ----------------------------<  115<H>  3.7   |  25  |  0.57    Ca    8.2<L>      08 Sep 2022 07:55  Phos  3.6     09-07  Mg     1.70     09-08            MICROBIOLOGY:  Culture - Blood (collected 09-07-22 @ 09:39)  Source: .Blood Blood-Venous  Preliminary Report (09-08-22 @ 15:02):    No growth to date.    Culture - Blood (collected 09-07-22 @ 08:24)  Source: .Blood Blood-Peripheral  Preliminary Report (09-08-22 @ 12:01):    No growth to date.    Culture - Blood (collected 09-06-22 @ 07:39)  Source: .Blood Blood  Preliminary Report (09-07-22 @ 11:02):    No growth to date.    Culture - Urine (collected 09-05-22 @ 04:15)  Source: Clean Catch Clean Catch (Midstream)  Final Report (09-08-22 @ 09:10):    >100,000 CFU/ml Klebsiella pneumoniae  Organism: Klebsiella pneumoniae (09-08-22 @ 09:10)  Organism: Klebsiella pneumoniae (09-08-22 @ 09:10)      -  Amikacin: S <=16      -  Amoxicillin/Clavulanic Acid: R >16/8      -  Ampicillin: R >16 These ampicillin results predict results for amoxicillin      -  Ampicillin/Sulbactam: R >16/8 Enterobacter, Klebsiella aerogenes, Citrobacter, and Serratia may develop resistance during prolonged therapy (3-4 days)      -  Aztreonam: S <=4      -  Cefazolin: R >16 For uncomplicated UTI with K. pneumoniae, E. coli, or P. mirablis: DHAVAL <=16 is sensitive and DHAVAL >=32 is resistant. This also predicts results for oral agents cefaclor, cefdinir, cefpodoxime, cefprozil, cefuroxime axetil, cephalexin and locarbef for uncomplicated UTI. Note that some isolates may be susceptible to these agents while testing resistant to cefazolin.      -  Cefepime: S <=2      -  Cefoxitin: S <=8      -  Ceftriaxone: S <=1 Enterobacter, Klebsiella aerogenes, Citrobacter, and Serratia may develop resistance during prolonged therapy      -  Ciprofloxacin: S <=0.25      -  Ertapenem: S <=0.5      -  Gentamicin: S <=2      -  Imipenem: S <=1      -  Levofloxacin: S <=0.5      -  Meropenem: S <=1      -  Nitrofurantoin: R >64 Should not be used to treat pyelonephritis      -  Piperacillin/Tazobactam: S <=8      -  Tigecycline: S <=2      -  Tobramycin: S <=2      -  Trimethoprim/Sulfamethoxazole: S <=0.5/9.5      Method Type: DHAVAL    Culture - Blood (collected 09-05-22 @ 03:50)  Source: .Blood Blood-Venous  Gram Stain (09-05-22 @ 18:50):    Aerobic and Anaerobic Bottle: Gram Negative Rods  Final Report (09-07-22 @ 15:43):    Growth in aerobic and anaerobic bottles: Klebsiella pneumoniae    ***Blood Panel PCR results on this specimen are available    approximately 3 hours after the Gram stain result.***    Gram stain, PCR, and/or culture results may not always    correspond dueto difference in methodologies.    ************************************************************    This PCR assay was performed by multiplex PCR. This    Assay tests for 66 bacterial and resistance gene targets.    Please refer to the [facility] test directory    at [email or web address] for details.  Organism: Blood Culture PCR  Klebsiella pneumoniae (09-07-22 @ 15:43)  Organism: Klebsiella pneumoniae (09-07-22 @ 15:43)      -  Amikacin: S <=16      -  Ampicillin: R >16 These ampicillin results predict results for amoxicillin      -  Ampicillin/Sulbactam: R >16/8 Enterobacter, Klebsiella aerogenes, Citrobacter, and Serratia may develop resistance during prolonged therapy (3-4 days)      -  Aztreonam: S <=4      -  Cefazolin: I 4 Enterobacter, Klebsiella aerogenes, Citrobacter, and Serratia may develop resistance during prolonged therapy (3-4 days)      -  Cefepime: S <=2      -  Cefoxitin: S <=8      -  Ceftriaxone: S <=1 Enterobacter, Klebsiella aerogenes, Citrobacter, and Serratia may develop resistance during prolonged therapy      -  Ciprofloxacin: S <=0.25      -  Ertapenem: S <=0.5      -  Gentamicin: S <=2      -  Imipenem: S <=1      -  Levofloxacin: S <=0.5      -  Meropenem: S <=1      -  Piperacillin/Tazobactam: S <=8      -  Tobramycin: S <=2      -  Trimethoprim/Sulfamethoxazole: S <=0.5/9.5      Method Type: DHAVAL  Organism: Blood Culture PCR (09-07-22 @ 15:43)      -  Klebsiella pneumoniae: Detec      Method Type: PCR    Culture - Blood (collected 09-05-22 @ 03:30)  Source: .Blood Blood-Peripheral  Gram Stain (09-06-22 @ 02:23):    Growth in aerobic and anaerobic bottles: Gram positive cocci in pairs  Final Report (09-07-22 @ 15:46):    Growth in aerobic and anaerobic bottles: Enterococcus faecium (vancomycin    resistant)    ***Blood Panel PCR results on this specimen are available    approximately 3 hours after the Gram stain result.***    Gram stain, PCR, and/or culture results may notalways    correspond due to difference in methodologies.    ************************************************************    This PCR assay was performed by multiplex PCR. This    Assay tests for 66 bacterial and resistance gene targets.    Please refer to the [facility] test directory    at [email or web address] for details.  Organism: Blood Culture PCR  Enterococcus faecium (vancomycin resistant) (09-07-22 @ 15:46)  Organism: Enterococcus faecium (vancomycin resistant) (09-07-22 @ 15:46)      -  Ampicillin: R >8 Predicts results to ampicillin/sulbactam, amoxacillin-clavulanate and  piperacillin-tazobactam.      -  Daptomycin: SDD 4 The breakpoint for SDD (sensitive dose dependent)is based on a dosage regimen of 8-12 mg/kg administered every 24 h in adults and is intended for serious infections due to E. faecium. Consultation with an infectious diseases specialist is recommended.      -  Gentamicin synergy: S <=500      -  Linezolid: S 2      -  Streptomycin synergy: R >1000      -  Vancomycin: R >16      Method Type: DHAVAL  Organism: Blood Culture PCR (09-07-22 @ 15:46)      -  Enterococcus faecium,VRE: Detec      Method Type: PCR      v    Rapid RVP Result: NotDetec (09-05 @ 03:10)      RADIOLOGY:  Images below reviewed personally   Follow Up: Bacteremia    Interval History/ROS: Afebrile. No pain. Feels like she's losing her voice. Happy about negative blood cultures.     Allergies  No Known Drug Allergies  Pineapple (Anaphylaxis)        ANTIMICROBIALS:  DAPTOmycin IVPB 700 every 24 hours  piperacillin/tazobactam IVPB.. 3.375 every 8 hours      OTHER MEDS:  acetaminophen     Tablet .. 650 milliGRAM(s) Oral every 6 hours PRN  ALBUTerol    90 MICROgram(s) HFA Inhaler 2 Puff(s) Inhalation every 6 hours PRN  ascorbic acid 500 milliGRAM(s) Oral daily  cadexomer iodine 0.9% Gel 1 Application(s) Topical daily  chlorhexidine 2% Cloths 1 Application(s) Topical daily  cholecalciferol 2000 Unit(s) Oral daily  collagenase Ointment 1 Application(s) Topical daily  furosemide    Tablet 20 milliGRAM(s) Oral daily  heparin   Injectable 5000 Unit(s) SubCutaneous every 8 hours  HYDROmorphone   Tablet 2 milliGRAM(s) Oral every 6 hours PRN  insulin glargine Injectable (LANTUS) 16 Unit(s) SubCutaneous at bedtime  insulin lispro (ADMELOG) corrective regimen sliding scale   SubCutaneous Before meals and at bedtime  insulin lispro Injectable (ADMELOG) 6 Unit(s) SubCutaneous three times a day before meals  LORazepam   Injectable 0.5 milliGRAM(s) IV Push once  LORazepam   Injectable 0.5 milliGRAM(s) IV Push once  metoprolol succinate ER 25 milliGRAM(s) Oral daily  multivitamin 1 Tablet(s) Oral daily  nystatin Powder 1 Application(s) Topical two times a day  pantoprazole    Tablet 40 milliGRAM(s) Oral before breakfast  potassium chloride    Tablet ER 20 milliEquivalent(s) Oral daily  sodium chloride 0.9% lock flush 10 milliLiter(s) IV Push every 1 hour PRN      Vital Signs Last 24 Hrs  T(C): 36.4 (08 Sep 2022 09:31), Max: 36.9 (07 Sep 2022 21:37)  T(F): 97.6 (08 Sep 2022 09:31), Max: 98.4 (07 Sep 2022 21:37)  HR: 94 (08 Sep 2022 09:31) (93 - 99)  BP: 96/56 (08 Sep 2022 09:31) (94/52 - 105/64)  BP(mean): --  RR: 18 (08 Sep 2022 09:31) (16 - 18)  SpO2: 100% (08 Sep 2022 09:31) (98% - 100%)    Parameters below as of 08 Sep 2022 05:25  Patient On (Oxygen Delivery Method): room air        Physical Exam:  General: non toxic, alert   Cardio: regular rate   Respiratory: nonlabored on room air   abd: nondistended  vascular: no phlebitis   Skin: sacral woundvac                           9.3    12.37 )-----------( 611      ( 08 Sep 2022 07:55 )             30.9       09-08    138  |  101  |  9   ----------------------------<  115<H>  3.7   |  25  |  0.57    Ca    8.2<L>      08 Sep 2022 07:55  Phos  3.6     09-07  Mg     1.70     09-08            MICROBIOLOGY:  Culture - Blood (collected 09-07-22 @ 09:39)  Source: .Blood Blood-Venous  Preliminary Report (09-08-22 @ 15:02):    No growth to date.    Culture - Blood (collected 09-07-22 @ 08:24)  Source: .Blood Blood-Peripheral  Preliminary Report (09-08-22 @ 12:01):    No growth to date.    Culture - Blood (collected 09-06-22 @ 07:39)  Source: .Blood Blood  Preliminary Report (09-07-22 @ 11:02):    No growth to date.    Culture - Urine (collected 09-05-22 @ 04:15)  Source: Clean Catch Clean Catch (Midstream)  Final Report (09-08-22 @ 09:10):    >100,000 CFU/ml Klebsiella pneumoniae  Organism: Klebsiella pneumoniae (09-08-22 @ 09:10)  Organism: Klebsiella pneumoniae (09-08-22 @ 09:10)      -  Amikacin: S <=16      -  Amoxicillin/Clavulanic Acid: R >16/8      -  Ampicillin: R >16 These ampicillin results predict results for amoxicillin      -  Ampicillin/Sulbactam: R >16/8 Enterobacter, Klebsiella aerogenes, Citrobacter, and Serratia may develop resistance during prolonged therapy (3-4 days)      -  Aztreonam: S <=4      -  Cefazolin: R >16 For uncomplicated UTI with K. pneumoniae, E. coli, or P. mirablis: DHAVAL <=16 is sensitive and DHAVAL >=32 is resistant. This also predicts results for oral agents cefaclor, cefdinir, cefpodoxime, cefprozil, cefuroxime axetil, cephalexin and locarbef for uncomplicated UTI. Note that some isolates may be susceptible to these agents while testing resistant to cefazolin.      -  Cefepime: S <=2      -  Cefoxitin: S <=8      -  Ceftriaxone: S <=1 Enterobacter, Klebsiella aerogenes, Citrobacter, and Serratia may develop resistance during prolonged therapy      -  Ciprofloxacin: S <=0.25      -  Ertapenem: S <=0.5      -  Gentamicin: S <=2      -  Imipenem: S <=1      -  Levofloxacin: S <=0.5      -  Meropenem: S <=1      -  Nitrofurantoin: R >64 Should not be used to treat pyelonephritis      -  Piperacillin/Tazobactam: S <=8      -  Tigecycline: S <=2      -  Tobramycin: S <=2      -  Trimethoprim/Sulfamethoxazole: S <=0.5/9.5      Method Type: DHAVAL    Culture - Blood (collected 09-05-22 @ 03:50)  Source: .Blood Blood-Venous  Gram Stain (09-05-22 @ 18:50):    Aerobic and Anaerobic Bottle: Gram Negative Rods  Final Report (09-07-22 @ 15:43):    Growth in aerobic and anaerobic bottles: Klebsiella pneumoniae    ***Blood Panel PCR results on this specimen are available    approximately 3 hours after the Gram stain result.***    Gram stain, PCR, and/or culture results may not always    correspond dueto difference in methodologies.    ************************************************************    This PCR assay was performed by multiplex PCR. This    Assay tests for 66 bacterial and resistance gene targets.    Please refer to the [facility] test directory    at [email or web address] for details.  Organism: Blood Culture PCR  Klebsiella pneumoniae (09-07-22 @ 15:43)  Organism: Klebsiella pneumoniae (09-07-22 @ 15:43)      -  Amikacin: S <=16      -  Ampicillin: R >16 These ampicillin results predict results for amoxicillin      -  Ampicillin/Sulbactam: R >16/8 Enterobacter, Klebsiella aerogenes, Citrobacter, and Serratia may develop resistance during prolonged therapy (3-4 days)      -  Aztreonam: S <=4      -  Cefazolin: I 4 Enterobacter, Klebsiella aerogenes, Citrobacter, and Serratia may develop resistance during prolonged therapy (3-4 days)      -  Cefepime: S <=2      -  Cefoxitin: S <=8      -  Ceftriaxone: S <=1 Enterobacter, Klebsiella aerogenes, Citrobacter, and Serratia may develop resistance during prolonged therapy      -  Ciprofloxacin: S <=0.25      -  Ertapenem: S <=0.5      -  Gentamicin: S <=2      -  Imipenem: S <=1      -  Levofloxacin: S <=0.5      -  Meropenem: S <=1      -  Piperacillin/Tazobactam: S <=8      -  Tobramycin: S <=2      -  Trimethoprim/Sulfamethoxazole: S <=0.5/9.5      Method Type: DHAVAL  Organism: Blood Culture PCR (09-07-22 @ 15:43)      -  Klebsiella pneumoniae: Detec      Method Type: PCR    Culture - Blood (collected 09-05-22 @ 03:30)  Source: .Blood Blood-Peripheral  Gram Stain (09-06-22 @ 02:23):    Growth in aerobic and anaerobic bottles: Gram positive cocci in pairs  Final Report (09-07-22 @ 15:46):    Growth in aerobic and anaerobic bottles: Enterococcus faecium (vancomycin    resistant)    ***Blood Panel PCR results on this specimen are available    approximately 3 hours after the Gram stain result.***    Gram stain, PCR, and/or culture results may notalways    correspond due to difference in methodologies.    ************************************************************    This PCR assay was performed by multiplex PCR. This    Assay tests for 66 bacterial and resistance gene targets.    Please refer to the [facility] test directory    at [email or web address] for details.  Organism: Blood Culture PCR  Enterococcus faecium (vancomycin resistant) (09-07-22 @ 15:46)  Organism: Enterococcus faecium (vancomycin resistant) (09-07-22 @ 15:46)      -  Ampicillin: R >8 Predicts results to ampicillin/sulbactam, amoxacillin-clavulanate and  piperacillin-tazobactam.      -  Daptomycin: SDD 4 The breakpoint for SDD (sensitive dose dependent)is based on a dosage regimen of 8-12 mg/kg administered every 24 h in adults and is intended for serious infections due to E. faecium. Consultation with an infectious diseases specialist is recommended.      -  Gentamicin synergy: S <=500      -  Linezolid: S 2      -  Streptomycin synergy: R >1000      -  Vancomycin: R >16      Method Type: DHAVAL  Organism: Blood Culture PCR (09-07-22 @ 15:46)      -  Enterococcus faecium,VRE: Detec      Method Type: PCR    Rapid RVP Result: NotDetec (09-05 @ 03:10)      RADIOLOGY:  Images below reviewed personally  Xray Chest 1 View- PORTABLE-Urgent (Xray Chest 1 View- PORTABLE-Urgent .) (09.05.22 @ 06:13)   Clear lungs.    CT Abdomen and Pelvis No Cont (08.10.22 @ 23:30)   Large open sacral decubitus wound extending to bone. Foci of air in the   left gluteus musculature and superior to the wound. 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  Absence of the coccyx and distal sacrum, may related to prior resection.   Correlate with prior surgical history.

## 2022-09-08 NOTE — PROGRESS NOTE ADULT - ASSESSMENT
Bedbound from [place] with chronic sacral decubitus.   Here 8/10 with septic shock, necrotizing wound s/p OR I&D 8/11 growing Strep anginosus, VRE, Candida.   Improved and downgraded 8/23.   Sepsis recurrence 9/5 with Klebsiella and VRE bacteremia.   Klebsiella appears to be from urine (toussaint removed 8/26).   VRE could also be from urine or wound (woundvac in place).   Improved since restarting antibiotics.   Poor long term prognosis given neurological issue with high risk for recurrent infections.     Suggest  -CT abdomen pelvis IV contrast for focus   -f/u sensitivities   -repeat blood cultures tomorrow   -check TTE   -wound care follow up given change   -Zosyn for now for Klebsiella   -add Daptomycin 700mg q24h for VRE with weekly CPK     Discussed with medicine     Epi Harvey MD   Infectious Disease   Available on TEAMS. After 5PM and on weekends please page fellow on call or call [phone] Bedbound from MS with chronic sacral decubitus.   Here 8/10 with septic shock, necrotizing sacrum s/p OR I&D 8/11 growing Strep anginosus, VRE, Candida, woundvac in place.   Improved and downgraded 8/23.   Sepsis recurrence 9/5 with Klebsiella and VRE bacteremia.   Klebsiella appears to be from urine (toussaint removed 8/26).   VRE could also be from urine (didn't growth) or wound (grew before).   Clinically improved, repeat cultures 9/6 negative.   High risk for recurrent infections due to neurological issue.     Suggest   -CT abdomen pelvis IV contrast for focus   -check TTE   -monitor blood cultures   -narrow Zosyn to Ceftriaxone 1GM IV q24h for Klebsiella   -Daptomycin 700mg q24h for VRE with weekly CPK   -if remaining workup reassuring, aim for 2-week course of IV antibiotics through 9/20     Discussed with medicine     Epi Harvey MD   Infectious Disease   Available on TEAMS. After 5PM and on weekends please page fellow on call or call [phone]

## 2022-09-08 NOTE — PROGRESS NOTE ADULT - PROBLEM SELECTOR PLAN 1
Pt meets sepsis criteria at this time w/ fevers and leukocytosis. Likely 2/2  UTI vs soft tissue infection. Pt w/ new right upper thigh induration  -Pending CT A/P- expediated  -Will likely need drainage / debridement pending CT findings  -S/p course of Unasyn as below for STI  -c/w zosyn 09/05-  -c/w daptomycin 09/06 -  -Monitor CPK on dapto  -Blood Cx w/ VRE E. faecalis and Klebsiella  -Urine Cx w/ klebsiella   -Repeat Cx NGTD  -Appreciate ID Recs Pt meets sepsis criteria at this time w/ fevers and leukocytosis. Likely 2/2  UTI vs soft tissue infection. Pt w/ new right upper thigh induration  -Pending CT A/P- expediated  -Will likely need drainage / debridement pending CT findings  -S/p course of Unasyn as below for STI  -s/p zosyn 09/05-09/8, resume CTX  -c/w daptomycin 09/06 -  -Monitor CPK weekly on dapto  -To likely treat through 9/20 pending above work up   -Blood Cx w/ VRE E. faecalis and Klebsiella  -Urine Cx w/ klebsiella   -Repeat Cx NGTD  -Appreciate ID Recs

## 2022-09-08 NOTE — PROGRESS NOTE ADULT - PROBLEM SELECTOR PLAN 8
DIET: Soft and bite sized diet  DVT: Heparin SQ   DISPO: Accepted at [place] for wound care. No beds available. D/C held off  given new c/f infection.    Family updated.

## 2022-09-08 NOTE — PROGRESS NOTE ADULT - ASSESSMENT
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

## 2022-09-08 NOTE — PROGRESS NOTE ADULT - PROBLEM SELECTOR PLAN 5
# severe LV dysfunction; HFrEF 5 - 10%  - Likely 2/2 stress induced cardiomyopathy  - s/p CCU course for [place] and inotropic support now s/p swan removal   - C/w metoprolol-XL 25QD  - C/w Lasix 20mg QD per Cards recs  - C/w potassium supplementation on Lasix   - Will plan for low dose lisinopril 2.5mg QD prior to discharge if BP allows. Will hold off for now given soft pressures and c/f infection   - Outpatient cardiology follow up

## 2022-09-08 NOTE — PROGRESS NOTE ADULT - SUBJECTIVE AND OBJECTIVE BOX
Jonelle Fischer    Pager [phone]    Patient is a 52y old  Female who presents with a chief complaint of Soft tissue infection (07 Sep 2022 18:47)      SUBJECTIVE / OVERNIGHT EVENTS: No acute overnight events. This morning pt doing well. Expresses some frustration about CT not being done yesterday. Otherwise without complaints. Denies worsening pain at wound sites (L flank/r upper thigh). Denies fevers, chills, nausea, vomiting, chest pain, SOB.    MEDICATIONS  (STANDING):  ascorbic acid 500 milliGRAM(s) Oral daily  cadexomer iodine 0.9% Gel 1 Application(s) Topical daily  chlorhexidine 2% Cloths 1 Application(s) Topical daily  cholecalciferol 2000 Unit(s) Oral daily  collagenase Ointment 1 Application(s) Topical daily  DAPTOmycin IVPB 700 milliGRAM(s) IV Intermittent every 24 hours  furosemide    Tablet 20 milliGRAM(s) Oral daily  heparin   Injectable 5000 Unit(s) SubCutaneous every 8 hours  insulin glargine Injectable (LANTUS) 16 Unit(s) SubCutaneous at bedtime  insulin lispro (ADMELOG) corrective regimen sliding scale   SubCutaneous Before meals and at bedtime  insulin lispro Injectable (ADMELOG) 6 Unit(s) SubCutaneous three times a day before meals  LORazepam   Injectable 0.5 milliGRAM(s) IV Push once  LORazepam   Injectable 0.5 milliGRAM(s) IV Push once  metoprolol succinate ER 25 milliGRAM(s) Oral daily  multivitamin 1 Tablet(s) Oral daily  nystatin Powder 1 Application(s) Topical two times a day  pantoprazole    Tablet 40 milliGRAM(s) Oral before breakfast  piperacillin/tazobactam IVPB.. 3.375 Gram(s) IV Intermittent every 8 hours  potassium chloride    Tablet ER 20 milliEquivalent(s) Oral daily    MEDICATIONS  (PRN):  acetaminophen     Tablet .. 650 milliGRAM(s) Oral every 6 hours PRN Mild Pain (1 - 3), Moderate Pain (4 - 6)  ALBUTerol    90 MICROgram(s) HFA Inhaler 2 Puff(s) Inhalation every 6 hours PRN Shortness of Breath  HYDROmorphone   Tablet 2 milliGRAM(s) Oral every 6 hours PRN Severe Pain (7 - 10)  sodium chloride 0.9% lock flush 10 milliLiter(s) IV Push every 1 hour PRN Pre/post blood products, medications, blood draw, and to maintain line patency    Allergies    No Known Drug Allergies  Pineapple (Anaphylaxis)    Intolerances        Vital Signs Last 24 Hrs  T(C): 36.4 (08 Sep 2022 09:31), Max: 36.9 (07 Sep 2022 21:37)  T(F): 97.6 (08 Sep 2022 09:31), Max: 98.4 (07 Sep 2022 21:37)  HR: 94 (08 Sep 2022 09:31) (93 - 99)  BP: 96/56 (08 Sep 2022 09:31) (94/52 - 105/64)  BP(mean): --  RR: 18 (08 Sep 2022 09:31) (16 - 18)  SpO2: 100% (08 Sep 2022 09:31) (98% - 100%)    Parameters below as of 08 Sep 2022 05:25  Patient On (Oxygen Delivery Method): room air      Daily     Daily   CAPILLARY BLOOD GLUCOSE      POCT Blood Glucose.: 136 mg/dL (08 Sep 2022 11:56)  POCT Blood Glucose.: 116 mg/dL (08 Sep 2022 07:57)  POCT Blood Glucose.: 130 mg/dL (07 Sep 2022 22:03)  POCT Blood Glucose.: 157 mg/dL (07 Sep 2022 17:38)    I&O's Summary    07 Sep 2022 07:01  -  08 Sep 2022 07:00  --------------------------------------------------------  IN: 0 mL / OUT: 1975 mL / NET: -1975 mL    08 Sep 2022 07:01  -  08 Sep 2022 14:24  --------------------------------------------------------  IN: 60 mL / OUT: 725 mL / NET: -665 mL        PHYSICAL EXAM:  General:  Well appearing, NAD, obese  HEENT:  Nonicteric, PERRLA  CV:  RRR, no murmur, no JVD  Lungs:  CTA B/L, no wheezes, rales, rhonchi  Abdomen:  Soft, NTND, Bowel sounds normal   Extremities:  2+ pulses  Skin:  L flank wound w/ vac in place, w/o erythema , discharge, no TTP. Barrier film in place c/d/i, R upper upper thigh w. mod-large area of induration w/ small open wounds apx 1-2cm. No erythema /drainage. No TTP  Neuro:  AAOx3, non-focal  : Rose in place      LABS:                        9.3    12.37 )-----------( 611      ( 08 Sep 2022 07:55 )             30.9     Hgb Trend: 9.3<--, 8.7<--, 9.1<--, 9.3<--, 9.4<--  09-08    138  |  101  |  9   ----------------------------<  115<H>  3.7   |  25  |  0.57    Ca    8.2<L>      08 Sep 2022 07:55  Phos  3.6     09-07  Mg     1.70     09-08      Creatinine Trend: 0.57<--, 0.68<--, 0.61<--, 0.56<--, 0.56<--, 0.61<--      CARDIAC MARKERS ( 07 Sep 2022 07:59 )  x     / x     / 22 U/L / x     / x              RADIOLOGY & ADDITIONAL TESTS:    Imaging Personally Reviewed.    Consultant(s) Notes Reviewed.    Care Discussed with Consultants/Other Providers.

## 2022-09-09 LAB
ANION GAP SERPL CALC-SCNC: 12 MMOL/L — SIGNIFICANT CHANGE UP (ref 7–14)
APPEARANCE UR: ABNORMAL
BACTERIA # UR AUTO: NEGATIVE — SIGNIFICANT CHANGE UP
BILIRUB UR-MCNC: NEGATIVE — SIGNIFICANT CHANGE UP
BUN SERPL-MCNC: 9 MG/DL — SIGNIFICANT CHANGE UP (ref 7–23)
CALCIUM SERPL-MCNC: 7.9 MG/DL — LOW (ref 8.4–10.5)
CHLORIDE SERPL-SCNC: 104 MMOL/L — SIGNIFICANT CHANGE UP (ref 98–107)
CO2 SERPL-SCNC: 23 MMOL/L — SIGNIFICANT CHANGE UP (ref 22–31)
COLOR SPEC: YELLOW — SIGNIFICANT CHANGE UP
CREAT SERPL-MCNC: 0.49 MG/DL — LOW (ref 0.5–1.3)
CULTURE RESULTS: SIGNIFICANT CHANGE UP
DIFF PNL FLD: ABNORMAL
EGFR: 115 ML/MIN/1.73M2 — SIGNIFICANT CHANGE UP
EPI CELLS # UR: 6 /HPF — HIGH (ref 0–5)
GLUCOSE BLDC GLUCOMTR-MCNC: 114 MG/DL — HIGH (ref 70–99)
GLUCOSE BLDC GLUCOMTR-MCNC: 144 MG/DL — HIGH (ref 70–99)
GLUCOSE BLDC GLUCOMTR-MCNC: 209 MG/DL — HIGH (ref 70–99)
GLUCOSE BLDC GLUCOMTR-MCNC: 210 MG/DL — HIGH (ref 70–99)
GLUCOSE SERPL-MCNC: 97 MG/DL — SIGNIFICANT CHANGE UP (ref 70–99)
GLUCOSE UR QL: NEGATIVE — SIGNIFICANT CHANGE UP
GRAM STN FLD: SIGNIFICANT CHANGE UP
HCT VFR BLD CALC: 27.4 % — LOW (ref 34.5–45)
HGB BLD-MCNC: 8.6 G/DL — LOW (ref 11.5–15.5)
HYALINE CASTS # UR AUTO: 20 /LPF — HIGH (ref 0–7)
KETONES UR-MCNC: NEGATIVE — SIGNIFICANT CHANGE UP
LEUKOCYTE ESTERASE UR-ACNC: ABNORMAL
MAGNESIUM SERPL-MCNC: 1.6 MG/DL — SIGNIFICANT CHANGE UP (ref 1.6–2.6)
MCHC RBC-ENTMCNC: 28.1 PG — SIGNIFICANT CHANGE UP (ref 27–34)
MCHC RBC-ENTMCNC: 31.4 GM/DL — LOW (ref 32–36)
MCV RBC AUTO: 89.5 FL — SIGNIFICANT CHANGE UP (ref 80–100)
METHOD TYPE: SIGNIFICANT CHANGE UP
NITRITE UR-MCNC: NEGATIVE — SIGNIFICANT CHANGE UP
NRBC # BLD: 0 /100 WBCS — SIGNIFICANT CHANGE UP (ref 0–0)
NRBC # FLD: 0 K/UL — SIGNIFICANT CHANGE UP (ref 0–0)
P AERUGINOSA DNA BLD POS NAA+NON-PROBE: SIGNIFICANT CHANGE UP
PH UR: 6.5 — SIGNIFICANT CHANGE UP (ref 5–8)
PHOSPHATE SERPL-MCNC: 3.2 MG/DL — SIGNIFICANT CHANGE UP (ref 2.5–4.5)
PLATELET # BLD AUTO: 548 K/UL — HIGH (ref 150–400)
POTASSIUM SERPL-MCNC: 3.6 MMOL/L — SIGNIFICANT CHANGE UP (ref 3.5–5.3)
POTASSIUM SERPL-SCNC: 3.6 MMOL/L — SIGNIFICANT CHANGE UP (ref 3.5–5.3)
PROT UR-MCNC: ABNORMAL
RBC # BLD: 3.06 M/UL — LOW (ref 3.8–5.2)
RBC # FLD: 18.9 % — HIGH (ref 10.3–14.5)
RBC CASTS # UR COMP ASSIST: 11 /HPF — HIGH (ref 0–4)
SODIUM SERPL-SCNC: 139 MMOL/L — SIGNIFICANT CHANGE UP (ref 135–145)
SP GR SPEC: >1.05 (ref 1.01–1.05)
SPECIMEN SOURCE: SIGNIFICANT CHANGE UP
UROBILINOGEN FLD QL: SIGNIFICANT CHANGE UP
WBC # BLD: 13.94 K/UL — HIGH (ref 3.8–10.5)
WBC # FLD AUTO: 13.94 K/UL — HIGH (ref 3.8–10.5)
WBC UR QL: >720 /HPF — HIGH (ref 0–5)

## 2022-09-09 PROCEDURE — 74176 CT ABD & PELVIS W/O CONTRAST: CPT | Mod: 26

## 2022-09-09 PROCEDURE — 99233 SBSQ HOSP IP/OBS HIGH 50: CPT

## 2022-09-09 RX ORDER — HYDROMORPHONE HYDROCHLORIDE 2 MG/ML
1 INJECTION INTRAMUSCULAR; INTRAVENOUS; SUBCUTANEOUS ONCE
Refills: 0 | Status: DISCONTINUED | OUTPATIENT
Start: 2022-09-09 | End: 2022-09-09

## 2022-09-09 RX ORDER — MAGNESIUM OXIDE 400 MG ORAL TABLET 241.3 MG
400 TABLET ORAL
Refills: 0 | Status: DISCONTINUED | OUTPATIENT
Start: 2022-09-09 | End: 2022-09-09

## 2022-09-09 RX ORDER — MEROPENEM 1 G/30ML
1000 INJECTION INTRAVENOUS EVERY 8 HOURS
Refills: 0 | Status: DISCONTINUED | OUTPATIENT
Start: 2022-09-09 | End: 2022-09-11

## 2022-09-09 RX ORDER — INFLUENZA VIRUS VACCINE 15; 15; 15; 15 UG/.5ML; UG/.5ML; UG/.5ML; UG/.5ML
0.5 SUSPENSION INTRAMUSCULAR ONCE
Refills: 0 | Status: DISCONTINUED | OUTPATIENT
Start: 2022-09-09 | End: 2022-09-18

## 2022-09-09 RX ADMIN — DAPTOMYCIN 128 MILLIGRAM(S): 500 INJECTION, POWDER, LYOPHILIZED, FOR SOLUTION INTRAVENOUS at 10:48

## 2022-09-09 RX ADMIN — CEFTRIAXONE 100 MILLIGRAM(S): 500 INJECTION, POWDER, FOR SOLUTION INTRAMUSCULAR; INTRAVENOUS at 15:31

## 2022-09-09 RX ADMIN — Medication 1 TABLET(S): at 11:58

## 2022-09-09 RX ADMIN — CHLORHEXIDINE GLUCONATE 1 APPLICATION(S): 213 SOLUTION TOPICAL at 11:57

## 2022-09-09 RX ADMIN — MEROPENEM 100 MILLIGRAM(S): 1 INJECTION INTRAVENOUS at 21:42

## 2022-09-09 RX ADMIN — HYDROMORPHONE HYDROCHLORIDE 1 MILLIGRAM(S): 2 INJECTION INTRAMUSCULAR; INTRAVENOUS; SUBCUTANEOUS at 10:48

## 2022-09-09 RX ADMIN — Medication 20 MILLIGRAM(S): at 05:14

## 2022-09-09 RX ADMIN — NYSTATIN CREAM 1 APPLICATION(S): 100000 CREAM TOPICAL at 17:58

## 2022-09-09 RX ADMIN — Medication 1 APPLICATION(S): at 12:00

## 2022-09-09 RX ADMIN — HYDROMORPHONE HYDROCHLORIDE 1 MILLIGRAM(S): 2 INJECTION INTRAMUSCULAR; INTRAVENOUS; SUBCUTANEOUS at 11:03

## 2022-09-09 RX ADMIN — Medication 1 APPLICATION(S): at 11:59

## 2022-09-09 RX ADMIN — Medication 500 MILLIGRAM(S): at 11:59

## 2022-09-09 RX ADMIN — HEPARIN SODIUM 5000 UNIT(S): 5000 INJECTION INTRAVENOUS; SUBCUTANEOUS at 05:13

## 2022-09-09 RX ADMIN — NYSTATIN CREAM 1 APPLICATION(S): 100000 CREAM TOPICAL at 05:13

## 2022-09-09 RX ADMIN — Medication 4: at 21:42

## 2022-09-09 RX ADMIN — PANTOPRAZOLE SODIUM 40 MILLIGRAM(S): 20 TABLET, DELAYED RELEASE ORAL at 08:38

## 2022-09-09 RX ADMIN — Medication 20 MILLIEQUIVALENT(S): at 11:58

## 2022-09-09 RX ADMIN — Medication 4: at 17:58

## 2022-09-09 RX ADMIN — Medication 6 UNIT(S): at 17:58

## 2022-09-09 RX ADMIN — Medication 2000 UNIT(S): at 11:58

## 2022-09-09 RX ADMIN — HEPARIN SODIUM 5000 UNIT(S): 5000 INJECTION INTRAVENOUS; SUBCUTANEOUS at 21:41

## 2022-09-09 RX ADMIN — MEROPENEM 100 MILLIGRAM(S): 1 INJECTION INTRAVENOUS at 18:08

## 2022-09-09 RX ADMIN — Medication 6 UNIT(S): at 09:02

## 2022-09-09 RX ADMIN — Medication 25 MILLIGRAM(S): at 05:14

## 2022-09-09 RX ADMIN — Medication 6 UNIT(S): at 12:13

## 2022-09-09 RX ADMIN — HEPARIN SODIUM 5000 UNIT(S): 5000 INJECTION INTRAVENOUS; SUBCUTANEOUS at 13:49

## 2022-09-09 RX ADMIN — INSULIN GLARGINE 16 UNIT(S): 100 INJECTION, SOLUTION SUBCUTANEOUS at 21:41

## 2022-09-09 NOTE — PROGRESS NOTE ADULT - PROBLEM SELECTOR PLAN 1
Pt meets sepsis criteria  w/ fevers and leukocytosis. Likely 2/2  UTI vs soft tissue infection. Pt w/ new right upper thigh induration  -CT A/P w/ cystitis. Possible cellulitis of B/L upper thighs noted on imaging however phy exam not consistent w/ cellulitis at this time  -s/p zosyn 09/05-09/8  -broadened to Meropenem 1GM IV q8h given breakthrough Pseudomonas infection on Zosyn   -c/w daptomycin 09/06 -  -Monitor CPK weekly on dapto  -To likely treat for 2 weeks from negative cultures  -Blood Cx w/ VRE E. faecalis, Klebsiella and pseudomonas   -Urine Cx w/ klebsiella   -Repeat blood and urine cx. Send UA  -Appreciate ID Recs

## 2022-09-09 NOTE — PROGRESS NOTE ADULT - ASSESSMENT
52F MS, bedbound.   Here 8/10 with septic shock, necrotizing chronic sacral wound.   OR I&D 8/11 growing Strep anginosus, VRE, Candida, woundvac in place.   Sepsis recurrence 9/5 with Klebsiella and VRE bacteremia.   Klebsiella appears to be from urine (toussaint removed 8/26, replaced 9/7)   VRE could also be from wound but it looks clean.   Improved, blood cleared 9/6, TTE without vegetations.   However, new Pseudomonas bacteremia from 9/7 while on Zosyn with fever recurrence 9/8.   Unclear source.   CT abdomen today reassuring, just cystitis.   High risk for recurrent infections due to neurological issue.     Suggest   -I ordered repeat UA and urine culture for now and two sets of blood cultures for tomorrow   -f/u Pseudomonas sensitivities   -broadened to Meropenem 1GM IV q8h given breakthrough Pseudomonas infection on Zosyn   -Daptomycin 700mg q24h for VRE with weekly CPK   -eventually plan on a two-week course from negative cultures     Paged medicine     Epi Harvey MD   Infectious Disease   Available on TEAMS. After 5PM and on weekends please page fellow on call or call [phone] 52F MS, bedbound.   Here 8/10 with septic shock, necrotizing chronic sacral wound.   OR I&D 8/11 growing Strep anginosus, VRE, Candida, woundvac in place.   Sepsis recurrence 9/5 with Klebsiella and VRE bacteremia.   Klebsiella appears to be from urine (toussaint removed 8/26, replaced 9/5)   VRE could also be from wound but it looks clean.   Improved, blood cleared 9/6, TTE without vegetations.   However, new Pseudomonas bacteremia from 9/7 while on Zosyn with fever recurrence 9/8.   Unclear source.   CT abdomen today reassuring, just cystitis.   High risk for recurrent infections due to neurological issue.     Suggest   -I ordered repeat UA and urine culture for now and two sets of blood cultures for tomorrow   -f/u Pseudomonas sensitivities   -broadened to Meropenem 1GM IV q8h given breakthrough Pseudomonas infection on Zosyn   -Daptomycin 700mg q24h for VRE with weekly CPK   -eventually plan on a two-week course from negative cultures     Paged medicine     Epi Harvey MD   Infectious Disease   Available on TEAMS. After 5PM and on weekends please page fellow on call or call [phone] 52F MS, bedbound.   Here 8/10 with septic shock, necrotizing chronic sacral wound.   OR I&D 8/11 growing Strep anginosus, VRE, Candida, woundvac in place.   Sepsis recurrence 9/5 with Klebsiella and VRE bacteremia.   Klebsiella appears to be from urine (toussaint removed 8/26, replaced 9/5)   Wound grew VRE but looks clean.   Improved, blood cleared 9/6, TTE without vegetations.   However, new Pseudomonas bacteremia from 9/7 while on Zosyn with fever recurrence 9/8.   Unclear source.   CT abdomen today reassuring, just cystitis.   High risk for recurrent infections due to neurological issue.     Suggest   -I ordered repeat UA and urine culture for now and two sets of blood cultures for tomorrow   -f/u Pseudomonas sensitivities   -broadened to Meropenem 1GM IV q8h given breakthrough Pseudomonas infection on Zosyn   -Daptomycin 700mg q24h for VRE with weekly CPK   -eventually plan on a two-week course from negative cultures     Discussed with medicine     Epi Harvey MD   Infectious Disease   Available on TEAMS. After 5PM and on weekends please page fellow on call or call [phone]

## 2022-09-09 NOTE — PROGRESS NOTE ADULT - PROBLEM SELECTOR PLAN 8
DIET: Soft and bite sized diet  DVT: Heparin SQ   DISPO: Accepted at [facility] for wound care. No beds available. D/C held off  given new c/f infection.    Family updated.

## 2022-09-09 NOTE — PROGRESS NOTE ADULT - SUBJECTIVE AND OBJECTIVE BOX
Jonelle Fischer    Pager [phone]    Patient is a 52y old  Female who presents with a chief complaint of Soft tissue infection (09 Sep 2022 16:26)      SUBJECTIVE / OVERNIGHT EVENTS: No acute overnight events. This morning pt doing well. Denies fevers, chills, nausea, vomiting, chest pain, SOB, abdominal pain, constipation, diarrhea.     MEDICATIONS  (STANDING):  ascorbic acid 500 milliGRAM(s) Oral daily  cadexomer iodine 0.9% Gel 1 Application(s) Topical daily  chlorhexidine 2% Cloths 1 Application(s) Topical daily  cholecalciferol 2000 Unit(s) Oral daily  collagenase Ointment 1 Application(s) Topical daily  DAPTOmycin IVPB 700 milliGRAM(s) IV Intermittent every 24 hours  furosemide    Tablet 20 milliGRAM(s) Oral daily  heparin   Injectable 5000 Unit(s) SubCutaneous every 8 hours  influenza   Vaccine 0.5 milliLiter(s) IntraMuscular once  insulin glargine Injectable (LANTUS) 16 Unit(s) SubCutaneous at bedtime  insulin lispro (ADMELOG) corrective regimen sliding scale   SubCutaneous Before meals and at bedtime  insulin lispro Injectable (ADMELOG) 6 Unit(s) SubCutaneous three times a day before meals  LORazepam   Injectable 0.5 milliGRAM(s) IV Push once  LORazepam   Injectable 0.5 milliGRAM(s) IV Push once  meropenem  IVPB 1000 milliGRAM(s) IV Intermittent every 8 hours  metoprolol succinate ER 25 milliGRAM(s) Oral daily  multivitamin 1 Tablet(s) Oral daily  nystatin Powder 1 Application(s) Topical two times a day  pantoprazole    Tablet 40 milliGRAM(s) Oral before breakfast  potassium chloride    Tablet ER 20 milliEquivalent(s) Oral daily    MEDICATIONS  (PRN):  acetaminophen     Tablet .. 650 milliGRAM(s) Oral every 6 hours PRN Mild Pain (1 - 3), Moderate Pain (4 - 6)  ALBUTerol    90 MICROgram(s) HFA Inhaler 2 Puff(s) Inhalation every 6 hours PRN Shortness of Breath  HYDROmorphone   Tablet 2 milliGRAM(s) Oral every 6 hours PRN Severe Pain (7 - 10)  sodium chloride 0.9% lock flush 10 milliLiter(s) IV Push every 1 hour PRN Pre/post blood products, medications, blood draw, and to maintain line patency    Allergies    No Known Drug Allergies  Pineapple (Anaphylaxis)    Intolerances        Vital Signs Last 24 Hrs  T(C): 36.5 (09 Sep 2022 14:00), Max: 37.3 (09 Sep 2022 01:59)  T(F): 97.7 (09 Sep 2022 14:00), Max: 99.2 (09 Sep 2022 01:59)  HR: 94 (09 Sep 2022 14:00) (79 - 97)  BP: 104/51 (09 Sep 2022 14:00) (104/51 - 111/62)  BP(mean): --  RR: 18 (09 Sep 2022 14:00) (16 - 18)  SpO2: 100% (09 Sep 2022 14:00) (100% - 100%)    Parameters below as of 09 Sep 2022 14:00  Patient On (Oxygen Delivery Method): room air      Daily     Daily   CAPILLARY BLOOD GLUCOSE      POCT Blood Glucose.: 209 mg/dL (09 Sep 2022 17:33)  POCT Blood Glucose.: 144 mg/dL (09 Sep 2022 12:08)  POCT Blood Glucose.: 114 mg/dL (09 Sep 2022 08:59)  POCT Blood Glucose.: 211 mg/dL (08 Sep 2022 21:43)    I&O's Summary    08 Sep 2022 07:01  -  09 Sep 2022 07:00  --------------------------------------------------------  IN: 730 mL / OUT: 2525 mL / NET: -1795 mL    09 Sep 2022 07:01  -  09 Sep 2022 17:43  --------------------------------------------------------  IN: 480 mL / OUT: 460 mL / NET: 20 mL        PHYSICAL EXAM:  General:  Well appearing, NAD, obese  HEENT:  Nonicteric, PERRLA  CV:  RRR, no murmur, no JVD  Lungs:  CTA B/L, no wheezes, rales, rhonchi  Abdomen:  Soft, NTND, Bowel sounds normal   Extremities:  2+ pulses  Skin:  L flank wound w/ vac in place, w/o erythema , discharge, no TTP. Barrier film in place c/d/i, R upper upper thigh w. mod-large area of induration w/ small open wounds apx 2cm, now appear to be dry eschar. No erythema /drainage. No TTP  Neuro:  AAOx3, non-focal  : Rose in place      LABS:                        8.6    13.94 )-----------( 548      ( 09 Sep 2022 05:45 )             27.4     Hgb Trend: 8.6<--, 9.3<--, 8.7<--, 9.1<--, 9.3<--  09-09    139  |  104  |  9   ----------------------------<  97  3.6   |  23  |  0.49<L>    Ca    7.9<L>      09 Sep 2022 05:45  Phos  3.2     09-09  Mg     1.60     09-09      Creatinine Trend: 0.49<--, 0.57<--, 0.68<--, 0.61<--, 0.56<--, 0.56<--              RADIOLOGY & ADDITIONAL TESTS:    Imaging Personally Reviewed.    Consultant(s) Notes Reviewed.    Care Discussed with Consultants/Other Providers.

## 2022-09-09 NOTE — PROVIDER CONTACT NOTE (CRITICAL VALUE NOTIFICATION) - TEST AND RESULT REPORTED:
9/7: aerobic gram negative rods in preliminary
gram negative shelly for aneorobic and areobic bottles
Hemoglobin 6.9
Potassium 2.9
Growth in aerobic and anaerobic bottle gram positive cocci in pair

## 2022-09-09 NOTE — PROGRESS NOTE ADULT - PROBLEM SELECTOR PLAN 5
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 Will hold off for now given soft pressures and c/f infection   - Outpatient cardiology follow up

## 2022-09-09 NOTE — PROVIDER CONTACT NOTE (CRITICAL VALUE NOTIFICATION) - SITUATION
Potassium 2.9
critical
Patients  culture resulted back with both gram negative shelly for aneorobic and areobic bottles
Pt hemoglobin 6.9 post 2 PRBCs transfusion
Growth in aerobic and anaerobic bottle gram positive cocci in pair

## 2022-09-09 NOTE — ADVANCED PRACTICE NURSE CONSULT - REASON FOR CONSULT
Patient known to [facility] service line for wound VAC to lower back to left flank, last seen on 9/7/22. Coordinated with wound care NP to assess and provide wound care for multiple full thickness pressure injuries. Please see progress wound care note for detailed assessment. In reference to right medial thigh, medical attending Jonelle Fischer and ID Epi Harvey, both evaluated right thigh area of induration and areas of eschars and are in agreement that it does not appear to infected or cellulitic. On today's assessment area remains indurated without extension of trace area and tissue type now presenting with dry, fixed eschar. No drainage observed. See CT of abdomen and pelvis from 9/9/22 for result.

## 2022-09-09 NOTE — PROGRESS NOTE ADULT - SUBJECTIVE AND OBJECTIVE BOX
Follow Up: Polymicrobial bacteremia     Interval History/ROS: Febrile overnight. GNR on blood cultures from 9/7. Feels fine. Asked if she can go home. I said no.     Allergies  No Known Drug Allergies  Pineapple (Anaphylaxis)        ANTIMICROBIALS:  DAPTOmycin IVPB 700 every 24 hours  meropenem  IVPB 1000 every 8 hours      OTHER MEDS:  acetaminophen     Tablet .. 650 milliGRAM(s) Oral every 6 hours PRN  ALBUTerol    90 MICROgram(s) HFA Inhaler 2 Puff(s) Inhalation every 6 hours PRN  ascorbic acid 500 milliGRAM(s) Oral daily  cadexomer iodine 0.9% Gel 1 Application(s) Topical daily  chlorhexidine 2% Cloths 1 Application(s) Topical daily  cholecalciferol 2000 Unit(s) Oral daily  collagenase Ointment 1 Application(s) Topical daily  furosemide    Tablet 20 milliGRAM(s) Oral daily  heparin   Injectable 5000 Unit(s) SubCutaneous every 8 hours  HYDROmorphone   Tablet 2 milliGRAM(s) Oral every 6 hours PRN  influenza   Vaccine 0.5 milliLiter(s) IntraMuscular once  insulin glargine Injectable (LANTUS) 16 Unit(s) SubCutaneous at bedtime  insulin lispro (ADMELOG) corrective regimen sliding scale   SubCutaneous Before meals and at bedtime  insulin lispro Injectable (ADMELOG) 6 Unit(s) SubCutaneous three times a day before meals  LORazepam   Injectable 0.5 milliGRAM(s) IV Push once  LORazepam   Injectable 0.5 milliGRAM(s) IV Push once  metoprolol succinate ER 25 milliGRAM(s) Oral daily  multivitamin 1 Tablet(s) Oral daily  nystatin Powder 1 Application(s) Topical two times a day  pantoprazole    Tablet 40 milliGRAM(s) Oral before breakfast  potassium chloride    Tablet ER 20 milliEquivalent(s) Oral daily  sodium chloride 0.9% lock flush 10 milliLiter(s) IV Push every 1 hour PRN      Vital Signs Last 24 Hrs  T(C): 36.5 (09 Sep 2022 14:00), Max: 38.1 (08 Sep 2022 17:30)  T(F): 97.7 (09 Sep 2022 14:00), Max: 100.5 (08 Sep 2022 17:30)  HR: 94 (09 Sep 2022 14:00) (79 - 891)  BP: 104/51 (09 Sep 2022 14:00) (98/52 - 111/62)  BP(mean): --  RR: 18 (09 Sep 2022 14:00) (16 - 18)  SpO2: 100% (09 Sep 2022 14:00) (93% - 100%)    Parameters below as of 09 Sep 2022 14:00  Patient On (Oxygen Delivery Method): room air        Physical Exam:  General: non toxic  Cardio: regular rate   Respiratory: nonlabored   Skin: large sacral wound being dressed, grossly clean appearing. anterior right thigh irregular subcutaneous induration/firmness without clinical cellulitis   Psych: calm cooperative                           8.6    13.94 )-----------( 548      ( 09 Sep 2022 05:45 )             27.4       09-09    139  |  104  |  9   ----------------------------<  97  3.6   |  23  |  0.49<L>    Ca    7.9<L>      09 Sep 2022 05:45  Phos  3.2     09-09  Mg     1.60     09-09            MICROBIOLOGY:  Culture - Blood (collected 09-07-22 @ 09:39)  Source: .Blood Blood-Venous  Gram Stain (09-09-22 @ 09:03):    Growth in aerobic bottle: Gram Negative Rods  Preliminary Report (09-09-22 @ 10:28):    Growth in aerobic bottle: Gram Negative Rods    ***Blood Panel PCR results on this specimen are available    approximately 3 hours after the Gram stain result.***    Gram stain, PCR, and/or culture results may not always    correspond due to difference in methodologies.    ************************************************************    This PCR assay was performed by multiplex PCR. This    Assay tests for 66 bacterial and resistance gene targets.    Please refer to the [facility] test directory    at [email or web address] for details.  Organism: Blood Culture PCR (09-09-22 @ 13:24)  Organism: Blood Culture PCR (09-09-22 @ 13:24)      -  Pseudomonas aeruginosa: Detec      Method Type: PCR    Culture - Blood (collected 09-07-22 @ 08:24)  Source: .Blood Blood-Peripheral  Preliminary Report (09-08-22 @ 12:01):    No growth to date.    Culture - Blood (collected 09-06-22 @ 07:39)  Source: .Blood Blood  Preliminary Report (09-07-22 @ 11:02):    No growth to date.    Culture - Urine (collected 09-05-22 @ 04:15)  Source: Clean Catch Clean Catch (Midstream)  Final Report (09-08-22 @ 09:10):    >100,000 CFU/ml Klebsiella pneumoniae  Organism: Klebsiella pneumoniae (09-08-22 @ 09:10)  Organism: Klebsiella pneumoniae (09-08-22 @ 09:10)      -  Amikacin: S <=16      -  Amoxicillin/Clavulanic Acid: R >16/8      -  Ampicillin: R >16 These ampicillin results predict results for amoxicillin      -  Ampicillin/Sulbactam: R >16/8 Enterobacter, Klebsiella aerogenes, Citrobacter, and Serratia may develop resistance during prolonged therapy (3-4 days)      -  Aztreonam: S <=4      -  Cefazolin: R >16 For uncomplicated UTI with K. pneumoniae, E. coli, or P. mirablis: DHAVAL <=16 is sensitive and DHAVAL >=32 is resistant. This also predicts results for oral agents cefaclor, cefdinir, cefpodoxime, cefprozil, cefuroxime axetil, cephalexin and locarbef for uncomplicated UTI. Note that some isolates may be susceptible to these agents while testing resistant to cefazolin.      -  Cefepime: S <=2      -  Cefoxitin: S <=8      -  Ceftriaxone: S <=1 Enterobacter, Klebsiella aerogenes, Citrobacter, and Serratia may develop resistance during prolonged therapy      -  Ciprofloxacin: S <=0.25      -  Ertapenem: S <=0.5      -  Gentamicin: S <=2      -  Imipenem: S <=1      -  Levofloxacin: S <=0.5      -  Meropenem: S <=1      -  Nitrofurantoin: R >64 Should not be used to treat pyelonephritis      -  Piperacillin/Tazobactam: S <=8      -  Tigecycline: S <=2      -  Tobramycin: S <=2      -  Trimethoprim/Sulfamethoxazole: S <=0.5/9.5      Method Type: DHAVAL    Culture - Blood (collected 09-05-22 @ 03:50)  Source: .Blood Blood-Venous  Gram Stain (09-05-22 @ 18:50):    Aerobic and Anaerobic Bottle: Gram Negative Rods  Final Report (09-07-22 @ 15:43):    Growth in aerobic and anaerobic bottles: Klebsiella pneumoniae    ***Blood Panel PCR results on this specimen are available    approximately 3 hours after the Gram stain result.***    Gram stain, PCR, and/or culture results may not always    correspond dueto difference in methodologies.    ************************************************************    This PCR assay was performed by multiplex PCR. This    Assay tests for 66 bacterial and resistance gene targets.    Please refer to the [facility] test directory    at [email or web address] for details.  Organism: Blood Culture PCR  Klebsiella pneumoniae (09-07-22 @ 15:43)  Organism: Klebsiella pneumoniae (09-07-22 @ 15:43)      -  Amikacin: S <=16      -  Ampicillin: R >16 These ampicillin results predict results for amoxicillin      -  Ampicillin/Sulbactam: R >16/8 Enterobacter, Klebsiella aerogenes, Citrobacter, and Serratia may develop resistance during prolonged therapy (3-4 days)      -  Aztreonam: S <=4      -  Cefazolin: I 4 Enterobacter, Klebsiella aerogenes, Citrobacter, and Serratia may develop resistance during prolonged therapy (3-4 days)      -  Cefepime: S <=2      -  Cefoxitin: S <=8      -  Ceftriaxone: S <=1 Enterobacter, Klebsiella aerogenes, Citrobacter, and Serratia may develop resistance during prolonged therapy      -  Ciprofloxacin: S <=0.25      -  Ertapenem: S <=0.5      -  Gentamicin: S <=2      -  Imipenem: S <=1      -  Levofloxacin: S <=0.5      -  Meropenem: S <=1      -  Piperacillin/Tazobactam: S <=8      -  Tobramycin: S <=2      -  Trimethoprim/Sulfamethoxazole: S <=0.5/9.5      Method Type: DHAVAL  Organism: Blood Culture PCR (09-07-22 @ 15:43)      -  Klebsiella pneumoniae: Detec      Method Type: PCR    Culture - Blood (collected 09-05-22 @ 03:30)  Source: .Blood Blood-Peripheral  Gram Stain (09-06-22 @ 02:23):    Growth in aerobic and anaerobic bottles: Gram positive cocci in pairs  Final Report (09-07-22 @ 15:46):    Growth in aerobic and anaerobic bottles: Enterococcus faecium (vancomycin    resistant)    ***Blood Panel PCR results on this specimen are available    approximately 3 hours after the Gram stain result.***    Gram stain, PCR, and/or culture results may notalways    correspond due to difference in methodologies.    ************************************************************    This PCR assay was performed by multiplex PCR. This    Assay tests for 66 bacterial and resistance gene targets.    Please refer to the [facility] test directory    at [email or web address] for details.  Organism: Blood Culture PCR  Enterococcus faecium (vancomycin resistant) (09-07-22 @ 15:46)  Organism: Enterococcus faecium (vancomycin resistant) (09-07-22 @ 15:46)      -  Ampicillin: R >8 Predicts results to ampicillin/sulbactam, amoxacillin-clavulanate and  piperacillin-tazobactam.      -  Daptomycin: SDD 4 The breakpoint for SDD (sensitive dose dependent)is based on a dosage regimen of 8-12 mg/kg administered every 24 h in adults and is intended for serious infections due to E. faecium. Consultation with an infectious diseases specialist is recommended.      -  Gentamicin synergy: S <=500      -  Linezolid: S 2      -  Streptomycin synergy: R >1000      -  Vancomycin: R >16      Method Type: DHAVAL  Organism: Blood Culture PCR (09-07-22 @ 15:46)      -  Enterococcus faecium,VRE: Detec      Method Type: PCR    Rapid RVP Result: NotDetec (09-05 @ 03:10)    RADIOLOGY:  Transthoracic Echocardiogram (09.08.22 @ 14:34)   1. Normal mitral valve. Minimal mitral regurgitation.  2. Normal left ventricular internaldimensions and wall  thicknesses.  3. Endocardium not well visualized; unable to evaluate left  ventricular systolic function.  4. The right ventricle is not well visualized.  Unable to exclude endocarditis.  Consider MELANY for further  evaluation, if clinically indicated.  Consider limited  repeat imaging with the intravenous administration of  ultrasound enhancing agent for improved evaluation of LV  systolic function, if clinically indicated.    Images below reviewed personally  CT Abdomen and Pelvis No Cont (09.09.22 @ 10:34)   Question of cellulitis involving the bilateral upper thighs. No fluid   collection.  Interval healing of the sacral decubitus ulcer.  Findings are suggestive of cystitis.

## 2022-09-09 NOTE — PROVIDER CONTACT NOTE (CRITICAL VALUE NOTIFICATION) - ASSESSMENT
Asymptomatic
Growth in aerobic and anaerobic bottle gram positive cocci in pair
9/7: aerobic gram negative rods in preliminary
PMHx sickle cell disease, p/w sharp RUQ pain, generalized weakness
Patients  culture resulted back with both gram negative shelly for aneorobic and areobic bottles

## 2022-09-09 NOTE — PROVIDER CONTACT NOTE (CRITICAL VALUE NOTIFICATION) - ACTION/TREATMENT ORDERED:
Provider aware. Repeat cultures to be drawn with morning labs.
will continue to monitor
MD notified, no new order at this time.
team aware
provider took note of results

## 2022-09-09 NOTE — PROVIDER CONTACT NOTE (CRITICAL VALUE NOTIFICATION) - BACKGROUND
Pt with urosepsis and necrotizing fasciitis of left flank wound
Necrotizing fascitis
pt admitted for necrotizing fasciitis
positive and negative cultures from the 5th and 6th
PMHx sickle cell disease, p/w sharp RUQ pain, generalized weakness

## 2022-09-09 NOTE — PROGRESS NOTE ADULT - ASSESSMENT
52F immobile 2/2 h/o MS, DM2, asthma, hypothyroidism, and known sacral wounds presents from [place] with urosepsis and anemia with new L flank wound with concern for necrotizing soft tissue infection s/p debridement of wound, hospital course c/b severe global LV dysfunction of unknown etiology- likely stress induced cardiomyopathy, s/p CCU course for [place] and inotropic support, now s/p swan removal and transferred back to medicine, completed unasyn 8/25, course further complicated by new gram neg bacteremia, restarted on IV abx.

## 2022-09-10 LAB
ANION GAP SERPL CALC-SCNC: 10 MMOL/L — SIGNIFICANT CHANGE UP (ref 7–14)
BASOPHILS # BLD AUTO: 0.11 K/UL — SIGNIFICANT CHANGE UP (ref 0–0.2)
BASOPHILS NFR BLD AUTO: 0.8 % — SIGNIFICANT CHANGE UP (ref 0–2)
BUN SERPL-MCNC: 9 MG/DL — SIGNIFICANT CHANGE UP (ref 7–23)
CALCIUM SERPL-MCNC: 8.1 MG/DL — LOW (ref 8.4–10.5)
CHLORIDE SERPL-SCNC: 105 MMOL/L — SIGNIFICANT CHANGE UP (ref 98–107)
CK SERPL-CCNC: 17 U/L — LOW (ref 25–170)
CO2 SERPL-SCNC: 25 MMOL/L — SIGNIFICANT CHANGE UP (ref 22–31)
CREAT SERPL-MCNC: 0.49 MG/DL — LOW (ref 0.5–1.3)
EGFR: 115 ML/MIN/1.73M2 — SIGNIFICANT CHANGE UP
EOSINOPHIL # BLD AUTO: 0.46 K/UL — SIGNIFICANT CHANGE UP (ref 0–0.5)
EOSINOPHIL NFR BLD AUTO: 3.5 % — SIGNIFICANT CHANGE UP (ref 0–6)
GLUCOSE BLDC GLUCOMTR-MCNC: 141 MG/DL — HIGH (ref 70–99)
GLUCOSE BLDC GLUCOMTR-MCNC: 162 MG/DL — HIGH (ref 70–99)
GLUCOSE BLDC GLUCOMTR-MCNC: 188 MG/DL — HIGH (ref 70–99)
GLUCOSE BLDC GLUCOMTR-MCNC: 212 MG/DL — HIGH (ref 70–99)
GLUCOSE SERPL-MCNC: 159 MG/DL — HIGH (ref 70–99)
HCT VFR BLD CALC: 26.8 % — LOW (ref 34.5–45)
HGB BLD-MCNC: 8.2 G/DL — LOW (ref 11.5–15.5)
IANC: 7.34 K/UL — SIGNIFICANT CHANGE UP (ref 1.8–7.4)
IMM GRANULOCYTES NFR BLD AUTO: 5.8 % — HIGH (ref 0–1.5)
LYMPHOCYTES # BLD AUTO: 24.9 % — SIGNIFICANT CHANGE UP (ref 13–44)
LYMPHOCYTES # BLD AUTO: 3.25 K/UL — SIGNIFICANT CHANGE UP (ref 1–3.3)
MAGNESIUM SERPL-MCNC: 1.5 MG/DL — LOW (ref 1.6–2.6)
MCHC RBC-ENTMCNC: 27.4 PG — SIGNIFICANT CHANGE UP (ref 27–34)
MCHC RBC-ENTMCNC: 30.6 GM/DL — LOW (ref 32–36)
MCV RBC AUTO: 89.6 FL — SIGNIFICANT CHANGE UP (ref 80–100)
MONOCYTES # BLD AUTO: 1.12 K/UL — HIGH (ref 0–0.9)
MONOCYTES NFR BLD AUTO: 8.6 % — SIGNIFICANT CHANGE UP (ref 2–14)
NEUTROPHILS # BLD AUTO: 7.34 K/UL — SIGNIFICANT CHANGE UP (ref 1.8–7.4)
NEUTROPHILS NFR BLD AUTO: 56.4 % — SIGNIFICANT CHANGE UP (ref 43–77)
NRBC # BLD: 0 /100 WBCS — SIGNIFICANT CHANGE UP (ref 0–0)
NRBC # FLD: 0 K/UL — SIGNIFICANT CHANGE UP (ref 0–0)
PHOSPHATE SERPL-MCNC: 3.3 MG/DL — SIGNIFICANT CHANGE UP (ref 2.5–4.5)
PLATELET # BLD AUTO: 591 K/UL — HIGH (ref 150–400)
POTASSIUM SERPL-MCNC: 3.7 MMOL/L — SIGNIFICANT CHANGE UP (ref 3.5–5.3)
POTASSIUM SERPL-SCNC: 3.7 MMOL/L — SIGNIFICANT CHANGE UP (ref 3.5–5.3)
RBC # BLD: 2.99 M/UL — LOW (ref 3.8–5.2)
RBC # FLD: 18.7 % — HIGH (ref 10.3–14.5)
SODIUM SERPL-SCNC: 140 MMOL/L — SIGNIFICANT CHANGE UP (ref 135–145)
WBC # BLD: 13.04 K/UL — HIGH (ref 3.8–10.5)
WBC # FLD AUTO: 13.04 K/UL — HIGH (ref 3.8–10.5)

## 2022-09-10 PROCEDURE — 99233 SBSQ HOSP IP/OBS HIGH 50: CPT

## 2022-09-10 RX ORDER — INSULIN LISPRO 100/ML
8 VIAL (ML) SUBCUTANEOUS
Refills: 0 | Status: DISCONTINUED | OUTPATIENT
Start: 2022-09-10 | End: 2022-09-18

## 2022-09-10 RX ADMIN — HEPARIN SODIUM 5000 UNIT(S): 5000 INJECTION INTRAVENOUS; SUBCUTANEOUS at 21:31

## 2022-09-10 RX ADMIN — Medication 6 UNIT(S): at 08:33

## 2022-09-10 RX ADMIN — HEPARIN SODIUM 5000 UNIT(S): 5000 INJECTION INTRAVENOUS; SUBCUTANEOUS at 13:06

## 2022-09-10 RX ADMIN — Medication 1 APPLICATION(S): at 09:18

## 2022-09-10 RX ADMIN — Medication 25 MILLIGRAM(S): at 06:10

## 2022-09-10 RX ADMIN — MEROPENEM 100 MILLIGRAM(S): 1 INJECTION INTRAVENOUS at 13:06

## 2022-09-10 RX ADMIN — Medication 6 UNIT(S): at 17:38

## 2022-09-10 RX ADMIN — NYSTATIN CREAM 1 APPLICATION(S): 100000 CREAM TOPICAL at 06:08

## 2022-09-10 RX ADMIN — Medication 6 UNIT(S): at 12:29

## 2022-09-10 RX ADMIN — Medication 500 MILLIGRAM(S): at 09:19

## 2022-09-10 RX ADMIN — Medication 20 MILLIGRAM(S): at 06:10

## 2022-09-10 RX ADMIN — Medication 20 MILLIEQUIVALENT(S): at 10:26

## 2022-09-10 RX ADMIN — Medication 1 APPLICATION(S): at 09:20

## 2022-09-10 RX ADMIN — MEROPENEM 100 MILLIGRAM(S): 1 INJECTION INTRAVENOUS at 21:29

## 2022-09-10 RX ADMIN — Medication 1 TABLET(S): at 10:26

## 2022-09-10 RX ADMIN — MEROPENEM 100 MILLIGRAM(S): 1 INJECTION INTRAVENOUS at 06:09

## 2022-09-10 RX ADMIN — INSULIN GLARGINE 16 UNIT(S): 100 INJECTION, SOLUTION SUBCUTANEOUS at 21:29

## 2022-09-10 RX ADMIN — Medication 2: at 08:34

## 2022-09-10 RX ADMIN — PANTOPRAZOLE SODIUM 40 MILLIGRAM(S): 20 TABLET, DELAYED RELEASE ORAL at 06:10

## 2022-09-10 RX ADMIN — Medication 4: at 17:39

## 2022-09-10 RX ADMIN — HEPARIN SODIUM 5000 UNIT(S): 5000 INJECTION INTRAVENOUS; SUBCUTANEOUS at 06:10

## 2022-09-10 RX ADMIN — DAPTOMYCIN 128 MILLIGRAM(S): 500 INJECTION, POWDER, LYOPHILIZED, FOR SOLUTION INTRAVENOUS at 11:13

## 2022-09-10 RX ADMIN — CHLORHEXIDINE GLUCONATE 1 APPLICATION(S): 213 SOLUTION TOPICAL at 09:20

## 2022-09-10 RX ADMIN — NYSTATIN CREAM 1 APPLICATION(S): 100000 CREAM TOPICAL at 17:50

## 2022-09-10 RX ADMIN — Medication 2000 UNIT(S): at 09:19

## 2022-09-10 RX ADMIN — Medication 2: at 12:29

## 2022-09-10 NOTE — PROGRESS NOTE ADULT - PROBLEM SELECTOR PLAN 1
Pt meets sepsis criteria  w/ fevers and leukocytosis. Likely 2/2  UTI vs soft tissue infection. Pt w/ new right upper thigh induration  - CTAP w/ cystitis   - s/p zosyn 09/05-09/8  - broadened to Meropenem 1g q8h given breakthrough pseudomonas infection on Zosyn per ID  - c/w daptomycin 09/06; monitor CPK weekly on dapto  - plan to treat for 2 weeks from negative cultures  - appreciate ID Recs

## 2022-09-10 NOTE — PROGRESS NOTE ADULT - PROBLEM SELECTOR PLAN 8
DIET: DASH regular  DVT: SQH  DISPO: Accepted at [place] for wound care. No beds available. D/C held off  given new c/f infection.

## 2022-09-10 NOTE — PROGRESS NOTE ADULT - SUBJECTIVE AND OBJECTIVE BOX
Patient is a 52y old  Female who presents with a chief complaint of Soft tissue infection    SUBJECTIVE / OVERNIGHT EVENTS:    No CP, SOB, f/c/n/v  Reports happy to be getting regular diet  in no pain  has been unable to walk for 15 yrs     MEDICATIONS  (STANDING):  ascorbic acid 500 milliGRAM(s) Oral daily  cadexomer iodine 0.9% Gel 1 Application(s) Topical daily  chlorhexidine 2% Cloths 1 Application(s) Topical daily  cholecalciferol 2000 Unit(s) Oral daily  collagenase Ointment 1 Application(s) Topical daily  DAPTOmycin IVPB 700 milliGRAM(s) IV Intermittent every 24 hours  furosemide    Tablet 20 milliGRAM(s) Oral daily  heparin   Injectable 5000 Unit(s) SubCutaneous every 8 hours  influenza   Vaccine 0.5 milliLiter(s) IntraMuscular once  insulin glargine Injectable (LANTUS) 16 Unit(s) SubCutaneous at bedtime  insulin lispro (ADMELOG) corrective regimen sliding scale   SubCutaneous Before meals and at bedtime  insulin lispro Injectable (ADMELOG) 6 Unit(s) SubCutaneous three times a day before meals  LORazepam   Injectable 0.5 milliGRAM(s) IV Push once  LORazepam   Injectable 0.5 milliGRAM(s) IV Push once  meropenem  IVPB 1000 milliGRAM(s) IV Intermittent every 8 hours  metoprolol succinate ER 25 milliGRAM(s) Oral daily  multivitamin 1 Tablet(s) Oral daily  nystatin Powder 1 Application(s) Topical two times a day  pantoprazole    Tablet 40 milliGRAM(s) Oral before breakfast  potassium chloride    Tablet ER 20 milliEquivalent(s) Oral daily    MEDICATIONS  (PRN):  acetaminophen     Tablet .. 650 milliGRAM(s) Oral every 6 hours PRN Mild Pain (1 - 3), Moderate Pain (4 - 6)  ALBUTerol    90 MICROgram(s) HFA Inhaler 2 Puff(s) Inhalation every 6 hours PRN Shortness of Breath  HYDROmorphone   Tablet 2 milliGRAM(s) Oral every 6 hours PRN Severe Pain (7 - 10)  sodium chloride 0.9% lock flush 10 milliLiter(s) IV Push every 1 hour PRN Pre/post blood products, medications, blood draw, and to maintain line patency    T(C): 36.9 (09-10-22 @ 13:14), Max: 37.3 (09-10-22 @ 09:53)  HR: 93 (09-10-22 @ 13:14) (93 - 103)  BP: 101/56 (09-10-22 @ 13:14) (96/54 - 113/48)  RR: 17 (09-10-22 @ 13:14) (17 - 18)  SpO2: 98% (09-10-22 @ 13:14) (95% - 100%)    CAPILLARY BLOOD GLUCOSE  POCT Blood Glucose.: 188 mg/dL (10 Sep 2022 11:59)  POCT Blood Glucose.: 162 mg/dL (10 Sep 2022 08:26)  POCT Blood Glucose.: 210 mg/dL (09 Sep 2022 21:33)  POCT Blood Glucose.: 209 mg/dL (09 Sep 2022 17:33)    I&O's Summary    09 Sep 2022 07:01  -  10 Sep 2022 07:00  --------------------------------------------------------  IN: 770 mL / OUT: 1300 mL / NET: -530 mL    PHYSICAL EXAM:  GENERAL: NAD, obese   CHEST/LUNG: Clear to auscultation bilaterally; No wheeze  HEART: Regular rate and rhythm; No murmurs, rubs, or gallops  ABDOMEN: Soft, Nontender, Nondistended; Bowel sounds present  EXTREMITIES:   warm and well perfused, No clubbing, cyanosis, or edema  PSYCH: AAOx3  NEUROLOGY: LE 1, RUE  weaker than L    LABS:                        8.2    13.04 )-----------( 591      ( 10 Sep 2022 07:30 )             26.8     09-10    140  |  105  |  9   ----------------------------<  159<H>  3.7   |  25  |  0.49<L>    Ca    8.1<L>      10 Sep 2022 07:30  Phos  3.3     09-10  Mg     1.50     09-10      CARDIAC MARKERS ( 10 Sep 2022 07:30 )  x     / x     / 17 U/L / x     / x        Urinalysis Basic - ( 09 Sep 2022 18:04 )  Color: Yellow / Appearance: Turbid / SG: >1.050 / pH: x  Gluc: x / Ketone: Negative  / Bili: Negative / Urobili: <2 mg/dL   Blood: x / Protein: 100 mg/dL / Nitrite: Negative   Leuk Esterase: Large / RBC: 11 /HPF / WBC >720 /HPF   Sq Epi: x / Non Sq Epi: 6 /HPF / Bacteria: Negative    Microbiology: Culture Results:   Growth in aerobic bottle: Pseudomonas aeruginosa    Culture Results:   No growth to date. (09-07 @ 08:24)  Culture Results:   No growth to date. (09-06 @ 07:39)  Culture Results:   >100,000 CFU/ml Klebsiella pneumoniae (09-05 @ 04:15)  Culture Results:     Consultant(s) Notes Reviewed: ID   Care Discussed with Consultants/Other Providers:

## 2022-09-10 NOTE — PROGRESS NOTE ADULT - ASSESSMENT
52F bedbound due to MS, DM2, asthma, hypothyroidism, and known sacral wounds presents from [place] with urosepsis and anemia with new L flank wound with concern for necrotizing soft tissue infection s/p debridement of wound, hospital course c/b severe global LV dysfunction of unknown etiology- likely stress induced cardiomyopathy, s/p CCU course for [place] and inotropic support, now s/p swan removal and transferred back to medicine, completed unasyn 8/25, course further complicated by new pseudomonal bacteremia, restarted on IV abx.

## 2022-09-10 NOTE — PROGRESS NOTE ADULT - PROBLEM SELECTOR PLAN 2
s/p L flank wound debridement  - dressings intact and wound vac in place-to be continued on discharge  - no plan for further debridement, wound care to change vac M/W/F prn per surgery recs  -  if w/ persistent fevers/leukocytosis, will benefit from re-evaluation for further debridement   - s/p Unasyn 3 grams IV Q6h finished 8/25 per ID  - pain control prn

## 2022-09-10 NOTE — PROGRESS NOTE ADULT - PROBLEM SELECTOR PLAN 5
# severe LV dysfunction; HFrEF 5 - 10%; likely 2/2 stress induced cardiomyopathy  - s/p CCU course for swan and inotropic support now s/p swan removal   - c/w metoprolol-XL 25QD  - c/w Lasix 20mg QD per Cards recs  - c/w potassium supplementation on Lasix   - Advised outpatient follow up for initiation of  low dose lisinopril 2.5mg QD. Will hold off for now given soft pressures and c/f infection   - Outpatient cardiology follow up

## 2022-09-10 NOTE — PROGRESS NOTE ADULT - PROBLEM SELECTOR PLAN 3
Pt w. incontinence and intermittent urinary retention. Suspect  neurogenic bladder from MS.  - c/w [place]  - unlikley to pass TOV

## 2022-09-11 DIAGNOSIS — R78.81 BACTEREMIA: ICD-10-CM

## 2022-09-11 LAB
-  AMIKACIN: SIGNIFICANT CHANGE UP
-  AZTREONAM: SIGNIFICANT CHANGE UP
-  CEFEPIME: SIGNIFICANT CHANGE UP
-  CEFTAZIDIME: SIGNIFICANT CHANGE UP
-  CIPROFLOXACIN: SIGNIFICANT CHANGE UP
-  GENTAMICIN: SIGNIFICANT CHANGE UP
-  IMIPENEM: SIGNIFICANT CHANGE UP
-  LEVOFLOXACIN: SIGNIFICANT CHANGE UP
-  MEROPENEM: SIGNIFICANT CHANGE UP
-  PIPERACILLIN/TAZOBACTAM: SIGNIFICANT CHANGE UP
-  TOBRAMYCIN: SIGNIFICANT CHANGE UP
ANION GAP SERPL CALC-SCNC: 11 MMOL/L — SIGNIFICANT CHANGE UP (ref 7–14)
BASOPHILS # BLD AUTO: 0.11 K/UL — SIGNIFICANT CHANGE UP (ref 0–0.2)
BASOPHILS NFR BLD AUTO: 0.9 % — SIGNIFICANT CHANGE UP (ref 0–2)
BUN SERPL-MCNC: 9 MG/DL — SIGNIFICANT CHANGE UP (ref 7–23)
CALCIUM SERPL-MCNC: 8.1 MG/DL — LOW (ref 8.4–10.5)
CHLORIDE SERPL-SCNC: 102 MMOL/L — SIGNIFICANT CHANGE UP (ref 98–107)
CO2 SERPL-SCNC: 23 MMOL/L — SIGNIFICANT CHANGE UP (ref 22–31)
CREAT SERPL-MCNC: 0.53 MG/DL — SIGNIFICANT CHANGE UP (ref 0.5–1.3)
CULTURE RESULTS: SIGNIFICANT CHANGE UP
CULTURE RESULTS: SIGNIFICANT CHANGE UP
EGFR: 113 ML/MIN/1.73M2 — SIGNIFICANT CHANGE UP
EOSINOPHIL # BLD AUTO: 0.41 K/UL — SIGNIFICANT CHANGE UP (ref 0–0.5)
EOSINOPHIL NFR BLD AUTO: 3.2 % — SIGNIFICANT CHANGE UP (ref 0–6)
GLUCOSE BLDC GLUCOMTR-MCNC: 136 MG/DL — HIGH (ref 70–99)
GLUCOSE BLDC GLUCOMTR-MCNC: 137 MG/DL — HIGH (ref 70–99)
GLUCOSE BLDC GLUCOMTR-MCNC: 152 MG/DL — HIGH (ref 70–99)
GLUCOSE BLDC GLUCOMTR-MCNC: 178 MG/DL — HIGH (ref 70–99)
GLUCOSE SERPL-MCNC: 125 MG/DL — HIGH (ref 70–99)
HCT VFR BLD CALC: 26.9 % — LOW (ref 34.5–45)
HGB BLD-MCNC: 7.9 G/DL — LOW (ref 11.5–15.5)
IANC: 6.54 K/UL — SIGNIFICANT CHANGE UP (ref 1.8–7.4)
IMM GRANULOCYTES NFR BLD AUTO: 6.4 % — HIGH (ref 0–1.5)
LYMPHOCYTES # BLD AUTO: 29.1 % — SIGNIFICANT CHANGE UP (ref 13–44)
LYMPHOCYTES # BLD AUTO: 3.7 K/UL — HIGH (ref 1–3.3)
MAGNESIUM SERPL-MCNC: 1.5 MG/DL — LOW (ref 1.6–2.6)
MCHC RBC-ENTMCNC: 27.2 PG — SIGNIFICANT CHANGE UP (ref 27–34)
MCHC RBC-ENTMCNC: 29.4 GM/DL — LOW (ref 32–36)
MCV RBC AUTO: 92.8 FL — SIGNIFICANT CHANGE UP (ref 80–100)
METHOD TYPE: SIGNIFICANT CHANGE UP
MONOCYTES # BLD AUTO: 1.16 K/UL — HIGH (ref 0–0.9)
MONOCYTES NFR BLD AUTO: 9.1 % — SIGNIFICANT CHANGE UP (ref 2–14)
NEUTROPHILS # BLD AUTO: 6.54 K/UL — SIGNIFICANT CHANGE UP (ref 1.8–7.4)
NEUTROPHILS NFR BLD AUTO: 51.3 % — SIGNIFICANT CHANGE UP (ref 43–77)
NRBC # BLD: 0 /100 WBCS — SIGNIFICANT CHANGE UP (ref 0–0)
NRBC # FLD: 0 K/UL — SIGNIFICANT CHANGE UP (ref 0–0)
ORGANISM # SPEC MICROSCOPIC CNT: SIGNIFICANT CHANGE UP
PHOSPHATE SERPL-MCNC: 3.5 MG/DL — SIGNIFICANT CHANGE UP (ref 2.5–4.5)
PLATELET # BLD AUTO: 572 K/UL — HIGH (ref 150–400)
POTASSIUM SERPL-MCNC: 3.8 MMOL/L — SIGNIFICANT CHANGE UP (ref 3.5–5.3)
POTASSIUM SERPL-SCNC: 3.8 MMOL/L — SIGNIFICANT CHANGE UP (ref 3.5–5.3)
RBC # BLD: 2.9 M/UL — LOW (ref 3.8–5.2)
RBC # FLD: 18.8 % — HIGH (ref 10.3–14.5)
SODIUM SERPL-SCNC: 136 MMOL/L — SIGNIFICANT CHANGE UP (ref 135–145)
SPECIMEN SOURCE: SIGNIFICANT CHANGE UP
SPECIMEN SOURCE: SIGNIFICANT CHANGE UP
WBC # BLD: 12.73 K/UL — HIGH (ref 3.8–10.5)
WBC # FLD AUTO: 12.73 K/UL — HIGH (ref 3.8–10.5)

## 2022-09-11 PROCEDURE — 99232 SBSQ HOSP IP/OBS MODERATE 35: CPT

## 2022-09-11 RX ORDER — PIPERACILLIN AND TAZOBACTAM 4; .5 G/20ML; G/20ML
3.38 INJECTION, POWDER, LYOPHILIZED, FOR SOLUTION INTRAVENOUS EVERY 8 HOURS
Refills: 0 | Status: DISCONTINUED | OUTPATIENT
Start: 2022-09-11 | End: 2022-09-11

## 2022-09-11 RX ORDER — MAGNESIUM SULFATE 500 MG/ML
2 VIAL (ML) INJECTION ONCE
Refills: 0 | Status: COMPLETED | OUTPATIENT
Start: 2022-09-11 | End: 2022-09-11

## 2022-09-11 RX ORDER — LISINOPRIL 2.5 MG/1
2.5 TABLET ORAL DAILY
Refills: 0 | Status: DISCONTINUED | OUTPATIENT
Start: 2022-09-11 | End: 2022-09-18

## 2022-09-11 RX ORDER — MEROPENEM 1 G/30ML
1000 INJECTION INTRAVENOUS EVERY 8 HOURS
Refills: 0 | Status: DISCONTINUED | OUTPATIENT
Start: 2022-09-11 | End: 2022-09-12

## 2022-09-11 RX ORDER — OXYCODONE AND ACETAMINOPHEN 5; 325 MG/1; MG/1
1 TABLET ORAL EVERY 4 HOURS
Refills: 0 | Status: DISCONTINUED | OUTPATIENT
Start: 2022-09-11 | End: 2022-09-11

## 2022-09-11 RX ADMIN — Medication 1 APPLICATION(S): at 09:20

## 2022-09-11 RX ADMIN — Medication 500 MILLIGRAM(S): at 09:19

## 2022-09-11 RX ADMIN — Medication 20 MILLIGRAM(S): at 05:57

## 2022-09-11 RX ADMIN — DAPTOMYCIN 128 MILLIGRAM(S): 500 INJECTION, POWDER, LYOPHILIZED, FOR SOLUTION INTRAVENOUS at 11:29

## 2022-09-11 RX ADMIN — LISINOPRIL 2.5 MILLIGRAM(S): 2.5 TABLET ORAL at 21:39

## 2022-09-11 RX ADMIN — NYSTATIN CREAM 1 APPLICATION(S): 100000 CREAM TOPICAL at 05:56

## 2022-09-11 RX ADMIN — Medication 1 APPLICATION(S): at 09:21

## 2022-09-11 RX ADMIN — Medication 1 TABLET(S): at 09:19

## 2022-09-11 RX ADMIN — HEPARIN SODIUM 5000 UNIT(S): 5000 INJECTION INTRAVENOUS; SUBCUTANEOUS at 05:57

## 2022-09-11 RX ADMIN — Medication 2: at 17:46

## 2022-09-11 RX ADMIN — MEROPENEM 100 MILLIGRAM(S): 1 INJECTION INTRAVENOUS at 05:57

## 2022-09-11 RX ADMIN — Medication 8 UNIT(S): at 17:47

## 2022-09-11 RX ADMIN — Medication 8 UNIT(S): at 12:21

## 2022-09-11 RX ADMIN — MEROPENEM 100 MILLIGRAM(S): 1 INJECTION INTRAVENOUS at 21:37

## 2022-09-11 RX ADMIN — Medication 25 MILLIGRAM(S): at 05:57

## 2022-09-11 RX ADMIN — Medication 20 MILLIEQUIVALENT(S): at 09:19

## 2022-09-11 RX ADMIN — Medication 2: at 12:20

## 2022-09-11 RX ADMIN — PIPERACILLIN AND TAZOBACTAM 25 GRAM(S): 4; .5 INJECTION, POWDER, LYOPHILIZED, FOR SOLUTION INTRAVENOUS at 13:10

## 2022-09-11 RX ADMIN — NYSTATIN CREAM 1 APPLICATION(S): 100000 CREAM TOPICAL at 19:09

## 2022-09-11 RX ADMIN — Medication 25 GRAM(S): at 13:10

## 2022-09-11 RX ADMIN — CHLORHEXIDINE GLUCONATE 1 APPLICATION(S): 213 SOLUTION TOPICAL at 09:21

## 2022-09-11 RX ADMIN — Medication 2000 UNIT(S): at 09:20

## 2022-09-11 RX ADMIN — HEPARIN SODIUM 5000 UNIT(S): 5000 INJECTION INTRAVENOUS; SUBCUTANEOUS at 15:02

## 2022-09-11 RX ADMIN — Medication 8 UNIT(S): at 08:51

## 2022-09-11 RX ADMIN — INSULIN GLARGINE 16 UNIT(S): 100 INJECTION, SOLUTION SUBCUTANEOUS at 21:37

## 2022-09-11 RX ADMIN — HEPARIN SODIUM 5000 UNIT(S): 5000 INJECTION INTRAVENOUS; SUBCUTANEOUS at 21:38

## 2022-09-11 RX ADMIN — PANTOPRAZOLE SODIUM 40 MILLIGRAM(S): 20 TABLET, DELAYED RELEASE ORAL at 05:56

## 2022-09-11 NOTE — PROGRESS NOTE ADULT - PROBLEM SELECTOR PLAN 8
DIET: DASH regular  DVT: SQH  DISPO: Accepted at [place] for wound care. No beds available. D/C held off given new c/f infection.  Pt states rather go back home unless cannot have vac at home

## 2022-09-11 NOTE — PROGRESS NOTE ADULT - PROBLEM SELECTOR PLAN 6
iron studies c/w AOCD likely 2/2 infections; B12 and folic acid wnl  - stool guaiac positive  - retic count elevated  - monitor CBC, Hb stable ~8

## 2022-09-11 NOTE — PROGRESS NOTE ADULT - PROBLEM SELECTOR PLAN 5
# severe LV dysfunction; HFrEF 5 - 10%; likely 2/2 stress induced cardiomyopathy  - s/p CCU course for swan and inotropic support now s/p swan removal   - c/w metoprolol-XL 25QD  - c/w Lasix 20mg QD per Cards recs  - c/w potassium supplementation on Lasix   - add lisinopril 2.5mg QD   - Outpatient cardiology follow up

## 2022-09-11 NOTE — PROGRESS NOTE ADULT - SUBJECTIVE AND OBJECTIVE BOX
Follow Up:  Polymicrobial bacteremia. Afebrile >48hrs. Leukocytosis of 12.    Patient seen nad examined ta bedside. Patient denies any new pain or discomfort.    Interval History/ROS:  Overnight: no acute events.     Allergies  No Known Drug Allergies  Pineapple (Anaphylaxis)        ANTIMICROBIALS:  DAPTOmycin IVPB 700 every 24 hours  piperacillin/tazobactam IVPB.. 3.375 every 8 hours      OTHER MEDS:  MEDICATIONS  (STANDING):  acetaminophen     Tablet .. 650 every 6 hours PRN  ALBUTerol    90 MICROgram(s) HFA Inhaler 2 every 6 hours PRN  furosemide    Tablet 20 daily  heparin   Injectable 5000 every 8 hours  influenza   Vaccine 0.5 once  insulin glargine Injectable (LANTUS) 16 at bedtime  insulin lispro (ADMELOG) corrective regimen sliding scale  Before meals and at bedtime  insulin lispro Injectable (ADMELOG) 8 three times a day before meals  lisinopril 2.5 daily  metoprolol succinate ER 25 daily  oxycodone    5 mG/acetaminophen 325 mG 1 every 4 hours PRN  pantoprazole    Tablet 40 before breakfast      Vital Signs Last 24 Hrs  T(C): 36.8 (11 Sep 2022 18:00), Max: 36.8 (11 Sep 2022 13:46)  T(F): 98.3 (11 Sep 2022 18:00), Max: 98.3 (11 Sep 2022 18:00)  HR: 91 (11 Sep 2022 18:00) (79 - 98)  BP: 103/64 (11 Sep 2022 18:00) (100/50 - 121/76)  BP(mean): --  RR: 17 (11 Sep 2022 18:00) (17 - 18)  SpO2: 99% (11 Sep 2022 18:00) (98% - 100%)    Parameters below as of 11 Sep 2022 18:00  Patient On (Oxygen Delivery Method): room air        PHYSICAL EXAM:  Constitutional: non-toxic, no distress  Cardiovascular:   RR  Respiratory:  clear BS bilaterally, no wheezes, no rales  GI:  soft, non-tender, normal bowel sounds  Musculoskeletal:  no synovitis, normal ROM  Neurologic: awake and alert, normal strength, no focal findings  Skin:  sacral wound bandaged, no associated erythema, cellulitic changes                          7.9    12.73 )-----------( 572      ( 11 Sep 2022 06:15 )             26.9       09-11    136  |  102  |  9   ----------------------------<  125<H>  3.8   |  23  |  0.53    Ca    8.1<L>      11 Sep 2022 06:15  Phos  3.5     09-11  Mg     1.50     09-11            MICROBIOLOGY:  v    Culture - Blood (collected 10 Sep 2022 07:35)  Source: .Blood Blood-Peripheral  Preliminary Report (11 Sep 2022 13:02):    No growth to date.    Culture - Blood (collected 10 Sep 2022 07:30)  Source: .Blood Blood  Preliminary Report (11 Sep 2022 13:02):    No growth to date.    Culture - Blood (collected 07 Sep 2022 09:39)  Source: .Blood Blood-Venous  Gram Stain (09 Sep 2022 09:03):    Growth in aerobic bottle: Gram Negative Rods  Final Report (11 Sep 2022 10:28):    Growth in aerobic bottle: Pseudomonas aeruginosa    ***Blood Panel PCR results on this specimen are available    approximately 3 hours after the Gram stain result.***    Gram stain, PCR, and/or culture results may not always    correspond due to differencein methodologies.    ************************************************************    This PCR assay was performed by multiplex PCR. This    Assay tests for 66 bacterial and resistance gene targets.    Please refer to the [facility] test directory    at [email or web address] for details.  Organism: Blood Culture PCR  Pseudomonas aeruginosa (11 Sep 2022 10:28)  Organism: Pseudomonas aeruginosa (11 Sep 2022 10:28)      -  Amikacin: S <=16      -  Aztreonam: I 16      -  Cefepime: S 4      -  Ceftazidime: S 4      -  Ciprofloxacin: S 0.5      -  Gentamicin: S 4      -  Imipenem: S <=1      -  Levofloxacin: S 1      -  Meropenem: S <=1      -  Piperacillin/Tazobactam: S <=8      -  Tobramycin: S <=2      Method Type: DHAVAL  Organism: Blood Culture PCR (11 Sep 2022 10:28)      -  Pseudomonas aeruginosa: Detec      Method Type: PCR    Culture - Blood (collected 07 Sep 2022 08:24)  Source: .Blood Blood-Peripheral  Preliminary Report (08 Sep 2022 12:01):    No growth to date.              Rapid RVP Result: NotDetec (09-05 @ 03:10)        RADIOLOGY:

## 2022-09-11 NOTE — PROGRESS NOTE ADULT - SUBJECTIVE AND OBJECTIVE BOX
Patient is a 52y old  Female who presents with a chief complaint of Soft tissue infection    SUBJECTIVE / OVERNIGHT EVENTS:    No CP, SOB, f/c/n/v  Reports having BMs, not sure if diarrhea  Eating well  prefers home over [facility], had [facility] 8h per day, still does not recall how she ended up at [place]  States possible would go to [facility] if wound vac not avaiable at home     MEDICATIONS  (STANDING):  ascorbic acid 500 milliGRAM(s) Oral daily  cadexomer iodine 0.9% Gel 1 Application(s) Topical daily  chlorhexidine 2% Cloths 1 Application(s) Topical daily  cholecalciferol 2000 Unit(s) Oral daily  collagenase Ointment 1 Application(s) Topical daily  DAPTOmycin IVPB 700 milliGRAM(s) IV Intermittent every 24 hours  furosemide    Tablet 20 milliGRAM(s) Oral daily  heparin   Injectable 5000 Unit(s) SubCutaneous every 8 hours  influenza   Vaccine 0.5 milliLiter(s) IntraMuscular once  insulin glargine Injectable (LANTUS) 16 Unit(s) SubCutaneous at bedtime  insulin lispro (ADMELOG) corrective regimen sliding scale   SubCutaneous Before meals and at bedtime  insulin lispro Injectable (ADMELOG) 8 Unit(s) SubCutaneous three times a day before meals  lisinopril 2.5 milliGRAM(s) Oral daily  meropenem  IVPB 1000 milliGRAM(s) IV Intermittent every 8 hours  metoprolol succinate ER 25 milliGRAM(s) Oral daily  multivitamin 1 Tablet(s) Oral daily  nystatin Powder 1 Application(s) Topical two times a day  pantoprazole    Tablet 40 milliGRAM(s) Oral before breakfast  potassium chloride    Tablet ER 20 milliEquivalent(s) Oral daily    MEDICATIONS  (PRN):  acetaminophen     Tablet .. 650 milliGRAM(s) Oral every 6 hours PRN Mild Pain (1 - 3), Moderate Pain (4 - 6)  ALBUTerol    90 MICROgram(s) HFA Inhaler 2 Puff(s) Inhalation every 6 hours PRN Shortness of Breath  HYDROmorphone   Tablet 2 milliGRAM(s) Oral every 6 hours PRN Severe Pain (7 - 10)  sodium chloride 0.9% lock flush 10 milliLiter(s) IV Push every 1 hour PRN Pre/post blood products, medications, blood draw, and to maintain line patency    T(C): 36.6 (09-11-22 @ 09:38), Max: 37.4 (09-10-22 @ 18:00)  HR: 88 (09-11-22 @ 09:38) (88 - 99)  BP: 110/56 (09-11-22 @ 09:38) (100/50 - 114/55)  RR: 18 (09-11-22 @ 09:38) (17 - 18)  SpO2: 99% (09-11-22 @ 09:38) (98% - 100%)    CAPILLARY BLOOD GLUCOSE  POCT Blood Glucose.: 137 mg/dL (11 Sep 2022 08:19)  POCT Blood Glucose.: 141 mg/dL (10 Sep 2022 21:02)  POCT Blood Glucose.: 212 mg/dL (10 Sep 2022 17:21)  POCT Blood Glucose.: 188 mg/dL (10 Sep 2022 11:59)    I&O's Summary    10 Sep 2022 07:01  -  11 Sep 2022 07:00  --------------------------------------------------------  IN: 800 mL / OUT: 600 mL / NET: 200 mL    11 Sep 2022 07:01  -  11 Sep 2022 11:33  --------------------------------------------------------  IN: 0 mL / OUT: 500 mL / NET: -500 mL      PHYSICAL EXAM:  GENERAL: NAD, well-developed  HEAD:  Atraumatic, Normocephalic  EYES: EOMI, PERRLA, conjunctiva and sclera clear  NECK: Supple, No JVD  CHEST/LUNG: Clear to auscultation bilaterally; No wheeze  HEART: Regular rate and rhythm; No murmurs, rubs, or gallops  ABDOMEN: Soft, Nontender, Nondistended; Bowel sounds present  EXTREMITIES:   warm and well perfused, No clubbing, cyanosis, or edema  PSYCH: AAOx3  NEUROLOGY: non-focal  SKIN: No rashes or lesions    LABS:                        7.9    12.73 )-----------( 572      ( 11 Sep 2022 06:15 )             26.9     09-11    136  |  102  |  9   ----------------------------<  125<H>  3.8   |  23  |  0.53    Ca    8.1<L>      11 Sep 2022 06:15  Phos  3.5     09-11  Mg     1.50     09-11      CARDIAC MARKERS ( 10 Sep 2022 07:30 )  x     / x     / 17 U/L / x     / x        Microbiology: Culture Results:   Growth in aerobic bottle: Pseudomonas aeruginosa    Culture Results:   No growth to date. (09-07 @ 08:24)  Culture Results:   No Growth Final (09-06 @ 07:39)  Culture Results:   >100,000 CFU/ml Klebsiella pneumoniae (09-05 @ 04:15)    Consultant(s) Notes Reviewed:    Care Discussed with Consultants/Other Providers: ID fellow

## 2022-09-11 NOTE — PROGRESS NOTE ADULT - ASSESSMENT
52F MS, bedbound -admitted 8/10 with septic shock, necrotizing chronic sacral wound.   OR I&D 8/11 growing Strep anginosus, VRE, Candida, woundvac in place. Sepsis recurrence 9/5 with Klebsiella and VRE bacteremia.   Klebsiella appears to be from urine (toussaint removed 8/26, replaced 9/5)   Wound grew VRE but looks clean.   Improved, blood cleared 9/6, TTE without vegetations.   However, new Pseudomonas bacteremia from 9/7 while on Zosyn with fever recurrence 9/8.   Unclear source, persistent leukocytosis    Recommendations  Continue daptomycin and meropenem (changed from pip/tazo)  Follow pending blood cultures  anticipating at least two week of antibiotics    Kash Bucio MD  Division of Infectious Diseases  Available on Teams

## 2022-09-11 NOTE — PROGRESS NOTE ADULT - ASSESSMENT
52F bedbound due to MS, DM2, asthma, hypothyroidism, and known sacral wounds presents from [place] with urosepsis and anemia with new L flank wound with concern for necrotizing soft tissue infection s/p debridement of wound, hospital course c/b severe global LV dysfunction of unknown etiology- likely stress induced cardiomyopathy, s/p CCU course for [place] and inotropic support, now s/p swan removal and transferred back to medicine, completed unasyn 8/25, course further complicated by new pseudomonal bacteremia, restarted on IV abx.

## 2022-09-11 NOTE — PROGRESS NOTE ADULT - PROBLEM SELECTOR PLAN 1
Pt meets sepsis criteria  w/ fevers and leukocytosis. Likely 2/2 UTI vs soft tissue infection. Pt w/ new right upper thigh induration. On 9/5 VRE and Kleb in blood and then 9/7 with pseudomonas  - CTAP w/ cystitis   - s/p zosyn 09/05-09/8 broadened to Meropenem 1g q8h given breakthrough pseudomonas infection on Zosyn per ID however sensitivities back d/w ID, will go back to zosyn + dapto   - c/w daptomycin 09/06; monitor CPK weekly on dapto, 9/10 17  - plan to treat for 2 weeks from negative cultures  - repeat cultures on 9/10 in lab  - appreciate ID recs

## 2022-09-11 NOTE — PROGRESS NOTE ADULT - PROBLEM SELECTOR PLAN 3
Pt w. incontinence and intermittent urinary retention. Suspect  neurogenic bladder from MS.  - c/w toussaint  - unlikley to pass TOV given MS/neurogenic bladder

## 2022-09-12 LAB
ANION GAP SERPL CALC-SCNC: 10 MMOL/L — SIGNIFICANT CHANGE UP (ref 7–14)
BASOPHILS # BLD AUTO: 0.13 K/UL — SIGNIFICANT CHANGE UP (ref 0–0.2)
BASOPHILS NFR BLD AUTO: 1 % — SIGNIFICANT CHANGE UP (ref 0–2)
BUN SERPL-MCNC: 8 MG/DL — SIGNIFICANT CHANGE UP (ref 7–23)
CALCIUM SERPL-MCNC: 8.1 MG/DL — LOW (ref 8.4–10.5)
CHLORIDE SERPL-SCNC: 104 MMOL/L — SIGNIFICANT CHANGE UP (ref 98–107)
CO2 SERPL-SCNC: 25 MMOL/L — SIGNIFICANT CHANGE UP (ref 22–31)
CREAT SERPL-MCNC: 0.38 MG/DL — LOW (ref 0.5–1.3)
CULTURE RESULTS: SIGNIFICANT CHANGE UP
EGFR: 122 ML/MIN/1.73M2 — SIGNIFICANT CHANGE UP
EOSINOPHIL # BLD AUTO: 0.43 K/UL — SIGNIFICANT CHANGE UP (ref 0–0.5)
EOSINOPHIL NFR BLD AUTO: 3.2 % — SIGNIFICANT CHANGE UP (ref 0–6)
GLUCOSE BLDC GLUCOMTR-MCNC: 169 MG/DL — HIGH (ref 70–99)
GLUCOSE BLDC GLUCOMTR-MCNC: 269 MG/DL — HIGH (ref 70–99)
GLUCOSE BLDC GLUCOMTR-MCNC: 299 MG/DL — HIGH (ref 70–99)
GLUCOSE BLDC GLUCOMTR-MCNC: 78 MG/DL — SIGNIFICANT CHANGE UP (ref 70–99)
GLUCOSE SERPL-MCNC: 77 MG/DL — SIGNIFICANT CHANGE UP (ref 70–99)
HCT VFR BLD CALC: 27.8 % — LOW (ref 34.5–45)
HGB BLD-MCNC: 8.3 G/DL — LOW (ref 11.5–15.5)
IANC: 7.18 K/UL — SIGNIFICANT CHANGE UP (ref 1.8–7.4)
IMM GRANULOCYTES NFR BLD AUTO: 6.2 % — HIGH (ref 0–1.5)
LYMPHOCYTES # BLD AUTO: 26.8 % — SIGNIFICANT CHANGE UP (ref 13–44)
LYMPHOCYTES # BLD AUTO: 3.56 K/UL — HIGH (ref 1–3.3)
MAGNESIUM SERPL-MCNC: 1.7 MG/DL — SIGNIFICANT CHANGE UP (ref 1.6–2.6)
MCHC RBC-ENTMCNC: 27.3 PG — SIGNIFICANT CHANGE UP (ref 27–34)
MCHC RBC-ENTMCNC: 29.9 GM/DL — LOW (ref 32–36)
MCV RBC AUTO: 91.4 FL — SIGNIFICANT CHANGE UP (ref 80–100)
MONOCYTES # BLD AUTO: 1.15 K/UL — HIGH (ref 0–0.9)
MONOCYTES NFR BLD AUTO: 8.7 % — SIGNIFICANT CHANGE UP (ref 2–14)
NEUTROPHILS # BLD AUTO: 7.18 K/UL — SIGNIFICANT CHANGE UP (ref 1.8–7.4)
NEUTROPHILS NFR BLD AUTO: 54.1 % — SIGNIFICANT CHANGE UP (ref 43–77)
NRBC # BLD: 0 /100 WBCS — SIGNIFICANT CHANGE UP (ref 0–0)
NRBC # FLD: 0 K/UL — SIGNIFICANT CHANGE UP (ref 0–0)
PHOSPHATE SERPL-MCNC: 3.6 MG/DL — SIGNIFICANT CHANGE UP (ref 2.5–4.5)
PLATELET # BLD AUTO: 619 K/UL — HIGH (ref 150–400)
POTASSIUM SERPL-MCNC: 3.7 MMOL/L — SIGNIFICANT CHANGE UP (ref 3.5–5.3)
POTASSIUM SERPL-SCNC: 3.7 MMOL/L — SIGNIFICANT CHANGE UP (ref 3.5–5.3)
RBC # BLD: 3.04 M/UL — LOW (ref 3.8–5.2)
RBC # FLD: 18.7 % — HIGH (ref 10.3–14.5)
SODIUM SERPL-SCNC: 139 MMOL/L — SIGNIFICANT CHANGE UP (ref 135–145)
SPECIMEN SOURCE: SIGNIFICANT CHANGE UP
WBC # BLD: 13.27 K/UL — HIGH (ref 3.8–10.5)
WBC # FLD AUTO: 13.27 K/UL — HIGH (ref 3.8–10.5)

## 2022-09-12 PROCEDURE — 99233 SBSQ HOSP IP/OBS HIGH 50: CPT

## 2022-09-12 PROCEDURE — 99232 SBSQ HOSP IP/OBS MODERATE 35: CPT

## 2022-09-12 RX ORDER — PIPERACILLIN AND TAZOBACTAM 4; .5 G/20ML; G/20ML
3.38 INJECTION, POWDER, LYOPHILIZED, FOR SOLUTION INTRAVENOUS EVERY 8 HOURS
Refills: 0 | Status: DISCONTINUED | OUTPATIENT
Start: 2022-09-12 | End: 2022-09-18

## 2022-09-12 RX ADMIN — HEPARIN SODIUM 5000 UNIT(S): 5000 INJECTION INTRAVENOUS; SUBCUTANEOUS at 13:33

## 2022-09-12 RX ADMIN — Medication 6: at 23:09

## 2022-09-12 RX ADMIN — LISINOPRIL 2.5 MILLIGRAM(S): 2.5 TABLET ORAL at 06:05

## 2022-09-12 RX ADMIN — Medication 1 APPLICATION(S): at 11:05

## 2022-09-12 RX ADMIN — PIPERACILLIN AND TAZOBACTAM 25 GRAM(S): 4; .5 INJECTION, POWDER, LYOPHILIZED, FOR SOLUTION INTRAVENOUS at 23:08

## 2022-09-12 RX ADMIN — Medication 1 TABLET(S): at 11:06

## 2022-09-12 RX ADMIN — INSULIN GLARGINE 16 UNIT(S): 100 INJECTION, SOLUTION SUBCUTANEOUS at 23:10

## 2022-09-12 RX ADMIN — PANTOPRAZOLE SODIUM 40 MILLIGRAM(S): 20 TABLET, DELAYED RELEASE ORAL at 06:05

## 2022-09-12 RX ADMIN — HEPARIN SODIUM 5000 UNIT(S): 5000 INJECTION INTRAVENOUS; SUBCUTANEOUS at 06:04

## 2022-09-12 RX ADMIN — MEROPENEM 100 MILLIGRAM(S): 1 INJECTION INTRAVENOUS at 13:32

## 2022-09-12 RX ADMIN — CHLORHEXIDINE GLUCONATE 1 APPLICATION(S): 213 SOLUTION TOPICAL at 11:05

## 2022-09-12 RX ADMIN — DAPTOMYCIN 128 MILLIGRAM(S): 500 INJECTION, POWDER, LYOPHILIZED, FOR SOLUTION INTRAVENOUS at 11:06

## 2022-09-12 RX ADMIN — MEROPENEM 100 MILLIGRAM(S): 1 INJECTION INTRAVENOUS at 06:05

## 2022-09-12 RX ADMIN — Medication 2000 UNIT(S): at 11:07

## 2022-09-12 RX ADMIN — Medication 25 MILLIGRAM(S): at 06:05

## 2022-09-12 RX ADMIN — Medication 20 MILLIEQUIVALENT(S): at 11:06

## 2022-09-12 RX ADMIN — Medication 6: at 17:28

## 2022-09-12 RX ADMIN — Medication 2: at 12:23

## 2022-09-12 RX ADMIN — NYSTATIN CREAM 1 APPLICATION(S): 100000 CREAM TOPICAL at 06:04

## 2022-09-12 RX ADMIN — NYSTATIN CREAM 1 APPLICATION(S): 100000 CREAM TOPICAL at 17:28

## 2022-09-12 RX ADMIN — Medication 8 UNIT(S): at 17:29

## 2022-09-12 RX ADMIN — Medication 20 MILLIGRAM(S): at 06:05

## 2022-09-12 RX ADMIN — HEPARIN SODIUM 5000 UNIT(S): 5000 INJECTION INTRAVENOUS; SUBCUTANEOUS at 23:11

## 2022-09-12 NOTE — PROGRESS NOTE ADULT - ASSESSMENT
52F bedbound due to MS, DM2, asthma, hypothyroidism, and known sacral wounds presents from [place] with urosepsis and anemia with new L flank wound with concern for necrotizing soft tissue infection s/p debridement of wound, hospital course c/b severe global LV dysfunction of unknown etiology- likely stress induced cardiomyopathy, s/p CCU course for [place] and inotropic support, now s/p swan removal and transferred back to medicine, completed unasyn 8/25, course further complicated by new pseudomonal bacteremia, restarted on IV abx.

## 2022-09-12 NOTE — PROGRESS NOTE ADULT - PROBLEM SELECTOR PLAN 2
s/p L flank wound debridement  - dressings intact and wound vac in place-to be continued on discharge  - no plan for further debridement, wound care to change vac M/W/F prn per surgery recs  -  if w/ persistent fevers/leukocytosis, will benefit from re-evaluation for further debridement   - current abx as above   - pain control prn

## 2022-09-12 NOTE — PROGRESS NOTE ADULT - SUBJECTIVE AND OBJECTIVE BOX
[facility] Division of Hospital Medicine  Norma Acuna MD  Available via MS Teams  Pager: [phone]    SUBJECTIVE / OVERNIGHT EVENTS:    No new complaints this morning.   Denies to have any pain (LE, chest, abdominal)   Eating and drinking well. No N/V   Has reg BMS     MEDICATIONS  (STANDING):  ascorbic acid 500 milliGRAM(s) Oral daily  cadexomer iodine 0.9% Gel 1 Application(s) Topical daily  chlorhexidine 2% Cloths 1 Application(s) Topical daily  cholecalciferol 2000 Unit(s) Oral daily  collagenase Ointment 1 Application(s) Topical daily  DAPTOmycin IVPB 700 milliGRAM(s) IV Intermittent every 24 hours  furosemide    Tablet 20 milliGRAM(s) Oral daily  heparin   Injectable 5000 Unit(s) SubCutaneous every 8 hours  influenza   Vaccine 0.5 milliLiter(s) IntraMuscular once  insulin glargine Injectable (LANTUS) 16 Unit(s) SubCutaneous at bedtime  insulin lispro (ADMELOG) corrective regimen sliding scale   SubCutaneous Before meals and at bedtime  insulin lispro Injectable (ADMELOG) 8 Unit(s) SubCutaneous three times a day before meals  lisinopril 2.5 milliGRAM(s) Oral daily  meropenem  IVPB 1000 milliGRAM(s) IV Intermittent every 8 hours  metoprolol succinate ER 25 milliGRAM(s) Oral daily  multivitamin 1 Tablet(s) Oral daily  nystatin Powder 1 Application(s) Topical two times a day  pantoprazole    Tablet 40 milliGRAM(s) Oral before breakfast  potassium chloride    Tablet ER 20 milliEquivalent(s) Oral daily    MEDICATIONS  (PRN):  acetaminophen     Tablet .. 650 milliGRAM(s) Oral every 6 hours PRN Mild Pain (1 - 3), Moderate Pain (4 - 6)  ALBUTerol    90 MICROgram(s) HFA Inhaler 2 Puff(s) Inhalation every 6 hours PRN Shortness of Breath  oxycodone    5 mG/acetaminophen 325 mG 1 Tablet(s) Oral every 4 hours PRN Severe Pain (7 - 10)  sodium chloride 0.9% lock flush 10 milliLiter(s) IV Push every 1 hour PRN Pre/post blood products, medications, blood draw, and to maintain line patency      I&O's Summary    11 Sep 2022 07:01  -  12 Sep 2022 07:00  --------------------------------------------------------  IN: 480 mL / OUT: 1410 mL / NET: -930 mL    12 Sep 2022 07:01  -  12 Sep 2022 12:42  --------------------------------------------------------  IN: 0 mL / OUT: 250 mL / NET: -250 mL        PHYSICAL EXAM:  Vital Signs Last 24 Hrs  T(C): 36.7 (12 Sep 2022 09:37), Max: 36.8 (11 Sep 2022 13:46)  T(F): 98 (12 Sep 2022 09:37), Max: 98.3 (11 Sep 2022 18:00)  HR: 102 (12 Sep 2022 09:37) (79 - 102)  BP: 93/63 (12 Sep 2022 09:37) (93/63 - 130/92)  BP(mean): --  RR: 18 (12 Sep 2022 09:37) (17 - 21)  SpO2: 100% (12 Sep 2022 09:37) (99% - 100%)    Parameters below as of 12 Sep 2022 09:37  Patient On (Oxygen Delivery Method): room air      CONSTITUTIONAL: NAD, well-developed  EYES:  conjunctiva and sclera clear  NECK: Supple  RESPIRATORY: Normal respiratory effort; lungs are clear to auscultation bilaterally  CARDIOVASCULAR: Regular rate and rhythm, normal S1 and S2, no murmur/rub/gallop  ABDOMEN: Nontender to palpation, normoactive bowel sounds, no rebound/guarding; No hepatosplenomegaly  MUSCULOSKELETAL:  Right leg bandaged.  Gauze looks c/d/i    PSYCH: A+O to person, place, and time; affect appropriate  NEUROLOGY: no gross motor or sensory deficits   SKIN: No rashes; no palpable lesions    LABS:                        8.3    13.27 )-----------( 619      ( 12 Sep 2022 06:19 )             27.8     09-12    139  |  104  |  8   ----------------------------<  77  3.7   |  25  |  0.38<L>    Ca    8.1<L>      12 Sep 2022 06:19  Phos  3.6     09-12  Mg     1.70     09-12                Culture - Blood (collected 10 Sep 2022 07:35)  Source: .Blood Blood-Peripheral  Preliminary Report (11 Sep 2022 13:02):    No growth to date.    Culture - Blood (collected 10 Sep 2022 07:30)  Source: .Blood Blood  Preliminary Report (11 Sep 2022 13:02):    No growth to date.      COVID-19 PCR: NotDetec (08 Sep 2022 11:23)  SARS-CoV-2: NotDetec (05 Sep 2022 03:10)  COVID-19 PCR: NotDetec (01 Sep 2022 12:16)  COVID-19 PCR: NotDetec (23 Aug 2022 09:54)  COVID-19 PCR: NotDetec (17 Aug 2022 07:27)  COVID-19 PCR: Detected (17 Apr 2022 07:36)  SARS-CoV-2: Detected (12 Apr 2022 21:26)  COVID-19 PCR: NotDetec (10 Apr 2022 14:18)  COVID-19 PCR: NotDetec (06 Apr 2022 20:07)      RADIOLOGY & ADDITIONAL TESTS:  New Results Reviewed Today:   New Imaging Personally Reviewed Today:  New Electrocardiogram Personally Reviewed Today:  Prior or Outpatient Records Reviewed Today:    COMMUNICATION:  Care Discussed with Consultants/Other Providers and Details of Discussion:  Discussions with Patient/Family:  PCP Communication:

## 2022-09-12 NOTE — PROGRESS NOTE ADULT - SUBJECTIVE AND OBJECTIVE BOX
Follow Up: bacteremia    Interval History/ROS: Afebrile, no pain, feels alright. Needs to go to the [facility] for surgery on her back.     Allergies  No Known Drug Allergies  [place] (Anaphylaxis)        ANTIMICROBIALS:  DAPTOmycin IVPB 700 every 24 hours  meropenem  IVPB 1000 every 8 hours      OTHER MEDS:  acetaminophen     Tablet .. 650 milliGRAM(s) Oral every 6 hours PRN  ALBUTerol    90 MICROgram(s) HFA Inhaler 2 Puff(s) Inhalation every 6 hours PRN  ascorbic acid 500 milliGRAM(s) Oral daily  cadexomer iodine 0.9% Gel 1 Application(s) Topical daily  chlorhexidine 2% Cloths 1 Application(s) Topical daily  cholecalciferol 2000 Unit(s) Oral daily  collagenase Ointment 1 Application(s) Topical daily  furosemide    Tablet 20 milliGRAM(s) Oral daily  heparin   Injectable 5000 Unit(s) SubCutaneous every 8 hours  influenza   Vaccine 0.5 milliLiter(s) IntraMuscular once  insulin glargine Injectable (LANTUS) 16 Unit(s) SubCutaneous at bedtime  insulin lispro (ADMELOG) corrective regimen sliding scale   SubCutaneous Before meals and at bedtime  insulin lispro Injectable (ADMELOG) 8 Unit(s) SubCutaneous three times a day before meals  lisinopril 2.5 milliGRAM(s) Oral daily  metoprolol succinate ER 25 milliGRAM(s) Oral daily  multivitamin 1 Tablet(s) Oral daily  nystatin Powder 1 Application(s) Topical two times a day  oxycodone    5 mG/acetaminophen 325 mG 1 Tablet(s) Oral every 4 hours PRN  pantoprazole    Tablet 40 milliGRAM(s) Oral before breakfast  potassium chloride    Tablet ER 20 milliEquivalent(s) Oral daily  sodium chloride 0.9% lock flush 10 milliLiter(s) IV Push every 1 hour PRN      Vital Signs Last 24 Hrs  T(C): 37.3 (12 Sep 2022 17:40), Max: 37.3 (12 Sep 2022 17:40)  T(F): 99.1 (12 Sep 2022 17:40), Max: 99.1 (12 Sep 2022 17:40)  HR: 118 (12 Sep 2022 17:40) (88 - 118)  BP: 92/45 (12 Sep 2022 17:40) (92/45 - 130/92)  BP(mean): --  RR: 16 (12 Sep 2022 17:40) (16 - 21)  SpO2: 98% (12 Sep 2022 17:40) (98% - 100%)    Parameters below as of 12 Sep 2022 17:40  Patient On (Oxygen Delivery Method): room air        Physical Exam:  General: non toxic, obese  Cardio: regular rate   Respiratory: nonlabored   abd: nondistended, soft, nontender   Skin: no rash. sacral wound vac                           8.3    13.27 )-----------( 619      ( 12 Sep 2022 06:19 )             27.8       09-12    139  |  104  |  8   ----------------------------<  77  3.7   |  25  |  0.38<L>    Ca    8.1<L>      12 Sep 2022 06:19  Phos  3.6     09-12  Mg     1.70     09-12            MICROBIOLOGY:  Culture - Blood (collected 09-10-22 @ 07:35)  Source: .Blood Blood-Peripheral  Preliminary Report (09-11-22 @ 13:02):    No growth to date.    Culture - Blood (collected 09-10-22 @ 07:30)  Source: .Blood Blood  Preliminary Report (09-11-22 @ 13:02):    No growth to date.    Culture - Blood (collected 09-07-22 @ 09:39)  Source: .Blood Blood-Venous  Gram Stain (09-09-22 @ 09:03):    Growth in aerobic bottle: Gram Negative Rods  Final Report (09-11-22 @ 10:28):    Growth in aerobic bottle: Pseudomonas aeruginosa    ***Blood Panel PCR results on this specimen are available    approximately 3 hours after the Gram stain result.***    Gram stain, PCR, and/or culture results may not always    correspond due to differencein methodologies.    ************************************************************    This PCR assay was performed by multiplex PCR. This    Assay tests for 66 bacterial and resistance gene targets.    Please refer to the [facility] [employer] test directory    at [email or web address] for details.  Organism: Blood Culture PCR  Pseudomonas aeruginosa (09-11-22 @ 10:28)  Organism: Pseudomonas aeruginosa (09-11-22 @ 10:28)      -  Amikacin: S <=16      -  Aztreonam: I 16      -  Cefepime: S 4      -  Ceftazidime: S 4      -  Ciprofloxacin: S 0.5      -  Gentamicin: S 4      -  Imipenem: S <=1      -  Levofloxacin: S 1      -  Meropenem: S <=1      -  Piperacillin/Tazobactam: S <=8      -  Tobramycin: S <=2      Method Type: DHAVAL  Organism: Blood Culture PCR (09-11-22 @ 10:28)      -  Pseudomonas aeruginosa: Detec      Method Type: PCR    Culture - Blood (collected 09-07-22 @ 08:24)  Source: .Blood Blood-Peripheral  Final Report (09-12-22 @ 12:00):    No Growth Final    Culture - Blood (collected 09-06-22 @ 07:39)  Source: .Blood Blood  Final Report (09-11-22 @ 11:01):    No Growth Final    RADIOLOGY:  Transthoracic Echocardiogram (09.08.22 @ 14:34)   1. Normal mitral valve. Minimal mitral regurgitation.  2. Normal left ventricular internaldimensions and wall  thicknesses.  3. Endocardium not well visualized; unable to evaluate left  ventricular systolic function.  4. The right ventricle is not well visualized.  Unable to exclude endocarditis.  Consider MELANY for further  evaluation, if clinically indicated.  Consider limited  repeat imaging with the intravenous administration of  ultrasound enhancing agent for improved evaluation of LV  systolic function, if clinically indicated.    Images below reviewed personally  CT Abdomen and Pelvis No Cont (09.09.22 @ 10:34)   Question of cellulitis involving the bilateral upper thighs. No fluid   collection.  Interval healing of the sacral decubitus ulcer.  Findings are suggestive of cystitis.

## 2022-09-12 NOTE — PROGRESS NOTE ADULT - PROBLEM SELECTOR PLAN 8
DIET: DASH regular  DVT: SQH  DISPO: Accepted at [facility] for wound care. No beds available. D/C held off given new c/f infection.  Pt states rather go back home unless cannot have vac at home

## 2022-09-12 NOTE — PROGRESS NOTE ADULT - PROBLEM SELECTOR PLAN 3
Pt w. incontinence and intermittent urinary retention. Suspect  neurogenic bladder from MS.  - c/w toussaint  - Continue toussaint for now, can attempt TOV when medically stable

## 2022-09-12 NOTE — PROGRESS NOTE ADULT - ASSESSMENT
52F MS, bedbound.   Here 8/10 with septic shock, necrotizing chronic sacral wound.   OR I&D 8/11 grew Strep anginosus, VRE, Candida, woundvac in place.   Recovered then bacteremic with Klebsiella and VRE 9/5, Pseudomonas 9/7.   Probably from urine (toussaint removed 8/26, replaced 9/5).   Wound has looked clean.   Repeat CT abdomen reassuring, just cystitis.   Improved, cultures 9/10 negative to date.   High risk for recurrent infections due to neurological issue.     Suggest   -narrow Meropenem back to Zosyn 3.375GM IV q8h (4.5GM q8h over 30 min can be done outpatient for ease)   -Daptomycin 700mg q24h for VRE with weekly CPK   -if remains stable and blood cultures negative, complete the above on 9/23 for two weeks from negative cultures   -if going home, weekly CBC, BUN, creatinine, CPK to me at [phone]   -if going to rehab, monitoring per facility provider   -follow up with me within 4 weeks of discharge     Discussed with medicine     Epi Harvey MD   Infectious Disease   Available on TEAMS. After 5PM and on weekends please page fellow on call or call [phone]

## 2022-09-12 NOTE — PROGRESS NOTE ADULT - PROBLEM SELECTOR PLAN 1
Pt meets sepsis criteria  w/ fevers and leukocytosis. Likely 2/2 UTI vs soft tissue infection on right upper thigh  - culture data sig for 9/5 with VRE and Kleb in blood and then 9/7 with pseudomonas  - CTAP w/ cystitis   - ID recs appreciated. Previously treated with s/p zosyn 09/05-09/8 then Meropenem 1g q8h given breakthrough pseudomonas infection. Currently on  zosyn + dapto per ID recs   - c/w daptomycin 09/06; monitor CPK weekly on dapto, last on 9/10  - repeat cultures from 9/10, NGTD   - pending final ID recs Pt meets sepsis criteria  w/ fevers and leukocytosis. Likely 2/2 UTI vs soft tissue infection on right upper thigh  - culture data sig for 9/5 with VRE and Kleb in blood and then 9/7 with pseudomonas  - CTAP w/ cystitis   - ID recs appreciated. Previously treated with s/p zosyn 09/05-09/8 then Meropenem 1g q8h given breakthrough pseudomonas infection.  - Currently on wm + dapto per ID recs   - c/w daptomycin 09/06; monitor CPK weekly on dapto, last on 9/10  - repeat cultures from 9/10, NGTD   - pending final ID recs

## 2022-09-13 LAB
GLUCOSE BLDC GLUCOMTR-MCNC: 103 MG/DL — HIGH (ref 70–99)
GLUCOSE BLDC GLUCOMTR-MCNC: 153 MG/DL — HIGH (ref 70–99)
GLUCOSE BLDC GLUCOMTR-MCNC: 167 MG/DL — HIGH (ref 70–99)
GLUCOSE BLDC GLUCOMTR-MCNC: 265 MG/DL — HIGH (ref 70–99)
SARS-COV-2 RNA SPEC QL NAA+PROBE: SIGNIFICANT CHANGE UP

## 2022-09-13 PROCEDURE — 99233 SBSQ HOSP IP/OBS HIGH 50: CPT

## 2022-09-13 RX ADMIN — Medication 8 UNIT(S): at 12:20

## 2022-09-13 RX ADMIN — Medication 20 MILLIGRAM(S): at 05:59

## 2022-09-13 RX ADMIN — Medication 1 APPLICATION(S): at 11:11

## 2022-09-13 RX ADMIN — NYSTATIN CREAM 1 APPLICATION(S): 100000 CREAM TOPICAL at 06:14

## 2022-09-13 RX ADMIN — LISINOPRIL 2.5 MILLIGRAM(S): 2.5 TABLET ORAL at 06:00

## 2022-09-13 RX ADMIN — CHLORHEXIDINE GLUCONATE 1 APPLICATION(S): 213 SOLUTION TOPICAL at 11:12

## 2022-09-13 RX ADMIN — Medication 8 UNIT(S): at 17:50

## 2022-09-13 RX ADMIN — Medication 2: at 22:36

## 2022-09-13 RX ADMIN — Medication 2: at 12:20

## 2022-09-13 RX ADMIN — Medication 6: at 17:49

## 2022-09-13 RX ADMIN — PIPERACILLIN AND TAZOBACTAM 25 GRAM(S): 4; .5 INJECTION, POWDER, LYOPHILIZED, FOR SOLUTION INTRAVENOUS at 21:54

## 2022-09-13 RX ADMIN — PIPERACILLIN AND TAZOBACTAM 25 GRAM(S): 4; .5 INJECTION, POWDER, LYOPHILIZED, FOR SOLUTION INTRAVENOUS at 13:20

## 2022-09-13 RX ADMIN — NYSTATIN CREAM 1 APPLICATION(S): 100000 CREAM TOPICAL at 17:50

## 2022-09-13 RX ADMIN — PIPERACILLIN AND TAZOBACTAM 25 GRAM(S): 4; .5 INJECTION, POWDER, LYOPHILIZED, FOR SOLUTION INTRAVENOUS at 05:58

## 2022-09-13 RX ADMIN — HEPARIN SODIUM 5000 UNIT(S): 5000 INJECTION INTRAVENOUS; SUBCUTANEOUS at 21:54

## 2022-09-13 RX ADMIN — HEPARIN SODIUM 5000 UNIT(S): 5000 INJECTION INTRAVENOUS; SUBCUTANEOUS at 13:21

## 2022-09-13 RX ADMIN — HEPARIN SODIUM 5000 UNIT(S): 5000 INJECTION INTRAVENOUS; SUBCUTANEOUS at 06:00

## 2022-09-13 RX ADMIN — Medication 20 MILLIEQUIVALENT(S): at 11:11

## 2022-09-13 RX ADMIN — DAPTOMYCIN 128 MILLIGRAM(S): 500 INJECTION, POWDER, LYOPHILIZED, FOR SOLUTION INTRAVENOUS at 11:10

## 2022-09-13 RX ADMIN — INSULIN GLARGINE 16 UNIT(S): 100 INJECTION, SOLUTION SUBCUTANEOUS at 23:16

## 2022-09-13 RX ADMIN — Medication 25 MILLIGRAM(S): at 06:00

## 2022-09-13 NOTE — PROGRESS NOTE ADULT - PROBLEM SELECTOR PLAN 8
DIET: DASH regular  DVT: SQH  DISPO: Accepted at [facility] for wound care. No beds available. D/C held off given new c/f infection.  Pt states rather go back home unless cannot have vac at home

## 2022-09-13 NOTE — ADVANCED PRACTICE NURSE CONSULT - ASSESSMENT
Patient is aware and alert. Midline insertion along with risks, benefits, possible complications and infection prevention explained to patient who verbalized understanding. All questions addressed and patient gave verbal consent to place midline. Right arm cleansed with CHG. Using sterile technique under ultra sound guidance, placed PowerGlide Pro Midline 18G /10cm into Right brachial vein. Brisk blood return and flushed with 20Mls of normal saline. Minimal blood loss and patient tolerated procedure well. CHG dressing placed. All sharps accounted for. Report given to district RN and ordering provider. LOT#: [id number] , REF#: [id number]
R anterior upper thigh: area of induration measuring [id number] with three small moist open areas measuring 1oun4jmg5.1cm with hyperpigmentation to the periwound. No erythema, no edema, no temperature changes noted. Goals of care: monitor for tissue type changes, prevent from moisture, friction, shearing.

## 2022-09-13 NOTE — PROGRESS NOTE ADULT - ASSESSMENT
52F bedbound due to MS, DM2, asthma, hypothyroidism, and known sacral wounds presents from [place] with urosepsis and anemia with new L flank wound with concern for necrotizing soft tissue infection s/p debridement of wound, hospital course c/b severe global LV dysfunction of unknown etiology- likely stress induced cardiomyopathy, s/p CCU course for [place] and inotropic support, now s/p swan removal and transferred back to medicine, completed unasyn 8/25, course further complicated by new pseudomonal bacteremia, restarted on IV abx.

## 2022-09-13 NOTE — PROGRESS NOTE ADULT - SUBJECTIVE AND OBJECTIVE BOX
Patient is a 52y old  Female who presents with a chief complaint of Soft tissue infection (19 Aug 2022 11:45)      HPI:  HPI:  52F immobile 2/2 MS, poorly controlled T2DM, asthma, hypothyroidism, known chronic wounds sacrum (stage 4), vulvar/Rt thigh, and right calf. Recent 2022 hospitalization for urosepsis. On cefepime/flagyl for chronic osteo 2/2 unstageable sacral decubitus ulcer, dakins BID packing. Presented from [facility] with concerns for UTI and anemia (6.8 from baseline 8.0) and new malodorous wound on L hip to L flank area. Surgery consulted for potential necrotizing soft tissue infection.            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 1000 milliGRAM(s) IV Intermittent once  cefepime   IVPB 2000 milliGRAM(s) IV Intermittent once  clindamycin IVPB 900 milliGRAM(s) IV Intermittent once  sodium chloride 0.9% Bolus 1000 milliLiter(s) IV Bolus once  vancomycin  IVPB. 1000 milliGRAM(s) IV Intermittent once    MEDICATIONS  (PRN):      Allergies    No Known Drug Allergies  Pineapple (Anaphylaxis)    Intolerances        SOCIAL HISTORY:    FAMILY HISTORY:          Physical Exam:  General: NAD, resting comfortably, warm to touch   HEENT: NC/AT, EOMI  Pulmonary: normal resp effort, patent airway  Abdominal: obese  Extremities: poor muscle tone  Skin: sacral wound stage 4, r thigh wound tracking to labia, L thigh wound, L hip/flank malodorous dark green wound with surrounding erythema and underlying crepitus tracking up the L flank  Neuro: A/O x 3-4, CNs II-XII grossly intact    Vital Signs Last 24 Hrs  T(C): 39.8 (10 Aug 2022 21:06), Max: 39.8 (10 Aug 2022 21:06)  T(F): 103.7 (10 Aug 2022 21:06), Max: 103.7 (10 Aug 2022 21:06)  HR: 127 (10 Aug 2022 20:10) (124 - 127)  BP: 139/72 (10 Aug 2022 20:10) (123/56 - 139/72)  BP(mean): --  RR: 20 (10 Aug 2022 20:10) (18 - 20)  SpO2: 100% (10 Aug 2022 20:10) (100% - 100%)    Parameters below as of 10 Aug 2022 20:10  Patient On (Oxygen Delivery Method): room air        I&O's Summary          LABS:                        5.9    31.78 )-----------( 920      ( 10 Aug 2022 22:30 )             22.5         Urinalysis Basic - ( 10 Aug 2022 22:30 )    Color: Yellow / Appearance: Turbid / S.017 / pH: x  Gluc: x / Ketone: Negative  / Bili: Negative / Urobili: <2 mg/dL   Blood: x / Protein: 100 mg/dL / Nitrite: Negative   Leuk Esterase: Large / RBC: 25-50 /HPF / WBC >50 /HPF   Sq Epi: x / Non Sq Epi: x / Bacteria: Many                CAPILLARY BLOOD GLUCOSE            Cultures:      RADIOLOGY & ADDITIONAL STUDIES:               (10 Aug 2022 22:48)      PAST MEDICAL & SURGICAL HISTORY:  DM (diabetes mellitus)      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      MS (multiple sclerosis)      No significant past surgical history          MEDICATIONS  (STANDING):  ampicillin/sulbactam  IVPB      ampicillin/sulbactam  IVPB 3 Gram(s) IV Intermittent every 6 hours  ascorbic acid 500 milliGRAM(s) Oral daily  chlorhexidine 2% Cloths 1 Application(s) Topical daily  cholecalciferol 2000 Unit(s) Oral daily  collagenase Ointment 1 Application(s) Topical daily  heparin   Injectable 5000 Unit(s) SubCutaneous every 8 hours  insulin lispro (ADMELOG) corrective regimen sliding scale   SubCutaneous every 6 hours  insulin lispro Injectable (ADMELOG) 5 Unit(s) SubCutaneous every 6 hours  lactated ringers. 1000 milliLiter(s) (10 mL/Hr) IV Continuous <Continuous>  levothyroxine Injectable 56 MICROGram(s) IV Push <User Schedule>  multivitamin/minerals/iron Oral Solution (CENTRUM) 15 milliLiter(s) Oral daily  nystatin Powder 1 Application(s) Topical two times a day  pantoprazole  Injectable 40 milliGRAM(s) IV Push every 12 hours    MEDICATIONS  (PRN):  acetaminophen    Suspension .. 650 milliGRAM(s) Oral every 6 hours PRN Temp greater or equal to 38.5C (101.3F)  ALBUTerol    90 MICROgram(s) HFA Inhaler 2 Puff(s) Inhalation every 6 hours PRN Shortness of Breath  HYDROmorphone  Injectable 1 milliGRAM(s) IV Push every 3 hours PRN Severe Pain (7 - 10)  HYDROmorphone  Injectable 0.5 milliGRAM(s) IV Push every 3 hours PRN Moderate Pain (4 - 6)  sodium chloride 0.9% lock flush 10 milliLiter(s) IV Push every 1 hour PRN Pre/post blood products, medications, blood draw, and to maintain line patency      Allergies    No Known Drug Allergies  Pineapple (Anaphylaxis)    Intolerances        VITALS:    Vital Signs Last 24 Hrs  T(C): 36.6 (19 Aug 2022 20:00), Max: 37.4 (19 Aug 2022 04:00)  T(F): 97.8 (19 Aug 2022 20:00), Max: 99.3 (19 Aug 2022 04:00)  HR: 103 (19 Aug 2022 22:00) (99 - 112)  BP: --  BP(mean): --  RR: 13 (19 Aug 2022 22:00) (11 - 27)  SpO2: 98% (19 Aug 2022 22:00) (96% - 100%)    Parameters below as of 19 Aug 2022 22:00  Patient On (Oxygen Delivery Method): nasal cannula  O2 Flow (L/min): 2      LABS:                          8.8    15.86 )-----------( 506      ( 19 Aug 2022 17:00 )             28.2       08-19    141  |  108<H>  |  8   ----------------------------<  84  3.9   |  21<L>  |  0.31<L>    Ca    7.3<L>      19 Aug 2022 03:00  Phos  3.3     -19  Mg     1.60         TPro  4.6<L>  /  Alb  1.6<L>  /  TBili  0.2  /  DBili  x   /  AST  13  /  ALT  7   /  AlkPhos  213<H>        CAPILLARY BLOOD GLUCOSE      POCT Blood Glucose.: 138 mg/dL (19 Aug 2022 23:29)  POCT Blood Glucose.: 120 mg/dL (19 Aug 2022 17:14)  POCT Blood Glucose.: 91 mg/dL (19 Aug 2022 12:24)  POCT Blood Glucose.: 80 mg/dL (19 Aug 2022 05:43)          LOWER EXTREMITY PHYSICAL EXAM:    Vascular: DP/PT 2/4, B/L, CFT <3 seconds B/L, Temperature gradient warm to cool B/L.   Neuro: Epicritic sensation deminshed to the level of forefoot, B/L.  Musculoskeletal/Ortho: Immobilization 2/2 MS   Skin: Deep tissue injury of the posterior heel, B/L, no signs of infection, no purulence, no drainage, no erythema, no hyperkeratotic surrounding the surface.       RADIOLOGY & ADDITIONAL STUDIES:

## 2022-09-13 NOTE — PROGRESS NOTE ADULT - PROBLEM SELECTOR PLAN 2
s/p L flank wound debridement  -  if w/ persistent fevers/leukocytosis, will benefit from re-evaluation for further debridement   - current abx as above   - pain control prn

## 2022-09-13 NOTE — PROGRESS NOTE ADULT - SUBJECTIVE AND OBJECTIVE BOX
[facility] Division of Hospital Medicine  Norma Acuna MD  Available via MS Teams  Pager: [phone]    SUBJECTIVE / OVERNIGHT EVENTS:    Feels well denies to have any complaints this morning   Eating and drinking well   Having regular BMs   no pain anywhere, including LE, chest and abdomen     MEDICATIONS  (STANDING):  ascorbic acid 500 milliGRAM(s) Oral daily  cadexomer iodine 0.9% Gel 1 Application(s) Topical daily  chlorhexidine 2% Cloths 1 Application(s) Topical daily  cholecalciferol 2000 Unit(s) Oral daily  collagenase Ointment 1 Application(s) Topical daily  DAPTOmycin IVPB 700 milliGRAM(s) IV Intermittent every 24 hours  furosemide    Tablet 20 milliGRAM(s) Oral daily  heparin   Injectable 5000 Unit(s) SubCutaneous every 8 hours  influenza   Vaccine 0.5 milliLiter(s) IntraMuscular once  insulin glargine Injectable (LANTUS) 16 Unit(s) SubCutaneous at bedtime  insulin lispro (ADMELOG) corrective regimen sliding scale   SubCutaneous Before meals and at bedtime  insulin lispro Injectable (ADMELOG) 8 Unit(s) SubCutaneous three times a day before meals  lisinopril 2.5 milliGRAM(s) Oral daily  metoprolol succinate ER 25 milliGRAM(s) Oral daily  multivitamin 1 Tablet(s) Oral daily  nystatin Powder 1 Application(s) Topical two times a day  pantoprazole    Tablet 40 milliGRAM(s) Oral before breakfast  piperacillin/tazobactam IVPB.. 3.375 Gram(s) IV Intermittent every 8 hours  potassium chloride    Tablet ER 20 milliEquivalent(s) Oral daily    MEDICATIONS  (PRN):  acetaminophen     Tablet .. 650 milliGRAM(s) Oral every 6 hours PRN Mild Pain (1 - 3), Moderate Pain (4 - 6)  ALBUTerol    90 MICROgram(s) HFA Inhaler 2 Puff(s) Inhalation every 6 hours PRN Shortness of Breath  oxycodone    5 mG/acetaminophen 325 mG 1 Tablet(s) Oral every 4 hours PRN Severe Pain (7 - 10)  sodium chloride 0.9% lock flush 10 milliLiter(s) IV Push every 1 hour PRN Pre/post blood products, medications, blood draw, and to maintain line patency      I&O's Summary    12 Sep 2022 07:01  -  13 Sep 2022 07:00  --------------------------------------------------------  IN: 600 mL / OUT: 1200 mL / NET: -600 mL    13 Sep 2022 07:01  -  13 Sep 2022 13:16  --------------------------------------------------------  IN: 0 mL / OUT: 200 mL / NET: -200 mL        PHYSICAL EXAM:  Vital Signs Last 24 Hrs  T(C): 36.6 (13 Sep 2022 09:56), Max: 37.4 (13 Sep 2022 02:17)  T(F): 97.9 (13 Sep 2022 09:56), Max: 99.3 (13 Sep 2022 02:17)  HR: 99 (13 Sep 2022 09:56) (88 - 118)  BP: 96/57 (13 Sep 2022 09:56) (92/45 - 124/64)  BP(mean): --  RR: 18 (13 Sep 2022 09:56) (16 - 18)  SpO2: 96% (13 Sep 2022 09:56) (96% - 100%)    Parameters below as of 13 Sep 2022 06:00  Patient On (Oxygen Delivery Method): room air      CONSTITUTIONAL: NAD, well-developed  EYES:  conjunctiva and sclera clear  NECK: Supple  RESPIRATORY: Normal respiratory effort; lungs are clear to auscultation bilaterally  CARDIOVASCULAR: Regular rate and rhythm, normal S1 and S2, no murmur/rub/gallop  ABDOMEN: Nontender to palpation, normoactive bowel sounds, no rebound/guarding; No hepatosplenomegaly  MUSCULOSKELETAL: lower extremities without new changes   PSYCH: A+O to person, place, and time; affect appropriate  NEUROLOGY: no gross motor or sensory deficits   SKIN: No rashes; no palpable lesions    LABS:                        8.3    13.27 )-----------( 619      ( 12 Sep 2022 06:19 )             27.8     09-12    139  |  104  |  8   ----------------------------<  77  3.7   |  25  |  0.38<L>    Ca    8.1<L>      12 Sep 2022 06:19  Phos  3.6     09-12  Mg     1.70     09-12                COVID-19 PCR: NotDetec (08 Sep 2022 11:23)  SARS-CoV-2: NotDetec (05 Sep 2022 03:10)  COVID-19 PCR: NotDetec (01 Sep 2022 12:16)  COVID-19 PCR: NotDetec (23 Aug 2022 09:54)  COVID-19 PCR: NotDetec (17 Aug 2022 07:27)  COVID-19 PCR: Detected (17 Apr 2022 07:36)  SARS-CoV-2: Detected (12 Apr 2022 21:26)  COVID-19 PCR: NotDetec (10 Apr 2022 14:18)  COVID-19 PCR: NotDetec (06 Apr 2022 20:07)      RADIOLOGY & ADDITIONAL TESTS:  New Results Reviewed Today:   New Imaging Personally Reviewed Today:  New Electrocardiogram Personally Reviewed Today:  Prior or Outpatient Records Reviewed Today:    COMMUNICATION:  Care Discussed with Consultants/Other Providers and Details of Discussion:  Discussions with Patient/Family:  PCP Communication:

## 2022-09-13 NOTE — PROGRESS NOTE ADULT - PROBLEM SELECTOR PLAN 1
Pt meets sepsis criteria  w/ fevers and leukocytosis. Likely 2/2 UTI vs soft tissue infection on right upper thigh  - culture data sig for 9/5 with VRE and Kleb in blood and then 9/7 with pseudomonas  - CTAP w/ cystitis   - Currently on zosyn + dapto per ID recs   - c/w daptomycin 09/06; monitor CPK weekly on dapto, last on 9/10  - repeat cultures from 9/10, NGTD   - ID recs appreciated: complete zosyn/dapto with end date 9/23, provided no other culture data returns positive   - now pending disposition per PT/SW Pt meets sepsis criteria  w/ fevers and leukocytosis. Likely 2/2 UTI vs soft tissue infection on right upper thigh  - culture data sig for 9/5 with VRE and Kleb in blood and then 9/7 with pseudomonas  - CTAP w/ cystitis   - Currently on zosyn + dapto per ID recs   - c/w daptomycin 09/06; monitor CPK weekly on dapto, last on 9/10  - repeat cultures from 9/10, NGTD   - ID recs appreciated: complete zosyn/dapto with end date 9/23, provided no other culture data returns positive   - now pending disposition per PT/SW  - patient will need midline exchange prior to discharge

## 2022-09-13 NOTE — PROVIDER CONTACT NOTE (OTHER) - RECOMMENDATIONS
contact provider and inform them on pt refusing to get scan done
Provider notified
Provider notified
discussed risks vs benefits of placing midline without am labs, explained what labs are assessed prior to midline placement
Md made aware- Tylenol/ice packs
allow staff to complete dressing changes
will pass on wound care to day shift, pt may be willing later in day
bolus

## 2022-09-14 LAB
ANION GAP SERPL CALC-SCNC: 12 MMOL/L — SIGNIFICANT CHANGE UP (ref 7–14)
BUN SERPL-MCNC: 11 MG/DL — SIGNIFICANT CHANGE UP (ref 7–23)
CALCIUM SERPL-MCNC: 8.1 MG/DL — LOW (ref 8.4–10.5)
CHLORIDE SERPL-SCNC: 110 MMOL/L — HIGH (ref 98–107)
CO2 SERPL-SCNC: 23 MMOL/L — SIGNIFICANT CHANGE UP (ref 22–31)
CREAT SERPL-MCNC: 0.41 MG/DL — LOW (ref 0.5–1.3)
EGFR: 120 ML/MIN/1.73M2 — SIGNIFICANT CHANGE UP
GLUCOSE BLDC GLUCOMTR-MCNC: 125 MG/DL — HIGH (ref 70–99)
GLUCOSE BLDC GLUCOMTR-MCNC: 132 MG/DL — HIGH (ref 70–99)
GLUCOSE BLDC GLUCOMTR-MCNC: 145 MG/DL — HIGH (ref 70–99)
GLUCOSE BLDC GLUCOMTR-MCNC: 175 MG/DL — HIGH (ref 70–99)
GLUCOSE BLDC GLUCOMTR-MCNC: 91 MG/DL — SIGNIFICANT CHANGE UP (ref 70–99)
GLUCOSE SERPL-MCNC: 112 MG/DL — HIGH (ref 70–99)
HCT VFR BLD CALC: 26.9 % — LOW (ref 34.5–45)
HGB BLD-MCNC: 8.3 G/DL — LOW (ref 11.5–15.5)
MAGNESIUM SERPL-MCNC: 1.5 MG/DL — LOW (ref 1.6–2.6)
MCHC RBC-ENTMCNC: 28.2 PG — SIGNIFICANT CHANGE UP (ref 27–34)
MCHC RBC-ENTMCNC: 30.9 GM/DL — LOW (ref 32–36)
MCV RBC AUTO: 91.5 FL — SIGNIFICANT CHANGE UP (ref 80–100)
NRBC # BLD: 0 /100 WBCS — SIGNIFICANT CHANGE UP (ref 0–0)
NRBC # FLD: 0 K/UL — SIGNIFICANT CHANGE UP (ref 0–0)
PHOSPHATE SERPL-MCNC: 3.1 MG/DL — SIGNIFICANT CHANGE UP (ref 2.5–4.5)
PLATELET # BLD AUTO: 662 K/UL — HIGH (ref 150–400)
POTASSIUM SERPL-MCNC: 3.6 MMOL/L — SIGNIFICANT CHANGE UP (ref 3.5–5.3)
POTASSIUM SERPL-SCNC: 3.6 MMOL/L — SIGNIFICANT CHANGE UP (ref 3.5–5.3)
RBC # BLD: 2.94 M/UL — LOW (ref 3.8–5.2)
RBC # FLD: 18.4 % — HIGH (ref 10.3–14.5)
SODIUM SERPL-SCNC: 145 MMOL/L — SIGNIFICANT CHANGE UP (ref 135–145)
WBC # BLD: 12.33 K/UL — HIGH (ref 3.8–10.5)
WBC # FLD AUTO: 12.33 K/UL — HIGH (ref 3.8–10.5)

## 2022-09-14 PROCEDURE — 99233 SBSQ HOSP IP/OBS HIGH 50: CPT

## 2022-09-14 RX ORDER — MAGNESIUM SULFATE 500 MG/ML
1 VIAL (ML) INJECTION ONCE
Refills: 0 | Status: COMPLETED | OUTPATIENT
Start: 2022-09-14 | End: 2022-09-14

## 2022-09-14 RX ADMIN — DAPTOMYCIN 128 MILLIGRAM(S): 500 INJECTION, POWDER, LYOPHILIZED, FOR SOLUTION INTRAVENOUS at 12:08

## 2022-09-14 RX ADMIN — Medication 8 UNIT(S): at 14:19

## 2022-09-14 RX ADMIN — Medication 8 UNIT(S): at 09:32

## 2022-09-14 RX ADMIN — PIPERACILLIN AND TAZOBACTAM 25 GRAM(S): 4; .5 INJECTION, POWDER, LYOPHILIZED, FOR SOLUTION INTRAVENOUS at 14:19

## 2022-09-14 RX ADMIN — HEPARIN SODIUM 5000 UNIT(S): 5000 INJECTION INTRAVENOUS; SUBCUTANEOUS at 14:20

## 2022-09-14 RX ADMIN — PANTOPRAZOLE SODIUM 40 MILLIGRAM(S): 20 TABLET, DELAYED RELEASE ORAL at 05:51

## 2022-09-14 RX ADMIN — Medication 25 MILLIGRAM(S): at 05:50

## 2022-09-14 RX ADMIN — PIPERACILLIN AND TAZOBACTAM 25 GRAM(S): 4; .5 INJECTION, POWDER, LYOPHILIZED, FOR SOLUTION INTRAVENOUS at 22:52

## 2022-09-14 RX ADMIN — Medication 8 UNIT(S): at 17:39

## 2022-09-14 RX ADMIN — Medication 1 APPLICATION(S): at 12:10

## 2022-09-14 RX ADMIN — Medication 20 MILLIEQUIVALENT(S): at 12:09

## 2022-09-14 RX ADMIN — HEPARIN SODIUM 5000 UNIT(S): 5000 INJECTION INTRAVENOUS; SUBCUTANEOUS at 22:57

## 2022-09-14 RX ADMIN — Medication 20 MILLIGRAM(S): at 05:50

## 2022-09-14 RX ADMIN — LISINOPRIL 2.5 MILLIGRAM(S): 2.5 TABLET ORAL at 05:54

## 2022-09-14 RX ADMIN — NYSTATIN CREAM 1 APPLICATION(S): 100000 CREAM TOPICAL at 05:58

## 2022-09-14 RX ADMIN — HEPARIN SODIUM 5000 UNIT(S): 5000 INJECTION INTRAVENOUS; SUBCUTANEOUS at 05:55

## 2022-09-14 RX ADMIN — Medication 100 GRAM(S): at 12:08

## 2022-09-14 RX ADMIN — NYSTATIN CREAM 1 APPLICATION(S): 100000 CREAM TOPICAL at 17:39

## 2022-09-14 RX ADMIN — CHLORHEXIDINE GLUCONATE 1 APPLICATION(S): 213 SOLUTION TOPICAL at 12:10

## 2022-09-14 RX ADMIN — INSULIN GLARGINE 16 UNIT(S): 100 INJECTION, SOLUTION SUBCUTANEOUS at 22:52

## 2022-09-14 RX ADMIN — Medication 1 TABLET(S): at 12:09

## 2022-09-14 RX ADMIN — Medication 500 MILLIGRAM(S): at 12:09

## 2022-09-14 RX ADMIN — Medication 2000 UNIT(S): at 12:09

## 2022-09-14 RX ADMIN — PIPERACILLIN AND TAZOBACTAM 25 GRAM(S): 4; .5 INJECTION, POWDER, LYOPHILIZED, FOR SOLUTION INTRAVENOUS at 05:46

## 2022-09-14 NOTE — PROGRESS NOTE ADULT - ASSESSMENT
Assessment/Plan: 52F bedbound due to MS, DM2, asthma, hypothyroidism, and known sacral wounds presents from [place] with urosepsis and anemia with new L flank wound with concern for necrotizing soft tissue infection s/p debridement of wound, hospital course c/b severe global LV dysfunction of unknown etiology- likely stress induced cardiomyopathy, s/p CCU course for Cochrane and inotropic support, now s/p swan removal and transferred back to medicine, completed unasyn 8/25, course further complicated by new pseudomonal bacteremia, restarted on IV abx. Repeat blood cultures negative remains on IV abx.    Wound surgery follow up for multiple pressure injuries.    Right axilla full thickness injury of unknown etiology  -Continues to improve slowly  -area within intertriginous fold exposed to moisture.  -wound base clean, no purulent drainage, no odor. No s/s of acute skin infection/cellulitis, no suspected candidiasis within this area.  -Topical recommendations: Clean with NS, pat dry. Apply aquacel hydrofiber, cover with silicone foam with border. Change daily.    Right forearm category 3 skin tear  -healed.    Right lateral trunk/flank wound within intertriginous fold complicated by pressure  -mixed tissue type, purple-maroon discoloration, fixed eschar.  -no sharp debridement indicated.  -no overt s/s of acute skin infection  -Topical recommendations: Clean with NS, pat dry. Apply medihoney gel to wound base, cover with silicone foam with border. Change daily.    Right trochanter stage 4 pressure injury  -No bone exposed  -Chronic stage 4 pressure injury. Narrow ulceration; visible base clean. Unable to palpate bone. (+) serosanguinous drainage. No purulence, no odor.   -Periwound skin without induration, no increased warmth, no edema, no erythema, no crepitus. No overt s/s of acute skin infection/cellulitis.  -Topical recommendations: pack with Aquacel hydrofiber, leaving 2" out at end, cover with silicone foam with border. Change daily.    Right buttock and left ischium mixed unstagable pressure injury and incontinence/moisture associated cellulitis.   -Mixed tissue types; eschar/slough, pink dermis, re-epithelialization.  -No overt s/s of cellulitis  -No sharp debridement indicated  -Topical Recommendations: Clean with NS. Pat dry. Apply collagenase nahun thickness to wound base, cover with silicone foam with border. Change daily or PRN if soiled/compromised.     Right knee superficial abrasion  -healed    Left lateral lower leg: proximal stage 4 pressure injury, distal stage 3 pressure injury:   -proximal wound base clean, agranular with viable tendon exposed 9minimal). (+) serous drainage. No purulent drainage no odor.   - Tissue type improving  -Periwound skin of all without induration, no increased warmth, no edema, no erythema, no crepitus. No overt s/s of acute skin infection/cellulitis.  -Topical recommendations: pack with Aquacel hydrofiber, leaving 2" out at end, cover with silicone foam with border. Change daily.    Left lateral malleolus unstageable pressure injury  -fixed slough.  -no sharp debridement  -no overt s/s of cellulitis  -paint with betadine daily, cover with 4x4 gauze, wrap with kerlix. Change daily.    Bilateral heels unstageable pressure injuries  -stable eschar  -f/b Podiatry     Moisture associated dermatitis   -Bilateral axilla, submammary, abdominal pannus, bilateral groinwithout suspected candidiasis. Recommend; cleanse with luke-warm soap and water, dry well. Apply Interdry textile sheeting leaving 2" out at end to wick, remove to wash & dry affected area, then replace. Individual sheeting may be used for up to 5 days unless soiled. Inspect skin daily.     Right medial thigh  -area of induration; with stable cluster of stable eschar.  -No acute s/s of overt cellulitis.  -CT abdomen and pelvis, no fluid collection. Question of cellulitis to bilateral upper thighs, management per primary team. No s/s of cellulitis on clinical exam.  -Topical recommendations: apply liquid barrier film daily, cover with silicone foam with border.    Left flank to sacrum s/p excision debridement in OR for necrotizing fascitis with large wound  -NPWT/VAC therapy currently managed by [facility] team  -Wound base improving  -CT abdomen/pelvis with sacrum with suggestive OM, abx management per primary team/ID.  -Consider use of Envella bed to minimize pressure to affected area.    **Of note palpable nodular areas of induration to abdomen, thighs. Without s/s of cellulitis. Consider dermatology consult, no open ulcerations at these sites except for the area of right medial thigh mentioned above.    Additional recommendations:  -Consider Envella bed. Currently on low airloss support surface.  -Continue turning and positioning w/ offloading assistive devices as per protocol  -Continue w/ Pericare as per protocol.   -Continue use of complete cair boots  -Nutrition recommendations appreciated when medically appropriate for patient with multiple full thickness pressure injuries,  now s/p debridement of necrotizing fascitis with NPWT/VAC in place.  -Patient c/o right should pain with t&P, consider right should x-ray.    Patient seen with WO nurses today.  Will continue to follow periodically throughout hospitalization. Please reconsult earlier if needed.    Upon discharge f/u as outpatient at [facility] [address] [place] [address] [phone]  All findings and plan discussed with primary team.    Thank you.    KAR Skinner-BC, OC    pager #[phone]    If after 4PM or before 7:30AM on Mon-Friday or weekend/holiday please contact general surgery for urgent matters.   Team A- [id number]   Team B- [id number]  For non-urgent matters e-mail [email or web address]    I spent 35 minutes face-to-face with this patient of which more than 50% of the time was spent counseling/coordinating care of this patient.

## 2022-09-14 NOTE — PROGRESS NOTE ADULT - SUBJECTIVE AND OBJECTIVE BOX
[facility]-- WOUND TEAM -- FOLLOW UP NOTE  --------------------------------------------------------------------------------    Subjective: Patient seen and examined at bedside. C/o right shoulder pain with turning and postioning, aching, 7/10 pain. Reports she wants to go home and see her dog again, afraid he will forget who she is.      Interval HPI/24 hour events:   No acute events overnight.     Chart reviewed including labs and relevant images      Diet:  Diet, Consistent Carbohydrate w/Evening Snack:   Supplement Feeding Modality:  Oral  Glucerna Shake Cans or Servings Per Day:  1       Frequency:  Three Times a day (09-08-22 @ 09:20)      ROS: General, msk see above  all other systems negative.    ALLERGIES & MEDICATIONS  --------------------------------------------------------------------------------  Allergies    No Known Drug Allergies  Pineapple (Anaphylaxis)    Intolerances          STANDING INPATIENT MEDICATIONS    ascorbic acid 500 milliGRAM(s) Oral daily  cadexomer iodine 0.9% Gel 1 Application(s) Topical daily  chlorhexidine 2% Cloths 1 Application(s) Topical daily  cholecalciferol 2000 Unit(s) Oral daily  collagenase Ointment 1 Application(s) Topical daily  DAPTOmycin IVPB 700 milliGRAM(s) IV Intermittent every 24 hours  furosemide    Tablet 20 milliGRAM(s) Oral daily  heparin   Injectable 5000 Unit(s) SubCutaneous every 8 hours  influenza   Vaccine 0.5 milliLiter(s) IntraMuscular once  insulin glargine Injectable (LANTUS) 16 Unit(s) SubCutaneous at bedtime  insulin lispro (ADMELOG) corrective regimen sliding scale   SubCutaneous Before meals and at bedtime  insulin lispro Injectable (ADMELOG) 8 Unit(s) SubCutaneous three times a day before meals  lisinopril 2.5 milliGRAM(s) Oral daily  metoprolol succinate ER 25 milliGRAM(s) Oral daily  multivitamin 1 Tablet(s) Oral daily  nystatin Powder 1 Application(s) Topical two times a day  pantoprazole    Tablet 40 milliGRAM(s) Oral before breakfast  piperacillin/tazobactam IVPB.. 3.375 Gram(s) IV Intermittent every 8 hours  potassium chloride    Tablet ER 20 milliEquivalent(s) Oral daily      PRN INPATIENT MEDICATION  acetaminophen     Tablet .. 650 milliGRAM(s) Oral every 6 hours PRN  ALBUTerol    90 MICROgram(s) HFA Inhaler 2 Puff(s) Inhalation every 6 hours PRN  oxycodone    5 mG/acetaminophen 325 mG 1 Tablet(s) Oral every 4 hours PRN  sodium chloride 0.9% lock flush 10 milliLiter(s) IV Push every 1 hour PRN        Vital signs:  T(C): 37.2 (09-14-22 @ 05:00), Max: 37.6 (09-13-22 @ 18:00)  HR: 95 (09-14-22 @ 05:00) (89 - 104)  BP: 124/58 (09-14-22 @ 05:00) (90/49 - 124/58)  RR: 18 (09-14-22 @ 05:00) (16 - 18)  SpO2: 100% (09-14-22 @ 05:00) (97% - 100%)  Wt(kg): 108 kg (08-22-22)        09-13-22 @ 07:01  -  09-14-22 @ 07:00  --------------------------------------------------------  IN: 0 mL / OUT: 1180 mL / NET: -1180 mL    Physical Exam:  Constitutional: A&O x 2 - 3. Alert.  (+) low airloss support surface, (+) fluidized positioning devices, (+) complete cair boots  HEENT:  NC/AT, nonicteric, nares clear, mucosa moist  Cardiovascular: regular to tachycardic  Respiratory: Room air, nonlabored, equal chest expansion  Gastrointestinal: large abdominal pannus, soft NT/ND, incontinent pasty stool  : indwelling toussaint catheter in place  Musculoskeletal: limited a ROM, b/l upper and lower extremities. RUE able to perform passive ROM, lift arm above head.  Back: NPWT/VAC therapy changed by with WOC team.  Surgical wound s/p OR debridement for necrotizing fascitis: 76efn43wnu4.5cm, scant tan-moist firmly attached slough, 3 sutures in wound bed, remaining pink-moist granular. Periwound skin intact, area of denuded epidermis along wound border at 12 o'clock, exposing pink-dermis. Moderate serous drainage, no odor. No edema, no erythema, no increased warmth, no induration noted to periwound skin. NPWT/VAC replaced with WOC team.  Vascular: B/L LE hyperpigmentation with dry-flaky skin to dorsal toes, no edema noted.  Right knee abrasion- healed. liquid barrier film applied.  Left lateral malleolus unstageable pressure injury- 1.9xje9ovl0.1cm, 100% tan minimally moist firmly attached slough. Periwound skin intact, no s/s of cellulitis. Betadine applied.  Left lateral lower leg- Proximal - stage 4 pressure injury- 2.8cmx1.3cmx0.5cm (prev 3.2cmx1.5cmx0.8cm-->2.8cmx1.5cmx1.2cm), wound base with 5% viable tendon exposed, 95% agranular base. Small-moderate serosanguinous drainage, no odor. Periwound skin with hypopigmentation and soft tissue contraction.   Lateral-distal lower leg stage 3 pressure injury- 1cmx0.2cmx0.3cm, 100% pink-agranular. Scant serous drainage. No associated cellulitis. Aquacel hydrofiber, silicone foam with border applied to both wounds.  Skin:  as noted above.  Right forearm category 3 skin tear (new)- healed.  Right axilla- wound of unknown etiology within intertriginous fold- 0.8cmx0.5cmx0.3cm (prev 2tyl7aiw4.8cm-->0.9cmx0.5cmx0.5cm) wound base flat, pink agranular. Small serous drainage, no odor. Periwound skin with hypopigmentation. No induration, no fluctuance. Periwound skin without edema, erythema, increased warmth, no induration. No overt s/s of acute skin infection/cellulitis. Aquacel and Silicone foam with border applied.  Intertriginous folds with increased moisture with chronic hyperpigmentation, no candidiasis noted to bilateral axilla, submammary, abdominal pannus, bilateral groin and medial thighs.  Right lateral trunk/flank- mixed etiology moisture and pressure wound- entire area 6wtn2bvt5.1cm, irregular border within area of intertriginous fold. 40% purple maroon discoloration, 60% black-moist eschar. +induration, nonfluctuant beneath area of discoloration that does not extend beyond wound border. TRIAD, silicone foam with border applied.  Right medial thigh- wound of unknown etiology, area of induration without fluctuance 9.1nbk4ve, within this area there is a cluster of irregular shaped scabs/dry eschar, cluster 9cjk9xrd4.1cm. No fluctuance, no drainage. No edema, no erythema, no increased warmth. Silicone foam with border applied.   Right trochanter- chronic stage 4 pressure injury- 6ykz3jhk4wu (prev 4jgu6qyv6vy-->1cmx1.4qty3lx) undermining at 5 o'clock extending 2cm, unable to probe to bone. Due to wound dimensions unable to visualize wound base in its entirety, visible base pink-moist granular. Small-moderate serosanguineous drainage, no odor. Periwound skin with hypopigmentation, soft tissue contraction. No induration, no fluctuance, no crepitus, no erythema, no edema, no increased warmth. No overt s/s of acute skin infection/cellulitis. Packed with Aquacel ribbon, covered with silicone foam with border.  Right buttock unstageable pressure injury complicated by Incontinence/moisture associated dermatitis(new)- measured in cluster- 3.7eaa0zqz6.5cm 60% tan-brown minimally moist firmly attached eschar beginning to separate from wound edge at 6 o'clock, 40% pink-moist dermis  two areas of eschar. Wound edges with hypopigmentation/re-epithelialization. No odor. No fluctuance, induration. Periwound skin intact. No overt cellulitis. Silicone foam dressing applied.   Right lateral thigh and right ischium hypopigmentation noted with soft tissue contraction. Collagenase, silicone foam with border applied.  Left ischium within intertriginous fold- mixed etiology unstageable pressure injury and moisture associated dermatitis- 5.0iwv8wdg3.2cm (prev 4cmx1.5cmx0.2cm-->2.6iti8hxt7.2cm) - tissue type, irregular borders 70% moistening adherent slough, 30% re-epithelialization migrating from wound edges. Small serous drainage. No induration, no fluctuance. Periwound skin without edema, erythema, increased warmth, no induration. No overt s/s of acute skin infection/cellulitis. Collagenase, Silicone foam with border applied.  Right heel unstageable pressure injury- 2rky9tve4- 100% black stable eschar.  Left heel unstageable pressure injury- 2.5cmx3.5cmx0, 100% black stable eschar.  Betadine, gauze, kerlix wrap applied. +Complete cair boots.      LABS/ CULTURES/ RADIOLOGY:              8.3    12.33 >-----------<  662      [09-14-22 @ 07:22]              26.9     145  |  110  |  11  ----------------------------<  112      [09-14-22 @ 07:22]  3.6   |  23  |  0.41        Ca     8.1     [09-14-22 @ 07:22]      Mg     1.50     [09-14-22 @ 07:22]      Phos  3.1     [09-14-22 @ 07:22]          CAPILLARY BLOOD GLUCOSE    POCT Blood Glucose.: 132 mg/dL (14 Sep 2022 08:45)  POCT Blood Glucose.: 153 mg/dL (13 Sep 2022 21:37)  POCT Blood Glucose.: 265 mg/dL (13 Sep 2022 17:32)      A1C with Estimated Average Glucose Result: 5.5 % (08-12-22 @ 00:50)  A1C with Estimated Average Glucose Result: 14.2 % (03-27-22 @ 19:13)        Culture - Blood (collected 09-10-22 @ 07:35)  Source: .Blood Blood-Peripheral  Preliminary Report (09-11-22 @ 13:02):    No growth to date.    Culture - Blood (collected 09-10-22 @ 07:30)  Source: .Blood Blood  Preliminary Report (09-11-22 @ 13:02):    No growth to date.        < from: CT Abdomen and Pelvis No Cont (09.09.22 @ 10:34) >  ACC: [id number] EXAM:  CT ABDOMEN AND PELVIS                          PROCEDURE DATE:  09/09/2022          INTERPRETATION:  CLINICAL INFORMATION: Bacteremia with induration on   right upper thigh.    COMPARISON: CT abdomen pelvis 8/10/2022.    CONTRAST/COMPLICATIONS:  IV Contrast: Omnipaque 350  65 cc administered   5 cc discarded  Oral Contrast: NONE  Complications: Contrast extravasated into left upper extremity. Exam   changed to noncontrast.    PROCEDURE:  CT of the Abdomen and Pelvis was performed.  Sagittal and coronal reformats were performed.    FINDINGS:  LOWER CHEST: Bibasilar subsegmental atelectasis. 4 mm nodule in the right   lower lobe (4, 3) unchanged since 3/28/2022.    LIVER: Enlarged with fatty infiltration.  BILE DUCTS: Normal caliber.  GALLBLADDER: Cholelithiasis.  SPLEEN: Within normal limits.  PANCREAS: Within normal limits.  ADRENALS: Within normal limits.  KIDNEYS/URETERS: Mild prominence of the collecting system with mild   urothelial thickening of the ureters, unchanged.    BLADDER: Toussaint catheter with intraluminal air. Bladder is minimally   distended and demonstrates wall thickening and perivesicular stranding.  REPRODUCTIVE ORGANS: Fibroid uterus, unchanged.    BOWEL: Colonic diverticulosis without diverticulitis. No bowel   obstruction. Appendix is normal.  PERITONEUM: No ascites.  VESSELS: Atherosclerotic changes.  RETROPERITONEUM/LYMPH NODES: No lymphadenopathy.  ABDOMINAL WALL: Anasarca. Interval decrease in size of sacral decubitus   wound and resolution of subcutaneous emphysema. Healed sacral decubitus   ulcer. No focal fluid collection in the visualized lower extremities.   Subcutaneous fatty stranding and skin thickening associated with tissues   of the upper thigh bilaterally.  BONES: Degenerative changes. Absence of the coccyx and distal sacrum,   likely secondary to prior resection. Posterior right 12th rib deformity,   may be related to chronic fracture.    IMPRESSION:  Question of cellulitis involving the bilateral upper thighs. No fluid   collection.  Interval healing of the sacral decubitus ulcer.  Findings are suggestive of cystitis.    --- End of Report ---          REID COHEN MD; Resident Radiologist  This document has been electronically signed.  PERDO CHASE MD; Attending Radiologist  This document has been electronically signed. Sep  9 2022 12:50PM    < end of copied text >

## 2022-09-14 NOTE — PROGRESS NOTE ADULT - ASSESSMENT
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

## 2022-09-14 NOTE — PROGRESS NOTE ADULT - PROBLEM SELECTOR PLAN 3
Pt w. incontinence and intermittent urinary retention. Suspect  neurogenic bladder from MS.  - dc toussaint and attempt TOV

## 2022-09-14 NOTE — PROGRESS NOTE ADULT - PROBLEM SELECTOR PLAN 8
DIET: DASH regular  DVT: SQH  DISPO: Accepted at [facility] for wound care. No beds available. D/C held off given new c/f infection.  Pt states rather go back home unless cannot have vac at home

## 2022-09-14 NOTE — PROGRESS NOTE ADULT - SUBJECTIVE AND OBJECTIVE BOX
[facility] Division of Hospital Medicine  Norma Acuna MD  Available via MS Teams  Pager: [phone]    SUBJECTIVE / OVERNIGHT EVENTS:        REVIEW OF SYSTEMS:    CONSTITUTIONAL: No weakness, fevers or chills  EYES/ENT: No visual changes;  No vertigo or throat pain   NECK: No pain or stiffness  RESPIRATORY: No cough, wheezing, hemoptysis; No shortness of breath  CARDIOVASCULAR: No chest pain or palpitations  GASTROINTESTINAL: No abdominal or epigastric pain. No nausea, vomiting, or hematemesis; No diarrhea or constipation. No melena or hematochezia.  GENITOURINARY: No dysuria, frequency or hematuria  NEUROLOGICAL: No numbness or weakness  SKIN: No itching, rashes    MEDICATIONS  (STANDING):  ascorbic acid 500 milliGRAM(s) Oral daily  cadexomer iodine 0.9% Gel 1 Application(s) Topical daily  chlorhexidine 2% Cloths 1 Application(s) Topical daily  cholecalciferol 2000 Unit(s) Oral daily  collagenase Ointment 1 Application(s) Topical daily  DAPTOmycin IVPB 700 milliGRAM(s) IV Intermittent every 24 hours  furosemide    Tablet 20 milliGRAM(s) Oral daily  heparin   Injectable 5000 Unit(s) SubCutaneous every 8 hours  influenza   Vaccine 0.5 milliLiter(s) IntraMuscular once  insulin glargine Injectable (LANTUS) 16 Unit(s) SubCutaneous at bedtime  insulin lispro (ADMELOG) corrective regimen sliding scale   SubCutaneous Before meals and at bedtime  insulin lispro Injectable (ADMELOG) 8 Unit(s) SubCutaneous three times a day before meals  lisinopril 2.5 milliGRAM(s) Oral daily  metoprolol succinate ER 25 milliGRAM(s) Oral daily  multivitamin 1 Tablet(s) Oral daily  nystatin Powder 1 Application(s) Topical two times a day  pantoprazole    Tablet 40 milliGRAM(s) Oral before breakfast  piperacillin/tazobactam IVPB.. 3.375 Gram(s) IV Intermittent every 8 hours  potassium chloride    Tablet ER 20 milliEquivalent(s) Oral daily    MEDICATIONS  (PRN):  acetaminophen     Tablet .. 650 milliGRAM(s) Oral every 6 hours PRN Mild Pain (1 - 3), Moderate Pain (4 - 6)  ALBUTerol    90 MICROgram(s) HFA Inhaler 2 Puff(s) Inhalation every 6 hours PRN Shortness of Breath  oxycodone    5 mG/acetaminophen 325 mG 1 Tablet(s) Oral every 4 hours PRN Severe Pain (7 - 10)  sodium chloride 0.9% lock flush 10 milliLiter(s) IV Push every 1 hour PRN Pre/post blood products, medications, blood draw, and to maintain line patency      I&O's Summary    13 Sep 2022 07:01  -  14 Sep 2022 07:00  --------------------------------------------------------  IN: 0 mL / OUT: 1180 mL / NET: -1180 mL        PHYSICAL EXAM:  Vital Signs Last 24 Hrs  T(C): 37.2 (14 Sep 2022 05:00), Max: 37.6 (13 Sep 2022 18:00)  T(F): 98.9 (14 Sep 2022 05:00), Max: 99.6 (13 Sep 2022 18:00)  HR: 95 (14 Sep 2022 05:00) (89 - 104)  BP: 124/58 (14 Sep 2022 05:00) (90/49 - 124/58)  BP(mean): --  RR: 18 (14 Sep 2022 05:00) (16 - 18)  SpO2: 100% (14 Sep 2022 05:00) (97% - 100%)    Parameters below as of 14 Sep 2022 05:00  Patient On (Oxygen Delivery Method): room air    CONSTITUTIONAL: NAD, well-developed  EYES:  conjunctiva and sclera clear  NECK: Supple  RESPIRATORY: Normal respiratory effort; lungs are clear to auscultation bilaterally  CARDIOVASCULAR: Regular rate and rhythm, normal S1 and S2, no murmur/rub/gallop  ABDOMEN: Nontender to palpation, normoactive bowel sounds, no rebound/guarding; No hepatosplenomegaly  MUSCULOSKELETAL: lower extremities without new changes   PSYCH: A+O to person, place, and time; affect appropriate  NEUROLOGY: no gross motor or sensory deficits   SKIN: No rashes; no palpable lesions    LABS:                        8.3    12.33 )-----------( 662      ( 14 Sep 2022 07:22 )             26.9     09-14    145  |  110<H>  |  11  ----------------------------<  112<H>  3.6   |  23  |  0.41<L>    Ca    8.1<L>      14 Sep 2022 07:22  Phos  3.1     09-14  Mg     1.50     09-14    COVID-19 PCR: NotDetec (13 Sep 2022 08:30)  COVID-19 PCR: NotDetec (08 Sep 2022 11:23)  SARS-CoV-2: NotDetec (05 Sep 2022 03:10)  COVID-19 PCR: NotDetec (01 Sep 2022 12:16)  COVID-19 PCR: NotDetec (23 Aug 2022 09:54)  COVID-19 PCR: NotDetec (17 Aug 2022 07:27)  COVID-19 PCR: Detected (17 Apr 2022 07:36)  SARS-CoV-2: Detected (12 Apr 2022 21:26)  COVID-19 PCR: NotDetec (10 Apr 2022 14:18)  COVID-19 PCR: NotDetec (06 Apr 2022 20:07)      RADIOLOGY & ADDITIONAL TESTS:  New Results Reviewed Today:   New Imaging Personally Reviewed Today:  New Electrocardiogram Personally Reviewed Today:  Prior or Outpatient Records Reviewed Today:    COMMUNICATION:  Care Discussed with Consultants/Other Providers and Details of Discussion:  Discussions with Patient/Family:  PCP Communication:

## 2022-09-14 NOTE — PROGRESS NOTE ADULT - PROBLEM SELECTOR PLAN 1
Pt meets sepsis criteria  w/ fevers and leukocytosis. Likely 2/2 UTI vs soft tissue infection on right upper thigh  - culture data sig for 9/5 with VRE and Kleb in blood and then 9/7 with pseudomonas  - CTAP w/ cystitis   - Currently on zosyn + dapto per ID recs   - c/w daptomycin 09/06; monitor CPK weekly on dapto, last on 9/10  - repeat cultures from 9/10, NGTD   - ID recs appreciated: complete zosyn/dapto with end date 9/23, provided no other culture data returns positive   - now pending disposition per PT/SW  - continue midline on discharge

## 2022-09-15 DIAGNOSIS — D75.839 THROMBOCYTOSIS, UNSPECIFIED: ICD-10-CM

## 2022-09-15 LAB
ANION GAP SERPL CALC-SCNC: 10 MMOL/L — SIGNIFICANT CHANGE UP (ref 7–14)
BUN SERPL-MCNC: 10 MG/DL — SIGNIFICANT CHANGE UP (ref 7–23)
CALCIUM SERPL-MCNC: 8.2 MG/DL — LOW (ref 8.4–10.5)
CHLORIDE SERPL-SCNC: 107 MMOL/L — SIGNIFICANT CHANGE UP (ref 98–107)
CO2 SERPL-SCNC: 24 MMOL/L — SIGNIFICANT CHANGE UP (ref 22–31)
CREAT SERPL-MCNC: 0.45 MG/DL — LOW (ref 0.5–1.3)
CULTURE RESULTS: SIGNIFICANT CHANGE UP
CULTURE RESULTS: SIGNIFICANT CHANGE UP
EGFR: 117 ML/MIN/1.73M2 — SIGNIFICANT CHANGE UP
GLUCOSE BLDC GLUCOMTR-MCNC: 107 MG/DL — HIGH (ref 70–99)
GLUCOSE BLDC GLUCOMTR-MCNC: 151 MG/DL — HIGH (ref 70–99)
GLUCOSE BLDC GLUCOMTR-MCNC: 184 MG/DL — HIGH (ref 70–99)
GLUCOSE BLDC GLUCOMTR-MCNC: 200 MG/DL — HIGH (ref 70–99)
GLUCOSE SERPL-MCNC: 85 MG/DL — SIGNIFICANT CHANGE UP (ref 70–99)
HCT VFR BLD CALC: 27.2 % — LOW (ref 34.5–45)
HGB BLD-MCNC: 8.2 G/DL — LOW (ref 11.5–15.5)
MAGNESIUM SERPL-MCNC: 1.5 MG/DL — LOW (ref 1.6–2.6)
MCHC RBC-ENTMCNC: 27.4 PG — SIGNIFICANT CHANGE UP (ref 27–34)
MCHC RBC-ENTMCNC: 30.1 GM/DL — LOW (ref 32–36)
MCV RBC AUTO: 91 FL — SIGNIFICANT CHANGE UP (ref 80–100)
NRBC # BLD: 0 /100 WBCS — SIGNIFICANT CHANGE UP (ref 0–0)
NRBC # FLD: 0 K/UL — SIGNIFICANT CHANGE UP (ref 0–0)
PHOSPHATE SERPL-MCNC: 3.7 MG/DL — SIGNIFICANT CHANGE UP (ref 2.5–4.5)
PLATELET # BLD AUTO: 616 K/UL — HIGH (ref 150–400)
POTASSIUM SERPL-MCNC: 3.5 MMOL/L — SIGNIFICANT CHANGE UP (ref 3.5–5.3)
POTASSIUM SERPL-SCNC: 3.5 MMOL/L — SIGNIFICANT CHANGE UP (ref 3.5–5.3)
RBC # BLD: 2.99 M/UL — LOW (ref 3.8–5.2)
RBC # FLD: 18.1 % — HIGH (ref 10.3–14.5)
SODIUM SERPL-SCNC: 141 MMOL/L — SIGNIFICANT CHANGE UP (ref 135–145)
SPECIMEN SOURCE: SIGNIFICANT CHANGE UP
SPECIMEN SOURCE: SIGNIFICANT CHANGE UP
WBC # BLD: 9.91 K/UL — SIGNIFICANT CHANGE UP (ref 3.8–10.5)
WBC # FLD AUTO: 9.91 K/UL — SIGNIFICANT CHANGE UP (ref 3.8–10.5)

## 2022-09-15 PROCEDURE — 99233 SBSQ HOSP IP/OBS HIGH 50: CPT

## 2022-09-15 RX ORDER — MAGNESIUM OXIDE 400 MG ORAL TABLET 241.3 MG
400 TABLET ORAL
Refills: 0 | Status: COMPLETED | OUTPATIENT
Start: 2022-09-15 | End: 2022-09-16

## 2022-09-15 RX ORDER — MAGNESIUM SULFATE 500 MG/ML
1 VIAL (ML) INJECTION ONCE
Refills: 0 | Status: COMPLETED | OUTPATIENT
Start: 2022-09-15 | End: 2022-09-15

## 2022-09-15 RX ADMIN — Medication 2: at 12:20

## 2022-09-15 RX ADMIN — Medication 2: at 21:50

## 2022-09-15 RX ADMIN — Medication 8 UNIT(S): at 12:20

## 2022-09-15 RX ADMIN — NYSTATIN CREAM 1 APPLICATION(S): 100000 CREAM TOPICAL at 05:50

## 2022-09-15 RX ADMIN — Medication 1 APPLICATION(S): at 13:52

## 2022-09-15 RX ADMIN — Medication 20 MILLIGRAM(S): at 05:48

## 2022-09-15 RX ADMIN — INSULIN GLARGINE 16 UNIT(S): 100 INJECTION, SOLUTION SUBCUTANEOUS at 21:51

## 2022-09-15 RX ADMIN — HEPARIN SODIUM 5000 UNIT(S): 5000 INJECTION INTRAVENOUS; SUBCUTANEOUS at 21:52

## 2022-09-15 RX ADMIN — CHLORHEXIDINE GLUCONATE 1 APPLICATION(S): 213 SOLUTION TOPICAL at 12:19

## 2022-09-15 RX ADMIN — Medication 2: at 17:18

## 2022-09-15 RX ADMIN — Medication 25 MILLIGRAM(S): at 05:49

## 2022-09-15 RX ADMIN — NYSTATIN CREAM 1 APPLICATION(S): 100000 CREAM TOPICAL at 17:41

## 2022-09-15 RX ADMIN — Medication 8 UNIT(S): at 08:41

## 2022-09-15 RX ADMIN — Medication 1 APPLICATION(S): at 12:19

## 2022-09-15 RX ADMIN — Medication 100 GRAM(S): at 13:51

## 2022-09-15 RX ADMIN — DAPTOMYCIN 128 MILLIGRAM(S): 500 INJECTION, POWDER, LYOPHILIZED, FOR SOLUTION INTRAVENOUS at 12:21

## 2022-09-15 RX ADMIN — LISINOPRIL 2.5 MILLIGRAM(S): 2.5 TABLET ORAL at 05:49

## 2022-09-15 RX ADMIN — PIPERACILLIN AND TAZOBACTAM 25 GRAM(S): 4; .5 INJECTION, POWDER, LYOPHILIZED, FOR SOLUTION INTRAVENOUS at 13:54

## 2022-09-15 RX ADMIN — PANTOPRAZOLE SODIUM 40 MILLIGRAM(S): 20 TABLET, DELAYED RELEASE ORAL at 05:50

## 2022-09-15 RX ADMIN — HEPARIN SODIUM 5000 UNIT(S): 5000 INJECTION INTRAVENOUS; SUBCUTANEOUS at 13:51

## 2022-09-15 RX ADMIN — HEPARIN SODIUM 5000 UNIT(S): 5000 INJECTION INTRAVENOUS; SUBCUTANEOUS at 05:48

## 2022-09-15 RX ADMIN — Medication 8 UNIT(S): at 17:18

## 2022-09-15 RX ADMIN — PIPERACILLIN AND TAZOBACTAM 25 GRAM(S): 4; .5 INJECTION, POWDER, LYOPHILIZED, FOR SOLUTION INTRAVENOUS at 05:48

## 2022-09-15 NOTE — PROGRESS NOTE ADULT - PROBLEM SELECTOR PLAN 8
DIET: DASH regular  DVT: SQH  DISPO: Accepted at [facility] for wound care. No beds available. D/C held off given new c/f infection.  Pt states rather go back home unless cannot have vac at home HbA1c 5.5  - c/w Lantus @ 16U qhs & increase lispro 6U-->8U TID  - Monitor fingersticks

## 2022-09-15 NOTE — PROVIDER CONTACT NOTE (OTHER) - ACTION/TREATMENT ORDERED:
proceed with midline placement
Provider made aware. IV Tylenol given per order. Will re-assess temp in 1 hr.
Md made aware
Provide made aware and said okay
bladder scan Q 1hr
Provider made aware. Fever lab work up, zosyn and vancomycin ordered.
team aware, rescan in 6 hours.
 cc bolus over 30 minutes, reassess, provider to come to bedside to assess
cardiology to come assess pt
awaiting call back from provider
Md would look into it.

## 2022-09-15 NOTE — PROVIDER CONTACT NOTE (OTHER) - ASSESSMENT
AxOx3 -confused to situation.
Pt refusing AM PO medications and wound care.
Temp 100.9, 
ultrasound bladder 316cc , denies discomfortment and refused straight cath
Patient refuses to go to CT scan and says she wants to go tomorrow during the day instead
labs from 9/12- platelets 619, EGFR 122
260 retained, patient verbalized "I am holding in my urine"
Pt BP 79/49, asymptomatic, HR 86, O2 100%, 98.6.
patient running low grade temp 100.3  Tachycardic 110 manually - c/o feeling warm
Pt BP 75/53, pt asymptomatic otherwise, mental status at baseline, arousable. HR 88, O2 99, T 98.1
Temp 102.2,

## 2022-09-15 NOTE — PROGRESS NOTE ADULT - PROBLEM SELECTOR PLAN 3
Pt w. incontinence and intermittent urinary retention. Suspect  neurogenic bladder from MS.  - dc toussaint and attempt TOV  - if fails, can be DCed with toussaint and urology follow up - likely related to infection / reactive   - trend   - if continues to rise, patient may need aspirin ppx

## 2022-09-15 NOTE — PROVIDER CONTACT NOTE (OTHER) - BACKGROUND
Patient with urosepsis and anemia, necrotizing soft tissue infection of left flank wound
Pt s/p debridement of wound 8/22 with wound vac placement with worsening sepsis, acidosis, and severe global LV dysfunction of unknown etiology.
patient admitted for necrotizing fascitis
s/p debridement of wound with worsening sepsis, acidosis, and severe global LV dysfunction of unknown etiology 8/11
pt requiring midline for iv antibiotics for 10 more days
Multiple pressure wounds Necrotizing fasciitis
Sacral wounds, New L flank wound with necrotizing soft tissue infection.
s/p debridement of wound with worsening sepsis, acidosis, and severe global LV dysfunction of unknown etiology 8/11
patient's toussaint removed yesterday
pt admitted for necrotizing fasciitis
Patient with urosepsis and anemia, necrotizing soft tissue infection of left flank wound

## 2022-09-15 NOTE — PROGRESS NOTE ADULT - PROBLEM SELECTOR PLAN 9
DIET: DASH regular  DVT: SQH  DISPO: Accepted at [facility] for wound care. No beds available. D/C held off given new c/f infection.  Pt states rather go back home unless cannot have vac at home

## 2022-09-15 NOTE — PROGRESS NOTE ADULT - SUBJECTIVE AND OBJECTIVE BOX
[facility] Division of Hospital Medicine  Norma Acuna MD  Available via MS Teams  Pager: [phone]    SUBJECTIVE / OVERNIGHT EVENTS:    No overnight events. Patient would really like to be moved to next facility     REVIEW OF SYSTEMS:    CONSTITUTIONAL: No weakness, fevers or chills  EYES/ENT: No visual changes;  No vertigo or throat pain   NECK: No pain or stiffness  RESPIRATORY: No cough, wheezing, hemoptysis; No shortness of breath  CARDIOVASCULAR: No chest pain or palpitations  GASTROINTESTINAL: No abdominal or epigastric pain. No nausea, vomiting, or hematemesis; No diarrhea or constipation. No melena or hematochezia.  GENITOURINARY: No dysuria, frequency or hematuria  NEUROLOGICAL: No numbness or weakness  SKIN: No itching, rashes    MEDICATIONS  (STANDING):  ascorbic acid 500 milliGRAM(s) Oral daily  cadexomer iodine 0.9% Gel 1 Application(s) Topical daily  chlorhexidine 2% Cloths 1 Application(s) Topical daily  cholecalciferol 2000 Unit(s) Oral daily  collagenase Ointment 1 Application(s) Topical daily  DAPTOmycin IVPB 700 milliGRAM(s) IV Intermittent every 24 hours  furosemide    Tablet 20 milliGRAM(s) Oral daily  heparin   Injectable 5000 Unit(s) SubCutaneous every 8 hours  influenza   Vaccine 0.5 milliLiter(s) IntraMuscular once  insulin glargine Injectable (LANTUS) 16 Unit(s) SubCutaneous at bedtime  insulin lispro (ADMELOG) corrective regimen sliding scale   SubCutaneous Before meals and at bedtime  insulin lispro Injectable (ADMELOG) 8 Unit(s) SubCutaneous three times a day before meals  lisinopril 2.5 milliGRAM(s) Oral daily  magnesium oxide 400 milliGRAM(s) Oral two times a day with meals  magnesium sulfate  IVPB 1 Gram(s) IV Intermittent once  metoprolol succinate ER 25 milliGRAM(s) Oral daily  multivitamin 1 Tablet(s) Oral daily  nystatin Powder 1 Application(s) Topical two times a day  pantoprazole    Tablet 40 milliGRAM(s) Oral before breakfast  piperacillin/tazobactam IVPB.. 3.375 Gram(s) IV Intermittent every 8 hours  potassium chloride    Tablet ER 20 milliEquivalent(s) Oral daily    MEDICATIONS  (PRN):  acetaminophen     Tablet .. 650 milliGRAM(s) Oral every 6 hours PRN Mild Pain (1 - 3), Moderate Pain (4 - 6)  ALBUTerol    90 MICROgram(s) HFA Inhaler 2 Puff(s) Inhalation every 6 hours PRN Shortness of Breath  oxycodone    5 mG/acetaminophen 325 mG 1 Tablet(s) Oral every 4 hours PRN Severe Pain (7 - 10)  sodium chloride 0.9% lock flush 10 milliLiter(s) IV Push every 1 hour PRN Pre/post blood products, medications, blood draw, and to maintain line patency      I&O's Summary    14 Sep 2022 07:01  -  15 Sep 2022 07:00  --------------------------------------------------------  IN: 850 mL / OUT: 1320 mL / NET: -470 mL    15 Sep 2022 07:01  -  15 Sep 2022 13:24  --------------------------------------------------------  IN: 240 mL / OUT: 1100.5 mL / NET: -860.5 mL        PHYSICAL EXAM:  Vital Signs Last 24 Hrs  T(C): 36.5 (15 Sep 2022 10:00), Max: 37.1 (14 Sep 2022 21:41)  T(F): 97.7 (15 Sep 2022 10:00), Max: 98.7 (14 Sep 2022 21:41)  HR: 90 (15 Sep 2022 10:00) (86 - 95)  BP: 105/51 (15 Sep 2022 10:00) (98/58 - 115/56)  BP(mean): 72 (15 Sep 2022 05:54) (72 - 72)  RR: 18 (15 Sep 2022 10:00) (17 - 18)  SpO2: 97% (15 Sep 2022 10:00) (97% - 100%)    Parameters below as of 15 Sep 2022 10:00  Patient On (Oxygen Delivery Method): room air      CONSTITUTIONAL: NAD, well-developed  EYES:  conjunctiva and sclera clear  NECK: Supple  RESPIRATORY: Normal respiratory effort; lungs are clear to auscultation bilaterally  CARDIOVASCULAR: Regular rate and rhythm, normal S1 and S2, no murmur/rub/gallop  ABDOMEN: Nontender to palpation, normoactive bowel sounds, no rebound/guarding; No hepatosplenomegaly  MUSCULOSKELETAL: lower extremities without new changes   PSYCH: A+O to person, place, and time; affect appropriate  NEUROLOGY: no gross motor or sensory deficits   SKIN: No rashes; no palpable lesions    LABS:                        8.2    9.91  )-----------( 616      ( 15 Sep 2022 07:10 )             27.2     09-15    141  |  107  |  10  ----------------------------<  85  3.5   |  24  |  0.45<L>    Ca    8.2<L>      15 Sep 2022 07:10  Phos  3.7     09-15  Mg     1.50     09-15                COVID-19 PCR: NotDetec (13 Sep 2022 08:30)  COVID-19 PCR: NotDetec (08 Sep 2022 11:23)  SARS-CoV-2: NotDetec (05 Sep 2022 03:10)  COVID-19 PCR: NotDetec (01 Sep 2022 12:16)  COVID-19 PCR: NotDetec (23 Aug 2022 09:54)  COVID-19 PCR: NotDetec (17 Aug 2022 07:27)  COVID-19 PCR: Detected (17 Apr 2022 07:36)  SARS-CoV-2: Detected (12 Apr 2022 21:26)  COVID-19 PCR: NotDetec (10 Apr 2022 14:18)  COVID-19 PCR: NotDetec (06 Apr 2022 20:07)      RADIOLOGY & ADDITIONAL TESTS:  New Results Reviewed Today:   New Imaging Personally Reviewed Today:  New Electrocardiogram Personally Reviewed Today:  Prior or Outpatient Records Reviewed Today:    COMMUNICATION:  Care Discussed with Consultants/Other Providers and Details of Discussion:  Discussions with Patient/Family:  PCP Communication:     No complaints

## 2022-09-15 NOTE — PROGRESS NOTE ADULT - PROBLEM SELECTOR PLAN 6
iron studies c/w AOCD likely 2/2 infections; B12 and folic acid wnl  - stool guaiac positive  - retic count elevated  - monitor CBC, Hb stable ~8 # severe LV dysfunction; HFrEF 5 - 10%; likely 2/2 stress induced cardiomyopathy  - s/p CCU course for swan and inotropic support now s/p swan removal   - c/w metoprolol-XL 25QD  - c/w Lasix 20mg QD per Cards recs  - c/w potassium supplementation on Lasix   - add lisinopril 2.5mg QD   - Outpatient cardiology follow up

## 2022-09-15 NOTE — PROVIDER CONTACT NOTE (OTHER) - SITUATION
Febrile 100.3
Pt BP 75/53, pt asymptomatic otherwise, mental status at baseline, arousable.
Pt BP 79/49, asymptomatic
260 retained
Pt refusing AM PO medications and wound care.
Temp 100.9, 
ultrasound 316cc patient refused straight cath and denies discomfortment
Patient refuses to go to CT scan and says she wants to go tomorrow during the day instead
Temp 102.2, 
Patient refusing dressing changes for  2xdays
pt requiring midline for dc, no am labs drawn

## 2022-09-15 NOTE — PROGRESS NOTE ADULT - PROBLEM SELECTOR PLAN 5
# severe LV dysfunction; HFrEF 5 - 10%; likely 2/2 stress induced cardiomyopathy  - s/p CCU course for swan and inotropic support now s/p swan removal   - c/w metoprolol-XL 25QD  - c/w Lasix 20mg QD per Cards recs  - c/w potassium supplementation on Lasix   - add lisinopril 2.5mg QD   - Outpatient cardiology follow up - c/w daily KCL 20MEQ QD (to be continued on discharge)

## 2022-09-15 NOTE — PROGRESS NOTE ADULT - ASSESSMENT
52F bedbound due to MS, DM2, asthma, hypothyroidism, and known sacral wounds presents from [place] with urosepsis and anemia with new L flank wound with concern for necrotizing soft tissue infection s/p debridement of wound, hospital course c/b severe global LV dysfunction of unknown etiology- likely stress induced cardiomyopathy, s/p CCU course for [place] and inotropic support, now s/p swan removal and transferred back to medicine, completed unasyn 8/25, course further complicated by new pseudomonal bacteremia, restarted on IV abx.

## 2022-09-15 NOTE — PROGRESS NOTE ADULT - PROBLEM SELECTOR PLAN 4
- c/w daily KCL 20MEQ QD (to be continued on discharge) Pt w. incontinence and intermittent urinary retention. Suspect  neurogenic bladder from MS.  - dc toussaint and attempt TOV  - if fails, can be DCed with [facility] and urology follow up

## 2022-09-15 NOTE — PROVIDER CONTACT NOTE (OTHER) - REASON
Pt BP 79/49
Febrile / Tachycardic
Pt BP 75/53, asymptomatic
Temp 100.9, 
patient refuses to go for CT scan
patient refusing dressing changes
patient refusing straight cath , bladder scan 316cc
Temp 102.2, 
midline placement, using labs from 9/12
bladder scan
pt refusing AM PO medications and wound care

## 2022-09-15 NOTE — PROGRESS NOTE ADULT - PROBLEM SELECTOR PLAN 7
HbA1c 5.5  - c/w Lantus @ 16U qhs & increase lispro 6U-->8U TID  - Monitor fingersticks iron studies c/w AOCD likely 2/2 infections; B12 and folic acid wnl  - stool guaiac positive  - retic count elevated  - monitor CBC, Hb stable ~8

## 2022-09-15 NOTE — PROVIDER CONTACT NOTE (OTHER) - DATE AND TIME:
01-Sep-2022 07:06
01-Sep-2022 22:19
13-Sep-2022 12:15
06-Sep-2022 20:43
15-Sep-2022 17:11
28-Aug-2022 06:58
05-Sep-2022 03:00
06-Sep-2022 02:40
15-Sep-2022 01:25
29-Aug-2022 21:00

## 2022-09-16 LAB
GLUCOSE BLDC GLUCOMTR-MCNC: 114 MG/DL — HIGH (ref 70–99)
GLUCOSE BLDC GLUCOMTR-MCNC: 156 MG/DL — HIGH (ref 70–99)
GLUCOSE BLDC GLUCOMTR-MCNC: 76 MG/DL — SIGNIFICANT CHANGE UP (ref 70–99)
GLUCOSE BLDC GLUCOMTR-MCNC: 77 MG/DL — SIGNIFICANT CHANGE UP (ref 70–99)

## 2022-09-16 PROCEDURE — 99233 SBSQ HOSP IP/OBS HIGH 50: CPT

## 2022-09-16 RX ADMIN — HEPARIN SODIUM 5000 UNIT(S): 5000 INJECTION INTRAVENOUS; SUBCUTANEOUS at 21:31

## 2022-09-16 RX ADMIN — Medication 20 MILLIGRAM(S): at 06:50

## 2022-09-16 RX ADMIN — PANTOPRAZOLE SODIUM 40 MILLIGRAM(S): 20 TABLET, DELAYED RELEASE ORAL at 06:50

## 2022-09-16 RX ADMIN — MAGNESIUM OXIDE 400 MG ORAL TABLET 400 MILLIGRAM(S): 241.3 TABLET ORAL at 09:19

## 2022-09-16 RX ADMIN — Medication 2: at 21:29

## 2022-09-16 RX ADMIN — PIPERACILLIN AND TAZOBACTAM 25 GRAM(S): 4; .5 INJECTION, POWDER, LYOPHILIZED, FOR SOLUTION INTRAVENOUS at 18:14

## 2022-09-16 RX ADMIN — HEPARIN SODIUM 5000 UNIT(S): 5000 INJECTION INTRAVENOUS; SUBCUTANEOUS at 06:50

## 2022-09-16 RX ADMIN — HEPARIN SODIUM 5000 UNIT(S): 5000 INJECTION INTRAVENOUS; SUBCUTANEOUS at 18:15

## 2022-09-16 RX ADMIN — PIPERACILLIN AND TAZOBACTAM 25 GRAM(S): 4; .5 INJECTION, POWDER, LYOPHILIZED, FOR SOLUTION INTRAVENOUS at 21:30

## 2022-09-16 RX ADMIN — NYSTATIN CREAM 1 APPLICATION(S): 100000 CREAM TOPICAL at 18:16

## 2022-09-16 RX ADMIN — DAPTOMYCIN 128 MILLIGRAM(S): 500 INJECTION, POWDER, LYOPHILIZED, FOR SOLUTION INTRAVENOUS at 14:43

## 2022-09-16 RX ADMIN — Medication 25 MILLIGRAM(S): at 06:51

## 2022-09-16 RX ADMIN — PIPERACILLIN AND TAZOBACTAM 25 GRAM(S): 4; .5 INJECTION, POWDER, LYOPHILIZED, FOR SOLUTION INTRAVENOUS at 09:20

## 2022-09-16 RX ADMIN — PIPERACILLIN AND TAZOBACTAM 25 GRAM(S): 4; .5 INJECTION, POWDER, LYOPHILIZED, FOR SOLUTION INTRAVENOUS at 00:09

## 2022-09-16 RX ADMIN — Medication 8 UNIT(S): at 18:22

## 2022-09-16 RX ADMIN — Medication 1 APPLICATION(S): at 11:39

## 2022-09-16 RX ADMIN — Medication 1 APPLICATION(S): at 11:40

## 2022-09-16 RX ADMIN — Medication 8 UNIT(S): at 13:02

## 2022-09-16 RX ADMIN — LISINOPRIL 2.5 MILLIGRAM(S): 2.5 TABLET ORAL at 06:50

## 2022-09-16 RX ADMIN — INSULIN GLARGINE 16 UNIT(S): 100 INJECTION, SOLUTION SUBCUTANEOUS at 21:30

## 2022-09-16 RX ADMIN — CHLORHEXIDINE GLUCONATE 1 APPLICATION(S): 213 SOLUTION TOPICAL at 11:40

## 2022-09-16 RX ADMIN — Medication 8 UNIT(S): at 09:16

## 2022-09-16 RX ADMIN — NYSTATIN CREAM 1 APPLICATION(S): 100000 CREAM TOPICAL at 06:51

## 2022-09-16 NOTE — PROGRESS NOTE ADULT - SUBJECTIVE AND OBJECTIVE BOX
Patient is a 52y old  Female who presents with a chief complaint of Soft tissue infection (16 Sep 2022 14:15)       INTERVAL HPI/OVERNIGHT EVENTS:  Patient seen and evaluated at bedside.  Pt is resting comfortable in NAD.    Allergies    No Known Drug Allergies  Pineapple (Anaphylaxis)    Intolerances        Vital Signs Last 24 Hrs  T(C): 37.2 (16 Sep 2022 17:45), Max: 37.2 (16 Sep 2022 17:45)  T(F): 99 (16 Sep 2022 17:45), Max: 99 (16 Sep 2022 17:45)  HR: 100 (16 Sep 2022 17:45) (88 - 100)  BP: 93/57 (16 Sep 2022 17:45) (93/57 - 119/60)  BP(mean): --  RR: 18 (16 Sep 2022 17:45) (16 - 18)  SpO2: 100% (16 Sep 2022 17:45) (99% - 100%)    Parameters below as of 16 Sep 2022 17:45  Patient On (Oxygen Delivery Method): room air        LABS:                        8.2    9.91  )-----------( 616      ( 15 Sep 2022 07:10 )             27.2     09-15    141  |  107  |  10  ----------------------------<  85  3.5   |  24  |  0.45<L>    Ca    8.2<L>      15 Sep 2022 07:10  Phos  3.7     09-15  Mg     1.50     09-15          CAPILLARY BLOOD GLUCOSE      POCT Blood Glucose.: 77 mg/dL (16 Sep 2022 16:52)  POCT Blood Glucose.: 114 mg/dL (16 Sep 2022 12:04)  POCT Blood Glucose.: 76 mg/dL (16 Sep 2022 08:24)  POCT Blood Glucose.: 184 mg/dL (15 Sep 2022 21:06)    LOWER EXTREMITY PHYSICAL EXAM:    Vascular: DP/PT 2/4, B/L, CFT <3 seconds B/L, Temperature gradient warm to cool B/L.   Neuro: Epicritic sensation diminished to the level of forefoot, B/L.  Musculoskeletal/Ortho: Immobilization 2/2 MS   Skin: Deep tissue injury of the posterior heel/ well adhered dry eschars, B/L, no signs of infection, no purulence, no drainage, no erythema, no malodor, no hyperkeratotic surrounding the surface.

## 2022-09-16 NOTE — PROGRESS NOTE ADULT - ASSESSMENT
52 y.o immobile F 2/2 to MS with deep tissue injury of the posterior heel B/L     - Pt was seen and evaluated.   - Afebrile, no leukocytosis  - No clinical indication wound cultures considering both heels have no clinical signs of infection.   - KADEEM: Deep tissue injury of the posterior heel with well adhered dry eschars B/L, no signs of infection, no purulence, no drainage, no erythema, no malodor, no hyperkeratotic surrounding the surface.   - Continue Z-flows at all times, B/L   - Pod Plan: Local wound care. No surgical intervention at this time.

## 2022-09-16 NOTE — PROGRESS NOTE ADULT - ASSESSMENT
52F bedbound due to MS, DM2, asthma, hypothyroidism, and known sacral wounds presents from [place] with urosepsis and anemia with new L flank wound with concern for necrotizing soft tissue infection s/p debridement of wound, hospital course c/b severe global LV dysfunction of unknown etiology- likely stress induced cardiomyopathy, s/p CCU course for [place] and inotropic support, now s/p swan removal and transferred back to medicine, completed unasyn 8/25, course further complicated by new pseudomonal bacteremia, restarted on IV abx.

## 2022-09-16 NOTE — PROGRESS NOTE ADULT - PROBLEM SELECTOR PLAN 4
Pt w. incontinence and intermittent urinary retention. Suspect  neurogenic bladder from MS.  - dc [facility] and attempt TOV  - if fails, can be DCed with [facility] and urology follow up

## 2022-09-16 NOTE — PROGRESS NOTE ADULT - SUBJECTIVE AND OBJECTIVE BOX
[facility] Division of Hospital Medicine  Norma Acuna MD  Available via MS Teams  Pager: [phone]    SUBJECTIVE / OVERNIGHT EVENTS:    No new complaints.     REVIEW OF SYSTEMS:    CONSTITUTIONAL: No weakness, fevers or chills  EYES/ENT: No visual changes;  No vertigo or throat pain   NECK: No pain or stiffness  RESPIRATORY: No cough, wheezing, hemoptysis; No shortness of breath  CARDIOVASCULAR: No chest pain or palpitations  GASTROINTESTINAL: No abdominal or epigastric pain. No nausea, vomiting, or hematemesis; No diarrhea or constipation. No melena or hematochezia.  GENITOURINARY: No dysuria, frequency or hematuria  NEUROLOGICAL: No numbness or weakness  SKIN: No itching, rashes    MEDICATIONS  (STANDING):  ascorbic acid 500 milliGRAM(s) Oral daily  cadexomer iodine 0.9% Gel 1 Application(s) Topical daily  chlorhexidine 2% Cloths 1 Application(s) Topical daily  cholecalciferol 2000 Unit(s) Oral daily  collagenase Ointment 1 Application(s) Topical daily  DAPTOmycin IVPB 700 milliGRAM(s) IV Intermittent every 24 hours  furosemide    Tablet 20 milliGRAM(s) Oral daily  heparin   Injectable 5000 Unit(s) SubCutaneous every 8 hours  influenza   Vaccine 0.5 milliLiter(s) IntraMuscular once  insulin glargine Injectable (LANTUS) 16 Unit(s) SubCutaneous at bedtime  insulin lispro (ADMELOG) corrective regimen sliding scale   SubCutaneous Before meals and at bedtime  insulin lispro Injectable (ADMELOG) 8 Unit(s) SubCutaneous three times a day before meals  lisinopril 2.5 milliGRAM(s) Oral daily  metoprolol succinate ER 25 milliGRAM(s) Oral daily  multivitamin 1 Tablet(s) Oral daily  nystatin Powder 1 Application(s) Topical two times a day  pantoprazole    Tablet 40 milliGRAM(s) Oral before breakfast  piperacillin/tazobactam IVPB.. 3.375 Gram(s) IV Intermittent every 8 hours  potassium chloride    Tablet ER 20 milliEquivalent(s) Oral daily    MEDICATIONS  (PRN):  acetaminophen     Tablet .. 650 milliGRAM(s) Oral every 6 hours PRN Mild Pain (1 - 3), Moderate Pain (4 - 6)  ALBUTerol    90 MICROgram(s) HFA Inhaler 2 Puff(s) Inhalation every 6 hours PRN Shortness of Breath  oxycodone    5 mG/acetaminophen 325 mG 1 Tablet(s) Oral every 4 hours PRN Severe Pain (7 - 10)  sodium chloride 0.9% lock flush 10 milliLiter(s) IV Push every 1 hour PRN Pre/post blood products, medications, blood draw, and to maintain line patency      I&O's Summary    15 Sep 2022 07:01  -  16 Sep 2022 07:00  --------------------------------------------------------  IN: 720 mL / OUT: 2000.5 mL / NET: -1280.5 mL    16 Sep 2022 07:01  -  16 Sep 2022 14:15  --------------------------------------------------------  IN: 240 mL / OUT: 0 mL / NET: 240 mL        PHYSICAL EXAM:  Vital Signs Last 24 Hrs  T(C): 36.4 (16 Sep 2022 10:00), Max: 36.9 (15 Sep 2022 20:58)  T(F): 97.5 (16 Sep 2022 10:00), Max: 98.4 (15 Sep 2022 20:58)  HR: 90 (16 Sep 2022 10:00) (88 - 96)  BP: 96/64 (16 Sep 2022 10:00) (92/57 - 119/60)  BP(mean): --  RR: 18 (16 Sep 2022 10:00) (16 - 18)  SpO2: 100% (16 Sep 2022 10:00) (99% - 100%)    Parameters below as of 16 Sep 2022 10:00  Patient On (Oxygen Delivery Method): room air      CONSTITUTIONAL: NAD, well-developed  EYES:  conjunctiva and sclera clear  NECK: Supple  RESPIRATORY: Normal respiratory effort; lungs are clear to auscultation bilaterally  CARDIOVASCULAR: Regular rate and rhythm, normal S1 and S2, no murmur/rub/gallop  ABDOMEN: Nontender to palpation, normoactive bowel sounds, no rebound/guarding; No hepatosplenomegaly  MUSCULOSKELETAL: lower extremities without new changes   PSYCH: A+O to person, place, and time; affect appropriate  NEUROLOGY: no gross motor or sensory deficits   SKIN: No rashes; no palpable lesions    LABS:                        8.2    9.91  )-----------( 616      ( 15 Sep 2022 07:10 )             27.2     09-15    141  |  107  |  10  ----------------------------<  85  3.5   |  24  |  0.45<L>    Ca    8.2<L>      15 Sep 2022 07:10  Phos  3.7     09-15  Mg     1.50     09-15                COVID-19 PCR: NotDetec (13 Sep 2022 08:30)  COVID-19 PCR: NotDetec (08 Sep 2022 11:23)  SARS-CoV-2: NotDetec (05 Sep 2022 03:10)  COVID-19 PCR: NotDetec (01 Sep 2022 12:16)  COVID-19 PCR: NotDetec (23 Aug 2022 09:54)  COVID-19 PCR: NotDetec (17 Aug 2022 07:27)  COVID-19 PCR: Detected (17 Apr 2022 07:36)  SARS-CoV-2: Detected (12 Apr 2022 21:26)  COVID-19 PCR: NotDetec (10 Apr 2022 14:18)  COVID-19 PCR: NotDetec (06 Apr 2022 20:07)      RADIOLOGY & ADDITIONAL TESTS:  New Results Reviewed Today:   New Imaging Personally Reviewed Today:  New Electrocardiogram Personally Reviewed Today:  Prior or Outpatient Records Reviewed Today:    COMMUNICATION:  Care Discussed with Consultants/Other Providers and Details of Discussion:  Discussions with Patient/Family:  PCP Communication:

## 2022-09-16 NOTE — PROGRESS NOTE ADULT - PROBLEM SELECTOR PLAN 3
- likely related to infection / reactive   - trend   - if continues to rise, patient may need aspirin ppx

## 2022-09-16 NOTE — PHARMACOTHERAPY INTERVENTION NOTE - COMMENTS
Recommended to order CPK with AM labs on 9/17 since the patient is on daptomycin and requires weekly CPK monitoring.
52 M with polymicrobial bacteremia on Zosyn (Kleb and VRE faecium).  Pt in on hydromorphone for pain (which may interact with linezolid).   kg, IBW 57 kg.  CrCl > 30 mL/min.    Recommendation(s):  1)  Consider starting daptomycin 700 mg IV q24h.  2) Monitor CPK while on daptomycin.    Darius Rosas, PharmD, BCIDP  Clinical Pharmacy Specialist, Infectious Diseases  Spectra extension [phone]  .

## 2022-09-17 LAB
GLUCOSE BLDC GLUCOMTR-MCNC: 120 MG/DL — HIGH (ref 70–99)
GLUCOSE BLDC GLUCOMTR-MCNC: 127 MG/DL — HIGH (ref 70–99)
GLUCOSE BLDC GLUCOMTR-MCNC: 140 MG/DL — HIGH (ref 70–99)
GLUCOSE BLDC GLUCOMTR-MCNC: 158 MG/DL — HIGH (ref 70–99)
SARS-COV-2 RNA SPEC QL NAA+PROBE: SIGNIFICANT CHANGE UP

## 2022-09-17 PROCEDURE — 99233 SBSQ HOSP IP/OBS HIGH 50: CPT

## 2022-09-17 RX ADMIN — HEPARIN SODIUM 5000 UNIT(S): 5000 INJECTION INTRAVENOUS; SUBCUTANEOUS at 06:50

## 2022-09-17 RX ADMIN — Medication 8 UNIT(S): at 12:23

## 2022-09-17 RX ADMIN — CHLORHEXIDINE GLUCONATE 1 APPLICATION(S): 213 SOLUTION TOPICAL at 12:34

## 2022-09-17 RX ADMIN — PIPERACILLIN AND TAZOBACTAM 25 GRAM(S): 4; .5 INJECTION, POWDER, LYOPHILIZED, FOR SOLUTION INTRAVENOUS at 05:30

## 2022-09-17 RX ADMIN — Medication 8 UNIT(S): at 08:38

## 2022-09-17 RX ADMIN — HEPARIN SODIUM 5000 UNIT(S): 5000 INJECTION INTRAVENOUS; SUBCUTANEOUS at 15:19

## 2022-09-17 RX ADMIN — HEPARIN SODIUM 5000 UNIT(S): 5000 INJECTION INTRAVENOUS; SUBCUTANEOUS at 22:38

## 2022-09-17 RX ADMIN — PIPERACILLIN AND TAZOBACTAM 25 GRAM(S): 4; .5 INJECTION, POWDER, LYOPHILIZED, FOR SOLUTION INTRAVENOUS at 22:37

## 2022-09-17 RX ADMIN — Medication 20 MILLIEQUIVALENT(S): at 12:29

## 2022-09-17 RX ADMIN — Medication 1 APPLICATION(S): at 18:17

## 2022-09-17 RX ADMIN — DAPTOMYCIN 128 MILLIGRAM(S): 500 INJECTION, POWDER, LYOPHILIZED, FOR SOLUTION INTRAVENOUS at 15:28

## 2022-09-17 RX ADMIN — NYSTATIN CREAM 1 APPLICATION(S): 100000 CREAM TOPICAL at 05:30

## 2022-09-17 RX ADMIN — Medication 1 APPLICATION(S): at 12:33

## 2022-09-17 RX ADMIN — Medication 2: at 12:23

## 2022-09-17 RX ADMIN — INSULIN GLARGINE 16 UNIT(S): 100 INJECTION, SOLUTION SUBCUTANEOUS at 22:38

## 2022-09-17 RX ADMIN — Medication 2000 UNIT(S): at 12:27

## 2022-09-17 RX ADMIN — Medication 500 MILLIGRAM(S): at 12:28

## 2022-09-17 RX ADMIN — PIPERACILLIN AND TAZOBACTAM 25 GRAM(S): 4; .5 INJECTION, POWDER, LYOPHILIZED, FOR SOLUTION INTRAVENOUS at 15:21

## 2022-09-17 RX ADMIN — Medication 1 TABLET(S): at 12:27

## 2022-09-17 NOTE — PROVIDER CONTACT NOTE (CHANGE IN STATUS NOTIFICATION) - ACTION/TREATMENT ORDERED:
DESMOND Garduno made aware, will arrive to bedside to assess & will contact wound care.
straight cath for urine after breakfast

## 2022-09-17 NOTE — PROGRESS NOTE ADULT - PROBLEM SELECTOR PLAN 9
DIET: DASH regular  DVT: SQH  DISPO: Accepted at [facility] for wound care. No beds available. D/C held off given new c/f infection.  Pt states rather go back home unless cannot have vac at home DIET: DASH regular  DVT: SQH  DISPO: Accepted at [facility] for wound care. No beds available. D/C held off given new c/f infection.  Pt states rather go back home unless cannot have vac at home  plan of care d/w pt

## 2022-09-17 NOTE — CHART NOTE - NSCHARTNOTESELECT_GEN_ALL_CORE
Event Note
Nutrition Follow-up Note/Nutrition Services
Nutrition Services
CCU/Transfer Note
Event Note
Hypotension/Event Note
MAR Accept/Event Note
New fever/Event Note
SICU/Event Note

## 2022-09-17 NOTE — PROVIDER CONTACT NOTE (CHANGE IN STATUS NOTIFICATION) - BACKGROUND
pt admitted 8/10 with necrotic fascitis with wound debridement completed, black foam wound vac in place @ 125mmhg as per order
toussaint removed 9/14

## 2022-09-17 NOTE — CHART NOTE - NSCHARTNOTEFT_GEN_A_CORE
RN concerned for leakage of wound vac.  Writer called on-call surgery resident who came to assess pt beside w/ ACP. As per surgery, the minimal leakage noted is acceptable; wound vac machine reads that its seal is intact. Will continue to monitor. Discussed with attending Dr. Quispe.

## 2022-09-17 NOTE — PROVIDER CONTACT NOTE (CHANGE IN STATUS NOTIFICATION) - ASSESSMENT
361 in bladder  post void of 300ml
small amount of serous drainage leaking from black foam sacrum wound. 0cc serous fluid output drainage in wound vac this shift.

## 2022-09-17 NOTE — PROVIDER CONTACT NOTE (CHANGE IN STATUS NOTIFICATION) - RECOMMENDATIONS
ACP arrive to bedside to assess, ACP contact surgery team and/or wound care to arrive to bedside to assess & reinforce wound dressing/vac to site

## 2022-09-17 NOTE — PROGRESS NOTE ADULT - ASSESSMENT
52F bedbound due to MS, DM2, asthma, hypothyroidism, and known sacral wounds presents from [place] with urosepsis and anemia with new L flank wound with concern for necrotizing soft tissue infection s/p debridement of wound, hospital course c/b severe global LV dysfunction of unknown etiology- likely stress induced cardiomyopathy, s/p CCU course for [place] and inotropic support, now s/p swan removal and transferred back to medicine, completed unasyn 8/25, course further complicated by new pseudomonal bacteremia, restarted on IV abx.

## 2022-09-17 NOTE — PROVIDER CONTACT NOTE (CHANGE IN STATUS NOTIFICATION) - SITUATION
Upon q2h assessment black foam wound vac @ 125 mmhg on sacrum as per protocol, RN noticed small amount of serous drainage leaking from black foam sacrum wound. 0cc serous fluid output drainage in wound vac this shift.
urine retention

## 2022-09-17 NOTE — PROGRESS NOTE ADULT - SUBJECTIVE AND OBJECTIVE BOX
Patient is a 52y old  Female who presents with a chief complaint of Soft tissue infection (16 Sep 2022 18:34)      SUBJECTIVE / OVERNIGHT EVENTS:    MEDICATIONS  (STANDING):  ascorbic acid 500 milliGRAM(s) Oral daily  cadexomer iodine 0.9% Gel 1 Application(s) Topical daily  chlorhexidine 2% Cloths 1 Application(s) Topical daily  cholecalciferol 2000 Unit(s) Oral daily  collagenase Ointment 1 Application(s) Topical daily  DAPTOmycin IVPB 700 milliGRAM(s) IV Intermittent every 24 hours  furosemide    Tablet 20 milliGRAM(s) Oral daily  heparin   Injectable 5000 Unit(s) SubCutaneous every 8 hours  influenza   Vaccine 0.5 milliLiter(s) IntraMuscular once  insulin glargine Injectable (LANTUS) 16 Unit(s) SubCutaneous at bedtime  insulin lispro (ADMELOG) corrective regimen sliding scale   SubCutaneous Before meals and at bedtime  insulin lispro Injectable (ADMELOG) 8 Unit(s) SubCutaneous three times a day before meals  lisinopril 2.5 milliGRAM(s) Oral daily  metoprolol succinate ER 25 milliGRAM(s) Oral daily  multivitamin 1 Tablet(s) Oral daily  nystatin Powder 1 Application(s) Topical two times a day  pantoprazole    Tablet 40 milliGRAM(s) Oral before breakfast  piperacillin/tazobactam IVPB.. 3.375 Gram(s) IV Intermittent every 8 hours  potassium chloride    Tablet ER 20 milliEquivalent(s) Oral daily    MEDICATIONS  (PRN):  acetaminophen     Tablet .. 650 milliGRAM(s) Oral every 6 hours PRN Mild Pain (1 - 3), Moderate Pain (4 - 6)  ALBUTerol    90 MICROgram(s) HFA Inhaler 2 Puff(s) Inhalation every 6 hours PRN Shortness of Breath  oxycodone    5 mG/acetaminophen 325 mG 1 Tablet(s) Oral every 4 hours PRN Severe Pain (7 - 10)  sodium chloride 0.9% lock flush 10 milliLiter(s) IV Push every 1 hour PRN Pre/post blood products, medications, blood draw, and to maintain line patency      Vital Signs Last 24 Hrs  T(C): 36.7 (17 Sep 2022 10:03), Max: 37.2 (16 Sep 2022 17:45)  T(F): 98.1 (17 Sep 2022 10:03), Max: 99 (16 Sep 2022 17:45)  HR: 100 (17 Sep 2022 10:03) (84 - 100)  BP: 102/69 (17 Sep 2022 10:03) (92/51 - 112/62)  BP(mean): --  RR: 18 (17 Sep 2022 10:03) (17 - 18)  SpO2: 100% (17 Sep 2022 10:03) (97% - 100%)    Parameters below as of 17 Sep 2022 10:03  Patient On (Oxygen Delivery Method): room air      CAPILLARY BLOOD GLUCOSE      POCT Blood Glucose.: 127 mg/dL (17 Sep 2022 08:15)  POCT Blood Glucose.: 156 mg/dL (16 Sep 2022 21:19)  POCT Blood Glucose.: 77 mg/dL (16 Sep 2022 16:52)  POCT Blood Glucose.: 114 mg/dL (16 Sep 2022 12:04)    I&O's Summary    16 Sep 2022 07:01  -  17 Sep 2022 07:00  --------------------------------------------------------  IN: 920 mL / OUT: 950 mL / NET: -30 mL        PHYSICAL EXAM:  GENERAL: NAD, well-developed  HEAD:  Atraumatic, Normocephalic  EYES: EOMI, PERRLA, conjunctiva and sclera clear  NECK: Supple, No JVD  CHEST/LUNG: Clear to auscultation bilaterally; No wheeze  HEART: Regular rate and rhythm; No murmurs, rubs, or gallops  ABDOMEN: Soft, Nontender, Nondistended; Bowel sounds present  EXTREMITIES:  2+ Peripheral Pulses, No clubbing, cyanosis, or edema  PSYCH: AAOx3  NEUROLOGY: non-focal  SKIN: No rashes or lesions    LABS:                    RADIOLOGY & ADDITIONAL TESTS:    Imaging Personally Reviewed:    Consultant(s) Notes Reviewed:      Care Discussed with Consultants/Other Providers:   Patient is a 52y old  Female who presents with a chief complaint of Soft tissue infection (16 Sep 2022 18:34)      SUBJECTIVE / OVERNIGHT EVENTS: patient seen and examined by bedside, pt c/o dizziness       MEDICATIONS  (STANDING):  ascorbic acid 500 milliGRAM(s) Oral daily  cadexomer iodine 0.9% Gel 1 Application(s) Topical daily  chlorhexidine 2% Cloths 1 Application(s) Topical daily  cholecalciferol 2000 Unit(s) Oral daily  collagenase Ointment 1 Application(s) Topical daily  DAPTOmycin IVPB 700 milliGRAM(s) IV Intermittent every 24 hours  furosemide    Tablet 20 milliGRAM(s) Oral daily  heparin   Injectable 5000 Unit(s) SubCutaneous every 8 hours  influenza   Vaccine 0.5 milliLiter(s) IntraMuscular once  insulin glargine Injectable (LANTUS) 16 Unit(s) SubCutaneous at bedtime  insulin lispro (ADMELOG) corrective regimen sliding scale   SubCutaneous Before meals and at bedtime  insulin lispro Injectable (ADMELOG) 8 Unit(s) SubCutaneous three times a day before meals  lisinopril 2.5 milliGRAM(s) Oral daily  metoprolol succinate ER 25 milliGRAM(s) Oral daily  multivitamin 1 Tablet(s) Oral daily  nystatin Powder 1 Application(s) Topical two times a day  pantoprazole    Tablet 40 milliGRAM(s) Oral before breakfast  piperacillin/tazobactam IVPB.. 3.375 Gram(s) IV Intermittent every 8 hours  potassium chloride    Tablet ER 20 milliEquivalent(s) Oral daily    MEDICATIONS  (PRN):  acetaminophen     Tablet .. 650 milliGRAM(s) Oral every 6 hours PRN Mild Pain (1 - 3), Moderate Pain (4 - 6)  ALBUTerol    90 MICROgram(s) HFA Inhaler 2 Puff(s) Inhalation every 6 hours PRN Shortness of Breath  oxycodone    5 mG/acetaminophen 325 mG 1 Tablet(s) Oral every 4 hours PRN Severe Pain (7 - 10)  sodium chloride 0.9% lock flush 10 milliLiter(s) IV Push every 1 hour PRN Pre/post blood products, medications, blood draw, and to maintain line patency      Vital Signs Last 24 Hrs  T(C): 36.7 (17 Sep 2022 10:03), Max: 37.2 (16 Sep 2022 17:45)  T(F): 98.1 (17 Sep 2022 10:03), Max: 99 (16 Sep 2022 17:45)  HR: 100 (17 Sep 2022 10:03) (84 - 100)  BP: 102/69 (17 Sep 2022 10:03) (92/51 - 112/62)  BP(mean): --  RR: 18 (17 Sep 2022 10:03) (17 - 18)  SpO2: 100% (17 Sep 2022 10:03) (97% - 100%)    Parameters below as of 17 Sep 2022 10:03  Patient On (Oxygen Delivery Method): room air      CAPILLARY BLOOD GLUCOSE      POCT Blood Glucose.: 127 mg/dL (17 Sep 2022 08:15)  POCT Blood Glucose.: 156 mg/dL (16 Sep 2022 21:19)  POCT Blood Glucose.: 77 mg/dL (16 Sep 2022 16:52)  POCT Blood Glucose.: 114 mg/dL (16 Sep 2022 12:04)    I&O's Summary    16 Sep 2022 07:01  -  17 Sep 2022 07:00  --------------------------------------------------------  IN: 920 mL / OUT: 950 mL / NET: -30 mL        PHYSICAL EXAM:  GENERAL: NAD, obese  HEAD:  Atraumatic, Normocephalic  EYES: EOMI, PERRLA, conjunctiva and sclera clear  NECK: Supple, No JVD  CHEST/LUNG: Clear to auscultation bilaterally; No wheeze  HEART: Regular rate and rhythm; No murmurs, rubs, or gallops  ABDOMEN: Soft, Nontender, Nondistended; Bowel sounds present  EXTREMITIES:  2+ Peripheral Pulses, No clubbing, cyanosis, or edema  NEUROLOGY:  AAOx3, bedbound , normal strength UE , decreased strength B/L LE    SKIN: flank wound with VAC , DTI in post heel     LABS:                    RADIOLOGY & ADDITIONAL TESTS:    Imaging Personally Reviewed:    Consultant(s) Notes Reviewed:  Podiatry     Care Discussed with Consultants/Other Providers:   Patient is a 52y old  Female who presents with a chief complaint of Soft tissue infection (16 Sep 2022 18:34)      SUBJECTIVE / OVERNIGHT EVENTS: patient seen and examined by bedside, pt c/o dizziness, denies headache, , SOB, CP, Palpitations , N/V/D, abdominal pain        MEDICATIONS  (STANDING):  ascorbic acid 500 milliGRAM(s) Oral daily  cadexomer iodine 0.9% Gel 1 Application(s) Topical daily  chlorhexidine 2% Cloths 1 Application(s) Topical daily  cholecalciferol 2000 Unit(s) Oral daily  collagenase Ointment 1 Application(s) Topical daily  DAPTOmycin IVPB 700 milliGRAM(s) IV Intermittent every 24 hours  furosemide    Tablet 20 milliGRAM(s) Oral daily  heparin   Injectable 5000 Unit(s) SubCutaneous every 8 hours  influenza   Vaccine 0.5 milliLiter(s) IntraMuscular once  insulin glargine Injectable (LANTUS) 16 Unit(s) SubCutaneous at bedtime  insulin lispro (ADMELOG) corrective regimen sliding scale   SubCutaneous Before meals and at bedtime  insulin lispro Injectable (ADMELOG) 8 Unit(s) SubCutaneous three times a day before meals  lisinopril 2.5 milliGRAM(s) Oral daily  metoprolol succinate ER 25 milliGRAM(s) Oral daily  multivitamin 1 Tablet(s) Oral daily  nystatin Powder 1 Application(s) Topical two times a day  pantoprazole    Tablet 40 milliGRAM(s) Oral before breakfast  piperacillin/tazobactam IVPB.. 3.375 Gram(s) IV Intermittent every 8 hours  potassium chloride    Tablet ER 20 milliEquivalent(s) Oral daily    MEDICATIONS  (PRN):  acetaminophen     Tablet .. 650 milliGRAM(s) Oral every 6 hours PRN Mild Pain (1 - 3), Moderate Pain (4 - 6)  ALBUTerol    90 MICROgram(s) HFA Inhaler 2 Puff(s) Inhalation every 6 hours PRN Shortness of Breath  oxycodone    5 mG/acetaminophen 325 mG 1 Tablet(s) Oral every 4 hours PRN Severe Pain (7 - 10)  sodium chloride 0.9% lock flush 10 milliLiter(s) IV Push every 1 hour PRN Pre/post blood products, medications, blood draw, and to maintain line patency      Vital Signs Last 24 Hrs  T(C): 36.7 (17 Sep 2022 10:03), Max: 37.2 (16 Sep 2022 17:45)  T(F): 98.1 (17 Sep 2022 10:03), Max: 99 (16 Sep 2022 17:45)  HR: 100 (17 Sep 2022 10:03) (84 - 100)  BP: 102/69 (17 Sep 2022 10:03) (92/51 - 112/62)  BP(mean): --  RR: 18 (17 Sep 2022 10:03) (17 - 18)  SpO2: 100% (17 Sep 2022 10:03) (97% - 100%)    Parameters below as of 17 Sep 2022 10:03  Patient On (Oxygen Delivery Method): room air      CAPILLARY BLOOD GLUCOSE      POCT Blood Glucose.: 127 mg/dL (17 Sep 2022 08:15)  POCT Blood Glucose.: 156 mg/dL (16 Sep 2022 21:19)  POCT Blood Glucose.: 77 mg/dL (16 Sep 2022 16:52)  POCT Blood Glucose.: 114 mg/dL (16 Sep 2022 12:04)    I&O's Summary    16 Sep 2022 07:01  -  17 Sep 2022 07:00  --------------------------------------------------------  IN: 920 mL / OUT: 950 mL / NET: -30 mL        PHYSICAL EXAM:  GENERAL: NAD, obese  HEAD:  Atraumatic, Normocephalic  EYES: EOMI, PERRLA, conjunctiva and sclera clear  NECK: Supple, No JVD  CHEST/LUNG: Clear to auscultation bilaterally; No wheeze  HEART: Regular rate and rhythm; No murmurs, rubs, or gallops  ABDOMEN: Soft, Nontender, Nondistended; Bowel sounds present  EXTREMITIES:  2+ Peripheral Pulses, No clubbing, cyanosis, or edema  NEUROLOGY:  AAOx3, bedbound , normal strength UE , decreased strength B/L LE    SKIN: flank wound with VAC , DTI in post heel     LABS:                    RADIOLOGY & ADDITIONAL TESTS:    Imaging Personally Reviewed:    Consultant(s) Notes Reviewed:  Podiatry     Care Discussed with Consultants/Other Providers:

## 2022-09-17 NOTE — PROGRESS NOTE ADULT - PROBLEM SELECTOR PLAN 8
HbA1c 5.5  - c/w Lantus @ 16U qhs & increase lispro 6U-->8U TID  - Monitor fingersticks HbA1c 5.5  - c/w Lantus @ 16U qhs & increase lispro 6U-->8U TID  - Monitor fingersticks, FSBS x2 noted 77 and 76 , fsbs 156 and 127 subsequently, if less than 100, will decrease insulin

## 2022-09-18 ENCOUNTER — TRANSCRIPTION ENCOUNTER (OUTPATIENT)
Age: 52
End: 2022-09-18

## 2022-09-18 VITALS
TEMPERATURE: 102 F | OXYGEN SATURATION: 96 % | DIASTOLIC BLOOD PRESSURE: 73 MMHG | RESPIRATION RATE: 18 BRPM | SYSTOLIC BLOOD PRESSURE: 124 MMHG | HEART RATE: 104 BPM

## 2022-09-18 LAB
ANION GAP SERPL CALC-SCNC: 11 MMOL/L — SIGNIFICANT CHANGE UP (ref 7–14)
BUN SERPL-MCNC: 10 MG/DL — SIGNIFICANT CHANGE UP (ref 7–23)
CALCIUM SERPL-MCNC: 8.6 MG/DL — SIGNIFICANT CHANGE UP (ref 8.4–10.5)
CHLORIDE SERPL-SCNC: 109 MMOL/L — HIGH (ref 98–107)
CK SERPL-CCNC: 33 U/L — SIGNIFICANT CHANGE UP (ref 25–170)
CO2 SERPL-SCNC: 22 MMOL/L — SIGNIFICANT CHANGE UP (ref 22–31)
CREAT SERPL-MCNC: 0.41 MG/DL — LOW (ref 0.5–1.3)
EGFR: 120 ML/MIN/1.73M2 — SIGNIFICANT CHANGE UP
GLUCOSE BLDC GLUCOMTR-MCNC: 101 MG/DL — HIGH (ref 70–99)
GLUCOSE BLDC GLUCOMTR-MCNC: 122 MG/DL — HIGH (ref 70–99)
GLUCOSE BLDC GLUCOMTR-MCNC: 144 MG/DL — HIGH (ref 70–99)
GLUCOSE BLDC GLUCOMTR-MCNC: 97 MG/DL — SIGNIFICANT CHANGE UP (ref 70–99)
GLUCOSE SERPL-MCNC: 89 MG/DL — SIGNIFICANT CHANGE UP (ref 70–99)
HCT VFR BLD CALC: 26.8 % — LOW (ref 34.5–45)
HGB BLD-MCNC: 8.1 G/DL — LOW (ref 11.5–15.5)
MAGNESIUM SERPL-MCNC: 1.5 MG/DL — LOW (ref 1.6–2.6)
MCHC RBC-ENTMCNC: 27.6 PG — SIGNIFICANT CHANGE UP (ref 27–34)
MCHC RBC-ENTMCNC: 30.2 GM/DL — LOW (ref 32–36)
MCV RBC AUTO: 91.5 FL — SIGNIFICANT CHANGE UP (ref 80–100)
NRBC # BLD: 0 /100 WBCS — SIGNIFICANT CHANGE UP (ref 0–0)
NRBC # FLD: 0 K/UL — SIGNIFICANT CHANGE UP (ref 0–0)
PHOSPHATE SERPL-MCNC: 3.6 MG/DL — SIGNIFICANT CHANGE UP (ref 2.5–4.5)
PLATELET # BLD AUTO: 645 K/UL — HIGH (ref 150–400)
POTASSIUM SERPL-MCNC: 3.6 MMOL/L — SIGNIFICANT CHANGE UP (ref 3.5–5.3)
POTASSIUM SERPL-SCNC: 3.6 MMOL/L — SIGNIFICANT CHANGE UP (ref 3.5–5.3)
RBC # BLD: 2.93 M/UL — LOW (ref 3.8–5.2)
RBC # FLD: 17.8 % — HIGH (ref 10.3–14.5)
SODIUM SERPL-SCNC: 142 MMOL/L — SIGNIFICANT CHANGE UP (ref 135–145)
WBC # BLD: 7.67 K/UL — SIGNIFICANT CHANGE UP (ref 3.8–10.5)
WBC # FLD AUTO: 7.67 K/UL — SIGNIFICANT CHANGE UP (ref 3.8–10.5)

## 2022-09-18 PROCEDURE — 99239 HOSP IP/OBS DSCHRG MGMT >30: CPT

## 2022-09-18 RX ORDER — METRONIDAZOLE 500 MG
1 TABLET ORAL
Qty: 0 | Refills: 0 | DISCHARGE

## 2022-09-18 RX ORDER — MECLIZINE HCL 12.5 MG
12.5 TABLET ORAL ONCE
Refills: 0 | Status: DISCONTINUED | OUTPATIENT
Start: 2022-09-18 | End: 2022-09-18

## 2022-09-18 RX ORDER — CADEXOMER IODINE 0.9 %
1 PADS, MEDICATED (EA) TOPICAL
Qty: 0 | Refills: 0 | DISCHARGE
Start: 2022-09-18

## 2022-09-18 RX ORDER — LISINOPRIL 2.5 MG/1
1 TABLET ORAL
Qty: 0 | Refills: 0 | DISCHARGE
Start: 2022-09-18

## 2022-09-18 RX ORDER — DULAGLUTIDE 4.5 MG/.5ML
0.75 INJECTION, SOLUTION SUBCUTANEOUS
Qty: 0 | Refills: 0 | DISCHARGE

## 2022-09-18 RX ORDER — CEFEPIME 1 G/1
1 INJECTION, POWDER, FOR SOLUTION INTRAMUSCULAR; INTRAVENOUS
Qty: 0 | Refills: 0 | DISCHARGE

## 2022-09-18 RX ORDER — NYSTATIN CREAM 100000 [USP'U]/G
1 CREAM TOPICAL
Qty: 0 | Refills: 0 | DISCHARGE
Start: 2022-09-18

## 2022-09-18 RX ORDER — ACETAMINOPHEN 500 MG
2 TABLET ORAL
Qty: 0 | Refills: 0 | DISCHARGE
Start: 2022-09-18

## 2022-09-18 RX ORDER — CHLORHEXIDINE GLUCONATE 213 G/1000ML
1 SOLUTION TOPICAL
Qty: 0 | Refills: 0 | DISCHARGE
Start: 2022-09-18

## 2022-09-18 RX ORDER — METFORMIN HYDROCHLORIDE 850 MG/1
1 TABLET ORAL
Qty: 0 | Refills: 0 | DISCHARGE

## 2022-09-18 RX ORDER — COLLAGENASE CLOSTRIDIUM HIST. 250 UNIT/G
1 OINTMENT (GRAM) TOPICAL
Qty: 0 | Refills: 0 | DISCHARGE
Start: 2022-09-18

## 2022-09-18 RX ORDER — METOPROLOL TARTRATE 50 MG
1 TABLET ORAL
Qty: 0 | Refills: 0 | DISCHARGE
Start: 2022-09-18

## 2022-09-18 RX ORDER — DAPTOMYCIN 500 MG/10ML
700 INJECTION, POWDER, LYOPHILIZED, FOR SOLUTION INTRAVENOUS
Qty: 0 | Refills: 0 | DISCHARGE
Start: 2022-09-18

## 2022-09-18 RX ORDER — MAGNESIUM SULFATE 500 MG/ML
2 VIAL (ML) INJECTION ONCE
Refills: 0 | Status: DISCONTINUED | OUTPATIENT
Start: 2022-09-18 | End: 2022-09-18

## 2022-09-18 RX ORDER — PANTOPRAZOLE SODIUM 20 MG/1
1 TABLET, DELAYED RELEASE ORAL
Qty: 0 | Refills: 0 | DISCHARGE
Start: 2022-09-18

## 2022-09-18 RX ORDER — CHOLECALCIFEROL (VITAMIN D3) 125 MCG
2000 CAPSULE ORAL
Qty: 0 | Refills: 0 | DISCHARGE
Start: 2022-09-18

## 2022-09-18 RX ORDER — MECLIZINE HCL 12.5 MG
12.5 TABLET ORAL
Refills: 0 | Status: DISCONTINUED | OUTPATIENT
Start: 2022-09-18 | End: 2022-09-18

## 2022-09-18 RX ORDER — FERROUS SULFATE 325(65) MG
1 TABLET ORAL
Qty: 0 | Refills: 0 | DISCHARGE

## 2022-09-18 RX ORDER — FUROSEMIDE 40 MG
1 TABLET ORAL
Qty: 0 | Refills: 0 | DISCHARGE
Start: 2022-09-18

## 2022-09-18 RX ORDER — INSULIN LISPRO 100/ML
8 VIAL (ML) SUBCUTANEOUS
Qty: 0 | Refills: 0 | DISCHARGE
Start: 2022-09-18

## 2022-09-18 RX ORDER — FOLIC ACID 0.8 MG
1 TABLET ORAL
Qty: 0 | Refills: 0 | DISCHARGE

## 2022-09-18 RX ORDER — INSULIN GLARGINE 100 [IU]/ML
14 INJECTION, SOLUTION SUBCUTANEOUS
Qty: 0 | Refills: 0 | DISCHARGE
Start: 2022-09-18

## 2022-09-18 RX ORDER — PIPERACILLIN AND TAZOBACTAM 4; .5 G/20ML; G/20ML
3.38 INJECTION, POWDER, LYOPHILIZED, FOR SOLUTION INTRAVENOUS
Qty: 0 | Refills: 0 | DISCHARGE
Start: 2022-09-18

## 2022-09-18 RX ORDER — ASPIRIN/CALCIUM CARB/MAGNESIUM 324 MG
81 TABLET ORAL DAILY
Refills: 0 | Status: DISCONTINUED | OUTPATIENT
Start: 2022-09-18 | End: 2022-09-18

## 2022-09-18 RX ORDER — MECLIZINE HCL 12.5 MG
1 TABLET ORAL
Qty: 0 | Refills: 0 | DISCHARGE
Start: 2022-09-18

## 2022-09-18 RX ORDER — MECLIZINE HCL 12.5 MG
1 TABLET ORAL
Qty: 0 | Refills: 0 | DISCHARGE

## 2022-09-18 RX ORDER — ERYTHROPOIETIN 10000 [IU]/ML
40000 INJECTION, SOLUTION INTRAVENOUS; SUBCUTANEOUS
Qty: 0 | Refills: 0 | DISCHARGE

## 2022-09-18 RX ORDER — INSULIN GLARGINE 100 [IU]/ML
30 INJECTION, SOLUTION SUBCUTANEOUS
Qty: 0 | Refills: 0 | DISCHARGE

## 2022-09-18 RX ORDER — HEPARIN SODIUM 5000 [USP'U]/ML
5000 INJECTION INTRAVENOUS; SUBCUTANEOUS
Qty: 0 | Refills: 0 | DISCHARGE
Start: 2022-09-18

## 2022-09-18 RX ORDER — ASCORBIC ACID 60 MG
1 TABLET,CHEWABLE ORAL
Qty: 0 | Refills: 0 | DISCHARGE
Start: 2022-09-18

## 2022-09-18 RX ORDER — INSULIN ASPART 100 [IU]/ML
10 INJECTION, SOLUTION SUBCUTANEOUS
Qty: 0 | Refills: 0 | DISCHARGE

## 2022-09-18 RX ORDER — LEVOTHYROXINE SODIUM 125 MCG
1 TABLET ORAL
Qty: 0 | Refills: 0 | DISCHARGE

## 2022-09-18 RX ORDER — SODIUM CHLORIDE 9 MG/ML
10 INJECTION INTRAMUSCULAR; INTRAVENOUS; SUBCUTANEOUS
Qty: 0 | Refills: 0 | DISCHARGE
Start: 2022-09-18

## 2022-09-18 RX ORDER — POTASSIUM CHLORIDE 20 MEQ
1 PACKET (EA) ORAL
Qty: 0 | Refills: 0 | DISCHARGE
Start: 2022-09-18

## 2022-09-18 RX ADMIN — Medication 8 UNIT(S): at 13:00

## 2022-09-18 RX ADMIN — PIPERACILLIN AND TAZOBACTAM 25 GRAM(S): 4; .5 INJECTION, POWDER, LYOPHILIZED, FOR SOLUTION INTRAVENOUS at 05:57

## 2022-09-18 RX ADMIN — Medication 8 UNIT(S): at 09:46

## 2022-09-18 RX ADMIN — NYSTATIN CREAM 1 APPLICATION(S): 100000 CREAM TOPICAL at 05:57

## 2022-09-18 RX ADMIN — HEPARIN SODIUM 5000 UNIT(S): 5000 INJECTION INTRAVENOUS; SUBCUTANEOUS at 05:58

## 2022-09-18 NOTE — PROGRESS NOTE ADULT - PROBLEM SELECTOR PROBLEM 2
Acute systolic heart failure
Necrotizing soft tissue infection
Acute systolic heart failure
Necrotizing soft tissue infection
Acute systolic heart failure
Necrotizing soft tissue infection
Electrolyte abnormality
Necrotizing soft tissue infection

## 2022-09-18 NOTE — PROGRESS NOTE ADULT - SUBJECTIVE AND OBJECTIVE BOX
Patient is a 52y old  Female who presents with a chief complaint of Soft tissue infection (17 Sep 2022 11:05)      SUBJECTIVE / OVERNIGHT EVENTS:    MEDICATIONS  (STANDING):  ascorbic acid 500 milliGRAM(s) Oral daily  cadexomer iodine 0.9% Gel 1 Application(s) Topical daily  chlorhexidine 2% Cloths 1 Application(s) Topical daily  cholecalciferol 2000 Unit(s) Oral daily  collagenase Ointment 1 Application(s) Topical daily  DAPTOmycin IVPB 700 milliGRAM(s) IV Intermittent every 24 hours  furosemide    Tablet 20 milliGRAM(s) Oral daily  heparin   Injectable 5000 Unit(s) SubCutaneous every 8 hours  influenza   Vaccine 0.5 milliLiter(s) IntraMuscular once  insulin glargine Injectable (LANTUS) 16 Unit(s) SubCutaneous at bedtime  insulin lispro (ADMELOG) corrective regimen sliding scale   SubCutaneous Before meals and at bedtime  insulin lispro Injectable (ADMELOG) 8 Unit(s) SubCutaneous three times a day before meals  lisinopril 2.5 milliGRAM(s) Oral daily  metoprolol succinate ER 25 milliGRAM(s) Oral daily  multivitamin 1 Tablet(s) Oral daily  nystatin Powder 1 Application(s) Topical two times a day  pantoprazole    Tablet 40 milliGRAM(s) Oral before breakfast  piperacillin/tazobactam IVPB.. 3.375 Gram(s) IV Intermittent every 8 hours  potassium chloride    Tablet ER 20 milliEquivalent(s) Oral daily    MEDICATIONS  (PRN):  acetaminophen     Tablet .. 650 milliGRAM(s) Oral every 6 hours PRN Mild Pain (1 - 3), Moderate Pain (4 - 6)  ALBUTerol    90 MICROgram(s) HFA Inhaler 2 Puff(s) Inhalation every 6 hours PRN Shortness of Breath  oxycodone    5 mG/acetaminophen 325 mG 1 Tablet(s) Oral every 4 hours PRN Severe Pain (7 - 10)  sodium chloride 0.9% lock flush 10 milliLiter(s) IV Push every 1 hour PRN Pre/post blood products, medications, blood draw, and to maintain line patency      Vital Signs Last 24 Hrs  T(C): 37.1 (18 Sep 2022 06:08), Max: 37.4 (17 Sep 2022 23:08)  T(F): 98.7 (18 Sep 2022 06:08), Max: 99.4 (17 Sep 2022 23:08)  HR: 91 (18 Sep 2022 06:08) (91 - 103)  BP: 114/63 (18 Sep 2022 06:08) (102/69 - 114/63)  BP(mean): --  RR: 17 (18 Sep 2022 06:08) (17 - 18)  SpO2: 100% (18 Sep 2022 06:08) (99% - 100%)    Parameters below as of 18 Sep 2022 06:08  Patient On (Oxygen Delivery Method): room air      CAPILLARY BLOOD GLUCOSE      POCT Blood Glucose.: 97 mg/dL (18 Sep 2022 09:30)  POCT Blood Glucose.: 101 mg/dL (18 Sep 2022 08:14)  POCT Blood Glucose.: 120 mg/dL (17 Sep 2022 22:36)  POCT Blood Glucose.: 140 mg/dL (17 Sep 2022 17:33)  POCT Blood Glucose.: 158 mg/dL (17 Sep 2022 12:11)    I&O's Summary    17 Sep 2022 07:01  -  18 Sep 2022 07:00  --------------------------------------------------------  IN: 540 mL / OUT: 1100 mL / NET: -560 mL        PHYSICAL EXAM:  GENERAL: NAD, well-developed  HEAD:  Atraumatic, Normocephalic  EYES: EOMI, PERRLA, conjunctiva and sclera clear  NECK: Supple, No JVD  CHEST/LUNG: Clear to auscultation bilaterally; No wheeze  HEART: Regular rate and rhythm; No murmurs, rubs, or gallops  ABDOMEN: Soft, Nontender, Nondistended; Bowel sounds present  EXTREMITIES:  2+ Peripheral Pulses, No clubbing, cyanosis, or edema  PSYCH: AAOx3  NEUROLOGY: non-focal  SKIN: No rashes or lesions    LABS:                        8.1    7.67  )-----------( 645      ( 18 Sep 2022 06:30 )             26.8     09-18    142  |  109<H>  |  10  ----------------------------<  89  3.6   |  22  |  0.41<L>    Ca    8.6      18 Sep 2022 06:30  Phos  3.6     09-18  Mg     1.50     09-18                RADIOLOGY & ADDITIONAL TESTS:    Imaging Personally Reviewed:    Consultant(s) Notes Reviewed:      Care Discussed with Consultants/Other Providers:   Patient is a 52y old  Female who presents with a chief complaint of Soft tissue infection (17 Sep 2022 11:05)      SUBJECTIVE / OVERNIGHT EVENTS: patient seen and examined by bedside, pt c/o vertigo denies headache, , SOB, CP, Palpitations , N/V/D, abdominal pain      MEDICATIONS  (STANDING):  ascorbic acid 500 milliGRAM(s) Oral daily  cadexomer iodine 0.9% Gel 1 Application(s) Topical daily  chlorhexidine 2% Cloths 1 Application(s) Topical daily  cholecalciferol 2000 Unit(s) Oral daily  collagenase Ointment 1 Application(s) Topical daily  DAPTOmycin IVPB 700 milliGRAM(s) IV Intermittent every 24 hours  furosemide    Tablet 20 milliGRAM(s) Oral daily  heparin   Injectable 5000 Unit(s) SubCutaneous every 8 hours  influenza   Vaccine 0.5 milliLiter(s) IntraMuscular once  insulin glargine Injectable (LANTUS) 16 Unit(s) SubCutaneous at bedtime  insulin lispro (ADMELOG) corrective regimen sliding scale   SubCutaneous Before meals and at bedtime  insulin lispro Injectable (ADMELOG) 8 Unit(s) SubCutaneous three times a day before meals  lisinopril 2.5 milliGRAM(s) Oral daily  metoprolol succinate ER 25 milliGRAM(s) Oral daily  multivitamin 1 Tablet(s) Oral daily  nystatin Powder 1 Application(s) Topical two times a day  pantoprazole    Tablet 40 milliGRAM(s) Oral before breakfast  piperacillin/tazobactam IVPB.. 3.375 Gram(s) IV Intermittent every 8 hours  potassium chloride    Tablet ER 20 milliEquivalent(s) Oral daily    MEDICATIONS  (PRN):  acetaminophen     Tablet .. 650 milliGRAM(s) Oral every 6 hours PRN Mild Pain (1 - 3), Moderate Pain (4 - 6)  ALBUTerol    90 MICROgram(s) HFA Inhaler 2 Puff(s) Inhalation every 6 hours PRN Shortness of Breath  oxycodone    5 mG/acetaminophen 325 mG 1 Tablet(s) Oral every 4 hours PRN Severe Pain (7 - 10)  sodium chloride 0.9% lock flush 10 milliLiter(s) IV Push every 1 hour PRN Pre/post blood products, medications, blood draw, and to maintain line patency      Vital Signs Last 24 Hrs  T(C): 37.1 (18 Sep 2022 06:08), Max: 37.4 (17 Sep 2022 23:08)  T(F): 98.7 (18 Sep 2022 06:08), Max: 99.4 (17 Sep 2022 23:08)  HR: 91 (18 Sep 2022 06:08) (91 - 103)  BP: 114/63 (18 Sep 2022 06:08) (102/69 - 114/63)  BP(mean): --  RR: 17 (18 Sep 2022 06:08) (17 - 18)  SpO2: 100% (18 Sep 2022 06:08) (99% - 100%)    Parameters below as of 18 Sep 2022 06:08  Patient On (Oxygen Delivery Method): room air      CAPILLARY BLOOD GLUCOSE      POCT Blood Glucose.: 97 mg/dL (18 Sep 2022 09:30)  POCT Blood Glucose.: 101 mg/dL (18 Sep 2022 08:14)  POCT Blood Glucose.: 120 mg/dL (17 Sep 2022 22:36)  POCT Blood Glucose.: 140 mg/dL (17 Sep 2022 17:33)  POCT Blood Glucose.: 158 mg/dL (17 Sep 2022 12:11)    I&O's Summary    17 Sep 2022 07:01  -  18 Sep 2022 07:00  --------------------------------------------------------  IN: 540 mL / OUT: 1100 mL / NET: -560 mL    PHYSICAL EXAM:  GENERAL: NAD, obese  HEAD:  Atraumatic, Normocephalic  EYES: EOMI, PERRLA, conjunctiva and sclera clear  NECK: Supple, No JVD  CHEST/LUNG: Clear to auscultation bilaterally; No wheeze  HEART: Regular rate and rhythm; No murmurs, rubs, or gallops  ABDOMEN: Soft, Nontender, Nondistended; Bowel sounds present  EXTREMITIES:  2+ Peripheral Pulses, No clubbing, cyanosis, or edema  NEUROLOGY:  AAOx3, bedbound , normal strength UE , decreased strength B/L LE , midline RUE +   SKIN: flank wound with VAC , DTI in post heel           LABS:                        8.1    7.67  )-----------( 645      ( 18 Sep 2022 06:30 )             26.8     09-18    142  |  109<H>  |  10  ----------------------------<  89  3.6   |  22  |  0.41<L>    Ca    8.6      18 Sep 2022 06:30  Phos  3.6     09-18  Mg     1.50     09-18                RADIOLOGY & ADDITIONAL TESTS:    Imaging Personally Reviewed:    Consultant(s) Notes Reviewed:      Care Discussed with Consultants/Other Providers: ID

## 2022-09-18 NOTE — PROGRESS NOTE ADULT - PROBLEM SELECTOR PLAN 3
- likely related to infection / reactive   - trend   - if continues to rise, patient may need aspirin ppx - likely related to infection / reactive   - trend   -

## 2022-09-18 NOTE — DISCHARGE NOTE NURSING/CASE MANAGEMENT/SOCIAL WORK - PATIENT PORTAL LINK FT
You can access the FollowMyHealth Patient Portal offered by [facility] by registering at the following website: [email or web address]. By joining [facility]’s FollowMyHealth portal, you will also be able to view your health information using other applications (apps) compatible with our system.

## 2022-09-18 NOTE — PROGRESS NOTE ADULT - PROBLEM SELECTOR PLAN 8
HbA1c 5.5  - c/w Lantus @ 16U qhs & increase lispro 6U-->8U TID  - Monitor fingersticks, FSBS x2 noted 77 and 76 , fsbs 156 and 127 subsequently, if less than 100, will decrease insulin HbA1c 5.5  - c/w Lantus @ 16U qhs and Lispro 8U TID  - Monitor fingersticks, FSBS x2 noted 77 and 76 ,   - FSBS 97 today, will decrease lantus to 14 U

## 2022-09-18 NOTE — PROGRESS NOTE ADULT - PROBLEM SELECTOR PROBLEM 1
Bacteremia due to Pseudomonas
Electrolyte abnormality
Sepsis
Metabolic acidosis
Sepsis
Sepsis
Electrolyte abnormality
Sepsis
Necrotizing soft tissue infection
Bacteremia due to Pseudomonas
Electrolyte abnormality
Bacteremia due to Pseudomonas
Necrotizing soft tissue infection
Electrolyte abnormality
Bacteremia due to Pseudomonas
Bacteremia due to Pseudomonas
Electrolyte abnormality
Electrolyte abnormality
Necrotizing soft tissue infection
Sepsis
Sepsis
Electrolyte abnormality
Electrolyte abnormality
Bacteremia due to Pseudomonas
Electrolyte abnormality
Electrolyte abnormality
Bacteremia due to Pseudomonas
Bacteremia due to Pseudomonas

## 2022-09-18 NOTE — PROGRESS NOTE ADULT - PROBLEM SELECTOR PLAN 7
iron studies c/w AOCD likely 2/2 infections; B12 and folic acid wnl  - stool guaiac positive  - retic count elevated  - monitor CBC, Hb stable ~8 iron studies c/w AOCD likely 2/2 infections; B12 and folic acid wnl  - stool guaiac positive  - retic count elevated  - monitor CBC, Hb stable ~8  -outpt f/u

## 2022-09-18 NOTE — PROGRESS NOTE ADULT - REASON FOR ADMISSION
Soft tissue infection

## 2022-09-18 NOTE — PROGRESS NOTE ADULT - ASSESSMENT
52F bedbound due to MS, DM2, asthma, hypothyroidism, and known sacral wounds presents from [facility] with urosepsis and anemia with new L flank wound with concern for necrotizing soft tissue infection s/p debridement of wound, hospital course c/b severe global LV dysfunction of unknown etiology- likely stress induced cardiomyopathy, s/p CCU course for [place] and inotropic support, now s/p swan removal and transferred back to medicine, completed unasyn 8/25, course further complicated by new pseudomonal bacteremia, restarted on IV abx.

## 2022-09-18 NOTE — PROGRESS NOTE ADULT - PROBLEM SELECTOR PLAN 4
Pt w. incontinence and intermittent urinary retention. Suspect  neurogenic bladder from MS.  - dc [facility] and attempt TOV  - if fails, can be DCed with [facility] and urology follow up Pt w. incontinence and intermittent urinary retention. Suspect  neurogenic bladder from MS.  -

## 2022-09-18 NOTE — PROGRESS NOTE ADULT - PROBLEM SELECTOR PLAN 2
s/p L flank wound debridement  -  if w/ persistent fevers/leukocytosis, will benefit from re-evaluation for further debridement   - current abx as above   - pain control prn s/p L flank wound debridement  - current abx as above   - pain control prn

## 2022-09-18 NOTE — DISCHARGE NOTE NURSING/CASE MANAGEMENT/SOCIAL WORK - NSDCFUADDAPPT_GEN_ALL_CORE_FT
Follow up with Infectious Disease Dr. Epi Harvey within 1 month of [facility] discharge. Upon discharge follow up as outpatient at [facility] [address] [facility] [phone].   Follow up outpatient with cardiology routinely.

## 2022-09-18 NOTE — PROGRESS NOTE ADULT - PROBLEM/PLAN-1
DISPLAY PLAN FREE TEXT
DISPLAY PLAN FREE TEXT
Raymundo Richard is a 70 y.o. male here for   Chief Complaint   Patient presents with    Diabetes       Functional glucose monitor and record keeping system? -   Eye exam within last year? -   Foot exam within last year? -     1. Have you been to the ER, urgent care clinic since your last visit? Hospitalized since your last visit? -    2. Have you seen or consulted any other health care providers outside of the [address] [place] [address] since your last visit?   Include any pap smears or colon screening.-      Lab Results   Component Value Date/Time    Hemoglobin A1c 8.5 04/19/2017 09:08 AM    Hemoglobin A1c, External 8.3 11/05/2016 12:57 PM       Wt Readings from Last 3 Encounters:   07/26/17 205 lb 12.8 oz (93.4 kg)   04/24/17 216 lb (98 kg)   01/20/17 219 lb (99.3 kg)     Temp Readings from Last 3 Encounters:   07/26/17 97.5 °F (36.4 °C) (Oral)   04/24/17 97.2 °F (36.2 °C) (Oral)   01/20/17 97.3 °F (36.3 °C) (Oral)     BP Readings from Last 3 Encounters:   07/26/17 131/63   04/24/17 136/67   01/20/17 143/78     Pulse Readings from Last 3 Encounters:   07/26/17 82   04/24/17 76   01/20/17 81
DISPLAY PLAN FREE TEXT

## 2022-10-01 LAB
CULTURE RESULTS: SIGNIFICANT CHANGE UP
SPECIMEN SOURCE: SIGNIFICANT CHANGE UP

## 2022-10-12 NOTE — PATIENT PROFILE ADULT - DO YOU LACK THE NECESSARY SUPPORT TO HELP YOU COPE WITH LIFE CHALLENGES?
Pt calling due to anxiety/claustrophobia due to cast placement on arm due to break. Needs something to help her relax.    Orders Placed This Encounter   • ALPRAZolam (XANAX) 0.25 MG tablet   Meds sent    ESTRELLA Schreiber     no

## 2023-02-07 NOTE — PROGRESS NOTE ADULT - PROBLEM SELECTOR PLAN 3
[facility]    ASSESSMENT AND PLAN     1  Starr-Danlos syndrome  Assessment & Plan:  Doing fairly well on current treatment  Ultram schedule: 150mg in AM-150mg at lunch-100mg at HS   Gabapentin as follows:  600 mg in a m  600 mg in afternoon -700 mg at HS    Occasional joint pain-primarily in hands and wrists  Occasional back  Brandy Solomona continue current treatment plan  Contract in place     Years since any specialist follow up for his condition  Advise re-establish with Rheum to ensure we are doing all appropriate and current treatment  recommendations  Had appt scheduled - was canceled by provider  He has not rescheduled yet - Will have staff assist in rescheduling  Discussed importance of eval - may be better, alternative treatment options  Echo and Aorta US still pending  2  Rash  Assessment & Plan:  Possible insect bites - sleeps with his dog  May have fleas  Trail Kenalog cream  Follow up if persistent  Consider Derm referral    May need to treat home for infestation    Orders:  -     triamcinolone (KENALOG) 0 1 % cream; Apply topically 2 (two) times a day    3  Tobacco use  Assessment & Plan:  He has successfully quit smoking  4  Depression with anxiety  Assessment & Plan:  Reports on/off for years  Denies any SI/HI  Marjorie Freeman Already on Wellbutrin - he will increase dose to BID (admits to inconsistent with PM dose)  He is agreeable to trial therapy - referral placed  F/U here 6 weeks  May trial adding SSRI  F/U  sooner with concerns    Orders:  -     Ambulatory Referral to [facility]; Future    5  Alcohol use  Assessment & Plan:  Reports alcohol use - many mornings 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)  He believes he uses secondary to his anxiety - helps him sleep  He is looking into getting approved for medical marijuana for same  Dicussed importance of alcohol avoidance - and potential dangers of consuming with prescription medications  He verbalizes understanding     We discussed counseling and he is agreeable to same  - referral placed  Will have f/u here 6 weeks - advise sooner with problems/concerns    Orders:  -     Ambulatory Referral to [facility]; Future         SUBJECTIVE       Patient ID: Jose Posey is a 30 y o  male  Chief Complaint   Patient presents with   • Follow-up     Med check       HISTORY OF PRESENT ILLNESS    Routien check up        The following portions of the patient's history were reviewed and updated as appropriate: allergies, current medications, past family history, past medical history, past social history, past surgical history, and problem list     REVIEW OF SYSTEMS  Review of Systems   Constitutional: Negative  HENT: Negative  Respiratory: Negative  Cardiovascular: Negative  Gastrointestinal: Negative  Genitourinary: Negative  Musculoskeletal: Negative  Baseline   Skin: Negative  Neurological: Negative  Psychiatric/Behavioral: Negative          OBJECTIVE      VITAL SIGNS  /86 (BP Location: Left arm, Patient Position: Sitting, Cuff Size: Standard)   Pulse 78   Temp (!) 97 4 °F (36 3 °C)   Ht 5' 10" (1 778 m)   Wt 78 3 kg (172 lb 9 6 oz)   SpO2 97%   BMI 24 77 kg/m²     CURRENT MEDICATIONS    Current Outpatient Medications:   •  albuterol (Ventolin HFA) 90 mcg/act inhaler, Inhale 2 puffs as needed for wheezing, Disp: 18 g, Rfl: 1  •  aspirin-acetaminophen-caffeine (EXCEDRIN MIGRAINE) 250-250-65 MG per tablet, Take 3 tablets by mouth daily, Disp: , Rfl:   •  buPROPion (Wellbutrin SR) 100 mg 12 hr tablet, Take 1 tablet (100 mg total) by mouth 2 (two) times a day (Patient taking differently: Take 100 mg by mouth 2 (two) times a day One a day), Disp: 60 tablet, Rfl: 2  •  diphenhydrAMINE (BENADRYL) 25 mg capsule, Take 1 tablet by mouth daily , Disp: , Rfl:   •  fluticasone (FLONASE) 50 mcg/act nasal spray, 1 spray into each nostril daily, Disp: 16 g, Rfl: 1  •  gabapentin (NEURONTIN) 100 mg capsule, Take 1 capsule (100 mg total) by mouth daily at bedtime, Disp: 30 capsule, Rfl: 5  •  gabapentin (NEURONTIN) 600 MG tablet, Take 1 tablet (600 mg total) by mouth 3 (three) times a day, Disp: 90 tablet, Rfl: 5  •  traMADol (ULTRAM) 50 mg tablet, Take 2 tablets (100 mg total) by mouth every 6 (six) hours as needed for moderate pain Do not start before January 28, 2023 , Disp: 240 tablet, Rfl: 0  •  triamcinolone (KENALOG) 0 1 % cream, Apply topically 2 (two) times a day, Disp: 30 g, Rfl: 1  •  varenicline (CHANTIX ELIJAH) 0 5 MG X 11 & 1 MG X 42 tablet, Take one 0 5 mg tablet by mouth once daily for 3 days, then one 0 5 mg tablet by mouth twice daily for 4 days, then one 1 mg tablet by mouth twice daily  (Patient not taking: Reported on 8/12/2022), Disp: 53 tablet, Rfl: 0      PHYSICAL EXAMINATION   Physical Exam  Vitals and nursing note reviewed  Constitutional:       General: He is not in acute distress  Appearance: Normal appearance  He is well-developed and well-groomed  He is not ill-appearing  HENT:      Head: Normocephalic and atraumatic  Right Ear: Tympanic membrane, ear canal and external ear normal       Left Ear: Tympanic membrane, ear canal and external ear normal       Mouth/Throat:      Mouth: Mucous membranes are moist    Eyes:      Pupils: Pupils are equal, round, and reactive to light  Neck:      Vascular: No carotid bruit  Cardiovascular:      Rate and Rhythm: Normal rate and regular rhythm  Heart sounds: Normal heart sounds  Pulmonary:      Effort: Pulmonary effort is normal  No respiratory distress  Breath sounds: Normal breath sounds and air entry  Musculoskeletal:      Right lower leg: No edema  Left lower leg: No edema  Lymphadenopathy:      Cervical: No cervical adenopathy  Skin:     General: Skin is warm and dry  Findings: Rash (right arm) present  Neurological:      General: No focal deficit present        Mental Status: He is alert and oriented to person, place, and time  Psychiatric:         Attention and Perception: Attention normal          Mood and Affect: Mood normal          Behavior: Behavior normal          Thought Content:  Thought content normal          Judgment: Judgment normal  - likely related to infection / reactive   - trend   - if continues to rise, patient may need aspirin ppx

## 2023-09-29 NOTE — PATIENT PROFILE ADULT - NSTOBACCONEVERSMOKERY/N_GEN_A
(FYI)  Patient c/o Headaches, dull, for the last couple weeks. Comes and goes, scalp feels tight (skin), she has light sensitivity with the headaches. She reports she will sometimes \"see stars\", pain is usually 5/10, the stars last for an hour or two in the morning.  she has not taken tylenol or ibuprofen for them. Does take methadone daily.    Pt mentions a history of blood clots, so she is concerned.    Denies facial asymmetry , numbness, tingling, balance, difficulty with word finding.    Patient is scheduled for an appointment 10/12/23 and will report any changes in symptoms in the meantime.  ________________  Only need to return call if she should be seen sooner     No
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
## 2024-08-21 NOTE — PATIENT PROFILE ADULT - LOCATION #8
[facility]    PLEASE ADVISE.   Right Armpit

## 2024-10-10 NOTE — ED ADULT NURSE NOTE - NSFALLRSKHARMRISK_ED_ALL_ED
Chart reviewed.   Last seen 07/02/24 for chronic vaginitis  \"PLAN:    -Given she is asymptomatic, will continue to monitor and plan to follow up with annual exam and pap.  If she becomes symptomatic of yeast or BV, can treat over the phone and if treatment is not helping then should be seen.    Return in about 1 month (around 8/2/2024) for Schedule annual exam with pap with me, whatever works for patient. . \"    Portal message to patient.   no

## 2025-05-07 NOTE — ED PROVIDER NOTE - TEMPLATE, MLM
"Discharge instructions:   -Do not place anything (no partner, sex toys, tampons, douche, etc.) in your vagina for 6 weeks  -You may walk for exercise for the first 6 weeks then gradually return to your usual activities   -Please do not drive for 1 week if you have no stitches and for 2 weeks if you have stitches    -Please do not drive if you are taking any narcotic medications  -You may take baths or shower per your preference   -Please examine your breasts in the mirror daily and call your doctor for redness, tenderness, increased warmth, fevers, or chills  -Please call your doctor's office for temperature > 100.4*F or 38*C, worsening pain, increased bleeding (filling 2 or more maxi pads within 1 hr or less for at least 2 hrs), foul-smelling discharge/drainage, burning with urination, or symptoms of depression    For pain, you may take 650mg of Tylenol every 6 hours  For cramping, you may take 600mg of ibuprofen every 6 hours    You can alternate Tylenol and ibuprofen and take them \"around the clock\" to stay ahead of pain. For example, take 650mg of Tylenol at 9 AM, then 600mg of ibuprofen at 12 PM, then 650mg of Tylenol at 3 PM, and so forth.     Please take over the counter stool softener or laxative to ensure you do not strain when moving your bowels. You can take whatever is preferred (Miralax, Senna, Metamucil).    If you have any questions or concerns, please call your doctor.  " General

## 2025-05-16 NOTE — PROGRESS NOTE ADULT - SUBJECTIVE AND OBJECTIVE BOX
Subjective/Objective: Patient alert , oriented x2. denies chest pain or sob. Bedridden with multiple wound and a wound vac.          MEDICATIONS  furosemide    Tablet 40 milliGRAM(s) Oral daily  heparin   Injectable 5000 Unit(s) SubCutaneous every 8 hours  metoprolol tartrate 12.5 milliGRAM(s) Oral two times a dayam   ampicillin/sulbactam  IVPB 3 Gram(s) IV Intermittent every 6 hours  ALBUTerol    90 MICROgram(s) HFA Inhaler 2 Puff(s) Inhalation every 6 hours PRN  acetaminophen     Tablet .. 650 milliGRAM(s) Oral every 6 hours PRN  HYDROmorphone   Tablet 2 milliGRAM(s) Oral every 3 hours PRN  HYDROmorphone   Tablet 1 milliGRAM(s) Oral every 3 hours PRN  pantoprazole    Tablet 40 milliGRAM(s) Oral before breakfast  insulin lispro (ADMELOG) corrective regimen sliding scale   SubCutaneous Before meals and at bedtime  insulin lispro Injectable (ADMELOG) 5 Unit(s) SubCutaneous three times a day before meals  ascorbic acid 500 milliGRAM(s) Oral daily  cadexomer iodine 0.9% Gel 1 Application(s) Topical daily  chlorhexidine 2% Cloths 1 Application(s) Topical daily  cholecalciferol 2000 Unit(s) Oral daily  collagenase Ointment 1 Application(s) Topical daily  magnesium sulfate  IVPB 2 Gram(s) IV Intermittent once  multivitamin 1 Tablet(s) Oral daily  nystatin Powder 1 Application(s) Topical two times a day  potassium chloride   Powder 40 milliEquivalent(s) Oral every 4 hours  sodium chloride 0.9% lock flush 10 milliLiter(s) IV Push every 1 hour PRN          REVIEW OF SYSTEMS    General: no fatigue/malaise  Ophthalmologic: no blurred vision, no loss of vision. 	  ENT: no sore throat, rhinorrhea, sinus congestion.  Cardiovascular:no chest pain ,no palpitation,no dizziness,no diaphoresis,  Respiratory: no SOB, cough or wheeze.  Gastrointestinal:  no N/V/D .  Genitourinary: no dysuria.  Neurological: no changes in vision or hearing, no lightheadedness/dizziness, no syncope/near syncope	  Psychiatric: no unusual stress/anxiety      	    ICU Vital Signs Last 24 Hrs  T(C): 36.6 (23 Aug 2022 09:41), Max: 36.8 (22 Aug 2022 12:00)  T(F): 97.9 (23 Aug 2022 09:41), Max: 98.2 (22 Aug 2022 12:00)  HR: 83 (23 Aug 2022 09:41) (83 - 110)  BP: 135/60 (23 Aug 2022 09:41) (100/53 - 144/74)  BP(mean): 65 (22 Aug 2022 15:00) (63 - 65)  ABP: 95/47 (22 Aug 2022 13:00) (95/47 - 107/49)  ABP(mean): 64 (22 Aug 2022 13:00) (64 - 69)  RR: 18 (23 Aug 2022 09:41) (11 - 22)  SpO2: 93% (23 Aug 2022 09:41) (93% - 99%)    O2 Parameters below as of 23 Aug 2022 06:30  Patient On (Oxygen Delivery Method): room air            PHYSICAL EXAMINATION  Appearance: NAD, no distress  Cardiovascular: Regular rate and rhythm, Normal S1 S2, No JVD, No murmurs  Respiratory: Lungs clear to auscultation. No rales, No rhonchi, No wheezing. No tenderness to palpation  Gastrointestinal: obese, Soft, Non-tender, + BS  Skin: Warm and dry,+ multiple wound  Musculoskeletal: No clubbing, No cyanosis, No edema  Psychiatry: Mood & affect appropriate      	    		      I&O's Summary    22 Aug 2022 07:01  -  23 Aug 2022 07:00  --------------------------------------------------------  IN: 170 mL / OUT: 2000 mL / NET: -1830 mL    23 Aug 2022 07:01  -  23 Aug 2022 11:51  --------------------------------------------------------  IN: 0 mL / OUT: 300 mL / NET: -300 mL    	 	  LABORATORY VALUES	 	                          9.0    9.21  )-----------( 497      ( 23 Aug 2022 09:00 )             30.0       08-23    142  |  108<H>  |  4<L>  ----------------------------<  121<H>  3.4<L>   |  25  |  0.36<L>  08-22    142  |  109<H>  |  5<L>  ----------------------------<  249<H>  3.4<L>   |  23  |  0.35<L>    Ca    7.0<L>      23 Aug 2022 09:00  Ca    7.0<L>      22 Aug 2022 00:13  Phos  3.6     08-23  Phos  3.0     08-22  Mg     1.50     08-23  Mg     1.40     08-22    TPro  4.4<L>  /  Alb  1.5<L>  /  TBili  <0.2  /  DBili  x   /  AST  17  /  ALT  5   /  AlkPhos  206<H>  08-22    LIVER FUNCTIONS - ( 22 Aug 2022 00:13 )  Alb: 1.5 g/dL / Pro: 4.4 g/dL / ALK PHOS: 206 U/L / ALT: 5 U/L / AST: 17 U/L / GGT: x           Thyroid Stimulating Hormone, Serum: 3.86 uIU/mL (03-28 @ 12:53)        CAPILLARY BLOOD GLUCOSE      POCT Blood Glucose.: 116 mg/dL (23 Aug 2022 08:27)    08-11 @ 04:49  Culture - Blood: --  Gram Stain Blood: --  Method Type: DHAVAL  Organism: Enterococcus faecium (vancomycin resistant)  Organism Identification: Enterococcus faecium (vancomycin resistant)    08-10 @ 22:30  Culture - Blood: --  Gram Stain Blood: --  Method Type: DHAVAL  Organism: Staphylococcus epidermidis  Organism Identification: Staphylococcus epidermidis    08-10 @ 22:20  Culture - Blood: --  Gram Stain Blood: --  Method Type: PCR  Organism: Blood Culture PCR  Organism Identification: Blood Culture PCR          Diagnostic testing:  cath:  echo:< from: TTE with Doppler (w/Cont) (08.15.22 @ 12:55) >  Dimensions:     Normal Values:  LA:     4.1 cm    2.0 - 4.0 cm  Ao:     2.6 cm    2.0 - 3.8 cm  SEPTUM: 1.4 cm    0.6 - 1.2 cm  PWT:    0.9 cm   0.6 - 1.1 cm  LVIDd:  4.2 cm    3.0 - 5.6 cm  LVIDs:  4.0 cm    1.8 - 4.0 cm  Derived Variables:  LVMI: 82 g/m2  RWT: 0.42  Fractional short: 5 %  Ejection Fraction (Visual Estimate): 5-10 %  ------------------------------------------------------------------------  OBSERVATIONS:  Mitral Valve: Tethered mitral valve leaflets with normalopening. Mild-moderate mitral regurgitation.  Aortic Root: Normal aortic root.  Aortic Valve: Aortic valve not well visualized; probablynormal. Minimal aortic regurgitation.  Left Atrium: Mildly dilated left atrium.  LA volume index =37 cc/m2.  Left Ventricle: Endocardial visualization enhanced withintravenous injection of echo contrast (Definity). Severesegmental left ventricular systolic dysfunction. The basal  myocardial segments have the most preserved function; allremaining segments appear severely hypokinetic. Normal leftventricular internal dimensions and wall thicknesses.  Right Heart: Normal right atrium. Normal right ventricularsize and function. Normal tricuspid valve. Normal pulmonicvalve.  Pericardium/PleuraNormal pericardium with no pericardial effusion.    < end of copied text >        Assessment and Plan:  52F immobile 2/2 h/o MS, DM2, asthma, hypothyroidism, and known sacral wounds [place] from [place] with urosepsis and anemia with new L flank wound with concern for necrotizing soft tissue infection. Was s/p debridement of wound with worsening sepsis, acidosis, and severe global LV dysfunction of unknown etiology, transferred to CCU for [place] and inotrope support. Pt is s/p swan removal and off all sedation, weaning off pressors. Repeat echo showed EF of 5-10% and signs suggestive of possible stress induced cardiomyopathy. Pt's mental status has returned to baseline off sedation. Pt started on lopressor 12.5 po bid and lasix 40 po qd for HFrEF. C/w Unasyn until 8/25 per ID. Patient is stable for transfer to surgical tele.      CV:  # severe LV dysfunction  - EF 5 - 10%  -  cardiomyopathy of unknown etiology but may be R/T sepsis and critical illness  - s/p Dobutamine and IV pressor, now off-   - repeat ECHO to reassess LV function showed EF of 5-10%   -c/w lopressor 12.5 PO ,lasix 40mg PO daily  -start on losartan 25mg Po daily ( hold for SBp <100) Subjective/Objective: Patient alert , oriented x2. denies chest pain or sob. Bedridden with multiple wound and a wound vac.          MEDICATIONS  furosemide    Tablet 40 milliGRAM(s) Oral daily  heparin   Injectable 5000 Unit(s) SubCutaneous every 8 hours  metoprolol tartrate 12.5 milliGRAM(s) Oral two times a dayam   ampicillin/sulbactam  IVPB 3 Gram(s) IV Intermittent every 6 hours  ALBUTerol    90 MICROgram(s) HFA Inhaler 2 Puff(s) Inhalation every 6 hours PRN  acetaminophen     Tablet .. 650 milliGRAM(s) Oral every 6 hours PRN  HYDROmorphone   Tablet 2 milliGRAM(s) Oral every 3 hours PRN  HYDROmorphone   Tablet 1 milliGRAM(s) Oral every 3 hours PRN  pantoprazole    Tablet 40 milliGRAM(s) Oral before breakfast  insulin lispro (ADMELOG) corrective regimen sliding scale   SubCutaneous Before meals and at bedtime  insulin lispro Injectable (ADMELOG) 5 Unit(s) SubCutaneous three times a day before meals  ascorbic acid 500 milliGRAM(s) Oral daily  cadexomer iodine 0.9% Gel 1 Application(s) Topical daily  chlorhexidine 2% Cloths 1 Application(s) Topical daily  cholecalciferol 2000 Unit(s) Oral daily  collagenase Ointment 1 Application(s) Topical daily  magnesium sulfate  IVPB 2 Gram(s) IV Intermittent once  multivitamin 1 Tablet(s) Oral daily  nystatin Powder 1 Application(s) Topical two times a day  potassium chloride   Powder 40 milliEquivalent(s) Oral every 4 hours  sodium chloride 0.9% lock flush 10 milliLiter(s) IV Push every 1 hour PRN          REVIEW OF SYSTEMS    General: no fatigue/malaise  Ophthalmologic: no blurred vision, no loss of vision. 	  ENT: no sore throat, rhinorrhea, sinus congestion.  Cardiovascular:no chest pain ,no palpitation,no dizziness,no diaphoresis,  Respiratory: no SOB, cough or wheeze.  Gastrointestinal:  no N/V/D .  Genitourinary: no dysuria.  Neurological: no changes in vision or hearing, no lightheadedness/dizziness, no syncope/near syncope	  Psychiatric: no unusual stress/anxiety      	    ICU Vital Signs Last 24 Hrs  T(C): 36.6 (23 Aug 2022 09:41), Max: 36.8 (22 Aug 2022 12:00)  T(F): 97.9 (23 Aug 2022 09:41), Max: 98.2 (22 Aug 2022 12:00)  HR: 83 (23 Aug 2022 09:41) (83 - 110)  BP: 135/60 (23 Aug 2022 09:41) (100/53 - 144/74)  BP(mean): 65 (22 Aug 2022 15:00) (63 - 65)  ABP: 95/47 (22 Aug 2022 13:00) (95/47 - 107/49)  ABP(mean): 64 (22 Aug 2022 13:00) (64 - 69)  RR: 18 (23 Aug 2022 09:41) (11 - 22)  SpO2: 93% (23 Aug 2022 09:41) (93% - 99%)    O2 Parameters below as of 23 Aug 2022 06:30  Patient On (Oxygen Delivery Method): room air            PHYSICAL EXAMINATION  Appearance: NAD, no distress  Cardiovascular: Regular rate and rhythm, Normal S1 S2, No JVD, No murmurs  Respiratory: Lungs clear to auscultation. No rales, No rhonchi, No wheezing. No tenderness to palpation  Gastrointestinal: obese, Soft, Non-tender, + BS  Skin: Warm and dry,+ multiple wound  Musculoskeletal: No clubbing, No cyanosis, No edema  Psychiatry: Mood & affect appropriate      	    		      I&O's Summary    22 Aug 2022 07:01  -  23 Aug 2022 07:00  --------------------------------------------------------  IN: 170 mL / OUT: 2000 mL / NET: -1830 mL    23 Aug 2022 07:01  -  23 Aug 2022 11:51  --------------------------------------------------------  IN: 0 mL / OUT: 300 mL / NET: -300 mL    	 	  LABORATORY VALUES	 	                          9.0    9.21  )-----------( 497      ( 23 Aug 2022 09:00 )             30.0       08-23    142  |  108<H>  |  4<L>  ----------------------------<  121<H>  3.4<L>   |  25  |  0.36<L>  08-22    142  |  109<H>  |  5<L>  ----------------------------<  249<H>  3.4<L>   |  23  |  0.35<L>    Ca    7.0<L>      23 Aug 2022 09:00  Ca    7.0<L>      22 Aug 2022 00:13  Phos  3.6     08-23  Phos  3.0     08-22  Mg     1.50     08-23  Mg     1.40     08-22    TPro  4.4<L>  /  Alb  1.5<L>  /  TBili  <0.2  /  DBili  x   /  AST  17  /  ALT  5   /  AlkPhos  206<H>  08-22    LIVER FUNCTIONS - ( 22 Aug 2022 00:13 )  Alb: 1.5 g/dL / Pro: 4.4 g/dL / ALK PHOS: 206 U/L / ALT: 5 U/L / AST: 17 U/L / GGT: x           Thyroid Stimulating Hormone, Serum: 3.86 uIU/mL (03-28 @ 12:53)        CAPILLARY BLOOD GLUCOSE      POCT Blood Glucose.: 116 mg/dL (23 Aug 2022 08:27)    08-11 @ 04:49  Culture - Blood: --  Gram Stain Blood: --  Method Type: DHAVAL  Organism: Enterococcus faecium (vancomycin resistant)  Organism Identification: Enterococcus faecium (vancomycin resistant)    08-10 @ 22:30  Culture - Blood: --  Gram Stain Blood: --  Method Type: DHAVAL  Organism: Staphylococcus epidermidis  Organism Identification: Staphylococcus epidermidis    08-10 @ 22:20  Culture - Blood: --  Gram Stain Blood: --  Method Type: PCR  Organism: Blood Culture PCR  Organism Identification: Blood Culture PCR          Diagnostic testing:  cath:  echo:< from: TTE with Doppler (w/Cont) (08.15.22 @ 12:55) >  Dimensions:     Normal Values:  LA:     4.1 cm    2.0 - 4.0 cm  Ao:     2.6 cm    2.0 - 3.8 cm  SEPTUM: 1.4 cm    0.6 - 1.2 cm  PWT:    0.9 cm   0.6 - 1.1 cm  LVIDd:  4.2 cm    3.0 - 5.6 cm  LVIDs:  4.0 cm    1.8 - 4.0 cm  Derived Variables:  LVMI: 82 g/m2  RWT: 0.42  Fractional short: 5 %  Ejection Fraction (Visual Estimate): 5-10 %  ------------------------------------------------------------------------  OBSERVATIONS:  Mitral Valve: Tethered mitral valve leaflets with normalopening. Mild-moderate mitral regurgitation.  Aortic Root: Normal aortic root.  Aortic Valve: Aortic valve not well visualized; probablynormal. Minimal aortic regurgitation.  Left Atrium: Mildly dilated left atrium.  LA volume index =37 cc/m2.  Left Ventricle: Endocardial visualization enhanced withintravenous injection of echo contrast (Definity). Severesegmental left ventricular systolic dysfunction. The basal  myocardial segments have the most preserved function; allremaining segments appear severely hypokinetic. Normal leftventricular internal dimensions and wall thicknesses.  Right Heart: Normal right atrium. Normal right ventricularsize and function. Normal tricuspid valve. Normal pulmonicvalve.  Pericardium/PleuraNormal pericardium with no pericardial effusion.    < end of copied text >        Assessment and Plan:  52F immobile 2/2 h/o MS, DM2, asthma, hypothyroidism, and known sacral wounds presents from [place] with urosepsis and anemia with new L flank wound with concern for necrotizing soft tissue infection. Was s/p debridement of wound with worsening sepsis, acidosis, and severe global LV dysfunction of unknown etiology, transferred to CCU for [place] and inotrope support. Pt is s/p swan removal and off all sedation, weaning off pressors. Repeat echo showed EF of 5-10% and signs suggestive of possible stress induced cardiomyopathy. Pt's mental status has returned to baseline off sedation. Pt started on lopressor 12.5 po bid and lasix 40 po qd for HFrEF. C/w Unasyn until 8/25 per ID. Patient is stable for transfer to surgical tele.      CV:  # severe LV dysfunction  - EF 5 - 10%  -  cardiomyopathy of unknown etiology but may be R/T sepsis and critical illness  - s/p Dobutamine and IV pressor, now off-   - repeat ECHO to reassess LV function showed EF of 5-10%   -c/w lopressor 12.5 PO ,lasix 40mg PO daily  -start on losartan 25mg Po daily ( hold for SBp <100)  - ischemic eval as out pt [Time Spent: ___ minutes] : I have spent [unfilled] minutes of time on the encounter which excludes teaching and separately reported services.

## (undated) DEVICE — DRSG TEGADERM 4X4.75"

## (undated) DEVICE — DRSG COMBINE 5X9"

## (undated) DEVICE — PREP BETADINE KIT

## (undated) DEVICE — VENODYNE/SCD SLEEVE CALF MEDIUM

## (undated) DEVICE — SOL IRR POUR NS 0.9% 500ML

## (undated) DEVICE — PACK MINOR WITH LAP

## (undated) DEVICE — STAPLER SKIN VISI-STAT 35 WIDE

## (undated) DEVICE — LIGASURE IMPACT

## (undated) DEVICE — Device

## (undated) DEVICE — ELCTR GROUNDING PAD ADULT COVIDIEN

## (undated) DEVICE — LABELS BLANK W PEN

## (undated) DEVICE — DRAPE LAPAROTOMY W VELCRO CORD TABS

## (undated) DEVICE — LIJ/LIA-RENTAL VAC MACHINE VACUUM ASSISTED C: Type: DURABLE MEDICAL EQUIPMENT

## (undated) DEVICE — CANISTER W/GEL INFOVAC 1000ML

## (undated) DEVICE — HANDPIECE INTERPULSE W/ COAXIAL FAN SPRAY TIP

## (undated) DEVICE — SUT VICRYL 3-0 27" SH UNDYED

## (undated) DEVICE — BASIN SET DOUBLE

## (undated) DEVICE — DRSG VAC GRANUFOAM LARGE (BLACK)

## (undated) DEVICE — DRSG VAC ABTHERS

## (undated) DEVICE — POSITIONER STRAP ARMBOARD VELCRO TS-30

## (undated) DEVICE — DRSG TELFA 2 X 3

## (undated) DEVICE — SYR LUER LOK 20CC

## (undated) DEVICE — SUT VICRYL 2-0 27" SH UNDYED